# Patient Record
Sex: FEMALE | Race: WHITE | Employment: UNEMPLOYED
[De-identification: names, ages, dates, MRNs, and addresses within clinical notes are randomized per-mention and may not be internally consistent; named-entity substitution may affect disease eponyms.]

---

## 2017-01-20 ENCOUNTER — ROUTINE PRENATAL (OUTPATIENT)
Dept: OBGYN | Facility: CLINIC | Age: 22
End: 2017-01-20

## 2017-01-20 VITALS
BODY MASS INDEX: 42.76 KG/M2 | DIASTOLIC BLOOD PRESSURE: 76 MMHG | HEART RATE: 73 BPM | SYSTOLIC BLOOD PRESSURE: 138 MMHG | WEIGHT: 273 LBS

## 2017-01-20 DIAGNOSIS — O09.899 SUPERVISION OF OTHER HIGH-RISK PREGNANCY: ICD-10-CM

## 2017-01-20 DIAGNOSIS — Z3A.29 29 WEEKS GESTATION OF PREGNANCY: Primary | ICD-10-CM

## 2017-01-20 PROCEDURE — 0502F SUBSEQUENT PRENATAL CARE: CPT | Performed by: SPECIALIST

## 2017-01-30 ENCOUNTER — TELEPHONE (OUTPATIENT)
Dept: OBGYN | Facility: CLINIC | Age: 22
End: 2017-01-30

## 2017-01-30 DIAGNOSIS — I10 ESSENTIAL HYPERTENSION: ICD-10-CM

## 2017-01-30 DIAGNOSIS — O09.899 SUPERVISION OF OTHER HIGH-RISK PREGNANCY: Primary | ICD-10-CM

## 2017-01-30 DIAGNOSIS — R73.09 ELEVATED GLUCOSE TOLERANCE TEST: ICD-10-CM

## 2017-02-08 ENCOUNTER — HOSPITAL ENCOUNTER (OUTPATIENT)
Age: 22
Discharge: HOME | End: 2017-02-08
Payer: COMMERCIAL

## 2017-02-08 ENCOUNTER — HOSPITAL ENCOUNTER (OUTPATIENT)
Age: 22
Setting detail: OBSERVATION
LOS: 1 days | Discharge: HOME | End: 2017-02-08
Attending: SPECIALIST | Admitting: OBSTETRICS & GYNECOLOGY
Payer: COMMERCIAL

## 2017-02-08 VITALS
TEMPERATURE: 97.9 F | RESPIRATION RATE: 16 BRPM | BODY MASS INDEX: 42.85 KG/M2 | HEART RATE: 93 BPM | SYSTOLIC BLOOD PRESSURE: 136 MMHG | OXYGEN SATURATION: 97 % | HEIGHT: 67 IN | DIASTOLIC BLOOD PRESSURE: 74 MMHG | WEIGHT: 273 LBS

## 2017-02-08 DIAGNOSIS — R73.09 ELEVATED GLUCOSE TOLERANCE TEST: ICD-10-CM

## 2017-02-08 DIAGNOSIS — I10 ESSENTIAL HYPERTENSION: ICD-10-CM

## 2017-02-08 DIAGNOSIS — O09.899 SUPERVISION OF OTHER HIGH-RISK PREGNANCY: ICD-10-CM

## 2017-02-08 PROBLEM — Z98.891 H/O CESAREAN SECTION: Status: ACTIVE | Noted: 2017-02-08

## 2017-02-08 PROBLEM — R82.71 GBS BACTERIURIA: Status: ACTIVE | Noted: 2017-02-08

## 2017-02-08 PROBLEM — O26.899 PELVIC PAIN AFFECTING PREGNANCY: Status: ACTIVE | Noted: 2017-02-08

## 2017-02-08 PROBLEM — O26.899 HEADACHE IN PREGNANCY: Status: ACTIVE | Noted: 2017-02-08

## 2017-02-08 PROBLEM — Z87.59 HISTORY OF SPONTANEOUS ABORTION: Status: ACTIVE | Noted: 2017-02-08

## 2017-02-08 PROBLEM — R51.9 HEADACHE IN PREGNANCY: Status: ACTIVE | Noted: 2017-02-08

## 2017-02-08 PROBLEM — R10.2 PELVIC PAIN AFFECTING PREGNANCY: Status: ACTIVE | Noted: 2017-02-08

## 2017-02-08 PROBLEM — O98.819 CHLAMYDIA INFECTION AFFECTING PREGNANCY: Status: ACTIVE | Noted: 2017-02-08

## 2017-02-08 PROBLEM — A74.9 CHLAMYDIA INFECTION AFFECTING PREGNANCY: Status: ACTIVE | Noted: 2017-02-08

## 2017-02-08 LAB
AMOUNT GLUCOSE GIVEN: 100 G
BILIRUBIN URINE: NEGATIVE
COLOR: YELLOW
COMMENT UA: NORMAL
DIRECT EXAM: NORMAL
GLUCOSE FASTING: 133 MG/DL (ref 65–99)
GLUCOSE TOLERANCE TEST 1 HOUR: ABNORMAL MG/DL (ref 65–184)
GLUCOSE TOLERANCE TEST 2 HOUR: ABNORMAL MG/DL (ref 65–139)
GLUCOSE TOLERANCE TEST 3 HOUR: ABNORMAL MG/DL (ref 65–130)
GLUCOSE URINE: NEGATIVE
KETONES, URINE: NEGATIVE
LEUKOCYTE ESTERASE, URINE: NEGATIVE
Lab: NORMAL
NITRITE, URINE: NEGATIVE
PH UA: 6.5 (ref 5–8)
PROTEIN UA: NEGATIVE
SPECIFIC GRAVITY UA: 1.02 (ref 1–1.03)
SPECIMEN DESCRIPTION: NORMAL
SPECIMEN DESCRIPTION: NORMAL
STATUS: NORMAL
TURBIDITY: CLEAR
URINE HGB: NEGATIVE
UROBILINOGEN, URINE: NORMAL

## 2017-02-08 PROCEDURE — 82952 GTT-ADDED SAMPLES: CPT

## 2017-02-08 PROCEDURE — G0463 HOSPITAL OUTPT CLINIC VISIT: HCPCS

## 2017-02-08 PROCEDURE — 82947 ASSAY GLUCOSE BLOOD QUANT: CPT

## 2017-02-08 PROCEDURE — 81003 URINALYSIS AUTO W/O SCOPE: CPT

## 2017-02-08 PROCEDURE — 87591 N.GONORRHOEAE DNA AMP PROB: CPT

## 2017-02-08 PROCEDURE — 87491 CHLMYD TRACH DNA AMP PROBE: CPT

## 2017-02-08 PROCEDURE — 36415 COLL VENOUS BLD VENIPUNCTURE: CPT

## 2017-02-08 PROCEDURE — 87660 TRICHOMONAS VAGIN DIR PROBE: CPT

## 2017-02-08 PROCEDURE — 87510 GARDNER VAG DNA DIR PROBE: CPT

## 2017-02-08 PROCEDURE — 82951 GLUCOSE TOLERANCE TEST (GTT): CPT

## 2017-02-08 PROCEDURE — 6370000000 HC RX 637 (ALT 250 FOR IP): Performed by: OBSTETRICS & GYNECOLOGY

## 2017-02-08 PROCEDURE — G0378 HOSPITAL OBSERVATION PER HR: HCPCS

## 2017-02-08 PROCEDURE — 87480 CANDIDA DNA DIR PROBE: CPT

## 2017-02-08 RX ORDER — ACETAMINOPHEN 500 MG
1000 TABLET ORAL EVERY 6 HOURS PRN
Status: DISCONTINUED | OUTPATIENT
Start: 2017-02-08 | End: 2017-02-08 | Stop reason: HOSPADM

## 2017-02-08 RX ADMIN — ACETAMINOPHEN 1000 MG: 500 TABLET ORAL at 14:57

## 2017-02-08 ASSESSMENT — PAIN SCALES - GENERAL: PAINLEVEL_OUTOF10: 10

## 2017-02-09 LAB
C TRACH DNA GENITAL QL NAA+PROBE: NEGATIVE
N. GONORRHOEAE DNA: NEGATIVE

## 2017-02-13 ENCOUNTER — ROUTINE PRENATAL (OUTPATIENT)
Dept: OBGYN | Facility: CLINIC | Age: 22
End: 2017-02-13

## 2017-02-13 VITALS
DIASTOLIC BLOOD PRESSURE: 88 MMHG | SYSTOLIC BLOOD PRESSURE: 142 MMHG | WEIGHT: 274 LBS | HEART RATE: 81 BPM | BODY MASS INDEX: 42.91 KG/M2

## 2017-02-13 DIAGNOSIS — O24.410 DIET CONTROLLED GESTATIONAL DIABETES MELLITUS (GDM) IN THIRD TRIMESTER: ICD-10-CM

## 2017-02-13 DIAGNOSIS — O09.899 SUPERVISION OF OTHER HIGH-RISK PREGNANCY: Primary | ICD-10-CM

## 2017-02-13 DIAGNOSIS — I10 ESSENTIAL HYPERTENSION: ICD-10-CM

## 2017-02-13 DIAGNOSIS — Z3A.32 32 WEEKS GESTATION OF PREGNANCY: ICD-10-CM

## 2017-02-13 DIAGNOSIS — J45.20 MILD INTERMITTENT ASTHMA WITHOUT COMPLICATION: ICD-10-CM

## 2017-02-13 LAB
ACCELERATIONS > 10BPM: NORMAL
ACCELERATIONS > 15 BPM: NORMAL
ACOUSTIC STIMULATION: NORMAL
DECELERATIONS: NORMAL
FHR VARIABILITIES: NORMAL
NST ASSESSMENT: NORMAL
NST BASELINE: NORMAL
NST DURATION: NORMAL MINUTES
NST INDICATIONS: NORMAL
NST LOCATIONS: NORMAL
NST READ BY: NORMAL
NST RETURN: NORMAL
UTERINE ACTIVITY: NORMAL

## 2017-02-13 PROCEDURE — 59025 FETAL NON-STRESS TEST: CPT | Performed by: SPECIALIST

## 2017-02-13 PROCEDURE — 0502F SUBSEQUENT PRENATAL CARE: CPT | Performed by: SPECIALIST

## 2017-02-16 ENCOUNTER — TELEPHONE (OUTPATIENT)
Dept: OBGYN | Facility: CLINIC | Age: 22
End: 2017-02-16

## 2017-02-17 ENCOUNTER — HOSPITAL ENCOUNTER (OUTPATIENT)
Age: 22
Setting detail: OBSERVATION
Discharge: HOME | End: 2017-02-17
Attending: SPECIALIST | Admitting: SPECIALIST
Payer: COMMERCIAL

## 2017-02-17 VITALS
SYSTOLIC BLOOD PRESSURE: 123 MMHG | DIASTOLIC BLOOD PRESSURE: 57 MMHG | RESPIRATION RATE: 19 BRPM | TEMPERATURE: 98.5 F | HEART RATE: 97 BPM

## 2017-02-17 PROCEDURE — 80048 BASIC METABOLIC PNL TOTAL CA: CPT

## 2017-02-17 PROCEDURE — 6370000000 HC RX 637 (ALT 250 FOR IP): Performed by: OBSTETRICS & GYNECOLOGY

## 2017-02-17 PROCEDURE — G0378 HOSPITAL OBSERVATION PER HR: HCPCS

## 2017-02-17 PROCEDURE — G0379 DIRECT REFER HOSPITAL OBSERV: HCPCS

## 2017-02-17 PROCEDURE — 82947 ASSAY GLUCOSE BLOOD QUANT: CPT

## 2017-02-17 RX ORDER — ACETAMINOPHEN 500 MG
1000 TABLET ORAL ONCE
Status: COMPLETED | OUTPATIENT
Start: 2017-02-17 | End: 2017-02-17

## 2017-02-17 RX ADMIN — ACETAMINOPHEN 1000 MG: 500 TABLET ORAL at 19:57

## 2017-02-17 ASSESSMENT — PAIN SCALES - GENERAL: PAINLEVEL_OUTOF10: 10

## 2017-02-20 ENCOUNTER — ROUTINE PRENATAL (OUTPATIENT)
Dept: OBGYN | Facility: CLINIC | Age: 22
End: 2017-02-20

## 2017-02-20 VITALS
WEIGHT: 278 LBS | SYSTOLIC BLOOD PRESSURE: 129 MMHG | BODY MASS INDEX: 43.54 KG/M2 | DIASTOLIC BLOOD PRESSURE: 72 MMHG | HEART RATE: 83 BPM

## 2017-02-20 DIAGNOSIS — J45.20 MILD INTERMITTENT ASTHMA WITHOUT COMPLICATION: ICD-10-CM

## 2017-02-20 DIAGNOSIS — Z3A.33 33 WEEKS GESTATION OF PREGNANCY: ICD-10-CM

## 2017-02-20 DIAGNOSIS — O09.899 SUPERVISION OF OTHER HIGH-RISK PREGNANCY: Primary | ICD-10-CM

## 2017-02-20 DIAGNOSIS — O24.410 DIET CONTROLLED GESTATIONAL DIABETES MELLITUS (GDM) IN THIRD TRIMESTER: ICD-10-CM

## 2017-02-20 LAB
ACCELERATIONS > 10BPM: NORMAL
ACCELERATIONS > 15 BPM: NORMAL
ACOUSTIC STIMULATION: NORMAL
DECELERATIONS: NORMAL
FHR VARIABILITIES: NORMAL
GLUCOSE BLD-MCNC: 137 MG/DL (ref 65–105)
NST ASSESSMENT: NORMAL
NST BASELINE: NORMAL
NST DURATION: NORMAL MINUTES
NST INDICATIONS: NORMAL
NST LOCATIONS: NORMAL
NST READ BY: NORMAL
NST RETURN: NORMAL
UTERINE ACTIVITY: NORMAL

## 2017-02-20 PROCEDURE — 59025 FETAL NON-STRESS TEST: CPT | Performed by: SPECIALIST

## 2017-02-20 PROCEDURE — 0502F SUBSEQUENT PRENATAL CARE: CPT | Performed by: SPECIALIST

## 2017-02-28 ENCOUNTER — ROUTINE PRENATAL (OUTPATIENT)
Dept: OBGYN | Facility: CLINIC | Age: 22
End: 2017-02-28

## 2017-02-28 VITALS
SYSTOLIC BLOOD PRESSURE: 125 MMHG | DIASTOLIC BLOOD PRESSURE: 73 MMHG | BODY MASS INDEX: 43.7 KG/M2 | WEIGHT: 279 LBS | HEART RATE: 84 BPM

## 2017-02-28 DIAGNOSIS — O24.410 DIET CONTROLLED GESTATIONAL DIABETES MELLITUS (GDM) IN THIRD TRIMESTER: ICD-10-CM

## 2017-02-28 DIAGNOSIS — J45.20 MILD INTERMITTENT ASTHMA WITHOUT COMPLICATION: ICD-10-CM

## 2017-02-28 DIAGNOSIS — O09.899 SUPERVISION OF OTHER HIGH-RISK PREGNANCY: Primary | ICD-10-CM

## 2017-02-28 DIAGNOSIS — I10 ESSENTIAL HYPERTENSION: ICD-10-CM

## 2017-02-28 PROCEDURE — 0502F SUBSEQUENT PRENATAL CARE: CPT | Performed by: SPECIALIST

## 2017-02-28 PROCEDURE — 59025 FETAL NON-STRESS TEST: CPT | Performed by: SPECIALIST

## 2017-03-02 ENCOUNTER — PROCEDURE VISIT (OUTPATIENT)
Dept: OBGYN | Facility: CLINIC | Age: 22
End: 2017-03-02

## 2017-03-02 DIAGNOSIS — O24.419 GESTATIONAL DIABETES MELLITUS (GDM), ANTEPARTUM, GESTATIONAL DIABETES METHOD OF CONTROL UNSPECIFIED: Primary | ICD-10-CM

## 2017-03-02 LAB
ABDOMINAL CIRCUMFERENCE: 36.53 CM
BIPARIETAL DIAMETER: 8.4 CM
ESTIMATED FETAL WEIGHT: NORMAL GRAMS
FEMORAL DIAMETER: NORMAL CM
HC/AC: 3465
HEAD CIRCUMFERENCE: 32.88 CM

## 2017-03-07 ENCOUNTER — TELEPHONE (OUTPATIENT)
Dept: OBGYN | Facility: CLINIC | Age: 22
End: 2017-03-07

## 2017-03-15 ENCOUNTER — HOSPITAL ENCOUNTER (OUTPATIENT)
Age: 22
Discharge: HOME | End: 2017-03-15
Payer: COMMERCIAL

## 2017-03-15 ENCOUNTER — HOSPITAL ENCOUNTER (OUTPATIENT)
Age: 22
Discharge: HOME | End: 2017-03-15
Attending: SPECIALIST | Admitting: SPECIALIST
Payer: COMMERCIAL

## 2017-03-15 ENCOUNTER — TELEPHONE (OUTPATIENT)
Dept: OBGYN CLINIC | Age: 22
End: 2017-03-15

## 2017-03-15 VITALS
SYSTOLIC BLOOD PRESSURE: 118 MMHG | BODY MASS INDEX: 43.79 KG/M2 | HEART RATE: 103 BPM | RESPIRATION RATE: 18 BRPM | HEIGHT: 67 IN | TEMPERATURE: 98.2 F | DIASTOLIC BLOOD PRESSURE: 72 MMHG | WEIGHT: 279 LBS

## 2017-03-15 LAB
-: ABNORMAL
AMORPHOUS: ABNORMAL
BACTERIA: ABNORMAL
BILIRUBIN URINE: NEGATIVE
CASTS UA: ABNORMAL /LPF
COLOR: YELLOW
COMMENT UA: ABNORMAL
CRYSTALS, UA: ABNORMAL /HPF
DIRECT EXAM: NORMAL
EPITHELIAL CELLS UA: ABNORMAL /HPF
GLUCOSE BLD-MCNC: 149 MG/DL (ref 65–105)
GLUCOSE URINE: NEGATIVE
KETONES, URINE: NEGATIVE
LEUKOCYTE ESTERASE, URINE: ABNORMAL
Lab: NORMAL
MUCUS: ABNORMAL
NITRITE, URINE: NEGATIVE
OTHER OBSERVATIONS UA: ABNORMAL
PH UA: 7 (ref 5–8)
PROTEIN UA: NEGATIVE
RBC UA: ABNORMAL /HPF
RENAL EPITHELIAL, UA: ABNORMAL /HPF
SPECIFIC GRAVITY UA: 1.02 (ref 1–1.03)
SPECIMEN DESCRIPTION: NORMAL
SPECIMEN DESCRIPTION: NORMAL
STATUS: NORMAL
TRICHOMONAS: ABNORMAL
TURBIDITY: ABNORMAL
URINE HGB: NEGATIVE
UROBILINOGEN, URINE: NORMAL
WBC UA: ABNORMAL /HPF
YEAST: ABNORMAL

## 2017-03-15 PROCEDURE — 87660 TRICHOMONAS VAGIN DIR PROBE: CPT

## 2017-03-15 PROCEDURE — 82947 ASSAY GLUCOSE BLOOD QUANT: CPT

## 2017-03-15 PROCEDURE — G0463 HOSPITAL OUTPT CLINIC VISIT: HCPCS

## 2017-03-15 PROCEDURE — 6370000000 HC RX 637 (ALT 250 FOR IP): Performed by: OBSTETRICS & GYNECOLOGY

## 2017-03-15 PROCEDURE — 87086 URINE CULTURE/COLONY COUNT: CPT

## 2017-03-15 PROCEDURE — 87480 CANDIDA DNA DIR PROBE: CPT

## 2017-03-15 PROCEDURE — 87510 GARDNER VAG DNA DIR PROBE: CPT

## 2017-03-15 PROCEDURE — 81001 URINALYSIS AUTO W/SCOPE: CPT

## 2017-03-15 RX ORDER — ACETAMINOPHEN 500 MG
1000 TABLET ORAL ONCE
Status: COMPLETED | OUTPATIENT
Start: 2017-03-15 | End: 2017-03-15

## 2017-03-15 RX ADMIN — ACETAMINOPHEN 1000 MG: 500 TABLET, FILM COATED ORAL at 14:57

## 2017-03-15 ASSESSMENT — PAIN SCALES - GENERAL: PAINLEVEL_OUTOF10: 10

## 2017-03-16 LAB
CULTURE: NORMAL
CULTURE: NORMAL
Lab: NORMAL
SPECIMEN DESCRIPTION: NORMAL
SPECIMEN DESCRIPTION: NORMAL
STATUS: NORMAL

## 2017-03-23 ENCOUNTER — HOSPITAL ENCOUNTER (INPATIENT)
Age: 22
LOS: 3 days | Discharge: HOME | End: 2017-03-26
Attending: SPECIALIST | Admitting: SPECIALIST
Payer: COMMERCIAL

## 2017-03-23 ENCOUNTER — ANESTHESIA (OUTPATIENT)
Dept: LABOR AND DELIVERY | Age: 22
End: 2017-03-23
Payer: COMMERCIAL

## 2017-03-23 ENCOUNTER — ANESTHESIA EVENT (OUTPATIENT)
Dept: LABOR AND DELIVERY | Age: 22
End: 2017-03-23
Payer: COMMERCIAL

## 2017-03-23 VITALS — SYSTOLIC BLOOD PRESSURE: 105 MMHG | OXYGEN SATURATION: 98 % | DIASTOLIC BLOOD PRESSURE: 44 MMHG

## 2017-03-23 PROBLEM — Z98.891 S/P CESAREAN SECTION: Status: ACTIVE | Noted: 2017-03-23

## 2017-03-23 PROBLEM — O09.90 HRP (HIGH RISK PREGNANCY): Status: ACTIVE | Noted: 2017-03-23

## 2017-03-23 LAB
-: ABNORMAL
ABO/RH: NORMAL
ABSOLUTE BANDS #: 0.56 K/UL (ref 0–1)
ABSOLUTE EOS #: 0 K/UL (ref 0–0.4)
ABSOLUTE LYMPH #: 2.44 K/UL (ref 1–4.8)
ABSOLUTE MONO #: 0.94 K/UL (ref 0.1–1.3)
AMORPHOUS: ABNORMAL
ANTIBODY SCREEN: NEGATIVE
ARM BAND NUMBER: NORMAL
BACTERIA: ABNORMAL
BANDS: 3 % (ref 0–10)
BASOPHILS # BLD: 1 % (ref 0–2)
BASOPHILS ABSOLUTE: 0.19 K/UL (ref 0–0.2)
BILIRUBIN URINE: NEGATIVE
CASTS UA: ABNORMAL /LPF
COLOR: YELLOW
COMMENT UA: ABNORMAL
CRYSTALS, UA: ABNORMAL /HPF
DIFFERENTIAL TYPE: ABNORMAL
EOSINOPHILS RELATIVE PERCENT: 0 % (ref 0–4)
EPITHELIAL CELLS UA: ABNORMAL /HPF
EXPIRATION DATE: NORMAL
GLUCOSE BLD-MCNC: 100 MG/DL (ref 65–105)
GLUCOSE URINE: NEGATIVE
HCT VFR BLD CALC: 34.4 % (ref 36–46)
HCT VFR BLD CALC: 36 % (ref 36–46)
HEMOGLOBIN: 11.4 G/DL (ref 12–16)
HEMOGLOBIN: 12 G/DL (ref 12–16)
KETONES, URINE: NEGATIVE
LEUKOCYTE ESTERASE, URINE: ABNORMAL
LYMPHOCYTES # BLD: 13 % (ref 24–44)
MCH RBC QN AUTO: 31.1 PG (ref 26–34)
MCH RBC QN AUTO: 31.1 PG (ref 26–34)
MCHC RBC AUTO-ENTMCNC: 33 G/DL (ref 31–37)
MCHC RBC AUTO-ENTMCNC: 33.3 G/DL (ref 31–37)
MCV RBC AUTO: 93.4 FL (ref 80–100)
MCV RBC AUTO: 94 FL (ref 80–100)
MONOCYTES # BLD: 5 % (ref 1–7)
MORPHOLOGY: ABNORMAL
MUCUS: ABNORMAL
NITRITE, URINE: NEGATIVE
OTHER OBSERVATIONS UA: ABNORMAL
PDW BLD-RTO: 15.2 % (ref 11.5–14.9)
PDW BLD-RTO: 15.3 % (ref 11.5–14.9)
PH UA: 6.5 (ref 5–8)
PLATELET # BLD: 158 K/UL (ref 150–450)
PLATELET # BLD: 170 K/UL (ref 150–450)
PLATELET ESTIMATE: ABNORMAL
PMV BLD AUTO: 9 FL (ref 6–12)
PMV BLD AUTO: 9.1 FL (ref 6–12)
PROTEIN UA: NEGATIVE
RBC # BLD: 3.66 M/UL (ref 4–5.2)
RBC # BLD: 3.85 M/UL (ref 4–5.2)
RBC # BLD: ABNORMAL 10*6/UL
RBC UA: ABNORMAL /HPF
RENAL EPITHELIAL, UA: ABNORMAL /HPF
SEG NEUTROPHILS: 78 % (ref 36–66)
SEGMENTED NEUTROPHILS ABSOLUTE COUNT: 14.67 K/UL (ref 1.3–9.1)
SPECIFIC GRAVITY UA: 1.02 (ref 1–1.03)
T. PALLIDUM, IGG: NONREACTIVE
TRICHOMONAS: ABNORMAL
TURBIDITY: ABNORMAL
URINE HGB: NEGATIVE
UROBILINOGEN, URINE: NORMAL
WBC # BLD: 16.6 K/UL (ref 3.5–11)
WBC # BLD: 18.8 K/UL (ref 3.5–11)
WBC # BLD: ABNORMAL 10*3/UL
WBC UA: ABNORMAL /HPF
YEAST: ABNORMAL

## 2017-03-23 PROCEDURE — 6370000000 HC RX 637 (ALT 250 FOR IP): Performed by: OBSTETRICS & GYNECOLOGY

## 2017-03-23 PROCEDURE — 85025 COMPLETE CBC W/AUTO DIFF WBC: CPT

## 2017-03-23 PROCEDURE — 2580000003 HC RX 258: Performed by: OBSTETRICS & GYNECOLOGY

## 2017-03-23 PROCEDURE — 6360000002 HC RX W HCPCS: Performed by: OBSTETRICS & GYNECOLOGY

## 2017-03-23 PROCEDURE — 82947 ASSAY GLUCOSE BLOOD QUANT: CPT

## 2017-03-23 PROCEDURE — A6550 NEG PRES WOUND THER DRSG SET: HCPCS | Performed by: SPECIALIST

## 2017-03-23 PROCEDURE — 85027 COMPLETE CBC AUTOMATED: CPT

## 2017-03-23 PROCEDURE — 86403 PARTICLE AGGLUT ANTBDY SCRN: CPT

## 2017-03-23 PROCEDURE — 2580000003 HC RX 258: Performed by: ANESTHESIOLOGY

## 2017-03-23 PROCEDURE — 88307 TISSUE EXAM BY PATHOLOGIST: CPT

## 2017-03-23 PROCEDURE — 3700000000 HC ANESTHESIA ATTENDED CARE: Performed by: SPECIALIST

## 2017-03-23 PROCEDURE — 86900 BLOOD TYPING SEROLOGIC ABO: CPT

## 2017-03-23 PROCEDURE — 81001 URINALYSIS AUTO W/SCOPE: CPT

## 2017-03-23 PROCEDURE — 94664 DEMO&/EVAL PT USE INHALER: CPT

## 2017-03-23 PROCEDURE — 94762 N-INVAS EAR/PLS OXIMTRY CONT: CPT

## 2017-03-23 PROCEDURE — 6360000002 HC RX W HCPCS: Performed by: SPECIALIST

## 2017-03-23 PROCEDURE — 36415 COLL VENOUS BLD VENIPUNCTURE: CPT

## 2017-03-23 PROCEDURE — 7100000001 HC PACU RECOVERY - ADDTL 15 MIN: Performed by: SPECIALIST

## 2017-03-23 PROCEDURE — 6360000002 HC RX W HCPCS

## 2017-03-23 PROCEDURE — 86901 BLOOD TYPING SEROLOGIC RH(D): CPT

## 2017-03-23 PROCEDURE — 86850 RBC ANTIBODY SCREEN: CPT

## 2017-03-23 PROCEDURE — 64520 N BLOCK LUMBAR/THORACIC: CPT | Performed by: ANESTHESIOLOGY

## 2017-03-23 PROCEDURE — 3609079900 HC CESAREAN SECTION: Performed by: SPECIALIST

## 2017-03-23 PROCEDURE — 87086 URINE CULTURE/COLONY COUNT: CPT

## 2017-03-23 PROCEDURE — 6360000002 HC RX W HCPCS: Performed by: ANESTHESIOLOGY

## 2017-03-23 PROCEDURE — 7100000000 HC PACU RECOVERY - FIRST 15 MIN: Performed by: SPECIALIST

## 2017-03-23 PROCEDURE — 86780 TREPONEMA PALLIDUM: CPT

## 2017-03-23 PROCEDURE — 3700000001 HC ADD 15 MINUTES (ANESTHESIA): Performed by: SPECIALIST

## 2017-03-23 PROCEDURE — 1220000000 HC SEMI PRIVATE OB R&B

## 2017-03-23 RX ORDER — DIPHENHYDRAMINE HCL 25 MG
25 TABLET ORAL EVERY 6 HOURS PRN
Status: DISCONTINUED | OUTPATIENT
Start: 2017-03-23 | End: 2017-03-23 | Stop reason: SDUPTHER

## 2017-03-23 RX ORDER — DOCUSATE SODIUM 100 MG/1
100 CAPSULE, LIQUID FILLED ORAL 2 TIMES DAILY
Status: DISCONTINUED | OUTPATIENT
Start: 2017-03-23 | End: 2017-03-26 | Stop reason: HOSPADM

## 2017-03-23 RX ORDER — METOCLOPRAMIDE HYDROCHLORIDE 5 MG/ML
INJECTION INTRAMUSCULAR; INTRAVENOUS PRN
Status: DISCONTINUED | OUTPATIENT
Start: 2017-03-23 | End: 2017-03-23 | Stop reason: SDUPTHER

## 2017-03-23 RX ORDER — SODIUM CHLORIDE, SODIUM LACTATE, POTASSIUM CHLORIDE, CALCIUM CHLORIDE 600; 310; 30; 20 MG/100ML; MG/100ML; MG/100ML; MG/100ML
125 INJECTION, SOLUTION INTRAVENOUS CONTINUOUS
Status: DISCONTINUED | OUTPATIENT
Start: 2017-03-23 | End: 2017-03-23

## 2017-03-23 RX ORDER — ONDANSETRON HYDROCHLORIDE 8 MG/1
8 TABLET, FILM COATED ORAL EVERY 8 HOURS PRN
Status: DISCONTINUED | OUTPATIENT
Start: 2017-03-23 | End: 2017-03-26 | Stop reason: HOSPADM

## 2017-03-23 RX ORDER — ONDANSETRON 2 MG/ML
4 INJECTION INTRAMUSCULAR; INTRAVENOUS EVERY 6 HOURS PRN
Status: DISCONTINUED | OUTPATIENT
Start: 2017-03-23 | End: 2017-03-26 | Stop reason: HOSPADM

## 2017-03-23 RX ORDER — OXYCODONE HYDROCHLORIDE AND ACETAMINOPHEN 5; 325 MG/1; MG/1
2 TABLET ORAL EVERY 4 HOURS PRN
Status: DISCONTINUED | OUTPATIENT
Start: 2017-03-24 | End: 2017-03-25

## 2017-03-23 RX ORDER — ONDANSETRON 2 MG/ML
4 INJECTION INTRAMUSCULAR; INTRAVENOUS EVERY 6 HOURS PRN
Status: DISCONTINUED | OUTPATIENT
Start: 2017-03-23 | End: 2017-03-23 | Stop reason: SDUPTHER

## 2017-03-23 RX ORDER — SODIUM CHLORIDE 0.9 % (FLUSH) 0.9 %
10 SYRINGE (ML) INJECTION EVERY 12 HOURS SCHEDULED
Status: DISCONTINUED | OUTPATIENT
Start: 2017-03-23 | End: 2017-03-26 | Stop reason: HOSPADM

## 2017-03-23 RX ORDER — CALCIUM CARBONATE 200(500)MG
500 TABLET,CHEWABLE ORAL 3 TIMES DAILY PRN
Status: DISCONTINUED | OUTPATIENT
Start: 2017-03-23 | End: 2017-03-26 | Stop reason: HOSPADM

## 2017-03-23 RX ORDER — IBUPROFEN 800 MG/1
800 TABLET ORAL EVERY 8 HOURS PRN
Status: DISCONTINUED | OUTPATIENT
Start: 2017-03-24 | End: 2017-03-25

## 2017-03-23 RX ORDER — NALOXONE HYDROCHLORIDE 0.4 MG/ML
0.4 INJECTION, SOLUTION INTRAMUSCULAR; INTRAVENOUS; SUBCUTANEOUS PRN
Status: DISCONTINUED | OUTPATIENT
Start: 2017-03-23 | End: 2017-03-26 | Stop reason: HOSPADM

## 2017-03-23 RX ORDER — SODIUM CHLORIDE 0.9 % (FLUSH) 0.9 %
10 SYRINGE (ML) INJECTION EVERY 12 HOURS SCHEDULED
Status: DISCONTINUED | OUTPATIENT
Start: 2017-03-23 | End: 2017-03-23

## 2017-03-23 RX ORDER — OXYCODONE HYDROCHLORIDE AND ACETAMINOPHEN 5; 325 MG/1; MG/1
1 TABLET ORAL EVERY 4 HOURS PRN
Status: DISCONTINUED | OUTPATIENT
Start: 2017-03-24 | End: 2017-03-25

## 2017-03-23 RX ORDER — ONDANSETRON 2 MG/ML
INJECTION INTRAMUSCULAR; INTRAVENOUS PRN
Status: DISCONTINUED | OUTPATIENT
Start: 2017-03-23 | End: 2017-03-23 | Stop reason: SDUPTHER

## 2017-03-23 RX ORDER — METHYLDOPA 250 MG/1
500 TABLET, FILM COATED ORAL 2 TIMES DAILY
Status: DISCONTINUED | OUTPATIENT
Start: 2017-03-23 | End: 2017-03-26 | Stop reason: HOSPADM

## 2017-03-23 RX ORDER — SODIUM CHLORIDE 0.9 % (FLUSH) 0.9 %
10 SYRINGE (ML) INJECTION PRN
Status: DISCONTINUED | OUTPATIENT
Start: 2017-03-23 | End: 2017-03-23

## 2017-03-23 RX ORDER — FAMOTIDINE 20 MG/1
20 TABLET, FILM COATED ORAL 2 TIMES DAILY
Status: DISCONTINUED | OUTPATIENT
Start: 2017-03-23 | End: 2017-03-26 | Stop reason: HOSPADM

## 2017-03-23 RX ORDER — SODIUM CHLORIDE 9 MG/ML
125 INJECTION, SOLUTION INTRAVENOUS CONTINUOUS
Status: DISCONTINUED | OUTPATIENT
Start: 2017-03-23 | End: 2017-03-23

## 2017-03-23 RX ORDER — KETOROLAC TROMETHAMINE 30 MG/ML
30 INJECTION, SOLUTION INTRAMUSCULAR; INTRAVENOUS EVERY 6 HOURS PRN
Status: DISPENSED | OUTPATIENT
Start: 2017-03-23 | End: 2017-03-24

## 2017-03-23 RX ORDER — SODIUM CHLORIDE 9 MG/ML
INJECTION, SOLUTION INTRAVENOUS CONTINUOUS PRN
Status: DISCONTINUED | OUTPATIENT
Start: 2017-03-23 | End: 2017-03-23 | Stop reason: SDUPTHER

## 2017-03-23 RX ORDER — OXYCODONE HYDROCHLORIDE AND ACETAMINOPHEN 5; 325 MG/1; MG/1
1 TABLET ORAL EVERY 4 HOURS PRN
Status: DISCONTINUED | OUTPATIENT
Start: 2017-03-23 | End: 2017-03-23 | Stop reason: SDUPTHER

## 2017-03-23 RX ORDER — SCOLOPAMINE TRANSDERMAL SYSTEM 1 MG/1
1 PATCH, EXTENDED RELEASE TRANSDERMAL
Status: DISCONTINUED | OUTPATIENT
Start: 2017-03-23 | End: 2017-03-26 | Stop reason: HOSPADM

## 2017-03-23 RX ORDER — LANOLIN 100 %
OINTMENT (GRAM) TOPICAL
Status: DISCONTINUED | OUTPATIENT
Start: 2017-03-23 | End: 2017-03-26 | Stop reason: HOSPADM

## 2017-03-23 RX ORDER — MIDAZOLAM HYDROCHLORIDE 1 MG/ML
INJECTION INTRAMUSCULAR; INTRAVENOUS PRN
Status: DISCONTINUED | OUTPATIENT
Start: 2017-03-23 | End: 2017-03-23 | Stop reason: SDUPTHER

## 2017-03-23 RX ORDER — SODIUM CHLORIDE 0.9 % (FLUSH) 0.9 %
10 SYRINGE (ML) INJECTION PRN
Status: DISCONTINUED | OUTPATIENT
Start: 2017-03-23 | End: 2017-03-26 | Stop reason: HOSPADM

## 2017-03-23 RX ORDER — SODIUM CHLORIDE 9 MG/ML
INJECTION, SOLUTION INTRAVENOUS CONTINUOUS
Status: DISCONTINUED | OUTPATIENT
Start: 2017-03-23 | End: 2017-03-26 | Stop reason: HOSPADM

## 2017-03-23 RX ORDER — DIPHENHYDRAMINE HYDROCHLORIDE 50 MG/ML
25 INJECTION INTRAMUSCULAR; INTRAVENOUS EVERY 6 HOURS PRN
Status: DISCONTINUED | OUTPATIENT
Start: 2017-03-23 | End: 2017-03-26 | Stop reason: HOSPADM

## 2017-03-23 RX ADMIN — SODIUM CITRATE AND CITRIC ACID MONOHYDRATE 30 ML: 500; 334 SOLUTION ORAL at 08:34

## 2017-03-23 RX ADMIN — KETOROLAC TROMETHAMINE 30 MG: 30 INJECTION, SOLUTION INTRAMUSCULAR at 10:57

## 2017-03-23 RX ADMIN — SODIUM CHLORIDE: 9 INJECTION, SOLUTION INTRAVENOUS at 08:40

## 2017-03-23 RX ADMIN — SODIUM CHLORIDE 75 ML/HR: 9 INJECTION, SOLUTION INTRAVENOUS at 10:59

## 2017-03-23 RX ADMIN — Medication 2 G: at 17:33

## 2017-03-23 RX ADMIN — PHENYLEPHRINE HYDROCHLORIDE 100 MCG: 10 INJECTION INTRAVENOUS at 08:55

## 2017-03-23 RX ADMIN — SODIUM CHLORIDE: 9 INJECTION, SOLUTION INTRAVENOUS at 09:13

## 2017-03-23 RX ADMIN — KETOROLAC TROMETHAMINE 30 MG: 30 INJECTION, SOLUTION INTRAMUSCULAR at 16:55

## 2017-03-23 RX ADMIN — SODIUM CHLORIDE 125 ML/HR: 9 INJECTION, SOLUTION INTRAVENOUS at 07:00

## 2017-03-23 RX ADMIN — METOCLOPRAMIDE 10 MG: 5 INJECTION, SOLUTION INTRAMUSCULAR; INTRAVENOUS at 09:29

## 2017-03-23 RX ADMIN — Medication 500 ML: at 09:23

## 2017-03-23 RX ADMIN — DIPHENHYDRAMINE HYDROCHLORIDE 25 MG: 50 INJECTION, SOLUTION INTRAMUSCULAR; INTRAVENOUS at 14:34

## 2017-03-23 RX ADMIN — ONDANSETRON 4 MG: 2 INJECTION, SOLUTION INTRAMUSCULAR; INTRAVENOUS at 09:24

## 2017-03-23 RX ADMIN — PHENYLEPHRINE HYDROCHLORIDE 100 MCG: 10 INJECTION INTRAVENOUS at 09:06

## 2017-03-23 RX ADMIN — KETOROLAC TROMETHAMINE 30 MG: 30 INJECTION, SOLUTION INTRAMUSCULAR at 22:48

## 2017-03-23 RX ADMIN — Medication 50 MILLI-UNITS/MIN: at 10:57

## 2017-03-23 RX ADMIN — PHENYLEPHRINE HYDROCHLORIDE 100 MCG: 10 INJECTION INTRAVENOUS at 09:39

## 2017-03-23 RX ADMIN — MIDAZOLAM HYDROCHLORIDE 2 MG: 1 INJECTION, SOLUTION INTRAMUSCULAR; INTRAVENOUS at 09:52

## 2017-03-23 RX ADMIN — PHENYLEPHRINE HYDROCHLORIDE 100 MCG: 10 INJECTION INTRAVENOUS at 09:13

## 2017-03-23 RX ADMIN — FAMOTIDINE 20 MG: 20 TABLET ORAL at 20:44

## 2017-03-23 RX ADMIN — Medication 3 G: at 08:33

## 2017-03-23 RX ADMIN — PHENYLEPHRINE HYDROCHLORIDE 100 MCG: 10 INJECTION INTRAVENOUS at 09:02

## 2017-03-23 RX ADMIN — PHENYLEPHRINE HYDROCHLORIDE 100 MCG: 10 INJECTION INTRAVENOUS at 09:29

## 2017-03-23 RX ADMIN — DOCUSATE SODIUM 100 MG: 100 CAPSULE, LIQUID FILLED ORAL at 20:44

## 2017-03-23 ASSESSMENT — PAIN SCALES - GENERAL
PAINLEVEL_OUTOF10: 2
PAINLEVEL_OUTOF10: 8
PAINLEVEL_OUTOF10: 3
PAINLEVEL_OUTOF10: 2
PAINLEVEL_OUTOF10: 9

## 2017-03-23 ASSESSMENT — PAIN DESCRIPTION - PAIN TYPE
TYPE: SURGICAL PAIN
TYPE: SURGICAL PAIN

## 2017-03-23 ASSESSMENT — PAIN DESCRIPTION - ORIENTATION
ORIENTATION: LOWER
ORIENTATION: LOWER

## 2017-03-23 ASSESSMENT — PAIN DESCRIPTION - LOCATION
LOCATION: ABDOMEN
LOCATION: ABDOMEN

## 2017-03-24 LAB
CULTURE: NORMAL
CULTURE: NORMAL
HCT VFR BLD CALC: 30.1 % (ref 36–46)
HEMOGLOBIN: 10 G/DL (ref 12–16)
Lab: NORMAL
SPECIMEN DESCRIPTION: NORMAL
SPECIMEN DESCRIPTION: NORMAL
STATUS: NORMAL

## 2017-03-24 PROCEDURE — 6370000000 HC RX 637 (ALT 250 FOR IP): Performed by: OBSTETRICS & GYNECOLOGY

## 2017-03-24 PROCEDURE — 85014 HEMATOCRIT: CPT

## 2017-03-24 PROCEDURE — 6360000002 HC RX W HCPCS: Performed by: OBSTETRICS & GYNECOLOGY

## 2017-03-24 PROCEDURE — 2580000003 HC RX 258: Performed by: OBSTETRICS & GYNECOLOGY

## 2017-03-24 PROCEDURE — 36415 COLL VENOUS BLD VENIPUNCTURE: CPT

## 2017-03-24 PROCEDURE — 85018 HEMOGLOBIN: CPT

## 2017-03-24 PROCEDURE — 1220000000 HC SEMI PRIVATE OB R&B

## 2017-03-24 PROCEDURE — 6360000002 HC RX W HCPCS: Performed by: SPECIALIST

## 2017-03-24 RX ORDER — SIMETHICONE 80 MG
80 TABLET,CHEWABLE ORAL EVERY 6 HOURS PRN
Status: DISCONTINUED | OUTPATIENT
Start: 2017-03-24 | End: 2017-03-26 | Stop reason: HOSPADM

## 2017-03-24 RX ADMIN — DOCUSATE SODIUM 100 MG: 100 CAPSULE, LIQUID FILLED ORAL at 19:56

## 2017-03-24 RX ADMIN — Medication 10 ML: at 10:39

## 2017-03-24 RX ADMIN — ANTACID TABLETS 500 MG: 500 TABLET, CHEWABLE ORAL at 19:55

## 2017-03-24 RX ADMIN — DOCUSATE SODIUM 100 MG: 100 CAPSULE, LIQUID FILLED ORAL at 09:49

## 2017-03-24 RX ADMIN — OXYCODONE HYDROCHLORIDE AND ACETAMINOPHEN 2 TABLET: 5; 325 TABLET ORAL at 13:26

## 2017-03-24 RX ADMIN — OXYCODONE HYDROCHLORIDE AND ACETAMINOPHEN 2 TABLET: 5; 325 TABLET ORAL at 21:40

## 2017-03-24 RX ADMIN — OXYCODONE HYDROCHLORIDE AND ACETAMINOPHEN 2 TABLET: 5; 325 TABLET ORAL at 05:39

## 2017-03-24 RX ADMIN — IBUPROFEN 800 MG: 800 TABLET, FILM COATED ORAL at 13:25

## 2017-03-24 RX ADMIN — IBUPROFEN 800 MG: 800 TABLET, FILM COATED ORAL at 19:55

## 2017-03-24 RX ADMIN — OXYCODONE HYDROCHLORIDE AND ACETAMINOPHEN 2 TABLET: 5; 325 TABLET ORAL at 09:49

## 2017-03-24 RX ADMIN — IBUPROFEN 800 MG: 800 TABLET, FILM COATED ORAL at 05:38

## 2017-03-24 RX ADMIN — Medication 2 G: at 01:55

## 2017-03-24 RX ADMIN — Medication 10 ML: at 21:41

## 2017-03-24 RX ADMIN — MAGNESIUM HYDROXIDE 30 ML: 400 SUSPENSION ORAL at 21:40

## 2017-03-24 RX ADMIN — FAMOTIDINE 20 MG: 20 TABLET ORAL at 09:49

## 2017-03-24 RX ADMIN — OXYCODONE HYDROCHLORIDE AND ACETAMINOPHEN 2 TABLET: 5; 325 TABLET ORAL at 17:35

## 2017-03-24 RX ADMIN — ONDANSETRON 4 MG: 2 INJECTION INTRAMUSCULAR; INTRAVENOUS at 10:39

## 2017-03-24 RX ADMIN — SODIUM CHLORIDE: 9 INJECTION, SOLUTION INTRAVENOUS at 00:58

## 2017-03-24 RX ADMIN — FAMOTIDINE 20 MG: 20 TABLET ORAL at 19:55

## 2017-03-24 ASSESSMENT — PAIN SCALES - GENERAL
PAINLEVEL_OUTOF10: 0
PAINLEVEL_OUTOF10: 10
PAINLEVEL_OUTOF10: 8
PAINLEVEL_OUTOF10: 6
PAINLEVEL_OUTOF10: 9

## 2017-03-25 ENCOUNTER — APPOINTMENT (OUTPATIENT)
Dept: GENERAL RADIOLOGY | Age: 22
End: 2017-03-25
Payer: COMMERCIAL

## 2017-03-25 LAB
ABSOLUTE EOS #: 0.31 K/UL (ref 0–0.4)
ABSOLUTE LYMPH #: 1.39 K/UL (ref 1–4.8)
ABSOLUTE MONO #: 0.62 K/UL (ref 0.1–1.3)
ANION GAP SERPL CALCULATED.3IONS-SCNC: 12 MMOL/L (ref 9–17)
BASOPHILS # BLD: 0 % (ref 0–2)
BASOPHILS ABSOLUTE: 0 K/UL (ref 0–0.2)
BUN BLDV-MCNC: 6 MG/DL (ref 6–20)
BUN/CREAT BLD: ABNORMAL (ref 9–20)
CALCIUM SERPL-MCNC: 8.4 MG/DL (ref 8.6–10.4)
CHLORIDE BLD-SCNC: 105 MMOL/L (ref 98–107)
CO2: 22 MMOL/L (ref 20–31)
CREAT SERPL-MCNC: 0.7 MG/DL (ref 0.5–0.9)
DIFFERENTIAL TYPE: ABNORMAL
EOSINOPHILS RELATIVE PERCENT: 2 % (ref 0–4)
GFR AFRICAN AMERICAN: >60 ML/MIN
GFR NON-AFRICAN AMERICAN: >60 ML/MIN
GFR SERPL CREATININE-BSD FRML MDRD: ABNORMAL ML/MIN/{1.73_M2}
GFR SERPL CREATININE-BSD FRML MDRD: ABNORMAL ML/MIN/{1.73_M2}
GLUCOSE BLD-MCNC: 111 MG/DL (ref 70–99)
HCT VFR BLD CALC: 28.3 % (ref 36–46)
HEMOGLOBIN: 9.3 G/DL (ref 12–16)
LYMPHOCYTES # BLD: 9 % (ref 24–44)
MCH RBC QN AUTO: 31.4 PG (ref 26–34)
MCHC RBC AUTO-ENTMCNC: 33 G/DL (ref 31–37)
MCV RBC AUTO: 95.3 FL (ref 80–100)
MONOCYTES # BLD: 4 % (ref 1–7)
MORPHOLOGY: ABNORMAL
PDW BLD-RTO: 15.4 % (ref 11.5–14.9)
PLATELET # BLD: 155 K/UL (ref 150–450)
PLATELET ESTIMATE: ABNORMAL
PMV BLD AUTO: 8.4 FL (ref 6–12)
POTASSIUM SERPL-SCNC: 3.9 MMOL/L (ref 3.7–5.3)
RBC # BLD: 2.97 M/UL (ref 4–5.2)
RBC # BLD: ABNORMAL 10*6/UL
SEG NEUTROPHILS: 85 % (ref 36–66)
SEGMENTED NEUTROPHILS ABSOLUTE COUNT: 13.08 K/UL (ref 1.3–9.1)
SODIUM BLD-SCNC: 139 MMOL/L (ref 135–144)
WBC # BLD: 15.4 K/UL (ref 3.5–11)
WBC # BLD: ABNORMAL 10*3/UL

## 2017-03-25 PROCEDURE — 6370000000 HC RX 637 (ALT 250 FOR IP): Performed by: OBSTETRICS & GYNECOLOGY

## 2017-03-25 PROCEDURE — 6360000002 HC RX W HCPCS: Performed by: OBSTETRICS & GYNECOLOGY

## 2017-03-25 PROCEDURE — 85025 COMPLETE CBC W/AUTO DIFF WBC: CPT

## 2017-03-25 PROCEDURE — 74000 XR ABDOMEN LIMITED (KUB): CPT

## 2017-03-25 PROCEDURE — 2580000003 HC RX 258: Performed by: OBSTETRICS & GYNECOLOGY

## 2017-03-25 PROCEDURE — 80048 BASIC METABOLIC PNL TOTAL CA: CPT

## 2017-03-25 PROCEDURE — 1220000000 HC SEMI PRIVATE OB R&B

## 2017-03-25 PROCEDURE — 36415 COLL VENOUS BLD VENIPUNCTURE: CPT

## 2017-03-25 RX ORDER — IBUPROFEN 600 MG/1
600 TABLET ORAL EVERY 6 HOURS PRN
Qty: 60 TABLET | Refills: 0 | Status: SHIPPED | OUTPATIENT
Start: 2017-03-25 | End: 2018-11-16 | Stop reason: SINTOL

## 2017-03-25 RX ORDER — SIMETHICONE 80 MG
80 TABLET,CHEWABLE ORAL EVERY 6 HOURS PRN
Qty: 60 TABLET | Refills: 0 | Status: SHIPPED | OUTPATIENT
Start: 2017-03-25 | End: 2018-11-16 | Stop reason: ALTCHOICE

## 2017-03-25 RX ORDER — PSEUDOEPHEDRINE HCL 30 MG
100 TABLET ORAL 2 TIMES DAILY
Qty: 60 CAPSULE | Refills: 1 | Status: SHIPPED | OUTPATIENT
Start: 2017-03-25 | End: 2017-03-31 | Stop reason: ALTCHOICE

## 2017-03-25 RX ORDER — OXYCODONE HYDROCHLORIDE AND ACETAMINOPHEN 5; 325 MG/1; MG/1
1 TABLET ORAL EVERY 6 HOURS PRN
Qty: 15 TABLET | Refills: 0 | Status: SHIPPED | OUTPATIENT
Start: 2017-03-25 | End: 2017-03-31 | Stop reason: SDUPTHER

## 2017-03-25 RX ORDER — ACETAMINOPHEN 500 MG
1000 TABLET ORAL EVERY 6 HOURS PRN
Status: DISCONTINUED | OUTPATIENT
Start: 2017-03-25 | End: 2017-03-26 | Stop reason: HOSPADM

## 2017-03-25 RX ORDER — IBUPROFEN 600 MG/1
600 TABLET ORAL EVERY 6 HOURS PRN
Status: DISCONTINUED | OUTPATIENT
Start: 2017-03-25 | End: 2017-03-26 | Stop reason: HOSPADM

## 2017-03-25 RX ORDER — IBUPROFEN 600 MG/1
600 TABLET ORAL EVERY 6 HOURS PRN
Status: DISCONTINUED | OUTPATIENT
Start: 2017-03-25 | End: 2017-03-25

## 2017-03-25 RX ORDER — ACETAMINOPHEN 500 MG
1000 TABLET ORAL EVERY 6 HOURS PRN
Status: DISCONTINUED | OUTPATIENT
Start: 2017-03-25 | End: 2017-03-25

## 2017-03-25 RX ORDER — KETOROLAC TROMETHAMINE 30 MG/ML
30 INJECTION, SOLUTION INTRAMUSCULAR; INTRAVENOUS EVERY 6 HOURS
Status: DISCONTINUED | OUTPATIENT
Start: 2017-03-25 | End: 2017-03-25

## 2017-03-25 RX ADMIN — SODIUM CHLORIDE: 9 INJECTION, SOLUTION INTRAVENOUS at 10:23

## 2017-03-25 RX ADMIN — OXYCODONE HYDROCHLORIDE AND ACETAMINOPHEN 2 TABLET: 5; 325 TABLET ORAL at 01:21

## 2017-03-25 RX ADMIN — ANTACID TABLETS 500 MG: 500 TABLET, CHEWABLE ORAL at 22:24

## 2017-03-25 RX ADMIN — KETOROLAC TROMETHAMINE 30 MG: 30 INJECTION, SOLUTION INTRAMUSCULAR at 04:21

## 2017-03-25 RX ADMIN — MAGNESIUM HYDROXIDE 30 ML: 400 SUSPENSION ORAL at 08:04

## 2017-03-25 RX ADMIN — FAMOTIDINE 20 MG: 20 TABLET ORAL at 08:04

## 2017-03-25 RX ADMIN — ACETAMINOPHEN 1000 MG: 500 TABLET, FILM COATED ORAL at 22:25

## 2017-03-25 RX ADMIN — KETOROLAC TROMETHAMINE 30 MG: 30 INJECTION, SOLUTION INTRAMUSCULAR at 11:21

## 2017-03-25 RX ADMIN — SIMETHICONE CHEW TAB 80 MG 80 MG: 80 TABLET ORAL at 01:22

## 2017-03-25 RX ADMIN — FAMOTIDINE 20 MG: 20 TABLET ORAL at 22:25

## 2017-03-25 RX ADMIN — ANTACID TABLETS 500 MG: 500 TABLET, CHEWABLE ORAL at 15:54

## 2017-03-25 RX ADMIN — MAGNESIUM HYDROXIDE 30 ML: 400 SUSPENSION ORAL at 22:25

## 2017-03-25 RX ADMIN — KETOROLAC TROMETHAMINE 30 MG: 30 INJECTION, SOLUTION INTRAMUSCULAR at 17:34

## 2017-03-25 RX ADMIN — DOCUSATE SODIUM 100 MG: 100 CAPSULE, LIQUID FILLED ORAL at 22:25

## 2017-03-25 RX ADMIN — SIMETHICONE CHEW TAB 80 MG 80 MG: 80 TABLET ORAL at 08:04

## 2017-03-25 RX ADMIN — SIMETHICONE CHEW TAB 80 MG 80 MG: 80 TABLET ORAL at 15:54

## 2017-03-25 RX ADMIN — SIMETHICONE CHEW TAB 80 MG 80 MG: 80 TABLET ORAL at 22:24

## 2017-03-25 RX ADMIN — ONDANSETRON 4 MG: 2 INJECTION INTRAMUSCULAR; INTRAVENOUS at 04:12

## 2017-03-25 RX ADMIN — DOCUSATE SODIUM 100 MG: 100 CAPSULE, LIQUID FILLED ORAL at 08:04

## 2017-03-25 ASSESSMENT — PAIN SCALES - GENERAL
PAINLEVEL_OUTOF10: 9
PAINLEVEL_OUTOF10: 10
PAINLEVEL_OUTOF10: 6
PAINLEVEL_OUTOF10: 5
PAINLEVEL_OUTOF10: 6
PAINLEVEL_OUTOF10: 4

## 2017-03-26 VITALS
TEMPERATURE: 98.8 F | DIASTOLIC BLOOD PRESSURE: 54 MMHG | OXYGEN SATURATION: 98 % | WEIGHT: 274 LBS | BODY MASS INDEX: 43 KG/M2 | SYSTOLIC BLOOD PRESSURE: 108 MMHG | HEIGHT: 67 IN | HEART RATE: 72 BPM | RESPIRATION RATE: 16 BRPM

## 2017-03-26 PROCEDURE — 6370000000 HC RX 637 (ALT 250 FOR IP): Performed by: OBSTETRICS & GYNECOLOGY

## 2017-03-26 RX ADMIN — ACETAMINOPHEN 1000 MG: 500 TABLET, FILM COATED ORAL at 15:00

## 2017-03-26 RX ADMIN — FAMOTIDINE 20 MG: 20 TABLET ORAL at 09:20

## 2017-03-26 RX ADMIN — IBUPROFEN 600 MG: 600 TABLET, FILM COATED ORAL at 09:20

## 2017-03-26 RX ADMIN — DOCUSATE SODIUM 100 MG: 100 CAPSULE, LIQUID FILLED ORAL at 09:20

## 2017-03-26 ASSESSMENT — PAIN SCALES - GENERAL
PAINLEVEL_OUTOF10: 1
PAINLEVEL_OUTOF10: 2
PAINLEVEL_OUTOF10: 4
PAINLEVEL_OUTOF10: 2

## 2017-03-26 ASSESSMENT — PAIN DESCRIPTION - PAIN TYPE
TYPE: ACUTE PAIN
TYPE: ACUTE PAIN

## 2017-03-26 ASSESSMENT — PAIN DESCRIPTION - LOCATION
LOCATION: ABDOMEN
LOCATION: ABDOMEN

## 2017-03-27 LAB — SURGICAL PATHOLOGY REPORT: NORMAL

## 2017-03-31 ENCOUNTER — POSTPARTUM VISIT (OUTPATIENT)
Dept: OBGYN CLINIC | Age: 22
End: 2017-03-31

## 2017-03-31 VITALS
SYSTOLIC BLOOD PRESSURE: 135 MMHG | BODY MASS INDEX: 40.57 KG/M2 | RESPIRATION RATE: 18 BRPM | DIASTOLIC BLOOD PRESSURE: 89 MMHG | WEIGHT: 259 LBS | TEMPERATURE: 98.5 F | HEART RATE: 87 BPM

## 2017-03-31 DIAGNOSIS — Z48.89 POSTOPERATIVE VISIT: Primary | ICD-10-CM

## 2017-03-31 PROCEDURE — 99024 POSTOP FOLLOW-UP VISIT: CPT | Performed by: SPECIALIST

## 2017-03-31 RX ORDER — OXYCODONE HYDROCHLORIDE AND ACETAMINOPHEN 5; 325 MG/1; MG/1
1 TABLET ORAL EVERY 6 HOURS PRN
Qty: 20 TABLET | Refills: 0 | Status: SHIPPED | OUTPATIENT
Start: 2017-03-31 | End: 2017-04-07

## 2017-03-31 ASSESSMENT — ENCOUNTER SYMPTOMS
COUGH: 0
CONSTIPATION: 0
VOMITING: 0
EYE PAIN: 0
ABDOMINAL DISTENTION: 0
DIARRHEA: 0
ABDOMINAL PAIN: 0
APNEA: 0
NAUSEA: 0

## 2017-05-23 ENCOUNTER — POSTPARTUM VISIT (OUTPATIENT)
Dept: OBGYN CLINIC | Age: 22
End: 2017-05-23
Payer: COMMERCIAL

## 2017-05-23 VITALS
WEIGHT: 257 LBS | RESPIRATION RATE: 16 BRPM | BODY MASS INDEX: 40.25 KG/M2 | HEART RATE: 78 BPM | DIASTOLIC BLOOD PRESSURE: 78 MMHG | SYSTOLIC BLOOD PRESSURE: 137 MMHG

## 2017-05-23 DIAGNOSIS — Z86.32 HISTORY OF GESTATIONAL DIABETES: ICD-10-CM

## 2017-05-23 DIAGNOSIS — Z30.017 NEXPLANON INSERTION: Primary | ICD-10-CM

## 2017-05-23 PROCEDURE — 11981 INSERTION DRUG DLVR IMPLANT: CPT | Performed by: SPECIALIST

## 2017-05-23 PROCEDURE — 81025 URINE PREGNANCY TEST: CPT | Performed by: SPECIALIST

## 2017-05-23 ASSESSMENT — ENCOUNTER SYMPTOMS
APNEA: 0
EYE PAIN: 0
VOMITING: 0
DIARRHEA: 0
NAUSEA: 0
CONSTIPATION: 0
COUGH: 0
ABDOMINAL PAIN: 0
ABDOMINAL DISTENTION: 0

## 2017-06-08 ENCOUNTER — OFFICE VISIT (OUTPATIENT)
Dept: OBGYN CLINIC | Age: 22
End: 2017-06-08
Payer: COMMERCIAL

## 2017-06-08 VITALS
OXYGEN SATURATION: 96 % | WEIGHT: 253 LBS | HEART RATE: 76 BPM | BODY MASS INDEX: 39.71 KG/M2 | DIASTOLIC BLOOD PRESSURE: 66 MMHG | RESPIRATION RATE: 18 BRPM | HEIGHT: 67 IN | SYSTOLIC BLOOD PRESSURE: 123 MMHG | TEMPERATURE: 98 F

## 2017-06-08 DIAGNOSIS — Z30.40 ENCOUNTER FOR SURVEILLANCE OF CONTRACEPTIVES: Primary | ICD-10-CM

## 2017-06-08 PROCEDURE — 99212 OFFICE O/P EST SF 10 MIN: CPT | Performed by: SPECIALIST

## 2017-06-08 RX ORDER — CEPHALEXIN 500 MG/1
500 CAPSULE ORAL 4 TIMES DAILY
Qty: 20 CAPSULE | Refills: 0 | Status: SHIPPED | OUTPATIENT
Start: 2017-06-08 | End: 2017-06-13

## 2017-06-08 ASSESSMENT — ENCOUNTER SYMPTOMS
VOMITING: 0
EYE PAIN: 0
ABDOMINAL PAIN: 0
CONSTIPATION: 0
ROS SKIN COMMENTS: RASH ON FACE
ABDOMINAL DISTENTION: 0
NAUSEA: 0
DIARRHEA: 0
COUGH: 0
APNEA: 0

## 2017-06-08 ASSESSMENT — PATIENT HEALTH QUESTIONNAIRE - PHQ9
9. THOUGHTS THAT YOU WOULD BE BETTER OFF DEAD, OR OF HURTING YOURSELF: 0
4. FEELING TIRED OR HAVING LITTLE ENERGY: 3
2. FEELING DOWN, DEPRESSED OR HOPELESS: 2
5. POOR APPETITE OR OVEREATING: 3
8. MOVING OR SPEAKING SO SLOWLY THAT OTHER PEOPLE COULD HAVE NOTICED. OR THE OPPOSITE, BEING SO FIGETY OR RESTLESS THAT YOU HAVE BEEN MOVING AROUND A LOT MORE THAN USUAL: 2
7. TROUBLE CONCENTRATING ON THINGS, SUCH AS READING THE NEWSPAPER OR WATCHING TELEVISION: 2
SUM OF ALL RESPONSES TO PHQ QUESTIONS 1-9: 20
6. FEELING BAD ABOUT YOURSELF - OR THAT YOU ARE A FAILURE OR HAVE LET YOURSELF OR YOUR FAMILY DOWN: 3
3. TROUBLE FALLING OR STAYING ASLEEP: 3
1. LITTLE INTEREST OR PLEASURE IN DOING THINGS: 2
10. IF YOU CHECKED OFF ANY PROBLEMS, HOW DIFFICULT HAVE THESE PROBLEMS MADE IT FOR YOU TO DO YOUR WORK, TAKE CARE OF THINGS AT HOME, OR GET ALONG WITH OTHER PEOPLE: 2
SUM OF ALL RESPONSES TO PHQ9 QUESTIONS 1 & 2: 4

## 2018-06-04 ENCOUNTER — HOSPITAL ENCOUNTER (OUTPATIENT)
Age: 23
Setting detail: SPECIMEN
Discharge: HOME | End: 2018-06-04
Payer: COMMERCIAL

## 2018-06-04 ENCOUNTER — OFFICE VISIT (OUTPATIENT)
Dept: OBGYN CLINIC | Age: 23
End: 2018-06-04
Payer: COMMERCIAL

## 2018-06-04 VITALS
SYSTOLIC BLOOD PRESSURE: 127 MMHG | BODY MASS INDEX: 44.29 KG/M2 | HEART RATE: 92 BPM | WEIGHT: 282.2 LBS | HEIGHT: 67 IN | DIASTOLIC BLOOD PRESSURE: 83 MMHG

## 2018-06-04 DIAGNOSIS — N76.0 VULVOVAGINITIS: Primary | ICD-10-CM

## 2018-06-04 DIAGNOSIS — N76.0 VULVOVAGINITIS: ICD-10-CM

## 2018-06-04 DIAGNOSIS — Z86.19 HISTORY OF CHLAMYDIA: ICD-10-CM

## 2018-06-04 PROCEDURE — G8427 DOCREV CUR MEDS BY ELIG CLIN: HCPCS | Performed by: SPECIALIST

## 2018-06-04 PROCEDURE — G8417 CALC BMI ABV UP PARAM F/U: HCPCS | Performed by: SPECIALIST

## 2018-06-04 PROCEDURE — 99213 OFFICE O/P EST LOW 20 MIN: CPT | Performed by: SPECIALIST

## 2018-06-04 PROCEDURE — 1036F TOBACCO NON-USER: CPT | Performed by: SPECIALIST

## 2018-06-04 RX ORDER — FLUCONAZOLE 100 MG/1
100 TABLET ORAL DAILY
Qty: 7 TABLET | Refills: 1 | Status: SHIPPED | OUTPATIENT
Start: 2018-06-04 | End: 2018-06-11

## 2018-06-04 ASSESSMENT — ENCOUNTER SYMPTOMS
CONSTIPATION: 0
NAUSEA: 0
ABDOMINAL PAIN: 0
DIARRHEA: 0
VOMITING: 0
APNEA: 0
ABDOMINAL DISTENTION: 0
COUGH: 0
EYE PAIN: 0

## 2018-06-05 LAB
C TRACH DNA GENITAL QL NAA+PROBE: NEGATIVE
N. GONORRHOEAE DNA: NEGATIVE

## 2018-11-06 ENCOUNTER — APPOINTMENT (OUTPATIENT)
Dept: CT IMAGING | Age: 23
DRG: 251 | End: 2018-11-06
Payer: COMMERCIAL

## 2018-11-06 ENCOUNTER — HOSPITAL ENCOUNTER (INPATIENT)
Age: 23
LOS: 1 days | Discharge: HOME | DRG: 251 | End: 2018-11-08
Attending: EMERGENCY MEDICINE | Admitting: SURGERY
Payer: COMMERCIAL

## 2018-11-06 DIAGNOSIS — R11.2 NAUSEA VOMITING AND DIARRHEA: ICD-10-CM

## 2018-11-06 DIAGNOSIS — R10.31 RIGHT LOWER QUADRANT ABDOMINAL PAIN: Primary | ICD-10-CM

## 2018-11-06 DIAGNOSIS — R19.7 NAUSEA VOMITING AND DIARRHEA: ICD-10-CM

## 2018-11-06 PROBLEM — R10.9 ABDOMINAL PAIN: Status: ACTIVE | Noted: 2018-11-06

## 2018-11-06 LAB
-: NORMAL
ABSOLUTE BANDS #: 0.37 K/UL (ref 0–1)
ABSOLUTE EOS #: 0.19 K/UL (ref 0–0.4)
ABSOLUTE IMMATURE GRANULOCYTE: ABNORMAL K/UL (ref 0–0.3)
ABSOLUTE LYMPH #: 2.22 K/UL (ref 1–4.8)
ABSOLUTE MONO #: 0.56 K/UL (ref 0.1–1.3)
ALBUMIN SERPL-MCNC: 4.7 G/DL (ref 3.5–5.2)
ALBUMIN/GLOBULIN RATIO: ABNORMAL (ref 1–2.5)
ALP BLD-CCNC: 80 U/L (ref 35–104)
ALT SERPL-CCNC: 50 U/L (ref 5–33)
ANION GAP SERPL CALCULATED.3IONS-SCNC: 16 MMOL/L (ref 9–17)
AST SERPL-CCNC: 28 U/L
BANDS: 2 % (ref 0–10)
BASOPHILS # BLD: 0 % (ref 0–2)
BASOPHILS ABSOLUTE: 0 K/UL (ref 0–0.2)
BILIRUB SERPL-MCNC: 0.83 MG/DL (ref 0.3–1.2)
BILIRUBIN URINE: NEGATIVE
BUN BLDV-MCNC: 12 MG/DL (ref 6–20)
BUN/CREAT BLD: ABNORMAL (ref 9–20)
CALCIUM SERPL-MCNC: 9.6 MG/DL (ref 8.6–10.4)
CHLORIDE BLD-SCNC: 106 MMOL/L (ref 98–107)
CO2: 19 MMOL/L (ref 20–31)
COLOR: YELLOW
COMMENT UA: ABNORMAL
CREAT SERPL-MCNC: 0.78 MG/DL (ref 0.5–0.9)
DIFFERENTIAL TYPE: ABNORMAL
DIRECT EXAM: NORMAL
EOSINOPHILS RELATIVE PERCENT: 1 % (ref 0–4)
GFR AFRICAN AMERICAN: >60 ML/MIN
GFR NON-AFRICAN AMERICAN: >60 ML/MIN
GFR SERPL CREATININE-BSD FRML MDRD: ABNORMAL ML/MIN/{1.73_M2}
GFR SERPL CREATININE-BSD FRML MDRD: ABNORMAL ML/MIN/{1.73_M2}
GLUCOSE BLD-MCNC: 190 MG/DL (ref 70–99)
GLUCOSE URINE: NEGATIVE
HCG QUALITATIVE: NEGATIVE
HCT VFR BLD CALC: 46.3 % (ref 36–46)
HEMOGLOBIN: 15.6 G/DL (ref 12–16)
IMMATURE GRANULOCYTES: ABNORMAL %
KETONES, URINE: NEGATIVE
LEUKOCYTE ESTERASE, URINE: NEGATIVE
LIPASE: 42 U/L (ref 13–60)
LYMPHOCYTES # BLD: 12 % (ref 24–44)
Lab: NORMAL
MCH RBC QN AUTO: 30.2 PG (ref 26–34)
MCHC RBC AUTO-ENTMCNC: 33.6 G/DL (ref 31–37)
MCV RBC AUTO: 89.9 FL (ref 80–100)
MONOCYTES # BLD: 3 % (ref 1–7)
MORPHOLOGY: NORMAL
NITRITE, URINE: NEGATIVE
NRBC AUTOMATED: ABNORMAL PER 100 WBC
PDW BLD-RTO: 13.4 % (ref 11.5–14.9)
PH UA: 6 (ref 5–8)
PLATELET # BLD: 258 K/UL (ref 150–450)
PLATELET ESTIMATE: ABNORMAL
PMV BLD AUTO: 8.6 FL (ref 6–12)
POTASSIUM SERPL-SCNC: 3.7 MMOL/L (ref 3.7–5.3)
PROTEIN UA: NEGATIVE
RBC # BLD: 5.15 M/UL (ref 4–5.2)
RBC # BLD: ABNORMAL 10*6/UL
REASON FOR REJECTION: NORMAL
SEG NEUTROPHILS: 82 % (ref 36–66)
SEGMENTED NEUTROPHILS ABSOLUTE COUNT: 15.16 K/UL (ref 1.3–9.1)
SODIUM BLD-SCNC: 141 MMOL/L (ref 135–144)
SPECIFIC GRAVITY UA: 1.04 (ref 1–1.03)
SPECIMEN DESCRIPTION: NORMAL
STATUS: NORMAL
TOTAL PROTEIN: 8 G/DL (ref 6.4–8.3)
TURBIDITY: CLEAR
URINE HGB: NEGATIVE
UROBILINOGEN, URINE: NORMAL
WBC # BLD: 18.5 K/UL (ref 3.5–11)
WBC # BLD: ABNORMAL 10*3/UL
ZZ NTE CLEAN UP: ORDERED TEST: NORMAL
ZZ NTE WITH NAME CLEAN UP: SPECIMEN SOURCE: NORMAL

## 2018-11-06 PROCEDURE — 87491 CHLMYD TRACH DNA AMP PROBE: CPT

## 2018-11-06 PROCEDURE — 96374 THER/PROPH/DIAG INJ IV PUSH: CPT

## 2018-11-06 PROCEDURE — 96375 TX/PRO/DX INJ NEW DRUG ADDON: CPT

## 2018-11-06 PROCEDURE — 36415 COLL VENOUS BLD VENIPUNCTURE: CPT

## 2018-11-06 PROCEDURE — 87480 CANDIDA DNA DIR PROBE: CPT

## 2018-11-06 PROCEDURE — 81003 URINALYSIS AUTO W/O SCOPE: CPT

## 2018-11-06 PROCEDURE — 87660 TRICHOMONAS VAGIN DIR PROBE: CPT

## 2018-11-06 PROCEDURE — 6360000002 HC RX W HCPCS: Performed by: EMERGENCY MEDICINE

## 2018-11-06 PROCEDURE — 87510 GARDNER VAG DNA DIR PROBE: CPT

## 2018-11-06 PROCEDURE — 99285 EMERGENCY DEPT VISIT HI MDM: CPT

## 2018-11-06 PROCEDURE — 6360000004 HC RX CONTRAST MEDICATION: Performed by: EMERGENCY MEDICINE

## 2018-11-06 PROCEDURE — 2580000003 HC RX 258: Performed by: EMERGENCY MEDICINE

## 2018-11-06 PROCEDURE — G0378 HOSPITAL OBSERVATION PER HR: HCPCS

## 2018-11-06 PROCEDURE — 87591 N.GONORRHOEAE DNA AMP PROB: CPT

## 2018-11-06 PROCEDURE — 80053 COMPREHEN METABOLIC PANEL: CPT

## 2018-11-06 PROCEDURE — 96372 THER/PROPH/DIAG INJ SC/IM: CPT

## 2018-11-06 PROCEDURE — 74177 CT ABD & PELVIS W/CONTRAST: CPT

## 2018-11-06 PROCEDURE — 84703 CHORIONIC GONADOTROPIN ASSAY: CPT

## 2018-11-06 PROCEDURE — 85025 COMPLETE CBC W/AUTO DIFF WBC: CPT

## 2018-11-06 PROCEDURE — 83690 ASSAY OF LIPASE: CPT

## 2018-11-06 PROCEDURE — 94640 AIRWAY INHALATION TREATMENT: CPT

## 2018-11-06 RX ORDER — 0.9 % SODIUM CHLORIDE 0.9 %
1000 INTRAVENOUS SOLUTION INTRAVENOUS ONCE
Status: COMPLETED | OUTPATIENT
Start: 2018-11-06 | End: 2018-11-06

## 2018-11-06 RX ORDER — DICYCLOMINE HYDROCHLORIDE 10 MG/ML
20 INJECTION INTRAMUSCULAR ONCE
Status: COMPLETED | OUTPATIENT
Start: 2018-11-06 | End: 2018-11-06

## 2018-11-06 RX ORDER — ONDANSETRON 2 MG/ML
4 INJECTION INTRAMUSCULAR; INTRAVENOUS EVERY 8 HOURS PRN
Status: DISCONTINUED | OUTPATIENT
Start: 2018-11-06 | End: 2018-11-07

## 2018-11-06 RX ORDER — DICYCLOMINE HYDROCHLORIDE 10 MG/1
10 CAPSULE ORAL DAILY
COMMUNITY
End: 2018-11-16

## 2018-11-06 RX ORDER — FENTANYL CITRATE 50 UG/ML
25 INJECTION, SOLUTION INTRAMUSCULAR; INTRAVENOUS
Status: DISCONTINUED | OUTPATIENT
Start: 2018-11-06 | End: 2018-11-08 | Stop reason: HOSPADM

## 2018-11-06 RX ORDER — 0.9 % SODIUM CHLORIDE 0.9 %
1000 INTRAVENOUS SOLUTION INTRAVENOUS ONCE
Status: COMPLETED | OUTPATIENT
Start: 2018-11-06 | End: 2018-11-07

## 2018-11-06 RX ORDER — FENTANYL CITRATE 50 UG/ML
50 INJECTION, SOLUTION INTRAMUSCULAR; INTRAVENOUS
Status: DISCONTINUED | OUTPATIENT
Start: 2018-11-06 | End: 2018-11-08 | Stop reason: HOSPADM

## 2018-11-06 RX ORDER — FENTANYL CITRATE 50 UG/ML
50 INJECTION, SOLUTION INTRAMUSCULAR; INTRAVENOUS ONCE
Status: COMPLETED | OUTPATIENT
Start: 2018-11-06 | End: 2018-11-06

## 2018-11-06 RX ORDER — SODIUM CHLORIDE 0.9 % (FLUSH) 0.9 %
10 SYRINGE (ML) INJECTION PRN
Status: DISCONTINUED | OUTPATIENT
Start: 2018-11-06 | End: 2018-11-08 | Stop reason: HOSPADM

## 2018-11-06 RX ORDER — FENTANYL CITRATE 50 UG/ML
100 INJECTION, SOLUTION INTRAMUSCULAR; INTRAVENOUS ONCE
Status: DISCONTINUED | OUTPATIENT
Start: 2018-11-06 | End: 2018-11-06

## 2018-11-06 RX ORDER — KETOROLAC TROMETHAMINE 30 MG/ML
30 INJECTION, SOLUTION INTRAMUSCULAR; INTRAVENOUS ONCE
Status: COMPLETED | OUTPATIENT
Start: 2018-11-06 | End: 2018-11-06

## 2018-11-06 RX ORDER — ALBUTEROL SULFATE 2.5 MG/3ML
SOLUTION RESPIRATORY (INHALATION)
Status: DISPENSED
Start: 2018-11-06 | End: 2018-11-07

## 2018-11-06 RX ORDER — ONDANSETRON 2 MG/ML
4 INJECTION INTRAMUSCULAR; INTRAVENOUS ONCE
Status: COMPLETED | OUTPATIENT
Start: 2018-11-06 | End: 2018-11-06

## 2018-11-06 RX ORDER — 0.9 % SODIUM CHLORIDE 0.9 %
80 INTRAVENOUS SOLUTION INTRAVENOUS ONCE
Status: COMPLETED | OUTPATIENT
Start: 2018-11-06 | End: 2018-11-06

## 2018-11-06 RX ORDER — ACETAMINOPHEN 325 MG/1
650 TABLET ORAL EVERY 4 HOURS PRN
Status: DISCONTINUED | OUTPATIENT
Start: 2018-11-06 | End: 2018-11-08 | Stop reason: HOSPADM

## 2018-11-06 RX ORDER — SODIUM CHLORIDE 0.9 % (FLUSH) 0.9 %
10 SYRINGE (ML) INJECTION EVERY 12 HOURS SCHEDULED
Status: DISCONTINUED | OUTPATIENT
Start: 2018-11-06 | End: 2018-11-08 | Stop reason: HOSPADM

## 2018-11-06 RX ORDER — PROMETHAZINE HYDROCHLORIDE 25 MG/1
25 TABLET ORAL EVERY 6 HOURS PRN
COMMUNITY
End: 2019-06-05

## 2018-11-06 RX ORDER — ALBUTEROL SULFATE 2.5 MG/3ML
2.5 SOLUTION RESPIRATORY (INHALATION) EVERY 6 HOURS PRN
Status: DISCONTINUED | OUTPATIENT
Start: 2018-11-06 | End: 2018-11-08 | Stop reason: HOSPADM

## 2018-11-06 RX ORDER — DEXTROSE AND SODIUM CHLORIDE 5; .45 G/100ML; G/100ML
INJECTION, SOLUTION INTRAVENOUS CONTINUOUS
Status: DISCONTINUED | OUTPATIENT
Start: 2018-11-06 | End: 2018-11-08 | Stop reason: HOSPADM

## 2018-11-06 RX ADMIN — ONDANSETRON 4 MG: 2 INJECTION INTRAMUSCULAR; INTRAVENOUS at 19:52

## 2018-11-06 RX ADMIN — FENTANYL CITRATE 50 MCG: 50 INJECTION INTRAMUSCULAR; INTRAVENOUS at 20:54

## 2018-11-06 RX ADMIN — IOPAMIDOL 75 ML: 755 INJECTION, SOLUTION INTRAVENOUS at 21:40

## 2018-11-06 RX ADMIN — SODIUM CHLORIDE 80 ML: 9 INJECTION, SOLUTION INTRAVENOUS at 21:39

## 2018-11-06 RX ADMIN — Medication 10 ML: at 21:39

## 2018-11-06 RX ADMIN — DICYCLOMINE HYDROCHLORIDE 20 MG: 20 INJECTION, SOLUTION INTRAMUSCULAR at 19:54

## 2018-11-06 RX ADMIN — ALBUTEROL SULFATE 2.5 MG: 2.5 SOLUTION RESPIRATORY (INHALATION) at 19:35

## 2018-11-06 RX ADMIN — SODIUM CHLORIDE 1000 ML: 9 INJECTION, SOLUTION INTRAVENOUS at 19:56

## 2018-11-06 RX ADMIN — SODIUM CHLORIDE 1000 ML: 9 INJECTION, SOLUTION INTRAVENOUS at 21:23

## 2018-11-06 RX ADMIN — KETOROLAC TROMETHAMINE 30 MG: 30 INJECTION, SOLUTION INTRAMUSCULAR at 22:30

## 2018-11-06 ASSESSMENT — PAIN SCALES - GENERAL
PAINLEVEL_OUTOF10: 9
PAINLEVEL_OUTOF10: 10
PAINLEVEL_OUTOF10: 6
PAINLEVEL_OUTOF10: 6
PAINLEVEL_OUTOF10: 9

## 2018-11-06 ASSESSMENT — ENCOUNTER SYMPTOMS
WHEEZING: 0
RHINORRHEA: 0
NAUSEA: 1
ABDOMINAL PAIN: 1
EYE REDNESS: 0
VOMITING: 1
SORE THROAT: 0
CONSTIPATION: 0
SHORTNESS OF BREATH: 0
EYE DISCHARGE: 0
EYE PAIN: 0
DIARRHEA: 0
BLOOD IN STOOL: 0
CHEST TIGHTNESS: 0
COLOR CHANGE: 0
COUGH: 0
FACIAL SWELLING: 0
BACK PAIN: 0
TROUBLE SWALLOWING: 0
SINUS PRESSURE: 0

## 2018-11-06 NOTE — ED NOTES
Bed: 11  Expected date:   Expected time:   Means of arrival:   Comments:     Trista Pham RN  11/06/18 4727

## 2018-11-07 ENCOUNTER — APPOINTMENT (OUTPATIENT)
Dept: ULTRASOUND IMAGING | Age: 23
DRG: 251 | End: 2018-11-07
Payer: COMMERCIAL

## 2018-11-07 PROBLEM — K76.0 HEPATIC STEATOSIS: Chronic | Status: ACTIVE | Noted: 2018-11-07

## 2018-11-07 PROBLEM — Z86.19 HISTORY OF CHLAMYDIA: Chronic | Status: ACTIVE | Noted: 2018-11-07

## 2018-11-07 PROBLEM — K59.1 FUNCTIONAL DIARRHEA: Chronic | Status: ACTIVE | Noted: 2018-11-07

## 2018-11-07 PROBLEM — Z86.19 HISTORY OF GONORRHEA: Chronic | Status: ACTIVE | Noted: 2018-11-07

## 2018-11-07 PROBLEM — E11.40 TYPE 2 DIABETES MELLITUS WITH DIABETIC NEUROPATHY (HCC): Chronic | Status: ACTIVE | Noted: 2018-11-07

## 2018-11-07 PROBLEM — R10.9 ABDOMINAL PAIN: Chronic | Status: ACTIVE | Noted: 2018-11-06

## 2018-11-07 LAB
ANION GAP SERPL CALCULATED.3IONS-SCNC: 10 MMOL/L (ref 9–17)
BUN BLDV-MCNC: 9 MG/DL (ref 6–20)
BUN/CREAT BLD: ABNORMAL (ref 9–20)
CALCIUM SERPL-MCNC: 8.8 MG/DL (ref 8.6–10.4)
CHLORIDE BLD-SCNC: 108 MMOL/L (ref 98–107)
CO2: 22 MMOL/L (ref 20–31)
CREAT SERPL-MCNC: 0.72 MG/DL (ref 0.5–0.9)
ESTIMATED AVERAGE GLUCOSE: 114 MG/DL
GFR AFRICAN AMERICAN: >60 ML/MIN
GFR NON-AFRICAN AMERICAN: >60 ML/MIN
GFR SERPL CREATININE-BSD FRML MDRD: ABNORMAL ML/MIN/{1.73_M2}
GFR SERPL CREATININE-BSD FRML MDRD: ABNORMAL ML/MIN/{1.73_M2}
GLUCOSE BLD-MCNC: 117 MG/DL (ref 65–105)
GLUCOSE BLD-MCNC: 118 MG/DL (ref 65–105)
GLUCOSE BLD-MCNC: 122 MG/DL (ref 70–99)
GLUCOSE BLD-MCNC: 138 MG/DL (ref 65–105)
GLUCOSE BLD-MCNC: 148 MG/DL (ref 65–105)
HBA1C MFR BLD: 5.6 % (ref 4–6)
HCT VFR BLD CALC: 37.1 % (ref 36–46)
HEMOGLOBIN: 12.9 G/DL (ref 12–16)
MCH RBC QN AUTO: 31.1 PG (ref 26–34)
MCHC RBC AUTO-ENTMCNC: 34.7 G/DL (ref 31–37)
MCV RBC AUTO: 89.6 FL (ref 80–100)
NRBC AUTOMATED: NORMAL PER 100 WBC
PDW BLD-RTO: 13.5 % (ref 11.5–14.9)
PLATELET # BLD: 231 K/UL (ref 150–450)
PMV BLD AUTO: 8.3 FL (ref 6–12)
POTASSIUM SERPL-SCNC: 3.7 MMOL/L (ref 3.7–5.3)
RBC # BLD: 4.14 M/UL (ref 4–5.2)
SODIUM BLD-SCNC: 140 MMOL/L (ref 135–144)
WBC # BLD: 8.9 K/UL (ref 3.5–11)

## 2018-11-07 PROCEDURE — 2580000003 HC RX 258: Performed by: EMERGENCY MEDICINE

## 2018-11-07 PROCEDURE — 6360000002 HC RX W HCPCS: Performed by: EMERGENCY MEDICINE

## 2018-11-07 PROCEDURE — 85027 COMPLETE CBC AUTOMATED: CPT

## 2018-11-07 PROCEDURE — 82947 ASSAY GLUCOSE BLOOD QUANT: CPT

## 2018-11-07 PROCEDURE — 6360000002 HC RX W HCPCS: Performed by: SURGERY

## 2018-11-07 PROCEDURE — 96372 THER/PROPH/DIAG INJ SC/IM: CPT

## 2018-11-07 PROCEDURE — 83036 HEMOGLOBIN GLYCOSYLATED A1C: CPT

## 2018-11-07 PROCEDURE — 96376 TX/PRO/DX INJ SAME DRUG ADON: CPT

## 2018-11-07 PROCEDURE — 96375 TX/PRO/DX INJ NEW DRUG ADDON: CPT

## 2018-11-07 PROCEDURE — 36415 COLL VENOUS BLD VENIPUNCTURE: CPT

## 2018-11-07 PROCEDURE — 76705 ECHO EXAM OF ABDOMEN: CPT

## 2018-11-07 PROCEDURE — 80048 BASIC METABOLIC PNL TOTAL CA: CPT

## 2018-11-07 PROCEDURE — 1200000000 HC SEMI PRIVATE

## 2018-11-07 PROCEDURE — 76830 TRANSVAGINAL US NON-OB: CPT

## 2018-11-07 PROCEDURE — 87801 DETECT AGNT MULT DNA AMPLI: CPT

## 2018-11-07 RX ORDER — DEXTROSE MONOHYDRATE 50 MG/ML
100 INJECTION, SOLUTION INTRAVENOUS PRN
Status: DISCONTINUED | OUTPATIENT
Start: 2018-11-07 | End: 2018-11-08 | Stop reason: HOSPADM

## 2018-11-07 RX ORDER — ONDANSETRON 2 MG/ML
4 INJECTION INTRAMUSCULAR; INTRAVENOUS EVERY 4 HOURS PRN
Status: DISCONTINUED | OUTPATIENT
Start: 2018-11-07 | End: 2018-11-08 | Stop reason: HOSPADM

## 2018-11-07 RX ORDER — DEXTROSE MONOHYDRATE 25 G/50ML
12.5 INJECTION, SOLUTION INTRAVENOUS PRN
Status: DISCONTINUED | OUTPATIENT
Start: 2018-11-07 | End: 2018-11-08 | Stop reason: HOSPADM

## 2018-11-07 RX ORDER — NICOTINE POLACRILEX 4 MG
15 LOZENGE BUCCAL PRN
Status: DISCONTINUED | OUTPATIENT
Start: 2018-11-07 | End: 2018-11-08 | Stop reason: HOSPADM

## 2018-11-07 RX ADMIN — FENTANYL CITRATE 50 MCG: 50 INJECTION INTRAMUSCULAR; INTRAVENOUS at 15:58

## 2018-11-07 RX ADMIN — ENOXAPARIN SODIUM 30 MG: 30 INJECTION SUBCUTANEOUS at 08:45

## 2018-11-07 RX ADMIN — ONDANSETRON 4 MG: 2 INJECTION INTRAMUSCULAR; INTRAVENOUS at 10:16

## 2018-11-07 RX ADMIN — FENTANYL CITRATE 25 MCG: 50 INJECTION INTRAMUSCULAR; INTRAVENOUS at 07:13

## 2018-11-07 RX ADMIN — DEXTROSE AND SODIUM CHLORIDE: 5; 450 INJECTION, SOLUTION INTRAVENOUS at 07:09

## 2018-11-07 RX ADMIN — ENOXAPARIN SODIUM 30 MG: 30 INJECTION SUBCUTANEOUS at 23:30

## 2018-11-07 RX ADMIN — FENTANYL CITRATE 50 MCG: 50 INJECTION INTRAMUSCULAR; INTRAVENOUS at 09:12

## 2018-11-07 RX ADMIN — FENTANYL CITRATE 25 MCG: 50 INJECTION INTRAMUSCULAR; INTRAVENOUS at 21:07

## 2018-11-07 RX ADMIN — ONDANSETRON 4 MG: 2 INJECTION INTRAMUSCULAR; INTRAVENOUS at 16:56

## 2018-11-07 RX ADMIN — DEXTROSE AND SODIUM CHLORIDE: 5; 450 INJECTION, SOLUTION INTRAVENOUS at 15:59

## 2018-11-07 RX ADMIN — FENTANYL CITRATE 25 MCG: 50 INJECTION INTRAMUSCULAR; INTRAVENOUS at 04:21

## 2018-11-07 RX ADMIN — FENTANYL CITRATE 25 MCG: 50 INJECTION INTRAMUSCULAR; INTRAVENOUS at 23:30

## 2018-11-07 RX ADMIN — FENTANYL CITRATE 50 MCG: 50 INJECTION INTRAMUSCULAR; INTRAVENOUS at 16:56

## 2018-11-07 RX ADMIN — DEXTROSE AND SODIUM CHLORIDE: 5; 450 INJECTION, SOLUTION INTRAVENOUS at 23:44

## 2018-11-07 RX ADMIN — FENTANYL CITRATE 50 MCG: 50 INJECTION INTRAMUSCULAR; INTRAVENOUS at 10:12

## 2018-11-07 RX ADMIN — FENTANYL CITRATE 50 MCG: 50 INJECTION INTRAMUSCULAR; INTRAVENOUS at 13:25

## 2018-11-07 RX ADMIN — ONDANSETRON 4 MG: 2 INJECTION INTRAMUSCULAR; INTRAVENOUS at 04:27

## 2018-11-07 RX ADMIN — DEXTROSE AND SODIUM CHLORIDE: 5; 450 INJECTION, SOLUTION INTRAVENOUS at 00:23

## 2018-11-07 ASSESSMENT — PAIN SCALES - GENERAL
PAINLEVEL_OUTOF10: 0
PAINLEVEL_OUTOF10: 8
PAINLEVEL_OUTOF10: 7
PAINLEVEL_OUTOF10: 0
PAINLEVEL_OUTOF10: 8
PAINLEVEL_OUTOF10: 7
PAINLEVEL_OUTOF10: 5
PAINLEVEL_OUTOF10: 7
PAINLEVEL_OUTOF10: 10
PAINLEVEL_OUTOF10: 8
PAINLEVEL_OUTOF10: 9
PAINLEVEL_OUTOF10: 8
PAINLEVEL_OUTOF10: 6
PAINLEVEL_OUTOF10: 0
PAINLEVEL_OUTOF10: 0

## 2018-11-07 ASSESSMENT — PAIN DESCRIPTION - LOCATION: LOCATION: ABDOMEN

## 2018-11-07 ASSESSMENT — PAIN DESCRIPTION - PAIN TYPE: TYPE: ACUTE PAIN

## 2018-11-07 ASSESSMENT — PAIN DESCRIPTION - DESCRIPTORS: DESCRIPTORS: ACHING;CRAMPING

## 2018-11-07 ASSESSMENT — PAIN DESCRIPTION - ORIENTATION: ORIENTATION: RIGHT

## 2018-11-07 NOTE — PLAN OF CARE
Problem: Bowel/Gastric:  Goal: Bowel function will improve  Bowel function will improve   Outcome: Ongoing  Patient scheduled for diagnostic testing. Continue to monitor. Goal: Diagnostic test results will improve  Diagnostic test results will improve   Outcome: Ongoing  Patient to receive ultra sound testing, awaiting testing and results. Will continue to monitor. Goal: Occurrences of nausea will decrease  Occurrences of nausea will decrease   Outcome: Ongoing  Patient receives antinausea medications, no emesis this shift. Problem: Fluid Volume:  Goal: Maintenance of adequate hydration will improve  Maintenance of adequate hydration will improve   Outcome: Ongoing  Continue IV fluids as ordered; continue to monitor I&O. Problem: Sensory:  Goal: Pain level will decrease  Pain level will decrease   Outcome: Ongoing  Patient receiving pain medications. Patient able to sleep during shift.

## 2018-11-07 NOTE — ED PROVIDER NOTES
[address]  eMERGENCY dEPARTMENT eNCOUnter      Pt Name: Anam Alberts  MRN: [id number]  [place] 1995  Date of evaluation: 11/6/18      CHIEF COMPLAINT       Chief Complaint   Patient presents with    Emesis    Abdominal Cramping         HISTORY OF PRESENT ILLNESS    Anam Alberts is a 20 y.o. female who presents complaining of Abdominal pain. Patient states that she is having severe right lower quadrant abdominal pain. Patient states that it started last week she had some and up going to the hospital and they did a CT scan and blood work that was reportedly patient states that she was feeling better on the medication and then last night started having some sharp pain that was stabbing in nature in that area. Patient states that today it got significantly worse and she is now vomiting and having diarrhea. Patient is having no urinary or vaginal complaints. Patient denies fevers. REVIEW OF SYSTEMS       Review of Systems   Constitutional: Negative for activity change, appetite change, chills, diaphoresis and fever. HENT: Negative for congestion, ear pain, facial swelling, nosebleeds, rhinorrhea, sinus pressure, sore throat and trouble swallowing. Eyes: Negative for pain, discharge and redness. Respiratory: Negative for cough, chest tightness, shortness of breath and wheezing. Cardiovascular: Negative for chest pain, palpitations and leg swelling. Gastrointestinal: Positive for abdominal pain, nausea and vomiting. Negative for blood in stool, constipation and diarrhea. Genitourinary: Negative for difficulty urinating, dysuria, flank pain, frequency, genital sores and hematuria. Musculoskeletal: Negative for arthralgias, back pain, gait problem, joint swelling, myalgias and neck pain. Skin: Negative for color change, pallor, rash and wound. Neurological: Negative for dizziness, tremors, seizures, syncope, speech difficulty, weakness, numbness and headaches. Normocephalic and atraumatic. Eyes: Pupils are equal, round, and reactive to light. Conjunctivae and EOM are normal. Right eye exhibits no discharge. Left eye exhibits no discharge. No scleral icterus. Cardiovascular: Normal rate, regular rhythm and normal heart sounds. Exam reveals no gallop and no friction rub. No murmur heard. Pulmonary/Chest: Effort normal and breath sounds normal. No respiratory distress. She has no wheezes. She has no rales. She exhibits no tenderness. Abdominal: Soft. Bowel sounds are normal. She exhibits no distension and no mass. There is tenderness in the right upper quadrant and right lower quadrant. There is guarding. There is no rigidity, no rebound, no CVA tenderness, no tenderness at McBurney's point and negative Herring's sign. Musculoskeletal: Normal range of motion. She exhibits no edema or tenderness. Neurological: She is alert and oriented to person, place, and time. She displays normal reflexes. No cranial nerve deficit. She exhibits normal muscle tone. Coordination normal.   Skin: Skin is warm and dry. No rash noted. She is not diaphoretic. No erythema. No pallor. Psychiatric: She has a normal mood and affect. Her behavior is normal. Judgment and thought content normal.   Nursing note and vitals reviewed. DIAGNOSTIC RESULTS     RADIOLOGY:All plain film, CT,MRI, and formal ultrasound images (except ED bedside ultrasound) are read by the radiologist and the interpretations are directly viewed by the emergency physician. Ct Abdomen Pelvis W Iv Contrast    Result Date: 11/6/2018  EXAMINATION: CT OF THE ABDOMEN AND PELVIS WITH CONTRAST 11/6/2018 9:40 pm TECHNIQUE: CT of the abdomen and pelvis was performed with the administration of intravenous contrast. Multiplanar reformatted images are provided for review.  Dose modulation, iterative reconstruction, and/or weight based adjustment of the mA/kV was utilized to reduce the radiation dose to as low as reasonably

## 2018-11-07 NOTE — PLAN OF CARE
Problem: Bowel/Gastric:  Goal: Bowel function will improve  Bowel function will improve   Outcome: Ongoing    Goal: Diagnostic test results will improve  Diagnostic test results will improve   Outcome: Ongoing    Goal: Occurrences of nausea will decrease  Occurrences of nausea will decrease   Outcome: Ongoing      Problem: Fluid Volume:  Goal: Maintenance of adequate hydration will improve  Maintenance of adequate hydration will improve   Outcome: Ongoing      Problem: Sensory:  Goal: Pain level will decrease  Pain level will decrease   Outcome: Ongoing  Patients pain level decreased with prescribed pain medications.

## 2018-11-07 NOTE — CARE COORDINATION
Pt has been scheduled for a new patient appointment with DR. Anshul London on Tuesday November 13 at 3:30. Pt verbalizes understanding that they must bring photo ID, insurance card, and medication list to appointment. Pt also understands that they are to arrive 15 minutes prior to appointment. Appointment information entered into discharge navigator.     Electronically signed by Marlon Leyden, RN on 11/7/18 at 3:59 PM

## 2018-11-08 VITALS
BODY MASS INDEX: 43.7 KG/M2 | RESPIRATION RATE: 17 BRPM | WEIGHT: 278.44 LBS | TEMPERATURE: 98.2 F | HEIGHT: 67 IN | OXYGEN SATURATION: 97 % | SYSTOLIC BLOOD PRESSURE: 125 MMHG | DIASTOLIC BLOOD PRESSURE: 76 MMHG | HEART RATE: 67 BPM

## 2018-11-08 PROBLEM — E11.40 TYPE 2 DIABETES MELLITUS WITH DIABETIC NEUROPATHY (HCC): Status: ACTIVE | Noted: 2018-11-07

## 2018-11-08 PROBLEM — R10.9 ABDOMINAL PAIN: Status: RESOLVED | Noted: 2018-11-06 | Resolved: 2018-11-08

## 2018-11-08 PROBLEM — K59.1 FUNCTIONAL DIARRHEA: Chronic | Status: RESOLVED | Noted: 2018-11-07 | Resolved: 2018-11-08

## 2018-11-08 PROBLEM — E11.40 TYPE 2 DIABETES MELLITUS WITH DIABETIC NEUROPATHY (HCC): Status: RESOLVED | Noted: 2018-11-07 | Resolved: 2018-11-08

## 2018-11-08 LAB
ANION GAP SERPL CALCULATED.3IONS-SCNC: 11 MMOL/L (ref 9–17)
BUN BLDV-MCNC: 6 MG/DL (ref 6–20)
BUN/CREAT BLD: ABNORMAL (ref 9–20)
C TRACH DNA GENITAL QL NAA+PROBE: NEGATIVE
CALCIUM SERPL-MCNC: 8.6 MG/DL (ref 8.6–10.4)
CHLORIDE BLD-SCNC: 109 MMOL/L (ref 98–107)
CO2: 22 MMOL/L (ref 20–31)
CREAT SERPL-MCNC: 0.61 MG/DL (ref 0.5–0.9)
DIRECT EXAM: ABNORMAL
DIRECT EXAM: ABNORMAL
GFR AFRICAN AMERICAN: >60 ML/MIN
GFR NON-AFRICAN AMERICAN: >60 ML/MIN
GFR SERPL CREATININE-BSD FRML MDRD: ABNORMAL ML/MIN/{1.73_M2}
GFR SERPL CREATININE-BSD FRML MDRD: ABNORMAL ML/MIN/{1.73_M2}
GLUCOSE BLD-MCNC: 111 MG/DL (ref 70–99)
GLUCOSE BLD-MCNC: 113 MG/DL (ref 65–105)
GLUCOSE BLD-MCNC: 142 MG/DL (ref 65–105)
HCT VFR BLD CALC: 37.2 % (ref 36–46)
HEMOGLOBIN: 12.5 G/DL (ref 12–16)
Lab: ABNORMAL
MCH RBC QN AUTO: 30.1 PG (ref 26–34)
MCHC RBC AUTO-ENTMCNC: 33.6 G/DL (ref 31–37)
MCV RBC AUTO: 89.5 FL (ref 80–100)
N. GONORRHOEAE DNA: NEGATIVE
NRBC AUTOMATED: NORMAL PER 100 WBC
PDW BLD-RTO: 13.2 % (ref 11.5–14.9)
PLATELET # BLD: 209 K/UL (ref 150–450)
PMV BLD AUTO: 8.3 FL (ref 6–12)
POTASSIUM SERPL-SCNC: 3.8 MMOL/L (ref 3.7–5.3)
RBC # BLD: 4.16 M/UL (ref 4–5.2)
SODIUM BLD-SCNC: 142 MMOL/L (ref 135–144)
SPECIMEN DESCRIPTION: ABNORMAL
STATUS: ABNORMAL
WBC # BLD: 7.1 K/UL (ref 3.5–11)

## 2018-11-08 PROCEDURE — 85027 COMPLETE CBC AUTOMATED: CPT

## 2018-11-08 PROCEDURE — 82947 ASSAY GLUCOSE BLOOD QUANT: CPT

## 2018-11-08 PROCEDURE — 80048 BASIC METABOLIC PNL TOTAL CA: CPT

## 2018-11-08 PROCEDURE — 2580000003 HC RX 258: Performed by: EMERGENCY MEDICINE

## 2018-11-08 PROCEDURE — 36415 COLL VENOUS BLD VENIPUNCTURE: CPT

## 2018-11-08 PROCEDURE — 6360000002 HC RX W HCPCS: Performed by: EMERGENCY MEDICINE

## 2018-11-08 PROCEDURE — 99253 IP/OBS CNSLTJ NEW/EST LOW 45: CPT | Performed by: INTERNAL MEDICINE

## 2018-11-08 PROCEDURE — 6360000002 HC RX W HCPCS: Performed by: SURGERY

## 2018-11-08 RX ADMIN — ENOXAPARIN SODIUM 30 MG: 30 INJECTION SUBCUTANEOUS at 08:28

## 2018-11-08 RX ADMIN — DEXTROSE AND SODIUM CHLORIDE: 5; 450 INJECTION, SOLUTION INTRAVENOUS at 06:59

## 2018-11-08 RX ADMIN — FENTANYL CITRATE 25 MCG: 50 INJECTION INTRAMUSCULAR; INTRAVENOUS at 08:33

## 2018-11-08 RX ADMIN — FENTANYL CITRATE 25 MCG: 50 INJECTION INTRAMUSCULAR; INTRAVENOUS at 05:02

## 2018-11-08 ASSESSMENT — PAIN DESCRIPTION - FREQUENCY: FREQUENCY: CONTINUOUS

## 2018-11-08 ASSESSMENT — PAIN SCALES - GENERAL
PAINLEVEL_OUTOF10: 5
PAINLEVEL_OUTOF10: 0
PAINLEVEL_OUTOF10: 6

## 2018-11-08 ASSESSMENT — PAIN DESCRIPTION - PAIN TYPE: TYPE: ACUTE PAIN

## 2018-11-08 ASSESSMENT — PAIN DESCRIPTION - ORIENTATION: ORIENTATION: RIGHT

## 2018-11-08 ASSESSMENT — PAIN DESCRIPTION - LOCATION: LOCATION: ABDOMEN

## 2018-11-08 ASSESSMENT — PAIN DESCRIPTION - DESCRIPTORS: DESCRIPTORS: ACHING;CRAMPING

## 2018-11-08 NOTE — PROGRESS NOTES
Surgery Progress Note            PATIENT NAME: Darrell Wilson     TODAY'S DATE: 11/8/2018, 9:05 AM    Chief complaint: abdominal pain    SUBJECTIVE:    Pt is eating a regular diet. She is having no abdominal pain. OBJECTIVE:   VITALS:  /76   Pulse 67   Temp 98.2 °F (36.8 °C) (Oral)   Resp 17   Ht 5' 7\" (1.702 m)   Wt 278 lb 7.1 oz (126.3 kg)   SpO2 97%   BMI 43.61 kg/m²      INTAKE/OUTPUT:      Intake/Output Summary (Last 24 hours) at 11/08/18 0905  Last data filed at 11/08/18 4452   Gross per 24 hour   Intake          2934.08 ml   Output                0 ml   Net          2934.08 ml       PHYSICAL EXAM  General Appearance:  awake, alert, oriented, in no acute distress  Head/face:  NCAT  Lungs:  Normal expansion. Clear to auscultation. No rales, rhonchi, or wheezing. Heart:  Heart sounds are normal.  Regular rate and rhythm without murmur, gallop or rub. Abdomen:  Soft, non-tender, normal bowel sounds. No bruits, organomegaly or masses. Abnormal shape: obese and distended  Extremities: Extremities warm to touch, pink, with no edema.          Data:  CBC:   Lab Results   Component Value Date    WBC 7.1 11/08/2018    RBC 4.16 11/08/2018    HGB 12.5 11/08/2018    HCT 37.2 11/08/2018    MCV 89.5 11/08/2018    MCH 30.1 11/08/2018    MCHC 33.6 11/08/2018    RDW 13.2 11/08/2018     11/08/2018    MPV 8.3 11/08/2018     BMP:    Lab Results   Component Value Date     11/08/2018    K 3.8 11/08/2018     11/08/2018    CO2 22 11/08/2018    BUN 6 11/08/2018    LABALBU 4.7 11/06/2018    CREATININE 0.61 11/08/2018    CALCIUM 8.6 11/08/2018    GFRAA >60 11/08/2018    LABGLOM >60 11/08/2018    GLUCOSE 111 11/08/2018       Radiology Review:  EXAMINATION:   RIGHT UPPER QUADRANT ULTRASOUND       11/7/2018 10:49 am       COMPARISON:   CT 11/06/2018       HISTORY:   ORDERING SYSTEM PROVIDED HISTORY: RUQ abd pain, elevated WBC       FINDINGS:   LIVER:  Image portions of the liver show increased

## 2018-11-08 NOTE — PLAN OF CARE
Problem: Bowel/Gastric:  Goal: Occurrences of nausea will decrease  Occurrences of nausea will decrease   Outcome: Ongoing  Nausea decreases with patient NPO. Medications received as ordered. Will continue to monitor.

## 2018-11-08 NOTE — CONSULTS
Morbid obese  roseasea  gestional dm  abado pain non specific   Better now  Ok to [employer] pt with pcp  Wt loss and metformin is advised  Asher Washington  11/8/2018
POC Glucose Fingerstick    Collection Time: 11/07/18  8:17 PM   Result Value Ref Range    POC Glucose 148 (H) 65 - 105 mg/dL   Basic Metabolic Panel    Collection Time: 11/08/18  6:03 AM   Result Value Ref Range    Glucose 111 (H) 70 - 99 mg/dL    BUN 6 6 - 20 mg/dL    CREATININE 0.61 0.50 - 0.90 mg/dL    Bun/Cre Ratio NOT REPORTED 9 - 20    Calcium 8.6 8.6 - 10.4 mg/dL    Sodium 142 135 - 144 mmol/L    Potassium 3.8 3.7 - 5.3 mmol/L    Chloride 109 (H) 98 - 107 mmol/L    CO2 22 20 - 31 mmol/L    Anion Gap 11 9 - 17 mmol/L    GFR Non-African American >60 >60 mL/min    GFR African American >60 >60 mL/min    GFR Comment          GFR Staging NOT REPORTED    CBC    Collection Time: 11/08/18  6:03 AM   Result Value Ref Range    WBC 7.1 3.5 - 11.0 k/uL    RBC 4.16 4.0 - 5.2 m/uL    Hemoglobin 12.5 12.0 - 16.0 g/dL    Hematocrit 37.2 36 - 46 %    MCV 89.5 80 - 100 fL    MCH 30.1 26 - 34 pg    MCHC 33.6 31 - 37 g/dL    RDW 13.2 11.5 - 14.9 %    Platelets 187 897 - 202 k/uL    MPV 8.3 6.0 - 12.0 fL    NRBC Automated NOT REPORTED per 100 WBC   POC Glucose Fingerstick    Collection Time: 11/08/18  6:48 AM   Result Value Ref Range    POC Glucose 113 (H) 65 - 105 mg/dL   POC Glucose Fingerstick    Collection Time: 11/08/18 11:23 AM   Result Value Ref Range    POC Glucose 142 (H) 65 - 105 mg/dL       Imaging/Diagonstics:      Assessment :      Primary Problem  Abdominal pain    Active Hospital Problems    Diagnosis Date Noted    Hepatic steatosis [K76.0] 11/07/2018    History of gonorrhea [Z86.19] 11/07/2018    History of chlamydia [Z86.19] 11/07/2018    BMI 40.0-44.9 [Z68.41] 02/08/2017    Morbid obesity ([facility] [address] [E66.01] 08/16/2015    Asthma [J45.909]     chronic HTN [I10]        Plan:      Morbid obese  roseasea  gestional dm  abado pain non specific   Better now  Ok to [employer] pt with pcp  Wt loss and metformin is advised    Consultations:   IP CONSULT TO GENERAL SURGERY  IP CONSULT TO INTERNAL MEDICINE  IP CONSULT TO

## 2018-11-13 ENCOUNTER — TELEPHONE (OUTPATIENT)
Dept: INTERNAL MEDICINE CLINIC | Age: 23
End: 2018-11-13

## 2018-11-16 ENCOUNTER — OFFICE VISIT (OUTPATIENT)
Dept: INTERNAL MEDICINE CLINIC | Age: 23
End: 2018-11-16
Payer: COMMERCIAL

## 2018-11-16 VITALS
WEIGHT: 279 LBS | HEIGHT: 67 IN | BODY MASS INDEX: 43.79 KG/M2 | SYSTOLIC BLOOD PRESSURE: 124 MMHG | DIASTOLIC BLOOD PRESSURE: 78 MMHG

## 2018-11-16 DIAGNOSIS — R10.84 GENERALIZED ABDOMINAL PAIN: ICD-10-CM

## 2018-11-16 DIAGNOSIS — Z86.19 HISTORY OF GONORRHEA: Chronic | ICD-10-CM

## 2018-11-16 DIAGNOSIS — Z09 HOSPITAL DISCHARGE FOLLOW-UP: ICD-10-CM

## 2018-11-16 DIAGNOSIS — R11.2 NAUSEA AND VOMITING, INTRACTABILITY OF VOMITING NOT SPECIFIED, UNSPECIFIED VOMITING TYPE: Primary | ICD-10-CM

## 2018-11-16 DIAGNOSIS — J45.20 INTERMITTENT ASTHMA WITHOUT COMPLICATION, UNSPECIFIED ASTHMA SEVERITY: Chronic | ICD-10-CM

## 2018-11-16 DIAGNOSIS — L70.0 ACNE VULGARIS: ICD-10-CM

## 2018-11-16 DIAGNOSIS — Z86.19 HISTORY OF CHLAMYDIA: Chronic | ICD-10-CM

## 2018-11-16 PROCEDURE — 1036F TOBACCO NON-USER: CPT | Performed by: PHYSICIAN ASSISTANT

## 2018-11-16 PROCEDURE — G8427 DOCREV CUR MEDS BY ELIG CLIN: HCPCS | Performed by: PHYSICIAN ASSISTANT

## 2018-11-16 PROCEDURE — 1111F DSCHRG MED/CURRENT MED MERGE: CPT | Performed by: PHYSICIAN ASSISTANT

## 2018-11-16 PROCEDURE — G8484 FLU IMMUNIZE NO ADMIN: HCPCS | Performed by: PHYSICIAN ASSISTANT

## 2018-11-16 PROCEDURE — 99204 OFFICE O/P NEW MOD 45 MIN: CPT | Performed by: PHYSICIAN ASSISTANT

## 2018-11-16 PROCEDURE — G8417 CALC BMI ABV UP PARAM F/U: HCPCS | Performed by: PHYSICIAN ASSISTANT

## 2018-11-16 RX ORDER — SUCRALFATE 1 G/1
TABLET ORAL
Refills: 0 | COMMUNITY
Start: 2018-11-13 | End: 2019-05-24

## 2018-11-16 RX ORDER — CHLORHEXIDINE GLUCONATE 0.12 MG/ML
RINSE ORAL
Refills: 0 | COMMUNITY
Start: 2018-11-12 | End: 2018-11-16 | Stop reason: ALTCHOICE

## 2018-11-16 RX ORDER — OMEPRAZOLE 20 MG/1
CAPSULE, DELAYED RELEASE ORAL
Refills: 0 | COMMUNITY
Start: 2018-11-01 | End: 2019-06-05

## 2018-11-16 RX ORDER — ALBUTEROL SULFATE 90 UG/1
2 AEROSOL, METERED RESPIRATORY (INHALATION) EVERY 6 HOURS PRN
Qty: 1 INHALER | Refills: 3 | Status: SHIPPED | OUTPATIENT
Start: 2018-11-16 | End: 2019-06-05

## 2018-11-16 RX ORDER — ONDANSETRON 4 MG/1
TABLET, ORALLY DISINTEGRATING ORAL
Refills: 0 | COMMUNITY
Start: 2018-11-13 | End: 2019-06-05

## 2018-11-16 RX ORDER — DICYCLOMINE HCL 20 MG
TABLET ORAL
Refills: 0 | COMMUNITY
Start: 2018-11-01 | End: 2019-05-24

## 2018-11-16 ASSESSMENT — ENCOUNTER SYMPTOMS
VOMITING: 1
SORE THROAT: 0
CHEST TIGHTNESS: 0
EYE ITCHING: 0
DIARRHEA: 0
WHEEZING: 0
BACK PAIN: 0
EYE PAIN: 0
EYE REDNESS: 0
COLOR CHANGE: 0
SINUS PAIN: 0
EYE DISCHARGE: 0
BLOOD IN STOOL: 0
ABDOMINAL PAIN: 1
COUGH: 0
SHORTNESS OF BREATH: 0
TROUBLE SWALLOWING: 0
RHINORRHEA: 0
ANAL BLEEDING: 0
NAUSEA: 1
CONSTIPATION: 0
ABDOMINAL DISTENTION: 0

## 2018-11-16 ASSESSMENT — PATIENT HEALTH QUESTIONNAIRE - PHQ9
SUM OF ALL RESPONSES TO PHQ9 QUESTIONS 1 & 2: 0
2. FEELING DOWN, DEPRESSED OR HOPELESS: 0
SUM OF ALL RESPONSES TO PHQ QUESTIONS 1-9: 0
1. LITTLE INTEREST OR PLEASURE IN DOING THINGS: 0
SUM OF ALL RESPONSES TO PHQ QUESTIONS 1-9: 0

## 2018-11-16 NOTE — PROGRESS NOTES
Pneumococcal med risk (1 of 1 - PPSV23) 04/05/2012    Flu vaccine (1) 09/01/2018    Cervical cancer screen  10/28/2019    DTaP/Tdap/Td vaccine (2 - Td) 07/07/2025    HIV screen  Completed
healthmaintenance. Discussed use, benefit, and side effects of prescribed medications. Barriers tomedication compliance addressed. All patient questions answered. Patient voiced understanding. Patient given educational materials - see patient instructions    Megha STANFORD [facility]  11/16/2018, 12:49 PM    Please note that this chart was generated using voice recognition Dragon dictation software. Although every effort was made to ensure the accuracy of this automatedtranscription, some errors in transcription may have occurred.

## 2018-11-20 ENCOUNTER — OFFICE VISIT (OUTPATIENT)
Dept: GASTROENTEROLOGY | Age: 23
End: 2018-11-20
Payer: COMMERCIAL

## 2018-11-20 VITALS
SYSTOLIC BLOOD PRESSURE: 134 MMHG | HEART RATE: 87 BPM | DIASTOLIC BLOOD PRESSURE: 75 MMHG | WEIGHT: 279.1 LBS | BODY MASS INDEX: 43.7 KG/M2

## 2018-11-20 DIAGNOSIS — R19.7 DIARRHEA, UNSPECIFIED TYPE: ICD-10-CM

## 2018-11-20 DIAGNOSIS — R11.2 NAUSEA AND VOMITING, INTRACTABILITY OF VOMITING NOT SPECIFIED, UNSPECIFIED VOMITING TYPE: ICD-10-CM

## 2018-11-20 PROBLEM — R10.84 GENERALIZED ABDOMINAL PAIN: Status: ACTIVE | Noted: 2018-11-06

## 2018-11-20 PROCEDURE — 99244 OFF/OP CNSLTJ NEW/EST MOD 40: CPT | Performed by: INTERNAL MEDICINE

## 2018-11-20 PROCEDURE — G8484 FLU IMMUNIZE NO ADMIN: HCPCS | Performed by: INTERNAL MEDICINE

## 2018-11-20 PROCEDURE — G8427 DOCREV CUR MEDS BY ELIG CLIN: HCPCS | Performed by: INTERNAL MEDICINE

## 2018-11-20 PROCEDURE — G8417 CALC BMI ABV UP PARAM F/U: HCPCS | Performed by: INTERNAL MEDICINE

## 2018-11-20 ASSESSMENT — ENCOUNTER SYMPTOMS
TROUBLE SWALLOWING: 1
DIARRHEA: 1
SINUS PRESSURE: 1
SINUS PAIN: 1
VOMITING: 1
RESPIRATORY NEGATIVE: 1
ABDOMINAL PAIN: 1
BLOOD IN STOOL: 1
NAUSEA: 1
EYES NEGATIVE: 1
ABDOMINAL DISTENTION: 1

## 2018-11-20 NOTE — PROGRESS NOTES
Subjective:      Patient ID: Mary Ralph is a 20 y.o. female. HPI . Dr. Karron Boast primary care provider on file. has requested that I see Mary Ralph for a consult for   1. Nausea and vomiting, intractability of vomiting not specified, unspecified vomiting type    2. Diarrhea, unspecified type      Patient seen with the symptoms of nausea, vomiting, upper abdominal discomfort. Patient was admitted at [facility] with these symptoms. She also had diarrhea. It was felt that patient may have gastroenteritis or appendicitis. Patient was evaluated by general surgery and these possibilities were ruled out. Patient had a CT scan, ultrasound of the gallbladder which was reported to be nonspecific. Her labs including CBC, liver battery within normal limits. During this visit and diarrhea resolved. However, she continued to have upper abdominal discomfort and nausea. No emesis. She denies alcoholism. No history of NSAID use. On further discussion patient has significant anxiety/stress. Patient think that some of her symptoms may be related to these issues. Hospital records reviewed. Past Medical, Family, and Social History reviewed and does contribute to the patient presenting condition. patient\"s PMH/PSH,SH,PSYCH hx, MEDs, ALLERGIES, and ROS was all reviewed and updated ion the appropriate sections    Review of Systems   Constitutional: Positive for activity change, appetite change and fatigue. Negative for fever. HENT: Positive for sinus pain, sinus pressure and trouble swallowing. Negative for postnasal drip. Eyes: Negative. Respiratory: Negative. Cardiovascular: Negative. Gastrointestinal: Positive for abdominal distention, abdominal pain, blood in stool, diarrhea, nausea and vomiting. Endocrine: Negative. Genitourinary: Negative. Musculoskeletal: Negative. Skin: Negative. Allergic/Immunologic: Negative for environmental allergies and food allergies.

## 2018-11-21 ENCOUNTER — TELEPHONE (OUTPATIENT)
Dept: GASTROENTEROLOGY | Age: 23
End: 2018-11-21

## 2018-11-21 NOTE — TELEPHONE ENCOUNTER
I spoke with patient, she stated she will call her PCP for an appointment to discuss her anxiety and stress and the possibility it is affecting her GI symptoms

## 2018-11-27 ENCOUNTER — HOSPITAL ENCOUNTER (OUTPATIENT)
Age: 23
Setting detail: OUTPATIENT SURGERY
Discharge: HOME | End: 2018-11-27
Attending: INTERNAL MEDICINE | Admitting: INTERNAL MEDICINE
Payer: COMMERCIAL

## 2018-11-27 VITALS
RESPIRATION RATE: 16 BRPM | HEART RATE: 75 BPM | DIASTOLIC BLOOD PRESSURE: 64 MMHG | TEMPERATURE: 98.2 F | WEIGHT: 279 LBS | HEIGHT: 67 IN | OXYGEN SATURATION: 96 % | BODY MASS INDEX: 43.79 KG/M2 | SYSTOLIC BLOOD PRESSURE: 114 MMHG

## 2018-11-27 LAB — GLUCOSE BLD-MCNC: 116 MG/DL (ref 65–105)

## 2018-11-27 PROCEDURE — 2709999900 HC NON-CHARGEABLE SUPPLY: Performed by: INTERNAL MEDICINE

## 2018-11-27 PROCEDURE — 43239 EGD BIOPSY SINGLE/MULTIPLE: CPT | Performed by: INTERNAL MEDICINE

## 2018-11-27 PROCEDURE — 6360000002 HC RX W HCPCS: Performed by: INTERNAL MEDICINE

## 2018-11-27 PROCEDURE — 3609012400 HC EGD TRANSORAL BIOPSY SINGLE/MULTIPLE: Performed by: INTERNAL MEDICINE

## 2018-11-27 PROCEDURE — 88342 IMHCHEM/IMCYTCHM 1ST ANTB: CPT

## 2018-11-27 PROCEDURE — 2580000003 HC RX 258: Performed by: INTERNAL MEDICINE

## 2018-11-27 PROCEDURE — 7100000010 HC PHASE II RECOVERY - FIRST 15 MIN: Performed by: INTERNAL MEDICINE

## 2018-11-27 PROCEDURE — 7100000011 HC PHASE II RECOVERY - ADDTL 15 MIN: Performed by: INTERNAL MEDICINE

## 2018-11-27 PROCEDURE — 6370000000 HC RX 637 (ALT 250 FOR IP): Performed by: INTERNAL MEDICINE

## 2018-11-27 PROCEDURE — 99152 MOD SED SAME PHYS/QHP 5/>YRS: CPT | Performed by: INTERNAL MEDICINE

## 2018-11-27 PROCEDURE — 88305 TISSUE EXAM BY PATHOLOGIST: CPT

## 2018-11-27 PROCEDURE — 82947 ASSAY GLUCOSE BLOOD QUANT: CPT

## 2018-11-27 RX ORDER — MIDAZOLAM HYDROCHLORIDE 1 MG/ML
INJECTION INTRAMUSCULAR; INTRAVENOUS PRN
Status: DISCONTINUED | OUTPATIENT
Start: 2018-11-27 | End: 2018-11-27 | Stop reason: HOSPADM

## 2018-11-27 RX ORDER — FENTANYL CITRATE 50 UG/ML
INJECTION, SOLUTION INTRAMUSCULAR; INTRAVENOUS PRN
Status: DISCONTINUED | OUTPATIENT
Start: 2018-11-27 | End: 2018-11-27 | Stop reason: HOSPADM

## 2018-11-27 RX ORDER — SODIUM CHLORIDE 9 MG/ML
INJECTION, SOLUTION INTRAVENOUS CONTINUOUS
Status: DISCONTINUED | OUTPATIENT
Start: 2018-11-27 | End: 2018-11-27 | Stop reason: HOSPADM

## 2018-11-27 RX ADMIN — SODIUM CHLORIDE: 9 INJECTION, SOLUTION INTRAVENOUS at 09:44

## 2018-11-27 ASSESSMENT — PAIN SCALES - GENERAL: PAINLEVEL_OUTOF10: 0

## 2018-11-27 ASSESSMENT — PAIN - FUNCTIONAL ASSESSMENT: PAIN_FUNCTIONAL_ASSESSMENT: 0-10

## 2018-11-28 LAB — SURGICAL PATHOLOGY REPORT: NORMAL

## 2018-12-03 ENCOUNTER — TELEPHONE (OUTPATIENT)
Dept: GASTROENTEROLOGY | Age: 23
End: 2018-12-03

## 2018-12-03 NOTE — TELEPHONE ENCOUNTER
Patient called into office today asking for biopsy results and if it was positive for H-Pylori, writer told patient that it was negative for H-pylori and also made a follow appointment for the patient

## 2018-12-10 ENCOUNTER — TELEPHONE (OUTPATIENT)
Dept: INTERNAL MEDICINE CLINIC | Age: 23
End: 2018-12-10

## 2019-02-18 ENCOUNTER — TELEPHONE (OUTPATIENT)
Dept: INTERNAL MEDICINE CLINIC | Age: 24
End: 2019-02-18

## 2019-04-03 ENCOUNTER — HOSPITAL ENCOUNTER (EMERGENCY)
Age: 24
Discharge: HOME | End: 2019-04-03
Attending: EMERGENCY MEDICINE
Payer: COMMERCIAL

## 2019-04-03 VITALS
SYSTOLIC BLOOD PRESSURE: 105 MMHG | HEART RATE: 85 BPM | RESPIRATION RATE: 18 BRPM | TEMPERATURE: 98 F | DIASTOLIC BLOOD PRESSURE: 69 MMHG | OXYGEN SATURATION: 100 %

## 2019-04-03 DIAGNOSIS — B80 PINWORMS: Primary | ICD-10-CM

## 2019-04-03 PROCEDURE — 99282 EMERGENCY DEPT VISIT SF MDM: CPT

## 2019-04-03 PROCEDURE — 6370000000 HC RX 637 (ALT 250 FOR IP): Performed by: EMERGENCY MEDICINE

## 2019-04-03 RX ORDER — ALBENDAZOLE 200 MG/1
400 TABLET, FILM COATED ORAL ONCE
Status: COMPLETED | OUTPATIENT
Start: 2019-04-03 | End: 2019-04-03

## 2019-04-03 RX ADMIN — ALBENDAZOLE 400 MG: 200 TABLET, FILM COATED ORAL at 06:29

## 2019-04-03 ASSESSMENT — ENCOUNTER SYMPTOMS
SORE THROAT: 0
BACK PAIN: 0
ABDOMINAL PAIN: 0
SHORTNESS OF BREATH: 0

## 2019-04-03 NOTE — ED PROVIDER NOTES
[facility]  eMERGENCY dEPARTMENT eNCOUnter      Pt Name: Oli Brooks  MRN: [id number]  [place] 1995  Date of evaluation: 4/3/2019  PCP:    Lakesha Coyle PA-C      CHIEF COMPLAINT       Chief Complaint   Patient presents with    Other     treated pro active for worms in stool d/t child having them         [facility]    Oli Brooks is a 20 y.o. female who presents PIN WORMS     19 yo F pt daughter had pinworms, pt requesting to be treated no fever no vomit no abdominal pain, appetite remains stable, pt denies pin worms in stool,   Denies complaints,     REVIEW OF SYSTEMS       Review of Systems   Constitutional: Negative for fatigue and fever. HENT: Negative for sore throat. Respiratory: Negative for shortness of breath. Gastrointestinal: Negative for abdominal pain. Genitourinary: Negative for dysuria. Musculoskeletal: Negative for back pain. PAST MEDICAL HISTORY    has a past medical history of Asthma, Diabetes mellitus type 2 in obese ([address] Hepatic steatosis, Hypertension, Morbid obesity with body mass index (BMI) of 40.0 to 49.9 ([facility] [address] Neuropathy, and Postpartum depression. SURGICAL HISTORY      has a past surgical history that includes Tonsillectomy (Bilateral);  section; eye surgery;  section (N/A, 3/23/2017); and Upper gastrointestinal endoscopy (N/A, 2018).     CURRENT MEDICATIONS       Discharge Medication List as of 4/3/2019  6:23 AM      CONTINUE these medications which have NOT CHANGED    Details   dicyclomine (BENTYL) 20 MG tablet take 1 tablet by mouth every 6 hours, R-0Historical Med      ondansetron (ZOFRAN-ODT) 4 MG disintegrating tablet dissolve 1 tablet ON TONGUE every 8 hours if needed for nausea, R-0Historical Med      omeprazole (PRILOSEC) 20 MG delayed release capsule take 1 capsule by mouth once daily, R-0Historical Med      sucralfate (CARAFATE) 1 GM tablet take 1 tablet by mouth four times a day, R-0Historical Med      albuterol sulfate HFA (PROVENTIL HFA) 108 (90 Base) MCG/ACT inhaler Inhale 2 puffs into the lungs every 6 hours as needed for Wheezing, Disp-1 Inhaler, R-3Normal      promethazine (PHENERGAN) 25 MG tablet Take 25 mg by mouth every 6 hours as needed for NauseaHistorical Med      etonogestrel (NEXPLANON) 68 MG implant 68 mg by Subdermal route once for 1 dose, Subdermal, ONCE Starting Tue 5/23/2017, 1 dose, Disp-1 each, R-0, NO PRINT      acetaminophen (TYLENOL) 325 MG tablet Take 2 tablets by mouth every 6 hours as needed for Pain, Disp-60 tablet, R-0             ALLERGIES     is allergic to benadryl [diphenhydramine]. FAMILY HISTORY     indicated that her mother is alive. She indicated that her father is alive. She indicated that the status of her neg hx is unknown.     family history includes Breast Cancer (age of onset: 28) in her mother; Cancer (age of onset: 61) in her maternal uncle; Diabetes in her father, maternal uncle, and maternal uncle; Heart Attack in her maternal uncle; Heart Disease in her maternal uncle. SOCIAL HISTORY      reports that she quit smoking about 2 years ago. Her smoking use included cigarettes. She started smoking about 11 years ago. She has a 20.00 pack-year smoking history. She has never used smokeless tobacco. She reports that she does not drink alcohol or use drugs. PHYSICAL EXAM     INITIAL VITALS:  oral temperature is 98 °F (36.7 °C). Her blood pressure is 105/69 and her pulse is 85. Her respiration is 18 and oxygen saturation is 100%. Physical Exam   Constitutional: She appears well-developed and well-nourished. Obese female   HENT:   Head: Normocephalic and atraumatic. Eyes: No scleral icterus. Neck: Normal range of motion. Neck supple. Cardiovascular: Normal rate, regular rhythm and normal heart sounds. No murmur heard. Pulmonary/Chest: Effort normal and breath sounds normal. No respiratory distress. She has no wheezes.    Abdominal: [facility]

## 2019-04-03 NOTE — ED NOTES
Pt presents to the er with her children to get treated for worms. Pt denies any signs of them but her daughter was treated for rooms.      Kirby Woodward RN  04/03/19 2151

## 2019-04-09 ENCOUNTER — TELEPHONE (OUTPATIENT)
Dept: GASTROENTEROLOGY | Age: 24
End: 2019-04-09

## 2019-04-09 NOTE — TELEPHONE ENCOUNTER
Writer attempted to contact patient back to recommend that she go to the Emergency Room for vomiting blood, however, patient was unavailable and her voicemail box is full. No message was able to be left.

## 2019-04-10 ENCOUNTER — TELEPHONE (OUTPATIENT)
Dept: GASTROENTEROLOGY | Age: 24
End: 2019-04-10

## 2019-04-10 NOTE — TELEPHONE ENCOUNTER
Attempted to contact patient to confirm appt for tomorrow 4/11/19. Patient mailbox is full at this time and writer is unable to leave a detailed message.

## 2019-04-11 ENCOUNTER — OFFICE VISIT (OUTPATIENT)
Dept: GASTROENTEROLOGY | Age: 24
End: 2019-04-11
Payer: COMMERCIAL

## 2019-04-11 VITALS
DIASTOLIC BLOOD PRESSURE: 84 MMHG | SYSTOLIC BLOOD PRESSURE: 122 MMHG | WEIGHT: 283.2 LBS | BODY MASS INDEX: 44.36 KG/M2 | HEART RATE: 78 BPM

## 2019-04-11 DIAGNOSIS — K58.8 OTHER IRRITABLE BOWEL SYNDROME: Primary | ICD-10-CM

## 2019-04-11 DIAGNOSIS — R11.2 NAUSEA AND VOMITING, INTRACTABILITY OF VOMITING NOT SPECIFIED, UNSPECIFIED VOMITING TYPE: ICD-10-CM

## 2019-04-11 DIAGNOSIS — R10.84 GENERALIZED ABDOMINAL PAIN: ICD-10-CM

## 2019-04-11 DIAGNOSIS — R63.5 WEIGHT GAIN: ICD-10-CM

## 2019-04-11 PROCEDURE — G8427 DOCREV CUR MEDS BY ELIG CLIN: HCPCS | Performed by: INTERNAL MEDICINE

## 2019-04-11 PROCEDURE — G8417 CALC BMI ABV UP PARAM F/U: HCPCS | Performed by: INTERNAL MEDICINE

## 2019-04-11 PROCEDURE — 99244 OFF/OP CNSLTJ NEW/EST MOD 40: CPT | Performed by: INTERNAL MEDICINE

## 2019-04-11 ASSESSMENT — ENCOUNTER SYMPTOMS
SHORTNESS OF BREATH: 1
NAUSEA: 0
ABDOMINAL PAIN: 1
WHEEZING: 1
CONSTIPATION: 1
SINUS PRESSURE: 1
BLOOD IN STOOL: 0
COUGH: 1
VOMITING: 0
TROUBLE SWALLOWING: 1
SINUS PAIN: 1
ABDOMINAL DISTENTION: 1
DIARRHEA: 1
EYES NEGATIVE: 1

## 2019-04-11 NOTE — PROGRESS NOTES
Subjective:      Patient ID: Rudi Melendrez is a 30 y.o. female. HPI    Dr. Jordan Donaldson PA-C our mutual patient Rudi Melendrez was seen  for   1. Other irritable bowel syndrome    2. Generalized abdominal pain    3. Nausea and vomiting, intractability of vomiting not specified, unspecified vomiting type    4. Weight gain     . Patient seen with multiple medical issues. She states that she has abdominal pain. However the pain appears to be mostly in the right lateral abdominal wall area. She does not have any pain over the anterior abdominal.  She does have abdominal bloating and intermittent cramps. She also has history of constipation alternating with diarrhea. Constipation appears to be predominant. With good bowel movements abdominal bloating and cramps get better. She denies hematochezia or melena. She does not have food allergies. No symptoms of lactose intolerance. No nocturnal symptoms. She did gain 40 pounds in the last 5-6 months. Our previous records reviewed and she had EGD done in the past and was nonspecific. Past Medical, Family, and Social History reviewed and does contribute to the patient presenting condition. patient\"s PMH/PSH,SH,PSYCH hx, MEDs, ALLERGIES, and ROS was all reviewed and updated ion the appropriate sections    Review of Systems   Constitutional: Positive for activity change, appetite change and fatigue. Negative for fever. HENT: Positive for sinus pressure, sinus pain and trouble swallowing. Negative for postnasal drip. Eyes: Negative. Respiratory: Positive for cough, shortness of breath and wheezing. Cardiovascular: Negative. Gastrointestinal: Positive for abdominal distention, abdominal pain, constipation and diarrhea. Negative for blood in stool, nausea and vomiting. Endocrine: Negative. Genitourinary: Negative. Musculoskeletal: Negative. Skin: Negative.     Allergic/Immunologic: Negative for environmental allergies and food allergies. Neurological: Negative. Hematological: Bruises/bleeds easily. Psychiatric/Behavioral: Positive for sleep disturbance. The patient is nervous/anxious. Objective:   Physical Exam   Constitutional: She is oriented to person, place, and time. She appears well-developed and well-nourished. Markedly overweight patient. HENT:   Head: Normocephalic and atraumatic. No oral lesions   Eyes: Pupils are equal, round, and reactive to light. Conjunctivae are normal. No scleral icterus. Neck: Normal range of motion. Neck supple. No hepatojugular reflux and no JVD present. No tracheal deviation present. No thyromegaly present. Cardiovascular: Normal rate, regular rhythm, normal heart sounds and intact distal pulses. Pulmonary/Chest: Effort normal and breath sounds normal. No respiratory distress. She has no wheezes. She has no rales. Abdominal: Soft. Bowel sounds are normal. She exhibits no distension, no ascites and no mass. There is no hepatomegaly. There is no tenderness. There is no rebound. No hernia. Obese abdomin     Musculoskeletal: She exhibits no edema or tenderness. No joint swelling   Lymphadenopathy:     She has no cervical adenopathy. Neurological: She is alert and oriented to person, place, and time. No cranial nerve deficit. Skin: Skin is warm. No bruising, no ecchymosis and no rash noted. No erythema. Psychiatric: Thought content normal.   Nursing note and vitals reviewed. Assessment:       Diagnosis Orders   1. Other irritable bowel syndrome     2. Generalized abdominal pain     3. Nausea and vomiting, intractability of vomiting not specified, unspecified vomiting type     4. Weight gain  TSH without Reflex    Lipase           Plan:      Basing on the history and examination I think that this patient has irritable bowel syndrome. Discussed with the patient regarding IBS and management. Brochures given.     Also it appears that she has musculoskeletal

## 2019-04-12 ENCOUNTER — HOSPITAL ENCOUNTER (OUTPATIENT)
Age: 24
Discharge: HOME | End: 2019-04-12
Payer: COMMERCIAL

## 2019-04-12 DIAGNOSIS — R63.5 WEIGHT GAIN: ICD-10-CM

## 2019-04-12 LAB
LIPASE: 30 U/L (ref 13–60)
TSH SERPL DL<=0.05 MIU/L-ACNC: 2.96 MIU/L (ref 0.3–5)

## 2019-04-12 PROCEDURE — 36415 COLL VENOUS BLD VENIPUNCTURE: CPT

## 2019-04-12 PROCEDURE — 84443 ASSAY THYROID STIM HORMONE: CPT

## 2019-04-12 PROCEDURE — 83690 ASSAY OF LIPASE: CPT

## 2019-04-15 ENCOUNTER — TELEPHONE (OUTPATIENT)
Dept: GASTROENTEROLOGY | Age: 24
End: 2019-04-15

## 2019-04-15 NOTE — TELEPHONE ENCOUNTER
Patient phoned this writer requesting lab results. This writer reviewed last labs done. Patient was informed of results. Patient verbalized understanding, and states she has a follow up appointment.

## 2019-05-22 ENCOUNTER — TELEPHONE (OUTPATIENT)
Dept: GASTROENTEROLOGY | Age: 24
End: 2019-05-22

## 2019-05-24 ENCOUNTER — OFFICE VISIT (OUTPATIENT)
Dept: INTERNAL MEDICINE CLINIC | Age: 24
End: 2019-05-24
Payer: COMMERCIAL

## 2019-05-24 VITALS
HEIGHT: 67 IN | WEIGHT: 285 LBS | HEART RATE: 84 BPM | DIASTOLIC BLOOD PRESSURE: 78 MMHG | BODY MASS INDEX: 44.73 KG/M2 | SYSTOLIC BLOOD PRESSURE: 125 MMHG

## 2019-05-24 DIAGNOSIS — F31.31 BIPOLAR AFFECTIVE DISORDER, CURRENTLY DEPRESSED, MILD (HCC): Primary | ICD-10-CM

## 2019-05-24 DIAGNOSIS — K58.2 IRRITABLE BOWEL SYNDROME WITH BOTH CONSTIPATION AND DIARRHEA: ICD-10-CM

## 2019-05-24 DIAGNOSIS — N61.0 CELLULITIS OF BREAST: ICD-10-CM

## 2019-05-24 PROCEDURE — G8417 CALC BMI ABV UP PARAM F/U: HCPCS | Performed by: PHYSICIAN ASSISTANT

## 2019-05-24 PROCEDURE — 99214 OFFICE O/P EST MOD 30 MIN: CPT | Performed by: PHYSICIAN ASSISTANT

## 2019-05-24 PROCEDURE — G8427 DOCREV CUR MEDS BY ELIG CLIN: HCPCS | Performed by: PHYSICIAN ASSISTANT

## 2019-05-24 PROCEDURE — 1036F TOBACCO NON-USER: CPT | Performed by: PHYSICIAN ASSISTANT

## 2019-05-24 RX ORDER — DOXYCYCLINE HYCLATE 100 MG/1
100 CAPSULE ORAL 2 TIMES DAILY
Qty: 20 CAPSULE | Refills: 0 | Status: SHIPPED | OUTPATIENT
Start: 2019-05-24 | End: 2019-06-03

## 2019-05-24 RX ORDER — FLUOXETINE 10 MG/1
10 CAPSULE ORAL DAILY
Qty: 30 CAPSULE | Refills: 3 | Status: SHIPPED | OUTPATIENT
Start: 2019-05-24 | End: 2019-09-08 | Stop reason: SDUPTHER

## 2019-05-24 RX ORDER — RISPERIDONE 0.25 MG/1
0.25 TABLET, FILM COATED ORAL NIGHTLY
Qty: 30 TABLET | Refills: 2 | Status: SHIPPED | OUTPATIENT
Start: 2019-05-24 | End: 2019-08-10 | Stop reason: SDUPTHER

## 2019-05-24 ASSESSMENT — ENCOUNTER SYMPTOMS
DIARRHEA: 1
BACK PAIN: 0
NAUSEA: 0
ABDOMINAL PAIN: 0
RHINORRHEA: 0
COUGH: 0
EYE PAIN: 0
VOICE CHANGE: 0
COLOR CHANGE: 0
CHEST TIGHTNESS: 0
SORE THROAT: 0
BLOOD IN STOOL: 0
VOMITING: 0
WHEEZING: 0
CONSTIPATION: 1
SINUS PAIN: 0
EYE DISCHARGE: 0
EYE ITCHING: 0
SHORTNESS OF BREATH: 0
TROUBLE SWALLOWING: 0

## 2019-05-24 ASSESSMENT — PATIENT HEALTH QUESTIONNAIRE - PHQ9
SUM OF ALL RESPONSES TO PHQ9 QUESTIONS 1 & 2: 2
2. FEELING DOWN, DEPRESSED OR HOPELESS: 1
SUM OF ALL RESPONSES TO PHQ QUESTIONS 1-9: 2
SUM OF ALL RESPONSES TO PHQ QUESTIONS 1-9: 2
1. LITTLE INTEREST OR PLEASURE IN DOING THINGS: 1

## 2019-05-24 NOTE — PROGRESS NOTES
[facility] PHYSICIANS  [facility]  [address] [address]  [address] [address] [facility] [address]  Dept: [phone]  Dept Fax: [phone]    OfficeProgress/Follow Up Note  Date of patient's visit: 5/24/2019  Patient's Name:  Delisa Mccrary YOB: 1995            Patient Care Team:  Joseline Ponce PA-C as PCP - General (Physician Assistant)  Baylee Mai MD as Consulting Physician (Obstetrics & Gynecology)    REASON FOR VISIT:  Routine outpatient follow up    HISTORY OF PRESENT ILLNESS:      Chief Complaint   Patient presents with    Manic Behavior     CSB needs to be put psych meds     Cyst     she has multiple cysts on her breasts      History was obtained from the patient. Delisa Mccrary is a 30 y.o. female here today for follow up. Bipolar disorder. Reports increasing depression recently. Evaluated by CSB and requesting psych evaluation. Not taking medications for months, previously on Prozac 10 mg QD, Risperdal 0.25 mg QHS. Sleep is poor, appetite good, energy level low. No suicidal thoughts. No hallucinations. Previously following with [facility]. Irritable bowel syndrome. Following with gastroenterologist.   Reports improvement in nausea. Alternating episodes of constipation and diarrhea. Breast problem. Complains of bilateral breast masses. Present for months. Intermittently tender. Right breast with area of redness, tenderness to palpation. No nausea or vomiting. No fever or chills.      Health Maintenance Due   Topic Date Due    Pneumococcal 0-64 years Vaccine (1 of 1 - PPSV23) 04/05/1999    Varicella Vaccine (1 of 2 - 13+ 2-dose series) 04/05/2006    HPV vaccine (2 - Female 3-dose series) 08/19/2011     MEDICATIONS:      Current Outpatient Medications   Medication Sig Dispense Refill    FLUoxetine (PROZAC) 10 MG capsule Take 1 capsule by mouth daily 30 capsule 3    risperiDONE (RISPERDAL) 0.25 MG tablet Take 1 tablet by mouth nightly 30 tablet 2    doxycycline hyclate (VIBRAMYCIN) 100 MG capsule Take 1 capsule by mouth 2 times daily for 10 days Take with food, but avoid dairy, calcium and MTV's 2 hours before and after the dose 20 capsule 0    ondansetron (ZOFRAN-ODT) 4 MG disintegrating tablet dissolve 1 tablet ON TONGUE every 8 hours if needed for nausea  0    omeprazole (PRILOSEC) 20 MG delayed release capsule take 1 capsule by mouth once daily  0    albuterol sulfate HFA (PROVENTIL HFA) 108 (90 Base) MCG/ACT inhaler Inhale 2 puffs into the lungs every 6 hours as needed for Wheezing 1 Inhaler 3    promethazine (PHENERGAN) 25 MG tablet Take 25 mg by mouth every 6 hours as needed for Nausea      acetaminophen (TYLENOL) 325 MG tablet Take 2 tablets by mouth every 6 hours as needed for Pain 60 tablet 0    etonogestrel (NEXPLANON) 68 MG implant 68 mg by Subdermal route once for 1 dose 1 each 0     No current facility-administered medications for this visit. ALLERGIES:      Allergies   Allergen Reactions    Benadryl [Diphenhydramine] Shortness Of Breath       SOCIAL HISTORY    Reviewed and no change from previous record. Adenike Bullock  reports that she quit smoking about 2 years ago. Her smoking use included cigarettes. She started smoking about 11 years ago. She has a 20.00 pack-year smoking history. She has never used smokeless tobacco.    FAMILY HISTORY:    Reviewed and no change from previous visit    REVIEW OF SYSTEMS:    Review of Systems   Constitutional: Positive for fatigue and unexpected weight change (gain). Negative for appetite change, chills and fever. HENT: Negative for congestion, ear discharge, ear pain, hearing loss, rhinorrhea, sinus pain, sore throat, trouble swallowing and voice change. Eyes: Negative for pain, discharge, itching and visual disturbance. Respiratory: Negative for cough, chest tightness, shortness of breath and wheezing. Cardiovascular: Negative for chest pain, palpitations and leg swelling.    Gastrointestinal: Positive for constipation and diarrhea. Negative for abdominal pain, blood in stool, nausea and vomiting. Genitourinary: Negative for difficulty urinating, dysuria, flank pain, frequency, hematuria and urgency. Musculoskeletal: Negative for arthralgias, back pain, neck pain and neck stiffness. Skin: Positive for rash (right breast). Negative for color change and pallor. Neurological: Negative for dizziness, weakness, light-headedness, numbness and headaches. Hematological: Negative for adenopathy. Psychiatric/Behavioral: Positive for dysphoric mood and sleep disturbance. Negative for agitation, confusion, decreased concentration, hallucinations, self-injury and suicidal ideas. The patient is not nervous/anxious. PHYSICAL EXAM:      Vitals:    05/24/19 1321   BP: 125/78   Pulse: 84   Weight: 285 lb (129.3 kg)   Height: 5' 7.01\" (1.702 m)     BP Readings from Last 3 Encounters:   05/24/19 125/78   04/11/19 122/84   04/03/19 105/69      BENJIE Thompson present for duration of examination.     General - alert, well appearing, in no distress, appears mildly anxious   Skin - normal coloration and turgor, no rashes  Eyes - pupils equal and reactive, extraocular eye movements intact  Mouth - mucous membranes are moist, pharynx normal without lesions  Neck - supple, no significant adenopathy, no palpable masses   Lymphatics - no palpable lymphadenopathy, no hepatosplenomegaly  Breast - bilateral macromastia, right breast with small papule at 12 o'clock position, area of surrounding erythema, tender to palpation, no appreciable warmth, no underlying mass appreciated, no fluctuance, small excoriated papules present to both breasts   Chest - clear to auscultation, symmetric air entry  Heart - normal rate, rhythm is regular, no murmur  Abdomen - soft, nontender, nondistended, no masses or organomegaly  Neurological - alert, oriented, normal speech, no focal sensory or motor deficit  Extremities - no pedal edema    LABORATORY FINDINGS:    CBC:  Lab Results   Component Value Date    WBC 7.1 11/08/2018    HGB 12.5 11/08/2018     11/08/2018     BMP:    Lab Results   Component Value Date     11/08/2018    K 3.8 11/08/2018     11/08/2018    CO2 22 11/08/2018    BUN 6 11/08/2018    CREATININE 0.61 11/08/2018    GLUCOSE 111 11/08/2018     HEMOGLOBIN A1C:   Lab Results   Component Value Date    LABA1C 5.6 11/07/2018     FASTING LIPID PANEL:  Lab Results   Component Value Date    CHOL 165 05/03/2016    HDL 37 (L) 05/03/2016    TRIG 129 05/03/2016     ASSESSMENT AND PLAN:      1. Bipolar affective disorder, currently depressed, mild ([facility] [address]  - Will resume medications, patient encouraged to follow up with psychiatrist for further evaluation and treatment  - FLUoxetine (PROZAC) 10 MG capsule; Take 1 capsule by mouth daily  Dispense: 30 capsule; Refill: 3  - risperiDONE (RISPERDAL) 0.25 MG tablet; Take 1 tablet by mouth nightly  Dispense: 30 tablet; Refill: 2  - External Referral To Psychiatry    2. Cellulitis of breast  - scattered lesions to both breasts, acne, hidradenitis suppurativa, skin picking ?  - will start antibiotics, patient to return in one week, earlier if worsening  - doxycycline hyclate (VIBRAMYCIN) 100 MG capsule; Take 1 capsule by mouth 2 times daily for 10 days Take with food, but avoid dairy, calcium and MTV's 2 hours before and after the dose  Dispense: 20 capsule; Refill: 0  - consider addition of clindamycin gel ? 3. Irritable bowel syndrome with both constipation and diarrhea  - following with gastroenterologist  - follow up as instructed     FOLLOW UP AND INSTRUCTIONS:   Return in about 1 week (around 5/31/2019). Patient educated on depression, if worsening or develop thoughts to harm self or others, instructed to present to emergency room immediately, patient understands and agrees with planJaelyn Shi received counseling on the following healthy behaviors: nutrition and exercise    Discussed use, benefit, and side effects of prescribed medications. Barriers to medication compliance addressed. All patient questions answered. Patient voiced understanding. Patient given educational materials - see patient instructions    ABIMBOLA Manning [facility]  5/24/2019, 2:22 PM    Please note that this chart was generated using voice recognition Dragon dictation software. Although everyeffort was made to ensure the accuracy of this automated transcription, some errors in transcription may have occurred.

## 2019-05-24 NOTE — PROGRESS NOTES
Visit Information    Have you changed or started any medications since your last visit including any over-the-counter medicines, vitamins, or herbal medicines? no   Are you having any side effects from any of your medications? -  no  Have you stopped taking any of your medications? Is so, why? -  no    Have you seen any other physician or provider since your last visit? No  Have you had any other diagnostic tests since your last visit? No  Have you been seen in the emergency room and/or had an admission to a hospital since we last saw you? No  Have you had your routine dental cleaning in the past 6 months? no    Have you activated your [employer] account? If not, what are your barriers?  Yes     Patient Care Team:  Bakari De La Torre PA-C as PCP - General (Physician Assistant)  Edgar Grove MD as Consulting Physician (Obstetrics & Gynecology)    Medical History Review  Past Medical, Family, and Social History reviewed and does contribute to the patient presenting condition  Chief Complaint   Patient presents with    Manic Behavior     CSB needs to be put psych meds     Cyst     she has multiple cysts on her breasts      Health Maintenance   Topic Date Due    Pneumococcal 0-64 years Vaccine (1 of 1 - PPSV23) 04/05/1999    Varicella Vaccine (1 of 2 - 13+ 2-dose series) 04/05/2006    HPV vaccine (2 - Female 3-dose series) 08/19/2011    Flu vaccine (Season Ended) 09/01/2019    Cervical cancer screen  10/28/2019    DTaP/Tdap/Td vaccine (2 - Td) 07/07/2025    HIV screen  Completed

## 2019-05-31 ENCOUNTER — TELEPHONE (OUTPATIENT)
Dept: INTERNAL MEDICINE CLINIC | Age: 24
End: 2019-05-31

## 2019-05-31 NOTE — LETTER
[facility]  [address] [place] [address]  Phone: [phone]  Fax: [phone]      May 31, 2019     Iban Duran [place] [id number] [facility]      Dear Tasha Rice:    I am writing you because I have been informed of your missed appointment(s). We ask that you please call 24 hours in advance if you are unable to make your appointment so that we can give that time to another patient in need. We care about you and the management of your healthcare and want to make sure that you follow up as recommended. I have to also mention, that in an effort to provide quality care and timely appointments to all my patients, it is our policy that patients be discharged from the practice if they do not show for three scheduled appointments. You would be informed of this termination by mail, and would have 30 days from the date of discharge to locate another physician. We're sorry you were unable to keep your appointment and hope that you are doing well. We would like to continue treating your healthcare needs. Please call the office to let us know your plans for treatment and to reschedule your appointment.      Sincerely,        Cruz Lucas PA-C

## 2019-06-05 ENCOUNTER — TELEPHONE (OUTPATIENT)
Dept: INTERNAL MEDICINE CLINIC | Age: 24
End: 2019-06-05

## 2019-06-05 NOTE — TELEPHONE ENCOUNTER
Patient called, states plastics consult at [facility] stated she needs a referral to a different plastics surgeon. States her breasts are \"to long\". Please refer her to someone else.   Please notify patient

## 2019-06-18 ENCOUNTER — TELEPHONE (OUTPATIENT)
Dept: OBGYN CLINIC | Age: 24
End: 2019-06-18

## 2019-06-18 ENCOUNTER — HOSPITAL ENCOUNTER (INPATIENT)
Age: 24
LOS: 4 days | Discharge: HOME | DRG: 385 | End: 2019-06-22
Attending: SPECIALIST | Admitting: SPECIALIST
Payer: COMMERCIAL

## 2019-06-18 ENCOUNTER — OFFICE VISIT (OUTPATIENT)
Dept: OBGYN CLINIC | Age: 24
End: 2019-06-18
Payer: COMMERCIAL

## 2019-06-18 VITALS
WEIGHT: 270 LBS | DIASTOLIC BLOOD PRESSURE: 78 MMHG | BODY MASS INDEX: 42.38 KG/M2 | HEART RATE: 96 BPM | HEIGHT: 67 IN | SYSTOLIC BLOOD PRESSURE: 134 MMHG

## 2019-06-18 DIAGNOSIS — N61.1 LEFT BREAST ABSCESS: Primary | ICD-10-CM

## 2019-06-18 DIAGNOSIS — L73.2 HIDRADENITIS SUPPURATIVA: ICD-10-CM

## 2019-06-18 DIAGNOSIS — L02.91 ABSCESS: ICD-10-CM

## 2019-06-18 DIAGNOSIS — E11.65 UNCONTROLLED TYPE 2 DIABETES MELLITUS WITH HYPERGLYCEMIA (HCC): ICD-10-CM

## 2019-06-18 PROBLEM — Z86.59 HISTORY OF POSTPARTUM DEPRESSION: Status: ACTIVE | Noted: 2019-06-18

## 2019-06-18 PROBLEM — E11.9 DIABETES (HCC): Status: ACTIVE | Noted: 2019-06-18

## 2019-06-18 PROBLEM — Z91.199 NONCOMPLIANCE: Status: ACTIVE | Noted: 2019-06-18

## 2019-06-18 PROBLEM — E87.1 HYPONATREMIA: Status: ACTIVE | Noted: 2019-06-18

## 2019-06-18 PROBLEM — M54.9 BACK PAIN: Status: ACTIVE | Noted: 2019-06-18

## 2019-06-18 PROBLEM — Z98.891 HISTORY OF CESAREAN SECTION: Status: ACTIVE | Noted: 2019-06-18

## 2019-06-18 PROBLEM — Z78.9 POOR HISTORIAN: Status: ACTIVE | Noted: 2019-06-18

## 2019-06-18 PROBLEM — Z87.59 HISTORY OF POSTPARTUM DEPRESSION: Status: ACTIVE | Noted: 2019-06-18

## 2019-06-18 PROBLEM — Z65.3 LOST CUSTODY OF CHILDREN: Status: ACTIVE | Noted: 2019-06-18

## 2019-06-18 LAB
-: ABNORMAL
ABSOLUTE EOS #: 0.2 K/UL (ref 0–0.4)
ABSOLUTE IMMATURE GRANULOCYTE: ABNORMAL K/UL (ref 0–0.3)
ABSOLUTE LYMPH #: 1.8 K/UL (ref 1–4.8)
ABSOLUTE MONO #: 0.8 K/UL (ref 0.1–1.3)
ALBUMIN SERPL-MCNC: 3.8 G/DL (ref 3.5–5.2)
ALBUMIN/GLOBULIN RATIO: ABNORMAL (ref 1–2.5)
ALLEN TEST: ABNORMAL
ALP BLD-CCNC: 109 U/L (ref 35–104)
ALT SERPL-CCNC: 31 U/L (ref 5–33)
AMORPHOUS: ABNORMAL
AMPHETAMINE SCREEN URINE: NEGATIVE
ANION GAP SERPL CALCULATED.3IONS-SCNC: 16 MMOL/L (ref 9–17)
AST SERPL-CCNC: 28 U/L
BACTERIA: ABNORMAL
BARBITURATE SCREEN URINE: NEGATIVE
BASOPHILS # BLD: 1 % (ref 0–2)
BASOPHILS ABSOLUTE: 0.1 K/UL (ref 0–0.2)
BENZODIAZEPINE SCREEN, URINE: NEGATIVE
BETA-HYDROXYBUTYRATE: 0.43 MMOL/L (ref 0.02–0.27)
BILIRUB SERPL-MCNC: 1.41 MG/DL (ref 0.3–1.2)
BILIRUBIN URINE: NEGATIVE
BUN BLDV-MCNC: 8 MG/DL (ref 6–20)
BUN/CREAT BLD: ABNORMAL (ref 9–20)
BUPRENORPHINE URINE: NORMAL
CALCIUM SERPL-MCNC: 8.6 MG/DL (ref 8.6–10.4)
CANNABINOID SCREEN URINE: NEGATIVE
CARBOXYHEMOGLOBIN: 2.2 % (ref 0–5)
CASTS UA: ABNORMAL /LPF
CHLORIDE BLD-SCNC: 93 MMOL/L (ref 98–107)
CO2: 18 MMOL/L (ref 20–31)
COCAINE METABOLITE, URINE: NEGATIVE
COLOR: YELLOW
COMMENT UA: ABNORMAL
CREAT SERPL-MCNC: 0.75 MG/DL (ref 0.5–0.9)
CRYSTALS, UA: ABNORMAL /HPF
DIFFERENTIAL TYPE: ABNORMAL
EOSINOPHILS RELATIVE PERCENT: 2 % (ref 0–4)
EPITHELIAL CELLS UA: ABNORMAL /HPF
FIO2: ABNORMAL
GFR AFRICAN AMERICAN: >60 ML/MIN
GFR NON-AFRICAN AMERICAN: >60 ML/MIN
GFR SERPL CREATININE-BSD FRML MDRD: ABNORMAL ML/MIN/{1.73_M2}
GFR SERPL CREATININE-BSD FRML MDRD: ABNORMAL ML/MIN/{1.73_M2}
GLUCOSE BLD-MCNC: 326 MG/DL (ref 65–105)
GLUCOSE BLD-MCNC: 704 MG/DL (ref 70–99)
GLUCOSE URINE: ABNORMAL
HCG(URINE) PREGNANCY TEST: NEGATIVE
HCO3 VENOUS: 21.4 MMOL/L (ref 24–30)
HCT VFR BLD CALC: 40.1 % (ref 36–46)
HEMOGLOBIN: 13.1 G/DL (ref 12–16)
IMMATURE GRANULOCYTES: ABNORMAL %
KETONES, URINE: ABNORMAL
LACTIC ACID: 1.4 MMOL/L (ref 0.5–2.2)
LEUKOCYTE ESTERASE, URINE: NEGATIVE
LYMPHOCYTES # BLD: 19 % (ref 24–44)
MCH RBC QN AUTO: 30.5 PG (ref 26–34)
MCHC RBC AUTO-ENTMCNC: 32.7 G/DL (ref 31–37)
MCV RBC AUTO: 93.2 FL (ref 80–100)
MDMA URINE: NORMAL
METHADONE SCREEN, URINE: NEGATIVE
METHAMPHETAMINE, URINE: NORMAL
METHEMOGLOBIN: 0.3 % (ref 0–1.9)
MODE: ABNORMAL
MONOCYTES # BLD: 8 % (ref 1–7)
MUCUS: ABNORMAL
NEGATIVE BASE EXCESS, VEN: 2.9 MMOL/L (ref 0–2)
NITRITE, URINE: NEGATIVE
NOTIFICATION TIME: ABNORMAL
NOTIFICATION: ABNORMAL
NRBC AUTOMATED: ABNORMAL PER 100 WBC
O2 DEVICE/FLOW/%: ABNORMAL
O2 SAT, VEN: 91.3 % (ref 60–85)
OPIATES, URINE: NEGATIVE
OTHER OBSERVATIONS UA: ABNORMAL
OXYCODONE SCREEN URINE: NEGATIVE
OXYHEMOGLOBIN: ABNORMAL % (ref 95–98)
PATIENT TEMP: 37
PCO2, VEN, TEMP ADJ: ABNORMAL MMHG (ref 39–55)
PCO2, VEN: 32.1 (ref 39–55)
PDW BLD-RTO: 13 % (ref 11.5–14.9)
PEEP/CPAP: ABNORMAL
PH UA: 5.5 (ref 5–8)
PH VENOUS: 7.43 (ref 7.32–7.42)
PH, VEN, TEMP ADJ: ABNORMAL (ref 7.32–7.42)
PHENCYCLIDINE, URINE: NEGATIVE
PLATELET # BLD: 182 K/UL (ref 150–450)
PLATELET ESTIMATE: ABNORMAL
PMV BLD AUTO: 9.2 FL (ref 6–12)
PO2, VEN, TEMP ADJ: ABNORMAL MMHG (ref 30–50)
PO2, VEN: 64.1 (ref 30–50)
POSITIVE BASE EXCESS, VEN: ABNORMAL MMOL/L (ref 0–2)
POTASSIUM SERPL-SCNC: 4.4 MMOL/L (ref 3.7–5.3)
PROPOXYPHENE, URINE: NORMAL
PROTEIN UA: NEGATIVE
PSV: ABNORMAL
PT. POSITION: ABNORMAL
RBC # BLD: 4.31 M/UL (ref 4–5.2)
RBC # BLD: ABNORMAL 10*6/UL
RBC UA: ABNORMAL /HPF
RENAL EPITHELIAL, UA: ABNORMAL /HPF
RESPIRATORY RATE: ABNORMAL
SAMPLE SITE: ABNORMAL
SEG NEUTROPHILS: 70 % (ref 36–66)
SEGMENTED NEUTROPHILS ABSOLUTE COUNT: 7 K/UL (ref 1.3–9.1)
SET RATE: ABNORMAL
SODIUM BLD-SCNC: 127 MMOL/L (ref 135–144)
SPECIFIC GRAVITY UA: 1.03 (ref 1–1.03)
TEST INFORMATION: NORMAL
TEXT FOR RESPIRATORY: ABNORMAL
TOTAL HB: ABNORMAL G/DL (ref 12–16)
TOTAL PROTEIN: 6.7 G/DL (ref 6.4–8.3)
TOTAL RATE: ABNORMAL
TRICHOMONAS: ABNORMAL
TRICYCLIC ANTIDEPRESSANTS, UR: NORMAL
TURBIDITY: CLEAR
URINE HGB: ABNORMAL
UROBILINOGEN, URINE: NORMAL
VT: ABNORMAL
WBC # BLD: 9.8 K/UL (ref 3.5–11)
WBC # BLD: ABNORMAL 10*3/UL
WBC UA: ABNORMAL /HPF
YEAST: ABNORMAL

## 2019-06-18 PROCEDURE — 87075 CULTR BACTERIA EXCEPT BLOOD: CPT

## 2019-06-18 PROCEDURE — 82010 KETONE BODYS QUAN: CPT

## 2019-06-18 PROCEDURE — 81025 URINE PREGNANCY TEST: CPT

## 2019-06-18 PROCEDURE — 82805 BLOOD GASES W/O2 SATURATION: CPT

## 2019-06-18 PROCEDURE — 6360000002 HC RX W HCPCS: Performed by: STUDENT IN AN ORGANIZED HEALTH CARE EDUCATION/TRAINING PROGRAM

## 2019-06-18 PROCEDURE — 80053 COMPREHEN METABOLIC PANEL: CPT

## 2019-06-18 PROCEDURE — 1036F TOBACCO NON-USER: CPT | Performed by: SPECIALIST

## 2019-06-18 PROCEDURE — 86403 PARTICLE AGGLUT ANTBDY SCRN: CPT

## 2019-06-18 PROCEDURE — 36415 COLL VENOUS BLD VENIPUNCTURE: CPT

## 2019-06-18 PROCEDURE — 6370000000 HC RX 637 (ALT 250 FOR IP): Performed by: STUDENT IN AN ORGANIZED HEALTH CARE EDUCATION/TRAINING PROGRAM

## 2019-06-18 PROCEDURE — 87186 SC STD MICRODIL/AGAR DIL: CPT

## 2019-06-18 PROCEDURE — 85025 COMPLETE CBC W/AUTO DIFF WBC: CPT

## 2019-06-18 PROCEDURE — 82800 BLOOD PH: CPT

## 2019-06-18 PROCEDURE — 6370000000 HC RX 637 (ALT 250 FOR IP): Performed by: INTERNAL MEDICINE

## 2019-06-18 PROCEDURE — 87205 SMEAR GRAM STAIN: CPT

## 2019-06-18 PROCEDURE — 87070 CULTURE OTHR SPECIMN AEROBIC: CPT

## 2019-06-18 PROCEDURE — 83036 HEMOGLOBIN GLYCOSYLATED A1C: CPT

## 2019-06-18 PROCEDURE — 99213 OFFICE O/P EST LOW 20 MIN: CPT | Performed by: SPECIALIST

## 2019-06-18 PROCEDURE — 87040 BLOOD CULTURE FOR BACTERIA: CPT

## 2019-06-18 PROCEDURE — 81001 URINALYSIS AUTO W/SCOPE: CPT

## 2019-06-18 PROCEDURE — 82947 ASSAY GLUCOSE BLOOD QUANT: CPT

## 2019-06-18 PROCEDURE — 80307 DRUG TEST PRSMV CHEM ANLYZR: CPT

## 2019-06-18 PROCEDURE — 87491 CHLMYD TRACH DNA AMP PROBE: CPT

## 2019-06-18 PROCEDURE — 2580000003 HC RX 258: Performed by: STUDENT IN AN ORGANIZED HEALTH CARE EDUCATION/TRAINING PROGRAM

## 2019-06-18 PROCEDURE — 10060 I&D ABSCESS SIMPLE/SINGLE: CPT | Performed by: SPECIALIST

## 2019-06-18 PROCEDURE — 87591 N.GONORRHOEAE DNA AMP PROB: CPT

## 2019-06-18 PROCEDURE — G8417 CALC BMI ABV UP PARAM F/U: HCPCS | Performed by: SPECIALIST

## 2019-06-18 PROCEDURE — 1200000000 HC SEMI PRIVATE

## 2019-06-18 PROCEDURE — G8427 DOCREV CUR MEDS BY ELIG CLIN: HCPCS | Performed by: SPECIALIST

## 2019-06-18 PROCEDURE — 83605 ASSAY OF LACTIC ACID: CPT

## 2019-06-18 RX ORDER — SODIUM CHLORIDE 9 MG/ML
125 INJECTION, SOLUTION INTRAVENOUS CONTINUOUS
Status: DISCONTINUED | OUTPATIENT
Start: 2019-06-18 | End: 2019-06-22 | Stop reason: HOSPADM

## 2019-06-18 RX ORDER — PROMETHAZINE HYDROCHLORIDE 25 MG/ML
12.5 INJECTION, SOLUTION INTRAMUSCULAR; INTRAVENOUS EVERY 6 HOURS PRN
Status: DISCONTINUED | OUTPATIENT
Start: 2019-06-18 | End: 2019-06-22 | Stop reason: HOSPADM

## 2019-06-18 RX ORDER — NICOTINE POLACRILEX 4 MG
15 LOZENGE BUCCAL PRN
Status: DISCONTINUED | OUTPATIENT
Start: 2019-06-18 | End: 2019-06-22 | Stop reason: HOSPADM

## 2019-06-18 RX ORDER — HYDROCODONE BITARTRATE AND ACETAMINOPHEN 5; 325 MG/1; MG/1
1 TABLET ORAL EVERY 6 HOURS PRN
Status: DISCONTINUED | OUTPATIENT
Start: 2019-06-18 | End: 2019-06-22 | Stop reason: HOSPADM

## 2019-06-18 RX ORDER — DEXTROSE MONOHYDRATE 25 G/50ML
12.5 INJECTION, SOLUTION INTRAVENOUS PRN
Status: DISCONTINUED | OUTPATIENT
Start: 2019-06-18 | End: 2019-06-22 | Stop reason: HOSPADM

## 2019-06-18 RX ORDER — DEXTROSE MONOHYDRATE 50 MG/ML
100 INJECTION, SOLUTION INTRAVENOUS PRN
Status: DISCONTINUED | OUTPATIENT
Start: 2019-06-18 | End: 2019-06-22 | Stop reason: HOSPADM

## 2019-06-18 RX ORDER — INSULIN GLARGINE 100 [IU]/ML
20 INJECTION, SOLUTION SUBCUTANEOUS ONCE
Status: COMPLETED | OUTPATIENT
Start: 2019-06-18 | End: 2019-06-18

## 2019-06-18 RX ORDER — ACETAMINOPHEN 500 MG
1000 TABLET ORAL EVERY 6 HOURS PRN
Status: DISCONTINUED | OUTPATIENT
Start: 2019-06-18 | End: 2019-06-22 | Stop reason: HOSPADM

## 2019-06-18 RX ORDER — IBUPROFEN 800 MG/1
800 TABLET ORAL EVERY 8 HOURS PRN
Status: DISCONTINUED | OUTPATIENT
Start: 2019-06-18 | End: 2019-06-22 | Stop reason: HOSPADM

## 2019-06-18 RX ORDER — SULFAMETHOXAZOLE AND TRIMETHOPRIM 800; 160 MG/1; MG/1
1 TABLET ORAL 2 TIMES DAILY
COMMUNITY
End: 2019-07-30

## 2019-06-18 RX ORDER — ONDANSETRON 2 MG/ML
4 INJECTION INTRAMUSCULAR; INTRAVENOUS EVERY 6 HOURS PRN
Status: DISCONTINUED | OUTPATIENT
Start: 2019-06-18 | End: 2019-06-22 | Stop reason: HOSPADM

## 2019-06-18 RX ORDER — CEPHALEXIN 500 MG/1
500 CAPSULE ORAL 3 TIMES DAILY
COMMUNITY
Start: 2019-06-17 | End: 2019-06-28

## 2019-06-18 RX ADMIN — SODIUM CHLORIDE 125 ML/HR: 9 INJECTION, SOLUTION INTRAVENOUS at 17:27

## 2019-06-18 RX ADMIN — HYDROCODONE BITARTRATE AND ACETAMINOPHEN 1 TABLET: 5; 325 TABLET ORAL at 17:27

## 2019-06-18 RX ADMIN — INSULIN GLARGINE 20 UNITS: 100 INJECTION, SOLUTION SUBCUTANEOUS at 18:13

## 2019-06-18 RX ADMIN — INSULIN LISPRO 6 UNITS: 100 INJECTION, SOLUTION INTRAVENOUS; SUBCUTANEOUS at 21:39

## 2019-06-18 RX ADMIN — CEFTRIAXONE SODIUM 2 G: 2 INJECTION, POWDER, FOR SOLUTION INTRAMUSCULAR; INTRAVENOUS at 17:27

## 2019-06-18 RX ADMIN — HYDROCODONE BITARTRATE AND ACETAMINOPHEN 1 TABLET: 5; 325 TABLET ORAL at 23:01

## 2019-06-18 RX ADMIN — INSULIN LISPRO 15 UNITS: 100 INJECTION, SOLUTION INTRAVENOUS; SUBCUTANEOUS at 18:13

## 2019-06-18 RX ADMIN — IBUPROFEN 800 MG: 800 TABLET ORAL at 21:38

## 2019-06-18 ASSESSMENT — ENCOUNTER SYMPTOMS
CONSTIPATION: 0
COUGH: 0
DIARRHEA: 0
BACK PAIN: 1
ABDOMINAL PAIN: 1
EYE PAIN: 0
NAUSEA: 0
ABDOMINAL DISTENTION: 0
VOMITING: 0
APNEA: 0

## 2019-06-18 ASSESSMENT — PAIN SCALES - GENERAL
PAINLEVEL_OUTOF10: 7
PAINLEVEL_OUTOF10: 6
PAINLEVEL_OUTOF10: 10
PAINLEVEL_OUTOF10: 8

## 2019-06-18 ASSESSMENT — PAIN DESCRIPTION - LOCATION: LOCATION: ABDOMEN

## 2019-06-18 ASSESSMENT — PAIN DESCRIPTION - PAIN TYPE: TYPE: ACUTE PAIN

## 2019-06-18 ASSESSMENT — PAIN DESCRIPTION - ORIENTATION: ORIENTATION: RIGHT

## 2019-06-18 NOTE — TELEPHONE ENCOUNTER
ED Notes  - documented in this encounter    Mitali Corona RN - 06/17/2019 9:40 PM EDT  Pt has boil under left breast. Area is red and swollen.    Electronically signed by Ella Carrington RN at 06/17/2019 9:41 PM EDT          Miscellaneous Notes  - documented in this encounter    ED Procedure Note - MELANIE Foote - 06/17/2019 10:20 PM EDT  Associated Order(s): Incision and Drainage    Procedure  Incision and Drainage  Date/Time: 6/17/2019 10:20 PM  Performed by: MELANIE Harry  Authorized by: Damon Sullivan DO     Consent:   Consent obtained: Verbal  Consent given by: Patient  Risks discussed: Incomplete drainage, pain and infection  Location:   Type: Abscess  Size: 3x3cm  Location: Trunk  Trunk location: L breast  Pre-procedure details:   Skin preparation: Betadine  Anesthesia (see MAR for exact dosages): Anesthesia method: Local infiltration  Local anesthetic: Lidocaine 1% w/o epi  Procedure type:   Complexity: Simple  Procedure details:   Incision types: Single straight  Incision depth: Subcutaneous  Scalpel blade: 11  Wound management: Probed and deloculated  Drainage: Purulent  Drainage amount:  Moderate  Wound treatment: Wound left open  Packing materials: 1/2 in gauze  Post-procedure details:   Patient tolerance of procedure: Procedure terminated at patient's request      MELANIE Harry  06/17/19 2221      Electronically signed by MELANIE Harry at 06/17/2019 10:21 PM EDT          Plan of Treatment  - documented as of this encounter    Not on file        Procedures  - documented in this encounter    Procedure Name Priority Date/Time Associated Diagnosis Comments   IN DRAIN SKIN ABSCESS SIMPLE Routine 06/17/2019 10:20 PM EDT   Results for this procedure are in the results section.       Miscellaneous Results  - documented in this encounter      Incision and Drainage (06/17/2019 10:20 PM EDT)  Incision and Drainage (06/17/2019 10:20 PM EDT)   Narrative Performed At [facility] Goodwin     6/17/2019 10:21 PM    Incision and Drainage    Date/Time: 6/17/2019 10:20 PM    Performed by: MELANIE Goodwin    Authorized by: Giovanni Ramires DO         Consent:       Consent obtained:  Verbal      Consent given by:  Patient      Risks discussed:  Incomplete drainage, pain and infection    Location:       Type:  Abscess      Size:  3x3cm      Location:  Trunk      Trunk location:  L breast    Pre-procedure details:       Skin preparation:  Betadine    Anesthesia (see MAR for exact dosages):       Anesthesia method:  Local infiltration      Local anesthetic:  Lidocaine 1% w/o epi    Procedure type:       Complexity:  Simple    Procedure details:       Incision types:  Single straight      Incision depth:  Subcutaneous      Scalpel blade:  11      Wound management:  Probed and deloculated      Drainage:  Purulent      Drainage amount:  Moderate      Wound treatment:  Wound left open      Packing materials:  1/2 in [facility]

## 2019-06-18 NOTE — FLOWSHEET NOTE
RN reviewed home meds with patient. Pt is supposed to be taking Prozac, risperdal and ATB. Pt admits to not taking any of her medications at this time. RN notified the OB resident of this. Left breast examined with the OB resident, small open area to lower breast noted. Area is open to air Dry dressing applied to breast.   Pt also is a Diabetic but admits to not having a meter and has not checked her sugar in \"awhile\". RN will obtain BS reading today. Awaiting new admit orders.

## 2019-06-18 NOTE — H&P
OB/GYN H&P  [facility]    Patient Name: Hawa Buitrago     Patient : 1995  Room/Bed: 2037/7-01  Admission Date/Time: 2019  3:49 PM  Primary Care Physician: Nikki Kilgore PA-C    CC: Breast pain             HPI: Hawa Buitrago is a 30 y.o. female [id number] presents as a direct admit from Dr. Gracie Bravo office for treatment of L breast cellulitis/abscess. She reports she was evaluated at [facility] yesterday and she is s/p I&D of her L breast. She reports her symptoms started a few days ago. It started out as a very small area of redness and has gotten significantly worse since. She complains of intense L breast pain. She reports the redness has spread. She has a history of hydranitis suppurativa but denies ever having anything like this before. She was given keflex and bactrim in the ED yesterday but told the RN that she has not taken it yet. She denies any fevers or chills. She reports nausea and one episode of emesis earlier today. She denies any diarrhea and reports her last BM was two days ago. She has a decrease in her appetite today. She reports dysuria. She also complains of left lower back pain. She reports a history of low back pain. She has been doing a lot of house work lately and says it might be from that. She is requesting something for her pain. Of note, patient is a poor historian and has been noncompliant with her medial care and medications prescribed. She reports her children were taken away from her a few weeks ago due to her \"mental health issues and due to a dirty household\". She reports she was ordered to take medications (zoloft and Risperdal) in order to get her kids back. She stated to writer that she has not taken these medications in over a week but she then stated to RN that she has not started them at all. She appears to be upset and states \"I just wish I could see my kids. \" Patient denies any SI/HI at this time.  Per medical record, patient has a history of bipolar disorder. Patient reports she has multiple medical problems, including HTN, DM, and asthma. She reports she has not been checking her blood sugars and is supposed to be on medication but does not take it. Patient reports she had the Nexplanon implant placed two years ago. She has not had a period in 2 years but does report that she started to have her period a week ago. She is sexually active with one partner and does not use condoms for STD prevention. She reports a history of gonorrhea and chlamydia in the past. She does not know when her last pap smear was. Per Epic her last pap was 2016 and it was significant for ASCUS and HPV positive. Patient reports she was supposed to follow up but never did. She has a history of 3 pregnancies. (2 C/S and 1 SAB). She denies any tobacco, alcohol, or drug use. When asked about family history, patient reports her uncle had ovarian cancer. Writer informed patient that this is impossible but patient replies \"this is what I was told. \". Otherwise. She reports her family history is unremarkable. . No LMP recorded. Patient has had an implant.      REVIEW OF SYSTEMS:   Constitutional: negative fever, negative chills  HEENT: negative visual disturbances, negative headaches  Respiratory: negative dyspnea, negative cough  Cardiovascular: negative chest pain,  negative palpitations  Gastrointestinal: negative abdominal pain, negative RUQ pain, positive N/V, negative diarrhea, negative constipation  Genitourinary: positive dysuria, negative vaginal discharge  Dermatological: negative rash, positive erythema and pain to left breast   Hematologic: negative bruising  Immunologic/Lymphatic: negative recent illness, negative recent sick contact  Musculoskeletal: positive back pain, negative myalgias, negative arthralgias  Neurological:  negative dizziness, negative weakness  Behavior/Psych: positive depression, positive anxiety    _______________________________________________________________________    GYNECOLOGICAL HISTORY:    Sexually Active: single partner, contraception - nexplanon  STD History: past history: chlamydia and gonorrhea     Pap History: Last PAP was abnormal;  2016 - ASC-US; Atypical Squamous Cells of Undetermined Significance. HPV +  Colposcopy History: denies    Permanent Sterilization: denies  Reversible Birth Control: reports  Hormone Replacement Exposure: denies    OBSTETRICAL HISTORY:   OB History    Para Term  AB Living   3 2 1 1 1 2   SAB TAB Ectopic Molar Multiple Live Births   1 0 0 0 0 2      # Outcome Date GA Lbr Laci/2nd Weight Sex Delivery Anes PTL Lv   3 Term 17 38w0d  7 lb 11 oz (3.487 kg) F CS-LTranv Spinal N [place]      Name: Aakash Coppin  Apgar5: 8   2  09/03/15 35w6d  6 lb 10.5 oz (3.019 kg) F CS-LTranv Spinal Y [place]      Complications: Breech birth      Apgar1: 9  Apgar5: 9   1 SAB 10/23/14 14w0d              PAST MEDICAL HISTORY:   has a past medical history of Asthma, Diabetes mellitus type 2 in obese ([address] Hepatic steatosis, Hypertension, Morbid obesity with body mass index (BMI) of 40.0 to 49.9 ([facility] [address] Neuropathy, and Postpartum depression. PAST SURGICAL HISTORY:   has a past surgical history that includes Tonsillectomy (Bilateral);  section; eye surgery;  section (N/A, 3/23/2017); and Upper gastrointestinal endoscopy (N/A, 2018).     ALLERGIES:  Allergies as of 2019 - Review Complete 2019   Allergen Reaction Noted    Benadryl [diphenhydramine] Shortness Of Breath 2018       MEDICATIONS:  Current Facility-Administered Medications   Medication Dose Route Frequency Provider Last Rate Last Dose    magnesium hydroxide (MILK OF MAGNESIA) 400 MG/5ML suspension 30 mL  30 mL Oral Daily PRN Eduarda Apitsch, DO        ondansetron (ZOFRAN) injection 4 mg  4 mg Intravenous Q6H PRN Antonette Lopez DO  acetaminophen (TYLENOL) tablet 1,000 mg  1,000 mg Oral Q6H PRN Perfecto Momin, DO        promethazine (PHENERGAN) injection 12.5 mg  12.5 mg Intramuscular Q6H PRN Eduarda Apitsch, DO        0.9 % sodium chloride infusion  125 mL/hr Intravenous Continuous Eduarda Apitsch,  mL/hr at 06/18/19 1727 125 mL/hr at 06/18/19 1727    cefTRIAXone (ROCEPHIN) 2 g IVPB in D5W 50ml minibag  2 g Intravenous Q24H Eduarda Apitsch,  mL/hr at 06/18/19 1727 2 g at 06/18/19 1727    HYDROcodone-acetaminophen (NORCO) 5-325 MG per tablet 1 tablet  1 tablet Oral Q6H PRN Perfecto Momin, DO   1 tablet at 06/18/19 1727    ibuprofen (ADVIL;MOTRIN) tablet 800 mg  800 mg Oral Q8H PRN Eduarda Apiwaich, DO        insulin lispro (HUMALOG) injection vial 0-18 Units  0-18 Units Subcutaneous TID WC Eduarda Apitsch, DO        insulin lispro (HUMALOG) injection vial 0-9 Units  0-9 Units Subcutaneous Nightly Eduarda Apitsch, DO        glucose (GLUTOSE) 40 % oral gel 15 g  15 g Oral PRN Eduarda Apitsch, DO        dextrose 50 % IV solution  12.5 g Intravenous PRN Eduarda Apitsch, DO        glucagon (rDNA) injection 1 mg  1 mg Intramuscular PRN Eduarda Apiwaich, DO        dextrose 5 % solution  100 mL/hr Intravenous PRN Perfecto Momin, DO        vancomycin (VANCOCIN) 1750 mg in dextrose 5% 500 mL IVPB  1,750 mg Intravenous Once Perfecto Momin, DO        vancomycin (VANCOCIN) intermittent dosing (placeholder)   Other RX Placeholder Eduardaleyda Leachch, DO           FAMILY HISTORY:  Family History of Breast, Ovarian, Colon or Uterine Cancer:   family history includes Breast Cancer (age of onset: 28) in her mother; Cancer (age of onset: 61) in her maternal uncle; Colon Cancer in her maternal uncle; Diabetes in her father, maternal uncle, and maternal uncle; Heart Attack in her maternal uncle; Heart Disease in her maternal uncle. SOCIAL HISTORY:   reports that she quit smoking about 2 years ago.  Her smoking use included cigarettes. She started smoking about 11 years ago. She has a 20.00 pack-year smoking history. She has never used smokeless tobacco. She reports that she does not drink alcohol or use drugs. ________________________________________________________________________                                    Paula Height:  Vitals:    06/18/19 1638   BP: 107/78   Pulse: 91   Resp: 20   Temp: 98.5 °F (36.9 °C)   TempSrc: Oral   SpO2: 97%   Weight: 275 lb 12.7 oz (125.1 kg)   Height: 5' 7\" (1.702 m)                                                INPUT/OUTPUT:  I/O this shift:  In: -   Out: 600 [Urine:600]  In: -   Out: 600 [Urine:600]                                                                                                                               PHYSICAL EXAM:     General Appearance. Alert; in no acute distress. Poor hygiene. Skin/Breast Exam: Tenderness to L breast. Significant portion of L breast with erythema and warmth. Area previously outlined. Exam limited due to pt's pain. Small opening S/p I&D on L lower brest, no current drainage. Packing was removed in office. Many small areas of hidradenitis suppurativa on both breast and axilla. Respiratory: Normal expansion. Clear to auscultation. No rales, rhonchi, or wheezing. Cardiovascular: regular rate and rhythm  Abdomen: soft, non-tender, non-distended, no right upper quadrant tenderness and no CVA tenderness    Pelvic Exam: not indicated     Musculoskeletal: no gross abnormalities  Extremities: non-tender BLE and non-edematous  Psych:  Poor historian. Appears to be in mild distress over current situation. LAB RESULTS: labs ordered        DIAGNOSTICS:  No results found. ASSESSMENT & PLAN:    Avery Alvarado is a 30 y.o. female [id number] admitted for Tx of L Breast Abscess/cellulitis     - VSS. Afebrile. Non-toxic appearing   - S/p I&D 6/17/19 @[facility]    - L breast with significant erythema and warmth consistent with active infection.  Multiple areas of hidradenitis suppurativa on both breasts bilaterally and in axilla. No active drainage   - Will check a CBC to assess white count. Blood cultures ordered. Will collect wound cultures. - Will start patient on IV abx: Rocephin/vanco and consult ID for Abx recs. - General Surgery consult   - NPO after MN incase procedure is indicated   - Norco/motrin for pain control    - Zofran/phenergan for Nausea     Back pain    - Most likely musculoskeletal    - No injuries noted on exam    Vaginal Bleeding   - Denies heavy bleeding    - Most likely menses. Will order HCG Urine to rule out pregnancy   - CBC. No s/s of anemia. Dysuria   - UA ordered   - Afebrile     Uncontrolled DM type II   - Patient reports diagnosis of DM and has not followed up with a provider and has not been compliant with checking BG   - Per RN, POCT glucose is \"Too high too read\". CMP ordered. Will address once resulted. - High dose ISS.  ACHS   - Hgb A1C   - IM consult    - Carb control diet    - Counseled on importance of outpatient follow up     Chronic HTN   - BP normotensive    - F/u PCP     Asthma   - Reports well controlled   - Albuterol as needed     Bipolar Disorder   - Patient appears to be in mild distress over losing custody of children due to her mental health    - Noncompliant with medications prescribed   - Psych consult     Remote hx gonorrhea/chlamydia   - Gc/C urine    - Patient denies any current concerns     Hx ASCUS/HPV 2016   - Patient will need to follow up with OB/GYN for repeat pap smear       Noncompliance with medical care   - Counseled on importance     Poor Historian     BMI 43.3    Patient Active Problem List    Diagnosis Date Noted    Bipolar affective disorder, currently depressed, mild ([facility] [address] 05/24/2019     Priority: High    Cellulitis of breast 05/24/2019     Priority: High    Back pain 06/18/2019     Priority: Medium    History of gonorrhea 11/07/2018     Priority: Medium    History of chlamydia 2018     Priority: Medium    BMI 40.0-44.9 2017     Priority: Medium    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 2015     Priority: Medium    Morbid obesity ([facility] [address] 2015     Priority: Medium    Asthma      Priority: Medium     Albuterol PRN     Overview:   Overview:   Albuterol PRN       chronic HTN      Priority: Medium     Patient states she was dx cHTN by PCP, never started on meds    Overview:   Overview:   Patient states she was dx cHTN by PCP, never started on meds      Noncompliance 2019    Diabetes ([facility] [address] 2019    History of  section x2 2019    History of postpartum depression 2019    Lost custody of children 2019    Poor historian 2019    Nausea & vomiting 2018    Diarrhea 2018    Hepatic steatosis 2018    Generalized abdominal pain 2018       Plan discussed with Dr. Stafford Olszewski, who is agreeable.      Attending's Name: Dr. Sabine Nguyen DO  Ob/Gyn Resident  Pager: [phone]  2019, 5:51 PM

## 2019-06-18 NOTE — FLOWSHEET NOTE
Rn notified by lab that patient's glucose 704. RN called to notify OB resident ABDOULAYE Santiago. RN will also call Internal medicine for orders. Dr Jcarlos Novoa paged re: elevated blood sugar.  New orders given for @0 units of Lantus insulin, 15 units of regular insulin  SQ and recheck BS in 3 hours and call Dr Jcarlos Novoa [phone]

## 2019-06-18 NOTE — PLAN OF CARE
Problem: SAFETY  Goal: Free from accidental physical injury  Outcome: Met This Shift  Goal: Free from intentional harm  Outcome: Met This Shift     Problem: DAILY CARE  Goal: Daily care needs are met  Outcome: Met This Shift     Problem: PAIN  Goal: Patient's pain/discomfort is manageable  Outcome: Met This Shift     Problem: SKIN INTEGRITY  Goal: Skin integrity is maintained or improved  Outcome: Met This Shift     Problem: KNOWLEDGE DEFICIT  Goal: Patient/S.O. demonstrates understanding of disease process, treatment plan, medications, and discharge instructions. Outcome: Met This Shift     Problem: DISCHARGE BARRIERS  Goal: Patient's continuum of care needs are met  Outcome: Met This Shift     Problem: Pain:  Description  Pain management should include both nonpharmacologic and pharmacologic interventions.   Goal: Pain level will decrease  Description  Pain level will decrease  Outcome: Met This Shift  Goal: Control of acute pain  Description  Control of acute pain  Outcome: Met This Shift  Goal: Control of chronic pain  Description  Control of chronic pain  Outcome: Met This Shift

## 2019-06-18 NOTE — DISCHARGE SUMMARY
lantus to 30mg BID and continued metformin and lisinopril. LFTS were slightly elevated and IM ordered a hepatitis panel which was significant for Hep A. Patient to follow up as outpatient. Breast US 6/21/19: No drainable fluid collections within the breast. Wound packing material. No sonographic evidence of malignancy. General surgery rec daily dressing changes. Blood cultures remained negative. Per General surgery patient was okay for discharge from a surgical standpoint. Per IM, patient was okay for discharge and to follow up for DM management. Post-op course normal, discharged home on 6/22/19. Follow up in 1 week. Discharge instructions reviewed and questions answered. Discharge to: Home    Readmission planned: No    Recommendations on Discharge:     Medications:     Medication List      START taking these medications    blood glucose monitor kit and supplies  Test 2 times a day & as needed for symptoms of irregular blood glucose. blood glucose test strips  Test 2 times a day & as needed for symptoms of irregular blood glucose. HYDROcodone-acetaminophen 5-325 MG per tablet  Commonly known as:  NORCO  Take 1 tablet by mouth every 6 hours as needed for Pain for up to 5 days.      ibuprofen 800 MG tablet  Commonly known as:  ADVIL;MOTRIN  Take 1 tablet by mouth every 8 hours as needed for Pain     insulin glargine 100 UNIT/ML injection pen  Commonly known as:  LANTUS SOLOSTAR  Inject 35 Units into the skin 2 times daily     lisinopril 5 MG tablet  Commonly known as:  PRINIVIL;ZESTRIL  Take 1 tablet by mouth daily     metFORMIN 1000 MG tablet  Commonly known as:  GLUCOPHAGE  Take 1 tablet by mouth 2 times daily (with meals)     ONE TOUCH LANCETS Misc  1 each by Does not apply route 2 times daily     Pen Needles 31G X 6 MM Misc  1 each by Does not apply route daily        CONTINUE taking these medications    cephALEXin 500 MG capsule  Commonly known as:  KEFLEX     etonogestrel 68 MG implant  Commonly known as:  NEXPLANON  68 mg by Subdermal route once for 1 dose     FLUoxetine 10 MG capsule  Commonly known as:  PROZAC  Take 1 capsule by mouth daily     risperiDONE 0.25 MG tablet  Commonly known as:  RISPERDAL  Take 1 tablet by mouth nightly     sulfamethoxazole-trimethoprim 800-160 MG per tablet  Commonly known as:  BACTRIM DS;SEPTRA DS        STOP taking these medications    doxycycline hyclate 100 MG capsule  Commonly known as:  VIBRAMYCIN           Where to Get Your Medications      These medications were sent to [facility] [address]    Phone:  [phone]   · blood glucose monitor kit and supplies  · blood glucose test strips  · insulin glargine 100 UNIT/ML injection pen  · lisinopril 5 MG tablet  · metFORMIN 1000 MG tablet  · ONE TOUCH LANCETS Misc  · Pen Needles 31G X 6 MM Misc     You can get these medications from any pharmacy    Bring a paper prescription for each of these medications  · HYDROcodone-acetaminophen 5-325 MG per tablet  · ibuprofen 800 MG tablet           Diet: diabetic diet  Follow up: 1 week     Condition on discharge: good and stable   Discharge Date: 6/22/19    Comments:  Home care, Follow-up care, restrictions reviewed.     Frank Quinones DO  Ob/Gyn Resident  [facility]  6/22/2019, 9:36 AM

## 2019-06-18 NOTE — FLOWSHEET NOTE
Pt admitted to room 2037 from admitting per wheelchair. OB resident at bedside to complete admission. Pt c/o left breast pain with palpation. Right sided back and side pain. Pt did state she is bleeding vaginally at present time. Pt up ad [facility]  In room. Pt is alert and oriented x3 able to answer questions appropriately.

## 2019-06-19 ENCOUNTER — ANESTHESIA (OUTPATIENT)
Dept: OPERATING ROOM | Age: 24
DRG: 385 | End: 2019-06-19
Payer: COMMERCIAL

## 2019-06-19 ENCOUNTER — ANESTHESIA EVENT (OUTPATIENT)
Dept: OPERATING ROOM | Age: 24
DRG: 385 | End: 2019-06-19
Payer: COMMERCIAL

## 2019-06-19 VITALS — TEMPERATURE: 97.2 F | DIASTOLIC BLOOD PRESSURE: 100 MMHG | OXYGEN SATURATION: 99 % | SYSTOLIC BLOOD PRESSURE: 177 MMHG

## 2019-06-19 LAB
ABSOLUTE EOS #: 0.2 K/UL (ref 0–0.4)
ABSOLUTE IMMATURE GRANULOCYTE: ABNORMAL K/UL (ref 0–0.3)
ABSOLUTE LYMPH #: 3.2 K/UL (ref 1–4.8)
ABSOLUTE MONO #: 0.7 K/UL (ref 0.1–1.3)
ALBUMIN SERPL-MCNC: 3.9 G/DL (ref 3.5–5.2)
ALBUMIN/GLOBULIN RATIO: ABNORMAL (ref 1–2.5)
ALP BLD-CCNC: 109 U/L (ref 35–104)
ALT SERPL-CCNC: 29 U/L (ref 5–33)
ANION GAP SERPL CALCULATED.3IONS-SCNC: 13 MMOL/L (ref 9–17)
AST SERPL-CCNC: 23 U/L
BASOPHILS # BLD: 1 % (ref 0–2)
BASOPHILS ABSOLUTE: 0.1 K/UL (ref 0–0.2)
BILIRUB SERPL-MCNC: 0.86 MG/DL (ref 0.3–1.2)
BUN BLDV-MCNC: 8 MG/DL (ref 6–20)
BUN/CREAT BLD: ABNORMAL (ref 9–20)
CALCIUM SERPL-MCNC: 8.6 MG/DL (ref 8.6–10.4)
CHLORIDE BLD-SCNC: 102 MMOL/L (ref 98–107)
CHOLESTEROL/HDL RATIO: 5
CHOLESTEROL: 115 MG/DL
CO2: 23 MMOL/L (ref 20–31)
CREAT SERPL-MCNC: 0.63 MG/DL (ref 0.5–0.9)
CREAT SERPL-MCNC: 0.64 MG/DL (ref 0.5–0.9)
DIFFERENTIAL TYPE: ABNORMAL
EOSINOPHILS RELATIVE PERCENT: 3 % (ref 0–4)
GFR AFRICAN AMERICAN: >60 ML/MIN
GFR AFRICAN AMERICAN: >60 ML/MIN
GFR NON-AFRICAN AMERICAN: >60 ML/MIN
GFR NON-AFRICAN AMERICAN: >60 ML/MIN
GFR SERPL CREATININE-BSD FRML MDRD: ABNORMAL ML/MIN/{1.73_M2}
GFR SERPL CREATININE-BSD FRML MDRD: ABNORMAL ML/MIN/{1.73_M2}
GFR SERPL CREATININE-BSD FRML MDRD: NORMAL ML/MIN/{1.73_M2}
GFR SERPL CREATININE-BSD FRML MDRD: NORMAL ML/MIN/{1.73_M2}
GLUCOSE BLD-MCNC: 275 MG/DL (ref 65–105)
GLUCOSE BLD-MCNC: 291 MG/DL (ref 65–105)
GLUCOSE BLD-MCNC: 333 MG/DL (ref 65–105)
GLUCOSE BLD-MCNC: 358 MG/DL (ref 65–105)
GLUCOSE BLD-MCNC: 370 MG/DL (ref 70–99)
GLUCOSE BLD-MCNC: >600 MG/DL (ref 65–105)
HCT VFR BLD CALC: 39.5 % (ref 36–46)
HDLC SERPL-MCNC: 23 MG/DL
HEMOGLOBIN: 13.5 G/DL (ref 12–16)
IMMATURE GRANULOCYTES: ABNORMAL %
LDL CHOLESTEROL: 43 MG/DL (ref 0–130)
LYMPHOCYTES # BLD: 36 % (ref 24–44)
MCH RBC QN AUTO: 30.6 PG (ref 26–34)
MCHC RBC AUTO-ENTMCNC: 34.1 G/DL (ref 31–37)
MCV RBC AUTO: 89.8 FL (ref 80–100)
MONOCYTES # BLD: 8 % (ref 1–7)
NRBC AUTOMATED: ABNORMAL PER 100 WBC
PDW BLD-RTO: 12.7 % (ref 11.5–14.9)
PLATELET # BLD: 210 K/UL (ref 150–450)
PLATELET ESTIMATE: ABNORMAL
PMV BLD AUTO: 9.3 FL (ref 6–12)
POTASSIUM SERPL-SCNC: 3.6 MMOL/L (ref 3.7–5.3)
RBC # BLD: 4.4 M/UL (ref 4–5.2)
RBC # BLD: ABNORMAL 10*6/UL
SEG NEUTROPHILS: 52 % (ref 36–66)
SEGMENTED NEUTROPHILS ABSOLUTE COUNT: 4.8 K/UL (ref 1.3–9.1)
SODIUM BLD-SCNC: 138 MMOL/L (ref 135–144)
TOTAL PROTEIN: 6.7 G/DL (ref 6.4–8.3)
TRIGL SERPL-MCNC: 247 MG/DL
VLDLC SERPL CALC-MCNC: ABNORMAL MG/DL (ref 1–30)
WBC # BLD: 9 K/UL (ref 3.5–11)
WBC # BLD: ABNORMAL 10*3/UL

## 2019-06-19 PROCEDURE — 3600000012 HC SURGERY LEVEL 2 ADDTL 15MIN: Performed by: SURGERY

## 2019-06-19 PROCEDURE — 86403 PARTICLE AGGLUT ANTBDY SCRN: CPT

## 2019-06-19 PROCEDURE — 2580000003 HC RX 258: Performed by: INTERNAL MEDICINE

## 2019-06-19 PROCEDURE — 6360000002 HC RX W HCPCS: Performed by: SURGERY

## 2019-06-19 PROCEDURE — 99221 1ST HOSP IP/OBS SF/LOW 40: CPT | Performed by: INTERNAL MEDICINE

## 2019-06-19 PROCEDURE — 6370000000 HC RX 637 (ALT 250 FOR IP): Performed by: SURGERY

## 2019-06-19 PROCEDURE — 85025 COMPLETE CBC W/AUTO DIFF WBC: CPT

## 2019-06-19 PROCEDURE — 2500000003 HC RX 250 WO HCPCS: Performed by: NURSE ANESTHETIST, CERTIFIED REGISTERED

## 2019-06-19 PROCEDURE — 87205 SMEAR GRAM STAIN: CPT

## 2019-06-19 PROCEDURE — 7100000000 HC PACU RECOVERY - FIRST 15 MIN: Performed by: SURGERY

## 2019-06-19 PROCEDURE — 3700000001 HC ADD 15 MINUTES (ANESTHESIA): Performed by: SURGERY

## 2019-06-19 PROCEDURE — 3600000002 HC SURGERY LEVEL 2 BASE: Performed by: SURGERY

## 2019-06-19 PROCEDURE — 87075 CULTR BACTERIA EXCEPT BLOOD: CPT

## 2019-06-19 PROCEDURE — 82565 ASSAY OF CREATININE: CPT

## 2019-06-19 PROCEDURE — 1200000000 HC SEMI PRIVATE

## 2019-06-19 PROCEDURE — 87070 CULTURE OTHR SPECIMN AEROBIC: CPT

## 2019-06-19 PROCEDURE — 80053 COMPREHEN METABOLIC PANEL: CPT

## 2019-06-19 PROCEDURE — 7100000001 HC PACU RECOVERY - ADDTL 15 MIN: Performed by: SURGERY

## 2019-06-19 PROCEDURE — 2580000003 HC RX 258: Performed by: SURGERY

## 2019-06-19 PROCEDURE — 0H9U0ZZ DRAINAGE OF LEFT BREAST, OPEN APPROACH: ICD-10-PCS | Performed by: SURGERY

## 2019-06-19 PROCEDURE — 80061 LIPID PANEL: CPT

## 2019-06-19 PROCEDURE — 2580000003 HC RX 258: Performed by: STUDENT IN AN ORGANIZED HEALTH CARE EDUCATION/TRAINING PROGRAM

## 2019-06-19 PROCEDURE — 90792 PSYCH DIAG EVAL W/MED SRVCS: CPT | Performed by: NURSE PRACTITIONER

## 2019-06-19 PROCEDURE — 6360000002 HC RX W HCPCS: Performed by: NURSE ANESTHETIST, CERTIFIED REGISTERED

## 2019-06-19 PROCEDURE — 2709999900 HC NON-CHARGEABLE SUPPLY: Performed by: SURGERY

## 2019-06-19 PROCEDURE — 36415 COLL VENOUS BLD VENIPUNCTURE: CPT

## 2019-06-19 PROCEDURE — 3700000000 HC ANESTHESIA ATTENDED CARE: Performed by: SURGERY

## 2019-06-19 PROCEDURE — 99223 1ST HOSP IP/OBS HIGH 75: CPT | Performed by: INTERNAL MEDICINE

## 2019-06-19 PROCEDURE — 6370000000 HC RX 637 (ALT 250 FOR IP): Performed by: STUDENT IN AN ORGANIZED HEALTH CARE EDUCATION/TRAINING PROGRAM

## 2019-06-19 PROCEDURE — 2500000003 HC RX 250 WO HCPCS: Performed by: STUDENT IN AN ORGANIZED HEALTH CARE EDUCATION/TRAINING PROGRAM

## 2019-06-19 PROCEDURE — 6360000002 HC RX W HCPCS: Performed by: INTERNAL MEDICINE

## 2019-06-19 RX ORDER — FENTANYL CITRATE 50 UG/ML
INJECTION, SOLUTION INTRAMUSCULAR; INTRAVENOUS PRN
Status: DISCONTINUED | OUTPATIENT
Start: 2019-06-19 | End: 2019-06-19 | Stop reason: SDUPTHER

## 2019-06-19 RX ORDER — OXYCODONE HYDROCHLORIDE AND ACETAMINOPHEN 5; 325 MG/1; MG/1
1 TABLET ORAL EVERY 4 HOURS PRN
Status: DISCONTINUED | OUTPATIENT
Start: 2019-06-19 | End: 2019-06-22 | Stop reason: HOSPADM

## 2019-06-19 RX ORDER — MIDAZOLAM HYDROCHLORIDE 1 MG/ML
INJECTION INTRAMUSCULAR; INTRAVENOUS PRN
Status: DISCONTINUED | OUTPATIENT
Start: 2019-06-19 | End: 2019-06-19 | Stop reason: SDUPTHER

## 2019-06-19 RX ORDER — OXYCODONE HYDROCHLORIDE AND ACETAMINOPHEN 5; 325 MG/1; MG/1
2 TABLET ORAL PRN
Status: DISCONTINUED | OUTPATIENT
Start: 2019-06-19 | End: 2019-06-19 | Stop reason: HOSPADM

## 2019-06-19 RX ORDER — GLUCOSAMINE HCL/CHONDROITIN SU 500-400 MG
CAPSULE ORAL
Qty: 100 STRIP | Refills: 3 | Status: SHIPPED | OUTPATIENT
Start: 2019-06-19 | End: 2020-06-17 | Stop reason: CLARIF

## 2019-06-19 RX ORDER — METOCLOPRAMIDE HYDROCHLORIDE 5 MG/ML
INJECTION INTRAMUSCULAR; INTRAVENOUS PRN
Status: DISCONTINUED | OUTPATIENT
Start: 2019-06-19 | End: 2019-06-19 | Stop reason: SDUPTHER

## 2019-06-19 RX ORDER — PROPOFOL 10 MG/ML
INJECTION, EMULSION INTRAVENOUS PRN
Status: DISCONTINUED | OUTPATIENT
Start: 2019-06-19 | End: 2019-06-19 | Stop reason: SDUPTHER

## 2019-06-19 RX ORDER — FLUOXETINE 10 MG/1
10 CAPSULE ORAL DAILY
Status: DISCONTINUED | OUTPATIENT
Start: 2019-06-19 | End: 2019-06-22 | Stop reason: HOSPADM

## 2019-06-19 RX ORDER — SUCCINYLCHOLINE/SOD CL,ISO/PF 200MG/10ML
SYRINGE (ML) INTRAVENOUS PRN
Status: DISCONTINUED | OUTPATIENT
Start: 2019-06-19 | End: 2019-06-19 | Stop reason: SDUPTHER

## 2019-06-19 RX ORDER — INSULIN GLARGINE 100 [IU]/ML
20 INJECTION, SOLUTION SUBCUTANEOUS 2 TIMES DAILY
Status: DISCONTINUED | OUTPATIENT
Start: 2019-06-19 | End: 2019-06-21

## 2019-06-19 RX ORDER — FENTANYL CITRATE 50 UG/ML
100 INJECTION, SOLUTION INTRAMUSCULAR; INTRAVENOUS
Status: DISCONTINUED | OUTPATIENT
Start: 2019-06-19 | End: 2019-06-22 | Stop reason: HOSPADM

## 2019-06-19 RX ORDER — ALBUTEROL SULFATE 2.5 MG/3ML
2.5 SOLUTION RESPIRATORY (INHALATION) EVERY 6 HOURS PRN
Status: DISCONTINUED | OUTPATIENT
Start: 2019-06-19 | End: 2019-06-22 | Stop reason: HOSPADM

## 2019-06-19 RX ORDER — PROMETHAZINE HYDROCHLORIDE 25 MG/ML
6.25 INJECTION, SOLUTION INTRAMUSCULAR; INTRAVENOUS
Status: DISCONTINUED | OUTPATIENT
Start: 2019-06-19 | End: 2019-06-19

## 2019-06-19 RX ORDER — ONDANSETRON 2 MG/ML
INJECTION INTRAMUSCULAR; INTRAVENOUS PRN
Status: DISCONTINUED | OUTPATIENT
Start: 2019-06-19 | End: 2019-06-19 | Stop reason: SDUPTHER

## 2019-06-19 RX ORDER — LABETALOL 20 MG/4 ML (5 MG/ML) INTRAVENOUS SYRINGE
5 EVERY 10 MIN PRN
Status: DISCONTINUED | OUTPATIENT
Start: 2019-06-19 | End: 2019-06-19 | Stop reason: HOSPADM

## 2019-06-19 RX ORDER — MEPERIDINE HYDROCHLORIDE 50 MG/ML
12.5 INJECTION INTRAMUSCULAR; INTRAVENOUS; SUBCUTANEOUS EVERY 5 MIN PRN
Status: DISCONTINUED | OUTPATIENT
Start: 2019-06-19 | End: 2019-06-19 | Stop reason: HOSPADM

## 2019-06-19 RX ORDER — ROCURONIUM BROMIDE 10 MG/ML
INJECTION, SOLUTION INTRAVENOUS PRN
Status: DISCONTINUED | OUTPATIENT
Start: 2019-06-19 | End: 2019-06-19 | Stop reason: SDUPTHER

## 2019-06-19 RX ORDER — OXYCODONE HYDROCHLORIDE AND ACETAMINOPHEN 5; 325 MG/1; MG/1
1 TABLET ORAL PRN
Status: DISCONTINUED | OUTPATIENT
Start: 2019-06-19 | End: 2019-06-19 | Stop reason: HOSPADM

## 2019-06-19 RX ORDER — RISPERIDONE 0.25 MG/1
0.25 TABLET, FILM COATED ORAL NIGHTLY
Status: DISCONTINUED | OUTPATIENT
Start: 2019-06-19 | End: 2019-06-22 | Stop reason: HOSPADM

## 2019-06-19 RX ORDER — POTASSIUM CHLORIDE 7.45 MG/ML
10 INJECTION INTRAVENOUS PRN
Status: DISCONTINUED | OUTPATIENT
Start: 2019-06-19 | End: 2019-06-22 | Stop reason: HOSPADM

## 2019-06-19 RX ORDER — LISINOPRIL 5 MG/1
5 TABLET ORAL DAILY
Status: DISCONTINUED | OUTPATIENT
Start: 2019-06-19 | End: 2019-06-22 | Stop reason: HOSPADM

## 2019-06-19 RX ORDER — LIDOCAINE HYDROCHLORIDE 20 MG/ML
INJECTION, SOLUTION EPIDURAL; INFILTRATION; INTRACAUDAL; PERINEURAL PRN
Status: DISCONTINUED | OUTPATIENT
Start: 2019-06-19 | End: 2019-06-19 | Stop reason: SDUPTHER

## 2019-06-19 RX ADMIN — METOCLOPRAMIDE 10 MG: 5 INJECTION, SOLUTION INTRAMUSCULAR; INTRAVENOUS at 17:28

## 2019-06-19 RX ADMIN — LIDOCAINE HYDROCHLORIDE 70 MG: 20 INJECTION, SOLUTION EPIDURAL; INFILTRATION; INTRACAUDAL; PERINEURAL at 17:22

## 2019-06-19 RX ADMIN — RISPERIDONE 0.25 MG: 0.25 TABLET ORAL at 20:53

## 2019-06-19 RX ADMIN — PROPOFOL 200 MG: 10 INJECTION, EMULSION INTRAVENOUS at 17:22

## 2019-06-19 RX ADMIN — INSULIN GLARGINE 20 UNITS: 100 INJECTION, SOLUTION SUBCUTANEOUS at 20:52

## 2019-06-19 RX ADMIN — Medication 140 MG: at 17:22

## 2019-06-19 RX ADMIN — PIPERACILLIN SODIUM AND TAZOBACTAM SODIUM 3.38 G: 3; .375 INJECTION, POWDER, LYOPHILIZED, FOR SOLUTION INTRAVENOUS at 20:50

## 2019-06-19 RX ADMIN — INSULIN LISPRO 12 UNITS: 100 INJECTION, SOLUTION INTRAVENOUS; SUBCUTANEOUS at 12:40

## 2019-06-19 RX ADMIN — INSULIN LISPRO 15 UNITS: 100 INJECTION, SOLUTION INTRAVENOUS; SUBCUTANEOUS at 08:43

## 2019-06-19 RX ADMIN — FENTANYL CITRATE 50 MCG: 50 INJECTION, SOLUTION INTRAMUSCULAR; INTRAVENOUS at 17:27

## 2019-06-19 RX ADMIN — Medication 1750 MG: at 06:38

## 2019-06-19 RX ADMIN — FENTANYL CITRATE 100 MCG: 50 INJECTION INTRAMUSCULAR; INTRAVENOUS at 19:51

## 2019-06-19 RX ADMIN — SODIUM CHLORIDE 125 ML/HR: 9 INJECTION, SOLUTION INTRAVENOUS at 12:42

## 2019-06-19 RX ADMIN — INSULIN LISPRO 5 UNITS: 100 INJECTION, SOLUTION INTRAVENOUS; SUBCUTANEOUS at 20:51

## 2019-06-19 RX ADMIN — MIDAZOLAM 2 MG: 1 INJECTION INTRAMUSCULAR; INTRAVENOUS at 17:15

## 2019-06-19 RX ADMIN — FLUOXETINE 10 MG: 10 CAPSULE ORAL at 20:51

## 2019-06-19 RX ADMIN — INSULIN LISPRO 9 UNITS: 100 INJECTION, SOLUTION INTRAVENOUS; SUBCUTANEOUS at 18:14

## 2019-06-19 RX ADMIN — ONDANSETRON 4 MG: 2 INJECTION INTRAMUSCULAR; INTRAVENOUS at 17:32

## 2019-06-19 RX ADMIN — PIPERACILLIN SODIUM AND TAZOBACTAM SODIUM 3.38 G: 3; .375 INJECTION, POWDER, LYOPHILIZED, FOR SOLUTION INTRAVENOUS at 15:12

## 2019-06-19 RX ADMIN — HYDROCODONE BITARTRATE AND ACETAMINOPHEN 1 TABLET: 5; 325 TABLET ORAL at 06:38

## 2019-06-19 RX ADMIN — ROCURONIUM BROMIDE 4 MG: 10 INJECTION INTRAVENOUS at 17:22

## 2019-06-19 RX ADMIN — FENTANYL CITRATE 50 MCG: 50 INJECTION, SOLUTION INTRAMUSCULAR; INTRAVENOUS at 18:00

## 2019-06-19 ASSESSMENT — PULMONARY FUNCTION TESTS
PIF_VALUE: 22
PIF_VALUE: 23
PIF_VALUE: 22
PIF_VALUE: 23
PIF_VALUE: 19
PIF_VALUE: 1
PIF_VALUE: 26
PIF_VALUE: 1
PIF_VALUE: 22
PIF_VALUE: 1
PIF_VALUE: 19
PIF_VALUE: 23
PIF_VALUE: 25
PIF_VALUE: 23
PIF_VALUE: 6
PIF_VALUE: 1
PIF_VALUE: 25
PIF_VALUE: 3
PIF_VALUE: 27
PIF_VALUE: 23
PIF_VALUE: 22
PIF_VALUE: 23
PIF_VALUE: 22
PIF_VALUE: 1
PIF_VALUE: 8
PIF_VALUE: 23
PIF_VALUE: 22
PIF_VALUE: 0
PIF_VALUE: 23
PIF_VALUE: 23
PIF_VALUE: 22
PIF_VALUE: 17
PIF_VALUE: 14
PIF_VALUE: 5
PIF_VALUE: 22
PIF_VALUE: 12
PIF_VALUE: 2
PIF_VALUE: 1
PIF_VALUE: 1
PIF_VALUE: 22
PIF_VALUE: 22
PIF_VALUE: 19
PIF_VALUE: 22
PIF_VALUE: 2
PIF_VALUE: 23

## 2019-06-19 ASSESSMENT — PAIN DESCRIPTION - DESCRIPTORS: DESCRIPTORS: ACHING

## 2019-06-19 ASSESSMENT — PAIN DESCRIPTION - ONSET: ONSET: ON-GOING

## 2019-06-19 ASSESSMENT — PAIN SCALES - GENERAL
PAINLEVEL_OUTOF10: 10
PAINLEVEL_OUTOF10: 8
PAINLEVEL_OUTOF10: 0
PAINLEVEL_OUTOF10: 6
PAINLEVEL_OUTOF10: 0
PAINLEVEL_OUTOF10: 4
PAINLEVEL_OUTOF10: 0
PAINLEVEL_OUTOF10: 4

## 2019-06-19 ASSESSMENT — ENCOUNTER SYMPTOMS
ALLERGIC/IMMUNOLOGIC NEGATIVE: 1
RESPIRATORY NEGATIVE: 1
NAUSEA: 1
VOMITING: 0

## 2019-06-19 ASSESSMENT — PAIN DESCRIPTION - PROGRESSION: CLINICAL_PROGRESSION: NOT CHANGED

## 2019-06-19 ASSESSMENT — PAIN DESCRIPTION - LOCATION: LOCATION: BREAST

## 2019-06-19 ASSESSMENT — PAIN DESCRIPTION - ORIENTATION: ORIENTATION: LEFT

## 2019-06-19 ASSESSMENT — PAIN DESCRIPTION - FREQUENCY: FREQUENCY: CONTINUOUS

## 2019-06-19 ASSESSMENT — PAIN DESCRIPTION - PAIN TYPE: TYPE: ACUTE PAIN

## 2019-06-19 NOTE — CONSULTS
[address]    Consult Note. Date:   2019  Patient name:  Anna Steel  Date of admission:  2019  3:49 PM  MRN:   [id number]  YOB: 1995    Pt seen at the request of Haritha Andre MD    CHIEF COMPLAINT:     History Obtained From:  Patient and chart review. HISTORY OF PRESENT ILLNESS:      The patient is a 30 y.o.  female who is admitted to the hospital for  HPI: Anna Steel is a 30 y.o. female [id number] presents as a direct admit from Dr. Moon Vogel office for treatment of L breast cellulitis/abscess. She reports she was evaluated at [facility] yesterday and she is s/p I&D of her L breast. She reports her symptoms started a few days ago. It started out as a very small area of redness and has gotten significantly worse since. She complains of intense L breast pain. She reports the redness has spread. She has a history of hydranitis suppurativa but denies ever having anything like this before. She was given keflex and bactrim in the ED yesterday but told the RN that she has not taken it yet. She denies any fevers or chills. She reports nausea and one episode of emesis earlier today. She denies any diarrhea and reports her last BM was two days ago. She has a decrease in her appetite today. She reports dysuria. She also complains of left lower back pain. She reports a history of low back pain. She has been doing a lot of house work lately and says it might be from that. She is requesting something for her pain.      hiradenitis   dm2       Past Medical History:   has a past medical history of Asthma, Diabetes mellitus type 2 in obese [facility]), Hepatic steatosis, Hypertension, Morbid obesity with body mass index (BMI) of 40.0 to 49.9 ([facility] Neuropathy, and Postpartum depression. Past Surgical History:   has a past surgical history that includes Tonsillectomy (Bilateral);   section; eye surgery;  section (N/A, 3/23/2017); and Upper gastrointestinal endoscopy (N/A, 2018). Home Medications:    Prior to Admission medications    Medication Sig Start Date End Date Taking? Authorizing Provider   blood glucose monitor kit and supplies Test 2 times a day & as needed for symptoms of irregular blood glucose. 19  Yes Kely England MD   blood glucose monitor strips Test 2 times a day & as needed for symptoms of irregular blood glucose. 19  Yes Kely England MD   ONE TOUCH LANCETS MISC 1 each by Does not apply route 2 times daily 19  Yes Kely England MD   risperiDONE (RISPERDAL) 0.25 MG tablet Take 1 tablet by mouth nightly 19  Yes Mayco Carlos PA-C   sulfamethoxazole-trimethoprim (BACTRIM DS;SEPTRA DS) 800-160 MG per tablet Take 1 tablet by mouth 2 times daily    Historical Provider, MD   cephALEXin (KEFLEX) 500 MG capsule Take 500 mg by mouth 3 times daily 19  Historical Provider, MD   doxycycline hyclate (VIBRAMYCIN) 100 MG capsule Take 100 mg by mouth daily    Historical Provider, MD   FLUoxetine (PROZAC) 10 MG capsule Take 1 capsule by mouth daily 19   Mayco Carlos PA-C   etonogestrel (NEXPLANON) 68 MG implant 68 mg by Subdermal route once for 1 dose 17  Kartik Lozano MD       Allergies:  Benadryl [diphenhydramine]    Social History:   reports that she quit smoking about 2 years ago. Her smoking use included cigarettes. She started smoking about 11 years ago. She has a 20.00 pack-year smoking history. She has never used smokeless tobacco. She reports that she does not drink alcohol or use drugs. Family History: family history includes Breast Cancer (age of onset: 28) in her mother; Cancer (age of onset: 61) in her maternal uncle; Colon Cancer in her maternal uncle; Diabetes in her father, maternal uncle, and maternal uncle; Heart Attack in her maternal uncle; Heart Disease in her maternal uncle.     REVIEW OF SYSTEMS: lymphadenopathy no splenomegaly       DIAGNOSTICS:    Laboratory Testing:  CBC:   Recent Labs     06/19/19  0744   WBC 9.0   HGB 13.5        BMP:    Recent Labs     06/18/19  1703 06/19/19  0658 06/19/19  0744   *  --  138   K 4.4  --  3.6*   CL 93*  --  102   CO2 18*  --  23   BUN 8  --  8   CREATININE 0.75 0.63 0.64   GLUCOSE 704*  --  370*     S. Calcium:  Recent Labs     06/19/19  0744   CALCIUM 8.6     S. Ionized Calcium:No results for input(s): IONCA in the last 72 hours. S. Magnesium:No results for input(s): MG in the last 72 hours. S. Phosphorus:No results for input(s): PHOS in the last 72 hours. S. Glucose:  Recent Labs     06/18/19  2051 06/19/19  0658 06/19/19  1115   POCGLU 326* 358* 333*     Glycosylated hemoglobin A1C: No results for input(s): LABA1C in the last 72 hours. INR: No results for input(s): INR in the last 72 hours. Hepatic functions:   Recent Labs     06/19/19  0744   ALKPHOS 109*   ALT 29   AST 23   PROT 6.7   BILITOT 0.86   LABALBU 3.9     Pancreatic functions:  Recent Labs     06/18/19  1809   LACTA 1.4     S. Lactic Acid:   Recent Labs     06/18/19  1809   LACTA 1.4     Cardiac enzymes:No results for input(s): CKTOTAL, CKMB, CKMBINDEX, TROPONINI in the last 72 hours. BNP:No results for input(s): BNP in the last 72 hours. Lipid profile: No results for input(s): CHOL, TRIG, HDL, LDLCALC in the last 72 hours. Invalid input(s): LDL  Blood Gases: No results found for: PH, PCO2, PO2, HCO3, O2SAT  Thyroid functions:   Lab Results   Component Value Date    TSH 2.96 04/12/2019        Imaging/Diagonstics:    No results found.       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    BMI 40.0-44.9    Generalized abdominal pain    Hepatic steatosis    History of gonorrhea    History of chlamydia    Nausea & vomiting    Diarrhea    Bipolar affective disorder, currently depressed, mild ([facility]    Cellulitis of breast    Back pain    Noncompliance    Diabetes ([facility]    History of  section x2    History of postpartum depression    Lost custody of children    Poor historian    Hyponatremia    Uncontrolled diabetes mellitus ([facility]    Mood disorder ([facility]     Lt breast cellulitis   with underlying hiradenitis    On zosyn/vanco   cx pend     dm2   poor control     start    lantus   20 u     cr nl     Metformin 500 mg bid    htn    Mod control  PLAN:        hba1c   add lantus   add metformin      add lisinopril    lipids   needs ststin    tsh    cont iv atb      dayneulin  Ss         MD MARCK Boles[address], [address] [place] [address].    Phone [phone]   Fax: [phone]  Answering Service: [phone]

## 2019-06-19 NOTE — PROGRESS NOTES
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

## 2019-06-19 NOTE — FLOWSHEET NOTE
Pt alert and oriented x3. Flat affect noted.  Telepysch consult scheduled for 10 am. Pt to go to surgery at 1630 today for ID of left breast with Dr Lucille Gray

## 2019-06-19 NOTE — PROGRESS NOTES
Pharmacy Note  Vancomycin Consult    Anna Simmons is a 30 y.o. female started on Vancomycin for breast infection with I&D ; consult received from Dr. Nilda Daniels to manage therapy. Also receiving the following antibiotics: ceftriaxone. 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    BMI 40.0-44.9    Generalized abdominal pain    Hepatic steatosis    History of gonorrhea    History of chlamydia    Nausea & vomiting    Diarrhea    Bipolar affective disorder, currently depressed, mild ([facility]    Cellulitis of breast    Back pain    Noncompliance    Diabetes ([facility]    History of  section x2    History of postpartum depression    Lost custody of children    Poor historian    Hyponatremia    Uncontrolled diabetes mellitus ([facility]       Allergies:  Benadryl [diphenhydramine]     Temp max: 36.9     Recent Labs     19  1703   BUN 8       Recent Labs     19  1703   CREATININE 0.75       Recent Labs     19  1703   WBC 9.8         Intake/Output Summary (Last 24 hours) at 2019 2106  Last data filed at 2019 1816  Gross per 24 hour   Intake --   Output 800 ml   Net -800 ml       Culture Date      Source                       Results  NA    Ht Readings from Last 1 Encounters:   19 5' 7\" (1.702 m)        Wt Readings from Last 1 Encounters:   19 275 lb 12.7 oz (125.1 kg)         Body mass index is 43.2 kg/m². Estimated Creatinine Clearance: 156 mL/min (based on SCr of 0.75 mg/dL). Goal Trough Level: 10-20 mcg/mL    Assessment/Plan:  Will initiate vancomycin 1750  mg IV every 12 hours, started in ED. Timing of trough level will be determined based on culture results, renal function, and clinical response. Thank you for the consult. Will continue to follow. Mackenzie Rios [facility].  [address] 2019 9:12 PM

## 2019-06-19 NOTE — ANESTHESIA POSTPROCEDURE EVALUATION
Department of Anesthesiology  Postprocedure Note    Patient: Elsi Sigala  MRN: [id number]  [place]: 1995  Date of evaluation: 6/19/2019  Time:  6:11 PM     Procedure Summary     Date:  06/19/19 Room / Location:  [address] / [address]    Anesthesia Start:  1715 Anesthesia Stop:  1806    Procedure:  BREAST INCISION AND DRAINAGE (Left Breast) Diagnosis:  (ABSCESS LEFT BREAST)    Surgeon:  Marvin Mcqueen MD Responsible Provider:      Anesthesia Type:  general ASA Status:  3          Anesthesia Type: general    Ana Cristina Phase I:      Ana Cristina Phase II:      Last vitals: Reviewed and per EMR flowsheets.        Anesthesia Post Evaluation    Comments: POST- ANESTHESIA EVALUATION       Pt Name: Elsi Sigala  MRN: [id number]  YOB: 1995  Date of evaluation: 6/19/2019  Time:  6:11 PM      /88   Pulse 78   Temp 98.2 °F (36.8 °C) (Oral)   Resp 16   Ht 5' 7\" (1.702 m)   Wt 275 lb 12.7 oz (125.1 kg)   SpO2 99%   BMI 43.20 kg/m²      Consciousness Level  Awake  Cardiopulmonary Status  Stable  Pain Adequately Treated YES  Nausea / Vomiting  NO  Adequate Hydration  YES  Anesthesia Related Complications NONE      Electronically signed by Elvira Villalta MD on 6/19/2019 at 6:11 PM

## 2019-06-19 NOTE — CARE COORDINATION
DISCHARGE PLANNING NOTE:    Call received from [facility] at [facility] and she informed me that this patient is 100% covered for IV antibiotics. She would like to be kept updated on the discharge plan and can be reached at [phone].      Electronically signed by Gaviota Corado RN on 6/19/2019 at 2:55 PM

## 2019-06-19 NOTE — CONSULTS
[facility] -   Infectious diseases evaluation  admission date 6/18/2019    reason for consultation:   Left breast abscess/cellulitis    Impression :   Current:  Left breast abscess/cellulitis  Hidradenitis suppurativa  Other:  · Bipolar disorder  Discussion / summary of stay / plan of care   She presented with left breast increased swelling and redness, incomplete I&D was done at [facility] on 6/17, no cultures could be found. Recommendations   Surgery has been consulted and the plan for surgical debridement later today. Continue IV Vancomycin . D/C Ceftriaxone and start Zosyn . Infection Control Recommendations   · [place] Precautions      Antimicrobial Stewardship Recommendations   · Simplification of therapy  · Targeted therapy  · IV to oral conversion  · Per Kg dosing  · Restricted antimicrobial use  · Discontinuation of therapy    Coordination ofOutpatient Care:   · Estimated Length of IV antimicrobials: Indeterminate  · Patient will need Midline / picc  catheter Insertion: Unlikely  · Patient will need SNF: Unlikely  · Patient will need outpatient wound care: Indeterminate    History of Present Illness:   Initial history:  Delisa Mccrary is a 30y.o.-year-old female was a direct admit from OB/GYN office for increased swelling, pain or redness to the left breast for several days associated with nausea and vomiting, decreased appetite and lower back pain. She denied any fever or chills  The patient is a poor historian, noncompliant with mental health disease. Reportedly she had incision and debridement of left breast abscess at the [facility] on 6/17/19   History of hidradenitis suppurativa, diabetes, hypertension , asthma, STDs history of gonorrhea and chlamydia  She is sexually active with one partner and does not use condoms for STD prevention.   She had vaginal bleeding for 2 weeks has was evaluated by OB/GYN    Interval changes  6/19/2019 Not on file    Transportation needs:     Medical: Not on file     Non-medical: Not on file   Tobacco Use    Smoking status: Former Smoker     Packs/day: 2.00     Years: 10.00     Pack years: 20.00     Types: Cigarettes     Start date: 2007     Last attempt to quit: 2016     Years since quittin.9    Smokeless tobacco: Never Used   Substance and Sexual Activity    Alcohol use: No     Alcohol/week: 0.0 oz    Drug use: No    Sexual activity: Yes     Partners: Male     Comment: chlamydia in 2016,   Lifestyle    Physical activity:     Days per week: Not on file     Minutes per session: Not on file    Stress: Not on file   Relationships    Social connections:     Talks on phone: Not on file     Gets together: Not on file     Attends Yazidism service: Not on file     Active member of club or organization: Not on file     Attends meetings of clubs or organizations: Not on file     Relationship status: Not on file    Intimate partner violence:     Fear of current or ex partner: Not on file     Emotionally abused: Not on file     Physically abused: Not on file     Forced sexual activity: Not on file   Other Topics Concern    Not on file   Social History Narrative    Lives with fiance and 2 daughters       Family History:     Family History   Problem Relation Age of Onset    Breast Cancer Mother 28    Diabetes Father     Cancer Maternal Uncle 60        acute leukemia    Heart Disease Maternal Uncle     Diabetes Maternal Uncle     Colon Cancer Maternal Uncle     Diabetes Maternal Uncle     Heart Attack Maternal Uncle     Uterine Cancer Neg Hx     Ovarian Cancer Neg Hx         Allergies:   Benadryl [diphenhydramine]     Review of Systems:     Review of Systems   Constitutional: Negative. HENT: Negative. Respiratory: Negative. Cardiovascular: Negative. Gastrointestinal: Positive for nausea. Negative for vomiting. Endocrine: Negative. Genitourinary: Negative.     Musculoskeletal: Negative. Skin: Positive for wound. Allergic/Immunologic: Negative. Neurological: Negative. Hematological: Negative. Other than above 14 system review negative  Physical Examination :     Patient Vitals for the past 8 hrs:   BP Temp Temp src Pulse Resp SpO2   06/19/19 0722 105/62 98.4 °F (36.9 °C) Oral 66 16 98 %       Physical Exam   Constitutional: She is oriented to person, place, and time. She appears well-developed and well-nourished. HENT:   Head: Normocephalic and atraumatic. Eyes: Pupils are equal, round, and reactive to light. Conjunctivae and EOM are normal.   Neck: Normal range of motion. Neck supple. No thyromegaly present. Cardiovascular: Normal rate, regular rhythm and normal heart sounds. Pulmonary/Chest: Effort normal and breath sounds normal.   Abdominal: Soft. Bowel sounds are normal.   Musculoskeletal: Normal range of motion. Neurological: She is alert and oriented to person, place, and time. Skin: There is erythema. Erythema, swelling to the left breast associated with tenderness and induration, open wound with drainage. Multiple old scars to the axilla and groins area. Medical Decision Making:   I have independently reviewed/ordered the following labs:    CBC with Differential:   Recent Labs     06/18/19  1703 06/19/19  0744   WBC 9.8 9.0   HGB 13.1 13.5   HCT 40.1 39.5    210   LYMPHOPCT 19* 36   MONOPCT 8* 8*     BMP:  Recent Labs     06/18/19  1703 06/19/19  0658 06/19/19  0744   *  --  138   K 4.4  --  3.6*   CL 93*  --  102   CO2 18*  --  23   BUN 8  --  8   CREATININE 0.75 0.63 0.64     Hepatic Function Panel:   Recent Labs     06/18/19  1703 06/19/19  0744   PROT 6.7 6.7   LABALBU 3.8 3.9   BILITOT 1.41* 0.86   ALKPHOS 109* 109*   ALT 31 29   AST 28 23       Lab Results   Component Value Date    CREATININE 0.64 06/19/2019    GLUCOSE 370 06/19/2019       Detailed results:         Thank you for allowing us to participate in the care of this

## 2019-06-19 NOTE — FLOWSHEET NOTE
06/19/19 1503   Encounter Summary   Services provided to: Patient   Referral/Consult From: [facility]   Continue Visiting   (6/19/19V)   Volunteer Visit Yes   Complexity of Encounter Low   Length of Encounter 15 minutes   Routine   Type Follow up   Intervention Prayer

## 2019-06-19 NOTE — CARE COORDINATION
DISCHARGE PLANNING NOTE:    Referral sent to [facility] to follow as well as to [place] to run benefits for possible IV antibiotics. Will continue to follow along. Electronically signed by Eula Wang RN on 6/19/2019 at 10:49 AM    Call received from [facility] at [facility] and she informed me that they are unable to accept this patients insurance at this time. She will forward the patient information on to [facility] to run benefits.      Electronically signed by Eula Wang RN on 6/19/2019 at 11:00 AM

## 2019-06-19 NOTE — ANESTHESIA PRE PROCEDURE
Department of Anesthesiology  Preprocedure Note       Name:  Michel John   Age:  30 y.o.  :  1995                                          MRN:  [id number]         Date:  2019      Surgeon: Sadia Chu):  Natalie Landers MD    Procedure: BREAST INCISION AND DRAINAGE (Left Breast)    Medications prior to admission:   Prior to Admission medications    Medication Sig Start Date End Date Taking? Authorizing Provider   blood glucose monitor kit and supplies Test 2 times a day & as needed for symptoms of irregular blood glucose. 19  Yes Subhash Vincent MD   blood glucose monitor strips Test 2 times a day & as needed for symptoms of irregular blood glucose.  19  Yes Subhash Vincent MD   ONE TOUCH LANCETS MISC 1 each by Does not apply route 2 times daily 19  Yes Salina Bridges MD   risperiDONE (RISPERDAL) 0.25 MG tablet Take 1 tablet by mouth nightly 19  Yes Cliff Diana PA-C   sulfamethoxazole-trimethoprim (BACTRIM DS;SEPTRA DS) 800-160 MG per tablet Take 1 tablet by mouth 2 times daily    Historical Provider, MD   cephALEXin (KEFLEX) 500 MG capsule Take 500 mg by mouth 3 times daily 19  Historical Provider, MD   doxycycline hyclate (VIBRAMYCIN) 100 MG capsule Take 100 mg by mouth daily    Historical Provider, MD   FLUoxetine (PROZAC) 10 MG capsule Take 1 capsule by mouth daily 19   Cliff Diana PA-C   etonogestrel (NEXPLANON) 68 MG implant 68 mg by Subdermal route once for 1 dose 17  Augustine Shetty MD       Current medications:    Current Facility-Administered Medications   Medication Dose Route Frequency Provider Last Rate Last Dose    [MAR Hold] melatonin ER tablet 1 mg  1 mg Oral Nightly PRN Sandro Kee APRN - CNP        [facility] Hold] potassium chloride 10 mEq/100 mL IVPB (Peripheral Line)  10 mEq Intravenous PRN Eduarda Santiago DO        [MAR Hold] FLUoxetine (PROZAC) capsule 10 mg  10 mg Oral Daily Yvonne Willard APRN - CNP       [facility] Hold] risperiDONE (RISPERDAL) tablet 0.25 mg  0.25 mg Oral Nightly Candace Ayon, APRN - CNP        [MAR Hold] piperacillin-tazobactam (ZOSYN) 3.375 g in dextrose 5 % 50 mL IVPB (mini-bag)  3.375 g Intravenous Q6H Pedro Briggs MD   Stopped at 06/19/19 1612    [MAR Hold] insulin glargine (LANTUS) injection vial 20 Units  20 Units Subcutaneous BID Nery Rangel MD        [facility] Hold] lisinopril (PRINIVIL;ZESTRIL) tablet 5 mg  5 mg Oral Daily Nery Rangel MD        [facility] Hold] metFORMIN (GLUCOPHAGE) tablet 500 mg  500 mg Oral BID  Nery Rangel MD        [facility] Hold] magnesium hydroxide (MILK OF MAGNESIA) 400 MG/5ML suspension 30 mL  30 mL Oral Daily PRN Eduarda Santiago, DO        [MAR Hold] ondansetron (ZOFRAN) injection 4 mg  4 mg Intravenous Q6H PRN Eduarda Santiago, DO        [MAR Hold] acetaminophen (TYLENOL) tablet 1,000 mg  1,000 mg Oral Q6H PRN Eduarda Santiago, DO        [MAR Hold] promethazine (PHENERGAN) injection 12.5 mg  12.5 mg Intramuscular Q6H PRN Eduarda Santiago,         [facility] Hold] 0.9 % sodium chloride infusion  125 mL/hr Intravenous Continuous Eduarda Santiago,  mL/hr at 06/19/19 1242 125 mL/hr at 06/19/19 1242    [MAR Hold] HYDROcodone-acetaminophen (NORCO) 5-325 MG per tablet 1 tablet  1 tablet Oral Q6H PRN Enrique Rick, DO   1 tablet at 06/19/19 0638    [MAR Hold] ibuprofen (ADVIL;MOTRIN) tablet 800 mg  800 mg Oral Q8H PRN Eduarda Santiago, DO   800 mg at 06/18/19 2138    [MAR Hold] insulin lispro (HUMALOG) injection vial 0-18 Units  0-18 Units Subcutaneous TID RENEE Santiago DO   12 Units at 06/19/19 1240    [MAR Hold] insulin lispro (HUMALOG) injection vial 0-9 Units  0-9 Units Subcutaneous Nightly Eduarda Santiago, DO   6 Units at 06/18/19 2139    [MAR Hold] glucose (GLUTOSE) 40 % oral gel 15 g  15 g Oral PRN Eduarda Santiago, DO        [MAR Hold] dextrose 50 % IV solution  12.5 g Intravenous PRN Eduarda Santiago, DO        [MAR Hold] glucagon (rDNA) injection 1 mg  1 mg Intramuscular PRN Eduarda Santiago, DO        [MAR Hold] dextrose 5 % solution  100 mL/hr Intravenous PRN Praveen General, DO        [MAR Hold] vancomycin (VANCOCIN) intermittent dosing (placeholder)   Other RX Placeholder Eduardabert Santiago, DO        [MAR Hold] vancomycin (VANCOCIN) 1750 mg in dextrose 5% 500 mL IVPB  1,750 mg Intravenous Q12H Eduarda Santiago, DO   1,750 mg at 19 4872       Allergies:     Allergies   Allergen Reactions    Benadryl [Diphenhydramine] Shortness Of Breath       Problem List:    Patient Active Problem List   Diagnosis Code    chronic HTN I10    Asthma J45.909    Morbid obesity ([facility] [address] E66.01    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.4    BMI 40.0-44.9 Z68.41    Generalized abdominal pain R10.84    Hepatic steatosis K76.0    History of gonorrhea Z86.19    History of chlamydia Z86.19    Nausea & vomiting R11.2    Diarrhea R19.7    Bipolar affective disorder, currently depressed, mild (HCC) F31.31    Cellulitis of breast N61.0    Back pain M54.9    Noncompliance Z91.19    Diabetes ([address] E11.9    History of  section x2 Z98.891    History of postpartum depression Z87.59, Z80.58    Lost custody of children Z76.3    Poor historian Z78.9    Hyponatremia E87.1    Uncontrolled diabetes mellitus ([address] E11.65    Mood disorder ([address] F39       Past Medical History:        Diagnosis Date    Asthma     uses inhaler    Diabetes mellitus type 2 in obese ([address]     Hepatic steatosis     Hypertension     started when approx 6 mos pregnant with first pregnancy    Morbid obesity with body mass index (BMI) of 40.0 to 49.9 ([address]     Neuropathy     Postpartum depression        Past Surgical History:        Procedure Laterality Date     SECTION       SECTION N/A 3/23/2017     SECTION REPEAT  performed by Augustine Shetty MD [facility] L&D OR    EYE SURGERY      TONSILLECTOMY Bilateral     UPPER GASTROINTESTINAL ENDOSCOPY N/A 2018    EGD BIOPSY performed by Robi Cruz MD at [address] [place] [address] History:    Social History     Tobacco Use    Smoking status: Former Smoker     Packs/day: 2.00     Years: 10.00     Pack years: 20.00     Types: Cigarettes     Start date: 2007     Last attempt to quit: 2016     Years since quittin.9    Smokeless tobacco: Never Used   Substance Use Topics    Alcohol use: No     Alcohol/week: 0.0 oz                                Counseling given: Not Answered      Vital Signs (Current):   Vitals:    19 2346 19 0722 19 1144 19 1609   BP: 123/70 105/62 (!) 145/76 128/88   Pulse: 73 66 83 78   Resp: 16 16 16 16   Temp: 98.5 °F (36.9 °C) 98.4 °F (36.9 °C) 98 °F (36.7 °C) 98.2 °F (36.8 °C)   TempSrc: Oral Oral Oral Oral   SpO2: 98% 98% 96% 99%   Weight:       Height:                                                  BP Readings from Last 3 Encounters:   19 128/88   19 134/78   19 125/78       NPO Status: Time of last liquid consumption:                         Time of last solid consumption:                         Date of last liquid consumption: 19                        Date of last solid food consumption: 19    BMI:   Wt Readings from Last 3 Encounters:   19 275 lb 12.7 oz (125.1 kg)   19 270 lb (122.5 kg)   19 283 lb (128.4 kg)     Body mass index is 43.2 kg/m².     CBC:   Lab Results   Component Value Date    WBC 9.0 2019    RBC 4.40 2019    HGB 13.5 2019    HCT 39.5 2019    MCV 89.8 2019    RDW 12.7 2019     2019       CMP:   Lab Results   Component Value Date     2019    K 3.6 2019     2019    CO2 23 2019    BUN 8 2019    CREATININE 0.64 2019    GFRAA >60 2019    LABGLOM >60 2019    GLUCOSE 370 2019    PROT 6.7 2019    CALCIUM 8.6 2019    BILITOT 0.86 2019    ALKPHOS 109 06/19/2019    AST 23 06/19/2019    ALT 29 06/19/2019       POC Tests:   Recent Labs     06/19/19  1115   POCGLU 333*       Coags: No results found for: PROTIME, INR, APTT    HCG (If Applicable):   Lab Results   Component Value Date    PREGTESTUR NEGATIVE 06/18/2019    HCGQUANT 12,867 (H) 08/17/2016        ABGs: No results found for: PHART, PO2ART, QUP2NPE, ZER8ZPL, BEART, V6ETCLTD     Type & Screen (If Applicable):  No results found for: LABABO, [address]    Anesthesia Evaluation  Patient summary reviewed and Nursing notes reviewed no history of anesthetic complications:   Airway: Mallampati: IV  TM distance: >3 FB   Neck ROM: limited  Mouth opening: > = 3 FB Dental: normal exam         Pulmonary:normal exam    (+) asthma:                            Cardiovascular:    (+) hypertension:,                   Neuro/Psych:   (+) psychiatric history:            GI/Hepatic/Renal:   (+) liver disease:, morbid obesity          Endo/Other:    (+) DiabetesType II DM, poorly controlled, using insulin, . Abdominal:           Vascular:                                        Anesthesia Plan      general     ASA 3       Induction: intravenous. MIPS: Postoperative opioids intended and Prophylactic antiemetics administered. Anesthetic plan and risks discussed with patient.                       Elvira Villalta MD   6/19/2019

## 2019-06-19 NOTE — OP NOTE
Preoperative diagnosis: Left breast abscess inadequately drained with cellulitis      Postoperative diagnosis: Same    Procedure: Incision and drainage left breast abscess    Surgeon: Dr. Bradley Patricia    Asst.: None    Anesthesia: General    Preparation: Hibiclens    EBL: Less than 10 mL    Specimen: Cultures obtained    Procedure: 23-year-old female with left breast abscess cellulitis was scheduled for further drainage because of incomplete drainage in the emergency department in [facility].  Informed consent was obtained. Patient was on IV antibiotics. Patient was taken to the operating room. General anesthesia was given. Operative site was prepped and draped in usual sterile fashion. Timeout was done. Incision and drainage of this inadequately drained abscess was performed. Purulent fluid was drained. Cultures were obtained. Wound was irrigated. Hemostasis was confirmed. Sponge needle instrument counts found to be correct. Wound was packed using iodoform gauze. Clean dressing was applied. Patient tolerated procedure well and was transferred to the recovery room in stable condition. Recommendations: Operative findings are discussed with the patient's family at length. Postoperative care wound care recovery restrictions were discussed. Continue IV antibiotics per infectious disease recommendations.

## 2019-06-19 NOTE — PROGRESS NOTES
Nutrition Assessment    Type and Reason for Visit: Positive Nutrition Screen(Dietitian consult needed: Patient is diabetic and does not follow diet correctly)    Nutrition Recommendations: Continue NPO status, advance when appropriate. Patient drowsy today. Will attempt diabetic diet education over the next few days. Nutrition Assessment: Patient is nutritionally compromised as evidence by infected left breast abscess. Patient at risk for further compromise due to increased nutrient needs for wound healing. Will monitor for nutrition progression and add oral nutrition supplement when diet advanced. Patient still requires diabetic diet education. Will attempt diet education over the next few days. Malnutrition Assessment:  · Malnutrition Status: At risk for malnutrition  · Context: Acute illness or injury  · Findings of the 6 clinical characteristics of malnutrition (Minimum of 2 out of 6 clinical characteristics is required to make the diagnosis of moderate or severe Protein Calorie Malnutrition based on AND/ASPEN Guidelines):  1. Energy Intake-Less than or equal to 50% of estimated energy requirement, (2 days)    2. Weight Loss-No significant weight loss,    3. Fat Loss-No significant subcutaneous fat loss,    4. Muscle Loss-No significant muscle mass loss,    5. Fluid Accumulation-No significant fluid accumulation, Extremities  6.  Strength-Not measured    Nutrition Risk Level: Moderate    Nutrient Needs:  · Estimated Daily Total Kcal: 1875 kcals based on 15 kcal/kg of admission weight   · Estimated Daily Protein (g): 73-86 gm of protein based on 1.2-1.4 gm/kg of ideal weight     Nutrition Diagnosis:   · Problem: Increased nutrient needs  · Etiology: related to Increased demand for energy/nutrients     Signs and symptoms:  as evidenced by Presence of wounds(left breast abscess)    Objective Information:  · Nutrition-Focused Physical Findings: Some confusion.  Hidradenitis suppurativa covering the entire left breast.  · Wound Type: (Left breast abscess)  · Current Nutrition Therapies:  · Oral Diet Orders: NPO   · Oral Diet intake: NPO  · Anthropometric Measures:  · Ht: 5' 7\" (170.2 cm)   · Current Body Wt: 275 lb (124.7 kg)  · Admission Body Wt: 275 lb (124.7 kg)  · Ideal Body Wt: 135 lb (61.2 kg), % Ideal Body 204%  · BMI Classification: BMI > or equal to 40.0 Obese Class III    Nutrition Interventions:   Continue NPO  Continued Inpatient Monitoring, Education Needed    Nutrition Evaluation:   · Evaluation: Goals set   · Goals: Nutritional intake is greater than 75% of estimated needs    · Monitoring: Nutrition Progression, Weight, Pertinent Labs, Monitor Bowel Function, Diet Tolerance, I&O, Skin Integrity, Wound Healing      Nelda Lima  [facility], R.D, L.D,  Clinical Dietitian  Cell # [phone]  Office # [phone]
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
## 2019-06-19 NOTE — PROGRESS NOTES
OB/GYN Resident Interval Note    Patient seen and evaluated. Patient sitting comfortably in bed eating salad. Patient reports she is still feeling pain in her left breast and feels like it might be draining. Also complaining of a headache and reports she has been having a slight headache for the past couple of days. She is also complaining of increased confusion and problems with memory that has been going on for the past couple of weeks, but worse the past couple of days. Upon further questioning about increased confusion, patient cannot provide examples or describe what she means. Patient also complaining of feeling increased fatigue, lightheadedness and weakness for the past couple of weeks. Denies fevers, chills, chest pain, SOB, abdominal pain, N/V or increased pain/swelling in extremities. Aerobic and anaerobic cultures collected, patient tolerated well. Spoke with RN who reports General Surgery plans for I&D of left breast abscess on 6/19/19 at 1600.      Vitals:    06/18/19 1638 06/18/19 1854 06/18/19 2346   BP: 107/78 138/86 123/70   Pulse: 91 83 73   Resp: 20 18 16   Temp: 98.5 °F (36.9 °C) 98 °F (36.7 °C) 98.5 °F (36.9 °C)   TempSrc: Oral Oral Oral   SpO2: 97% 96% 98%   Weight: 275 lb 12.7 oz (125.1 kg)     Height: 5' 7\" (1.702 m)       PHYSICAL EXAM:    General appearance:  no apparent distress, alert and cooperative  Neurologic:  alert, oriented, normal speech, no focal findings or movement disorder noted  Lungs:  No increased work of breathing, good air exchange, clear to auscultation bilaterally, no crackles or wheezing  Heart:  regular rate and rhythm and no murmur    Abdomen:  soft, non-tender, non-distended, BS normal x 4  Extremities:  no calf tenderness, non edematous          Recent Results (from the past 12 hour(s))   Urinalysis Reflex to Culture    Collection Time: 06/18/19  5:00 PM   Result Value Ref Range    Color, UA YELLOW YELLOW    Turbidity UA CLEAR CLEAR    Glucose, Ur 3+ (A) NEGATIVE REPORTED None    Trichomonas, UA NOT REPORTED None    Amorphous, UA NOT REPORTED None    Other Observations UA NOT REPORTED NOT REQ.     Yeast, UA NOT REPORTED None   CBC auto differential    Collection Time: 06/18/19  5:03 PM   Result Value Ref Range    WBC 9.8 3.5 - 11.0 k/uL    RBC 4.31 4.0 - 5.2 m/uL    Hemoglobin 13.1 12.0 - 16.0 g/dL    Hematocrit 40.1 36 - 46 %    MCV 93.2 80 - 100 fL    MCH 30.5 26 - 34 pg    MCHC 32.7 31 - 37 g/dL    RDW 13.0 11.5 - 14.9 %    Platelets 558 330 - 114 k/uL    MPV 9.2 6.0 - 12.0 fL    NRBC Automated NOT REPORTED per 100 WBC    Differential Type NOT REPORTED     Seg Neutrophils 70 (H) 36 - 66 %    Lymphocytes 19 (L) 24 - 44 %    Monocytes 8 (H) 1 - 7 %    Eosinophils % 2 0 - 4 %    Basophils 1 0 - 2 %    Immature Granulocytes NOT REPORTED 0 %    Segs Absolute 7.00 1.3 - 9.1 k/uL    Absolute Lymph # 1.80 1.0 - 4.8 k/uL    Absolute Mono # 0.80 0.1 - 1.3 k/uL    Absolute Eos # 0.20 0.0 - 0.4 k/uL    Basophils # 0.10 0.0 - 0.2 k/uL    Absolute Immature Granulocyte NOT REPORTED 0.00 - 0.30 k/uL    WBC Morphology NOT REPORTED     RBC Morphology NOT REPORTED     Platelet Estimate NOT REPORTED    Comprehensive Metabolic Panel    Collection Time: 06/18/19  5:03 PM   Result Value Ref Range    Glucose 704 (HH) 70 - 99 mg/dL    BUN 8 6 - 20 mg/dL    CREATININE 0.75 0.50 - 0.90 mg/dL    Bun/Cre Ratio NOT REPORTED 9 - 20    Calcium 8.6 8.6 - 10.4 mg/dL    Sodium 127 (L) 135 - 144 mmol/L    Potassium 4.4 3.7 - 5.3 mmol/L    Chloride 93 (L) 98 - 107 mmol/L    CO2 18 (L) 20 - 31 mmol/L    Anion Gap 16 9 - 17 mmol/L    Alkaline Phosphatase 109 (H) 35 - 104 U/L    ALT 31 5 - 33 U/L    AST 28 <32 U/L    Total Bilirubin 1.41 (H) 0.3 - 1.2 mg/dL    Total Protein 6.7 6.4 - 8.3 g/dL    Alb 3.8 3.5 - 5.2 g/dL    Albumin/Globulin Ratio NOT REPORTED 1.0 - 2.5    GFR Non-African American >60 >60 mL/min    GFR African American >60 >60 mL/min    GFR Comment          GFR Staging NOT REPORTED BETA-HYDROXYBUTYRATE    Collection Time: 06/18/19  6:09 PM   Result Value Ref Range    Beta-Hydroxybutyrate 0.43 (H) 0.02 - 0.27 mmol/L   BLOOD GAS, VENOUS    Collection Time: 06/18/19  6:09 PM   Result Value Ref Range    pH, Nico 7.432 (H) 7.320 - 7.420    pCO2, Nico 32.1 (L) 39.0 - 55.0    pO2, Nico 64.1 (H) 30.0 - 50.0    HCO3, Venous 21.4 (L) 24.0 - 30.0 mmol/L    Positive Base Excess, Nico NOT REPORTED 0.0 - 2.0 mmol/L    Negative Base Excess, Nico 2.9 (H) 0.0 - 2.0 mmol/L    O2 Sat, Nico 91.3 (H) 60.0 - 85.0 %    Total Hb NOT REPORTED 12.0 - 16.0 g/dl    Oxyhemoglobin NOT REPORTED 95.0 - 98.0 %    Carboxyhemoglobin 2.2 0 - 5 %    Methemoglobin 0.3 0.0 - 1.9 %    Pt Temp 37.0     pH, [place], Temp Adj NOT REPORTED 7.320 - 7.420    pCO2, Nico, Temp Adj NOT REPORTED 39.0 - 55.0 mmHg    pO2, Nico, Temp Adj NOT REPORTED 30.0 - 50.0 mmHg    O2 Device/Flow/% NOT REPORTED     Respiratory Rate NOT REPORTED     Keyur Test NOT REPORTED     Sample Site NOT REPORTED     Pt. Position NOT REPORTED     Mode NOT REPORTED     Set Rate NOT REPORTED     Total Rate NOT REPORTED     VT NOT REPORTED     FIO2 NOT REPORTED     Peep/Cpap NOT REPORTED     PSV NOT REPORTED     Text for Respiratory  DRAWN PER LAB     NOTIFICATION NOT REPORTED     NOTIFICATION TIME NOT REPORTED    Lactic Acid    Collection Time: 06/18/19  6:09 PM   Result Value Ref Range    Lactic Acid 1.4 0.5 - 2.2 mmol/L   Cult,Aerobe/Anaerobe    Collection Time: 06/18/19  6:40 PM   Result Value Ref Range    Specimen Description . BREAST LEFT     Special Requests NOT REPORTED     Direct Exam MANY NEUTROPHILS (A)     Direct Exam NO BACTERIA SEEN     Culture PENDING    POC Glucose Fingerstick    Collection Time: 06/18/19  8:51 PM   Result Value Ref Range    POC Glucose 326 (H) 65 - 105 mg/dL     Labs reviewed. Glucose noted to be 704 on CMP. Internal Medicine consulted for management of uncontrolled Type II DM, recommended Lantus 20U and Novolog 15U.  Repeat blood sugar was noted to be 326. Continue to monitor blood sugars AC/HS with high dose ISS. Galina consulted regarding extensive psychiatric history, appreciate recommendations. Will continue to monitor symptoms closely.      Arvin Gilmore DO  Ob/Gyn Resident PGY2  [facility]  6/19/2019 4:10 AM

## 2019-06-19 NOTE — FLOWSHEET NOTE
06/19/19 1028   Encounter Summary   Services provided to: Patient   Referral/Consult From: Rounding   Continue Visiting   (6/19/19)   Complexity of Encounter Low   Length of Encounter 15 minutes   Spiritual/Church   Type Ritual   Intervention Anointing   Sacraments   Sacrament of Sick-Anointing Anointed  (Fr [place] 6/19/19)

## 2019-06-19 NOTE — CONSULTS
General Surgery Consult      Pt Name: Chanel Last  MRN: [id number]  [place]: 1995  Date of evaluation: 2019  Primary Care Physician: Chiquis Soto PA-C   Patient evaluated at the request of  Dr. Jorgito Carlson  Reason for evaluation: Left breast abscess with cellulitis    SUBJECTIVE:   History of Chief Complaint:    Chanel Last is a 30 y.o. female who presents with left breast lump. Patient went to the emergency department at [facility] where she had incision and drainage. Redness and pain persisted over the left lower breast.  Patient has very large breast.  Pain persisted. She was seen by her gynecologist and was admitted to the hospital.  I was asked to evaluate the patient. Patient is on IV antibiotics. Complaining of some drainage. Mostly pain and redness. Symptom onset has been acute for a time period of few day(s). Severity is described as moderate. Course of her symptoms over time is acute. Past Medical History   has a past medical history of Asthma, Diabetes mellitus type 2 in [facility]), Hepatic steatosis, Hypertension, Morbid obesity with body mass index (BMI) of 40.0 to 49.9 ([facility] [address] Neuropathy, and Postpartum depression. Past Surgical History   has a past surgical history that includes Tonsillectomy (Bilateral);  section; eye surgery;  section (N/A, 3/23/2017); and Upper gastrointestinal endoscopy (N/A, 2018). Medications  Prior to Admission medications    Medication Sig Start Date End Date Taking? Authorizing Provider   blood glucose monitor kit and supplies Test 2 times a day & as needed for symptoms of irregular blood glucose. 19  Yes Bruce Randolph MD   blood glucose monitor strips Test 2 times a day & as needed for symptoms of irregular blood glucose.  19  Yes Bruce Randolph MD   ONE TOUCH LANCETS MISC 1 each by Does not apply route 2 times daily 19  Yes Bruce Randolph MD   risperiDONE (RISPERDAL) 0.25 MG tablet Take 1 tablet and her pulse is 78. Her respiration is 16 and oxygen saturation is 99%. CONSTITUTIONAL: Alert and oriented times 3, no acute distress and cooperative to examination with proper mood and affect. SKIN: Skin color, texture, turgor normal. No rashes or lesions. LYMPH: no cervical nodes, no inguinal nodes  HEENT: Head is normocephalic, atraumatic. EOMI, PERRLA  NECK: Supple, symmetrical, trachea midline, no adenopathy, thyroid symmetric, not enlarged and no tenderness, skin normal  CHEST/LUNGS: chest symmetric with normal A/P diameter, normal respiratory rate and rhythm, lungs clear to auscultation without wheezes, rales or rhonchi. No accessory muscle use. Scars None   CARDIOVASCULAR: Heart regular rate and rhythm Normal S1 and S2. . Carotid and femoral pulses 2+/4 and equal bilaterally  ABDOMEN: Soft abdomen nondistended nontender rECTAL: deferred, not clinically indicated  NEUROLOGIC: There are no focalizing motor or sensory deficits. CN II-XII are grossly intact. EXTREMITIES: no cyanosis, no clubbing and no edema  Breast examination: Patient has very large breast tissue. On the left lower breast she had cellulitis with a small open area draining purulent fluid and tender. No crepitus.     LABS:   CBC with Differential:    Lab Results   Component Value Date    WBC 9.0 06/19/2019    RBC 4.40 06/19/2019    HGB 13.5 06/19/2019    HCT 39.5 06/19/2019     06/19/2019    MCV 89.8 06/19/2019    MCH 30.6 06/19/2019    MCHC 34.1 06/19/2019    RDW 12.7 06/19/2019    LYMPHOPCT 36 06/19/2019    MONOPCT 8 06/19/2019    BASOPCT 1 06/19/2019    MONOSABS 0.70 06/19/2019    LYMPHSABS 3.20 06/19/2019    EOSABS 0.20 06/19/2019    BASOSABS 0.10 06/19/2019    DIFFTYPE NOT REPORTED 06/19/2019     BMP:    Lab Results   Component Value Date     06/19/2019    K 3.6 06/19/2019     06/19/2019    CO2 23 06/19/2019    BUN 8 06/19/2019    LABALBU 3.9 06/19/2019    CREATININE 0.64 06/19/2019    CALCIUM 8.6 06/19/2019 GFRAA >60 06/19/2019    LABGLOM >60 06/19/2019    GLUCOSE 370 06/19/2019     Hepatic Function Panel:    Lab Results   Component Value Date    ALKPHOS 109 06/19/2019    ALT 29 06/19/2019    AST 23 06/19/2019    PROT 6.7 06/19/2019    BILITOT 0.86 06/19/2019    BILIDIR 0.09 04/21/2015    IBILI 0.15 04/21/2015    LABALBU 3.9 06/19/2019     Calcium:    Lab Results   Component Value Date    CALCIUM 8.6 06/19/2019     Magnesium:  No results found for: MG  Phosphorus:  No results found for: PHOS  PT/INR:  No results found for: PROTIME, INR  ABG:  No results found for: PHART, PH, VEL1XJP, PCO2, PO2ART, PO2, WJG2YIR, HCO3, BEART, BE, THGBART, THB, QTX3TDS, W5KHNTIY, O2SAT  Urine Culture:  No components found for: CURINE  Blood Culture:  No components found for: CBLOOD, CFUNGUSBL  Stool Culture:  No components found for: CSTOOL    RADIOLOGY:   I have personally reviewed the following films:  No results found. IMPRESSION:   1. Left breast abscess with cellulitis. Abscess partially drained. does not have any pertinent problems on file. PLAN:   1. Continue IV antibiotics per infectious disease recommendations. Further incision and drainage under anesthesia today. Procedure risk complications risk benefit alternatives recovery restrictions at all discussed with the patient and the family at length. They understand and want to proceed. Wound care was discussed with them as well. Thank you for this interesting consult and for allowing us to participate in the care of this patient. If you have any questions please don't hesitate to call.           Electronically signed by Jd Contreras MD  on 6/19/2019 at 5:51 PM

## 2019-06-19 NOTE — CARE COORDINATION
CASE MANAGEMENT NOTE:    Admission Date:  6/18/2019 Diandra Sood is a 30 y.o.  female    Admitted for : Left breast abscess [N61.1]  Cellulitis of breast [N61.0]    Met with:  Patient    PCP:  Jaye Ramos NP                                Insurance:  [facility]      Current Residence/ Living Arrangements:  independently at home           Current Services PTA:  Yes - Linked with [employer] - Has appointment on 6/21 and patient states she will take care of re-scheduling appointment    Is patient agreeable to [facility]: Yes    Freedom of choice provided: Yes    List of [address] provided: Yes    [facility] chosen:  Yes - Agreeable to [facility] [place] if needs IV antibiotics/wound packing. DME:  None - Will need scripts for glucometer and supplies. Home Oxygen: No    Nebulizer: No    CPAP/BIPAP: No    Supplier: N/A    Potential Assistance Needed: Yes - Referral placed for [facility] Outpatient diabetic education    SNF needed: No    Pharmacy:  [facility]       Is the Patient an [place]gg[place] with Readmission Risk Score greater than 14%? No  If yes, pt needs a follow up appointment made within 7 days. Does Patient want to use MEDS to BEDS? No    Family Members/Caregivers that pt would like involved in their care:    Yes    If yes, list name here:  Boyfriend Vonnie Lassiter:  Boyfriend Jesus Manuel Ness, cab or bus             Is patient in Isolation/One on One/Altered Mental Status? No  If yes, skip next question. If no, would they like an I-Pad to  use? No  If yes, call [phone]. Discharge Plan:  6/19: [facility] - Patient is from home with boyfriend. Linked with [facility] and will take care of re-scheduling her appointment with them. Order placed for outpatient diabetic education at [facility]. Will need glucometer, supplies and all insulin.  To have I&D today - [facility] [place] following for dressing changes and [place] to run benefits for possible IV antibiotics - Currently on IV rocephin and IV vanco. MELISA NEEDS SIGNED/COMPLETED. Saba Carpenter                 Electronically signed by: Raheem Tolbert RN on 6/19/2019 at 10:35 AM

## 2019-06-20 LAB
ABSOLUTE EOS #: 0.3 K/UL (ref 0–0.4)
ABSOLUTE IMMATURE GRANULOCYTE: NORMAL K/UL (ref 0–0.3)
ABSOLUTE LYMPH #: 3 K/UL (ref 1–4.8)
ABSOLUTE MONO #: 0.6 K/UL (ref 0.1–1.3)
ALBUMIN SERPL-MCNC: 3.6 G/DL (ref 3.5–5.2)
ALBUMIN/GLOBULIN RATIO: ABNORMAL (ref 1–2.5)
ALP BLD-CCNC: 101 U/L (ref 35–104)
ALT SERPL-CCNC: 40 U/L (ref 5–33)
ANION GAP SERPL CALCULATED.3IONS-SCNC: 15 MMOL/L (ref 9–17)
AST SERPL-CCNC: 47 U/L
BASOPHILS # BLD: 1 % (ref 0–2)
BASOPHILS ABSOLUTE: 0.1 K/UL (ref 0–0.2)
BILIRUB SERPL-MCNC: 0.93 MG/DL (ref 0.3–1.2)
BUN BLDV-MCNC: 6 MG/DL (ref 6–20)
BUN/CREAT BLD: ABNORMAL (ref 9–20)
C. TRACHOMATIS DNA ,URINE: NEGATIVE
CALCIUM SERPL-MCNC: 8.8 MG/DL (ref 8.6–10.4)
CHLORIDE BLD-SCNC: 102 MMOL/L (ref 98–107)
CO2: 20 MMOL/L (ref 20–31)
CREAT SERPL-MCNC: 0.64 MG/DL (ref 0.5–0.9)
CREAT SERPL-MCNC: 0.64 MG/DL (ref 0.5–0.9)
CULTURE: ABNORMAL
CULTURE: ABNORMAL
DIFFERENTIAL TYPE: NORMAL
DIRECT EXAM: ABNORMAL
DIRECT EXAM: ABNORMAL
EOSINOPHILS RELATIVE PERCENT: 3 % (ref 0–4)
ESTIMATED AVERAGE GLUCOSE: 283 MG/DL
GFR AFRICAN AMERICAN: >60 ML/MIN
GFR AFRICAN AMERICAN: >60 ML/MIN
GFR NON-AFRICAN AMERICAN: >60 ML/MIN
GFR NON-AFRICAN AMERICAN: >60 ML/MIN
GFR SERPL CREATININE-BSD FRML MDRD: ABNORMAL ML/MIN/{1.73_M2}
GFR SERPL CREATININE-BSD FRML MDRD: ABNORMAL ML/MIN/{1.73_M2}
GFR SERPL CREATININE-BSD FRML MDRD: NORMAL ML/MIN/{1.73_M2}
GFR SERPL CREATININE-BSD FRML MDRD: NORMAL ML/MIN/{1.73_M2}
GLUCOSE BLD-MCNC: 217 MG/DL (ref 65–105)
GLUCOSE BLD-MCNC: 240 MG/DL (ref 65–105)
GLUCOSE BLD-MCNC: 282 MG/DL (ref 70–99)
GLUCOSE BLD-MCNC: 345 MG/DL (ref 65–105)
GLUCOSE BLD-MCNC: 482 MG/DL (ref 65–105)
HBA1C MFR BLD: 11.5 % (ref 4–6)
HCT VFR BLD CALC: 39.3 % (ref 36–46)
HEMOGLOBIN: 13.3 G/DL (ref 12–16)
IMMATURE GRANULOCYTES: NORMAL %
LYMPHOCYTES # BLD: 29 % (ref 24–44)
Lab: ABNORMAL
MCH RBC QN AUTO: 31 PG (ref 26–34)
MCHC RBC AUTO-ENTMCNC: 33.9 G/DL (ref 31–37)
MCV RBC AUTO: 91.4 FL (ref 80–100)
MONOCYTES # BLD: 6 % (ref 1–7)
N. GONORRHOEAE DNA, URINE: NEGATIVE
NRBC AUTOMATED: NORMAL PER 100 WBC
PDW BLD-RTO: 12.7 % (ref 11.5–14.9)
PLATELET # BLD: 234 K/UL (ref 150–450)
PLATELET ESTIMATE: NORMAL
PMV BLD AUTO: 8.9 FL (ref 6–12)
POTASSIUM SERPL-SCNC: 3.9 MMOL/L (ref 3.7–5.3)
RBC # BLD: 4.3 M/UL (ref 4–5.2)
RBC # BLD: NORMAL 10*6/UL
SEG NEUTROPHILS: 61 % (ref 36–66)
SEGMENTED NEUTROPHILS ABSOLUTE COUNT: 6.2 K/UL (ref 1.3–9.1)
SODIUM BLD-SCNC: 137 MMOL/L (ref 135–144)
SPECIMEN DESCRIPTION: ABNORMAL
SPECIMEN DESCRIPTION: NORMAL
TOTAL PROTEIN: 6.5 G/DL (ref 6.4–8.3)
WBC # BLD: 10.2 K/UL (ref 3.5–11)
WBC # BLD: NORMAL 10*3/UL

## 2019-06-20 PROCEDURE — 2580000003 HC RX 258: Performed by: SURGERY

## 2019-06-20 PROCEDURE — 36415 COLL VENOUS BLD VENIPUNCTURE: CPT

## 2019-06-20 PROCEDURE — 99232 SBSQ HOSP IP/OBS MODERATE 35: CPT | Performed by: INTERNAL MEDICINE

## 2019-06-20 PROCEDURE — 2500000003 HC RX 250 WO HCPCS: Performed by: SURGERY

## 2019-06-20 PROCEDURE — 6360000002 HC RX W HCPCS: Performed by: STUDENT IN AN ORGANIZED HEALTH CARE EDUCATION/TRAINING PROGRAM

## 2019-06-20 PROCEDURE — 85025 COMPLETE CBC W/AUTO DIFF WBC: CPT

## 2019-06-20 PROCEDURE — 80053 COMPREHEN METABOLIC PANEL: CPT

## 2019-06-20 PROCEDURE — 82565 ASSAY OF CREATININE: CPT

## 2019-06-20 PROCEDURE — 6360000002 HC RX W HCPCS: Performed by: SURGERY

## 2019-06-20 PROCEDURE — 6370000000 HC RX 637 (ALT 250 FOR IP): Performed by: SURGERY

## 2019-06-20 PROCEDURE — 1200000000 HC SEMI PRIVATE

## 2019-06-20 PROCEDURE — 6370000000 HC RX 637 (ALT 250 FOR IP): Performed by: STUDENT IN AN ORGANIZED HEALTH CARE EDUCATION/TRAINING PROGRAM

## 2019-06-20 PROCEDURE — 82947 ASSAY GLUCOSE BLOOD QUANT: CPT

## 2019-06-20 RX ORDER — DOCUSATE SODIUM 100 MG/1
100 CAPSULE, LIQUID FILLED ORAL DAILY
Status: DISCONTINUED | OUTPATIENT
Start: 2019-06-20 | End: 2019-06-22 | Stop reason: HOSPADM

## 2019-06-20 RX ADMIN — OXYCODONE HYDROCHLORIDE AND ACETAMINOPHEN 1 TABLET: 5; 325 TABLET ORAL at 00:15

## 2019-06-20 RX ADMIN — Medication 1750 MG: at 06:22

## 2019-06-20 RX ADMIN — SODIUM CHLORIDE 125 ML/HR: 9 INJECTION, SOLUTION INTRAVENOUS at 16:42

## 2019-06-20 RX ADMIN — INSULIN GLARGINE 20 UNITS: 100 INJECTION, SOLUTION SUBCUTANEOUS at 08:42

## 2019-06-20 RX ADMIN — RISPERIDONE 0.25 MG: 0.25 TABLET ORAL at 21:05

## 2019-06-20 RX ADMIN — OXYCODONE HYDROCHLORIDE AND ACETAMINOPHEN 1 TABLET: 5; 325 TABLET ORAL at 06:22

## 2019-06-20 RX ADMIN — INSULIN LISPRO 6 UNITS: 100 INJECTION, SOLUTION INTRAVENOUS; SUBCUTANEOUS at 08:41

## 2019-06-20 RX ADMIN — METFORMIN HYDROCHLORIDE 500 MG: 500 TABLET, FILM COATED ORAL at 18:40

## 2019-06-20 RX ADMIN — PIPERACILLIN SODIUM AND TAZOBACTAM SODIUM 3.38 G: 3; .375 INJECTION, POWDER, LYOPHILIZED, FOR SOLUTION INTRAVENOUS at 14:00

## 2019-06-20 RX ADMIN — INSULIN LISPRO 6 UNITS: 100 INJECTION, SOLUTION INTRAVENOUS; SUBCUTANEOUS at 18:40

## 2019-06-20 RX ADMIN — INSULIN LISPRO 6 UNITS: 100 INJECTION, SOLUTION INTRAVENOUS; SUBCUTANEOUS at 21:07

## 2019-06-20 RX ADMIN — LISINOPRIL 5 MG: 5 TABLET ORAL at 08:41

## 2019-06-20 RX ADMIN — PIPERACILLIN SODIUM AND TAZOBACTAM SODIUM 3.38 G: 3; .375 INJECTION, POWDER, LYOPHILIZED, FOR SOLUTION INTRAVENOUS at 21:05

## 2019-06-20 RX ADMIN — ENOXAPARIN SODIUM 30 MG: 30 INJECTION SUBCUTANEOUS at 21:06

## 2019-06-20 RX ADMIN — DOCUSATE SODIUM 100 MG: 100 CAPSULE, LIQUID FILLED ORAL at 08:41

## 2019-06-20 RX ADMIN — Medication 1750 MG: at 18:40

## 2019-06-20 RX ADMIN — ENOXAPARIN SODIUM 30 MG: 30 INJECTION SUBCUTANEOUS at 08:41

## 2019-06-20 RX ADMIN — METFORMIN HYDROCHLORIDE 500 MG: 500 TABLET, FILM COATED ORAL at 08:41

## 2019-06-20 RX ADMIN — FLUOXETINE 10 MG: 10 CAPSULE ORAL at 08:45

## 2019-06-20 RX ADMIN — INSULIN GLARGINE 20 UNITS: 100 INJECTION, SOLUTION SUBCUTANEOUS at 21:06

## 2019-06-20 RX ADMIN — PIPERACILLIN SODIUM AND TAZOBACTAM SODIUM 3.38 G: 3; .375 INJECTION, POWDER, LYOPHILIZED, FOR SOLUTION INTRAVENOUS at 00:15

## 2019-06-20 RX ADMIN — PIPERACILLIN SODIUM AND TAZOBACTAM SODIUM 3.38 G: 3; .375 INJECTION, POWDER, LYOPHILIZED, FOR SOLUTION INTRAVENOUS at 08:40

## 2019-06-20 RX ADMIN — INSULIN LISPRO 18 UNITS: 100 INJECTION, SOLUTION INTRAVENOUS; SUBCUTANEOUS at 12:24

## 2019-06-20 RX ADMIN — IBUPROFEN 800 MG: 800 TABLET ORAL at 06:22

## 2019-06-20 ASSESSMENT — PAIN DESCRIPTION - LOCATION
LOCATION: BREAST

## 2019-06-20 ASSESSMENT — PAIN DESCRIPTION - FREQUENCY
FREQUENCY: CONTINUOUS
FREQUENCY: CONTINUOUS

## 2019-06-20 ASSESSMENT — PAIN SCALES - GENERAL
PAINLEVEL_OUTOF10: 7
PAINLEVEL_OUTOF10: 3
PAINLEVEL_OUTOF10: 9
PAINLEVEL_OUTOF10: 4
PAINLEVEL_OUTOF10: 10
PAINLEVEL_OUTOF10: 3

## 2019-06-20 ASSESSMENT — PAIN DESCRIPTION - PAIN TYPE
TYPE: SURGICAL PAIN

## 2019-06-20 ASSESSMENT — PAIN DESCRIPTION - ONSET
ONSET: ON-GOING
ONSET: ON-GOING

## 2019-06-20 ASSESSMENT — PAIN DESCRIPTION - DESCRIPTORS
DESCRIPTORS: ACHING
DESCRIPTORS: SORE

## 2019-06-20 ASSESSMENT — PAIN DESCRIPTION - PROGRESSION: CLINICAL_PROGRESSION: NOT CHANGED

## 2019-06-20 ASSESSMENT — PAIN DESCRIPTION - ORIENTATION
ORIENTATION: LEFT

## 2019-06-20 NOTE — PROGRESS NOTES
[facility]  Progress Note    Paola Tran  MRN: [id number]  Date: 6/20/2019    Reason for F/U :   Left breast abscess/cellulitis     Impression :   Current:  Left breast abscess/cellulitis status post incision and drainage left breast abscess 6/19/2019      Hidradenitis suppurativa  Other:  · Bipolar disorder  Discussion / summary of stay / plan of care   She presented with left breast increased swelling and redness, incomplete I&D was done at [facility] on 6/17. Status post surgical incision and drainage left breast abscess 6/19/2019  Recommendations     Continue IV Vancomycin and  Zosyn for now . Follow intraoperative cultures and adjust antibiotics as needed. Follow  CBC renal function closely  Infection Control Recommendations   · [place] Precautions        Antimicrobial Stewardship Recommendations   · Simplification of therapy  · Targeted therapy  · IV to oral conversion  · Per Kg dosing  · Restricted antimicrobial use  · Discontinuation of therapy     Coordination ofOutpatient Care:   · Estimated Length of IV antimicrobials: Indeterminate  · Patient will need Midline / picc  catheter Insertion: Unlikely  · Patient will need SNF: Unlikely  · Patient will need outpatient wound care: Indeterminate     History of Present Illness:   Initial history:  Paola Tran is a 30y.o.-year-old female was a direct admit from OB/GYN office for increased swelling, pain or redness to the left breast for several days associated with nausea and vomiting, decreased appetite and lower back pain. She denied any fever or chills  The patient is a poor historian, noncompliant with mental health disease. Reportedly she [place] [facility] [place]   History of hidradenitis suppurativa, diabetes, hypertension , asthma, STDs history of gonorrhea and chlamydia  She is sexually active with one partner and does not use condoms for STD prevention.   She had vaginal bleeding for 2 weeks has was evaluated by OB/GYN        Current evaluation:2019  Status post surgical debridement of the left breast yesterday. She is feeling better, denied nausea or vomiting, no diarrhea, no new complaints. BP (!) 140/70   Pulse 93   Temp 98.7 °F (37.1 °C) (Oral)   Resp 16   Ht 5' 7\" (1.702 m)   Wt 275 lb 12.7 oz (125.1 kg)   SpO2 99%   BMI 43.20 kg/m²     Temperature Range: Temp: 98.7 °F (37.1 °C) Temp  Av.5 °F (36.4 °C)  Min: 95.5 °F (35.3 °C)  Max: 98.8 °F (37.1 °C)  Summary of relevant labs:  Labs: Pregnancy test negative. Drug screen negative  WBC normal  Glucose was 704 on  , renal function normal  Micro:  Left breast culture pending  Imaging:  No new imaging        I have personally reviewed the past medical history, past surgical history, medications, social history, and family history, and I haveupdated the database accordingly.   Past Medical History:      Past Medical History   Past Medical History:   Diagnosis Date    Asthma       uses inhaler    Diabetes mellitus type 2 in obese ([facility] [address]      Hepatic steatosis      Hypertension       started when approx 6 mos pregnant with first pregnancy    Morbid obesity with body mass index (BMI) of 40.0 to 49.9 (HCC)      Neuropathy      Postpartum depression              Past Surgical  History:      Past Surgical History         Past Surgical History:   Procedure Laterality Date     SECTION         SECTION N/A 3/23/2017      SECTION REPEAT  performed by Renee Murphy MD at [facility] L[facility]D OR    EYE SURGERY        TONSILLECTOMY Bilateral      UPPER GASTROINTESTINAL ENDOSCOPY N/A 2018     EGD BIOPSY performed by Laura Dennis MD at [place] [address]     Medications:      Scheduled Medications    FLUoxetine  10 mg Oral Daily    risperiDONE  0.25 mg Oral Nightly    cefTRIAXone (ROCEPHIN) IV  2 g Intravenous Q24H    insulin lispro  0-18 Units Subcutaneous TID WC    insulin lispro  0-9 Units Subcutaneous Nightly    vancomycin (VANCOCIN) intermittent dosing (placeholder)   Other RX Placeholder    vancomycin  1,750 mg Intravenous Q12H            Social History:      Social History               Socioeconomic History    Marital status: Single       Spouse name: Not on file    Number of children: 2    Years of education: graduated high school, some college    Highest education level: Not on file   Occupational History    Occupation: housewife    Occupation:        Comment: last worked    [address] resource strain: Not on file    Food insecurity:       Worry: Not on file       Inability: Not on file   [employer] needs:       Medical: Not on file       Non-medical: Not on file   Tobacco Use    Smoking status: Former Smoker       Packs/day: 2.00       Years: 10.00       Pack years: 20.00       Types: Cigarettes       Start date: 2007       Last attempt to quit: 2016       Years since quittin.9    Smokeless tobacco: Never Used   Substance and Sexual Activity    Alcohol use:  No       Alcohol/week: 0.0 oz    Drug use: No    Sexual activity: Yes       Partners: Male       Comment: chlamydia in 2016,   Lifestyle    Physical activity:       Days per week: Not on file       Minutes per session: Not on file    Stress: Not on file   Relationships    Social connections:       Talks on phone: Not on file       Gets together: Not on file       Attends Rastafarian service: Not on file       Active member of club or organization: Not on file       Attends meetings of clubs or organizations: Not on file       Relationship status: Not on file    Intimate partner violence:       Fear of current or ex partner: Not on file       Emotionally abused: Not on file       Physically abused: Not on file       Forced sexual activity: Not on file   Other Topics Concern    Not on file   Social History Narrative     Lives with fiance and 2 daughters            Family History:      Family History         Family History   Problem Relation Age of Onset    Breast Cancer Mother 28    Diabetes Father      Cancer Maternal Uncle 61         acute leukemia    Heart Disease Maternal Uncle      Diabetes Maternal Uncle      Colon Cancer Maternal Uncle      Diabetes Maternal Uncle      Heart Attack Maternal Uncle      Uterine Cancer Neg Hx      Ovarian Cancer Neg Hx              Allergies:   Benadryl [diphenhydramine]                Review of Systems:      Review of Systems   Constitutional: Negative. HENT: Negative. Respiratory: Negative. Cardiovascular: Negative. Gastrointestinal: Positive for nausea. Negative for vomiting. Endocrine: Negative. Genitourinary: Negative. Musculoskeletal: Negative. Skin: Positive for wound. Allergic/Immunologic: Negative. Neurological: Negative. Hematological: Negative.       Other than above 12 system review negative    Physical Examination :     Physical Exam   Constitutional: She is oriented to person, place, and time. She appears well-developed and well-nourished. HENT:   Head: Normocephalic and atraumatic. Eyes: Pupils are equal, round, and reactive to light. Conjunctivae and EOM are normal.   Neck: Normal range of motion. Neck supple. No thyromegaly present. Cardiovascular: Normal rate, regular rhythm and normal heart sounds. Pulmonary/Chest: Effort normal and breath sounds normal.   Abdominal: Soft. Bowel sounds are normal.   Musculoskeletal: Normal range of motion. Neurological: She is alert and oriented to person, place, and time. Skin: Multiple old scars in the groin and axillary area. Left breast and surgical dressing in place.         Laboratory data:   I have independently reviewed the followinglabs:  CBC with Differential:   Recent Labs     06/19/19  0744 06/20/19  0654   WBC 9.0 10.2   HGB 13.5 13.3   HCT 39.5 39.3    234   LYMPHOPCT 36 29   MONOPCT 8* 6

## 2019-06-20 NOTE — PROGRESS NOTES
[address]    Progress Note            Date:   6/20/2019  Patient name:  Niranjan Herring  Date of admission:  6/18/2019  3:49 PM  MRN:   [id number]  YOB: 1995    Pt seen at the request of Girish Alicia MD    CHIEF COMPLAINT:     History Obtained From:  Patient and chart review. HISTORY OF PRESENT ILLNESS:      The patient is a 30 y.o.  female who is admitted to the hospital for  HPI: Niranjan Herring is a 30 y.o. female [id number] presents as a direct admit from Dr. Bekah Jordan office for treatment of L breast cellulitis/abscess. She reports she was evaluated at [facility] yesterday and she is s/p I&D of her L breast. She reports her symptoms started a few days ago. It started out as a very small area of redness and has gotten significantly worse since. She complains of intense L breast pain. She reports the redness has spread. She has a history of hydranitis suppurativa but denies ever having anything like this before. She was given keflex and bactrim in the ED yesterday but told the RN that she has not taken it yet. She denies any fevers or chills. She reports nausea and one episode of emesis earlier today. She denies any diarrhea and reports her last BM was two days ago. She has a decrease in her appetite today. She reports dysuria. She also complains of left lower back pain. She reports a history of low back pain. She has been doing a lot of house work lately and says it might be from that.  She is requesting something for her pain.      hiradenitis   dm2     6/20  Doing well  S/p I&D L breast abscess 6/19/19  Continues to have pain over L breast, improving  No chest pain or SOB      Past Medical History:   has a past medical history of Asthma, Diabetes mellitus type 2 in obese ([address] Hepatic steatosis, Hypertension, Morbid obesity with body mass index (BMI) of 40.0 to 49.9 ([address] Neuropathy, and Postpartum maternal uncle; Diabetes in her father, maternal uncle, and maternal uncle; Heart Attack in her maternal uncle; Heart Disease in her maternal uncle. REVIEW OF SYSTEMS:    · Constitutional: Negative for weight loss  · Eyes: Negative for visual changes, scleral icterus. · ENT: Negative for Headaches, hearing loss, vertigo, mouth sores, sore throat. · Cardiovascular: Negative for lightheadedness/orthostatic symptoms at this time ,chest pain, dyspnea on exertion, or loss of consciousness. · Respiratory: Negative for cough or wheezing, shortness of breath  · Gastrointestinal: Negative for nausea/vomiting, change in bowel habits, abdominal pain   · Genitourinary:Negative for change in bladder habits, dysuria, trouble voiding  · Musculoskeletal: Negative for weakness, joint complaints. + myalgias post op  · Integumentary:  lt breast  Wound, pain  ·  Neurological: Negative for headache, dizziness, reported change in muscle strength, numbness/tingling, change in gait, balance, coordination,   · Endocrine: negative for reported temperature intolerance, excessive thirst, fluid intake, or urination, tremor. · Hematologic/Lymphatic: negative for abnormal bruising or bleeding, blood clots, swollen lymph nodes. .      PHYSICAL EXAM:    /82   Pulse 72   Temp 98.8 °F (37.1 °C) (Oral)   Resp 16   Ht 5' 7\" (1.702 m)   Wt 275 lb 12.7 oz (125.1 kg)   SpO2 99%   BMI 43.20 kg/m²      · General appearance: well nourished  · HEENT: Head: Normocephalic, no lesions, without obvious abnormality. · Lungs: clear to auscultation bilaterally  · Heart: regular rate and rhythm, S1, S2 normal, no murmur  · Abdomen: soft, non-tender; bowel sounds normal; no masses,  no organomegaly  · Extremities: extremities normal, atraumatic, no cyanosis or edema  · Neurological: Gait normal. Reflexes normal and symmetric.  Sensation grossly normal  · Skin -  Lt breast  Bandage in place, appears clean and dry  ·  Eye no icterus no redness noted       DIAGNOSTICS:    Laboratory Testing:  CBC:   Recent Labs     06/19/19  0744   WBC 9.0   HGB 13.5        BMP:    Recent Labs     06/19/19  0658 06/19/19  0744 06/20/19  0654   NA  --  138  --    K  --  3.6*  --    CL  --  102  --    CO2  --  23  --    BUN  --  8  --    CREATININE 0.63 0.64 0.64   GLUCOSE  --  370*  --      S. Calcium:  Recent Labs     06/19/19  0744   CALCIUM 8.6     S. Ionized Calcium:No results for input(s): IONCA in the last 72 hours. S. Magnesium:No results for input(s): MG in the last 72 hours. S. Phosphorus:No results for input(s): PHOS in the last 72 hours. S. Glucose:  Recent Labs     06/19/19  1810 06/19/19  1943 06/20/19  0630   POCGLU 291* 275* 240*     Glycosylated hemoglobin A1C: No results for input(s): LABA1C in the last 72 hours. INR: No results for input(s): INR in the last 72 hours. Hepatic functions:   Recent Labs     06/19/19  0744   ALKPHOS 109*   ALT 29   AST 23   PROT 6.7   BILITOT 0.86   LABALBU 3.9     Pancreatic functions:  Recent Labs     06/18/19  1809   LACTA 1.4     S. Lactic Acid:   Recent Labs     06/18/19  1809   LACTA 1.4     Cardiac enzymes:No results for input(s): CKTOTAL, CKMB, CKMBINDEX, TROPONINI in the last 72 hours. BNP:No results for input(s): BNP in the last 72 hours. Lipid profile:   Recent Labs     06/19/19  1356   CHOL 115   TRIG 247*   HDL 23*     Blood Gases: No results found for: PH, PCO2, PO2, HCO3, O2SAT  Thyroid functions:   Lab Results   Component Value Date    TSH 2.96 04/12/2019        Imaging/Diagonstics:    No results found.       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    BMI 40.0-44.9    Generalized abdominal pain    Hepatic steatosis    History of gonorrhea    History of chlamydia    Nausea & vomiting    Diarrhea    Bipolar affective disorder, currently depressed, mild ([address]    Cellulitis of breast    Back pain    Noncompliance    Diabetes ([facility]    History of  section x2    History of postpartum depression    Lost custody of children    Poor historian    Hyponatremia    Uncontrolled diabetes mellitus ([facility]    Mood disorder ([facility]     Lt breast cellulitis with abscess   with underlying hiradenitis    On zosyn/vanco   cx pend     dm2   poor control     start    lantus   20 u     cr nl     Metformin 500 mg bid    htn    Mod control  PLAN:    hba1c   add lantus   add metformin      add lisinopril    lipids   needs ststin    tsh    cont iv atb      isulin  Ss       S/p L breast I&D  BP controlled  Cr normal, daily BMP  A1C pending  BG improved but remains elevated ~ 250   On metformin, held yesterday when NPO  Patient only received once daily dosing of lantus, continue bid as ordered with high dose iss and follow repsonse  On vanco and zosyn, follow final cx and sensitivities  Pain control per primary    MD MARCK Suárez[address], [address] [place] [address]. Phone [phone]   Fax: [phone]  Answering Service: [phone]  Attending Physician Statement  I Independently seen and examined the patient. I have discussed the care of the patient, including pertinent history and exam findings,  with the resident. I have reviewed the key elements of all parts of the encounter with the resident. I agree with the assessment, plan and orders as documented by the resident.     Nieves Bridges

## 2019-06-20 NOTE — PLAN OF CARE
Nutrition Problem: Increased nutrient needs  Intervention: Food and/or Nutrient Delivery: Continue NPO  Nutritional Goals: Nutritional intake is greater than 75% of estimated needs

## 2019-06-20 NOTE — FLOWSHEET NOTE
Writer spoke with the pharmacist Janeth Coles) regarding education for patients medications. Writer also spoke with the dietitian regarding the patients need for diabetic education related to nutrition.

## 2019-06-20 NOTE — PROGRESS NOTES
Patient is in the bathroom. Discussed with nurse taking care of the patient. Continue wet-to-dry dressing changes twice daily. IV antibiotics per infectious disease recommendations. I will evaluate the patient tomorrow morning.

## 2019-06-20 NOTE — PROGRESS NOTES
OB/GYN Resident Progress Note    Date: 6/20/2019  Time: 5:15 AM    Anna Steel is a 30 y.o. female [id number] HD# 3    Patient was seen and examined. Patient sleeping comfortably in bed. She complained of left breast pain this morning. Patient is  tolerating oral intake. She is urinating well. She is  ambulating without difficulty. She is  passing flatus. She denies Fever/Chills, Chest Pain, SOB, N/V, Calf Pain    Vitals:  Vitals:    06/19/19 1900 06/19/19 1910 06/19/19 1924 06/19/19 2341   BP: (!) 115/59  120/75 126/77   Pulse: 72 69 73 82   Resp: 19 18 16 16   Temp:  98.4 °F (36.9 °C) 98.3 °F (36.8 °C) 98.7 °F (37.1 °C)   TempSrc:   Oral Oral   SpO2: 93% 92% 96% 92%   Weight:       Height:           Intake/Output:   Last Shift: I/O last 3 completed shifts: In: 950 [I.V.:950]  Out: 902 [Urine:900; Blood:2]  Current Shift: No intake/output data recorded. Physical Exam:  General:  no apparent distress, alert and cooperative  Neurologic:  alert, oriented, normal speech, no focal findings or movement disorder noted  Lungs:  no increased work of breathing, good air exchange, clear to auscultation bilaterally, no crackles or wheezing  Heart:  regular rate and rhythm and no murmur  Breast: cellulitis on left lower breast with dressing in place   Abdomen: Abdomen soft, non-tender.  BS normal. No masses,  No organomegaly  Extremities:  no calf tenderness, non edematous    Lab:  Complete Blood Count:   Recent Labs     06/18/19  1703 06/19/19  0744   WBC 9.8 9.0   HGB 13.1 13.5   HCT 40.1 39.5    210        PT/INR:    No results found for: PROTIME, INR  PTT:    No results found for: APTT, PTT    Comprehensive Metabolic Profile:   Recent Labs     06/18/19  1703 06/19/19  0658 06/19/19  0744   *  --  138   K 4.4  --  3.6*   CL 93*  --  102   CO2 18*  --  23   BUN 8  --  8   CREATININE 0.75 0.63 0.64   GLUCOSE 704*  --  370*   CALCIUM 8.6  --  8.6   PROT 6.7  --  6.7   LABALBU 3.8  --  3.9   BILITOT 1.41*  -- 0. 86   ALKPHOS 109*  --  109*   AST 28  --  23   ALT 31  --  29        Assessment/Plan:  Iban Donaldson 30 y.o. female [id number] HD# 3 w/ Left Breast Abscess/Cellulitis POD #1 s/p Incision and Drainage on 19   - General Surgery following   - Doing well, vitals stable   - Continue IVF (NS @ 125 cc/hr)   - CBC showed WBC 9.8> 9.0   - Blood cultures x 2 NGTD   - Aerobic/anerobic cultures + S. aureus     - Continue IV antibiotics (Zosyn and Vancomycin)   - Norco/Motrin for pain control    - Zofran/Phenergan for nausea    - Carb control diet   - DVT prophylaxis: Lovenox   - Encourage ambulation and use of incentive spirometer    - Continue care, please page with any questions    Uncontrolled Type II DM   - Internal Medicine consulted for management of DM, appreciate recommendations -    - Patient reports she has been noncompliant with diet and diabetic management   - Lantus 20U BID and metformin 500 mg BID (started )   - VBG 19: pH 7.432, PCO2 32.1, HCO3 21.4   - Beta hydroxybutyrate 0.43   - Lactic acid 1.4   - Carb control diet   - Continue to monitor blood sugars AC/HS   - High dose ISS    Hyponatremia (resolved)   - Na 127>138     Hypokalemia   - 4.4> 3.6   - Potassium replacement protocol ordered     Chronic HTN   - Blood pressures normotensive overnight   - Continue Lisinopril 5 mg qd    - Recommend close follow up outpatient with PCP    Bipolar Disorder   - Psych consulted, appreciate recommendations   - Continue Risperdal and Prozac    - Denies SI/HI today    Remote Hx Gonorrhea/Chlamydia (TOR negative)   - Chlamyia + 10/28/16, TOR negative 18   - Gonorrhea + 3/31/15, TOR negative 18   - GC/C urine pending this admission     Asthma   - Controlled with Albuterol inhaler PRN     Obesity (BMI 43.3)      Patient Active Problem List    Diagnosis Date Noted    [facility] disorder [facility]     Back pain 2019    Noncompliance 2019    Diabetes ([facility] 2019    History of  section x2 06/18/2019    History of postpartum depression 06/18/2019    Lost custody of children 06/18/2019    Poor historian 06/18/2019    Hyponatremia 06/18/2019    Uncontrolled diabetes mellitus ([facility] 06/18/2019    Bipolar affective disorder, currently depressed, mild ([facility] 05/24/2019    Cellulitis of breast 05/24/2019    Nausea & vomiting 11/20/2018    Diarrhea 11/20/2018    Hepatic steatosis 11/07/2018    History of gonorrhea 11/07/2018    History of chlamydia 11/07/2018    Generalized abdominal pain 11/06/2018    BMI 40.0-44.9 02/08/2017    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 I have seen and examined the patient and the key elements of all parts of the encounter have been performed by me. I agree with the assessment, plan and orders as documented by the resident.     Mahesh Alegria MD  Attending Physician

## 2019-06-20 NOTE — PROGRESS NOTES
Nutrition Education    Type and Reason for Visit: Patient Education    Nutrition Assessment:  Patient was not following a carbohydrate restricted diet. Used [employer] eating for people with diabetes booklet, 14 day Sample menu for 1800 kcal diet and Ready, Set, Start Counting (carbohydrate counting) handout. Focused on eating 3 meals per day and portion sizes. Encouraged patient to attend diabetes education classes. · Verbally reviewed following information with Patient and mother-in- law to- be   · Written educational materials provided. · Refer to Patient Education activity for more details.     Colette GIBSON, R.D, L.D,  Clinical Dietitian  Cell # [phone]  Office # [phone]

## 2019-06-21 ENCOUNTER — APPOINTMENT (OUTPATIENT)
Dept: WOMENS IMAGING | Age: 24
DRG: 385 | End: 2019-06-21
Attending: SPECIALIST
Payer: COMMERCIAL

## 2019-06-21 LAB
-: NORMAL
ABSOLUTE EOS #: 0.3 K/UL (ref 0–0.4)
ABSOLUTE EOS #: NORMAL K/UL (ref 0–0.4)
ABSOLUTE IMMATURE GRANULOCYTE: NORMAL K/UL (ref 0–0.3)
ABSOLUTE IMMATURE GRANULOCYTE: NORMAL K/UL (ref 0–0.3)
ABSOLUTE LYMPH #: 2.6 K/UL (ref 1–4.8)
ABSOLUTE LYMPH #: NORMAL K/UL (ref 1–4.8)
ABSOLUTE MONO #: 0.6 K/UL (ref 0.1–1.3)
ABSOLUTE MONO #: NORMAL K/UL (ref 0.1–1.3)
ALBUMIN SERPL-MCNC: 3.7 G/DL (ref 3.5–5.2)
ALBUMIN/GLOBULIN RATIO: ABNORMAL (ref 1–2.5)
ALP BLD-CCNC: 93 U/L (ref 35–104)
ALT SERPL-CCNC: 32 U/L (ref 5–33)
ANION GAP SERPL CALCULATED.3IONS-SCNC: 13 MMOL/L (ref 9–17)
AST SERPL-CCNC: 29 U/L
BASOPHILS # BLD: 1 % (ref 0–2)
BASOPHILS # BLD: NORMAL % (ref 0–2)
BASOPHILS ABSOLUTE: 0.1 K/UL (ref 0–0.2)
BASOPHILS ABSOLUTE: NORMAL K/UL (ref 0–0.2)
BILIRUB SERPL-MCNC: 0.64 MG/DL (ref 0.3–1.2)
BUN BLDV-MCNC: 6 MG/DL (ref 6–20)
BUN/CREAT BLD: ABNORMAL (ref 9–20)
CALCIUM SERPL-MCNC: 8.8 MG/DL (ref 8.6–10.4)
CHLORIDE BLD-SCNC: 98 MMOL/L (ref 98–107)
CO2: 22 MMOL/L (ref 20–31)
CREAT SERPL-MCNC: 0.65 MG/DL (ref 0.5–0.9)
DIFFERENTIAL TYPE: NORMAL
DIFFERENTIAL TYPE: NORMAL
EOSINOPHILS RELATIVE PERCENT: 4 % (ref 0–4)
EOSINOPHILS RELATIVE PERCENT: NORMAL %
GFR AFRICAN AMERICAN: >60 ML/MIN
GFR NON-AFRICAN AMERICAN: >60 ML/MIN
GFR SERPL CREATININE-BSD FRML MDRD: ABNORMAL ML/MIN/{1.73_M2}
GFR SERPL CREATININE-BSD FRML MDRD: ABNORMAL ML/MIN/{1.73_M2}
GLUCOSE BLD-MCNC: 237 MG/DL (ref 65–105)
GLUCOSE BLD-MCNC: 278 MG/DL (ref 65–105)
GLUCOSE BLD-MCNC: 314 MG/DL (ref 65–105)
GLUCOSE BLD-MCNC: 352 MG/DL (ref 65–105)
GLUCOSE BLD-MCNC: 465 MG/DL (ref 70–99)
HAV IGM SER IA-ACNC: ABNORMAL
HCT VFR BLD CALC: 38.2 % (ref 36–46)
HCT VFR BLD CALC: NORMAL % (ref 36–46)
HEMOGLOBIN: 12.9 G/DL (ref 12–16)
HEMOGLOBIN: NORMAL G/DL (ref 12–16)
HEPATITIS B CORE IGM ANTIBODY: NONREACTIVE
HEPATITIS B SURFACE ANTIGEN: NONREACTIVE
HEPATITIS C ANTIBODY: NONREACTIVE
IMMATURE GRANULOCYTES: NORMAL %
IMMATURE GRANULOCYTES: NORMAL %
LYMPHOCYTES # BLD: 32 % (ref 24–44)
LYMPHOCYTES # BLD: NORMAL % (ref 24–44)
MCH RBC QN AUTO: 31.1 PG (ref 26–34)
MCH RBC QN AUTO: NORMAL PG (ref 26–34)
MCHC RBC AUTO-ENTMCNC: 33.7 G/DL (ref 31–37)
MCHC RBC AUTO-ENTMCNC: NORMAL G/DL (ref 31–37)
MCV RBC AUTO: 92.1 FL (ref 80–100)
MCV RBC AUTO: NORMAL FL (ref 80–100)
MONOCYTES # BLD: 7 % (ref 1–7)
MONOCYTES # BLD: NORMAL % (ref 1–7)
NRBC AUTOMATED: NORMAL PER 100 WBC
NRBC AUTOMATED: NORMAL PER 100 WBC
PATHOLOGIST REVIEW: NORMAL
PDW BLD-RTO: 12.9 % (ref 11.5–14.9)
PDW BLD-RTO: NORMAL % (ref 11.5–14.9)
PLATELET # BLD: 225 K/UL (ref 150–450)
PLATELET # BLD: NORMAL K/UL (ref 150–450)
PLATELET ESTIMATE: NORMAL
PLATELET ESTIMATE: NORMAL
PMV BLD AUTO: 8.9 FL (ref 6–12)
PMV BLD AUTO: NORMAL FL (ref 6–12)
POTASSIUM SERPL-SCNC: 4 MMOL/L (ref 3.7–5.3)
RBC # BLD: 4.14 M/UL (ref 4–5.2)
RBC # BLD: NORMAL 10*6/UL
RBC # BLD: NORMAL 10*6/UL
RBC # BLD: NORMAL M/UL (ref 4–5.2)
REASON FOR REJECTION: NORMAL
SEG NEUTROPHILS: 56 % (ref 36–66)
SEG NEUTROPHILS: NORMAL % (ref 36–66)
SEGMENTED NEUTROPHILS ABSOLUTE COUNT: 4.6 K/UL (ref 1.3–9.1)
SEGMENTED NEUTROPHILS ABSOLUTE COUNT: NORMAL K/UL (ref 1.3–9.1)
SODIUM BLD-SCNC: 133 MMOL/L (ref 135–144)
TOTAL PROTEIN: 6.2 G/DL (ref 6.4–8.3)
WBC # BLD: 8.1 K/UL (ref 3.5–11)
WBC # BLD: NORMAL 10*3/UL
WBC # BLD: NORMAL 10*3/UL
WBC # BLD: NORMAL K/UL (ref 3.5–11)
ZZ NTE CLEAN UP: ORDERED TEST: NORMAL
ZZ NTE WITH NAME CLEAN UP: SPECIMEN SOURCE: NORMAL

## 2019-06-21 PROCEDURE — 2580000003 HC RX 258: Performed by: INTERNAL MEDICINE

## 2019-06-21 PROCEDURE — 6360000002 HC RX W HCPCS: Performed by: STUDENT IN AN ORGANIZED HEALTH CARE EDUCATION/TRAINING PROGRAM

## 2019-06-21 PROCEDURE — 6370000000 HC RX 637 (ALT 250 FOR IP): Performed by: STUDENT IN AN ORGANIZED HEALTH CARE EDUCATION/TRAINING PROGRAM

## 2019-06-21 PROCEDURE — 2580000003 HC RX 258: Performed by: SURGERY

## 2019-06-21 PROCEDURE — 76642 ULTRASOUND BREAST LIMITED: CPT

## 2019-06-21 PROCEDURE — 80074 ACUTE HEPATITIS PANEL: CPT

## 2019-06-21 PROCEDURE — 6370000000 HC RX 637 (ALT 250 FOR IP): Performed by: INTERNAL MEDICINE

## 2019-06-21 PROCEDURE — 36415 COLL VENOUS BLD VENIPUNCTURE: CPT

## 2019-06-21 PROCEDURE — 2500000003 HC RX 250 WO HCPCS: Performed by: SURGERY

## 2019-06-21 PROCEDURE — 6360000002 HC RX W HCPCS: Performed by: SURGERY

## 2019-06-21 PROCEDURE — 99232 SBSQ HOSP IP/OBS MODERATE 35: CPT | Performed by: INTERNAL MEDICINE

## 2019-06-21 PROCEDURE — 82947 ASSAY GLUCOSE BLOOD QUANT: CPT

## 2019-06-21 PROCEDURE — 80053 COMPREHEN METABOLIC PANEL: CPT

## 2019-06-21 PROCEDURE — 6360000002 HC RX W HCPCS: Performed by: INTERNAL MEDICINE

## 2019-06-21 PROCEDURE — 6370000000 HC RX 637 (ALT 250 FOR IP): Performed by: SURGERY

## 2019-06-21 PROCEDURE — 1200000000 HC SEMI PRIVATE

## 2019-06-21 PROCEDURE — 85025 COMPLETE CBC W/AUTO DIFF WBC: CPT

## 2019-06-21 RX ORDER — HYDROCODONE BITARTRATE AND ACETAMINOPHEN 5; 325 MG/1; MG/1
1 TABLET ORAL EVERY 6 HOURS PRN
Qty: 12 TABLET | Refills: 0 | Status: SHIPPED | OUTPATIENT
Start: 2019-06-21 | End: 2019-06-26

## 2019-06-21 RX ORDER — INSULIN GLARGINE 100 [IU]/ML
30 INJECTION, SOLUTION SUBCUTANEOUS 2 TIMES DAILY
Status: DISCONTINUED | OUTPATIENT
Start: 2019-06-21 | End: 2019-06-22 | Stop reason: HOSPADM

## 2019-06-21 RX ORDER — IBUPROFEN 800 MG/1
800 TABLET ORAL EVERY 8 HOURS PRN
Qty: 30 TABLET | Refills: 0 | Status: SHIPPED | OUTPATIENT
Start: 2019-06-21 | End: 2019-07-30 | Stop reason: SDUPTHER

## 2019-06-21 RX ORDER — INSULIN GLARGINE 100 [IU]/ML
10 INJECTION, SOLUTION SUBCUTANEOUS ONCE
Status: COMPLETED | OUTPATIENT
Start: 2019-06-21 | End: 2019-06-21

## 2019-06-21 RX ADMIN — INSULIN GLARGINE 20 UNITS: 100 INJECTION, SOLUTION SUBCUTANEOUS at 08:55

## 2019-06-21 RX ADMIN — FLUOXETINE 10 MG: 10 CAPSULE ORAL at 08:55

## 2019-06-21 RX ADMIN — CEFAZOLIN 1 G: 1 INJECTION, POWDER, FOR SOLUTION INTRAMUSCULAR; INTRAVENOUS at 15:50

## 2019-06-21 RX ADMIN — METFORMIN HYDROCHLORIDE 500 MG: 500 TABLET, FILM COATED ORAL at 17:00

## 2019-06-21 RX ADMIN — INSULIN GLARGINE 30 UNITS: 100 INJECTION, SOLUTION SUBCUTANEOUS at 20:37

## 2019-06-21 RX ADMIN — CEFAZOLIN 1 G: 1 INJECTION, POWDER, FOR SOLUTION INTRAMUSCULAR; INTRAVENOUS at 22:53

## 2019-06-21 RX ADMIN — LISINOPRIL 5 MG: 5 TABLET ORAL at 08:54

## 2019-06-21 RX ADMIN — ENOXAPARIN SODIUM 30 MG: 30 INJECTION SUBCUTANEOUS at 20:37

## 2019-06-21 RX ADMIN — INSULIN LISPRO 9 UNITS: 100 INJECTION, SOLUTION INTRAVENOUS; SUBCUTANEOUS at 17:01

## 2019-06-21 RX ADMIN — RISPERIDONE 0.25 MG: 0.25 TABLET ORAL at 23:13

## 2019-06-21 RX ADMIN — PIPERACILLIN SODIUM AND TAZOBACTAM SODIUM 3.38 G: 3; .375 INJECTION, POWDER, LYOPHILIZED, FOR SOLUTION INTRAVENOUS at 02:30

## 2019-06-21 RX ADMIN — INSULIN LISPRO 6 UNITS: 100 INJECTION, SOLUTION INTRAVENOUS; SUBCUTANEOUS at 20:37

## 2019-06-21 RX ADMIN — METFORMIN HYDROCHLORIDE 500 MG: 500 TABLET, FILM COATED ORAL at 08:54

## 2019-06-21 RX ADMIN — ENOXAPARIN SODIUM 30 MG: 30 INJECTION SUBCUTANEOUS at 08:54

## 2019-06-21 RX ADMIN — Medication 1750 MG: at 06:09

## 2019-06-21 RX ADMIN — INSULIN GLARGINE 10 UNITS: 100 INJECTION, SOLUTION SUBCUTANEOUS at 12:15

## 2019-06-21 RX ADMIN — DOCUSATE SODIUM 100 MG: 100 CAPSULE, LIQUID FILLED ORAL at 08:54

## 2019-06-21 RX ADMIN — OXYCODONE HYDROCHLORIDE AND ACETAMINOPHEN 1 TABLET: 5; 325 TABLET ORAL at 01:15

## 2019-06-21 RX ADMIN — SODIUM CHLORIDE 125 ML/HR: 9 INJECTION, SOLUTION INTRAVENOUS at 02:30

## 2019-06-21 RX ADMIN — PIPERACILLIN SODIUM AND TAZOBACTAM SODIUM 3.38 G: 3; .375 INJECTION, POWDER, LYOPHILIZED, FOR SOLUTION INTRAVENOUS at 12:16

## 2019-06-21 RX ADMIN — INSULIN LISPRO 6 UNITS: 100 INJECTION, SOLUTION INTRAVENOUS; SUBCUTANEOUS at 08:56

## 2019-06-21 RX ADMIN — INSULIN LISPRO 15 UNITS: 100 INJECTION, SOLUTION INTRAVENOUS; SUBCUTANEOUS at 11:31

## 2019-06-21 RX ADMIN — HYDROCODONE BITARTRATE AND ACETAMINOPHEN 1 TABLET: 5; 325 TABLET ORAL at 17:01

## 2019-06-21 ASSESSMENT — PAIN DESCRIPTION - PAIN TYPE
TYPE: SURGICAL PAIN

## 2019-06-21 ASSESSMENT — PAIN DESCRIPTION - LOCATION
LOCATION: BREAST

## 2019-06-21 ASSESSMENT — PAIN SCALES - GENERAL
PAINLEVEL_OUTOF10: 9
PAINLEVEL_OUTOF10: 10
PAINLEVEL_OUTOF10: 3
PAINLEVEL_OUTOF10: 7
PAINLEVEL_OUTOF10: 3
PAINLEVEL_OUTOF10: 2

## 2019-06-21 ASSESSMENT — PAIN DESCRIPTION - ORIENTATION
ORIENTATION: LEFT

## 2019-06-21 NOTE — CARE COORDINATION
DISCHARGE PLANNING NOTE:    Plan is for this patient to return to home and will have services through [facility] for dressing changes. [facility] is following for possible IV antibiotics - Currently on IV cefazolin. ID is awaiting final cultures. Breast US unremarkable. Hopeful for PO antibiotics at discharge. We are following for glucometer, supplies and insulin. Will continue to follow along.      Electronically signed by Gretel Prader, RN on 6/21/2019 at 10:30 AM

## 2019-06-21 NOTE — DISCHARGE INSTR - COC
Continuity of Care Form    Patient Name: Rimma Bennett   :  1995  MRN:  [id number]    Admit date:  2019  Discharge date:  19    Code Status Order: Full Code   Advance Directives:   Advance Care Flowsheet Documentation     Date/Time Healthcare Directive Type of Healthcare Directive Copy in [address] Agent's Name Healthcare Agent's Phone Number    19 1504  No, patient does not have an advance directive for healthcare treatment  --  --  --  --  --    19 1636  No, patient does not have an advance directive for healthcare treatment  --  --  --  --  --          Admitting Physician:  Lucia Kerns MD  PCP: Guero Carmen PA-C    Discharging Nurse: [facility] Unit/Room#: 2037/2037-01  Discharging Unit Phone Number: [phone]    Emergency Contact:   Extended Emergency Contact Information  Primary Emergency Contact: Adarsh Cary  Address: [address] [place] [address] [address]. [address] Phone: [phone]  Mobile Phone: [phone]  Relation: Other  Secondary Emergency Contact: Frederick Chahal  Address: [address] [place] [address], [address] [place], [place] [address]e [address] Phone: [phone]  Mobile Phone: [phone]  Relation: Parent    Past Surgical History:  Past Surgical History:   Procedure Laterality Date    BREAST SURGERY Left 2019    BREAST INCISION AND DRAINAGE performed by Jd Contreras MD at [address] N/A 3/23/2017     SECTION REPEAT  performed by Lucia Kerns MD at [facility] L&D OR    EYE SURGERY      TONSILLECTOMY Bilateral     UPPER GASTROINTESTINAL ENDOSCOPY N/A 2018    EGD BIOPSY performed by Enmanuel Graham MD at [address] [place] [address] Yue Valiente       Immunization History:   Immunization History   Administered Date(s) Administered    HPV Quadrivalent (Gardasil) 2011    Influenza, Jeniffer Le, 3 Years and older, IM (Fluzone 3 yrs and older or Afluria 5 yrs and older) 10/04/2016    Tdap (Boostrix, Adacel) 2015       Active Problems:  Patient Active Problem List   Diagnosis Code    chronic HTN I10    Asthma J45.909    Morbid obesity ([facility] E66.01    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.4    BMI 40.0-44.9 Z68.41    Generalized abdominal pain R10.84    Hepatic steatosis K76.0    History of gonorrhea Z86.19    History of chlamydia Z86.19    Nausea & vomiting R11.2    Diarrhea R19.7    Bipolar affective disorder, currently depressed, mild ([facility]) F31.31    Cellulitis of breast N61.0    Back pain M54.9    Noncompliance Z91.19    Diabetes ([facility] E11.9    History of  section x2 Z98.891    History of postpartum depression Z80.59, Z80.58    Lost custody of children Z76.3    Poor historian Z78.9    Hyponatremia E87.1    Uncontrolled diabetes mellitus (HCC) E11.65    Mood disorder ([facility]) F39       Isolation/Infection:   Isolation          No Isolation            Nurse Assessment:  Last Vital Signs: /67   Pulse 64   Temp 98.4 °F (36.9 °C) (Oral)   Resp 16   Ht 5' 7\" (1.702 m)   Wt 275 lb 12.7 oz (125.1 kg)   SpO2 97%   BMI 43.20 kg/m²     Last documented pain score (0-10 scale): Pain Level: 3  Last Weight:   Wt Readings from Last 1 Encounters:   19 275 lb 12.7 oz (125.1 kg)     Mental Status:  oriented and alert    IV Access:  - None    Nursing Mobility/ADLs:  Walking   Independent  Transfer  Independent  Bathing  Independent  Dressing  Independent  Toileting  Independent  Feeding  Independent  Med Admin  Independent  Med Delivery   whole    Wound Care Documentation and Therapy:  Incision 17 Abdomen Lower (Active)   Number of days: 820       Incision (Active)   Number of days:         Elimination:  Continence:   · Bowel:  Yes  · Bladder: Yes  Urinary Catheter: None   Colostomy/Ileostomy/Ileal Conduit: No       Date of Last BM: 2019    Intake/Output Summary (Last 24 hours) at 2019 1036  Last data filed at 6/21/2019 8588  Gross per 24 hour   Intake 2093 ml   Output 600 ml   Net 1493 ml     I/O last 3 completed shifts: In: 1853 [I.V.:1853]  Out: 600 [Urine:600]    Safety Concerns:     None    Impairments/Disabilities:      None    Nutrition Therapy:  Current Nutrition Therapy:   - Oral Diet:  Carb Control 4 carbs/meal (1800kcals/day)    Routes of Feeding: Oral  Liquids: No Restrictions  Daily Fluid Restriction: no  Last Modified Barium Swallow with Video (Video Swallowing Test): not done    Treatments at the Time of Hospital Discharge:   Respiratory Treatments: N/A  Oxygen Therapy:  is not on home oxygen therapy.   Ventilator:    - No ventilator support    Rehab Therapies: N/A  Weight Bearing Status/Restrictions: No weight bearing restirctions  Other Medical Equipment (for information only, NOT a DME order):  N/A  Other Treatments: Skilled Nursing Assessment, Medication Education and Monitoring, In depth diabetic education  Wet to dry dressing changes to left breat BID    **Please make follow up appointment with GI for patient**    Patient's personal belongings (please select all that are sent with patient):  None    RN SIGNATURE:  Electronically signed by Rasheed Nelson RN on 6/21/19 at 10:37 AM    CASE MANAGEMENT/SOCIAL WORK SECTION    Inpatient Status Date: 6/18/2019    Readmission Risk Assessment Score:  Readmission Risk              Risk of Unplanned Readmission:        10           Discharging to Facility/ [address] [facility] [address]  Phone [phone]  Fax  [phone]      / signature: Electronically signed by Rasheed Nelson RN on 6/21/19 at 10:38 AM    PHYSICIAN SECTION    Prognosis: Good    Condition at Discharge: Stable    Rehab Potential (if transferring to Rehab): Good    Recommended Labs or Other Treatments After Discharge: SEE ABOVE    Physician Certification: I certify the above information and transfer of

## 2019-06-21 NOTE — PLAN OF CARE
Problem: DAILY CARE  Goal: Daily care needs are met  6/21/2019 1626 by Madeline Sumner RN  Outcome: Ongoing  Pt. Denies assistance with daily care     Problem: SKIN INTEGRITY  Goal: Skin integrity is maintained or improved  6/21/2019 1626 by Madeline Sumner RN  Outcome: Ongoing  No new signs of skin break down, assessment completed. Problem: Pain:  Goal: Control of acute pain  Description  Control of acute pain  6/21/2019 1626 by Madeline Sumner RN  Outcome: Ongoing  Pt. Verbalizes pain at a tolerable level. Will continue to monitor. Problem: Falls - Risk of:  Goal: Will remain free from falls  Description  Will remain free from falls  6/21/2019 1626 by Madeline Sumner RN  Outcome: Ongoing  Pt. Remains free of falls. Will continue to monitor.

## 2019-06-21 NOTE — PROGRESS NOTES
OB/GYN Resident Progress Note    Date: 6/21/2019  Time: 7:58 AM    Diandra Sood is a 30 y.o. female [id number] HD# 4, POD #2 S/P I&D of Left Breast Abscess     Patient was seen and examined. Afebrile, VSS. Patient sitting up comfortably in bed watching TV. She complained of left breast pain this morning, but reports it is improved from yesterday. Patient is  tolerating oral intake. She is urinating well. She is  ambulating without difficulty. She is  passing flatus. She denies Fever/Chills, Chest Pain, SOB, N/V, Calf Pain. Vitals:  Vitals:    06/20/19 0711 06/20/19 1159 06/20/19 1857 06/21/19 0658   BP: 134/82 (!) 140/70 (!) 92/46 123/67   Pulse: 72 93 93 64   Resp: 16 16 16 16   Temp: 98.8 °F (37.1 °C) 98.7 °F (37.1 °C) 98.2 °F (36.8 °C) 98.4 °F (36.9 °C)   TempSrc: Oral Oral Oral Oral   SpO2: 99% 99% 97% 97%   Weight:       Height:           Intake/Output:   Last Shift: I/O last 3 completed shifts: In: 1853 [I.V.:1853]  Out: 600 [Urine:600]  Current Shift: No intake/output data recorded. Physical Exam:  General:  no apparent distress, alert and cooperative  Neurologic:  alert, oriented, normal speech, no focal findings or movement disorder noted  Lungs:  no increased work of breathing, good air exchange, clear to auscultation bilaterally, no crackles or wheezing  Heart:  regular rate and rhythm and no murmur  Breast: cellulitis on left lower breast with dressing in place   Abdomen: Abdomen soft, non-tender.  BS normal. No masses,  No organomegaly  Extremities:  no calf tenderness, non edematous    Lab:  Complete Blood Count:   Recent Labs     06/19/19  0744 06/20/19  0654 06/21/19  0700   WBC 9.0 10.2 7.3   HGB 13.5 13.3 12.1   HCT 39.5 39.3 35.9*    234 196        PT/INR:    No results found for: PROTIME, INR  PTT:    No results found for: APTT, PTT    Comprehensive Metabolic Profile:   Recent Labs     06/18/19  1703 06/19/19  0658 06/19/19  0744 06/20/19  0654   *  --  138 137   K 4.4  -- 3. 6* 3.9   CL 93*  --  102 102   CO2 18*  --  23 20   BUN 8  --  8 6   CREATININE 0.75 0.63 0.64 0.64  0.64   GLUCOSE 704*  --  370* 282*   CALCIUM 8.6  --  8.6 8.8   PROT 6.7  --  6.7 6.5   LABALBU 3.8  --  3.9 3.6   BILITOT 1.41*  --  0.86 0.93   ALKPHOS 109*  --  109* 101   AST 28  --  23 47*   ALT 31  --  29 40*        Assessment/Plan:  Anna Simmons 30 y.o. female [id number] HD# 4 w/ Left Breast Abscess/Cellulitis POD #1 s/p Incision and Drainage on 6/19/19   - General Surgery following, recommended wet to dry dressing changes twice daily   - Doing well, vitals stable   - Continue IVF (NS @ 125 cc/hr)   - CBC showed WBC 9.8> 9.0   - Blood cultures x 2 NGTD   - Aerobic/anerobic cultures + S. aureus     - ID following, recommended to continue IV antibiotics (Zosyn and Vancomycin)   - Norco/Motrin for pain control    - Zofran/Phenergan for nausea    - Carb control diet   - DVT prophylaxis: Lovenox   - Encourage ambulation and use of incentive spirometer    - Continue care, please page with any questions    Uncontrolled Type II DM   - Internal Medicine consulted for management of DM, appreciate recommendations -    - Patient reports she has been noncompliant with diet and diabetic management   - Lantus 20U BID and metformin 500 mg BID (started 6/19)   - VBG 6/18/19: pH 7.432, PCO2 32.1, HCO3 21.4   - Beta hydroxybutyrate 0.43   - Lactic acid 1.4   - Carb control diet   - Continue to monitor blood sugars AC/HS   - High dose ISS    Hyponatremia (resolved)   - Na 197>933>506   - CMP pending this AM     Hypokalemia   - 4.4> 3.6> 3.9   - CMP pending this AM     Chronic HTN   - Blood pressures normotensive overnight   - Continue Lisinopril 5 mg qd    - Recommend close follow up outpatient with PCP    Bipolar Disorder   - Psych consulted, appreciate recommendations   - Continue Risperdal and Prozac    - Denies SI/HI today    Remote Hx Gonorrhea/Chlamydia (TOR negative)   - Chlamyia + 10/28/16, TOR negative 11/6/18   - Gonorrhea + 3/31/15, TOR negative 18   - GC/C urine pending this admission     Asthma   - Controlled with Albuterol inhaler PRN     Obesity (BMI 43.3)      Patient Active Problem List    Diagnosis Date Noted    Mood disorder ([address]     Back pain 2019    Noncompliance 2019    Diabetes ([address] 2019    History of  section x2 2019    History of postpartum depression 2019    Lost custody of children 2019    Poor historian 2019    Hyponatremia 2019    Uncontrolled diabetes mellitus ([address] 2019    Bipolar affective disorder, currently depressed, mild ([address] 2019    Cellulitis of breast 2019    Nausea & vomiting 2018    Diarrhea 2018    Hepatic steatosis 2018    History of gonorrhea 2018    History of chlamydia 2018    Generalized abdominal pain 2018    BMI 40.0-44.9 2017    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 2015    Morbid obesity ([address] 2015    Asthma      Overview Note:     Albuterol PRN     Overview:   Overview:   Albuterol PRN       chronic HTN      Overview Note:     Patient states she was dx cHTN by PCP, never started on meds    Overview:   Overview:   Patient states she was dx cHTN by PCP, never started on meds           Elva Sosa DO  Ob/Gyn Resident PGY2  [facility]  2019 7:58 AM

## 2019-06-21 NOTE — PROGRESS NOTES
Patient was seen and examined. Afebrile vital signs are stable. Left breast wound was examined. Very very clean. Cellulitis is resolved. At this point continue wet-to-dry dressing changes on a daily basis. Antibiotics per infectious disease recommendations. Surgically stable for discharge once her blood sugars are under control.

## 2019-06-21 NOTE — PROGRESS NOTES
[facility]  Progress Note    Vicenta Chou  MRN: [id number]  Date: 6/21/2019    Reason for F/U :   Left breast abscess/cellulitis     Impression :   Current:  Left breast abscess/cellulitis status post incision and drainage left breast abscess 6/19/2019 MSSA on culture . Hidradenitis suppurativa  Other:  · Bipolar disorder  Discussion / summary of stay / plan of care   She presented with left breast increased swelling and redness, incomplete I&D was done at [facility] on 6/17. Status post surgical incision and drainage left breast abscess 6/19/2019 MSSA ON CULTURES  Slightly elevated LFT   Recommendations     D/C IV Vancomycin and  Zosyn  . IV Cefazolin. Left breast US if no fluid collection may change to PO Keflex and Bactrim    Follow FINAL intraoperative cultures and adjust antibiotics as needed. F/U on Tuesday . Hepatitis panel  Follow  CBC renal function closely  Infection Control Recommendations   · [place] Precautions        Antimicrobial Stewardship Recommendations   · Simplification of therapy  · Targeted therapy  · IV to oral conversion  · Per Kg dosing  · Restricted antimicrobial use  · Discontinuation of therapy     Coordination ofOutpatient Care:   · Estimated Length of IV antimicrobials: 10 days  · Patient will need Midline / picc  catheter Insertion: no  · Patient will need SNF: no  · Patient will need outpatient wound care: Indeterminate     History of Present Illness:   Initial history:  Vicenta Chou is a 30y.o.-year-old female was a direct admit from OB/GYN office for increased swelling, pain or redness to the left breast for several days associated with nausea and vomiting, decreased appetite and lower back pain. She denied any fever or chills  The patient is a poor historian, noncompliant with mental health disease.   Reportedly she had incision and debridement of left breast abscess at the [facility] on 6/17/19   History of hidradenitis suppurativa, diabetes, hypertension , asthma, STDs history of gonorrhea and chlamydia  She is sexually active with one partner and does not use condoms for STD prevention. She had vaginal bleeding for 2 weeks has was evaluated by OB/GYN        Current evaluation:2019  Status post surgical debridement of the left breast . She is complaining of left breast pain , loose stool one BM today ,denied nausea or vomiting, , no new complaints. BP (!) 140/70   Pulse 93   Temp 98.7 °F (37.1 °C) (Oral)   Resp 16   Ht 5' 7\" (1.702 m)   Wt 275 lb 12.7 oz (125.1 kg)   SpO2 99%   BMI 43.20 kg/m²     Temperature Range: Temp: 98.7 °F (37.1 °C) Temp  Av.5 °F (36.4 °C)  Min: 95.5 °F (35.3 °C)  Max: 98.8 °F (37.1 °C)  Summary of relevant labs:  Labs: Pregnancy test negative. Drug screen negative  WBC normal  Glucose was 704 on  , renal function normal  Micro:  Left breast culture MSSA  Imaging:  No new imaging        I have personally reviewed the past medical history, past surgical history, medications, social history, and family history, and I haveupdated the database accordingly.   Past Medical History:      Past Medical History   Past Medical History:   Diagnosis Date    Asthma       uses inhaler    Diabetes mellitus type 2 in obese (HCC)      Hepatic steatosis      Hypertension       started when approx 6 mos pregnant with first pregnancy    Morbid obesity with body mass index (BMI) of 40.0 to 49.9 (HCC)      Neuropathy      Postpartum depression              Past Surgical  History:      Past Surgical History         Past Surgical History:   Procedure Laterality Date     SECTION         SECTION N/A 3/23/2017      SECTION REPEAT  performed by Girish Alicia MD at [address] [place] [address] L&D OR    EYE SURGERY        TONSILLECTOMY Bilateral      UPPER GASTROINTESTINAL ENDOSCOPY N/A 2018     EGD BIOPSY performed by Mariam Agrawal MD at [place] [address]     Medications:      Scheduled Medications    FLUoxetine  10 mg Oral Daily    risperiDONE  0.25 mg Oral Nightly    cefTRIAXone (ROCEPHIN) IV  2 g Intravenous Q24H    insulin lispro  0-18 Units Subcutaneous TID WC    insulin lispro  0-9 Units Subcutaneous Nightly    vancomycin (VANCOCIN) intermittent dosing (placeholder)   Other RX Placeholder    vancomycin  1,750 mg Intravenous Q12H            Social History:      Social History               Socioeconomic History    Marital status: Single       Spouse name: Not on file    Number of children: 2    Years of education: graduated high school, some college    Highest education level: Not on file   Occupational History    Occupation: housewife    Occupation:        Comment: last worked    Social Needs    Financial resource strain: Not on file    Food insecurity:       Worry: Not on file       Inability: Not on file   [employer] needs:       Medical: Not on file       Non-medical: Not on file   Tobacco Use    Smoking status: Former Smoker       Packs/day: 2.00       Years: 10.00       Pack years: 20.00       Types: Cigarettes       Start date: 2007       Last attempt to quit: 2016       Years since quittin.9    Smokeless tobacco: Never Used   Substance and Sexual Activity    Alcohol use:  No       Alcohol/week: 0.0 oz    Drug use: No    Sexual activity: Yes       Partners: Male       Comment: chlamydia in 2016,   Lifestyle    Physical activity:       Days per week: Not on file       Minutes per session: Not on file    Stress: Not on file   Relationships    Social connections:       Talks on phone: Not on file       Gets together: Not on file       Attends Judaism service: Not on file       Active member of club or organization: Not on file       Attends meetings of clubs or organizations: Not on file       Relationship status: Not on file    Intimate partner violence:       Fear of current or ex partner: Not on file       Emotionally abused: Not on file     Physically abused: Not on file       Forced sexual activity: Not on file   Other Topics Concern    Not on file   Social History Narrative     Lives with fiance and 2 daughters            Family History:      Family History         Family History   Problem Relation Age of Onset    Breast Cancer Mother 28    Diabetes Father      Cancer Maternal Uncle 61         acute leukemia    Heart Disease Maternal Uncle      Diabetes Maternal Uncle      Colon Cancer Maternal Uncle      Diabetes Maternal Uncle      Heart Attack Maternal Uncle      Uterine Cancer Neg Hx      Ovarian Cancer Neg Hx              Allergies:   Benadryl [diphenhydramine]                Review of Systems:      Review of Systems   Constitutional: Negative. HENT: Negative. Respiratory: mild cough . Cardiovascular: Negative. Gastrointestinal: Positive for loose stool . Negative for vomiting. Endocrine: Negative. Genitourinary: Negative. Musculoskeletal: Negative. Skin: Positive for wound. Allergic/Immunologic: Negative. Neurological: Negative. Hematological: Negative.       Other than above 12 system review negative    Physical Examination :     Physical Exam   Constitutional: She is oriented to person, place, and time. She appears well-developed and well-nourished. HENT:   Head: Normocephalic and atraumatic. Eyes: Pupils are equal, round, and reactive to light. Conjunctivae and EOM are normal.   Neck: Normal range of motion. Neck supple. No thyromegaly present. Cardiovascular: Normal rate, regular rhythm and normal heart sounds. Pulmonary/Chest: Effort normal and breath sounds normal.   Abdominal: Soft. Bowel sounds are normal.   Musculoskeletal: Normal range of motion. Neurological: She is alert and oriented to person, place, and time. Skin: Multiple old scars in the groin and axillary area.   Left breast open wound with surrounding erythema ,edema and induration ,no fluctuation or crepitans

## 2019-06-22 VITALS
HEIGHT: 67 IN | HEART RATE: 82 BPM | DIASTOLIC BLOOD PRESSURE: 84 MMHG | BODY MASS INDEX: 43.29 KG/M2 | RESPIRATION RATE: 16 BRPM | WEIGHT: 275.8 LBS | TEMPERATURE: 98.6 F | OXYGEN SATURATION: 99 % | SYSTOLIC BLOOD PRESSURE: 132 MMHG

## 2019-06-22 PROBLEM — B15.9 VIRAL HEPATITIS A WITHOUT HEPATIC COMA: Status: ACTIVE | Noted: 2019-06-22

## 2019-06-22 LAB
ABSOLUTE EOS #: 0.4 K/UL (ref 0–0.4)
ABSOLUTE IMMATURE GRANULOCYTE: ABNORMAL K/UL (ref 0–0.3)
ABSOLUTE LYMPH #: 2.6 K/UL (ref 1–4.8)
ABSOLUTE MONO #: 0.5 K/UL (ref 0.1–1.3)
ALBUMIN SERPL-MCNC: 3.6 G/DL (ref 3.5–5.2)
ALBUMIN/GLOBULIN RATIO: ABNORMAL (ref 1–2.5)
ALP BLD-CCNC: 84 U/L (ref 35–104)
ALT SERPL-CCNC: 46 U/L (ref 5–33)
ANION GAP SERPL CALCULATED.3IONS-SCNC: 12 MMOL/L (ref 9–17)
AST SERPL-CCNC: 57 U/L
BASOPHILS # BLD: 1 % (ref 0–2)
BASOPHILS ABSOLUTE: 0.1 K/UL (ref 0–0.2)
BILIRUB SERPL-MCNC: 0.36 MG/DL (ref 0.3–1.2)
BUN BLDV-MCNC: 7 MG/DL (ref 6–20)
BUN/CREAT BLD: ABNORMAL (ref 9–20)
CALCIUM SERPL-MCNC: 8.7 MG/DL (ref 8.6–10.4)
CHLORIDE BLD-SCNC: 104 MMOL/L (ref 98–107)
CO2: 21 MMOL/L (ref 20–31)
CREAT SERPL-MCNC: 0.62 MG/DL (ref 0.5–0.9)
DIFFERENTIAL TYPE: ABNORMAL
EOSINOPHILS RELATIVE PERCENT: 5 % (ref 0–4)
GFR AFRICAN AMERICAN: >60 ML/MIN
GFR NON-AFRICAN AMERICAN: >60 ML/MIN
GFR SERPL CREATININE-BSD FRML MDRD: ABNORMAL ML/MIN/{1.73_M2}
GFR SERPL CREATININE-BSD FRML MDRD: ABNORMAL ML/MIN/{1.73_M2}
GLUCOSE BLD-MCNC: 230 MG/DL (ref 65–105)
GLUCOSE BLD-MCNC: 266 MG/DL (ref 70–99)
GLUCOSE BLD-MCNC: 292 MG/DL (ref 65–105)
HCT VFR BLD CALC: 36.9 % (ref 36–46)
HEMOGLOBIN: 12.6 G/DL (ref 12–16)
IMMATURE GRANULOCYTES: ABNORMAL %
LYMPHOCYTES # BLD: 34 % (ref 24–44)
MCH RBC QN AUTO: 30.7 PG (ref 26–34)
MCHC RBC AUTO-ENTMCNC: 34.1 G/DL (ref 31–37)
MCV RBC AUTO: 90.2 FL (ref 80–100)
MISCELLANEOUS LAB TEST RESULT: NORMAL
MONOCYTES # BLD: 7 % (ref 1–7)
NRBC AUTOMATED: ABNORMAL PER 100 WBC
PDW BLD-RTO: 13 % (ref 11.5–14.9)
PLATELET # BLD: 227 K/UL (ref 150–450)
PLATELET ESTIMATE: ABNORMAL
PMV BLD AUTO: 8.3 FL (ref 6–12)
POTASSIUM SERPL-SCNC: 3.9 MMOL/L (ref 3.7–5.3)
RBC # BLD: 4.09 M/UL (ref 4–5.2)
RBC # BLD: ABNORMAL 10*6/UL
SEG NEUTROPHILS: 53 % (ref 36–66)
SEGMENTED NEUTROPHILS ABSOLUTE COUNT: 4 K/UL (ref 1.3–9.1)
SODIUM BLD-SCNC: 137 MMOL/L (ref 135–144)
TEST NAME: NORMAL
TOTAL PROTEIN: 6 G/DL (ref 6.4–8.3)
WBC # BLD: 7.6 K/UL (ref 3.5–11)
WBC # BLD: ABNORMAL 10*3/UL

## 2019-06-22 PROCEDURE — 85025 COMPLETE CBC W/AUTO DIFF WBC: CPT

## 2019-06-22 PROCEDURE — 2580000003 HC RX 258: Performed by: INTERNAL MEDICINE

## 2019-06-22 PROCEDURE — 99231 SBSQ HOSP IP/OBS SF/LOW 25: CPT | Performed by: INTERNAL MEDICINE

## 2019-06-22 PROCEDURE — 6360000002 HC RX W HCPCS: Performed by: STUDENT IN AN ORGANIZED HEALTH CARE EDUCATION/TRAINING PROGRAM

## 2019-06-22 PROCEDURE — 6370000000 HC RX 637 (ALT 250 FOR IP): Performed by: SURGERY

## 2019-06-22 PROCEDURE — 82947 ASSAY GLUCOSE BLOOD QUANT: CPT

## 2019-06-22 PROCEDURE — 6370000000 HC RX 637 (ALT 250 FOR IP): Performed by: INTERNAL MEDICINE

## 2019-06-22 PROCEDURE — 6360000002 HC RX W HCPCS: Performed by: INTERNAL MEDICINE

## 2019-06-22 PROCEDURE — 80053 COMPREHEN METABOLIC PANEL: CPT

## 2019-06-22 PROCEDURE — 6370000000 HC RX 637 (ALT 250 FOR IP): Performed by: STUDENT IN AN ORGANIZED HEALTH CARE EDUCATION/TRAINING PROGRAM

## 2019-06-22 PROCEDURE — 36415 COLL VENOUS BLD VENIPUNCTURE: CPT

## 2019-06-22 RX ORDER — PEN NEEDLE, DIABETIC 31 G X1/4"
1 NEEDLE, DISPOSABLE MISCELLANEOUS DAILY
Qty: 100 EACH | Refills: 3 | Status: SHIPPED | OUTPATIENT
Start: 2019-06-22 | End: 2021-05-21 | Stop reason: SDUPTHER

## 2019-06-22 RX ORDER — LISINOPRIL 5 MG/1
5 TABLET ORAL DAILY
Qty: 30 TABLET | Refills: 3 | Status: SHIPPED | OUTPATIENT
Start: 2019-06-23 | End: 2019-10-07 | Stop reason: SDUPTHER

## 2019-06-22 RX ADMIN — DOCUSATE SODIUM 100 MG: 100 CAPSULE, LIQUID FILLED ORAL at 08:11

## 2019-06-22 RX ADMIN — CEFAZOLIN 1 G: 1 INJECTION, POWDER, FOR SOLUTION INTRAMUSCULAR; INTRAVENOUS at 06:28

## 2019-06-22 RX ADMIN — ENOXAPARIN SODIUM 30 MG: 30 INJECTION SUBCUTANEOUS at 08:11

## 2019-06-22 RX ADMIN — INSULIN GLARGINE 30 UNITS: 100 INJECTION, SOLUTION SUBCUTANEOUS at 08:10

## 2019-06-22 RX ADMIN — FLUOXETINE 10 MG: 10 CAPSULE ORAL at 12:00

## 2019-06-22 RX ADMIN — METFORMIN HYDROCHLORIDE 500 MG: 500 TABLET, FILM COATED ORAL at 08:11

## 2019-06-22 RX ADMIN — LISINOPRIL 5 MG: 5 TABLET ORAL at 08:11

## 2019-06-22 RX ADMIN — INSULIN LISPRO 6 UNITS: 100 INJECTION, SOLUTION INTRAVENOUS; SUBCUTANEOUS at 08:09

## 2019-06-22 RX ADMIN — INSULIN LISPRO 9 UNITS: 100 INJECTION, SOLUTION INTRAVENOUS; SUBCUTANEOUS at 12:00

## 2019-06-22 NOTE — PROGRESS NOTES
Discharge instructions reviewed with patient and . Wound care instructions, follow up appts and medications reviewed. All questions answered.

## 2019-06-22 NOTE — CARE COORDINATION
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.4    BMI 40.0-44.9 Z68.41    Generalized abdominal pain R10.84    Hepatic steatosis K76.0    History of gonorrhea Z86.19    History of chlamydia Z86.19    Nausea & vomiting R11.2    Diarrhea R19.7    Bipolar affective disorder, currently depressed, mild ([facility]) F31.31    Cellulitis of breast N61.0    Back pain M54.9    Noncompliance Z91.19    Diabetes ([facility] E11.9    History of  section x2 Z98.891    History of postpartum depression Z80.59, Z80.58    Lost custody of children Z76.3    Poor historian Z78.9    Hyponatremia E87.1    Uncontrolled diabetes mellitus (HCC) E11.65    Mood disorder ([facility]) F39       Isolation/Infection:   Isolation          No Isolation            Nurse Assessment:  Last Vital Signs: /67   Pulse 64   Temp 98.4 °F (36.9 °C) (Oral)   Resp 16   Ht 5' 7\" (1.702 m)   Wt 275 lb 12.7 oz (125.1 kg)   SpO2 97%   BMI 43.20 kg/m²     Last documented pain score (0-10 scale): Pain Level: 3  Last Weight:   Wt Readings from Last 1 Encounters:   19 275 lb 12.7 oz (125.1 kg)     Mental Status:  oriented and alert    IV Access:  - None    Nursing Mobility/ADLs:  Walking   Independent  Transfer  Independent  Bathing  Independent  Dressing  Independent  Toileting  Independent  Feeding  Independent  Med Admin  Independent  Med Delivery   whole    Wound Care Documentation and Therapy:  Incision 17 Abdomen Lower (Active)   Number of days: 820       Incision (Active)   Number of days:         Elimination:  Continence:   · Bowel:  Yes  · Bladder: Yes  Urinary Catheter: None   Colostomy/Ileostomy/Ileal Conduit: No       Date of Last BM: 2019    Intake/Output Summary (Last 24 hours) at 2019 1036  Last Avery Found  is necessary for the continuing treatment of the diagnosis listed and that she requires Home Care for less 30 days. Update Admission H&P: No change in H&P    PHYSICIAN SIGNATURE: Verbal order per Dr. Zane Rosenberg Electronically signed by Spencer Vazquez RN on 6/22/19 at 9:21 AM    Current Discharge Medication List     START taking these medications     Medication Dose   metFORMIN (GLUCOPHAGE) 1000 MG tablet 1,000 mg   Take 1 tablet by mouth 2 times daily (with meals)   Quantity: 60 tablet Refills: 3       lisinopril (PRINIVIL;ZESTRIL) 5 MG tablet 5 mg   Take 1 tablet by mouth daily   Quantity: 30 tablet Refills: 3       insulin glargine (LANTUS SOLOSTAR) 100 UNIT/ML injection pen 35 Units   Inject 35 Units into the skin 2 times daily   Quantity: 5 pen Refills: 3       Insulin Pen Needle (PEN NEEDLES) 31G X 6 MM MISC 1 each   1 each by Does not apply route daily   Quantity: 100 each Refills: 3       HYDROcodone-acetaminophen (NORCO) 5-325 MG per tablet 1 tablet   Take 1 tablet by mouth every 6 hours as needed for Pain for up to 5 days. Quantity: 12 tablet Refills: 0       Comments: Reduce doses taken as pain becomes manageable      ibuprofen (ADVIL;MOTRIN) 800 MG tablet 800 mg   Take 1 tablet by mouth every 8 hours as needed for Pain   Quantity: 30 tablet Refills: 0       blood glucose monitor kit and supplies    Test 2 times a day & as needed for symptoms of irregular blood glucose. Quantity: 1 kit Refills: 0       Comments: Brand per patient preference. May round up to next available package size. blood glucose monitor strips    Test 2 times a day & as needed for symptoms of irregular blood glucose. Quantity: 100 strip Refills: 3       Comments: Brand per patient preference. May round up to next available package size.       ONE TOUCH LANCETS MISC 1 each   1 each by Does not apply route 2 times daily   Quantity: 100 each Refills: 3           CONTINUE these medications which have NOT CHANGED     Medication Dose   risperiDONE (RISPERDAL) 0.25 MG tablet 0.25 mg   Take 1 tablet by mouth nightly   Quantity: 30 tablet Refills: 2       sulfamethoxazole-trimethoprim (BACTRIM DS;SEPTRA DS) 800-160 MG per tablet 1 tablet   Take 1 tablet by mouth 2 times daily       cephALEXin (KEFLEX) 500 MG capsule 500 mg   Take 500 mg by mouth 3 times daily       FLUoxetine (PROZAC) 10 MG capsule 10 mg   Take 1 capsule by mouth daily   Quantity: 30 capsule Refills: 3       etonogestrel (NEXPLANON) 68 MG implant 68 mg   68 mg by Subdermal route once for 1 dose   Quantity: 1 each Refills: 0           STOP taking these previous medications     Medication Dose Reason for Stopping Comments   (STOP TAKING) doxycycline hyclate (VIBRAMYCIN) 100 MG capsule 100 mg

## 2019-06-22 NOTE — PROGRESS NOTES
Progress Note    Date: 6/22/2019  Time: 9:26 AM    Tahira Millard 30 y.o. female [id number], HD # 5, POD #3 S/p I&D of Left Breast Abscess    Patient seen and examined. She has no complaints today and wants to go home. She does have a lot of questions about her DM and new diagnosis of Hep A. Pain is controlled. Patient is  tolerating oral intake. She is urinating well. She is  ambulating without difficulty. She is  passing flatus. She denies Fever/Chills, Chest Pain, SOB, N/V, Calf Pain    Vitals:  Vitals:    06/20/19 1857 06/21/19 0658 06/21/19 1944 06/22/19 0659   BP: (!) 92/46 123/67 (!) 116/49 105/84   Pulse: 93 64 80 65   Resp: 16 16 12 16   Temp: 98.2 °F (36.8 °C) 98.4 °F (36.9 °C) 98.8 °F (37.1 °C) 97.7 °F (36.5 °C)   TempSrc: Oral Oral Oral Oral   SpO2: 97% 97% 97% 100%   Weight:       Height:             Intake/Output:   Last Shift: I/O last 3 completed shifts: In: 9277 [P.O.:360; I.V.:1420]  Out: -   Current Shift: No intake/output data recorded.     Physical Exam:  General:  no apparent distress, alert and cooperative  Neurologic:  alert, oriented, normal speech, no focal findings or movement disorder noted  Lungs:  No increased work of breathing, good air exchange, clear to auscultation bilaterally, no crackles or wheezing  Heart:  regular rate and rhythm and no murmur    Abdomen: soft, non-distended, appropriate tenderness, no CVA tenderness, normal bowel sounds  Breast: dressing in place   Extremities:  no calf tenderness, non edematous    Lab:  Recent Results (from the past 12 hour(s))   CBC Auto Differential    Collection Time: 06/22/19  6:47 AM   Result Value Ref Range    WBC 7.6 3.5 - 11.0 k/uL    RBC 4.09 4.0 - 5.2 m/uL    Hemoglobin 12.6 12.0 - 16.0 g/dL    Hematocrit 36.9 36 - 46 %    MCV 90.2 80 - 100 fL    MCH 30.7 26 - 34 pg    MCHC 34.1 31 - 37 g/dL    RDW 13.0 11.5 - 14.9 %    Platelets 270 169 - 436 k/uL    MPV 8.3 6.0 - 12.0 fL    NRBC Automated NOT REPORTED per 100 WBC    Differential Type NOT REPORTED     Seg Neutrophils 53 36 - 66 %    Lymphocytes 34 24 - 44 %    Monocytes 7 1 - 7 %    Eosinophils % 5 (H) 0 - 4 %    Basophils 1 0 - 2 %    Immature Granulocytes NOT REPORTED 0 %    Segs Absolute 4.00 1.3 - 9.1 k/uL    Absolute Lymph # 2.60 1.0 - 4.8 k/uL    Absolute Mono # 0.50 0.1 - 1.3 k/uL    Absolute Eos # 0.40 0.0 - 0.4 k/uL    Basophils # 0.10 0.0 - 0.2 k/uL    Absolute Immature Granulocyte NOT REPORTED 0.00 - 0.30 k/uL    WBC Morphology NOT REPORTED     RBC Morphology NOT REPORTED     Platelet Estimate NOT REPORTED    Comp Metabolic Prof    Collection Time: 06/22/19  6:47 AM   Result Value Ref Range    Glucose 266 (H) 70 - 99 mg/dL    BUN 7 6 - 20 mg/dL    CREATININE 0.62 0.50 - 0.90 mg/dL    Bun/Cre Ratio NOT REPORTED 9 - 20    Calcium 8.7 8.6 - 10.4 mg/dL    Sodium 137 135 - 144 mmol/L    Potassium 3.9 3.7 - 5.3 mmol/L    Chloride 104 98 - 107 mmol/L    CO2 21 20 - 31 mmol/L    Anion Gap 12 9 - 17 mmol/L    Alkaline Phosphatase 84 35 - 104 U/L    ALT 46 (H) 5 - 33 U/L    AST 57 (H) <32 U/L    Total Bilirubin 0.36 0.3 - 1.2 mg/dL    Total Protein 6.0 (L) 6.4 - 8.3 g/dL    Alb 3.6 3.5 - 5.2 g/dL    Albumin/Globulin Ratio NOT REPORTED 1.0 - 2.5    GFR Non-African American >60 >60 mL/min    GFR African American >60 >60 mL/min    GFR Comment          GFR Staging NOT REPORTED    POC Glucose Fingerstick    Collection Time: 06/22/19  6:57 AM   Result Value Ref Range    POC Glucose 230 (H) 65 - 105 mg/dL       Assessment/Plan:  Tahira Millard 30 y.o. female [id number], HD # 5 with left beast abscess/cellulitis POD# 3 from I&D on 6/19/19   - Doing well, vitals stable   - General surgery following: okay for discharge once BS under control. Daily dressing changes. Apprec recs    - Labs stable. No white count. Afebrile   - Blood cutures NTD   - Aerobic/anaerobic cultures: + staph aureus    - ID following: S/p vanco, zosyn, and rocephin. On Ancef currently.  D/C on Bactrim and Keflex    - Norco/motrin for pain control    - Zofran/phenergan for nausea   - Carb control diet   - DVT: Lovenox daily    - Encouraged ambulation and incentive spirometer   - Continue care, please page with any questions   - Okay for discharge once BS under control. Follow up with IM. Discussed daily dressing changes. Follow up in office next week. Uncontrolled Type II DM   - IM following, apprec recs   - Patient had been noncompliant with medications   - Lantus was increased to 30mg BID last night.  Continue Metformin   - Carb control diet    - Continue to monitor BS closely and adjustments per IM   - High Dose ISS    Newly diagnosed Hep A   - LFTs 40/47>32/29>46/57   - IM recs today    Hyponatremia/hypokelemia--resolved   - Daily labs     Chronic HTN   - Lisinopril per IM 5mg, apprec recs   - Follow up IM as OP    Bipolar disorder   - Psych consulted, apprec recs   - Continue Prozac and Risperdal    - Follow up closely as OP   - Denies SI/HI    BMI 43.3    Melody Ritchie DO  Ob/Gyn Resident  Pager: [phone]  6/22/2019, 9:26 AM

## 2019-06-22 NOTE — PROGRESS NOTES
lantus to 30 bid follow FS   Last HBA1c is 11.6     MD MARCK Brady [address], [address] [place] [address].    Phone [phone]   Fax: [phone]  Answering Service: [phone]

## 2019-06-22 NOTE — CARE COORDINATION
DISCHARGE PLANNING NOTE:     Spoke with patient regarding discharge plan and plan remains for patient to return to home and will have [facility] for dressing changes.      CSI is following for possible IV antibiotics - Currently on IV cefazolin. Per ID, if left breast US showed no fluid collection, then may change to PO Keflex and Bactrim on d/c. Left breast US was unremarkable. Intraop cultures still pending.     Glucometer/supplies already sent to patient's pharmacy. Will need Insulin Pens prescribed.     Will continue to follow for additional d/c needs.     Electronically signed by Melina Montaño RN on 6/22/2019 at 10:20 AM

## 2019-06-22 NOTE — CARE COORDINATION
Faxed MELISA and d/c med list to [facility]. Called [facility] and spoke with Sergei Baker. Notified her of what was faxed and that pt will be d/c'ed home today.     Electronically signed by Kahlil Skinner RN on 6/22/2019 at 1:05 PM

## 2019-06-23 NOTE — PROGRESS NOTES
[facility]    Date:   2019  Patient name:  Anna Simmons  Date of admission:  2019  3:49 PM  MRN:   [id number]  YOB: 1995    CC- hyperglycemia     HPI-    Pt FS more than 300  Cellulitis imporved     REVIEW OF SYSTEMS:    · General----negative for fatigue, weight loss  · GI negative for nausea and vomiting, no dysphagia       EXAM-  /84   Pulse 82   Temp 98.6 °F (37 °C) (Oral)   Resp 16   Ht 5' 7\" (1.702 m)   Wt 275 lb 12.7 oz (125.1 kg)   SpO2 99%   BMI 43.20 kg/m²      · General appearance: NAD conversant  · Lungs: normal effort, clear to auscultation bilaterally,no wheeze.   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    BMI 40.0-44.9    Generalized abdominal pain    Hepatic steatosis    History of gonorrhea    History of chlamydia    Nausea & vomiting    Diarrhea    Bipolar affective disorder, currently depressed, mild (HCC)    Cellulitis of breast    Back pain    Noncompliance    Diabetes ([address]    History of  section x2    History of postpartum depression    Lost custody of children    Poor historian    Hyponatremia    Uncontrolled diabetes mellitus ([address]    Mood disorder ([address]    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    Phone [phone]   Fax: [phone]  Answering Service: [phone]

## 2019-06-24 ENCOUNTER — TELEPHONE (OUTPATIENT)
Dept: INTERNAL MEDICINE CLINIC | Age: 24
End: 2019-06-24

## 2019-06-24 LAB
CULTURE: NORMAL
Lab: NORMAL
SPECIMEN DESCRIPTION: NORMAL

## 2019-06-24 NOTE — TELEPHONE ENCOUNTER
[facility] Transitions Initial Follow Up Call    Outreach made within 2 business days of discharge: Yes    Patient: Donna Cruz Patient : 1995   MRN: [id number]  Reason for Admission: There are no discharge diagnoses documented for the most recent discharge. Discharge Date: 19       Spoke with: [facility]     Discharge department/facility: [address] [place] [address] Interactive Patient Contact:  Was patient able to fill all prescriptions:  Was patient instructed to bring all medications to the follow-up visit:   Is patient taking all medications as directed in the discharge summary?    Does patient understand their discharge instructions:   Does patient have questions or concerns that need addressed prior to 7-14 day follow up office visit:     Scheduled appointment with PCP within 7-14 days    Follow Up  Future Appointments   Date Time Provider Terry Lopez   2019 10:00 AM Anthony Stauffer MD INF DIS 2 [facility]       1st att mpt LMOM     Jared Sakrar MA

## 2019-06-25 ENCOUNTER — TELEPHONE (OUTPATIENT)
Dept: INTERNAL MEDICINE CLINIC | Age: 24
End: 2019-06-25

## 2019-06-25 LAB
CULTURE: ABNORMAL
CULTURE: ABNORMAL
DIRECT EXAM: ABNORMAL
DIRECT EXAM: ABNORMAL
Lab: ABNORMAL
SPECIMEN DESCRIPTION: ABNORMAL

## 2019-06-25 NOTE — TELEPHONE ENCOUNTER
[facility] Transitions Initial Follow Up Call    Outreach made within 2 business days of discharge: Yes    Patient: Harshal Perez Patient : 1995   MRN: [id number]  Reason for Admission: There are no discharge diagnoses documented for the most recent discharge. Discharge Date: 19       Spoke with: patient     Discharge department/facility: [facility] Interactive Patient Contact:  Was patient able to fill all prescriptions: Yes  Was patient instructed to bring all medications to the follow-up visit: Yes  Is patient taking all medications as directed in the discharge summary? Yes  Does patient understand their discharge instructions: Yes  Does patient have questions or concerns that need addressed prior to 7-14 day follow up office visit: no    Scheduled appointment with PCP within 7-14 days  F/u 19   Follow Up  No future appointments.     Michelle Hsu

## 2019-07-01 NOTE — TELEPHONE ENCOUNTER
Called [facility] plastic and reconstructive surg at [phone] [facility] with Malinda Silva to see if they can help         Malinda Silva called back they take her insurance and is not concerned about sternal notch but they are not taking any new reductions until January.

## 2019-07-08 ENCOUNTER — TELEPHONE (OUTPATIENT)
Dept: INTERNAL MEDICINE CLINIC | Age: 24
End: 2019-07-08

## 2019-07-18 ENCOUNTER — OFFICE VISIT (OUTPATIENT)
Dept: OBGYN CLINIC | Age: 24
End: 2019-07-18
Payer: COMMERCIAL

## 2019-07-18 VITALS
HEART RATE: 84 BPM | BODY MASS INDEX: 42.22 KG/M2 | DIASTOLIC BLOOD PRESSURE: 82 MMHG | SYSTOLIC BLOOD PRESSURE: 129 MMHG | HEIGHT: 67 IN | WEIGHT: 269 LBS

## 2019-07-18 DIAGNOSIS — R10.2 PELVIC PAIN IN FEMALE: ICD-10-CM

## 2019-07-18 DIAGNOSIS — Z97.5 BREAKTHROUGH BLEEDING ON NEXPLANON: Primary | ICD-10-CM

## 2019-07-18 DIAGNOSIS — N92.1 BREAKTHROUGH BLEEDING ON NEXPLANON: Primary | ICD-10-CM

## 2019-07-18 PROCEDURE — 1036F TOBACCO NON-USER: CPT | Performed by: SPECIALIST

## 2019-07-18 PROCEDURE — 1111F DSCHRG MED/CURRENT MED MERGE: CPT | Performed by: SPECIALIST

## 2019-07-18 PROCEDURE — G8427 DOCREV CUR MEDS BY ELIG CLIN: HCPCS | Performed by: SPECIALIST

## 2019-07-18 PROCEDURE — 99213 OFFICE O/P EST LOW 20 MIN: CPT | Performed by: SPECIALIST

## 2019-07-18 PROCEDURE — G8417 CALC BMI ABV UP PARAM F/U: HCPCS | Performed by: SPECIALIST

## 2019-07-18 ASSESSMENT — ENCOUNTER SYMPTOMS
ABDOMINAL DISTENTION: 0
VOMITING: 0
NAUSEA: 0
DIARRHEA: 0
APNEA: 0
ABDOMINAL PAIN: 0
EYE PAIN: 0
COUGH: 0
CONSTIPATION: 0

## 2019-07-18 NOTE — PROGRESS NOTES
sulfamethoxazole-trimethoprim (BACTRIM DS;SEPTRA DS) 800-160 MG per tablet Take 1 tablet by mouth 2 times daily      FLUoxetine (PROZAC) 10 MG capsule Take 1 capsule by mouth daily 30 capsule 3    risperiDONE (RISPERDAL) 0.25 MG tablet Take 1 tablet by mouth nightly 30 tablet 2    etonogestrel (NEXPLANON) 68 MG implant 68 mg by Subdermal route once for 1 dose 1 each 0     No current Epic-ordered facility-administered medications on file. Problem List Items Addressed This Visit     None      Visit Diagnoses     Breakthrough bleeding on Nexplanon    -  Primary    Relevant Orders    US Non OB Transvaginal    US Pelvis Complete    Pelvic pain in female        Relevant Orders    US Non OB Transvaginal    US Pelvis Complete        Allergies   Allergen Reactions    Benadryl [Diphenhydramine] Shortness Of Breath     Orders Placed This Encounter   Procedures    US Non OB Transvaginal     Begin with transabdominal imaging. Standing Status:   Future     Standing Expiration Date:   7/18/2020     Order Specific Question:   Reason for exam:     Answer:   abnormal bleeding    US Pelvis Complete     Standing Status:   Future     Standing Expiration Date:   7/18/2020        Patient is here complaining of vaginal bleeding. She had not had a period in 2 years since she had the Nexplanon put in in May 2017. Then she started bleeding for a month. She did stop, but now has started bleeding again. She is also having pain in her lower right side. She denies nausea, vomiting and fever. Review of Systems   Constitutional: Negative for activity change, appetite change and fever. HENT: Negative for ear discharge and ear pain. Eyes: Negative for pain and visual disturbance. Respiratory: Negative for apnea and cough. Cardiovascular: Negative for chest pain, palpitations and leg swelling. Gastrointestinal: Negative for abdominal distention, abdominal pain, constipation, diarrhea, nausea and vomiting. above documentation placed by my scribe Alanis Beard. I reviewed the scribe's note and agree with the documented findings and plan of care. Any areas of disagreement are noted on the chart. I have personally evaluated this patient. Additional findings are as noted. I agree with the chief complaint, past medical history, past surgical history, allergies, medications, social and family history as documented unless otherwise noted below.      Electronically signed by Aviva Tello MD on 7/20/2019 at 4:20 PM

## 2019-07-22 DIAGNOSIS — E13.8 DIABETES MELLITUS OF OTHER TYPE WITH COMPLICATION, UNSPECIFIED WHETHER LONG TERM INSULIN USE: Primary | ICD-10-CM

## 2019-07-22 RX ORDER — GLUCOSAMINE HCL/CHONDROITIN SU 500-400 MG
1 CAPSULE ORAL 3 TIMES DAILY
Qty: 100 STRIP | Refills: 11 | Status: SHIPPED | OUTPATIENT
Start: 2019-07-22 | End: 2021-09-14

## 2019-07-23 ENCOUNTER — OFFICE VISIT (OUTPATIENT)
Dept: INTERNAL MEDICINE CLINIC | Age: 24
End: 2019-07-23
Payer: COMMERCIAL

## 2019-07-23 VITALS
WEIGHT: 273 LBS | BODY MASS INDEX: 42.85 KG/M2 | HEIGHT: 67 IN | HEART RATE: 97 BPM | OXYGEN SATURATION: 97 % | DIASTOLIC BLOOD PRESSURE: 80 MMHG | SYSTOLIC BLOOD PRESSURE: 124 MMHG

## 2019-07-23 DIAGNOSIS — I10 ESSENTIAL HYPERTENSION: ICD-10-CM

## 2019-07-23 DIAGNOSIS — F31.31 BIPOLAR AFFECTIVE DISORDER, CURRENTLY DEPRESSED, MILD (HCC): ICD-10-CM

## 2019-07-23 DIAGNOSIS — N61.1 BREAST ABSCESS: ICD-10-CM

## 2019-07-23 DIAGNOSIS — E78.5 HYPERLIPIDEMIA LDL GOAL <70: ICD-10-CM

## 2019-07-23 DIAGNOSIS — E13.8 DIABETES MELLITUS OF OTHER TYPE WITH COMPLICATION, UNSPECIFIED WHETHER LONG TERM INSULIN USE: Primary | ICD-10-CM

## 2019-07-23 LAB — HBA1C MFR BLD: 8.2 %

## 2019-07-23 PROCEDURE — 99214 OFFICE O/P EST MOD 30 MIN: CPT | Performed by: PHYSICIAN ASSISTANT

## 2019-07-23 PROCEDURE — 83036 HEMOGLOBIN GLYCOSYLATED A1C: CPT | Performed by: PHYSICIAN ASSISTANT

## 2019-07-23 PROCEDURE — G8417 CALC BMI ABV UP PARAM F/U: HCPCS | Performed by: PHYSICIAN ASSISTANT

## 2019-07-23 PROCEDURE — 1036F TOBACCO NON-USER: CPT | Performed by: PHYSICIAN ASSISTANT

## 2019-07-23 PROCEDURE — G8427 DOCREV CUR MEDS BY ELIG CLIN: HCPCS | Performed by: PHYSICIAN ASSISTANT

## 2019-07-23 PROCEDURE — 2022F DILAT RTA XM EVC RTNOPTHY: CPT | Performed by: PHYSICIAN ASSISTANT

## 2019-07-23 PROCEDURE — 3046F HEMOGLOBIN A1C LEVEL >9.0%: CPT | Performed by: PHYSICIAN ASSISTANT

## 2019-07-23 RX ORDER — NALOXONE HYDROCHLORIDE 4 MG/.1ML
SPRAY NASAL
Refills: 0 | Status: ON HOLD | COMMUNITY
Start: 2019-07-19 | End: 2021-09-20

## 2019-07-23 RX ORDER — ALBUTEROL SULFATE 90 UG/1
AEROSOL, METERED RESPIRATORY (INHALATION)
COMMUNITY
Start: 2018-11-16 | End: 2019-07-23 | Stop reason: SDUPTHER

## 2019-07-23 RX ORDER — ALBUTEROL SULFATE 90 UG/1
2 AEROSOL, METERED RESPIRATORY (INHALATION) EVERY 6 HOURS PRN
Qty: 1 INHALER | Refills: 2 | Status: SHIPPED | OUTPATIENT
Start: 2019-07-23

## 2019-07-23 RX ORDER — PEN NEEDLE, DIABETIC 29 G X1/2"
NEEDLE, DISPOSABLE MISCELLANEOUS
Refills: 0 | COMMUNITY
Start: 2019-06-24 | End: 2021-06-08 | Stop reason: SDUPTHER

## 2019-07-23 RX ORDER — OXYCODONE HYDROCHLORIDE AND ACETAMINOPHEN 5; 325 MG/1; MG/1
TABLET ORAL
Refills: 0 | Status: ON HOLD | COMMUNITY
Start: 2019-06-18 | End: 2020-02-03 | Stop reason: HOSPADM

## 2019-07-23 ASSESSMENT — ENCOUNTER SYMPTOMS
EYE DISCHARGE: 0
EYE ITCHING: 0
EYE PAIN: 0
SINUS PAIN: 0
NAUSEA: 0
CHEST TIGHTNESS: 0
BLOOD IN STOOL: 0
VOICE CHANGE: 0
BACK PAIN: 0
VOMITING: 0
SHORTNESS OF BREATH: 0
COLOR CHANGE: 0
COUGH: 0
TROUBLE SWALLOWING: 0
DIARRHEA: 0
RHINORRHEA: 0
CONSTIPATION: 0
SORE THROAT: 0
ABDOMINAL PAIN: 0

## 2019-07-23 NOTE — PROGRESS NOTES
1 tablet by mouth every 6 hours if needed for pain FOR UP TO 3 DAYS maximum daily dose of 4  0    blood glucose monitor kit and supplies 1 kit by Other route three times daily Dispense product that insurance will cover (Patient not taking: Reported on 7/23/2019) 1 kit 0    blood glucose monitor kit and supplies Test 2 times a day & as needed for symptoms of irregular blood glucose. 1 kit 0    blood glucose monitor strips Test 2 times a day & as needed for symptoms of irregular blood glucose. 100 strip 3    sulfamethoxazole-trimethoprim (BACTRIM DS;SEPTRA DS) 800-160 MG per tablet Take 1 tablet by mouth 2 times daily      etonogestrel (NEXPLANON) 68 MG implant 68 mg by Subdermal route once for 1 dose 1 each 0     No current facility-administered medications for this visit. ALLERGIES:      Allergies   Allergen Reactions    Benadryl [Diphenhydramine] Shortness Of Breath       SOCIAL HISTORY    Reviewed and no change from previous record. [facility]  reports that she quit smoking about 3 years ago. Her smoking use included cigarettes. She started smoking about 11 years ago. She has a 20.00 pack-year smoking history. She has never used smokeless tobacco.    FAMILY HISTORY:    Reviewed and No change from previous visit    REVIEW OF SYSTEMS:    Review of Systems   Constitutional: Negative for chills, fatigue and fever. HENT: Negative for congestion, ear discharge, ear pain, hearing loss, rhinorrhea, sinus pain, sore throat, trouble swallowing and voice change. Eyes: Negative for pain, discharge, itching and visual disturbance. Respiratory: Negative for cough, chest tightness and shortness of breath. Cardiovascular: Negative for chest pain, palpitations and leg swelling. Gastrointestinal: Negative for abdominal pain, blood in stool, constipation, diarrhea, nausea and vomiting. Genitourinary: Negative for dysuria, flank pain, frequency, hematuria and urgency.    Musculoskeletal: Negative for arthralgias, back pain, neck pain and neck stiffness. Skin: Negative for color change, pallor and rash. Neurological: Negative for dizziness, weakness, light-headedness, numbness and headaches. Hematological: Negative for adenopathy. Does not bruise/bleed easily. Psychiatric/Behavioral: Positive for dysphoric mood (improved). Negative for agitation, behavioral problems, confusion, decreased concentration, hallucinations, self-injury, sleep disturbance and suicidal ideas. The patient is not nervous/anxious and is not hyperactive.       PHYSICAL EXAM:      Vitals:    07/23/19 1457   BP: 124/80   Pulse: 97   SpO2: 97%   Weight: 273 lb (123.8 kg)   Height: 5' 7.01\" (1.702 m)     BP Readings from Last 3 Encounters:   07/23/19 124/80   07/18/19 129/82   06/22/19 132/84      General - alert, well appearing, and in no distress  Skin - normal coloration and turgor, no rash  Eyes - pupils equal and reactive, extraocular eye movements intact  Mouth - mucous membranes are moist, pharynx normal without lesions  Neck - supple, no significant adenopathy, no palpable masses   Lymphatics - no palpable lymphadenopathy, no hepatosplenomegaly  Chest - clear to auscultation, symmetric air entry  Heart - normal rate, rhythm is regular, no murmur  Abdomen - soft, nontender, nondistended  Neurological - alert, oriented, normal speech, no focal sensory or motor deficit  Extremities - peripheral pulses normal, no pedal edema    LABORATORY FINDINGS:    CBC:  Lab Results   Component Value Date    WBC 7.6 06/22/2019    HGB 12.6 06/22/2019     06/22/2019     BMP:    Lab Results   Component Value Date     06/22/2019    K 3.9 06/22/2019     06/22/2019    CO2 21 06/22/2019    BUN 7 06/22/2019    CREATININE 0.62 06/22/2019    GLUCOSE 266 06/22/2019     HEMOGLOBIN A1C:   Lab Results   Component Value Date    LABA1C 11.5 06/18/2019     FASTING LIPID PANEL:  Lab Results   Component Value Date    CHOL 115 06/19/2019    HDL 23

## 2019-07-30 ENCOUNTER — OFFICE VISIT (OUTPATIENT)
Dept: INTERNAL MEDICINE CLINIC | Age: 24
End: 2019-07-30
Payer: COMMERCIAL

## 2019-07-30 ENCOUNTER — HOSPITAL ENCOUNTER (OUTPATIENT)
Dept: GENERAL RADIOLOGY | Facility: CLINIC | Age: 24
Discharge: HOME | End: 2019-08-01
Payer: COMMERCIAL

## 2019-07-30 ENCOUNTER — HOSPITAL ENCOUNTER (OUTPATIENT)
Facility: CLINIC | Age: 24
Discharge: HOME | End: 2019-08-01
Payer: COMMERCIAL

## 2019-07-30 VITALS
BODY MASS INDEX: 41.91 KG/M2 | HEART RATE: 80 BPM | WEIGHT: 267 LBS | DIASTOLIC BLOOD PRESSURE: 62 MMHG | OXYGEN SATURATION: 97 % | TEMPERATURE: 98.3 F | SYSTOLIC BLOOD PRESSURE: 136 MMHG | HEIGHT: 67 IN

## 2019-07-30 DIAGNOSIS — M54.50 CHRONIC MIDLINE LOW BACK PAIN WITHOUT SCIATICA: ICD-10-CM

## 2019-07-30 DIAGNOSIS — G89.29 CHRONIC MIDLINE LOW BACK PAIN WITHOUT SCIATICA: Primary | ICD-10-CM

## 2019-07-30 DIAGNOSIS — G89.29 CHRONIC MIDLINE LOW BACK PAIN WITHOUT SCIATICA: ICD-10-CM

## 2019-07-30 DIAGNOSIS — M54.50 CHRONIC MIDLINE LOW BACK PAIN WITHOUT SCIATICA: Primary | ICD-10-CM

## 2019-07-30 DIAGNOSIS — K59.00 CONSTIPATION, UNSPECIFIED CONSTIPATION TYPE: ICD-10-CM

## 2019-07-30 PROCEDURE — G8417 CALC BMI ABV UP PARAM F/U: HCPCS | Performed by: PHYSICIAN ASSISTANT

## 2019-07-30 PROCEDURE — 99214 OFFICE O/P EST MOD 30 MIN: CPT | Performed by: PHYSICIAN ASSISTANT

## 2019-07-30 PROCEDURE — G8427 DOCREV CUR MEDS BY ELIG CLIN: HCPCS | Performed by: PHYSICIAN ASSISTANT

## 2019-07-30 PROCEDURE — 72100 X-RAY EXAM L-S SPINE 2/3 VWS: CPT

## 2019-07-30 PROCEDURE — 1036F TOBACCO NON-USER: CPT | Performed by: PHYSICIAN ASSISTANT

## 2019-07-30 RX ORDER — DOCUSATE SODIUM 100 MG/1
100 CAPSULE, LIQUID FILLED ORAL 2 TIMES DAILY PRN
Qty: 60 CAPSULE | Refills: 0 | Status: SHIPPED | OUTPATIENT
Start: 2019-07-30 | End: 2021-05-21

## 2019-07-30 RX ORDER — IBUPROFEN 600 MG/1
600 TABLET ORAL 3 TIMES DAILY PRN
Qty: 90 TABLET | Refills: 0 | Status: SHIPPED | OUTPATIENT
Start: 2019-07-30 | End: 2020-10-28 | Stop reason: SDUPTHER

## 2019-07-30 RX ORDER — POLYETHYLENE GLYCOL 3350 17 G/17G
17 POWDER, FOR SOLUTION ORAL DAILY
Qty: 1530 G | Refills: 1 | Status: SHIPPED | OUTPATIENT
Start: 2019-07-30 | End: 2019-08-29

## 2019-07-30 RX ORDER — CYCLOBENZAPRINE HCL 5 MG
5 TABLET ORAL NIGHTLY PRN
Qty: 30 TABLET | Refills: 0 | Status: SHIPPED | OUTPATIENT
Start: 2019-07-30 | End: 2019-08-09

## 2019-07-30 ASSESSMENT — ENCOUNTER SYMPTOMS
COUGH: 0
CHEST TIGHTNESS: 0
SHORTNESS OF BREATH: 0
NAUSEA: 0
CONSTIPATION: 1
VOMITING: 0
WHEEZING: 0
BACK PAIN: 1
DIARRHEA: 0
ABDOMINAL PAIN: 0

## 2019-07-30 NOTE — PROGRESS NOTES
[facility]  [facility] [address] [address]  Dept: [phone]  Dept Fax: [phone]    OfficeProgress/Follow Up Note  Date of patient's visit: 7/30/2019  Patient's Name:  Elias Bowie YOB: 1995            Patient Care Team:  Maire Leventhal, PA-C as PCP - General (Physician Assistant)  Maire Leventhal, PA-C as PCP - [facility]  Oliver Jacobson MD as Consulting Physician (Obstetrics & Gynecology)    REASON FOR VISIT:  Same day visit    HISTORY OF PRESENT ILLNESS:      Chief Complaint   Patient presents with    Lower Back Pain     sharp pain, been going for months and becoming, hurts to sleep ,sit and to walk cant stand for long      History was obtained from the patient. Elias Boiwe is a 30 y.o. female here today for above. Lower back pain. Symptoms present for years. Pain is described as an ache, sharp at times (9/10). Lower back, radiates to buttocks bilaterally. Pain is worse with activity, prolonged sitting/standing. No alleviating factors. No weakness or numbness in lower legs. No incontinence of bowels or bladder, mild constipation. No fever or chills.      MEDICATIONS:      Current Outpatient Medications   Medication Sig Dispense Refill    docusate sodium (COLACE) 100 MG capsule Take 1 capsule by mouth 2 times daily as needed for Constipation 60 capsule 0    polyethylene glycol (GLYCOLAX) powder Take 17 g by mouth daily 1530 g 1    cyclobenzaprine (FLEXERIL) 5 MG tablet Take 1 tablet by mouth nightly as needed for Muscle spasms 30 tablet 0    ibuprofen (ADVIL;MOTRIN) 600 MG tablet Take 1 tablet by mouth 3 times daily as needed for Pain 90 tablet 0    NARCAN 4 MG/0.1ML LIQD nasal spray ADMINISTER A SINGLE spray INTO ONE NOSTRIL CALL 911 MAY REPEAT ONCE  0    Blood Glucose Monitoring Suppl (TRUE METRIX METER) w/Device KIT TEST 2 TIMES A DAY AND AS NEEED FOR SYMPTOMS OF IRREGULAR BLOOD GLUCOSE  0    UNIFINE ext nusrat  Extremities - peripheral pulses normal, no pedal edema    ASSESSMENT AND PLAN:      1. Chronic midline low back pain without sciatica  - Acute on chronic lower back pain, advised ice/heat, anti inflammatory, muscle relaxer nightly as needed  - Consider physical therapy referral if symptoms worsening or fail to improve, weight loss discussed  - XR LUMBAR SPINE (2-3 VIEWS); Future  - cyclobenzaprine (FLEXERIL) 5 MG tablet; Take 1 tablet by mouth nightly as needed for Muscle spasms  Dispense: 30 tablet; Refill: 0  - ibuprofen (ADVIL;MOTRIN) 600 MG tablet; Take 1 tablet by mouth 3 times daily as needed for Pain  Dispense: 90 tablet; Refill: 0    2. Constipation, unspecified constipation type  - docusate sodium (COLACE) 100 MG capsule; Take 1 capsule by mouth 2 times daily as needed for Constipation  Dispense: 60 capsule; Refill: 0  - polyethylene glycol (GLYCOLAX) powder; Take 17 g by mouth daily  Dispense: 1530 g; Refill: 1    FOLLOW UP AND INSTRUCTIONS:   Return if symptoms worsen or fail to improve. Discussed use, benefit, and side effects of prescribed medications. All patient questions answered. Patient voiced understanding. Patient given educational materials - see patient instructions    [facility]  7/30/2019, 3:51 PM    Please note that this chart wasgenerated using voice recognition Dragon dictation software. Although every effort was made to ensure the accuracy of this automated transcription, some errors in transcription may have occurred.

## 2019-08-07 ENCOUNTER — TELEPHONE (OUTPATIENT)
Dept: INTERNAL MEDICINE CLINIC | Age: 24
End: 2019-08-07

## 2019-08-07 DIAGNOSIS — M54.50 CHRONIC MIDLINE LOW BACK PAIN WITHOUT SCIATICA: Primary | ICD-10-CM

## 2019-08-07 DIAGNOSIS — G89.29 CHRONIC MIDLINE LOW BACK PAIN WITHOUT SCIATICA: Primary | ICD-10-CM

## 2019-08-08 ENCOUNTER — TELEPHONE (OUTPATIENT)
Dept: INTERNAL MEDICINE CLINIC | Age: 24
End: 2019-08-08

## 2019-08-08 ENCOUNTER — TELEPHONE (OUTPATIENT)
Dept: OBGYN CLINIC | Age: 24
End: 2019-08-08

## 2019-08-08 RX ORDER — PREDNISONE 20 MG/1
20 TABLET ORAL 2 TIMES DAILY
Qty: 10 TABLET | Refills: 0 | Status: SHIPPED | OUTPATIENT
Start: 2019-08-08 | End: 2019-08-13

## 2019-08-08 NOTE — TELEPHONE ENCOUNTER
Pharmacist called to inform that the Prednisone 20 mg is not available, going to change dose to 10mg 2 tabs to double the dose

## 2019-08-10 DIAGNOSIS — F31.31 BIPOLAR AFFECTIVE DISORDER, CURRENTLY DEPRESSED, MILD (HCC): ICD-10-CM

## 2019-08-12 RX ORDER — RISPERIDONE 0.25 MG/1
0.25 TABLET, FILM COATED ORAL NIGHTLY
Qty: 30 TABLET | Refills: 2 | Status: SHIPPED | OUTPATIENT
Start: 2019-08-12 | End: 2019-11-05 | Stop reason: SDUPTHER

## 2019-08-14 ENCOUNTER — HOSPITAL ENCOUNTER (OUTPATIENT)
Age: 24
Discharge: HOME | End: 2019-08-14
Payer: COMMERCIAL

## 2019-08-14 LAB
T3 FREE: 3.14 PG/ML (ref 2.02–4.43)
THYROXINE, FREE: 1.17 NG/DL (ref 0.93–1.7)
TSH SERPL DL<=0.05 MIU/L-ACNC: 0.99 MIU/L (ref 0.3–5)

## 2019-08-14 PROCEDURE — 84481 FREE ASSAY (FT-3): CPT

## 2019-08-14 PROCEDURE — 84443 ASSAY THYROID STIM HORMONE: CPT

## 2019-08-14 PROCEDURE — 36415 COLL VENOUS BLD VENIPUNCTURE: CPT

## 2019-08-14 PROCEDURE — 86376 MICROSOMAL ANTIBODY EACH: CPT

## 2019-08-14 PROCEDURE — 84439 ASSAY OF FREE THYROXINE: CPT

## 2019-08-15 LAB — THYROID PEROXIDASE (TPO) AB: <10 IU/ML (ref 0–35)

## 2019-08-16 ENCOUNTER — TELEPHONE (OUTPATIENT)
Dept: INTERNAL MEDICINE CLINIC | Age: 24
End: 2019-08-16

## 2019-08-16 NOTE — TELEPHONE ENCOUNTER
Patient called in and is asking if something may be called in for her to help her sleep, informed patient she may need to make an appointment. Please call patient back to follow up and advise.

## 2019-08-19 RX ORDER — TRAZODONE HYDROCHLORIDE 50 MG/1
50 TABLET ORAL NIGHTLY PRN
Qty: 30 TABLET | Refills: 5 | Status: SHIPPED | OUTPATIENT
Start: 2019-08-19 | End: 2020-04-20 | Stop reason: SDUPTHER

## 2019-08-19 NOTE — TELEPHONE ENCOUNTER
Patient called back and I informed her of Susan Burks   response, she states that Melatonin is something she tried and does not work for her but she will try Diphenhydramine. Requesting rx to be sent to pharmacy if you could, states that she does not have any money to buy OTC.

## 2019-09-08 DIAGNOSIS — F31.31 BIPOLAR AFFECTIVE DISORDER, CURRENTLY DEPRESSED, MILD (HCC): ICD-10-CM

## 2019-09-09 RX ORDER — FLUOXETINE 10 MG/1
CAPSULE ORAL
Qty: 30 CAPSULE | Refills: 3 | Status: SHIPPED | OUTPATIENT
Start: 2019-09-09 | End: 2020-01-06

## 2019-09-18 ENCOUNTER — TELEPHONE (OUTPATIENT)
Dept: INTERNAL MEDICINE CLINIC | Age: 24
End: 2019-09-18

## 2019-09-23 ENCOUNTER — HOSPITAL ENCOUNTER (OUTPATIENT)
Age: 24
Setting detail: OBSERVATION
Discharge: HOME | End: 2019-09-25
Attending: EMERGENCY MEDICINE | Admitting: INTERNAL MEDICINE
Payer: COMMERCIAL

## 2019-09-23 ENCOUNTER — OFFICE VISIT (OUTPATIENT)
Dept: INTERNAL MEDICINE CLINIC | Age: 24
End: 2019-09-23
Payer: COMMERCIAL

## 2019-09-23 VITALS
OXYGEN SATURATION: 99 % | SYSTOLIC BLOOD PRESSURE: 112 MMHG | WEIGHT: 267 LBS | BODY MASS INDEX: 41.91 KG/M2 | DIASTOLIC BLOOD PRESSURE: 68 MMHG | HEIGHT: 67 IN | TEMPERATURE: 98.2 F | HEART RATE: 86 BPM

## 2019-09-23 DIAGNOSIS — L02.91 ABSCESS: Primary | ICD-10-CM

## 2019-09-23 DIAGNOSIS — N61.1 BREAST ABSCESS: ICD-10-CM

## 2019-09-23 DIAGNOSIS — N61.0 CELLULITIS OF BREAST: ICD-10-CM

## 2019-09-23 DIAGNOSIS — N61.1 ABSCESS OF RIGHT BREAST: Primary | ICD-10-CM

## 2019-09-23 LAB
ABSOLUTE EOS #: 0.5 K/UL (ref 0–0.4)
ABSOLUTE IMMATURE GRANULOCYTE: ABNORMAL K/UL (ref 0–0.3)
ABSOLUTE LYMPH #: 4 K/UL (ref 1–4.8)
ABSOLUTE MONO #: 1.1 K/UL (ref 0.1–1.3)
ALBUMIN SERPL-MCNC: 4.2 G/DL (ref 3.5–5.2)
ALBUMIN/GLOBULIN RATIO: ABNORMAL (ref 1–2.5)
ALP BLD-CCNC: 84 U/L (ref 35–104)
ALT SERPL-CCNC: 29 U/L (ref 5–33)
ANION GAP SERPL CALCULATED.3IONS-SCNC: 17 MMOL/L (ref 9–17)
AST SERPL-CCNC: 15 U/L
BASOPHILS # BLD: 1 % (ref 0–2)
BASOPHILS ABSOLUTE: 0.1 K/UL (ref 0–0.2)
BILIRUB SERPL-MCNC: 0.57 MG/DL (ref 0.3–1.2)
BUN BLDV-MCNC: 20 MG/DL (ref 6–20)
BUN/CREAT BLD: ABNORMAL (ref 9–20)
CALCIUM SERPL-MCNC: 9 MG/DL (ref 8.6–10.4)
CHLORIDE BLD-SCNC: 99 MMOL/L (ref 98–107)
CO2: 18 MMOL/L (ref 20–31)
CREAT SERPL-MCNC: 0.89 MG/DL (ref 0.5–0.9)
DIFFERENTIAL TYPE: ABNORMAL
EOSINOPHILS RELATIVE PERCENT: 3 % (ref 0–4)
GFR AFRICAN AMERICAN: >60 ML/MIN
GFR NON-AFRICAN AMERICAN: >60 ML/MIN
GFR SERPL CREATININE-BSD FRML MDRD: ABNORMAL ML/MIN/{1.73_M2}
GFR SERPL CREATININE-BSD FRML MDRD: ABNORMAL ML/MIN/{1.73_M2}
GLUCOSE BLD-MCNC: 279 MG/DL (ref 70–99)
HCT VFR BLD CALC: 37.1 % (ref 36–46)
HEMOGLOBIN: 12.4 G/DL (ref 12–16)
IMMATURE GRANULOCYTES: ABNORMAL %
LYMPHOCYTES # BLD: 26 % (ref 24–44)
MCH RBC QN AUTO: 30.5 PG (ref 26–34)
MCHC RBC AUTO-ENTMCNC: 33.4 G/DL (ref 31–37)
MCV RBC AUTO: 91.4 FL (ref 80–100)
MONOCYTES # BLD: 8 % (ref 1–7)
NRBC AUTOMATED: ABNORMAL PER 100 WBC
PDW BLD-RTO: 14.5 % (ref 11.5–14.9)
PLATELET # BLD: 242 K/UL (ref 150–450)
PLATELET ESTIMATE: ABNORMAL
PMV BLD AUTO: 7.6 FL (ref 6–12)
POTASSIUM SERPL-SCNC: 3.8 MMOL/L (ref 3.7–5.3)
RBC # BLD: 4.06 M/UL (ref 4–5.2)
RBC # BLD: ABNORMAL 10*6/UL
SEG NEUTROPHILS: 62 % (ref 36–66)
SEGMENTED NEUTROPHILS ABSOLUTE COUNT: 9.4 K/UL (ref 1.3–9.1)
SODIUM BLD-SCNC: 134 MMOL/L (ref 135–144)
TOTAL PROTEIN: 6.9 G/DL (ref 6.4–8.3)
WBC # BLD: 15.1 K/UL (ref 3.5–11)
WBC # BLD: ABNORMAL 10*3/UL

## 2019-09-23 PROCEDURE — G8417 CALC BMI ABV UP PARAM F/U: HCPCS | Performed by: PHYSICIAN ASSISTANT

## 2019-09-23 PROCEDURE — 99284 EMERGENCY DEPT VISIT MOD MDM: CPT

## 2019-09-23 PROCEDURE — 1036F TOBACCO NON-USER: CPT | Performed by: PHYSICIAN ASSISTANT

## 2019-09-23 PROCEDURE — 36415 COLL VENOUS BLD VENIPUNCTURE: CPT

## 2019-09-23 PROCEDURE — 80053 COMPREHEN METABOLIC PANEL: CPT

## 2019-09-23 PROCEDURE — G8427 DOCREV CUR MEDS BY ELIG CLIN: HCPCS | Performed by: PHYSICIAN ASSISTANT

## 2019-09-23 PROCEDURE — 96367 TX/PROPH/DG ADDL SEQ IV INF: CPT

## 2019-09-23 PROCEDURE — 96375 TX/PRO/DX INJ NEW DRUG ADDON: CPT

## 2019-09-23 PROCEDURE — G0378 HOSPITAL OBSERVATION PER HR: HCPCS

## 2019-09-23 PROCEDURE — 87040 BLOOD CULTURE FOR BACTERIA: CPT

## 2019-09-23 PROCEDURE — 6360000002 HC RX W HCPCS: Performed by: EMERGENCY MEDICINE

## 2019-09-23 PROCEDURE — 2580000003 HC RX 258: Performed by: EMERGENCY MEDICINE

## 2019-09-23 PROCEDURE — 99214 OFFICE O/P EST MOD 30 MIN: CPT | Performed by: PHYSICIAN ASSISTANT

## 2019-09-23 PROCEDURE — 96365 THER/PROPH/DIAG IV INF INIT: CPT

## 2019-09-23 PROCEDURE — 2500000003 HC RX 250 WO HCPCS: Performed by: EMERGENCY MEDICINE

## 2019-09-23 PROCEDURE — 85025 COMPLETE CBC W/AUTO DIFF WBC: CPT

## 2019-09-23 RX ORDER — KETOROLAC TROMETHAMINE 30 MG/ML
30 INJECTION, SOLUTION INTRAMUSCULAR; INTRAVENOUS ONCE
Status: COMPLETED | OUTPATIENT
Start: 2019-09-23 | End: 2019-09-23

## 2019-09-23 RX ADMIN — KETOROLAC TROMETHAMINE 30 MG: 30 INJECTION, SOLUTION INTRAMUSCULAR; INTRAVENOUS at 22:58

## 2019-09-23 RX ADMIN — Medication 1750 MG: at 23:30

## 2019-09-23 RX ADMIN — PIPERACILLIN SODIUM AND TAZOBACTAM SODIUM 3.38 G: 3; .375 INJECTION, POWDER, LYOPHILIZED, FOR SOLUTION INTRAVENOUS at 22:59

## 2019-09-23 ASSESSMENT — PAIN DESCRIPTION - FREQUENCY: FREQUENCY: CONTINUOUS

## 2019-09-23 ASSESSMENT — ENCOUNTER SYMPTOMS
SINUS PAIN: 0
COUGH: 0
BLOOD IN STOOL: 0
DIARRHEA: 0
SORE THROAT: 0
VOMITING: 0
CONSTIPATION: 0
EYE ITCHING: 0
SHORTNESS OF BREATH: 0
EYE PAIN: 0
COLOR CHANGE: 0
RHINORRHEA: 0
NAUSEA: 0
ABDOMINAL PAIN: 0
CHEST TIGHTNESS: 0
EYE DISCHARGE: 0
BACK PAIN: 0

## 2019-09-23 ASSESSMENT — PAIN DESCRIPTION - PAIN TYPE: TYPE: ACUTE PAIN

## 2019-09-23 ASSESSMENT — PAIN SCALES - GENERAL
PAINLEVEL_OUTOF10: 10
PAINLEVEL_OUTOF10: 10

## 2019-09-23 ASSESSMENT — PAIN DESCRIPTION - ORIENTATION: ORIENTATION: RIGHT

## 2019-09-23 ASSESSMENT — PAIN DESCRIPTION - DESCRIPTORS: DESCRIPTORS: ACHING

## 2019-09-23 ASSESSMENT — PAIN DESCRIPTION - ONSET: ONSET: ON-GOING

## 2019-09-23 ASSESSMENT — PAIN DESCRIPTION - LOCATION: LOCATION: BREAST

## 2019-09-23 NOTE — PROGRESS NOTES
deficit  Extremities - peripheral pulses normal, no pedal edema    ASSESSMENT AND PLAN:      1. Abscess of right breast  2. Cellulitis of breast  - Discussed case with Dr. Cayden Vogel, will admit to [facility]   - IV antibiotics, general surgery consult  - Patient to present to hospital from clinic     FOLLOW UP AND INSTRUCTIONS:   Return for hospital discharge follow up. [facility]OLA [facility]  9/23/2019, 3:43 PM    Please note that this chart wasgenerated using voice recognition Dragon dictation software. Although every effort was made to ensure the accuracy of this automated transcription, some errors in transcription may have occurred.

## 2019-09-24 ENCOUNTER — ANESTHESIA (OUTPATIENT)
Dept: OPERATING ROOM | Age: 24
End: 2019-09-24
Payer: COMMERCIAL

## 2019-09-24 ENCOUNTER — ANESTHESIA EVENT (OUTPATIENT)
Dept: OPERATING ROOM | Age: 24
End: 2019-09-24
Payer: COMMERCIAL

## 2019-09-24 VITALS — SYSTOLIC BLOOD PRESSURE: 94 MMHG | OXYGEN SATURATION: 99 % | DIASTOLIC BLOOD PRESSURE: 50 MMHG | TEMPERATURE: 95.9 F

## 2019-09-24 LAB
-: NORMAL
ANION GAP SERPL CALCULATED.3IONS-SCNC: 15 MMOL/L (ref 9–17)
BUN BLDV-MCNC: 18 MG/DL (ref 6–20)
BUN/CREAT BLD: ABNORMAL (ref 9–20)
CALCIUM SERPL-MCNC: 8.7 MG/DL (ref 8.6–10.4)
CHLORIDE BLD-SCNC: 103 MMOL/L (ref 98–107)
CO2: 18 MMOL/L (ref 20–31)
CREAT SERPL-MCNC: 0.76 MG/DL (ref 0.5–0.9)
GFR AFRICAN AMERICAN: >60 ML/MIN
GFR NON-AFRICAN AMERICAN: >60 ML/MIN
GFR SERPL CREATININE-BSD FRML MDRD: ABNORMAL ML/MIN/{1.73_M2}
GFR SERPL CREATININE-BSD FRML MDRD: ABNORMAL ML/MIN/{1.73_M2}
GLUCOSE BLD-MCNC: 139 MG/DL (ref 65–105)
GLUCOSE BLD-MCNC: 150 MG/DL (ref 65–105)
GLUCOSE BLD-MCNC: 152 MG/DL (ref 70–99)
GLUCOSE BLD-MCNC: 161 MG/DL (ref 65–105)
GLUCOSE BLD-MCNC: 212 MG/DL (ref 65–105)
GLUCOSE BLD-MCNC: 335 MG/DL (ref 65–105)
GLUCOSE BLD-MCNC: 404 MG/DL (ref 65–105)
HCG, PREGNANCY URINE (POC): NEGATIVE
HCT VFR BLD CALC: 38.3 % (ref 36–46)
HEMOGLOBIN: 13 G/DL (ref 12–16)
MCH RBC QN AUTO: 31.1 PG (ref 26–34)
MCHC RBC AUTO-ENTMCNC: 33.9 G/DL (ref 31–37)
MCV RBC AUTO: 91.9 FL (ref 80–100)
NRBC AUTOMATED: ABNORMAL PER 100 WBC
PDW BLD-RTO: 14.4 % (ref 11.5–14.9)
PLATELET # BLD: 219 K/UL (ref 150–450)
PMV BLD AUTO: 7.4 FL (ref 6–12)
POTASSIUM SERPL-SCNC: 3.7 MMOL/L (ref 3.7–5.3)
RBC # BLD: 4.17 M/UL (ref 4–5.2)
SODIUM BLD-SCNC: 136 MMOL/L (ref 135–144)
WBC # BLD: 12 K/UL (ref 3.5–11)

## 2019-09-24 PROCEDURE — 6370000000 HC RX 637 (ALT 250 FOR IP): Performed by: SURGERY

## 2019-09-24 PROCEDURE — 2500000003 HC RX 250 WO HCPCS

## 2019-09-24 PROCEDURE — 6360000002 HC RX W HCPCS

## 2019-09-24 PROCEDURE — 86403 PARTICLE AGGLUT ANTBDY SCRN: CPT

## 2019-09-24 PROCEDURE — 2580000003 HC RX 258: Performed by: SURGERY

## 2019-09-24 PROCEDURE — 3700000000 HC ANESTHESIA ATTENDED CARE: Performed by: SURGERY

## 2019-09-24 PROCEDURE — 6360000002 HC RX W HCPCS: Performed by: NURSE PRACTITIONER

## 2019-09-24 PROCEDURE — 87205 SMEAR GRAM STAIN: CPT

## 2019-09-24 PROCEDURE — 85027 COMPLETE CBC AUTOMATED: CPT

## 2019-09-24 PROCEDURE — 3600000012 HC SURGERY LEVEL 2 ADDTL 15MIN: Performed by: SURGERY

## 2019-09-24 PROCEDURE — 3700000001 HC ADD 15 MINUTES (ANESTHESIA): Performed by: SURGERY

## 2019-09-24 PROCEDURE — 87206 SMEAR FLUORESCENT/ACID STAI: CPT

## 2019-09-24 PROCEDURE — 6360000002 HC RX W HCPCS: Performed by: ANESTHESIOLOGY

## 2019-09-24 PROCEDURE — 2580000003 HC RX 258: Performed by: NURSE PRACTITIONER

## 2019-09-24 PROCEDURE — 87075 CULTR BACTERIA EXCEPT BLOOD: CPT

## 2019-09-24 PROCEDURE — 7100000001 HC PACU RECOVERY - ADDTL 15 MIN: Performed by: SURGERY

## 2019-09-24 PROCEDURE — G0378 HOSPITAL OBSERVATION PER HR: HCPCS

## 2019-09-24 PROCEDURE — 3600000002 HC SURGERY LEVEL 2 BASE: Performed by: SURGERY

## 2019-09-24 PROCEDURE — 87070 CULTURE OTHR SPECIMN AEROBIC: CPT

## 2019-09-24 PROCEDURE — 80048 BASIC METABOLIC PNL TOTAL CA: CPT

## 2019-09-24 PROCEDURE — 82947 ASSAY GLUCOSE BLOOD QUANT: CPT

## 2019-09-24 PROCEDURE — 81025 URINE PREGNANCY TEST: CPT

## 2019-09-24 PROCEDURE — 87186 SC STD MICRODIL/AGAR DIL: CPT

## 2019-09-24 PROCEDURE — 99223 1ST HOSP IP/OBS HIGH 75: CPT | Performed by: INTERNAL MEDICINE

## 2019-09-24 PROCEDURE — 6360000002 HC RX W HCPCS: Performed by: SURGERY

## 2019-09-24 PROCEDURE — 36415 COLL VENOUS BLD VENIPUNCTURE: CPT

## 2019-09-24 PROCEDURE — 2500000003 HC RX 250 WO HCPCS: Performed by: EMERGENCY MEDICINE

## 2019-09-24 PROCEDURE — 7100000000 HC PACU RECOVERY - FIRST 15 MIN: Performed by: SURGERY

## 2019-09-24 PROCEDURE — 2709999900 HC NON-CHARGEABLE SUPPLY: Performed by: SURGERY

## 2019-09-24 PROCEDURE — 87102 FUNGUS ISOLATION CULTURE: CPT

## 2019-09-24 PROCEDURE — 6370000000 HC RX 637 (ALT 250 FOR IP): Performed by: NURSE PRACTITIONER

## 2019-09-24 RX ORDER — NICOTINE POLACRILEX 4 MG
15 LOZENGE BUCCAL PRN
Status: DISCONTINUED | OUTPATIENT
Start: 2019-09-24 | End: 2019-09-25 | Stop reason: HOSPADM

## 2019-09-24 RX ORDER — DEXTROSE MONOHYDRATE 25 G/50ML
12.5 INJECTION, SOLUTION INTRAVENOUS PRN
Status: DISCONTINUED | OUTPATIENT
Start: 2019-09-24 | End: 2019-09-25 | Stop reason: HOSPADM

## 2019-09-24 RX ORDER — FENTANYL CITRATE 50 UG/ML
INJECTION, SOLUTION INTRAMUSCULAR; INTRAVENOUS PRN
Status: DISCONTINUED | OUTPATIENT
Start: 2019-09-24 | End: 2019-09-24 | Stop reason: SDUPTHER

## 2019-09-24 RX ORDER — POTASSIUM CHLORIDE 20 MEQ/1
40 TABLET, EXTENDED RELEASE ORAL PRN
Status: DISCONTINUED | OUTPATIENT
Start: 2019-09-24 | End: 2019-09-25 | Stop reason: HOSPADM

## 2019-09-24 RX ORDER — LABETALOL 20 MG/4 ML (5 MG/ML) INTRAVENOUS SYRINGE
5 EVERY 10 MIN PRN
Status: DISCONTINUED | OUTPATIENT
Start: 2019-09-24 | End: 2019-09-24 | Stop reason: HOSPADM

## 2019-09-24 RX ORDER — LIDOCAINE HYDROCHLORIDE 10 MG/ML
INJECTION, SOLUTION EPIDURAL; INFILTRATION; INTRACAUDAL; PERINEURAL PRN
Status: DISCONTINUED | OUTPATIENT
Start: 2019-09-24 | End: 2019-09-24 | Stop reason: SDUPTHER

## 2019-09-24 RX ORDER — ONDANSETRON 2 MG/ML
4 INJECTION INTRAMUSCULAR; INTRAVENOUS
Status: COMPLETED | OUTPATIENT
Start: 2019-09-24 | End: 2019-09-24

## 2019-09-24 RX ORDER — PROPOFOL 10 MG/ML
INJECTION, EMULSION INTRAVENOUS PRN
Status: DISCONTINUED | OUTPATIENT
Start: 2019-09-24 | End: 2019-09-24 | Stop reason: SDUPTHER

## 2019-09-24 RX ORDER — SODIUM CHLORIDE 0.9 % (FLUSH) 0.9 %
10 SYRINGE (ML) INJECTION EVERY 12 HOURS SCHEDULED
Status: DISCONTINUED | OUTPATIENT
Start: 2019-09-24 | End: 2019-09-25 | Stop reason: HOSPADM

## 2019-09-24 RX ORDER — DEXTROSE MONOHYDRATE 50 MG/ML
100 INJECTION, SOLUTION INTRAVENOUS PRN
Status: DISCONTINUED | OUTPATIENT
Start: 2019-09-24 | End: 2019-09-25 | Stop reason: HOSPADM

## 2019-09-24 RX ORDER — OXYCODONE HYDROCHLORIDE AND ACETAMINOPHEN 5; 325 MG/1; MG/1
1 TABLET ORAL PRN
Status: DISCONTINUED | OUTPATIENT
Start: 2019-09-24 | End: 2019-09-24 | Stop reason: HOSPADM

## 2019-09-24 RX ORDER — KETOROLAC TROMETHAMINE 30 MG/ML
30 INJECTION, SOLUTION INTRAMUSCULAR; INTRAVENOUS EVERY 6 HOURS PRN
Status: DISCONTINUED | OUTPATIENT
Start: 2019-09-24 | End: 2019-09-25 | Stop reason: HOSPADM

## 2019-09-24 RX ORDER — LISINOPRIL 5 MG/1
5 TABLET ORAL DAILY
Status: DISCONTINUED | OUTPATIENT
Start: 2019-09-24 | End: 2019-09-25 | Stop reason: HOSPADM

## 2019-09-24 RX ORDER — SODIUM CHLORIDE 9 MG/ML
INJECTION, SOLUTION INTRAVENOUS CONTINUOUS
Status: DISCONTINUED | OUTPATIENT
Start: 2019-09-24 | End: 2019-09-25 | Stop reason: HOSPADM

## 2019-09-24 RX ORDER — FLUOXETINE 10 MG/1
10 CAPSULE ORAL DAILY
Status: DISCONTINUED | OUTPATIENT
Start: 2019-09-24 | End: 2019-09-25 | Stop reason: HOSPADM

## 2019-09-24 RX ORDER — MIDAZOLAM HYDROCHLORIDE 1 MG/ML
INJECTION INTRAMUSCULAR; INTRAVENOUS PRN
Status: DISCONTINUED | OUTPATIENT
Start: 2019-09-24 | End: 2019-09-24 | Stop reason: SDUPTHER

## 2019-09-24 RX ORDER — ACETAMINOPHEN 325 MG/1
650 TABLET ORAL EVERY 4 HOURS PRN
Status: DISCONTINUED | OUTPATIENT
Start: 2019-09-24 | End: 2019-09-25 | Stop reason: HOSPADM

## 2019-09-24 RX ORDER — RISPERIDONE 0.25 MG/1
0.25 TABLET, FILM COATED ORAL NIGHTLY
Status: DISCONTINUED | OUTPATIENT
Start: 2019-09-24 | End: 2019-09-25 | Stop reason: HOSPADM

## 2019-09-24 RX ORDER — POTASSIUM CHLORIDE 7.45 MG/ML
10 INJECTION INTRAVENOUS PRN
Status: DISCONTINUED | OUTPATIENT
Start: 2019-09-24 | End: 2019-09-25 | Stop reason: HOSPADM

## 2019-09-24 RX ORDER — DEXAMETHASONE SODIUM PHOSPHATE 4 MG/ML
INJECTION, SOLUTION INTRA-ARTICULAR; INTRALESIONAL; INTRAMUSCULAR; INTRAVENOUS; SOFT TISSUE PRN
Status: DISCONTINUED | OUTPATIENT
Start: 2019-09-24 | End: 2019-09-24 | Stop reason: SDUPTHER

## 2019-09-24 RX ORDER — ALBUTEROL SULFATE 90 UG/1
2 AEROSOL, METERED RESPIRATORY (INHALATION) EVERY 6 HOURS PRN
Status: DISCONTINUED | OUTPATIENT
Start: 2019-09-24 | End: 2019-09-25 | Stop reason: HOSPADM

## 2019-09-24 RX ORDER — OXYCODONE HYDROCHLORIDE AND ACETAMINOPHEN 5; 325 MG/1; MG/1
1 TABLET ORAL EVERY 4 HOURS PRN
Status: DISCONTINUED | OUTPATIENT
Start: 2019-09-24 | End: 2019-09-25 | Stop reason: HOSPADM

## 2019-09-24 RX ORDER — INSULIN GLARGINE 100 [IU]/ML
35 INJECTION, SOLUTION SUBCUTANEOUS 2 TIMES DAILY
Status: DISCONTINUED | OUTPATIENT
Start: 2019-09-24 | End: 2019-09-25 | Stop reason: HOSPADM

## 2019-09-24 RX ORDER — OXYCODONE HYDROCHLORIDE AND ACETAMINOPHEN 5; 325 MG/1; MG/1
2 TABLET ORAL PRN
Status: DISCONTINUED | OUTPATIENT
Start: 2019-09-24 | End: 2019-09-24 | Stop reason: HOSPADM

## 2019-09-24 RX ORDER — ONDANSETRON 2 MG/ML
INJECTION INTRAMUSCULAR; INTRAVENOUS PRN
Status: DISCONTINUED | OUTPATIENT
Start: 2019-09-24 | End: 2019-09-24 | Stop reason: SDUPTHER

## 2019-09-24 RX ORDER — TRAZODONE HYDROCHLORIDE 50 MG/1
50 TABLET ORAL NIGHTLY PRN
Status: DISCONTINUED | OUTPATIENT
Start: 2019-09-24 | End: 2019-09-25 | Stop reason: HOSPADM

## 2019-09-24 RX ORDER — DOCUSATE SODIUM 100 MG/1
100 CAPSULE, LIQUID FILLED ORAL 2 TIMES DAILY PRN
Status: DISCONTINUED | OUTPATIENT
Start: 2019-09-24 | End: 2019-09-25 | Stop reason: HOSPADM

## 2019-09-24 RX ORDER — SODIUM CHLORIDE 0.9 % (FLUSH) 0.9 %
10 SYRINGE (ML) INJECTION PRN
Status: DISCONTINUED | OUTPATIENT
Start: 2019-09-24 | End: 2019-09-25 | Stop reason: HOSPADM

## 2019-09-24 RX ORDER — MAGNESIUM SULFATE 1 G/100ML
1 INJECTION INTRAVENOUS PRN
Status: DISCONTINUED | OUTPATIENT
Start: 2019-09-24 | End: 2019-09-25 | Stop reason: HOSPADM

## 2019-09-24 RX ORDER — PROMETHAZINE HYDROCHLORIDE 25 MG/ML
6.25 INJECTION, SOLUTION INTRAMUSCULAR; INTRAVENOUS
Status: DISCONTINUED | OUTPATIENT
Start: 2019-09-24 | End: 2019-09-24 | Stop reason: HOSPADM

## 2019-09-24 RX ORDER — ONDANSETRON 2 MG/ML
4 INJECTION INTRAMUSCULAR; INTRAVENOUS EVERY 6 HOURS PRN
Status: DISCONTINUED | OUTPATIENT
Start: 2019-09-24 | End: 2019-09-25 | Stop reason: HOSPADM

## 2019-09-24 RX ADMIN — TRAZODONE HYDROCHLORIDE 50 MG: 50 TABLET ORAL at 21:29

## 2019-09-24 RX ADMIN — ENOXAPARIN SODIUM 30 MG: 30 INJECTION SUBCUTANEOUS at 01:50

## 2019-09-24 RX ADMIN — Medication 1750 MG: at 12:12

## 2019-09-24 RX ADMIN — INSULIN LISPRO 4 UNITS: 100 INJECTION, SOLUTION INTRAVENOUS; SUBCUTANEOUS at 01:50

## 2019-09-24 RX ADMIN — FENTANYL CITRATE 50 MCG: 50 INJECTION, SOLUTION INTRAMUSCULAR; INTRAVENOUS at 15:32

## 2019-09-24 RX ADMIN — PIPERACILLIN SODIUM AND TAZOBACTAM SODIUM 3.38 G: 3; .375 INJECTION, POWDER, LYOPHILIZED, FOR SOLUTION INTRAVENOUS at 22:44

## 2019-09-24 RX ADMIN — FENTANYL CITRATE 50 MCG: 50 INJECTION, SOLUTION INTRAMUSCULAR; INTRAVENOUS at 15:20

## 2019-09-24 RX ADMIN — DEXAMETHASONE SODIUM PHOSPHATE 4 MG: 4 INJECTION, SOLUTION INTRA-ARTICULAR; INTRALESIONAL; INTRAMUSCULAR; INTRAVENOUS; SOFT TISSUE at 15:33

## 2019-09-24 RX ADMIN — ONDANSETRON 4 MG: 2 INJECTION INTRAMUSCULAR; INTRAVENOUS at 16:16

## 2019-09-24 RX ADMIN — LIDOCAINE HYDROCHLORIDE 50 MG: 10 INJECTION, SOLUTION EPIDURAL; INFILTRATION; INTRACAUDAL at 15:15

## 2019-09-24 RX ADMIN — PROPOFOL 60 MG: 10 INJECTION, EMULSION INTRAVENOUS at 15:20

## 2019-09-24 RX ADMIN — INSULIN GLARGINE 35 UNITS: 100 INJECTION, SOLUTION SUBCUTANEOUS at 21:30

## 2019-09-24 RX ADMIN — ONDANSETRON 4 MG: 2 INJECTION INTRAMUSCULAR; INTRAVENOUS at 15:33

## 2019-09-24 RX ADMIN — MIDAZOLAM 2 MG: 1 INJECTION INTRAMUSCULAR; INTRAVENOUS at 15:09

## 2019-09-24 RX ADMIN — INSULIN LISPRO 6 UNITS: 100 INJECTION, SOLUTION INTRAVENOUS; SUBCUTANEOUS at 21:30

## 2019-09-24 RX ADMIN — INSULIN GLARGINE 35 UNITS: 100 INJECTION, SOLUTION SUBCUTANEOUS at 01:50

## 2019-09-24 RX ADMIN — PIPERACILLIN SODIUM AND TAZOBACTAM SODIUM 3.38 G: 3; .375 INJECTION, POWDER, LYOPHILIZED, FOR SOLUTION INTRAVENOUS at 04:07

## 2019-09-24 RX ADMIN — RISPERIDONE 0.25 MG: 0.25 TABLET ORAL at 21:29

## 2019-09-24 RX ADMIN — SODIUM CHLORIDE: 9 INJECTION, SOLUTION INTRAVENOUS at 01:50

## 2019-09-24 RX ADMIN — SODIUM CHLORIDE: 9 INJECTION, SOLUTION INTRAVENOUS at 12:14

## 2019-09-24 RX ADMIN — PROPOFOL 200 MG: 10 INJECTION, EMULSION INTRAVENOUS at 15:15

## 2019-09-24 RX ADMIN — SODIUM CHLORIDE: 9 INJECTION, SOLUTION INTRAVENOUS at 14:38

## 2019-09-24 RX ADMIN — PIPERACILLIN SODIUM AND TAZOBACTAM SODIUM 3.38 G: 3; .375 INJECTION, POWDER, LYOPHILIZED, FOR SOLUTION INTRAVENOUS at 11:19

## 2019-09-24 ASSESSMENT — PULMONARY FUNCTION TESTS
PIF_VALUE: 13
PIF_VALUE: 13
PIF_VALUE: 11
PIF_VALUE: 2
PIF_VALUE: 11
PIF_VALUE: 15
PIF_VALUE: 14
PIF_VALUE: 0
PIF_VALUE: 17
PIF_VALUE: 14
PIF_VALUE: 25
PIF_VALUE: 17
PIF_VALUE: 0
PIF_VALUE: 14
PIF_VALUE: 12
PIF_VALUE: 13
PIF_VALUE: 0
PIF_VALUE: 0
PIF_VALUE: 15
PIF_VALUE: 12
PIF_VALUE: 0
PIF_VALUE: 14
PIF_VALUE: 16
PIF_VALUE: 13
PIF_VALUE: 11
PIF_VALUE: 12
PIF_VALUE: 0
PIF_VALUE: 3
PIF_VALUE: 12
PIF_VALUE: 13
PIF_VALUE: 1
PIF_VALUE: 15
PIF_VALUE: 14
PIF_VALUE: 14
PIF_VALUE: 0
PIF_VALUE: 24
PIF_VALUE: 0
PIF_VALUE: 14
PIF_VALUE: 12
PIF_VALUE: 14
PIF_VALUE: 1
PIF_VALUE: 1
PIF_VALUE: 12

## 2019-09-24 ASSESSMENT — PAIN - FUNCTIONAL ASSESSMENT: PAIN_FUNCTIONAL_ASSESSMENT: 0-10

## 2019-09-24 ASSESSMENT — ENCOUNTER SYMPTOMS
COUGH: 0
VOMITING: 0
BACK PAIN: 0
ABDOMINAL PAIN: 0
WHEEZING: 0
CONSTIPATION: 0
DIARRHEA: 1
NAUSEA: 0
SHORTNESS OF BREATH: 0
SORE THROAT: 0

## 2019-09-24 ASSESSMENT — PAIN DESCRIPTION - DESCRIPTORS
DESCRIPTORS: ACHING
DESCRIPTORS: BURNING;ACHING
DESCRIPTORS: THROBBING;BURNING

## 2019-09-24 ASSESSMENT — PAIN DESCRIPTION - ONSET
ONSET: SUDDEN
ONSET: ON-GOING

## 2019-09-24 ASSESSMENT — PAIN SCALES - GENERAL
PAINLEVEL_OUTOF10: 0
PAINLEVEL_OUTOF10: 0
PAINLEVEL_OUTOF10: 7
PAINLEVEL_OUTOF10: 0
PAINLEVEL_OUTOF10: 10

## 2019-09-24 ASSESSMENT — PAIN DESCRIPTION - ORIENTATION
ORIENTATION: RIGHT
ORIENTATION: LEFT;RIGHT

## 2019-09-24 ASSESSMENT — PAIN DESCRIPTION - PAIN TYPE
TYPE: ACUTE PAIN
TYPE: ACUTE PAIN

## 2019-09-24 ASSESSMENT — PAIN DESCRIPTION - FREQUENCY
FREQUENCY: CONTINUOUS
FREQUENCY: CONTINUOUS

## 2019-09-24 ASSESSMENT — PAIN DESCRIPTION - LOCATION
LOCATION: ARM;BREAST
LOCATION: BREAST

## 2019-09-24 NOTE — ED PROVIDER NOTES
40 mEq     potassium bicarb-citric acid (EFFER-K) effervescent tablet 40 mEq     potassium chloride 10 mEq/100 mL IVPB (Peripheral Line)    magnesium sulfate 1 g in dextrose 5% 100 mL IVPB    magnesium hydroxide (MILK OF MAGNESIA) 400 MG/5ML suspension 30 mL    ondansetron (ZOFRAN) injection 4 mg    enoxaparin (LOVENOX) injection 30 mg    acetaminophen (TYLENOL) tablet 650 mg    glucose (GLUTOSE) 40 % oral gel 15 g    dextrose 50 % IV solution    glucagon (rDNA) injection 1 mg    dextrose 5 % solution    insulin lispro (HUMALOG) injection vial 0-12 Units    insulin lispro (HUMALOG) injection vial 0-6 Units    influenza quadrivalent split vaccine (FLUZONE;FLUARIX;FLULAVAL;AFLURIA) injection 0.5 mL     CONSULTS:  PHARMACY TO DOSE VANCOMYCIN  IP CONSULT TO GENERAL SURGERY    FINAL IMPRESSION      1.  Abscess          DISPOSITION/PLAN   DISPOSITION Admitted 09/23/2019 11:15:15 PM      PATIENT REFERRED TO:  Rio Cho PA-C  [address] [place] [id number]          DISCHARGE MEDICATIONS:  Current Discharge Medication List        Lisa Anaya MD  Attending Emergency Physician                   Cayetano Primrose, MD  09/24/19 1370

## 2019-09-24 NOTE — CONSULTS
ABIMBOLA Gomez   estrogen, conjugated,-medroxyprogesterone (PREMPRO) 0.625-2.5 MG per tablet Take 1 tablet by mouth daily 7/18/19  Yes Daryn Murphy MD   metFORMIN (GLUCOPHAGE) 1000 MG tablet Take 1 tablet by mouth 2 times daily (with meals) 6/22/19  Yes Ailyn Clayton MD   lisinopril (PRINIVIL;ZESTRIL) 5 MG tablet Take 1 tablet by mouth daily 6/23/19  Yes Ailyn Clayton MD   insulin glargine (LANTUS SOLOSTAR) 100 UNIT/ML injection pen Inject 35 Units into the skin 2 times daily 6/22/19  Yes Ailyn Clayton MD   ibuprofen (ADVIL;MOTRIN) 600 MG tablet Take 1 tablet by mouth 3 times daily as needed for Pain 7/30/19   Lito Escoto PA-C   oxyCODONE-acetaminophen (PERCOCET) 5-325 MG per tablet take 1 tablet by mouth every 6 hours if needed for pain FOR UP TO 3 DAYS maximum daily dose of 4 6/18/19   Historical Provider, MD   NARCAN 4 MG/0.1ML LIQD nasal spray ADMINISTER A SINGLE spray INTO ONE NOSTRIL CALL 911 MAY REPEAT ONCE 7/19/19   Historical Provider, MD   Blood Glucose Monitoring Suppl (TRUE METRIX METER) w/Device KIT TEST 2 TIMES A DAY AND AS NEEED FOR SYMPTOMS OF IRREGULAR BLOOD GLUCOSE 6/19/19   Historical Provider, MD SAUCEDO PENTIPS 29G X 12MM MISC USE TWICE DAILY WITH Axel Claudio 6/24/19   Historical Provider, MD   blood glucose monitor kit and supplies 1 kit by Other route three times daily Dispense product that insurance will cover 7/22/19   Lito Escoto PA-C   blood glucose monitor strips 1 strip by Other route three times daily Dispense product that insurance will cover 7/22/19   Lito Escoto PA-C   Insulin Pen Needle (PEN NEEDLES) 31G X 6 MM MISC 1 each by Does not apply route daily 6/22/19   Ailyn Clayton MD   blood glucose monitor strips Test 2 times a day & as needed for symptoms of irregular blood glucose.  6/19/19   Dionne Ashley MD   ONE TOUCH LANCETS MISC 1 each by Does not apply route 2 times daily 6/19/19   Dionne Ashley MD   etonogestrel (NEXPLANON) 68 MG implant 68 mg Pain control. Thank you for this interesting consult and for allowing us to participate in the care of this patient. If you have any questions please don't hesitate to call.           Electronically signed by Carmen Post MD  on 9/24/2019 at 3:43 PM

## 2019-09-24 NOTE — ANESTHESIA PRE PROCEDURE
Department of Anesthesiology  Preprocedure Note       Name:  Rosmery Salguero   Age:  30 y.o.  :  1995                                          MRN:  [id number]         Date:  2019      Surgeon: Denise Og):  Connor Delaney MD    Procedure: BREAST INCISION AND DRAINAGE (Right Breast)    Medications prior to admission:   Prior to Admission medications    Medication Sig Start Date End Date Taking?  Authorizing Provider   FLUoxetine (PROZAC) 10 MG capsule take 1 capsule by mouth once daily 19   Aldo Zhang PA-C   traZODone (DESYREL) 50 MG tablet Take 1 tablet by mouth nightly as needed for Sleep 19   Aldo Zhang PA-C   risperiDONE (RISPERDAL) 0.25 MG tablet take 1 tablet by mouth NIGHTLY 19   Aldo Zhang PA-C   docusate sodium (COLACE) 100 MG capsule Take 1 capsule by mouth 2 times daily as needed for Constipation 19   Aldo Zhang PA-C   ibuprofen (ADVIL;MOTRIN) 600 MG tablet Take 1 tablet by mouth 3 times daily as needed for Pain 19   Aldo Zhang PA-C   oxyCODONE-acetaminophen (PERCOCET) 5-325 MG per tablet take 1 tablet by mouth every 6 hours if needed for pain FOR UP TO 3 DAYS maximum daily dose of 4 19   Historical Provider, MD   NARCAN 4 MG/0.1ML LIQD nasal spray ADMINISTER A SINGLE spray INTO ONE NOSTRIL CALL 911 MAY REPEAT ONCE 19   Historical Provider, MD   Blood Glucose Monitoring Suppl (TRUE METRIX METER) w/Device KIT TEST 2 TIMES A DAY AND AS NEEED FOR SYMPTOMS OF IRREGULAR BLOOD GLUCOSE 19   Historical Provider, MD SAUCEDO PENTIPS 29G X 12MM MISC USE TWICE DAILY WITH BASAGLAR 19   Historical Provider, MD   albuterol sulfate HFA (PROVENTIL HFA) 108 (90 Base) MCG/ACT inhaler Inhale 2 puffs into the lungs every 6 hours as needed for Wheezing 19   Aldo Zahng PA-C   blood glucose monitor kit and supplies 1 kit by Other route three times daily Dispense product that insurance will cover 19   Aldo Zhang PA-C   blood

## 2019-09-24 NOTE — H&P
uncle, and maternal uncle; Heart Attack in her maternal uncle; Heart Disease in her maternal uncle. SOCIAL HISTORY    Patient  reports that she quit smoking about 3 years ago. Her smoking use included cigarettes. She started smoking about 11 years ago. She has a 20.00 pack-year smoking history. She has never used smokeless tobacco. She reports that she does not drink alcohol or use drugs. HOME MEDICATIONS        Prior to Admission medications    Medication Sig Start Date End Date Taking?  Authorizing Provider   FLUoxetine (PROZAC) 10 MG capsule take 1 capsule by mouth once daily 9/9/19  Yes Bozena Marie PA-C   traZODone (DESYREL) 50 MG tablet Take 1 tablet by mouth nightly as needed for Sleep 8/19/19  Yes Bozena Marie PA-C   risperiDONE (RISPERDAL) 0.25 MG tablet take 1 tablet by mouth NIGHTLY 8/12/19  Yes Bozena Marie PA-C   docusate sodium (COLACE) 100 MG capsule Take 1 capsule by mouth 2 times daily as needed for Constipation 7/30/19  Yes Bozena Marie PA-C   albuterol sulfate HFA (PROVENTIL HFA) 108 (90 Base) MCG/ACT inhaler Inhale 2 puffs into the lungs every 6 hours as needed for Wheezing 7/23/19  Yes Bozena Marie PA-C   estrogen, conjugated,-medroxyprogesterone (PREMPRO) 0.625-2.5 MG per tablet Take 1 tablet by mouth daily 7/18/19  Yes Harjinder Barrera MD   metFORMIN (GLUCOPHAGE) 1000 MG tablet Take 1 tablet by mouth 2 times daily (with meals) 6/22/19  Yes Jairon Boyd MD   lisinopril (PRINIVIL;ZESTRIL) 5 MG tablet Take 1 tablet by mouth daily 6/23/19  Yes Jairon Boyd MD   insulin glargine (LANTUS SOLOSTAR) 100 UNIT/ML injection pen Inject 35 Units into the skin 2 times daily 6/22/19  Yes Jairon Boyd MD   ibuprofen (ADVIL;MOTRIN) 600 MG tablet Take 1 tablet by mouth 3 times daily as needed for Pain 7/30/19   Bozena Marie PA-C   oxyCODONE-acetaminophen (PERCOCET) 5-325 MG per tablet take 1 tablet by mouth every 6 hours if needed for pain FOR UP TO 3 DAYS maximum KAY Casanova [address] [place] [address], [address] [place] [address]. Phone [phone]     IM Attending    Pt seen and examined today by me independently   I have discussed the care of pt, including pertinent history and exam findings,  with the NP Jihan Greene . I have reviewed the key elements of all parts of the encounter with Jihan Greene.   I agree with the assessment, plan and orders as documented by Jihan Greene    Pt planned for I AND D TODAY   Electronically signed by Dharmesh Corado MD on 9/24/2019 at 9:16 PM

## 2019-09-24 NOTE — FLOWSHEET NOTE
09/24/19 0557   Provider Notification   Reason for Communication Review case   Provider Name    Provider Notification Physician   Method of Communication Page   Response Waiting for response   Notification Time 0602    notified of patient consult.

## 2019-09-24 NOTE — PLAN OF CARE
Problem: Falls - Risk of:  Goal: Will remain free from falls  Description  Will remain free from falls  9/24/2019 1607 by Joyce Raines RN  Outcome: Ongoing  Note:   No falls this shift. Call light within reach and siderails x2. Bed in lowest position. Patient safety maintained. Problem: Falls - Risk of:  Goal: Absence of physical injury  Description  Absence of physical injury  Outcome: Ongoing  Note:   No falls this shift. Call light within reach and siderails x2. Bed in lowest position. Patient safety maintained.        Problem: Pain:  Goal: Pain level will decrease  Description  Pain level will decrease  9/24/2019 1607 by Joyce Raines RN  Outcome: Ongoing

## 2019-09-25 VITALS
SYSTOLIC BLOOD PRESSURE: 129 MMHG | RESPIRATION RATE: 18 BRPM | TEMPERATURE: 98.1 F | DIASTOLIC BLOOD PRESSURE: 51 MMHG | HEIGHT: 67 IN | HEART RATE: 69 BPM | WEIGHT: 288.8 LBS | OXYGEN SATURATION: 99 % | BODY MASS INDEX: 45.33 KG/M2

## 2019-09-25 LAB
ANION GAP SERPL CALCULATED.3IONS-SCNC: 13 MMOL/L (ref 9–17)
BUN BLDV-MCNC: 11 MG/DL (ref 6–20)
BUN/CREAT BLD: ABNORMAL (ref 9–20)
CALCIUM SERPL-MCNC: 8.8 MG/DL (ref 8.6–10.4)
CHLORIDE BLD-SCNC: 102 MMOL/L (ref 98–107)
CO2: 20 MMOL/L (ref 20–31)
CREAT SERPL-MCNC: 0.6 MG/DL (ref 0.5–0.9)
DIRECT EXAM: NORMAL
GFR AFRICAN AMERICAN: >60 ML/MIN
GFR NON-AFRICAN AMERICAN: >60 ML/MIN
GFR SERPL CREATININE-BSD FRML MDRD: ABNORMAL ML/MIN/{1.73_M2}
GFR SERPL CREATININE-BSD FRML MDRD: ABNORMAL ML/MIN/{1.73_M2}
GLUCOSE BLD-MCNC: 208 MG/DL (ref 65–105)
GLUCOSE BLD-MCNC: 257 MG/DL (ref 65–105)
GLUCOSE BLD-MCNC: 294 MG/DL (ref 65–105)
GLUCOSE BLD-MCNC: 347 MG/DL (ref 70–99)
GLUCOSE BLD-MCNC: 357 MG/DL (ref 65–105)
GLUCOSE BLD-MCNC: 418 MG/DL (ref 65–105)
HCT VFR BLD CALC: 37.4 % (ref 36–46)
HEMOGLOBIN: 12.6 G/DL (ref 12–16)
Lab: NORMAL
MCH RBC QN AUTO: 30.8 PG (ref 26–34)
MCHC RBC AUTO-ENTMCNC: 33.7 G/DL (ref 31–37)
MCV RBC AUTO: 91.5 FL (ref 80–100)
NRBC AUTOMATED: ABNORMAL PER 100 WBC
PDW BLD-RTO: 14.5 % (ref 11.5–14.9)
PLATELET # BLD: 238 K/UL (ref 150–450)
PMV BLD AUTO: 7.8 FL (ref 6–12)
POTASSIUM SERPL-SCNC: 4.1 MMOL/L (ref 3.7–5.3)
RBC # BLD: 4.09 M/UL (ref 4–5.2)
SODIUM BLD-SCNC: 135 MMOL/L (ref 135–144)
SPECIMEN DESCRIPTION: NORMAL
VANCOMYCIN TROUGH DATE LAST DOSE: ABNORMAL
VANCOMYCIN TROUGH DOSE AMOUNT: ABNORMAL
VANCOMYCIN TROUGH TIME LAST DOSE: 1
VANCOMYCIN TROUGH: 9.3 UG/ML (ref 10–20)
WBC # BLD: 13.9 K/UL (ref 3.5–11)

## 2019-09-25 PROCEDURE — 36415 COLL VENOUS BLD VENIPUNCTURE: CPT

## 2019-09-25 PROCEDURE — 6360000002 HC RX W HCPCS: Performed by: SURGERY

## 2019-09-25 PROCEDURE — 96366 THER/PROPH/DIAG IV INF ADDON: CPT

## 2019-09-25 PROCEDURE — 2580000003 HC RX 258: Performed by: SURGERY

## 2019-09-25 PROCEDURE — 6360000002 HC RX W HCPCS: Performed by: INTERNAL MEDICINE

## 2019-09-25 PROCEDURE — 80202 ASSAY OF VANCOMYCIN: CPT

## 2019-09-25 PROCEDURE — 6370000000 HC RX 637 (ALT 250 FOR IP): Performed by: SURGERY

## 2019-09-25 PROCEDURE — 96376 TX/PRO/DX INJ SAME DRUG ADON: CPT

## 2019-09-25 PROCEDURE — 2500000003 HC RX 250 WO HCPCS: Performed by: SURGERY

## 2019-09-25 PROCEDURE — G0378 HOSPITAL OBSERVATION PER HR: HCPCS

## 2019-09-25 PROCEDURE — 96375 TX/PRO/DX INJ NEW DRUG ADDON: CPT

## 2019-09-25 PROCEDURE — 85027 COMPLETE CBC AUTOMATED: CPT

## 2019-09-25 PROCEDURE — 96372 THER/PROPH/DIAG INJ SC/IM: CPT

## 2019-09-25 PROCEDURE — 82947 ASSAY GLUCOSE BLOOD QUANT: CPT

## 2019-09-25 PROCEDURE — 80048 BASIC METABOLIC PNL TOTAL CA: CPT

## 2019-09-25 PROCEDURE — 99239 HOSP IP/OBS DSCHRG MGMT >30: CPT | Performed by: INTERNAL MEDICINE

## 2019-09-25 RX ORDER — ONDANSETRON 4 MG/1
TABLET, FILM COATED ORAL
Qty: 20 TABLET | Refills: 0 | Status: ON HOLD | OUTPATIENT
Start: 2019-09-25 | End: 2020-02-04 | Stop reason: HOSPADM

## 2019-09-25 RX ORDER — CEPHALEXIN 500 MG/1
CAPSULE ORAL
Qty: 21 CAPSULE | Refills: 0 | Status: ON HOLD | OUTPATIENT
Start: 2019-09-25 | End: 2020-02-04 | Stop reason: HOSPADM

## 2019-09-25 RX ORDER — SULFAMETHOXAZOLE AND TRIMETHOPRIM 800; 160 MG/1; MG/1
1 TABLET ORAL 2 TIMES DAILY
Qty: 20 TABLET | Refills: 0 | Status: SHIPPED | OUTPATIENT
Start: 2019-09-25 | End: 2019-10-05

## 2019-09-25 RX ORDER — OXYCODONE HYDROCHLORIDE AND ACETAMINOPHEN 5; 325 MG/1; MG/1
1 TABLET ORAL EVERY 6 HOURS PRN
Qty: 28 TABLET | Refills: 0 | Status: SHIPPED | OUTPATIENT
Start: 2019-09-25 | End: 2019-10-02

## 2019-09-25 RX ADMIN — OXYCODONE HYDROCHLORIDE AND ACETAMINOPHEN 1 TABLET: 5; 325 TABLET ORAL at 02:00

## 2019-09-25 RX ADMIN — METFORMIN HYDROCHLORIDE 1000 MG: 1000 TABLET ORAL at 16:33

## 2019-09-25 RX ADMIN — OXYCODONE HYDROCHLORIDE AND ACETAMINOPHEN 1 TABLET: 5; 325 TABLET ORAL at 16:01

## 2019-09-25 RX ADMIN — KETOROLAC TROMETHAMINE 30 MG: 30 INJECTION, SOLUTION INTRAMUSCULAR; INTRAVENOUS at 04:59

## 2019-09-25 RX ADMIN — ENOXAPARIN SODIUM 30 MG: 30 INJECTION SUBCUTANEOUS at 09:32

## 2019-09-25 RX ADMIN — Medication 1250 MG: at 13:16

## 2019-09-25 RX ADMIN — INSULIN LISPRO 6 UNITS: 100 INJECTION, SOLUTION INTRAVENOUS; SUBCUTANEOUS at 09:33

## 2019-09-25 RX ADMIN — LISINOPRIL 5 MG: 5 TABLET ORAL at 09:32

## 2019-09-25 RX ADMIN — INSULIN LISPRO 12 UNITS: 100 INJECTION, SOLUTION INTRAVENOUS; SUBCUTANEOUS at 16:29

## 2019-09-25 RX ADMIN — KETOROLAC TROMETHAMINE 30 MG: 30 INJECTION, SOLUTION INTRAMUSCULAR; INTRAVENOUS at 16:01

## 2019-09-25 RX ADMIN — PIPERACILLIN SODIUM AND TAZOBACTAM SODIUM 3.38 G: 3; .375 INJECTION, POWDER, LYOPHILIZED, FOR SOLUTION INTRAVENOUS at 16:29

## 2019-09-25 RX ADMIN — PIPERACILLIN SODIUM AND TAZOBACTAM SODIUM 3.38 G: 3; .375 INJECTION, POWDER, LYOPHILIZED, FOR SOLUTION INTRAVENOUS at 05:00

## 2019-09-25 RX ADMIN — INSULIN GLARGINE 35 UNITS: 100 INJECTION, SOLUTION SUBCUTANEOUS at 09:33

## 2019-09-25 RX ADMIN — PIPERACILLIN SODIUM AND TAZOBACTAM SODIUM 3.38 G: 3; .375 INJECTION, POWDER, LYOPHILIZED, FOR SOLUTION INTRAVENOUS at 09:45

## 2019-09-25 RX ADMIN — FLUOXETINE 10 MG: 10 CAPSULE ORAL at 09:34

## 2019-09-25 RX ADMIN — METFORMIN HYDROCHLORIDE 1000 MG: 1000 TABLET ORAL at 09:37

## 2019-09-25 RX ADMIN — INSULIN LISPRO 10 UNITS: 100 INJECTION, SOLUTION INTRAVENOUS; SUBCUTANEOUS at 13:11

## 2019-09-25 RX ADMIN — OXYCODONE HYDROCHLORIDE AND ACETAMINOPHEN 1 TABLET: 5; 325 TABLET ORAL at 09:44

## 2019-09-25 RX ADMIN — Medication 1750 MG: at 00:00

## 2019-09-25 ASSESSMENT — PAIN DESCRIPTION - LOCATION
LOCATION: BREAST
LOCATION: BREAST

## 2019-09-25 ASSESSMENT — PAIN DESCRIPTION - DESCRIPTORS
DESCRIPTORS: THROBBING
DESCRIPTORS: THROBBING

## 2019-09-25 ASSESSMENT — PAIN SCALES - GENERAL
PAINLEVEL_OUTOF10: 10
PAINLEVEL_OUTOF10: 5
PAINLEVEL_OUTOF10: 6
PAINLEVEL_OUTOF10: 0
PAINLEVEL_OUTOF10: 10
PAINLEVEL_OUTOF10: 2

## 2019-09-25 ASSESSMENT — PAIN DESCRIPTION - FREQUENCY
FREQUENCY: INTERMITTENT
FREQUENCY: INTERMITTENT

## 2019-09-25 ASSESSMENT — PAIN DESCRIPTION - PAIN TYPE
TYPE: SURGICAL PAIN
TYPE: SURGICAL PAIN

## 2019-09-25 ASSESSMENT — PAIN DESCRIPTION - ORIENTATION
ORIENTATION: RIGHT
ORIENTATION: RIGHT

## 2019-09-25 NOTE — PLAN OF CARE
Problem: Falls - Risk of:  Goal: Will remain free from falls  Description  Will remain free from falls  9/25/2019 0135 by Jerry Hugo RN  Outcome: Ongoing   Pt. Free of falls and injuries this shift. Problem: Pain:  Goal: Pain level will decrease  Description  Pain level will decrease  9/25/2019 0135 by Jerry Hugo RN  Outcome: Ongoing   Adequate pain control achieved this shift. See MAR.

## 2019-09-25 NOTE — DISCHARGE INSTR - COC
Continuity of Care Form    Patient Name: Ifrah Gonzalez   :  1995  MRN:  [id number]    Admit date:  2019  Discharge date:  2019    Code Status Order: Full Code   Advance Directives:   Advance Care Flowsheet Documentation     Date/Time Healthcare Directive Type of Healthcare Directive Copy in [address] Agent's Name Healthcare Agent's Phone Number    19 1427  No, patient does not have an advance directive for healthcare treatment declined info -- -- -- -- --    19 0108  No, patient does not have an advance directive for healthcare treatment -- -- -- -- --          Admitting Physician:  Linh Trujillo MD  PCP: Mark Lei PA-C    [facility]72[facility]2072-01  Discharging Unit Phone Number: [phone]    Emergency Contact:   Extended Emergency Contact Information  Primary Emergency Contact: Luis DanielAdarsh  Address: [address] [place] [address] [address] Phone: [phone]  Mobile Phone: [phone]  Relation: Other  Secondary Emergency Contact: Shivani Alvares  Address: [address] [place] [address], [address] [place] [address] Phone: [phone]  Mobile Phone: [phone]  Relation: Parent    Past Surgical History:  Past Surgical History:   Procedure Laterality Date    BREAST SURGERY Left 2019    BREAST INCISION AND DRAINAGE performed by Shira Kirkland MD at [facility] Right 2019    BREAST INCISION AND DRAINAGE performed by Shira Kirkland MD at [address] N/A 3/23/2017     SECTION REPEAT  performed by Megha Harley MD at [facility] L&D OR    EYE SURGERY      TONSILLECTOMY Bilateral     UPPER GASTROINTESTINAL ENDOSCOPY N/A 2018    EGD BIOPSY performed by Andrew Linares MD at [address]       Immunization History:   Immunization History   Administered Date(s) Administered Elimination:  Continence:   · Bowel: Yes  · Bladder: Yes  Urinary Catheter: None   Colostomy/Ileostomy/Ileal Conduit: No       Date of Last BM: 9/25/2019    Intake/Output Summary (Last 24 hours) at 9/25/2019 1822  Last data filed at 9/25/2019 0557  Gross per 24 hour   Intake 1081 ml   Output 900 ml   Net 181 ml     I/O last 3 completed shifts: In: 8382 [P.O.:240; I.V.:3106]  Out: 36 [Urine:900; Blood:25]    Safety Concerns:     None    Impairments/Disabilities:      None    Nutrition Therapy:  Current Nutrition Therapy:   - Oral Diet:  Carb Control 4 carbs/meal (1800kcals/day)    Routes of Feeding: Oral  Liquids: No Restrictions  Daily Fluid Restriction: no  Last Modified Barium Swallow with Video (Video Swallowing Test): not done    Treatments at the Time of Hospital Discharge:   Respiratory Treatments: none  Oxygen Therapy:  is not on home oxygen therapy. Ventilator:    - No ventilator support    Rehab Therapies: none  Weight Bearing Status/Restrictions: No weight bearing restirctions  Other Medical Equipment (for information only, NOT a DME order):     Other Treatments: Wet to dry dressing q daily to right breast incision    Patient's personal belongings (please select all that are sent with patient):  {CHP DME Belongings:[id number]}    RN SIGNATURE:  {Esignature:[id number]}    CASE MANAGEMENT/SOCIAL WORK SECTION    Inpatient Status Date: ***    Readmission Risk Assessment Score:  Readmission Risk              Risk of Unplanned Readmission:        15           Discharging to Facility/ . [address] [address]  Phone [phone]  Fax  [phone]  ·     Dialysis Facility (if applicable)   · Name:  · Address:  · Dialysis Schedule:  · Phone:  · Fax:    / signature: Electronically signed by Tj Henson RN on 9/26/19 at 8:17 AM    PHYSICIAN SECTION    Prognosis: Good    Condition at Discharge: Stable    Rehab Potential (if transferring to Rehab): Good    Recommended Labs or Other Treatments After Discharge:     Physician Certification: I certify the above information and transfer of Melanie Marcus  is necessary for the continuing treatment of the diagnosis listed and that she requires [address] for less 30 days. Update Admission H&P: No change in H&P    PHYSICIAN SIGNATURE:  V/O Dr. Lucia Means.  Jolie Hernandez, RN, CM

## 2019-09-25 NOTE — PROGRESS NOTES
Pharmacy Vancomycin Consult     Vancomycin Day: 3  Current Dosin mg every 12 hours    Temp max:  36.6    Recent Labs     19  0637 19  0533   BUN 18 11       Recent Labs     19  0637 19  0533   CREATININE 0.76 0.60       Recent Labs     19  0637 19  0533   WBC 12.0* 13.9*         Intake/Output Summary (Last 24 hours) at 2019 1203  Last data filed at 2019 0557  Gross per 24 hour   Intake 3346 ml   Output 925 ml   Net 2421 ml       Culture Date      Source                       Results  See micro    Ht Readings from Last 1 Encounters:   19 5' 7\" (1.702 m)        Wt Readings from Last 1 Encounters:   19 288 lb 12.8 oz (131 kg)         Body mass index is 45.23 kg/m². Estimated Creatinine Clearance: 201 mL/min (based on SCr of 0.6 mg/dL). Trough: 9.3    Assessment/Plan:  Trough is below target range of 10-20 and was drawn 1.5 hours prior to next dose. True trough would have been lower. Decrease dose to 1250 mg every 8 hours. Will continue to monitor and dose.    Abbi Modi RPh  2019  12:05 PM

## 2019-09-26 ENCOUNTER — TELEPHONE (OUTPATIENT)
Dept: INTERNAL MEDICINE CLINIC | Age: 24
End: 2019-09-26

## 2019-09-26 NOTE — DISCHARGE SUMMARY
Internal Medicine   Discharge Summary         Patient Identification:  Sheri Jacqeus is a 30 y.o. female.   :  1995  MRN: [id number]     Acct: [de-identified]   Admit Date:  2019  Discharge date and time: 2019  7:24 PM     Attending Provider: No att. 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    BMI 40.0-44.9    Generalized abdominal pain    Hepatic steatosis    History of gonorrhea    History of chlamydia    Nausea & vomiting    Diarrhea    Bipolar affective disorder, currently depressed, mild ([facility]    Cellulitis of breast    Back pain    Noncompliance    Diabetes ([facility]    History of  section x2    History of postpartum depression    Lost custody of children    Poor historian    Hyponatremia    Uncontrolled diabetes mellitus ([facility]    Mood disorder ([facility]    Viral hepatitis A without hepatic coma    Abscess    Hyperlipidemia LDL goal <70    Breast abscess          Consults:  Sx     Procedures: I and D     Hospital Course:    Abscess and Cellulitis of Right Breast  -IV Vancomycin and Zosyn initiated in ED  --Continue on admission to unit  -Blood cultures obtained x 2  -Wound culture/gram stain  S/p I and D   cx staph   Previous abscess in  [facility]   Dc on keflex     DM hba1c is 8.2   Compliance stressed   Disposition:   Home    Discharged Condition:  Stable     Discharge Medications:       Melo Mar   Home Medication Instructions GSM:[id number]    Printed on:19 1176   Medication Information                      albuterol sulfate HFA (PROVENTIL HFA) 108 (90 Base) MCG/ACT inhaler  Inhale 2 puffs into the lungs every 6 hours as needed for Wheezing             blood glucose monitor kit and supplies  1 kit by Other route three times daily Dispense product that insurance will cover             blood glucose monitor strips  Test 2 times a day & as needed for symptoms of irregular blood glucose.              blood glucose monitor strips  1 strip by Other route three times daily Dispense product that insurance will cover             Blood Glucose Monitoring Suppl (TRUE METRIX METER) w/Device KIT  TEST 2 TIMES A DAY AND AS NEEED FOR SYMPTOMS OF IRREGULAR BLOOD GLUCOSE             cephALEXin (KEFLEX) 500 MG capsule  500 mgTake three times daily             docusate sodium (COLACE) 100 MG capsule  Take 1 capsule by mouth 2 times daily as needed for Constipation             estrogen, conjugated,-medroxyprogesterone (PREMPRO) 0.625-2.5 MG per tablet  Take 1 tablet by mouth daily             etonogestrel (NEXPLANON) 68 MG implant  68 mg by Subdermal route once for 1 dose             FLUoxetine (PROZAC) 10 MG capsule  take 1 capsule by mouth once daily             ibuprofen (ADVIL;MOTRIN) 600 MG tablet  Take 1 tablet by mouth 3 times daily as needed for Pain             insulin glargine (LANTUS SOLOSTAR) 100 UNIT/ML injection pen  Inject 35 Units into the skin 2 times daily             Insulin Pen Needle (PEN NEEDLES) 31G X 6 MM MISC  1 each by Does not apply route daily             lisinopril (PRINIVIL;ZESTRIL) 5 MG tablet  Take 1 tablet by mouth daily             metFORMIN (GLUCOPHAGE) 1000 MG tablet  Take 1 tablet by mouth 2 times daily (with meals)             NARCAN 4 MG/0.1ML LIQD nasal spray  ADMINISTER A SINGLE spray INTO ONE NOSTRIL CALL 911 MAY REPEAT ONCE             ondansetron (ZOFRAN) 4 MG tablet  Take every six hours as needed             ONE TOUCH LANCETS MISC  1 each by Does not apply route 2 times daily             oxyCODONE-acetaminophen (PERCOCET) 5-325 MG per tablet  take 1 tablet by mouth every 6 hours if needed for pain FOR UP TO 3 DAYS maximum daily dose of 4             oxyCODONE-acetaminophen (PERCOCET) 5-325 MG per

## 2019-09-30 LAB
CULTURE: NORMAL
CULTURE: NORMAL
Lab: NORMAL
Lab: NORMAL
SPECIMEN DESCRIPTION: NORMAL
SPECIMEN DESCRIPTION: NORMAL

## 2019-10-08 RX ORDER — LISINOPRIL 5 MG/1
TABLET ORAL
Qty: 30 TABLET | Refills: 3 | Status: SHIPPED | OUTPATIENT
Start: 2019-10-08 | End: 2020-02-19

## 2019-10-11 RX ORDER — INSULIN GLARGINE 100 [IU]/ML
INJECTION, SOLUTION SUBCUTANEOUS
Qty: 15 ML | Refills: 11 | Status: ON HOLD | OUTPATIENT
Start: 2019-10-11 | End: 2020-02-04 | Stop reason: HOSPADM

## 2019-10-28 LAB
CULTURE: NORMAL
Lab: NORMAL
SPECIMEN DESCRIPTION: NORMAL

## 2019-11-05 DIAGNOSIS — F31.31 BIPOLAR AFFECTIVE DISORDER, CURRENTLY DEPRESSED, MILD (HCC): ICD-10-CM

## 2019-11-06 RX ORDER — RISPERIDONE 0.25 MG/1
0.25 TABLET, FILM COATED ORAL NIGHTLY
Qty: 30 TABLET | Refills: 2 | Status: ON HOLD | OUTPATIENT
Start: 2019-11-06 | End: 2020-02-04

## 2019-12-11 ENCOUNTER — TELEPHONE (OUTPATIENT)
Dept: INTERNAL MEDICINE CLINIC | Age: 24
End: 2019-12-11

## 2020-01-06 RX ORDER — FLUOXETINE 10 MG/1
CAPSULE ORAL
Qty: 30 CAPSULE | Refills: 3 | Status: SHIPPED | OUTPATIENT
Start: 2020-01-06 | End: 2020-04-20 | Stop reason: SDUPTHER

## 2020-01-09 NOTE — TELEPHONE ENCOUNTER
Last visit:19   Last Med refill: 19  Does patient have enough medication for 72 hours:    Next Visit Date:  No future appointments.     Health Maintenance   Topic Date Due    Varicella Vaccine (1 of 2 - 2-dose childhood series) 1994    Pneumococcal 0-64 years Vaccine (1 of 1 - PPSV23) 1999    Diabetic foot exam  2003    Diabetic microalbuminuria test  2011    HPV vaccine (2 - Female 3-dose series) 2011    Hepatitis B vaccine (1 of 3 - Risk 3-dose series) 2012    Flu vaccine (1) 2019    Cervical cancer screen  10/28/2019    Diabetic retinal exam  2019    Lipid screen  2020    A1C test (Diabetic or Prediabetic)  2020    Potassium monitoring  2020    Creatinine monitoring  2020    DTaP/Tdap/Td vaccine (2 - Td) 2025    HIV screen  Completed       Hemoglobin A1C (%)   Date Value   2019 8.2   2019 11.5 (H)   2018 5.6             ( goal A1C is < 7)   No results found for: LABMICR  LDL Cholesterol (mg/dL)   Date Value   2019 43   2016 102       (goal LDL is <100)   AST (U/L)   Date Value   2019 15     ALT (U/L)   Date Value   2019 29     BUN (mg/dL)   Date Value   2019 11     BP Readings from Last 3 Encounters:   19 (!) 129/51   19 (!) 94/50   19 112/68          (goal 120/80)    All Future Testing planned in [facility]  Lab Frequency Next Occurrence               Patient Active Problem List:     Essential hypertension     Asthma     Morbid obesity ([address]     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

## 2020-02-02 ENCOUNTER — HOSPITAL ENCOUNTER (INPATIENT)
Age: 25
LOS: 2 days | Discharge: HOME | DRG: 364 | End: 2020-02-04
Attending: EMERGENCY MEDICINE | Admitting: INTERNAL MEDICINE
Payer: COMMERCIAL

## 2020-02-02 ENCOUNTER — APPOINTMENT (OUTPATIENT)
Dept: CT IMAGING | Age: 25
DRG: 364 | End: 2020-02-02
Payer: COMMERCIAL

## 2020-02-02 PROBLEM — L02.31 ABSCESS OF BUTTOCK: Status: ACTIVE | Noted: 2020-02-02

## 2020-02-02 LAB
-: ABNORMAL
-: NORMAL
ABSOLUTE EOS #: 0.2 K/UL (ref 0–0.4)
ABSOLUTE IMMATURE GRANULOCYTE: ABNORMAL K/UL (ref 0–0.3)
ABSOLUTE LYMPH #: 2.8 K/UL (ref 1–4.8)
ABSOLUTE MONO #: 0.7 K/UL (ref 0.1–1.3)
AMORPHOUS: ABNORMAL
ANION GAP SERPL CALCULATED.3IONS-SCNC: 16 MMOL/L (ref 9–17)
BACTERIA: ABNORMAL
BASOPHILS # BLD: 1 % (ref 0–2)
BASOPHILS ABSOLUTE: 0.1 K/UL (ref 0–0.2)
BILIRUBIN URINE: NEGATIVE
BUN BLDV-MCNC: 7 MG/DL (ref 6–20)
BUN/CREAT BLD: ABNORMAL (ref 9–20)
CALCIUM SERPL-MCNC: 9.3 MG/DL (ref 8.6–10.4)
CASTS UA: ABNORMAL /LPF
CHLORIDE BLD-SCNC: 101 MMOL/L (ref 98–107)
CHP ED QC CHECK: YES
CO2: 19 MMOL/L (ref 20–31)
COLOR: YELLOW
COMMENT UA: ABNORMAL
CREAT SERPL-MCNC: 0.47 MG/DL (ref 0.5–0.9)
CRYSTALS, UA: ABNORMAL /HPF
DIFFERENTIAL TYPE: ABNORMAL
EOSINOPHILS RELATIVE PERCENT: 2 % (ref 0–4)
EPITHELIAL CELLS UA: ABNORMAL /HPF
GFR AFRICAN AMERICAN: >60 ML/MIN
GFR NON-AFRICAN AMERICAN: >60 ML/MIN
GFR SERPL CREATININE-BSD FRML MDRD: ABNORMAL ML/MIN/{1.73_M2}
GFR SERPL CREATININE-BSD FRML MDRD: ABNORMAL ML/MIN/{1.73_M2}
GLUCOSE BLD-MCNC: 302 MG/DL (ref 65–105)
GLUCOSE BLD-MCNC: 381 MG/DL (ref 70–99)
GLUCOSE BLD-MCNC: 398 MG/DL (ref 65–105)
GLUCOSE URINE: ABNORMAL
HCG, PREGNANCY URINE (POC): NEGATIVE
HCT VFR BLD CALC: 42.7 % (ref 36–46)
HEMOGLOBIN: 14.3 G/DL (ref 12–16)
IMMATURE GRANULOCYTES: ABNORMAL %
KETONES, URINE: NEGATIVE
LEUKOCYTE ESTERASE, URINE: ABNORMAL
LYMPHOCYTES # BLD: 24 % (ref 24–44)
MCH RBC QN AUTO: 29.6 PG (ref 26–34)
MCHC RBC AUTO-ENTMCNC: 33.4 G/DL (ref 31–37)
MCV RBC AUTO: 88.6 FL (ref 80–100)
MONOCYTES # BLD: 6 % (ref 1–7)
MUCUS: ABNORMAL
NITRITE, URINE: NEGATIVE
NRBC AUTOMATED: ABNORMAL PER 100 WBC
OTHER OBSERVATIONS UA: ABNORMAL
PDW BLD-RTO: 13.5 % (ref 11.5–14.9)
PH UA: 5.5 (ref 5–8)
PLATELET # BLD: 216 K/UL (ref 150–450)
PLATELET ESTIMATE: ABNORMAL
PMV BLD AUTO: 8.4 FL (ref 6–12)
POTASSIUM SERPL-SCNC: 4.1 MMOL/L (ref 3.7–5.3)
PREGNANCY TEST URINE, POC: NORMAL
PROTEIN UA: NEGATIVE
RBC # BLD: 4.82 M/UL (ref 4–5.2)
RBC # BLD: ABNORMAL 10*6/UL
RBC UA: ABNORMAL /HPF
RENAL EPITHELIAL, UA: ABNORMAL /HPF
SEG NEUTROPHILS: 67 % (ref 36–66)
SEGMENTED NEUTROPHILS ABSOLUTE COUNT: 8.1 K/UL (ref 1.3–9.1)
SODIUM BLD-SCNC: 136 MMOL/L (ref 135–144)
SPECIFIC GRAVITY UA: 1.04 (ref 1–1.03)
TRICHOMONAS: ABNORMAL
TURBIDITY: ABNORMAL
URINE HGB: ABNORMAL
UROBILINOGEN, URINE: NORMAL
WBC # BLD: 11.9 K/UL (ref 3.5–11)
WBC # BLD: ABNORMAL 10*3/UL
WBC UA: ABNORMAL /HPF
YEAST: ABNORMAL

## 2020-02-02 PROCEDURE — 96365 THER/PROPH/DIAG IV INF INIT: CPT

## 2020-02-02 PROCEDURE — 2580000003 HC RX 258: Performed by: INTERNAL MEDICINE

## 2020-02-02 PROCEDURE — 96375 TX/PRO/DX INJ NEW DRUG ADDON: CPT

## 2020-02-02 PROCEDURE — 6370000000 HC RX 637 (ALT 250 FOR IP): Performed by: STUDENT IN AN ORGANIZED HEALTH CARE EDUCATION/TRAINING PROGRAM

## 2020-02-02 PROCEDURE — 1200000000 HC SEMI PRIVATE

## 2020-02-02 PROCEDURE — 6360000002 HC RX W HCPCS: Performed by: EMERGENCY MEDICINE

## 2020-02-02 PROCEDURE — 96367 TX/PROPH/DG ADDL SEQ IV INF: CPT

## 2020-02-02 PROCEDURE — 2580000003 HC RX 258: Performed by: PHYSICIAN ASSISTANT

## 2020-02-02 PROCEDURE — G0378 HOSPITAL OBSERVATION PER HR: HCPCS

## 2020-02-02 PROCEDURE — 87086 URINE CULTURE/COLONY COUNT: CPT

## 2020-02-02 PROCEDURE — 96376 TX/PRO/DX INJ SAME DRUG ADON: CPT

## 2020-02-02 PROCEDURE — 96372 THER/PROPH/DIAG INJ SC/IM: CPT

## 2020-02-02 PROCEDURE — 96361 HYDRATE IV INFUSION ADD-ON: CPT

## 2020-02-02 PROCEDURE — 6360000002 HC RX W HCPCS: Performed by: STUDENT IN AN ORGANIZED HEALTH CARE EDUCATION/TRAINING PROGRAM

## 2020-02-02 PROCEDURE — 85025 COMPLETE CBC W/AUTO DIFF WBC: CPT

## 2020-02-02 PROCEDURE — 82947 ASSAY GLUCOSE BLOOD QUANT: CPT

## 2020-02-02 PROCEDURE — 80048 BASIC METABOLIC PNL TOTAL CA: CPT

## 2020-02-02 PROCEDURE — 81001 URINALYSIS AUTO W/SCOPE: CPT

## 2020-02-02 PROCEDURE — 2500000003 HC RX 250 WO HCPCS: Performed by: STUDENT IN AN ORGANIZED HEALTH CARE EDUCATION/TRAINING PROGRAM

## 2020-02-02 PROCEDURE — 72193 CT PELVIS W/DYE: CPT

## 2020-02-02 PROCEDURE — 6360000002 HC RX W HCPCS: Performed by: PHYSICIAN ASSISTANT

## 2020-02-02 PROCEDURE — 96366 THER/PROPH/DIAG IV INF ADDON: CPT

## 2020-02-02 PROCEDURE — 99284 EMERGENCY DEPT VISIT MOD MDM: CPT

## 2020-02-02 PROCEDURE — 2580000003 HC RX 258: Performed by: STUDENT IN AN ORGANIZED HEALTH CARE EDUCATION/TRAINING PROGRAM

## 2020-02-02 PROCEDURE — 6360000004 HC RX CONTRAST MEDICATION: Performed by: PHYSICIAN ASSISTANT

## 2020-02-02 PROCEDURE — 6360000002 HC RX W HCPCS: Performed by: INTERNAL MEDICINE

## 2020-02-02 PROCEDURE — 2580000003 HC RX 258: Performed by: EMERGENCY MEDICINE

## 2020-02-02 PROCEDURE — 81025 URINE PREGNANCY TEST: CPT

## 2020-02-02 RX ORDER — SODIUM CHLORIDE 9 MG/ML
1000 INJECTION, SOLUTION INTRAVENOUS CONTINUOUS
Status: DISCONTINUED | OUTPATIENT
Start: 2020-02-02 | End: 2020-02-04

## 2020-02-02 RX ORDER — SODIUM CHLORIDE 0.9 % (FLUSH) 0.9 %
10 SYRINGE (ML) INJECTION EVERY 12 HOURS SCHEDULED
Status: DISCONTINUED | OUTPATIENT
Start: 2020-02-02 | End: 2020-02-04 | Stop reason: HOSPADM

## 2020-02-02 RX ORDER — ONDANSETRON 2 MG/ML
4 INJECTION INTRAMUSCULAR; INTRAVENOUS EVERY 6 HOURS PRN
Status: DISCONTINUED | OUTPATIENT
Start: 2020-02-02 | End: 2020-02-04 | Stop reason: HOSPADM

## 2020-02-02 RX ORDER — SODIUM CHLORIDE 9 MG/ML
INJECTION, SOLUTION INTRAVENOUS CONTINUOUS
Status: DISCONTINUED | OUTPATIENT
Start: 2020-02-02 | End: 2020-02-04 | Stop reason: HOSPADM

## 2020-02-02 RX ORDER — DEXTROSE MONOHYDRATE 50 MG/ML
100 INJECTION, SOLUTION INTRAVENOUS PRN
Status: DISCONTINUED | OUTPATIENT
Start: 2020-02-02 | End: 2020-02-04 | Stop reason: HOSPADM

## 2020-02-02 RX ORDER — MORPHINE SULFATE 4 MG/ML
4 INJECTION, SOLUTION INTRAMUSCULAR; INTRAVENOUS ONCE
Status: COMPLETED | OUTPATIENT
Start: 2020-02-02 | End: 2020-02-02

## 2020-02-02 RX ORDER — NALOXONE HYDROCHLORIDE 1 MG/ML
1 INJECTION INTRAMUSCULAR; INTRAVENOUS; SUBCUTANEOUS PRN
Status: DISCONTINUED | OUTPATIENT
Start: 2020-02-02 | End: 2020-02-04 | Stop reason: HOSPADM

## 2020-02-02 RX ORDER — RISPERIDONE 0.25 MG/1
0.25 TABLET, FILM COATED ORAL NIGHTLY
Status: DISCONTINUED | OUTPATIENT
Start: 2020-02-02 | End: 2020-02-04 | Stop reason: HOSPADM

## 2020-02-02 RX ORDER — FLUOXETINE 10 MG/1
10 CAPSULE ORAL DAILY
Status: DISCONTINUED | OUTPATIENT
Start: 2020-02-02 | End: 2020-02-04 | Stop reason: HOSPADM

## 2020-02-02 RX ORDER — NICOTINE POLACRILEX 4 MG
15 LOZENGE BUCCAL PRN
Status: DISCONTINUED | OUTPATIENT
Start: 2020-02-02 | End: 2020-02-04 | Stop reason: HOSPADM

## 2020-02-02 RX ORDER — INSULIN GLARGINE 100 [IU]/ML
35 INJECTION, SOLUTION SUBCUTANEOUS 2 TIMES DAILY
Status: DISCONTINUED | OUTPATIENT
Start: 2020-02-02 | End: 2020-02-04

## 2020-02-02 RX ORDER — ALBUTEROL SULFATE 90 UG/1
2 AEROSOL, METERED RESPIRATORY (INHALATION) EVERY 6 HOURS PRN
Status: DISCONTINUED | OUTPATIENT
Start: 2020-02-02 | End: 2020-02-04 | Stop reason: HOSPADM

## 2020-02-02 RX ORDER — SODIUM CHLORIDE 0.9 % (FLUSH) 0.9 %
10 SYRINGE (ML) INJECTION PRN
Status: DISCONTINUED | OUTPATIENT
Start: 2020-02-02 | End: 2020-02-04 | Stop reason: HOSPADM

## 2020-02-02 RX ORDER — LISINOPRIL 5 MG/1
5 TABLET ORAL DAILY
Status: DISCONTINUED | OUTPATIENT
Start: 2020-02-02 | End: 2020-02-04 | Stop reason: HOSPADM

## 2020-02-02 RX ORDER — TRAZODONE HYDROCHLORIDE 50 MG/1
50 TABLET ORAL NIGHTLY PRN
Status: DISCONTINUED | OUTPATIENT
Start: 2020-02-02 | End: 2020-02-04 | Stop reason: HOSPADM

## 2020-02-02 RX ORDER — ACETAMINOPHEN 325 MG/1
650 TABLET ORAL EVERY 4 HOURS PRN
Status: DISCONTINUED | OUTPATIENT
Start: 2020-02-02 | End: 2020-02-04 | Stop reason: HOSPADM

## 2020-02-02 RX ORDER — ONDANSETRON 2 MG/ML
4 INJECTION INTRAMUSCULAR; INTRAVENOUS ONCE
Status: COMPLETED | OUTPATIENT
Start: 2020-02-02 | End: 2020-02-02

## 2020-02-02 RX ORDER — MORPHINE SULFATE 2 MG/ML
2 INJECTION, SOLUTION INTRAMUSCULAR; INTRAVENOUS EVERY 6 HOURS PRN
Status: DISCONTINUED | OUTPATIENT
Start: 2020-02-02 | End: 2020-02-04 | Stop reason: HOSPADM

## 2020-02-02 RX ORDER — DEXTROSE MONOHYDRATE 25 G/50ML
12.5 INJECTION, SOLUTION INTRAVENOUS PRN
Status: DISCONTINUED | OUTPATIENT
Start: 2020-02-02 | End: 2020-02-04 | Stop reason: HOSPADM

## 2020-02-02 RX ORDER — IBUPROFEN 600 MG/1
600 TABLET ORAL 3 TIMES DAILY PRN
Status: DISCONTINUED | OUTPATIENT
Start: 2020-02-02 | End: 2020-02-04 | Stop reason: HOSPADM

## 2020-02-02 RX ORDER — 0.9 % SODIUM CHLORIDE 0.9 %
80 INTRAVENOUS SOLUTION INTRAVENOUS ONCE
Status: COMPLETED | OUTPATIENT
Start: 2020-02-02 | End: 2020-02-02

## 2020-02-02 RX ADMIN — SODIUM CHLORIDE: 9 INJECTION, SOLUTION INTRAVENOUS at 16:59

## 2020-02-02 RX ADMIN — INSULIN LISPRO 8 UNITS: 100 INJECTION, SOLUTION INTRAVENOUS; SUBCUTANEOUS at 16:54

## 2020-02-02 RX ADMIN — INSULIN LISPRO 5 UNITS: 100 INJECTION, SOLUTION INTRAVENOUS; SUBCUTANEOUS at 21:54

## 2020-02-02 RX ADMIN — FAMOTIDINE 20 MG: 10 INJECTION, SOLUTION INTRAVENOUS at 21:53

## 2020-02-02 RX ADMIN — MORPHINE SULFATE 2 MG: 2 INJECTION, SOLUTION INTRAMUSCULAR; INTRAVENOUS at 23:26

## 2020-02-02 RX ADMIN — MORPHINE SULFATE 2 MG: 2 INJECTION, SOLUTION INTRAMUSCULAR; INTRAVENOUS at 16:54

## 2020-02-02 RX ADMIN — SODIUM CHLORIDE 80 ML: 9 INJECTION, SOLUTION INTRAVENOUS at 13:39

## 2020-02-02 RX ADMIN — INSULIN GLARGINE 35 UNITS: 100 INJECTION, SOLUTION SUBCUTANEOUS at 21:54

## 2020-02-02 RX ADMIN — VANCOMYCIN HYDROCHLORIDE 2500 MG: 1 INJECTION, POWDER, LYOPHILIZED, FOR SOLUTION INTRAVENOUS at 15:11

## 2020-02-02 RX ADMIN — IOPAMIDOL 75 ML: 755 INJECTION, SOLUTION INTRAVENOUS at 13:39

## 2020-02-02 RX ADMIN — MORPHINE SULFATE 4 MG: 4 INJECTION, SOLUTION INTRAMUSCULAR; INTRAVENOUS at 12:38

## 2020-02-02 RX ADMIN — ONDANSETRON 4 MG: 2 INJECTION INTRAMUSCULAR; INTRAVENOUS at 12:38

## 2020-02-02 RX ADMIN — SODIUM CHLORIDE 1000 ML: 9 INJECTION, SOLUTION INTRAVENOUS at 12:33

## 2020-02-02 RX ADMIN — PIPERACILLIN SODIUM AND TAZOBACTAM SODIUM 3.38 G: 3; .375 INJECTION, POWDER, LYOPHILIZED, FOR SOLUTION INTRAVENOUS at 23:26

## 2020-02-02 RX ADMIN — ENOXAPARIN SODIUM 30 MG: 30 INJECTION SUBCUTANEOUS at 21:53

## 2020-02-02 RX ADMIN — PIPERACILLIN AND TAZOBACTAM 4.5 G: 4; .5 INJECTION, POWDER, LYOPHILIZED, FOR SOLUTION INTRAVENOUS; PARENTERAL at 14:41

## 2020-02-02 RX ADMIN — FLUOXETINE 10 MG: 10 CAPSULE ORAL at 17:11

## 2020-02-02 RX ADMIN — RISPERIDONE 0.25 MG: 0.25 TABLET ORAL at 21:54

## 2020-02-02 RX ADMIN — LISINOPRIL 5 MG: 5 TABLET ORAL at 16:55

## 2020-02-02 RX ADMIN — Medication 10 ML: at 13:39

## 2020-02-02 ASSESSMENT — PAIN SCALES - GENERAL
PAINLEVEL_OUTOF10: 10
PAINLEVEL_OUTOF10: 10
PAINLEVEL_OUTOF10: 5
PAINLEVEL_OUTOF10: 10
PAINLEVEL_OUTOF10: 10
PAINLEVEL_OUTOF10: 0

## 2020-02-02 ASSESSMENT — ENCOUNTER SYMPTOMS
CONSTIPATION: 0
ABDOMINAL PAIN: 0
BACK PAIN: 0
RECTAL PAIN: 1
CHEST TIGHTNESS: 0
VOMITING: 0
BLOOD IN STOOL: 0
NAUSEA: 0
SHORTNESS OF BREATH: 0
DIARRHEA: 0
APNEA: 0
COLOR CHANGE: 1

## 2020-02-02 ASSESSMENT — PAIN DESCRIPTION - LOCATION
LOCATION: VAGINA
LOCATION: ABDOMEN;VAGINA

## 2020-02-02 ASSESSMENT — PAIN DESCRIPTION - ORIENTATION
ORIENTATION: LEFT
ORIENTATION: LEFT

## 2020-02-02 ASSESSMENT — PAIN DESCRIPTION - DESCRIPTORS: DESCRIPTORS: SHARP;SHOOTING

## 2020-02-02 ASSESSMENT — PAIN DESCRIPTION - PAIN TYPE
TYPE: ACUTE PAIN
TYPE: ACUTE PAIN

## 2020-02-02 ASSESSMENT — PAIN DESCRIPTION - FREQUENCY: FREQUENCY: INTERMITTENT

## 2020-02-02 NOTE — ED PROVIDER NOTES
[facility] [place] [facility]  eMERGENCY dEPARTMENT eNCOUnter      Pt Name: Britton Steinberg  MRN: [id number]  [place] 1995  Date of evaluation: 20      CHIEF COMPLAINT       Chief Complaint   Patient presents with    Abscess         HISTORY OF PRESENT ILLNESS    Britton Steinberg is a 30 y.o. female who presents complaining of abscess to the left perineal area   The history is provided by the patient. Abscess   Location:  Pelvis  Pelvic abscess location:  Perineum  Size:  4 cm  Abscess quality: painful, redness, warmth and weeping    Red streaking: no    Duration:  2 days  Progression:  Worsening  Pain details:     Quality:  Dull and pressure    Severity:  Moderate    Duration:  2 days    Timing:  Constant    Progression:  Worsening  Chronicity:  Recurrent  Context: diabetes    Relieved by:  Nothing  Worsened by:  Nothing  Ineffective treatments:  None tried  Associated symptoms: no nausea and no vomiting    Risk factors: prior abscess        REVIEW OF SYSTEMS       Review of Systems   Gastrointestinal: Negative for nausea and vomiting. Skin: Positive for color change (redness left, arnold area, pain weeping, recurrent abscess). All other systems reviewed and are negative.       PAST MEDICAL HISTORY     Past Medical History:   Diagnosis Date    Asthma     uses inhaler    Diabetes mellitus type 2 in obese ([address]     Hepatic steatosis     Hypertension     started when approx 6 mos pregnant with first pregnancy    Morbid obesity with body mass index (BMI) of 40.0 to 49.9 (HCC)     Neuropathy     Postpartum depression        SURGICAL HISTORY       Past Surgical History:   Procedure Laterality Date    BREAST SURGERY Left 2019    BREAST INCISION AND DRAINAGE performed by Ana Maria Vasques MD at [facility] Right 2019    BREAST INCISION AND DRAINAGE performed by Ana Maria Vasques MD at [facility] [address] N/A 3/23/2017     SECTION REPEAT  performed by Jessi Nixon Johnathon Mandujano MD at [address] [facility] [address]D [address] RECTAL SURGERY N/A 2/3/2020    I & D PERIANAL ABSCESS performed by Windle Koyanagi, MD at [address] Bilateral    [address] [facility] [address] N/A 11/27/2018    EGD BIOPSY performed by Matt Herndon MD at [address]       Current Discharge Medication List      CONTINUE these medications which have NOT CHANGED    Details   insulin aspart (NOVOLOG FLEXPEN) 100 UNIT/ML injection pen Inject 4 Units into the skin 3 times daily (before meals) 4 units before breakfast, 4 units before lunch, 6 units before dinner and 6 units before bedtime      insulin detemir (LEVEMIR FLEXTOUCH) 100 UNIT/ML injection pen Inject 48 Units into the skin nightly      FLUoxetine (PROZAC) 10 MG capsule take 1 capsule by mouth once daily  Qty: 30 capsule, Refills: 3    Associated Diagnoses: Bipolar affective disorder, currently depressed, mild (HCC)      risperiDONE (RISPERDAL) 0.25 MG tablet take 1 tablet by mouth NIGHTLY  Qty: 30 tablet, Refills: 2    Associated Diagnoses: Bipolar affective disorder, currently depressed, mild (HCC)      metFORMIN (GLUCOPHAGE) 1000 MG tablet take 1 tablet by mouth twice a day with meals  Qty: 60 tablet, Refills: 3      lisinopril (PRINIVIL;ZESTRIL) 5 MG tablet take 1 tablet by mouth once daily  Qty: 30 tablet, Refills: 3      traZODone (DESYREL) 50 MG tablet Take 1 tablet by mouth nightly as needed for Sleep  Qty: 30 tablet, Refills: 5      estrogen, conjugated,-medroxyprogesterone (PREMPRO) 0.625-2.5 MG per tablet Take 1 tablet by mouth daily  Qty: 28 tablet, Refills: 3      BASAGLAR KWIKPEN 100 UNIT/ML injection pen inject 35 units subcutaneously twice a day  Qty: 15 mL, Refills: 11      cephALEXin (KEFLEX) 500 MG capsule 500 mgTake three times daily  Qty: 21 capsule, Refills: 0    Associated Diagnoses: Breast abscess      ondansetron (ZOFRAN) 4 MG tablet Take every six hours as needed  Qty: 20 tablet, Refills: 0 time.    DIAGNOSTIC RESULTS     EKG: All EKG's are interpreted by the Emergency Department Physician who either signs or Co-signs this chart in the absence of acardiologist.        RADIOLOGY:Allplain film, CT, MRI, and formal ultrasound images (except ED bedside ultrasound) are read by the radiologist and the images and interpretations are directly viewed by the emergency physician. Prominent soft tissue is seen along the medial aspect of the gluteal clefts   without fluid collection to suggest obvious perianal fistula/abscess. Marsha Distel   is associated left-sided inflammatory changes skin thickening extending to   the level of the labia.  If further evaluation is needed, MRI utilizing   perianal fistula protocol is suggested. *Enlarged left inguinal lymph node, likely reactive given the history. Attention on follow-up is suggested. *Hepatic steatosis. LABS:All lab results were reviewed by myself, and all abnormals are listed below.   Labs Reviewed   CULTURE, ANAEROBIC AND AEROBIC - Abnormal; Notable for the following components:       Result Value    Direct Exam FEW NEUTROPHILS (*)     All other components within normal limits   CBC WITH AUTO DIFFERENTIAL - Abnormal; Notable for the following components:    WBC 11.9 (*)     Seg Neutrophils 67 (*)     All other components within normal limits   BASIC METABOLIC PANEL W/ REFLEX TO MG FOR LOW K - Abnormal; Notable for the following components:    Glucose 381 (*)     CREATININE 0.47 (*)     CO2 19 (*)     All other components within normal limits   URINE RT REFLEX TO CULTURE - Abnormal; Notable for the following components:    Turbidity UA CLOUDY (*)     Glucose, Ur 3+ (*)     Specific Gravity, UA 1.038 (*)     Urine Hgb LARGE (*)     Leukocyte Esterase, Urine TRACE (*)     All other components within normal limits   MICROSCOPIC URINALYSIS - Abnormal; Notable for the following components:    Bacteria, UA MODERATE (*)     Amorphous, UA 1+ (*)     All other components within normal limits   BASIC METABOLIC PANEL W/ REFLEX TO MG FOR LOW K - Abnormal; Notable for the following components:    Glucose 365 (*)     All other components within normal limits   CBC WITH AUTO DIFFERENTIAL - Abnormal; Notable for the following components:    Monocytes 8 (*)     All other components within normal limits   VANCOMYCIN, TROUGH - Abnormal; Notable for the following components:    Vancomycin Tr 8.6 (*)     All other components within normal limits   POC GLUCOSE FINGERSTICK - Abnormal; Notable for the following components:    POC Glucose 302 (*)     All other components within normal limits   POC GLUCOSE FINGERSTICK - Abnormal; Notable for the following components:    POC Glucose 398 (*)     All other components within normal limits   POC GLUCOSE FINGERSTICK - Abnormal; Notable for the following components:    POC Glucose 285 (*)     All other components within normal limits   POC GLUCOSE FINGERSTICK - Abnormal; Notable for the following components:    POC Glucose 264 (*)     All other components within normal limits   POC GLUCOSE FINGERSTICK - Abnormal; Notable for the following components:    POC Glucose 260 (*)     All other components within normal limits   POC GLUCOSE FINGERSTICK - Abnormal; Notable for the following components:    POC Glucose 271 (*)     All other components within normal limits   POCT URINE PREGNANCY - Normal   URINE CULTURE CLEAN CATCH   TSH WITHOUT REFLEX   HEMOGLOBIN A1C   POCT HCG, PREGNANCY, UR   POCT GLUCOSE         EMERGENCY DEPARTMENT COURSE:   Vitals:    Vitals:    02/03/20 1250 02/03/20 1300 02/03/20 1315 02/03/20 1515   BP: 124/63 126/71 (!) 144/83 111/62   Pulse: 69 66 90 64   Resp: 14 17 16 16   Temp:   98 °F (36.7 °C) 98 °F (36.7 °C)   TempSrc:   Oral Oral   SpO2: 97% 96% 98% 96%   Weight:       Height:           The patient was given the following medications while in the emergency department:  Orders Placed This Encounter   Medications    0.9 % sodium chloride infusion    morphine sulfate (PF) injection 4 mg    ondansetron (ZOFRAN) injection 4 mg    sodium chloride flush 0.9 % injection 10 mL    0.9 % sodium chloride bolus    iopamidol (ISOVUE-370) 76 % injection 75 mL    piperacillin-tazobactam (ZOSYN) 4.5 g in dextrose 5 % 100 mL IVPB (mini-bag)    vancomycin (VANCOCIN) intermittent dosing (placeholder)    vancomycin (VANCOCIN) 2,500 mg in dextrose 5 % 500 mL IVPB    DISCONTD: vancomycin (VANCOCIN) 1750 mg in dextrose 5% 500 mL IVPB    albuterol sulfate  (90 Base) MCG/ACT inhaler 2 puff    insulin glargine (LANTUS) injection vial 35 Units    lisinopril (PRINIVIL;ZESTRIL) tablet 5 mg    naloxone (NARCAN) injection 1 mg    risperiDONE (RISPERDAL) tablet 0.25 mg    traZODone (DESYREL) tablet 50 mg    FLUoxetine (PROZAC) capsule 10 mg    ibuprofen (ADVIL;MOTRIN) tablet 600 mg    DISCONTD: etonogestrel (NEXPLANON) implant 68 mg    sodium chloride flush 0.9 % injection 10 mL    sodium chloride flush 0.9 % injection 10 mL    magnesium hydroxide (MILK OF MAGNESIA) 400 MG/5ML suspension 30 mL    ondansetron (ZOFRAN) injection 4 mg    enoxaparin (LOVENOX) injection 30 mg    0.9 % sodium chloride infusion    acetaminophen (TYLENOL) tablet 650 mg    piperacillin-tazobactam (ZOSYN) 3.375 g in dextrose 5 % 50 mL IVPB extended infusion (mini-bag)    insulin lispro (HUMALOG) injection vial 0-12 Units    DISCONTD: insulin lispro (HUMALOG) injection vial 0-6 Units    glucose (GLUTOSE) 40 % oral gel 15 g    dextrose 50 % IV solution    glucagon (rDNA) injection 1 mg    dextrose 5 % solution    morphine (PF) injection 2 mg    famotidine (PEPCID) injection 20 mg    piperacillin-tazobactam (ZOSYN) 4.5 g in dextrose 5 % 100 mL IVPB (mini-bag)    DISCONTD: HYDROmorphone (DILAUDID) injection 0.25 mg    DISCONTD: HYDROmorphone (DILAUDID) injection 0.5 mg    DISCONTD: HYDROmorphone (DILAUDID) injection 0.25 mg    DISCONTD: becomes manageable  Associated Diagnoses: Postoperative state      sulfamethoxazole-trimethoprim (BACTRIM DS;SEPTRA DS) 800-160 MG per tablet Take 1 tablet by mouth 2 times daily for 10 days  Qty: 20 tablet, Refills: 0    Associated Diagnoses: Abscess      clindamycin (CLEOCIN-T) 1 % gel Apply topically 2 times daily.   Qty: 1 Tube, Refills: 1    Associated Diagnoses: Abscess             (Please note that portions of this note were completed with a voice recognition program.  Efforts were made to edit thedictations but occasionally words are mis-transcribed.)    ABIMBOLA Loyola PA-C  02/03/20 5260

## 2020-02-02 NOTE — H&P
reports that she quit smoking about 3 years ago. Her smoking use included cigarettes. She started smoking about 12 years ago. She has a 20.00 pack-year smoking history. She has never used smokeless tobacco.  Alcohol:      reports no history of alcohol use. Drug Use:  reports no history of drug use. Family History:     Family History   Problem Relation Age of Onset    Breast Cancer Mother 28    Diabetes Father     Cancer Maternal Uncle 61        acute leukemia    Heart Disease Maternal Uncle     Diabetes Maternal Uncle     Colon Cancer Maternal Uncle     Diabetes Maternal Uncle     Heart Attack Maternal Uncle     Uterine Cancer Neg Hx     Ovarian Cancer Neg Hx        Review of Systems:     Positive and Negative as described in HPI. Review of Systems   Constitutional: Positive for appetite change and fatigue. Negative for activity change, chills and fever. Respiratory: Negative for apnea, chest tightness and shortness of breath. Gastrointestinal: Positive for rectal pain. Negative for abdominal pain, blood in stool, constipation and diarrhea. Genitourinary: Positive for difficulty urinating and vaginal bleeding. Negative for dyspareunia, frequency and urgency. Musculoskeletal: Negative for arthralgias, back pain and neck pain. Skin: Positive for color change, rash and wound. Neurological: Negative for dizziness, light-headedness and headaches. Psychiatric/Behavioral: Negative for agitation, behavioral problems and confusion. The patient is nervous/anxious. Physical Exam:   /66   Pulse 68   Temp 98.6 °F (37 °C) (Oral)   Resp 16   Ht 5' 7\" (1.702 m)   Wt 270 lb (122.5 kg)   SpO2 96%   BMI 42.29 kg/m²   Temp (24hrs), Av.2 °F (36.8 °C), Min:97.8 °F (36.6 °C), Max:98.6 °F (37 °C)    No results for input(s): POCGLU in the last 72 hours.     Intake/Output Summary (Last 24 hours) at 2020 1532  Last data filed at 2020 1510  Gross per 24 hour   Intake 100 ml Other Observations UA NOT REPORTED NOT REQ. Yeast, UA NOT REPORTED None   POCT HCG, Prenancy, Ur    Collection Time: 02/02/20 12:24 PM   Result Value Ref Range    HCG, Pregnancy Urine (POC) NEGATIVE NEGATIVE    - NOT REPORTED    POCT urine pregnancy    Collection Time: 02/02/20 12:25 PM   Result Value Ref Range    Preg Test, Ur neg     QC OK?  yes    CBC Auto Differential    Collection Time: 02/02/20 12:31 PM   Result Value Ref Range    WBC 11.9 (H) 3.5 - 11.0 k/uL    RBC 4.82 4.0 - 5.2 m/uL    Hemoglobin 14.3 12.0 - 16.0 g/dL    Hematocrit 42.7 36 - 46 %    MCV 88.6 80 - 100 fL    MCH 29.6 26 - 34 pg    MCHC 33.4 31 - 37 g/dL    RDW 13.5 11.5 - 14.9 %    Platelets 611 985 - 092 k/uL    MPV 8.4 6.0 - 12.0 fL    NRBC Automated NOT REPORTED per 100 WBC    Differential Type NOT REPORTED     Seg Neutrophils 67 (H) 36 - 66 %    Lymphocytes 24 24 - 44 %    Monocytes 6 1 - 7 %    Eosinophils % 2 0 - 4 %    Basophils 1 0 - 2 %    Immature Granulocytes NOT REPORTED 0 %    Segs Absolute 8.10 1.3 - 9.1 k/uL    Absolute Lymph # 2.80 1.0 - 4.8 k/uL    Absolute Mono # 0.70 0.1 - 1.3 k/uL    Absolute Eos # 0.20 0.0 - 0.4 k/uL    Basophils Absolute 0.10 0.0 - 0.2 k/uL    Absolute Immature Granulocyte NOT REPORTED 0.00 - 0.30 k/uL    WBC Morphology NOT REPORTED     RBC Morphology NOT REPORTED     Platelet Estimate NOT REPORTED    Basic Metabolic Panel w/ Reflex to MG    Collection Time: 02/02/20 12:31 PM   Result Value Ref Range    Glucose 381 (H) 70 - 99 mg/dL    BUN 7 6 - 20 mg/dL    CREATININE 0.47 (L) 0.50 - 0.90 mg/dL    Bun/Cre Ratio NOT REPORTED 9 - 20    Calcium 9.3 8.6 - 10.4 mg/dL    Sodium 136 135 - 144 mmol/L    Potassium 4.1 3.7 - 5.3 mmol/L    Chloride 101 98 - 107 mmol/L    CO2 19 (L) 20 - 31 mmol/L    Anion Gap 16 9 - 17 mmol/L    GFR Non-African American >60 >60 mL/min    GFR African American >60 >60 mL/min    GFR Comment          GFR Staging NOT REPORTED        Imaging/Diagnostics:  Ct Pelvis W Contrast Additional Contrast? None    Result Date: 2020  EXAMINATION: CT OF THE PELVIS WITH CONTRAST 2020 1:31 pm TECHNIQUE: CT of the pelvis was performed with the administration of intravenous contrast. Multiplanar reformatted images are provided for review. Dose modulation, iterative reconstruction, and/or weight based adjustment of the mA/kV was utilized to reduce the radiation dose to as low as reasonably achievable. COMPARISON: CT abdomen and pelvis 2018. HISTORY: ORDERING SYSTEM PROVIDED HISTORY: abscess perirectal area TECHNOLOGIST PROVIDED HISTORY: abscess perirectal area Reason for Exam: perirectal abscess Acuity: Acute Type of Exam: Initial Additional signs and symptoms: Pt noticed something between rectum & vaginal opening a couple days ago; states larger last night Relevant Medical/Surgical History: hx diabetes on metformin; hx  FINDINGS: Visualized liver parenchyma demonstrates hepatic steatosis. Visualized gallbladder is unremarkable. Visualized small bowel and colon demonstrate no acute findings. There is prominent soft tissue seen along the medial aspects of the gluteal clefts without fluid collection to suggest obvious perianal fistula/abscess. There is mild left-sided inflammatory changes and associated skin thickening extending into the region of the left labia. The pelvic floor muscles appear relatively symmetric. Urinary bladder is grossly unremarkable. Uterus is grossly unremarkable. No adnexal mass. Uncomplicated left-sided fat filled inguinal hernia. No free air or free fluid. Enlarged left inguinal lymph node with lobulated cortex measuring 3.5 x 1.2 cm. Osseous structures demonstrate no acute findings. *Prominent soft tissue is seen along the medial aspect of the gluteal clefts without fluid collection to suggest obvious perianal fistula/abscess. There is associated left-sided inflammatory changes skin thickening extending to the level of the labia.   If further evaluation is needed, MRI utilizing perianal fistula protocol is suggested. *Enlarged left inguinal lymph node, likely reactive given the history. Attention on follow-up is suggested. *Hepatic steatosis. Assessment :      Primary Problem  <principal problem not specified>    Active Hospital Problems    Diagnosis Date Noted    Abscess of buttock [L02.31] 02/02/2020       Plan:     Patient status Admit as inpatient in the  Med/Surge    Perineal abscess  -Consult general surgery; appreciate recommendations  -IV Vancomycin pharmacy to dose  -IV Zosyn  -NPO after midnight  -IVF 75 mL/hr   -Zofran  -Morphine 4 mg in ED  -Morphine 2 mg Q6h PRN    Urinary Tract Infection  -Zosyn IV  -Urinalysis: moderate bacteria , cloudy , 3+ glucose   -Awaiting urine cultures       Type 2 Diabetes Mellitus   -MDSS  -Hypoglycemia protocol  -POCT Glucose       DVT Px: Lovenox 50 mg SC daily  PT/ OT Eval & Treat       Consultations:   PHARMACY TO DOSE VANCOMYCIN  IP CONSULT TO GENERAL SURGERY  IP CONSULT TO INTERNAL MEDICINE  IP CONSULT TO SOCIAL WORK  PHARMACY TO DOSE VANCOMYCIN    Patient is admitted as inpatient status because of co-morbiditieslisted above, severity of signs and symptoms as outlined, requirement for current medical therapies and most importantly because of direct risk to patient if care not provided in a hospital setting.     Tesha Mendoza MD  2/2/2020  3:32 PM    Copy sent to Dr. Alphonsus Soulier, PA-C

## 2020-02-02 NOTE — PROGRESS NOTES
Admitted to room 2055 from ER per cart. Oriented to room and call light. Vitals and assessment completed. Pt whimpering and rating pain 10/10 in her vaginal area and left abdomen.

## 2020-02-03 ENCOUNTER — ANESTHESIA (OUTPATIENT)
Dept: OPERATING ROOM | Age: 25
DRG: 364 | End: 2020-02-03
Payer: COMMERCIAL

## 2020-02-03 ENCOUNTER — ANESTHESIA EVENT (OUTPATIENT)
Dept: OPERATING ROOM | Age: 25
DRG: 364 | End: 2020-02-03
Payer: COMMERCIAL

## 2020-02-03 VITALS — OXYGEN SATURATION: 95 % | DIASTOLIC BLOOD PRESSURE: 53 MMHG | TEMPERATURE: 97 F | SYSTOLIC BLOOD PRESSURE: 105 MMHG

## 2020-02-03 PROBLEM — F31.31 BIPOLAR AFFECTIVE DISORDER, CURRENTLY DEPRESSED, MILD (HCC): Chronic | Status: ACTIVE | Noted: 2019-05-24

## 2020-02-03 LAB
ABSOLUTE EOS #: 0.2 K/UL (ref 0–0.4)
ABSOLUTE IMMATURE GRANULOCYTE: ABNORMAL K/UL (ref 0–0.3)
ABSOLUTE LYMPH #: 2.7 K/UL (ref 1–4.8)
ABSOLUTE MONO #: 0.6 K/UL (ref 0.1–1.3)
ANION GAP SERPL CALCULATED.3IONS-SCNC: 13 MMOL/L (ref 9–17)
BASOPHILS # BLD: 1 % (ref 0–2)
BASOPHILS ABSOLUTE: 0 K/UL (ref 0–0.2)
BUN BLDV-MCNC: 9 MG/DL (ref 6–20)
BUN/CREAT BLD: ABNORMAL (ref 9–20)
CALCIUM SERPL-MCNC: 8.7 MG/DL (ref 8.6–10.4)
CHLORIDE BLD-SCNC: 102 MMOL/L (ref 98–107)
CO2: 20 MMOL/L (ref 20–31)
CREAT SERPL-MCNC: 0.54 MG/DL (ref 0.5–0.9)
CULTURE: NORMAL
DIFFERENTIAL TYPE: ABNORMAL
EOSINOPHILS RELATIVE PERCENT: 3 % (ref 0–4)
ESTIMATED AVERAGE GLUCOSE: 298 MG/DL
GFR AFRICAN AMERICAN: >60 ML/MIN
GFR NON-AFRICAN AMERICAN: >60 ML/MIN
GFR SERPL CREATININE-BSD FRML MDRD: ABNORMAL ML/MIN/{1.73_M2}
GFR SERPL CREATININE-BSD FRML MDRD: ABNORMAL ML/MIN/{1.73_M2}
GLUCOSE BLD-MCNC: 260 MG/DL (ref 65–105)
GLUCOSE BLD-MCNC: 264 MG/DL (ref 65–105)
GLUCOSE BLD-MCNC: 271 MG/DL (ref 65–105)
GLUCOSE BLD-MCNC: 285 MG/DL (ref 65–105)
GLUCOSE BLD-MCNC: 350 MG/DL (ref 65–105)
GLUCOSE BLD-MCNC: 365 MG/DL (ref 70–99)
HBA1C MFR BLD: 12 % (ref 4–6)
HCT VFR BLD CALC: 37.6 % (ref 36–46)
HEMOGLOBIN: 12.9 G/DL (ref 12–16)
IMMATURE GRANULOCYTES: ABNORMAL %
LYMPHOCYTES # BLD: 34 % (ref 24–44)
Lab: NORMAL
MCH RBC QN AUTO: 30.5 PG (ref 26–34)
MCHC RBC AUTO-ENTMCNC: 34.4 G/DL (ref 31–37)
MCV RBC AUTO: 88.7 FL (ref 80–100)
MONOCYTES # BLD: 8 % (ref 1–7)
NRBC AUTOMATED: ABNORMAL PER 100 WBC
PDW BLD-RTO: 13.3 % (ref 11.5–14.9)
PLATELET # BLD: 198 K/UL (ref 150–450)
PLATELET ESTIMATE: ABNORMAL
PMV BLD AUTO: 8.5 FL (ref 6–12)
POTASSIUM SERPL-SCNC: 4.2 MMOL/L (ref 3.7–5.3)
RBC # BLD: 4.25 M/UL (ref 4–5.2)
RBC # BLD: ABNORMAL 10*6/UL
SEG NEUTROPHILS: 54 % (ref 36–66)
SEGMENTED NEUTROPHILS ABSOLUTE COUNT: 4.4 K/UL (ref 1.3–9.1)
SODIUM BLD-SCNC: 135 MMOL/L (ref 135–144)
SPECIMEN DESCRIPTION: NORMAL
TSH SERPL DL<=0.05 MIU/L-ACNC: 2.59 MIU/L (ref 0.3–5)
VANCOMYCIN TROUGH DATE LAST DOSE: ABNORMAL
VANCOMYCIN TROUGH DOSE AMOUNT: ABNORMAL
VANCOMYCIN TROUGH TIME LAST DOSE: 346
VANCOMYCIN TROUGH: 8.6 UG/ML (ref 10–20)
WBC # BLD: 8 K/UL (ref 3.5–11)
WBC # BLD: ABNORMAL 10*3/UL

## 2020-02-03 PROCEDURE — 2500000003 HC RX 250 WO HCPCS: Performed by: SURGERY

## 2020-02-03 PROCEDURE — 6370000000 HC RX 637 (ALT 250 FOR IP): Performed by: STUDENT IN AN ORGANIZED HEALTH CARE EDUCATION/TRAINING PROGRAM

## 2020-02-03 PROCEDURE — 6360000002 HC RX W HCPCS: Performed by: SURGERY

## 2020-02-03 PROCEDURE — 7100000000 HC PACU RECOVERY - FIRST 15 MIN: Performed by: SURGERY

## 2020-02-03 PROCEDURE — 2580000003 HC RX 258: Performed by: NURSE ANESTHETIST, CERTIFIED REGISTERED

## 2020-02-03 PROCEDURE — 6360000002 HC RX W HCPCS: Performed by: NURSE ANESTHETIST, CERTIFIED REGISTERED

## 2020-02-03 PROCEDURE — 80048 BASIC METABOLIC PNL TOTAL CA: CPT

## 2020-02-03 PROCEDURE — 2709999900 HC NON-CHARGEABLE SUPPLY: Performed by: SURGERY

## 2020-02-03 PROCEDURE — 2500000003 HC RX 250 WO HCPCS: Performed by: NURSE ANESTHETIST, CERTIFIED REGISTERED

## 2020-02-03 PROCEDURE — 84443 ASSAY THYROID STIM HORMONE: CPT

## 2020-02-03 PROCEDURE — 3600000002 HC SURGERY LEVEL 2 BASE: Performed by: SURGERY

## 2020-02-03 PROCEDURE — 96372 THER/PROPH/DIAG INJ SC/IM: CPT

## 2020-02-03 PROCEDURE — 87070 CULTURE OTHR SPECIMN AEROBIC: CPT

## 2020-02-03 PROCEDURE — 2580000003 HC RX 258: Performed by: STUDENT IN AN ORGANIZED HEALTH CARE EDUCATION/TRAINING PROGRAM

## 2020-02-03 PROCEDURE — 2580000003 HC RX 258: Performed by: SURGERY

## 2020-02-03 PROCEDURE — 1200000000 HC SEMI PRIVATE

## 2020-02-03 PROCEDURE — 6360000002 HC RX W HCPCS: Performed by: STUDENT IN AN ORGANIZED HEALTH CARE EDUCATION/TRAINING PROGRAM

## 2020-02-03 PROCEDURE — 87205 SMEAR GRAM STAIN: CPT

## 2020-02-03 PROCEDURE — 7100000001 HC PACU RECOVERY - ADDTL 15 MIN: Performed by: SURGERY

## 2020-02-03 PROCEDURE — G0378 HOSPITAL OBSERVATION PER HR: HCPCS

## 2020-02-03 PROCEDURE — 0J990ZZ DRAINAGE OF BUTTOCK SUBCUTANEOUS TISSUE AND FASCIA, OPEN APPROACH: ICD-10-PCS | Performed by: SURGERY

## 2020-02-03 PROCEDURE — 87075 CULTR BACTERIA EXCEPT BLOOD: CPT

## 2020-02-03 PROCEDURE — 3700000000 HC ANESTHESIA ATTENDED CARE: Performed by: SURGERY

## 2020-02-03 PROCEDURE — 2500000003 HC RX 250 WO HCPCS: Performed by: INTERNAL MEDICINE

## 2020-02-03 PROCEDURE — 3600000012 HC SURGERY LEVEL 2 ADDTL 15MIN: Performed by: SURGERY

## 2020-02-03 PROCEDURE — 83036 HEMOGLOBIN GLYCOSYLATED A1C: CPT

## 2020-02-03 PROCEDURE — 36415 COLL VENOUS BLD VENIPUNCTURE: CPT

## 2020-02-03 PROCEDURE — 80202 ASSAY OF VANCOMYCIN: CPT

## 2020-02-03 PROCEDURE — 99254 IP/OBS CNSLTJ NEW/EST MOD 60: CPT | Performed by: INTERNAL MEDICINE

## 2020-02-03 PROCEDURE — 82947 ASSAY GLUCOSE BLOOD QUANT: CPT

## 2020-02-03 PROCEDURE — 3700000001 HC ADD 15 MINUTES (ANESTHESIA): Performed by: SURGERY

## 2020-02-03 PROCEDURE — 6370000000 HC RX 637 (ALT 250 FOR IP): Performed by: INTERNAL MEDICINE

## 2020-02-03 PROCEDURE — 6370000000 HC RX 637 (ALT 250 FOR IP): Performed by: SURGERY

## 2020-02-03 PROCEDURE — 85025 COMPLETE CBC W/AUTO DIFF WBC: CPT

## 2020-02-03 RX ORDER — FENTANYL CITRATE 50 UG/ML
INJECTION, SOLUTION INTRAMUSCULAR; INTRAVENOUS PRN
Status: DISCONTINUED | OUTPATIENT
Start: 2020-02-03 | End: 2020-02-03 | Stop reason: SDUPTHER

## 2020-02-03 RX ORDER — OXYCODONE HYDROCHLORIDE AND ACETAMINOPHEN 5; 325 MG/1; MG/1
1 TABLET ORAL PRN
Status: DISCONTINUED | OUTPATIENT
Start: 2020-02-03 | End: 2020-02-03 | Stop reason: HOSPADM

## 2020-02-03 RX ORDER — DIPHENHYDRAMINE HYDROCHLORIDE 50 MG/ML
12.5 INJECTION INTRAMUSCULAR; INTRAVENOUS
Status: DISCONTINUED | OUTPATIENT
Start: 2020-02-03 | End: 2020-02-03

## 2020-02-03 RX ORDER — BUPIVACAINE HYDROCHLORIDE 5 MG/ML
INJECTION, SOLUTION EPIDURAL; INTRACAUDAL PRN
Status: DISCONTINUED | OUTPATIENT
Start: 2020-02-03 | End: 2020-02-03 | Stop reason: ALTCHOICE

## 2020-02-03 RX ORDER — GLYCOPYRROLATE 1 MG/5 ML
SYRINGE (ML) INTRAVENOUS PRN
Status: DISCONTINUED | OUTPATIENT
Start: 2020-02-03 | End: 2020-02-03 | Stop reason: SDUPTHER

## 2020-02-03 RX ORDER — PROPOFOL 10 MG/ML
INJECTION, EMULSION INTRAVENOUS PRN
Status: DISCONTINUED | OUTPATIENT
Start: 2020-02-03 | End: 2020-02-03 | Stop reason: SDUPTHER

## 2020-02-03 RX ORDER — CLINDAMYCIN PHOSPHATE 10 MG/G
GEL TOPICAL
Qty: 1 TUBE | Refills: 1 | Status: SHIPPED | OUTPATIENT
Start: 2020-02-03 | End: 2020-02-10

## 2020-02-03 RX ORDER — ONDANSETRON 2 MG/ML
INJECTION INTRAMUSCULAR; INTRAVENOUS PRN
Status: DISCONTINUED | OUTPATIENT
Start: 2020-02-03 | End: 2020-02-03 | Stop reason: SDUPTHER

## 2020-02-03 RX ORDER — HYDROCODONE BITARTRATE AND ACETAMINOPHEN 5; 325 MG/1; MG/1
1 TABLET ORAL EVERY 4 HOURS PRN
Qty: 20 TABLET | Refills: 0 | Status: SHIPPED | OUTPATIENT
Start: 2020-02-03 | End: 2020-02-04 | Stop reason: HOSPADM

## 2020-02-03 RX ORDER — SODIUM CHLORIDE 9 MG/ML
INJECTION, SOLUTION INTRAVENOUS CONTINUOUS PRN
Status: DISCONTINUED | OUTPATIENT
Start: 2020-02-03 | End: 2020-02-03 | Stop reason: SDUPTHER

## 2020-02-03 RX ORDER — LABETALOL 20 MG/4 ML (5 MG/ML) INTRAVENOUS SYRINGE
5 EVERY 10 MIN PRN
Status: DISCONTINUED | OUTPATIENT
Start: 2020-02-03 | End: 2020-02-03 | Stop reason: HOSPADM

## 2020-02-03 RX ORDER — ONDANSETRON 2 MG/ML
4 INJECTION INTRAMUSCULAR; INTRAVENOUS
Status: DISCONTINUED | OUTPATIENT
Start: 2020-02-03 | End: 2020-02-03 | Stop reason: HOSPADM

## 2020-02-03 RX ORDER — LIDOCAINE HYDROCHLORIDE 10 MG/ML
INJECTION, SOLUTION EPIDURAL; INFILTRATION; INTRACAUDAL; PERINEURAL PRN
Status: DISCONTINUED | OUTPATIENT
Start: 2020-02-03 | End: 2020-02-03 | Stop reason: SDUPTHER

## 2020-02-03 RX ORDER — SULFAMETHOXAZOLE AND TRIMETHOPRIM 800; 160 MG/1; MG/1
1 TABLET ORAL 2 TIMES DAILY
Qty: 20 TABLET | Refills: 0 | Status: SHIPPED | OUTPATIENT
Start: 2020-02-03 | End: 2020-02-13

## 2020-02-03 RX ORDER — OXYCODONE HYDROCHLORIDE AND ACETAMINOPHEN 5; 325 MG/1; MG/1
2 TABLET ORAL PRN
Status: DISCONTINUED | OUTPATIENT
Start: 2020-02-03 | End: 2020-02-03 | Stop reason: HOSPADM

## 2020-02-03 RX ORDER — MIDAZOLAM HYDROCHLORIDE 1 MG/ML
INJECTION INTRAMUSCULAR; INTRAVENOUS PRN
Status: DISCONTINUED | OUTPATIENT
Start: 2020-02-03 | End: 2020-02-03 | Stop reason: SDUPTHER

## 2020-02-03 RX ADMIN — SODIUM CHLORIDE: 9 INJECTION, SOLUTION INTRAVENOUS at 11:07

## 2020-02-03 RX ADMIN — MIDAZOLAM 2 MG: 1 INJECTION INTRAMUSCULAR; INTRAVENOUS at 11:07

## 2020-02-03 RX ADMIN — PIPERACILLIN SODIUM AND TAZOBACTAM SODIUM 3.38 G: 3; .375 INJECTION, POWDER, LYOPHILIZED, FOR SOLUTION INTRAVENOUS at 23:05

## 2020-02-03 RX ADMIN — SODIUM CHLORIDE: 9 INJECTION, SOLUTION INTRAVENOUS at 06:18

## 2020-02-03 RX ADMIN — Medication 0.2 MG: at 11:32

## 2020-02-03 RX ADMIN — FENTANYL CITRATE 100 MCG: 50 INJECTION, SOLUTION INTRAMUSCULAR; INTRAVENOUS at 11:11

## 2020-02-03 RX ADMIN — LIDOCAINE HYDROCHLORIDE 50 MG: 10 INJECTION, SOLUTION EPIDURAL; INFILTRATION; INTRACAUDAL; PERINEURAL at 11:11

## 2020-02-03 RX ADMIN — INSULIN GLARGINE 35 UNITS: 100 INJECTION, SOLUTION SUBCUTANEOUS at 21:40

## 2020-02-03 RX ADMIN — METFORMIN HYDROCHLORIDE 1000 MG: 1000 TABLET ORAL at 16:22

## 2020-02-03 RX ADMIN — PIPERACILLIN SODIUM AND TAZOBACTAM SODIUM 3.38 G: 3; .375 INJECTION, POWDER, LYOPHILIZED, FOR SOLUTION INTRAVENOUS at 06:14

## 2020-02-03 RX ADMIN — INSULIN GLARGINE 35 UNITS: 100 INJECTION, SOLUTION SUBCUTANEOUS at 09:52

## 2020-02-03 RX ADMIN — MORPHINE SULFATE 2 MG: 2 INJECTION, SOLUTION INTRAMUSCULAR; INTRAVENOUS at 21:43

## 2020-02-03 RX ADMIN — FAMOTIDINE 20 MG: 10 INJECTION, SOLUTION INTRAVENOUS at 21:40

## 2020-02-03 RX ADMIN — RISPERIDONE 0.25 MG: 0.25 TABLET ORAL at 21:45

## 2020-02-03 RX ADMIN — Medication 1750 MG: at 03:46

## 2020-02-03 RX ADMIN — VANCOMYCIN HYDROCHLORIDE 2000 MG: 1 INJECTION, POWDER, LYOPHILIZED, FOR SOLUTION INTRAVENOUS at 16:22

## 2020-02-03 RX ADMIN — SODIUM CHLORIDE: 9 INJECTION, SOLUTION INTRAVENOUS at 12:05

## 2020-02-03 RX ADMIN — ENOXAPARIN SODIUM 30 MG: 30 INJECTION SUBCUTANEOUS at 21:43

## 2020-02-03 RX ADMIN — PIPERACILLIN SODIUM AND TAZOBACTAM SODIUM 3.38 G: 3; .375 INJECTION, POWDER, LYOPHILIZED, FOR SOLUTION INTRAVENOUS at 16:22

## 2020-02-03 RX ADMIN — FAMOTIDINE 20 MG: 10 INJECTION, SOLUTION INTRAVENOUS at 13:30

## 2020-02-03 RX ADMIN — MORPHINE SULFATE 2 MG: 2 INJECTION, SOLUTION INTRAMUSCULAR; INTRAVENOUS at 06:18

## 2020-02-03 RX ADMIN — PROPOFOL 20 MG: 10 INJECTION, EMULSION INTRAVENOUS at 11:11

## 2020-02-03 RX ADMIN — Medication 0.2 MG: at 11:28

## 2020-02-03 RX ADMIN — PHENYLEPHRINE HYDROCHLORIDE 100 MCG: 10 INJECTION INTRAVENOUS at 11:16

## 2020-02-03 RX ADMIN — ONDANSETRON 4 MG: 2 INJECTION INTRAMUSCULAR; INTRAVENOUS at 11:32

## 2020-02-03 ASSESSMENT — PULMONARY FUNCTION TESTS
PIF_VALUE: 3
PIF_VALUE: 26
PIF_VALUE: 23
PIF_VALUE: 3
PIF_VALUE: 25
PIF_VALUE: 25
PIF_VALUE: 4
PIF_VALUE: 1
PIF_VALUE: 26
PIF_VALUE: 4
PIF_VALUE: 3
PIF_VALUE: 4
PIF_VALUE: 0
PIF_VALUE: 4
PIF_VALUE: 3
PIF_VALUE: 0
PIF_VALUE: 4
PIF_VALUE: 0
PIF_VALUE: 1
PIF_VALUE: 2
PIF_VALUE: 1
PIF_VALUE: 1
PIF_VALUE: 3
PIF_VALUE: 24
PIF_VALUE: 22
PIF_VALUE: 3
PIF_VALUE: 23
PIF_VALUE: 3
PIF_VALUE: 24
PIF_VALUE: 24
PIF_VALUE: 23
PIF_VALUE: 4
PIF_VALUE: 2
PIF_VALUE: 24
PIF_VALUE: 3
PIF_VALUE: 0
PIF_VALUE: 17
PIF_VALUE: 1
PIF_VALUE: 3
PIF_VALUE: 3
PIF_VALUE: 17

## 2020-02-03 ASSESSMENT — PAIN SCALES - GENERAL
PAINLEVEL_OUTOF10: 0
PAINLEVEL_OUTOF10: 10
PAINLEVEL_OUTOF10: 8
PAINLEVEL_OUTOF10: 6
PAINLEVEL_OUTOF10: 3

## 2020-02-03 ASSESSMENT — PAIN DESCRIPTION - ORIENTATION: ORIENTATION: RIGHT

## 2020-02-03 ASSESSMENT — PAIN DESCRIPTION - LOCATION: LOCATION: BUTTOCKS

## 2020-02-03 ASSESSMENT — PAIN DESCRIPTION - PAIN TYPE: TYPE: SURGICAL PAIN

## 2020-02-03 NOTE — CONSULTS
[address] [place] [address]            Date:   2/3/2020  Patient name:  Benita López  Date of admission:  2/2/2020 11:31 AM  MRN:   [id number]  Account:  [de-identified]  [address] Birth:  1995  PCP:    Peyman Contreras PA-C  Room:   [address] [place] [address]  Code Status:    Full Code    PhysicianRequesting Consult: Katherine Do MD          History ofPresent Illness:     Chief Complaint   Patient presents with    Abscess     and reason for consult; Medical comorbidity and medication management ;  S/p region and drainage of the abscess on buttock      Principal Problem:    Abscess of buttock  Active Problems:    Essential hypertension    Asthma    BMI 40.0-44.9    Hepatic steatosis    Type 2 diabetes mellitus with diabetic neuropathy (HCC)    Bipolar affective disorder, currently depressed, mild ([address]  Resolved Problems:    * No resolved hospital problems. *                HPI  ;  Patient was admitted by Dr. Campbell Spaulding for abscess on the buttock     DATE OF PROCEDURE: 2/3/2020      Asa Joy MD     ASSISTANT: Pravin Wakefield DO PGY 1     PREOPERATIVE DIAGNOSIS: Abscess left buttock     POSTOPERATIVE DIAGNOSIS: Same     OPERATION: Incision and drainage      ANESTHESIA: General and Local     ESTIMATED BLOOD LOSS:  less than 50 ml     COMPLICATIONS: None.      SPECIMENS:  Was Obtained: swabs for culture     HISTORY: The patient is a 30y.o. year old female with history of above preop diagnosis. I explained the risk, benefits, expected outcome, and alternatives to the Patient  And they consent. She is brought to the operating room and placed supine on the table. General anesthesia is achieved with an LMA and she is placed into lithotomy position. The area is prepped and draped in typical sterile fashion. The skin and subcutaneous tissue is infiltrated with 0.5% bupivacaine.   Incision is made approximately   3 cm and the pus is extruded from the wound. Cultures are taken. The wound is packed with iodoform gauze and dressed with a sterile dressing and a dressing is applied. The patient tolerates this procedure well. She is extubated and awakened and taken to the recovery room in stable condition          ON admission HX;  As above          Past Medical History:   Diagnosis Date    Asthma     uses inhaler    Diabetes mellitus type 2 in obese ([address]     Hepatic steatosis     Hypertension     started when approx 6 mos pregnant with first pregnancy    Morbid obesity with body mass index (BMI) of 40.0 to 49.9 (HCC)     Neuropathy     Postpartum depression        Family History   Problem Relation Age of Onset    Breast Cancer Mother 28    Diabetes Father     Cancer Maternal Uncle 61        acute leukemia    Heart Disease Maternal Uncle     Diabetes Maternal Uncle     Colon Cancer Maternal Uncle     Diabetes Maternal Uncle     Heart Attack Maternal Uncle     Uterine Cancer Neg Hx     Ovarian Cancer Neg Hx        Social History:     Tobacco:    reports that she quit smoking about 3 years ago. Her smoking use included cigarettes. She started smoking about 12 years ago. She has a 20.00 pack-year smoking history. She has never used smokeless tobacco.  Alcohol:     reports no history of alcohol use. Drug Use:  reports no history of drug use. Review of Systems:     POSITIVE AND NEGATIVES AS DESCRIBEDIN HISTORY OF PRESENT ILLNESS ;  IN ADDITION ;   Review of Systems          All other systems negative                Physical Exam:     Physical Exam   Vitals:  BP (!) 144/83   Pulse 90   Temp 98 °F (36.7 °C) (Oral)   Resp 16   Ht 5' 7\" (1.702 m)   Wt 260 lb 2.3 oz (118 kg)   SpO2 98%   BMI 40.74 kg/m²                 Body mass index is 40.74 kg/m².      General Appearance:   Alert , CO-OPERATIVE ,                 Skin:                             No rash or erythema  HEENT ;                           Head Symmetrical , within normal limits                                    No tenderness over frontal  sinuses. No tenderness over maxillary sinuses. Eye                           Conjunctiva normal ,, sclera non-icteric . Ear                           External ear ok . Hearing okay , no discharge from ears . [] Otoscopy findings wnl     Nose                         w.n.l. Throat                       Oropharynx  findings wnl               Neck:                            No mass , no thyroid enlargement                                           Pulmonary/Chest:        Clear to auscultation bilaterally . No wheezes, rales or rhonchi . No abnormality on percussion                                                        Cardiovascular:            Normal rate, regular rhythm,                                          No murmur or  Gallop . Abdomen:                       Soft, non-tender                                           Normal bowels sounds,                                             Extremities:                    No  Edema .                                            Neurological ;                 No focal motor deficit ,                 No focal sensory deficit ,    Musculo-skeletal ;                  No  gait abnormality                  No significant joint abnormality X 4 extremities                   Psych:   Mood normal ,                 Memory intact ,                            Status post incision and drainage of the buttock abscess                                                                     Data:     Vitals:  BP (!) 144/83   Pulse 90   Temp 98 °F (36.7 °C) (Oral)   Resp 16   Ht 5' 7\" (1.702 m)   Wt 260 lb 2.3 oz (118 kg)   SpO2 98%   BMI 40.74 kg/m²   Temp have occurred.

## 2020-02-03 NOTE — ANESTHESIA PRE PROCEDURE
Department of Anesthesiology  Preprocedure Note       Name:  Renetta Pearson   Age:  30 y.o.  :  1995                                          MRN:  [id number]         Date:  2/3/2020      Surgeon: Delores Ibrahim):  Katerin James MD    Procedure: I & D PERIANAL ABSCESS (N/A Anus)    Medications prior to admission:   Prior to Admission medications    Medication Sig Start Date End Date Taking?  Authorizing Provider   insulin aspart (NOVOLOG FLEXPEN) 100 UNIT/ML injection pen Inject 4 Units into the skin 3 times daily (before meals) 4 units before breakfast, 4 units before lunch, 6 units before dinner and 6 units before bedtime    Historical Provider, MD   insulin detemir (LEVEMIR FLEXTOUCH) 100 UNIT/ML injection pen Inject 48 Units into the skin nightly    Historical Provider, MD   estrogen, conjugated,-medroxyprogesterone (PREMPRO) 0.625-2.5 MG per tablet Take 1 tablet by mouth daily 1/10/20   Roberth Palma MD   FLUoxetine (PROZAC) 10 MG capsule take 1 capsule by mouth once daily 20   Aram Schmidt PA-C   risperiDONE (RISPERDAL) 0.25 MG tablet take 1 tablet by mouth NIGHTLY 19   Aram Schmidt PA-C   BASAGLAR KWIKPEN 100 UNIT/ML injection pen inject 35 units subcutaneously twice a day 10/11/19   Aram Schmidt PA-C   metFORMIN (GLUCOPHAGE) 1000 MG tablet take 1 tablet by mouth twice a day with meals 10/8/19   Aram Schmidt PA-C   lisinopril (PRINIVIL;ZESTRIL) 5 MG tablet take 1 tablet by mouth once daily 10/8/19   Aram Schmidt PA-C   cephALEXin (KEFLEX) 500 MG capsule 500 mgTake three times daily 19   Dante Garcia MD   ondansetron (ZOFRAN) 4 MG tablet Take every six hours as needed 19   Dante Garcia MD   traZODone (DESYREL) 50 MG tablet Take 1 tablet by mouth nightly as needed for Sleep 19   Aram Schmidt PA-C   docusate sodium (COLACE) 100 MG capsule Take 1 capsule by mouth 2 times daily as needed for Constipation 19   Aram Schmidt PA-C   ibuprofen (ADVIL;MOTRIN) 600 MG tablet Take 1 tablet by mouth 3 times daily as needed for Pain 7/30/19   Emily Guzman PA-C   oxyCODONE-acetaminophen (PERCOCET) 5-325 MG per tablet take 1 tablet by mouth every 6 hours if needed for pain FOR UP TO 3 DAYS maximum daily dose of 4 6/18/19   Historical Provider, MD   NARCAN 4 MG/0.1ML LIQD nasal spray ADMINISTER A SINGLE spray INTO ONE NOSTRIL CALL 911 MAY REPEAT ONCE 7/19/19   Historical Provider, MD   Blood Glucose Monitoring Suppl (TRUE METRIX METER) w/Device KIT TEST 2 TIMES A DAY AND AS NEEED FOR SYMPTOMS OF IRREGULAR BLOOD GLUCOSE 6/19/19   Historical Provider, MD SAUCEDO PENTIPS 29G X 12MM MISC USE TWICE DAILY WITH Wily Li 6/24/19   Historical Provider, MD   albuterol sulfate HFA (PROVENTIL HFA) 108 (90 Base) MCG/ACT inhaler Inhale 2 puffs into the lungs every 6 hours as needed for Wheezing 7/23/19   Emily Guzman PA-C   blood glucose monitor kit and supplies 1 kit by Other route three times daily Dispense product that insurance will cover 7/22/19   Emily Guzman PA-C   blood glucose monitor strips 1 strip by Other route three times daily Dispense product that insurance will cover 7/22/19   Emily Guzman PA-C   Insulin Pen Needle (PEN NEEDLES) 31G X 6 MM MISC 1 each by Does not apply route daily 6/22/19   Alex Patrick MD   blood glucose monitor strips Test 2 times a day & as needed for symptoms of irregular blood glucose. 6/19/19   Shannan Casanova MD   ONE TOUCH LANCETS MISC 1 each by Does not apply route 2 times daily 6/19/19   Shannan Casanova MD   etonogestrel (NEXPLANON) 68 MG implant 68 mg by Subdermal route once for 1 dose 5/23/17 11/16/18  Park Bradshaw MD       Current medications:    No current facility-administered medications for this visit. No current outpatient medications on file.      Facility-Administered Medications Ordered in Other Visits   Medication Dose Route Frequency Provider Last Rate Last Dose    [MAR Hold] 0.9 % sodium 0.9 % sodium chloride infusion   Intravenous Continuous Shazia Celis MD 75 mL/hr at 02/03/20 0618      [MAR Hold] acetaminophen (TYLENOL) tablet 650 mg  650 mg Oral Q4H PRN Maricarmen Tan MD        [facility] Hold] piperacillin-tazobactam (ZOSYN) 3.375 g in dextrose 5 % 50 mL IVPB extended infusion (mini-bag)  3.375 g Intravenous Q8H Maricarmen Tan MD 12.5 mL/hr at 02/03/20 0614 3.375 g at 02/03/20 0614    [MAR Hold] insulin lispro (HUMALOG) injection vial 0-12 Units  0-12 Units Subcutaneous TID WC Maricarmen Tan MD   8 Units at 02/02/20 1654    [MAR Hold] insulin lispro (HUMALOG) injection vial 0-6 Units  0-6 Units Subcutaneous Nightly Maricarmen Tan MD   5 Units at 02/02/20 2154    [MAR Hold] glucose (GLUTOSE) 40 % oral gel 15 g  15 g Oral PRN Maricarmen Tan MD        [facility] Hold] dextrose 50 % IV solution  12.5 g Intravenous PRN Maricarmen Tan MD        [facility] Hold] glucagon (rDNA) injection 1 mg  1 mg Intramuscular PRN Maricarmen Tan MD        [facility] Hold] dextrose 5 % solution  100 mL/hr Intravenous PRN Maricarmen Tan MD        [facility] Hold] morphine (PF) injection 2 mg  2 mg Intravenous Q6H PRN Maricarmen Tan MD   2 mg at 02/03/20 0618    [MAR Hold] famotidine (PEPCID) injection 20 mg  20 mg Intravenous BID Maricarmen Tan MD   20 mg at 02/02/20 2153    [MAR Hold] piperacillin-tazobactam (ZOSYN) 4.5 g in dextrose 5 % 100 mL IVPB (mini-bag)  4.5 g Intravenous Once Inocencio Myles MD           Allergies:     Allergies   Allergen Reactions    Benadryl [Diphenhydramine] Hives and Shortness Of Breath       Problem List:    Patient Active Problem List   Diagnosis Code    Essential hypertension I10    Asthma J45.909    Morbid obesity ([facility] [address] E66.01    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.4    BMI 40.0-44.9 Z68.41    Generalized abdominal pain R10.84    Hepatic steatosis K76.0    Type 2 diabetes mellitus with diabetic neuropathy (HCC) E11.40    History of gonorrhea Z86.19    History of chlamydia Z86.19    Nausea & vomiting R11.2    Diarrhea R19.7    Bipolar affective disorder, currently depressed, mild (HCC) F31.31    Cellulitis of breast N61.0    Back pain M54.9    Noncompliance Z91.19    Diabetes (HCC) E11.9    History of  section x2 Z98.891    History of postpartum depression Z87.59, Z80.58    Lost custody of children Z76.3    Poor historian Z78.9    Hyponatremia E87.1    Uncontrolled diabetes mellitus (HCC) E11.65    Mood disorder (HCC) F39    Viral hepatitis A without hepatic coma B15.9    Abscess L02.91    Hyperlipidemia LDL goal <70 E78.5    Breast abscess N61.1    Abscess of buttock L02.31       Past Medical History:        Diagnosis Date    Asthma     uses inhaler    Diabetes mellitus type 2 in obese ([address]     Hepatic steatosis     Hypertension     started when approx 6 mos pregnant with first pregnancy    Morbid obesity with body mass index (BMI) of 40.0 to 49.9 (HCC)     Neuropathy     Postpartum depression        Past Surgical History:        Procedure Laterality Date    BREAST SURGERY Left 2019    BREAST INCISION AND DRAINAGE performed by Yosvany Anderson MD at [address] [place] [address] Right 2019    BREAST INCISION AND DRAINAGE performed by Yosvany Anderson MD at [facility] [address] N/A 3/23/2017     SECTION REPEAT  performed by Tonya Muniz MD at [facility] L&D OR    EYE SURGERY      TONSILLECTOMY Bilateral     UPPER GASTROINTESTINAL ENDOSCOPY N/A 2018    EGD BIOPSY performed by Bautista Ceja MD at [address] [place] [address] [address]       Social History:    Social History     Tobacco Use    Smoking status: Former Smoker     Packs/day: 2.00     Years: 10.00     Pack years: 20.00     Types: Cigarettes     Start date: 2007     Last attempt to quit: 2016     Years since quitting: 3.5    Smokeless tobacco: Never Used   Substance Use Topics    Alcohol use: No     Alcohol/week: 0.0 standard drinks

## 2020-02-03 NOTE — CARE COORDINATION
DISCHARGE PLANNING NOTE:    Attempted to see patient for discharge planning needs, however she is off the floor for I&D perianal abscess. Will follow for possible VNS if wound care is needed. Active order for IV Vanco and IV Zosyn. Will continue to follow for additional d/c needs.     Electronically signed by Quincy Bishop RN on 2/3/2020 at 11:38 AM

## 2020-02-03 NOTE — OP NOTE
OPERATIVE NOTE    DATE OF PROCEDURE: 2/3/2020     Radha Nunez MD    ASSISTANT: Robert Mccallum DO PGY 1    PREOPERATIVE DIAGNOSIS: Abscess left buttock    POSTOPERATIVE DIAGNOSIS: Same    OPERATION: Incision and drainage     ANESTHESIA: General and Local    ESTIMATED BLOOD LOSS:  less than 50 ml    COMPLICATIONS: None. SPECIMENS:  Was Obtained: swabs for culture    HISTORY: The patient is a 30y.o. year old female with history of above preop diagnosis. I explained the risk, benefits, expected outcome, and alternatives to the Patient  And they consent. She is brought to the operating room and placed supine on the table. General anesthesia is achieved with an LMA and she is placed into lithotomy position. The area is prepped and draped in typical sterile fashion. The skin and subcutaneous tissue is infiltrated with 0.5% bupivacaine. Incision is made approximately   3 cm and the pus is extruded from the wound. Cultures are taken. The wound is packed with iodoform gauze and dressed with a sterile dressing and a dressing is applied. The patient tolerates this procedure well.   She is extubated and awakened and taken to the recovery room in stable condition    Electronically signed by Jai Lo MD on 2/3/2020 at 12:22 PM

## 2020-02-03 NOTE — PROGRESS NOTES
[address] [facility]   OCCUPATIONAL THERAPY MISSED TREATMENT NOTE   INPATIENT   Date: 2/3/20  Patient Name: Yuliya Patrick       Room: 9454/3061-50  MRN: [id number]   Account #: [de-identified]    : 1995  (30 y.o.)  Gender: female                 REASON FOR MISSED TREATMENT:  Patient unable to participate   -   Other - [phone] Per RN is preparing for surgery this AM. Will check back for OT gio Flores, OT

## 2020-02-03 NOTE — PROGRESS NOTES
[facility]: ELENITA SMART   OCCUPATIONAL THERAPY MISSED TREATMENT NOTE   INPATIENT   Date: 2/3/20  Patient Name: Sravan Appiah       Room: 2679/2323-52  MRN: [id number]   Account #: [de-identified]    : 1995  (30 y.o.)  Gender: female                 REASON FOR MISSED TREATMENT:  Hold treatment per nursing request   -   Other - Attempted OT eval at 36, JAYDEN Love reports pt just returned from surgery and is not able to participate at this time.  Will defer OT eval today, will check tomorrow       Sukumar Martinez OT

## 2020-02-03 NOTE — PROGRESS NOTES
Pharmacy Vancomycin Consult     Vancomycin Day: 2  Current Dosin mg every 12 hours. (only received a 2500 mg dose yesterday at 1511 and a 1750 mg dose at 0346.)    Temp max:  98 F    Recent Labs     20  1231 20  0617   BUN 7 9       Recent Labs     20  1231 20  0617   CREATININE 0.47* 0.54       Recent Labs     20  1231 20  0617   WBC 11.9* 8.0         Intake/Output Summary (Last 24 hours) at 2/3/2020 1513  Last data filed at 2/3/2020 1250  Gross per 24 hour   Intake 1805 ml   Output 800 ml   Net 1005 ml       Culture Date      Source                       Results  See micro    Ht Readings from Last 1 Encounters:   20 5' 7\" (1.702 m)        Wt Readings from Last 1 Encounters:   20 260 lb 2.3 oz (118 kg)         Body mass index is 40.74 kg/m². Estimated Creatinine Clearance: 210 mL/min (based on SCr of 0.54 mg/dL). Trough: 8.6 at 1414, last dose of 1750 at 0346. Assessment:  Level was obtained only after 2 nd dose and so these results are not valid since patient was not at steady state. Plan:  Because level was so below our target of 15-20, nevertheless, will increase the dose to 2 gm every 12 hours and get another trough level before the 4 th 2 gm dose ( around 0300 hrs on  ) or sooner if serum creatinine rises too quickly. Krishan Ramirez. Ph.  2/3/2020  3:17 PM

## 2020-02-03 NOTE — FLOWSHEET NOTE
Patient returned to room from [facility] [address] [place] [address]. Patient drowsy, oriented to self and place but disoriented to year and somewhat confused in conversation. VSS as charted. Dressing CDI. Bed alarm on, patient resting comfortably without signs of distress. Will continue to monitor.

## 2020-02-03 NOTE — PLAN OF CARE
Nutrition Problem: Increased nutrient needs  Intervention: Food and/or Nutrient Delivery: Continue current diet  Nutritional Goals: PO intakes are greater than 75% at meals

## 2020-02-03 NOTE — PLAN OF CARE
Problem: Falls - Risk of:  Goal: Will remain free from falls  Description  Will remain free from falls  Outcome: Ongoing  Note:   No falls during this shift. Call light is within reach. Side rails up x2 with bed in lowest position. Patient safety maintained. Goal: Absence of physical injury  Description  Absence of physical injury  Outcome: Ongoing  Note:   No injury this shift. Patient safety maintained. Problem: Pain:  Description  Pain management should include both nonpharmacologic and pharmacologic interventions. Goal: Pain level will decrease  Description  Pain level will decrease  Outcome: Ongoing  Note:   Patients pain level decreased with PRN medications.    Goal: Control of acute pain  Description  Control of acute pain  Outcome: Ongoing  Goal: Control of chronic pain  Description  Control of chronic pain  Outcome: Ongoing

## 2020-02-03 NOTE — CONSULTS
General Surgery   History and Physical      PATIENT NAME: Charline Moya   YOB: 1995    ADMISSION DATE: 2/2/2020 11:31 AM      TODAY'S DATE: 2/3/2020    CHIEF COMPLAINT:  Pain in left buttock      HISTORY OF PRESENT ILLNESS:  The patient is a 30 y.o. Non-/non  female who presents withAbscess   and she is admitted to the hospital for the management of  Perianal abscess. Patient presented to the emergency department at [facility] on 2/2 with complaints of pain along her left vaginal wall close to her buttock. Patient states she was doing well until a few days ago when, during wiping she discovered a bump along the above-mentioned area. Patient stated she normally gets bumps after shaving which is why she thought it would go away on its own. However, patient states the bump kept growing and it became difficult to wipe, urinate and patient was crying all day yesterday and took Advil to relieve the pain which she states is a 10 out of 10, with very little relief. Patient states she was recently operated on due to breast abscess by . Patient denies chest pain, SOB, fever, chills ,vomiting, diarrhea, constipation, burning on urination.      In the ED, CT scan was performed which showed prominent soft tissue without fluid collection, vanc and zosyn started with IV fluids. Decision to admit patient to hospital for further management of perianal abscess. Urinalyiss showed moderate bacteria, Urine HgB large, 3+ Glucose.     Past Medical History:        Diagnosis Date    Asthma     uses inhaler    Diabetes mellitus type 2 in obese ([address]     Hepatic steatosis     Hypertension     started when approx 6 mos pregnant with first pregnancy    Morbid obesity with body mass index (BMI) of 40.0 to 49.9 (HCC)     Neuropathy     Postpartum depression        Past Surgical History:        Procedure Laterality Date    BREAST SURGERY Left 6/19/2019    BREAST INCISION AND DRAINAGE performed by Judith Gonzales MD at [address] Right 2019    BREAST INCISION AND DRAINAGE performed by Judith Gonzales MD at [address] N/A 3/23/2017     SECTION REPEAT  performed by Sarah Parra MD at [facility] L&D OR    EYE SURGERY      TONSILLECTOMY Bilateral     UPPER GASTROINTESTINAL ENDOSCOPY N/A 2018    EGD BIOPSY performed by Maxwell Andino MD at [address]       Medications Prior to Admission:   Medications Prior to Admission: insulin aspart (NOVOLOG FLEXPEN) 100 UNIT/ML injection pen, Inject 4 Units into the skin 3 times daily (before meals) 4 units before breakfast, 4 units before lunch, 6 units before dinner and 6 units before bedtime  insulin detemir (LEVEMIR FLEXTOUCH) 100 UNIT/ML injection pen, Inject 48 Units into the skin nightly  FLUoxetine (PROZAC) 10 MG capsule, take 1 capsule by mouth once daily  risperiDONE (RISPERDAL) 0.25 MG tablet, take 1 tablet by mouth NIGHTLY  metFORMIN (GLUCOPHAGE) 1000 MG tablet, take 1 tablet by mouth twice a day with meals  lisinopril (PRINIVIL;ZESTRIL) 5 MG tablet, take 1 tablet by mouth once daily  traZODone (DESYREL) 50 MG tablet, Take 1 tablet by mouth nightly as needed for Sleep  estrogen, conjugated,-medroxyprogesterone (PREMPRO) 0.625-2.5 MG per tablet, Take 1 tablet by mouth daily  BASAGLAR KWIKPEN 100 UNIT/ML injection pen, inject 35 units subcutaneously twice a day  cephALEXin (KEFLEX) 500 MG capsule, 500 mgTake three times daily  ondansetron (ZOFRAN) 4 MG tablet, Take every six hours as needed  docusate sodium (COLACE) 100 MG capsule, Take 1 capsule by mouth 2 times daily as needed for Constipation  ibuprofen (ADVIL;MOTRIN) 600 MG tablet, Take 1 tablet by mouth 3 times daily as needed for Pain  oxyCODONE-acetaminophen (PERCOCET) 5-325 MG per tablet, take 1 tablet by mouth every 6 hours if needed for pain FOR UP TO 3 DAYS maximum daily dose of 4  NARCAN 4 MG/0.1ML LIQD nasal spray, ADMINISTER A SINGLE spray INTO ONE NOSTRIL CALL 911 MAY REPEAT ONCE  Blood Glucose Monitoring Suppl (TRUE METRIX METER) w/Device KIT, TEST 2 TIMES A DAY AND AS NEEED FOR SYMPTOMS OF IRREGULAR BLOOD GLUCOSE  UNIFINE PENTIPS 29G X 12MM MISC, USE TWICE DAILY WITH BASAGLAR  albuterol sulfate HFA (PROVENTIL HFA) 108 (90 Base) MCG/ACT inhaler, Inhale 2 puffs into the lungs every 6 hours as needed for Wheezing  blood glucose monitor kit and supplies, 1 kit by Other route three times daily Dispense product that insurance will cover  blood glucose monitor strips, 1 strip by Other route three times daily Dispense product that insurance will cover  Insulin Pen Needle (PEN NEEDLES) 31G X 6 MM MISC, 1 each by Does not apply route daily  blood glucose monitor strips, Test 2 times a day & as needed for symptoms of irregular blood glucose.   ONE TOUCH LANCETS MISC, 1 each by Does not apply route 2 times daily  etonogestrel (NEXPLANON) 68 MG implant, 68 mg by Subdermal route once for 1 dose    Allergies:  Benadryl [diphenhydramine]    Social History:   Social History     Socioeconomic History    Marital status: Single     Spouse name: Not on file    Number of children: 2    Years of education: graduated high school, some college    Highest education level: Not on file   Occupational History    Occupation: housewife    Occupation:      Comment: last worked 2015   [address] resource strain: Not on file    Food insecurity:     Worry: Not on file     Inability: Not on file   Work in Field needs:     Medical: Not on file     Non-medical: Not on file   Tobacco Use    Smoking status: Former Smoker     Packs/day: 2.00     Years: 10.00     Pack years: 20.00     Types: Cigarettes     Start date: 11/7/2007     Last attempt to quit: 7/20/2016     Years since quitting: 3.5    Smokeless tobacco: Never Used   Substance and Sexual Activity    Alcohol use: No     Alcohol/week: 0.0 standard drinks  Drug use: No    Sexual activity: Yes     Partners: Male     Comment: chlamydia in 2016,   Lifestyle    Physical activity:     Days per week: Not on file     Minutes per session: Not on file    Stress: Not on file   Relationships    Social connections:     Talks on phone: Not on file     Gets together: Not on file     Attends [employer] service: Not on file     Active member of club or organization: Not on file     Attends meetings of clubs or organizations: Not on file     Relationship status: Not on file    Intimate partner violence:     Fear of current or ex partner: Not on file     Emotionally abused: Not on file     Physically abused: Not on file     Forced sexual activity: Not on file   Other Topics Concern    Not on file   Social History Narrative    Lives with fiance and 2 daughters       Family History:       Problem Relation Age of Onset    Breast Cancer Mother 28    Diabetes Father     Cancer Maternal Uncle 60        acute leukemia    Heart Disease Maternal Uncle     Diabetes Maternal Uncle     Colon Cancer Maternal Uncle     Diabetes Maternal Uncle     Heart Attack Maternal Uncle     Uterine Cancer Neg Hx     Ovarian Cancer Neg Hx        Review Of Systems (11 point):  Constitutional: + fever, +chills or +malaise; No weight change or fatigue  Head and Eyes: No vision, Headache, Dizziness or trauma in last 12 months  ENT ROS: No hearing, Tinnitis, sinus or taste problems  Hematological and Lymphatic ROS:No Lymphoma, Von Willebrand's, Hemophillia or Bleeding History  Psych ROS: No Depression, Homicidal thoughts,suicidal thoughts, or anxiety  Breast ROS: No prior breast abnormalities or lumps  Respiratory ROS: No SOB, Pneumoniae,Cough, or Pulmonary Embolism History  Cardiovascular ROS: No Chest Pain with Exertion, Palpitations, Syncope, Edema, Arrhythmia  Gastrointestinal ROS: No Indigestion, Heartburn, Nausea, vomiting, Diarrhea, Constipation,or Bowel Changes;  No Bloody Stools or

## 2020-02-03 NOTE — PROGRESS NOTES
Physical Therapy  DATE: 2/3/2020    NAME: Nell Joaquin  MRN: [id number]   : 1995    Patient not seen this date for Physical Therapy due to:  [] Blood transfusion in progress  [] Hemodialysis  []  Patient Declined  [] Spine Precautions   [] Strict Bedrest  [x] Surgery/ Procedure  2/3/20: Needs PT eval. Transferring out of room for I&D, confirmed with nursing. 1005. Will check back. BK  [] Testing      [] Other        [] PT being discontinued at this time. Patient independent. No further needs. [] PT being discontinued at this time as the patient has been transferred to palliative care. No further needs.     Suzanne Lesches, PT

## 2020-02-04 VITALS
RESPIRATION RATE: 14 BRPM | BODY MASS INDEX: 40.83 KG/M2 | HEIGHT: 67 IN | WEIGHT: 260.14 LBS | TEMPERATURE: 98.1 F | SYSTOLIC BLOOD PRESSURE: 104 MMHG | DIASTOLIC BLOOD PRESSURE: 44 MMHG | HEART RATE: 68 BPM | OXYGEN SATURATION: 95 %

## 2020-02-04 LAB
ABSOLUTE EOS #: 0.2 K/UL (ref 0–0.4)
ABSOLUTE IMMATURE GRANULOCYTE: ABNORMAL K/UL (ref 0–0.3)
ABSOLUTE LYMPH #: 2.8 K/UL (ref 1–4.8)
ABSOLUTE MONO #: 0.6 K/UL (ref 0.1–1.3)
ANION GAP SERPL CALCULATED.3IONS-SCNC: 10 MMOL/L (ref 9–17)
BASOPHILS # BLD: 1 % (ref 0–2)
BASOPHILS ABSOLUTE: 0 K/UL (ref 0–0.2)
BUN BLDV-MCNC: 7 MG/DL (ref 6–20)
BUN/CREAT BLD: ABNORMAL (ref 9–20)
CALCIUM SERPL-MCNC: 8.8 MG/DL (ref 8.6–10.4)
CHLORIDE BLD-SCNC: 102 MMOL/L (ref 98–107)
CO2: 23 MMOL/L (ref 20–31)
CREAT SERPL-MCNC: 0.63 MG/DL (ref 0.5–0.9)
DIFFERENTIAL TYPE: ABNORMAL
EOSINOPHILS RELATIVE PERCENT: 3 % (ref 0–4)
GFR AFRICAN AMERICAN: >60 ML/MIN
GFR NON-AFRICAN AMERICAN: >60 ML/MIN
GFR SERPL CREATININE-BSD FRML MDRD: ABNORMAL ML/MIN/{1.73_M2}
GFR SERPL CREATININE-BSD FRML MDRD: ABNORMAL ML/MIN/{1.73_M2}
GLUCOSE BLD-MCNC: 255 MG/DL (ref 65–105)
GLUCOSE BLD-MCNC: 302 MG/DL (ref 65–105)
GLUCOSE BLD-MCNC: 357 MG/DL (ref 70–99)
HCT VFR BLD CALC: 36.8 % (ref 36–46)
HEMOGLOBIN: 12.7 G/DL (ref 12–16)
IMMATURE GRANULOCYTES: ABNORMAL %
LYMPHOCYTES # BLD: 40 % (ref 24–44)
MCH RBC QN AUTO: 30.7 PG (ref 26–34)
MCHC RBC AUTO-ENTMCNC: 34.6 G/DL (ref 31–37)
MCV RBC AUTO: 88.7 FL (ref 80–100)
MONOCYTES # BLD: 8 % (ref 1–7)
NRBC AUTOMATED: ABNORMAL PER 100 WBC
PDW BLD-RTO: 13.3 % (ref 11.5–14.9)
PLATELET # BLD: 200 K/UL (ref 150–450)
PLATELET ESTIMATE: ABNORMAL
PMV BLD AUTO: 8.3 FL (ref 6–12)
POTASSIUM SERPL-SCNC: 3.8 MMOL/L (ref 3.7–5.3)
RBC # BLD: 4.15 M/UL (ref 4–5.2)
RBC # BLD: ABNORMAL 10*6/UL
SEG NEUTROPHILS: 48 % (ref 36–66)
SEGMENTED NEUTROPHILS ABSOLUTE COUNT: 3.5 K/UL (ref 1.3–9.1)
SODIUM BLD-SCNC: 135 MMOL/L (ref 135–144)
WBC # BLD: 7.2 K/UL (ref 3.5–11)
WBC # BLD: ABNORMAL 10*3/UL

## 2020-02-04 PROCEDURE — 96376 TX/PRO/DX INJ SAME DRUG ADON: CPT

## 2020-02-04 PROCEDURE — 2580000003 HC RX 258: Performed by: SURGERY

## 2020-02-04 PROCEDURE — 6370000000 HC RX 637 (ALT 250 FOR IP): Performed by: STUDENT IN AN ORGANIZED HEALTH CARE EDUCATION/TRAINING PROGRAM

## 2020-02-04 PROCEDURE — 82947 ASSAY GLUCOSE BLOOD QUANT: CPT

## 2020-02-04 PROCEDURE — 6370000000 HC RX 637 (ALT 250 FOR IP): Performed by: INTERNAL MEDICINE

## 2020-02-04 PROCEDURE — 6360000002 HC RX W HCPCS: Performed by: SURGERY

## 2020-02-04 PROCEDURE — 96361 HYDRATE IV INFUSION ADD-ON: CPT

## 2020-02-04 PROCEDURE — 6370000000 HC RX 637 (ALT 250 FOR IP): Performed by: SURGERY

## 2020-02-04 PROCEDURE — 96372 THER/PROPH/DIAG INJ SC/IM: CPT

## 2020-02-04 PROCEDURE — 2500000003 HC RX 250 WO HCPCS: Performed by: SURGERY

## 2020-02-04 PROCEDURE — 36415 COLL VENOUS BLD VENIPUNCTURE: CPT

## 2020-02-04 PROCEDURE — 85025 COMPLETE CBC W/AUTO DIFF WBC: CPT

## 2020-02-04 PROCEDURE — 80048 BASIC METABOLIC PNL TOTAL CA: CPT

## 2020-02-04 PROCEDURE — 96366 THER/PROPH/DIAG IV INF ADDON: CPT

## 2020-02-04 PROCEDURE — 99232 SBSQ HOSP IP/OBS MODERATE 35: CPT | Performed by: INTERNAL MEDICINE

## 2020-02-04 PROCEDURE — G0378 HOSPITAL OBSERVATION PER HR: HCPCS

## 2020-02-04 RX ORDER — RISPERIDONE 0.25 MG/1
0.25 TABLET, FILM COATED ORAL NIGHTLY
Qty: 30 TABLET | Refills: 5 | Status: SHIPPED | OUTPATIENT
Start: 2020-02-04 | End: 2020-05-29

## 2020-02-04 RX ORDER — OXYCODONE HYDROCHLORIDE AND ACETAMINOPHEN 5; 325 MG/1; MG/1
1 TABLET ORAL EVERY 6 HOURS PRN
Qty: 20 TABLET | Refills: 0 | Status: SHIPPED | OUTPATIENT
Start: 2020-02-04 | End: 2020-02-09

## 2020-02-04 RX ORDER — SENNA PLUS 8.6 MG/1
1 TABLET ORAL NIGHTLY
Status: DISCONTINUED | OUTPATIENT
Start: 2020-02-04 | End: 2020-02-04 | Stop reason: HOSPADM

## 2020-02-04 RX ORDER — INSULIN GLARGINE 100 [IU]/ML
15 INJECTION, SOLUTION SUBCUTANEOUS ONCE
Status: COMPLETED | OUTPATIENT
Start: 2020-02-04 | End: 2020-02-04

## 2020-02-04 RX ORDER — OXYCODONE HYDROCHLORIDE AND ACETAMINOPHEN 5; 325 MG/1; MG/1
1 TABLET ORAL EVERY 4 HOURS PRN
Status: DISCONTINUED | OUTPATIENT
Start: 2020-02-04 | End: 2020-02-04 | Stop reason: HOSPADM

## 2020-02-04 RX ORDER — OXYCODONE HYDROCHLORIDE AND ACETAMINOPHEN 5; 325 MG/1; MG/1
2 TABLET ORAL EVERY 4 HOURS PRN
Status: DISCONTINUED | OUTPATIENT
Start: 2020-02-04 | End: 2020-02-04 | Stop reason: HOSPADM

## 2020-02-04 RX ORDER — INSULIN GLARGINE 100 [IU]/ML
45 INJECTION, SOLUTION SUBCUTANEOUS 2 TIMES DAILY
Qty: 1 VIAL | Refills: 3 | OUTPATIENT
Start: 2020-02-04

## 2020-02-04 RX ORDER — INSULIN GLARGINE 100 [IU]/ML
45 INJECTION, SOLUTION SUBCUTANEOUS 2 TIMES DAILY
Status: DISCONTINUED | OUTPATIENT
Start: 2020-02-04 | End: 2020-02-04 | Stop reason: HOSPADM

## 2020-02-04 RX ORDER — DOCUSATE SODIUM 100 MG/1
100 CAPSULE, LIQUID FILLED ORAL 2 TIMES DAILY
Status: DISCONTINUED | OUTPATIENT
Start: 2020-02-04 | End: 2020-02-04 | Stop reason: HOSPADM

## 2020-02-04 RX ADMIN — ACETAMINOPHEN 650 MG: 325 TABLET, FILM COATED ORAL at 00:45

## 2020-02-04 RX ADMIN — OXYCODONE HYDROCHLORIDE AND ACETAMINOPHEN 2 TABLET: 5; 325 TABLET ORAL at 03:13

## 2020-02-04 RX ADMIN — FLUOXETINE 10 MG: 10 CAPSULE ORAL at 08:52

## 2020-02-04 RX ADMIN — ENOXAPARIN SODIUM 30 MG: 30 INJECTION SUBCUTANEOUS at 08:46

## 2020-02-04 RX ADMIN — VANCOMYCIN HYDROCHLORIDE 2000 MG: 1 INJECTION, POWDER, LYOPHILIZED, FOR SOLUTION INTRAVENOUS at 03:14

## 2020-02-04 RX ADMIN — DOCUSATE SODIUM 100 MG: 100 CAPSULE, LIQUID FILLED ORAL at 09:35

## 2020-02-04 RX ADMIN — SODIUM CHLORIDE: 9 INJECTION, SOLUTION INTRAVENOUS at 03:14

## 2020-02-04 RX ADMIN — INSULIN LISPRO 4 UNITS: 100 INJECTION, SOLUTION INTRAVENOUS; SUBCUTANEOUS at 11:46

## 2020-02-04 RX ADMIN — INSULIN GLARGINE 35 UNITS: 100 INJECTION, SOLUTION SUBCUTANEOUS at 08:47

## 2020-02-04 RX ADMIN — FAMOTIDINE 20 MG: 10 INJECTION, SOLUTION INTRAVENOUS at 08:47

## 2020-02-04 RX ADMIN — LISINOPRIL 5 MG: 5 TABLET ORAL at 08:47

## 2020-02-04 RX ADMIN — INSULIN GLARGINE 15 UNITS: 100 INJECTION, SOLUTION SUBCUTANEOUS at 11:47

## 2020-02-04 RX ADMIN — ONDANSETRON 4 MG: 2 INJECTION INTRAMUSCULAR; INTRAVENOUS at 08:47

## 2020-02-04 RX ADMIN — ONDANSETRON 4 MG: 2 INJECTION INTRAMUSCULAR; INTRAVENOUS at 04:40

## 2020-02-04 RX ADMIN — MORPHINE SULFATE 2 MG: 2 INJECTION, SOLUTION INTRAMUSCULAR; INTRAVENOUS at 04:40

## 2020-02-04 RX ADMIN — OXYCODONE HYDROCHLORIDE AND ACETAMINOPHEN 2 TABLET: 5; 325 TABLET ORAL at 08:46

## 2020-02-04 RX ADMIN — METFORMIN HYDROCHLORIDE 1000 MG: 1000 TABLET ORAL at 08:47

## 2020-02-04 RX ADMIN — PIPERACILLIN SODIUM AND TAZOBACTAM SODIUM 3.38 G: 3; .375 INJECTION, POWDER, LYOPHILIZED, FOR SOLUTION INTRAVENOUS at 06:28

## 2020-02-04 RX ADMIN — INSULIN LISPRO 8 UNITS: 100 INJECTION, SOLUTION INTRAVENOUS; SUBCUTANEOUS at 08:47

## 2020-02-04 ASSESSMENT — PAIN SCALES - GENERAL
PAINLEVEL_OUTOF10: 10
PAINLEVEL_OUTOF10: 8
PAINLEVEL_OUTOF10: 9
PAINLEVEL_OUTOF10: 0
PAINLEVEL_OUTOF10: 6
PAINLEVEL_OUTOF10: 0

## 2020-02-04 NOTE — PROGRESS NOTES
Nurse went over discharge instructions. Pt understands medication regimen and follow up appointments. Scripts given to pt. Nurse explained wound care to the pt.  Pt understands

## 2020-02-04 NOTE — PROGRESS NOTES
Pt asked for a shower. Aide assisted pt to the bathroom. Pt threw herself on the ground while aide was changing the bed. Pt removed her iv while showering. Pt hysterically crying complaining of pain in her right side. Nurse medicated pt her orders. Pt told nurse she wants to go home. Dressing changed done per  order. Dr Iver Apley gave a verbal order to give 15 units of lantus.  Dr Deisy Jeffers and dr Iver Apley aware of the new pain

## 2020-02-04 NOTE — FLOWSHEET NOTE
02/04/20 1051   Encounter Summary   Services provided to: Patient   Referral/Consult From: Rounding   Continue Visiting   (2/4/20)   Complexity of Encounter Low   Length of Encounter 15 minutes   Spiritual/Anabaptist   Type Ritual   Sacraments   Sacrament of Sick-Anointing Anointed  (Fr Posada 2/4/20)

## 2020-02-04 NOTE — CONSULTS
[facility]            Date:   2/4/2020  Patient name:  Nell Joaquin  Date of admission:  2/2/2020 11:31 AM  MRN:   [id number]  Account:  [de-identified]  [address] Birth:  1995  PCP:    Emeterio Rea PA-C  Room:   [address] [place] [address]  Code Status:    Full Code    PhysicianRequesting Consult: Virginia Falcon MD          History ofPresent Illness:     Chief Complaint   Patient presents with    Abscess     and reason for consult; Medical comorbidity and medication management ;  S/p region and drainage of the abscess on buttock      Principal Problem:    Abscess of buttock  Active Problems:    Essential hypertension    Asthma    BMI 40.0-44.9    Hepatic steatosis    Type 2 diabetes mellitus with diabetic neuropathy (HCC)    Bipolar affective disorder, currently depressed, mild ([address]  Resolved Problems:    * No resolved hospital problems. *                HPI  ;  Patient was admitted by Dr. Jf Jin for abscess on the buttock     DATE OF PROCEDURE: 2/3/2020      Philippe Lambert MD     ASSISTANT: Jing Lara DO PGY 1     PREOPERATIVE DIAGNOSIS: Abscess left buttock     POSTOPERATIVE DIAGNOSIS: Same     OPERATION: Incision and drainage      ANESTHESIA: General and Local     ESTIMATED BLOOD LOSS:  less than 50 ml     COMPLICATIONS: None.      SPECIMENS:  Was Obtained: swabs for culture     HISTORY: The patient is a 30y.o. year old female with history of above preop diagnosis. I explained the risk, benefits, expected outcome, and alternatives to the Patient  And they consent. She is brought to the operating room and placed supine on the table. General anesthesia is achieved with an LMA and she is placed into lithotomy position. The area is prepped and draped in typical sterile fashion. The skin and subcutaneous tissue is infiltrated with 0.5% bupivacaine.   Incision is made approximately   3 cm and the pus is Symmetrical , within normal limits                                    No tenderness over frontal  sinuses. No tenderness over maxillary sinuses. Eye                           Conjunctiva normal ,, sclera non-icteric . Ear                           External ear ok . Hearing okay , no discharge from ears . [] Otoscopy findings wnl     Nose                         w.n.l. Throat                       Oropharynx  findings wnl               Neck:                            No mass , no thyroid enlargement                                           Pulmonary/Chest:        Clear to auscultation bilaterally . No wheezes, rales or rhonchi . No abnormality on percussion                                                        Cardiovascular:            Normal rate, regular rhythm,                                          No murmur or  Gallop . Abdomen:                       Soft, non-tender                                           Normal bowels sounds,                                             Extremities:                    No  Edema .                                            Neurological ;                 No focal motor deficit ,                 No focal sensory deficit ,    Musculo-skeletal ;                  No  gait abnormality                  No significant joint abnormality X 4 extremities                   Psych:   Mood normal ,                 Memory intact ,                            Status post incision and drainage of the buttock abscess                                                                     Data:     Vitals:  BP (!) 119/56   Pulse 60   Temp 98 °F (36.7 °C) (Oral)   Resp 16   Ht 5' 7\" (1.702 m)   Wt 260 lb 2.3 oz (118 kg)   SpO2 97%   BMI 40.74 kg/m²   Temp

## 2020-02-04 NOTE — CARE COORDINATION
ONGOING DISCHARGE PLAN:    Spoke with patient regarding discharge plan and patient confirms that plan is still home. Pt is agreeable to [facility] for wound care. Freedom of choice and list provided. Agency selected [facility] (she has used them in the past). Notified Gloria from [facility] of this. Active order for IV Zosyn and Vanco.    POD#1 I&D left buttock abscess. Will continue to follow for additional discharge needs.     Electronically signed by Wilder Triplett RN on 2/4/2020 at 1:58 PM

## 2020-02-04 NOTE — PROGRESS NOTES
[address]   OCCUPATIONAL THERAPY MISSED TREATMENT NOTE   INPATIENT   Date: 20  Patient Name: Sravan Appiah       Room: 0312/2489-47  MRN: [id number]   Account #: [de-identified]    : 1995  (30 y.o.)  Gender: female                 REASON FOR MISSED TREATMENT:     -        2020 OT attempt at 13:44 pt states that she is going home today     Jennifer Torres OT

## 2020-02-04 NOTE — DISCHARGE INSTR - COC
[facility]  Independent  Med Delivery   none    Wound Care Documentation and Therapy:        Elimination:  Continence:   · Bowel: Yes  · Bladder: Yes  Urinary Catheter: None   Colostomy/Ileostomy/Ileal Conduit: No       Date of Last BM: 2/2/20  No intake or output data in the 24 hours ending 02/04/20 1354  I/O last 3 completed shifts: In: 1150 [I.V.:1150]  Out: -     Safety Concerns:     None    Impairments/Disabilities:      None    Nutrition Therapy:  Current Nutrition Therapy:   - Oral Diet:  Carb Control 3 carbs/meal (1500kcals/day)    Routes of Feeding: Oral  Liquids: Thin Liquids  Daily Fluid Restriction: no  Last Modified Barium Swallow with Video (Video Swallowing Test): not done    Treatments at the Time of Hospital Discharge:   Respiratory Treatments: n/a  Oxygen Therapy:  is not on home oxygen therapy. Ventilator:    - No ventilator support    Rehab Therapies: n/a  Weight Bearing Status/Restrictions: No weight bearing restirctions  Other Medical Equipment (for information only, NOT a DME order):     Other Treatments: ***    Patient's personal belongings (please select all that are sent with patient):  Cell phone clothes     RN SIGNATURE:  Electronically signed by Henri Perdomo RN on 2/4/20 at 2:23 PM    CASE MANAGEMENT/SOCIAL WORK SECTION    Inpatient Status Date: ***    Readmission Risk Assessment Score:  Readmission Risk              Risk of Unplanned Readmission:        15           Discharging to Facility/ Agency   · Name:   · Address:  · Phone:  · Fax:    Dialysis Facility (if applicable)   · Name:  · Address:  · Dialysis Schedule:  · Phone:  · Fax:    / signature: {Esignature:[id number]}    PHYSICIAN SECTION    Prognosis: {Prognosis:3122675895}    Condition at Discharge: [facility] Patient Condition:[id number]}    Rehab Potential (if transferring to Rehab): {Prognosis:8598492381}    Recommended Labs or Other Treatments After

## 2020-02-04 NOTE — PROGRESS NOTES
General Surgery Progress Note    Date: 2020  Time: 7:27 AM    Michael Srinivasan 30 y.o. female , POD # 1 s/p I&D of abscess of left buttock    Patient seen and examined. She complained of some continued discomfort where the lesion was. Pain is controlled. Patient is  tolerating oral intake. She is urinating well. She is  ambulating without difficulty. She is  passing flatus. She denies Fever/Chills, Chest Pain, SOB, N/V, Calf Pain. She denies any further drainage from the surgical site and just admits to some light bleeding.     Vitals:  Vitals:    20 0010 20 0047 20 0715   BP: (!) 104/47 (!) 94/40 118/72 (!) 119/56   Pulse: 63 73  60   Resp: 18 16  16   Temp: 98.4 °F (36.9 °C) 97.9 °F (36.6 °C)  98 °F (36.7 °C)   TempSrc: Axillary Oral  Oral   SpO2: 98% 98%  97%   Weight:       Height:              Physical Exam:  General:  no apparent distress, alert and cooperative  Neurologic:  alert, oriented, normal speech, no focal findings or movement disorder noted  Lungs:  No increased work of breathing, good air exchange, clear to auscultation bilaterally, no crackles or wheezing  Heart:  regular rate and rhythm and no murmur    Abdomen: soft, non-distended, minimal tenderness to deep palpation, no CVA tenderness, normal bowel sounds  Incision: Surgical site clean, some light bleeding noted on bandage, iodine packing still in place, no foul discharge noted  Extremities:  no calf tenderness, non edematous, SCDs not being used currently    Lab:  Recent Results (from the past 12 hour(s))   POC Glucose Fingerstick    Collection Time: 20  8:15 PM   Result Value Ref Range    POC Glucose 350 (H) 65 - 105 mg/dL   Basic Metabolic Panel w/ Reflex to MG    Collection Time: 20  6:27 AM   Result Value Ref Range    Glucose 357 (H) 70 - 99 mg/dL    BUN 7 6 - 20 mg/dL    CREATININE 0.63 0.50 - 0.90 mg/dL    Bun/Cre Ratio NOT REPORTED 9 - 20    Calcium 8.8 8.6 - 10.4 mg/dL    Sodium 135 135 - 144 mmol/L    Potassium 3.8 3.7 - 5.3 mmol/L    Chloride 102 98 - 107 mmol/L    CO2 23 20 - 31 mmol/L    Anion Gap 10 9 - 17 mmol/L    GFR Non-African American >60 >60 mL/min    GFR African American >60 >60 mL/min    GFR Comment          GFR Staging NOT REPORTED    CBC auto differential    Collection Time: 02/04/20  6:27 AM   Result Value Ref Range    WBC 7.2 3.5 - 11.0 k/uL    RBC 4.15 4.0 - 5.2 m/uL    Hemoglobin 12.7 12.0 - 16.0 g/dL    Hematocrit 36.8 36 - 46 %    MCV 88.7 80 - 100 fL    MCH 30.7 26 - 34 pg    MCHC 34.6 31 - 37 g/dL    RDW 13.3 11.5 - 14.9 %    Platelets 110 257 - 764 k/uL    MPV 8.3 6.0 - 12.0 fL    NRBC Automated NOT REPORTED per 100 WBC    Differential Type NOT REPORTED     Seg Neutrophils 48 36 - 66 %    Lymphocytes 40 24 - 44 %    Monocytes 8 (H) 1 - 7 %    Eosinophils % 3 0 - 4 %    Basophils 1 0 - 2 %    Immature Granulocytes NOT REPORTED 0 %    Segs Absolute 3.50 1.3 - 9.1 k/uL    Absolute Lymph # 2.80 1.0 - 4.8 k/uL    Absolute Mono # 0.60 0.1 - 1.3 k/uL    Absolute Eos # 0.20 0.0 - 0.4 k/uL    Basophils Absolute 0.00 0.0 - 0.2 k/uL    Absolute Immature Granulocyte NOT REPORTED 0.00 - 0.30 k/uL    WBC Morphology NOT REPORTED     RBC Morphology NOT REPORTED     Platelet Estimate NOT REPORTED    POC Glucose Fingerstick    Collection Time: 02/04/20  6:33 AM   Result Value Ref Range    POC Glucose 302 (H) 65 - 105 mg/dL       Assessment/Plan:  Tempe Peat 30 y.o. female [id number], POD #1 s/p I&D of abscess on left buttock   - Doing well, vitals stable, afebrile   - Patient voiding spontaneously   - Continue IVF @125ml/hr   - Encourage ambulation and use of incentive spirometer   - Pain control: Motrin/Percocet as pain is tolerating PO   - Labs: CBC and BMP unremarkable other than glucose of 357   - DVT Proph: Lovenox 30mg BID   - Abx: Zosyn and Vancomycin   - Diet: Diabetic Diet   - Path: Aerobic and anaerobic cultures pending   - Surgical site without significant drainage, packing in place, will continue routine wound care so site can close by secondary intention   - Printed Rx for clindamycin gel and Bactrim DS, [place] for pain control upon discharge   - Continue post-op care, please page with any questions    Type 2 Diabetes Mellitus   - AM glucose 357   - Lantus 35U BID   - Medium dose sliding scale   - Metformin 1000mg BID   - Hypoglycemia order set in place    Bipolar Disorder   - Continue prozac and risperdal    Asthma   - Albuterol PRN ordered    Eve Oreilly DO  Ob/Gyn Resident  Pager: [phone]  2/4/2020, 7:27 AM

## 2020-02-04 NOTE — FLOWSHEET NOTE
Patient awake, alert and oriented x 4. Sitting up in bed, drinking water, visiting with family. Assisted to restroom. Ambulating well. Will continue to monitor.

## 2020-02-05 NOTE — DISCHARGE SUMMARY
Value Ref Range    Specimen Description . CLEAN CATCH URINE     Special Requests NOT REPORTED     Culture NO SIGNIFICANT GROWTH    Anaerobic and Aerobic Culture   Result Value Ref Range    Specimen Description . PERINEUM SWAB     Special Requests NOT REPORTED     Direct Exam FEW NEUTROPHILS (A)     Direct Exam NO BACTERIA SEEN     Culture NO GROWTH 1 DAY    CBC Auto Differential   Result Value Ref Range    WBC 11.9 (H) 3.5 - 11.0 k/uL    RBC 4.82 4.0 - 5.2 m/uL    Hemoglobin 14.3 12.0 - 16.0 g/dL    Hematocrit 42.7 36 - 46 %    MCV 88.6 80 - 100 fL    MCH 29.6 26 - 34 pg    MCHC 33.4 31 - 37 g/dL    RDW 13.5 11.5 - 14.9 %    Platelets 900 547 - 456 k/uL    MPV 8.4 6.0 - 12.0 fL    NRBC Automated NOT REPORTED per 100 WBC    Differential Type NOT REPORTED     Seg Neutrophils 67 (H) 36 - 66 %    Lymphocytes 24 24 - 44 %    Monocytes 6 1 - 7 %    Eosinophils % 2 0 - 4 %    Basophils 1 0 - 2 %    Immature Granulocytes NOT REPORTED 0 %    Segs Absolute 8.10 1.3 - 9.1 k/uL    Absolute Lymph # 2.80 1.0 - 4.8 k/uL    Absolute Mono # 0.70 0.1 - 1.3 k/uL    Absolute Eos # 0.20 0.0 - 0.4 k/uL    Basophils Absolute 0.10 0.0 - 0.2 k/uL    Absolute Immature Granulocyte NOT REPORTED 0.00 - 0.30 k/uL    WBC Morphology NOT REPORTED     RBC Morphology NOT REPORTED     Platelet Estimate NOT REPORTED    Basic Metabolic Panel w/ Reflex to MG   Result Value Ref Range    Glucose 381 (H) 70 - 99 mg/dL    BUN 7 6 - 20 mg/dL    CREATININE 0.47 (L) 0.50 - 0.90 mg/dL    Bun/Cre Ratio NOT REPORTED 9 - 20    Calcium 9.3 8.6 - 10.4 mg/dL    Sodium 136 135 - 144 mmol/L    Potassium 4.1 3.7 - 5.3 mmol/L    Chloride 101 98 - 107 mmol/L    CO2 19 (L) 20 - 31 mmol/L    Anion Gap 16 9 - 17 mmol/L    GFR Non-African American >60 >60 mL/min    GFR African American >60 >60 mL/min    GFR Comment          GFR Staging NOT REPORTED    Urinalysis Reflex to Culture   Result Value Ref Range    Color, UA YELLOW YELLOW    Turbidity UA CLOUDY (A) CLEAR    Glucose, Ur 3+ (A) NEGATIVE    Bilirubin Urine NEGATIVE NEGATIVE    Ketones, Urine NEGATIVE NEGATIVE    Specific Gravity, UA 1.038 (H) 1.000 - 1.030    Urine Hgb LARGE (A) NEGATIVE    pH, UA 5.5 5.0 - 8.0    Protein, UA NEGATIVE NEGATIVE    Urobilinogen, Urine Normal Normal    Nitrite, Urine NEGATIVE NEGATIVE    Leukocyte Esterase, Urine TRACE (A) NEGATIVE    Urinalysis Comments NOT REPORTED    Microscopic Urinalysis   Result Value Ref Range    -          WBC, UA 10 TO 20 /HPF    RBC, UA 10 TO 20 /HPF    Casts UA NOT REPORTED /LPF    Crystals UA NOT REPORTED None /HPF    Epithelial Cells UA 10 TO 20 /HPF    Renal Epithelial, Urine NOT REPORTED 0 /HPF    Bacteria, UA MODERATE (A) None    Mucus, UA NOT REPORTED None    Trichomonas, UA NOT REPORTED None    Amorphous, UA 1+ (A) None    Other Observations UA NOT REPORTED NOT REQ.     Yeast, UA NOT REPORTED None   Basic Metabolic Panel w/ Reflex to MG   Result Value Ref Range    Glucose 365 (H) 70 - 99 mg/dL    BUN 9 6 - 20 mg/dL    CREATININE 0.54 0.50 - 0.90 mg/dL    Bun/Cre Ratio NOT REPORTED 9 - 20    Calcium 8.7 8.6 - 10.4 mg/dL    Sodium 135 135 - 144 mmol/L    Potassium 4.2 3.7 - 5.3 mmol/L    Chloride 102 98 - 107 mmol/L    CO2 20 20 - 31 mmol/L    Anion Gap 13 9 - 17 mmol/L    GFR Non-African American >60 >60 mL/min    GFR African American >60 >60 mL/min    GFR Comment          GFR Staging NOT REPORTED    CBC auto differential   Result Value Ref Range    WBC 8.0 3.5 - 11.0 k/uL    RBC 4.25 4.0 - 5.2 m/uL    Hemoglobin 12.9 12.0 - 16.0 g/dL    Hematocrit 37.6 36 - 46 %    MCV 88.7 80 - 100 fL    MCH 30.5 26 - 34 pg    MCHC 34.4 31 - 37 g/dL    RDW 13.3 11.5 - 14.9 %    Platelets 503 802 - 719 k/uL    MPV 8.5 6.0 - 12.0 fL    NRBC Automated NOT REPORTED per 100 WBC    Differential Type NOT REPORTED     Seg Neutrophils 54 36 - 66 %    Lymphocytes 34 24 - 44 %    Monocytes 8 (H) 1 - 7 %    Eosinophils % 3 0 - 4 %    Basophils 1 0 - 2 %    Immature Granulocytes NOT REPORTED 0 % Segs Absolute 4.40 1.3 - 9.1 k/uL    Absolute Lymph # 2.70 1.0 - 4.8 k/uL    Absolute Mono # 0.60 0.1 - 1.3 k/uL    Absolute Eos # 0.20 0.0 - 0.4 k/uL    Basophils Absolute 0.00 0.0 - 0.2 k/uL    Absolute Immature Granulocyte NOT REPORTED 0.00 - 0.30 k/uL    WBC Morphology NOT REPORTED     RBC Morphology NOT REPORTED     Platelet Estimate NOT REPORTED    VANCOMYCIN, TROUGH   Result Value Ref Range    Vancomycin Tr 8.6 (L) 10.0 - 20.0 ug/mL    Vancomycin Trough Dose amount 1750MG     Vancomycin Trough Date last dose 2,032,020     Vancomycin Trough Time last dose 346    Hemoglobin A1C   Result Value Ref Range    Hemoglobin A1C 12.0 (H) 4.0 - 6.0 %    Estimated Avg Glucose 298 mg/dL   Thyroid Stim. Horm.    Result Value Ref Range    TSH 2.59 0.30 - 5.00 mIU/L   Basic Metabolic Panel w/ Reflex to MG   Result Value Ref Range    Glucose 357 (H) 70 - 99 mg/dL    BUN 7 6 - 20 mg/dL    CREATININE 0.63 0.50 - 0.90 mg/dL    Bun/Cre Ratio NOT REPORTED 9 - 20    Calcium 8.8 8.6 - 10.4 mg/dL    Sodium 135 135 - 144 mmol/L    Potassium 3.8 3.7 - 5.3 mmol/L    Chloride 102 98 - 107 mmol/L    CO2 23 20 - 31 mmol/L    Anion Gap 10 9 - 17 mmol/L    GFR Non-African American >60 >60 mL/min    GFR African American >60 >60 mL/min    GFR Comment          GFR Staging NOT REPORTED    CBC auto differential   Result Value Ref Range    WBC 7.2 3.5 - 11.0 k/uL    RBC 4.15 4.0 - 5.2 m/uL    Hemoglobin 12.7 12.0 - 16.0 g/dL    Hematocrit 36.8 36 - 46 %    MCV 88.7 80 - 100 fL    MCH 30.7 26 - 34 pg    MCHC 34.6 31 - 37 g/dL    RDW 13.3 11.5 - 14.9 %    Platelets 865 103 - 690 k/uL    MPV 8.3 6.0 - 12.0 fL    NRBC Automated NOT REPORTED per 100 WBC    Differential Type NOT REPORTED     Seg Neutrophils 48 36 - 66 %    Lymphocytes 40 24 - 44 %    Monocytes 8 (H) 1 - 7 %    Eosinophils % 3 0 - 4 %    Basophils 1 0 - 2 %    Immature Granulocytes NOT REPORTED 0 %    Segs Absolute 3.50 1.3 - 9.1 k/uL    Absolute Lymph # 2.80 1.0 - 4.8 k/uL    Absolute Mono # 0.60 0.1 - 1.3 k/uL    Absolute Eos # 0.20 0.0 - 0.4 k/uL    Basophils Absolute 0.00 0.0 - 0.2 k/uL    Absolute Immature Granulocyte NOT REPORTED 0.00 - 0.30 k/uL    WBC Morphology NOT REPORTED     RBC Morphology NOT REPORTED     Platelet Estimate NOT REPORTED    POCT urine pregnancy   Result Value Ref Range    Preg Test, Ur neg     QC OK? yes    POCT HCG, Prenancy, Ur   Result Value Ref Range    HCG, Pregnancy Urine (POC) NEGATIVE NEGATIVE    - NOT REPORTED    POC Glucose Fingerstick   Result Value Ref Range    POC Glucose 302 (H) 65 - 105 mg/dL   POC Glucose Fingerstick   Result Value Ref Range    POC Glucose 398 (H) 65 - 105 mg/dL   POC Glucose Fingerstick   Result Value Ref Range    POC Glucose 285 (H) 65 - 105 mg/dL   POC Glucose Fingerstick   Result Value Ref Range    POC Glucose 264 (H) 65 - 105 mg/dL   POC Glucose Fingerstick   Result Value Ref Range    POC Glucose 260 (H) 65 - 105 mg/dL   POC Glucose Fingerstick   Result Value Ref Range    POC Glucose 271 (H) 65 - 105 mg/dL   POC Glucose Fingerstick   Result Value Ref Range    POC Glucose 350 (H) 65 - 105 mg/dL   POC Glucose Fingerstick   Result Value Ref Range    POC Glucose 302 (H) 65 - 105 mg/dL   POC Glucose Fingerstick   Result Value Ref Range    POC Glucose 255 (H) 65 - 105 mg/dL        {Radiology:    Ct Pelvis W Contrast Additional Contrast? None    Result Date: 2/2/2020  EXAMINATION: CT OF THE PELVIS WITH CONTRAST 2/2/2020 1:31 pm TECHNIQUE: CT of the pelvis was performed with the administration of intravenous contrast. Multiplanar reformatted images are provided for review. Dose modulation, iterative reconstruction, and/or weight based adjustment of the mA/kV was utilized to reduce the radiation dose to as low as reasonably achievable. COMPARISON: CT abdomen and pelvis November 6, 2018.  HISTORY: ORDERING SYSTEM PROVIDED HISTORY: abscess perirectal area TECHNOLOGIST PROVIDED HISTORY: abscess perirectal area Reason for Exam: perirectal abscess Acuity: Acute Type of Exam: Initial Additional signs and symptoms: Pt noticed something between rectum & vaginal opening a couple days ago; states larger last night Relevant Medical/Surgical History: hx diabetes on metformin; hx  FINDINGS: Visualized liver parenchyma demonstrates hepatic steatosis. Visualized gallbladder is unremarkable. Visualized small bowel and colon demonstrate no acute findings. There is prominent soft tissue seen along the medial aspects of the gluteal clefts without fluid collection to suggest obvious perianal fistula/abscess. There is mild left-sided inflammatory changes and associated skin thickening extending into the region of the left labia. The pelvic floor muscles appear relatively symmetric. Urinary bladder is grossly unremarkable. Uterus is grossly unremarkable. No adnexal mass. Uncomplicated left-sided fat filled inguinal hernia. No free air or free fluid. Enlarged left inguinal lymph node with lobulated cortex measuring 3.5 x 1.2 cm. Osseous structures demonstrate no acute findings. *Prominent soft tissue is seen along the medial aspect of the gluteal clefts without fluid collection to suggest obvious perianal fistula/abscess. There is associated left-sided inflammatory changes skin thickening extending to the level of the labia. If further evaluation is needed, MRI utilizing perianal fistula protocol is suggested. *Enlarged left inguinal lymph node, likely reactive given the history. Attention on follow-up is suggested. *Hepatic steatosis. Consultations:    Consults:     Final Specialist Recommendations/Findings:   PHARMACY TO DOSE VANCOMYCIN  IP CONSULT TO GENERAL SURGERY  IP CONSULT TO INTERNAL MEDICINE  IP CONSULT TO SOCIAL WORK  PHARMACY TO DOSE VANCOMYCIN      The patient was seen and examined on day of discharge and this discharge summary is in conjunction with any daily progress note from day of discharge.     Discharge plan:     Disposition: Time spent on discharge planning ;          [] less than 30 minutes . [x]   more  than 30 minutes . Ellectronically signed by   Kayleen Mcneal MD      Thank you Dr. Shaista Enrique PA-C for the opportunity to be involved in this patient's care. Please note that this chart was generated using voice recognition Dragon dictation software. Although every effort was made to ensure the accuracy of this automated transcription, some errors in transcription may have occurred.

## 2020-02-06 ENCOUNTER — TELEPHONE (OUTPATIENT)
Dept: INTERNAL MEDICINE CLINIC | Age: 25
End: 2020-02-06

## 2020-02-06 NOTE — TELEPHONE ENCOUNTER
[facility] Transitions Initial Follow Up Call    Outreach made within 2 business days of discharge: Yes    Patient: Christina Juan Patient : 1995   MRN: [id number]  Reason for Admission: There are no discharge diagnoses documented for the most recent discharge. Discharge Date: 20       Spoke with: 1st attempt left message to call office. Discharge department/facility: [facility] Interactive Patient Contact:  Was patient able to fill all prescriptions: Yes  Was patient instructed to bring all medications to the follow-up visit: Yes  Is patient taking all medications as directed in the discharge summary? Yes  Does patient understand their discharge instructions: Yes  Does patient have questions or concerns that need addressed prior to 7-14 day follow up office visit: yes     Scheduled appointment with PCP within 7-14 days    Follow Up  No future appointments.     Cl Hanks MA

## 2020-02-12 ENCOUNTER — HOSPITAL ENCOUNTER (OUTPATIENT)
Dept: WOUND CARE | Age: 25
Discharge: HOME | End: 2020-02-12

## 2020-02-12 ENCOUNTER — TELEPHONE (OUTPATIENT)
Dept: WOUND CARE | Age: 25
End: 2020-02-12

## 2020-02-12 NOTE — TELEPHONE ENCOUNTER
Patient was a no call, no show for her [facility] wound care appointment today, message left on voice mail to call back to reschedule.

## 2020-02-19 RX ORDER — LISINOPRIL 5 MG/1
TABLET ORAL
Qty: 30 TABLET | Refills: 3 | Status: SHIPPED | OUTPATIENT
Start: 2020-02-19 | End: 2020-03-11

## 2020-03-02 ENCOUNTER — TELEPHONE (OUTPATIENT)
Dept: INTERNAL MEDICINE CLINIC | Age: 25
End: 2020-03-02

## 2020-03-11 RX ORDER — LISINOPRIL 5 MG/1
TABLET ORAL
Qty: 30 TABLET | Refills: 3 | Status: SHIPPED | OUTPATIENT
Start: 2020-03-11 | End: 2021-05-21 | Stop reason: SDUPTHER

## 2020-03-16 ENCOUNTER — TELEPHONE (OUTPATIENT)
Dept: INTERNAL MEDICINE CLINIC | Age: 25
End: 2020-03-16

## 2020-03-16 NOTE — TELEPHONE ENCOUNTER
Pt has no showed for appt carol dillard/ [facility] on 3/16/20. Pt has no showed for the 6th time since 5/2019. Per [address] pts may be dismissed after the 3rd no show by the provider's discretion. Please advise if you would like pt to be dismissed from the practice due to no show history.

## 2020-04-02 ENCOUNTER — TELEPHONE (OUTPATIENT)
Dept: INTERNAL MEDICINE CLINIC | Age: 25
End: 2020-04-02

## 2020-04-16 ENCOUNTER — HOSPITAL ENCOUNTER (EMERGENCY)
Age: 25
Discharge: HOME | End: 2020-04-16
Attending: EMERGENCY MEDICINE
Payer: COMMERCIAL

## 2020-04-16 VITALS
TEMPERATURE: 98.1 F | SYSTOLIC BLOOD PRESSURE: 145 MMHG | BODY MASS INDEX: 39.24 KG/M2 | RESPIRATION RATE: 16 BRPM | WEIGHT: 250 LBS | HEART RATE: 96 BPM | DIASTOLIC BLOOD PRESSURE: 81 MMHG | OXYGEN SATURATION: 98 % | HEIGHT: 67 IN

## 2020-04-16 PROCEDURE — 6370000000 HC RX 637 (ALT 250 FOR IP): Performed by: PHYSICIAN ASSISTANT

## 2020-04-16 PROCEDURE — 99282 EMERGENCY DEPT VISIT SF MDM: CPT

## 2020-04-16 RX ORDER — IBUPROFEN 600 MG/1
600 TABLET ORAL ONCE
Status: COMPLETED | OUTPATIENT
Start: 2020-04-16 | End: 2020-04-16

## 2020-04-16 RX ORDER — AMOXICILLIN 500 MG/1
500 CAPSULE ORAL 3 TIMES DAILY
Qty: 21 CAPSULE | Refills: 0 | Status: SHIPPED | OUTPATIENT
Start: 2020-04-16 | End: 2020-04-23

## 2020-04-16 RX ORDER — AMOXICILLIN 250 MG/1
500 CAPSULE ORAL ONCE
Status: COMPLETED | OUTPATIENT
Start: 2020-04-16 | End: 2020-04-16

## 2020-04-16 RX ADMIN — IBUPROFEN 600 MG: 600 TABLET, FILM COATED ORAL at 22:06

## 2020-04-16 RX ADMIN — AMOXICILLIN 500 MG: 250 CAPSULE ORAL at 22:06

## 2020-04-16 ASSESSMENT — ENCOUNTER SYMPTOMS
TROUBLE SWALLOWING: 0
RHINORRHEA: 0
SORE THROAT: 1

## 2020-04-16 ASSESSMENT — PAIN SCALES - GENERAL: PAINLEVEL_OUTOF10: 6

## 2020-04-17 ENCOUNTER — CARE COORDINATION (OUTPATIENT)
Dept: CARE COORDINATION | Age: 25
End: 2020-04-17

## 2020-04-17 NOTE — ED PROVIDER NOTES
EMERGENCY DEPARTMENT ENCOUNTER   INDEPENDENT ATTESTATION     Pt Name: Paras Givens  MRN: [id number]  [place] 1995  Date of evaluation: 4/17/20     Paras Givens is a 30 y.o. female with CC: Pharyngitis      I was personally available for consultation in the Emergency Department.     Phyllis Biswas MD  Attending Emergency Physician                   Phyllis Biswas MD  04/17/20 [id number]

## 2020-04-20 RX ORDER — TRAZODONE HYDROCHLORIDE 50 MG/1
50 TABLET ORAL NIGHTLY PRN
Qty: 30 TABLET | Refills: 5 | Status: SHIPPED | OUTPATIENT
Start: 2020-04-20 | End: 2021-05-21

## 2020-04-20 RX ORDER — FLUOXETINE 10 MG/1
CAPSULE ORAL
Qty: 30 CAPSULE | Refills: 3 | Status: SHIPPED | OUTPATIENT
Start: 2020-04-20 | End: 2020-05-29

## 2020-04-20 NOTE — TELEPHONE ENCOUNTER
Patient was dismissed from practice due to recurrent no shows, will provide refill but will need to establish care elsewhere for ongoing care.

## 2020-05-14 ENCOUNTER — APPOINTMENT (OUTPATIENT)
Dept: CT IMAGING | Age: 25
End: 2020-05-14
Payer: COMMERCIAL

## 2020-05-14 ENCOUNTER — HOSPITAL ENCOUNTER (EMERGENCY)
Age: 25
Discharge: HOME | End: 2020-05-15
Attending: EMERGENCY MEDICINE
Payer: COMMERCIAL

## 2020-05-14 LAB
ABSOLUTE EOS #: 0.2 K/UL (ref 0–0.4)
ABSOLUTE IMMATURE GRANULOCYTE: NORMAL K/UL (ref 0–0.3)
ABSOLUTE LYMPH #: 2.8 K/UL (ref 1–4.8)
ABSOLUTE MONO #: 0.6 K/UL (ref 0.1–1.3)
ALBUMIN SERPL-MCNC: 4.3 G/DL (ref 3.5–5.2)
ALBUMIN/GLOBULIN RATIO: ABNORMAL (ref 1–2.5)
ALP BLD-CCNC: 77 U/L (ref 35–104)
ALT SERPL-CCNC: 45 U/L (ref 5–33)
ANION GAP SERPL CALCULATED.3IONS-SCNC: 16 MMOL/L (ref 9–17)
AST SERPL-CCNC: 36 U/L
BASOPHILS # BLD: 1 % (ref 0–2)
BASOPHILS ABSOLUTE: 0.1 K/UL (ref 0–0.2)
BILIRUB SERPL-MCNC: 1.1 MG/DL (ref 0.3–1.2)
BUN BLDV-MCNC: 7 MG/DL (ref 6–20)
BUN/CREAT BLD: ABNORMAL (ref 9–20)
CALCIUM SERPL-MCNC: 9.4 MG/DL (ref 8.6–10.4)
CHLORIDE BLD-SCNC: 98 MMOL/L (ref 98–107)
CO2: 18 MMOL/L (ref 20–31)
CREAT SERPL-MCNC: 0.46 MG/DL (ref 0.5–0.9)
DIFFERENTIAL TYPE: NORMAL
EOSINOPHILS RELATIVE PERCENT: 2 % (ref 0–4)
GFR AFRICAN AMERICAN: >60 ML/MIN
GFR NON-AFRICAN AMERICAN: >60 ML/MIN
GFR SERPL CREATININE-BSD FRML MDRD: ABNORMAL ML/MIN/{1.73_M2}
GFR SERPL CREATININE-BSD FRML MDRD: ABNORMAL ML/MIN/{1.73_M2}
GLUCOSE BLD-MCNC: 353 MG/DL (ref 70–99)
HCG QUALITATIVE: NEGATIVE
HCG QUANTITATIVE: <1 IU/L
HCT VFR BLD CALC: 45.1 % (ref 36–46)
HEMOGLOBIN: 15.3 G/DL (ref 12–16)
IMMATURE GRANULOCYTES: NORMAL %
LIPASE: 118 U/L (ref 13–60)
LYMPHOCYTES # BLD: 33 % (ref 24–44)
MCH RBC QN AUTO: 30.7 PG (ref 26–34)
MCHC RBC AUTO-ENTMCNC: 34 G/DL (ref 31–37)
MCV RBC AUTO: 90.4 FL (ref 80–100)
MONOCYTES # BLD: 7 % (ref 1–7)
NRBC AUTOMATED: NORMAL PER 100 WBC
PDW BLD-RTO: 12.6 % (ref 11.5–14.9)
PLATELET # BLD: 187 K/UL (ref 150–450)
PLATELET ESTIMATE: NORMAL
PMV BLD AUTO: 8.6 FL (ref 6–12)
POTASSIUM SERPL-SCNC: 4.4 MMOL/L (ref 3.7–5.3)
RBC # BLD: 4.99 M/UL (ref 4–5.2)
RBC # BLD: NORMAL 10*6/UL
SEG NEUTROPHILS: 57 % (ref 36–66)
SEGMENTED NEUTROPHILS ABSOLUTE COUNT: 4.9 K/UL (ref 1.3–9.1)
SODIUM BLD-SCNC: 132 MMOL/L (ref 135–144)
TOTAL PROTEIN: 6.9 G/DL (ref 6.4–8.3)
WBC # BLD: 8.5 K/UL (ref 3.5–11)
WBC # BLD: NORMAL 10*3/UL

## 2020-05-14 PROCEDURE — 6360000004 HC RX CONTRAST MEDICATION: Performed by: EMERGENCY MEDICINE

## 2020-05-14 PROCEDURE — 85025 COMPLETE CBC W/AUTO DIFF WBC: CPT

## 2020-05-14 PROCEDURE — 84702 CHORIONIC GONADOTROPIN TEST: CPT

## 2020-05-14 PROCEDURE — 80053 COMPREHEN METABOLIC PANEL: CPT

## 2020-05-14 PROCEDURE — 83690 ASSAY OF LIPASE: CPT

## 2020-05-14 PROCEDURE — 6360000002 HC RX W HCPCS: Performed by: EMERGENCY MEDICINE

## 2020-05-14 PROCEDURE — 84703 CHORIONIC GONADOTROPIN ASSAY: CPT

## 2020-05-14 PROCEDURE — 2580000003 HC RX 258: Performed by: EMERGENCY MEDICINE

## 2020-05-14 PROCEDURE — 82010 KETONE BODYS QUAN: CPT

## 2020-05-14 PROCEDURE — 74177 CT ABD & PELVIS W/CONTRAST: CPT

## 2020-05-14 PROCEDURE — 99284 EMERGENCY DEPT VISIT MOD MDM: CPT

## 2020-05-14 PROCEDURE — 96361 HYDRATE IV INFUSION ADD-ON: CPT

## 2020-05-14 PROCEDURE — 96374 THER/PROPH/DIAG INJ IV PUSH: CPT

## 2020-05-14 PROCEDURE — 96375 TX/PRO/DX INJ NEW DRUG ADDON: CPT

## 2020-05-14 PROCEDURE — 36415 COLL VENOUS BLD VENIPUNCTURE: CPT

## 2020-05-14 RX ORDER — ONDANSETRON 2 MG/ML
4 INJECTION INTRAMUSCULAR; INTRAVENOUS ONCE
Status: COMPLETED | OUTPATIENT
Start: 2020-05-14 | End: 2020-05-14

## 2020-05-14 RX ORDER — 0.9 % SODIUM CHLORIDE 0.9 %
80 INTRAVENOUS SOLUTION INTRAVENOUS ONCE
Status: COMPLETED | OUTPATIENT
Start: 2020-05-14 | End: 2020-05-15

## 2020-05-14 RX ORDER — SODIUM CHLORIDE 0.9 % (FLUSH) 0.9 %
10 SYRINGE (ML) INJECTION PRN
Status: DISCONTINUED | OUTPATIENT
Start: 2020-05-14 | End: 2020-05-15 | Stop reason: HOSPADM

## 2020-05-14 RX ORDER — MORPHINE SULFATE 4 MG/ML
4 INJECTION, SOLUTION INTRAMUSCULAR; INTRAVENOUS ONCE
Status: COMPLETED | OUTPATIENT
Start: 2020-05-14 | End: 2020-05-14

## 2020-05-14 RX ORDER — 0.9 % SODIUM CHLORIDE 0.9 %
1000 INTRAVENOUS SOLUTION INTRAVENOUS ONCE
Status: COMPLETED | OUTPATIENT
Start: 2020-05-14 | End: 2020-05-15

## 2020-05-14 RX ADMIN — ONDANSETRON 4 MG: 2 INJECTION INTRAMUSCULAR; INTRAVENOUS at 23:08

## 2020-05-14 RX ADMIN — Medication 10 ML: at 23:59

## 2020-05-14 RX ADMIN — MORPHINE SULFATE 4 MG: 4 INJECTION, SOLUTION INTRAMUSCULAR; INTRAVENOUS at 23:08

## 2020-05-14 RX ADMIN — SODIUM CHLORIDE 1000 ML: 9 INJECTION, SOLUTION INTRAVENOUS at 23:08

## 2020-05-14 RX ADMIN — IOVERSOL 75 ML: 741 INJECTION INTRA-ARTERIAL; INTRAVENOUS at 23:59

## 2020-05-14 RX ADMIN — SODIUM CHLORIDE 80 ML: 9 INJECTION, SOLUTION INTRAVENOUS at 23:58

## 2020-05-14 ASSESSMENT — ENCOUNTER SYMPTOMS
SHORTNESS OF BREATH: 0
RHINORRHEA: 0
BACK PAIN: 0
SINUS PAIN: 0
COUGH: 0
CONSTIPATION: 0
DIARRHEA: 0
VOMITING: 0
CHEST TIGHTNESS: 0
ABDOMINAL PAIN: 1
NAUSEA: 1
EYE PAIN: 0

## 2020-05-14 ASSESSMENT — PAIN SCALES - GENERAL
PAINLEVEL_OUTOF10: 10
PAINLEVEL_OUTOF10: 10

## 2020-05-15 ENCOUNTER — APPOINTMENT (OUTPATIENT)
Dept: ULTRASOUND IMAGING | Age: 25
End: 2020-05-15
Payer: COMMERCIAL

## 2020-05-15 VITALS
RESPIRATION RATE: 16 BRPM | HEART RATE: 72 BPM | SYSTOLIC BLOOD PRESSURE: 107 MMHG | HEIGHT: 67 IN | TEMPERATURE: 98.3 F | DIASTOLIC BLOOD PRESSURE: 58 MMHG | OXYGEN SATURATION: 96 % | BODY MASS INDEX: 39.87 KG/M2 | WEIGHT: 254 LBS

## 2020-05-15 LAB
ALLEN TEST: ABNORMAL
BETA-HYDROXYBUTYRATE: 0.58 MMOL/L (ref 0.02–0.27)
BILIRUBIN URINE: NEGATIVE
CARBOXYHEMOGLOBIN: 3.6 % (ref 0–5)
COLOR: YELLOW
COMMENT UA: ABNORMAL
DIRECT EXAM: ABNORMAL
FIO2: ABNORMAL
GLUCOSE URINE: ABNORMAL
HCO3 VENOUS: 22 MMOL/L (ref 24–30)
KETONES, URINE: ABNORMAL
LEUKOCYTE ESTERASE, URINE: NEGATIVE
Lab: ABNORMAL
METHEMOGLOBIN: 0 % (ref 0–1.9)
MODE: ABNORMAL
NEGATIVE BASE EXCESS, VEN: 2.4 MMOL/L (ref 0–2)
NITRITE, URINE: NEGATIVE
NOTIFICATION TIME: ABNORMAL
NOTIFICATION: ABNORMAL
O2 DEVICE/FLOW/%: ABNORMAL
O2 SAT, VEN: 95.9 % (ref 60–85)
OXYHEMOGLOBIN: ABNORMAL % (ref 95–98)
PATIENT TEMP: ABNORMAL
PCO2, VEN, TEMP ADJ: ABNORMAL MMHG (ref 39–55)
PCO2, VEN: 33.3 (ref 39–55)
PEEP/CPAP: ABNORMAL
PH UA: 6 (ref 5–8)
PH VENOUS: 7.43 (ref 7.32–7.42)
PH, VEN, TEMP ADJ: ABNORMAL (ref 7.32–7.42)
PO2, VEN, TEMP ADJ: ABNORMAL MMHG (ref 30–50)
PO2, VEN: 110 (ref 30–50)
POSITIVE BASE EXCESS, VEN: ABNORMAL MMOL/L (ref 0–2)
PROTEIN UA: NEGATIVE
PSV: ABNORMAL
PT. POSITION: ABNORMAL
RESPIRATORY RATE: ABNORMAL
SAMPLE SITE: ABNORMAL
SET RATE: ABNORMAL
SPECIFIC GRAVITY UA: 1.07 (ref 1–1.03)
SPECIMEN DESCRIPTION: ABNORMAL
TEXT FOR RESPIRATORY: ABNORMAL
TOTAL HB: ABNORMAL G/DL (ref 12–16)
TOTAL RATE: ABNORMAL
TURBIDITY: CLEAR
URINE HGB: NEGATIVE
UROBILINOGEN, URINE: NORMAL
VT: ABNORMAL

## 2020-05-15 PROCEDURE — 6370000000 HC RX 637 (ALT 250 FOR IP): Performed by: EMERGENCY MEDICINE

## 2020-05-15 PROCEDURE — 87480 CANDIDA DNA DIR PROBE: CPT

## 2020-05-15 PROCEDURE — 96361 HYDRATE IV INFUSION ADD-ON: CPT

## 2020-05-15 PROCEDURE — 87491 CHLMYD TRACH DNA AMP PROBE: CPT

## 2020-05-15 PROCEDURE — 36415 COLL VENOUS BLD VENIPUNCTURE: CPT

## 2020-05-15 PROCEDURE — 93976 VASCULAR STUDY: CPT

## 2020-05-15 PROCEDURE — 82805 BLOOD GASES W/O2 SATURATION: CPT

## 2020-05-15 PROCEDURE — 87510 GARDNER VAG DNA DIR PROBE: CPT

## 2020-05-15 PROCEDURE — 87660 TRICHOMONAS VAGIN DIR PROBE: CPT

## 2020-05-15 PROCEDURE — 87591 N.GONORRHOEAE DNA AMP PROB: CPT

## 2020-05-15 PROCEDURE — 81003 URINALYSIS AUTO W/O SCOPE: CPT

## 2020-05-15 PROCEDURE — 82800 BLOOD PH: CPT

## 2020-05-15 PROCEDURE — 76830 TRANSVAGINAL US NON-OB: CPT

## 2020-05-15 RX ORDER — FLUCONAZOLE 150 MG/1
150 TABLET ORAL ONCE
Status: COMPLETED | OUTPATIENT
Start: 2020-05-15 | End: 2020-05-15

## 2020-05-15 RX ORDER — ONDANSETRON 4 MG/1
4 TABLET, ORALLY DISINTEGRATING ORAL EVERY 8 HOURS PRN
Qty: 20 TABLET | Refills: 0 | Status: SHIPPED | OUTPATIENT
Start: 2020-05-15 | End: 2021-05-21

## 2020-05-15 RX ADMIN — FLUCONAZOLE 150 MG: 150 TABLET ORAL at 01:58
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
## 2020-05-16 ENCOUNTER — CARE COORDINATION (OUTPATIENT)
Dept: CARE COORDINATION | Age: 25
End: 2020-05-16

## 2020-05-18 LAB
C TRACH DNA GENITAL QL NAA+PROBE: NEGATIVE
N. GONORRHOEAE DNA: NEGATIVE
SPECIMEN DESCRIPTION: NORMAL

## 2020-05-22 ENCOUNTER — CARE COORDINATION (OUTPATIENT)
Dept: CARE COORDINATION | Age: 25
End: 2020-05-22

## 2020-05-29 ENCOUNTER — TELEMEDICINE (OUTPATIENT)
Dept: PSYCHIATRY | Age: 25
End: 2020-05-29
Payer: COMMERCIAL

## 2020-05-29 ENCOUNTER — CARE COORDINATION (OUTPATIENT)
Dept: CARE COORDINATION | Age: 25
End: 2020-05-29

## 2020-05-29 PROCEDURE — 4004F PT TOBACCO SCREEN RCVD TLK: CPT | Performed by: NURSE PRACTITIONER

## 2020-05-29 PROCEDURE — 90792 PSYCH DIAG EVAL W/MED SRVCS: CPT | Performed by: NURSE PRACTITIONER

## 2020-05-29 NOTE — PROGRESS NOTES
Neurological:positive for migraine headaches. Endo/Heme/Allergies:positive for brittle diabetic, anemia, benadryl allergy.          Current Outpatient Medications:     dicyclomine (BENTYL) 20 MG tablet, Take 1 tablet by mouth every 6 hours, Disp: 60 tablet, Rfl: 0    Blood Pressure KIT, 1 kit by Does not apply route 2 times daily, Disp: 1 kit, Rfl: 0    QUEtiapine (SEROQUEL) 50 MG tablet, take 1 tablet by mouth at bedtime, Disp: , Rfl:     ondansetron (ZOFRAN ODT) 4 MG disintegrating tablet, Take 1 tablet by mouth every 8 hours as needed for Nausea (Patient not taking: Reported on 5/19/2020), Disp: 20 tablet, Rfl: 0    PREMPRO 0.625-2.5 MG per tablet, take 1 tablet by mouth once daily, Disp: 28 tablet, Rfl: 1    traZODone (DESYREL) 50 MG tablet, Take 1 tablet by mouth nightly as needed for Sleep, Disp: 30 tablet, Rfl: 5    metFORMIN (GLUCOPHAGE) 1000 MG tablet, take 1 tablet by mouth twice a day with meals, Disp: 60 tablet, Rfl: 3    lisinopril (PRINIVIL;ZESTRIL) 5 MG tablet, take 1 tablet by mouth once daily, Disp: 30 tablet, Rfl: 3    insulin detemir (LEVEMIR FLEXTOUCH) 100 UNIT/ML injection pen, Inject 55 Units into the skin nightly, Disp: 5 pen, Rfl: 3    insulin aspart (NOVOLOG FLEXPEN) 100 UNIT/ML injection pen, Inject 4 Units into the skin 3 times daily (before meals) 4 units before breakfast, 4 units before lunch, 6 units before dinner and 6 units before bedtime, Disp: , Rfl:     docusate sodium (COLACE) 100 MG capsule, Take 1 capsule by mouth 2 times daily as needed for Constipation (Patient not taking: Reported on 5/19/2020), Disp: 60 capsule, Rfl: 0    ibuprofen (ADVIL;MOTRIN) 600 MG tablet, Take 1 tablet by mouth 3 times daily as needed for Pain, Disp: 90 tablet, Rfl: 0    NARCAN 4 MG/0.1ML LIQD nasal spray, ADMINISTER A SINGLE spray INTO ONE NOSTRIL CALL 911 MAY REPEAT ONCE, Disp: , Rfl: 0    Blood Glucose Monitoring Suppl (TRUE METRIX METER) w/Device KIT, TEST 2 TIMES A DAY AND AS

## 2020-06-05 RX ORDER — QUETIAPINE FUMARATE 50 MG/1
TABLET, FILM COATED ORAL
Qty: 30 TABLET | Refills: 1 | Status: SHIPPED | OUTPATIENT
Start: 2020-06-05 | End: 2020-08-03

## 2020-06-15 ENCOUNTER — APPOINTMENT (OUTPATIENT)
Dept: CT IMAGING | Age: 25
End: 2020-06-15
Payer: COMMERCIAL

## 2020-06-15 ENCOUNTER — HOSPITAL ENCOUNTER (EMERGENCY)
Age: 25
Discharge: HOME | End: 2020-06-15
Attending: EMERGENCY MEDICINE
Payer: COMMERCIAL

## 2020-06-15 VITALS
OXYGEN SATURATION: 98 % | WEIGHT: 251 LBS | HEIGHT: 67 IN | SYSTOLIC BLOOD PRESSURE: 129 MMHG | TEMPERATURE: 98.8 F | RESPIRATION RATE: 17 BRPM | BODY MASS INDEX: 39.39 KG/M2 | DIASTOLIC BLOOD PRESSURE: 77 MMHG | HEART RATE: 92 BPM

## 2020-06-15 LAB
ABSOLUTE EOS #: 0.2 K/UL (ref 0–0.4)
ABSOLUTE IMMATURE GRANULOCYTE: NORMAL K/UL (ref 0–0.3)
ABSOLUTE LYMPH #: 3.6 K/UL (ref 1–4.8)
ABSOLUTE MONO #: 0.7 K/UL (ref 0.1–1.3)
ALBUMIN SERPL-MCNC: 4.3 G/DL (ref 3.5–5.2)
ALBUMIN/GLOBULIN RATIO: ABNORMAL (ref 1–2.5)
ALP BLD-CCNC: 73 U/L (ref 35–104)
ALT SERPL-CCNC: 52 U/L (ref 5–33)
ANION GAP SERPL CALCULATED.3IONS-SCNC: 15 MMOL/L (ref 9–17)
AST SERPL-CCNC: 37 U/L
BASOPHILS # BLD: 1 % (ref 0–2)
BASOPHILS ABSOLUTE: 0.1 K/UL (ref 0–0.2)
BILIRUB SERPL-MCNC: 1 MG/DL (ref 0.3–1.2)
BUN BLDV-MCNC: 11 MG/DL (ref 6–20)
BUN/CREAT BLD: ABNORMAL (ref 9–20)
CALCIUM SERPL-MCNC: 9.6 MG/DL (ref 8.6–10.4)
CHLORIDE BLD-SCNC: 96 MMOL/L (ref 98–107)
CO2: 21 MMOL/L (ref 20–31)
CREAT SERPL-MCNC: 0.58 MG/DL (ref 0.5–0.9)
DIFFERENTIAL TYPE: NORMAL
EOSINOPHILS RELATIVE PERCENT: 2 % (ref 0–4)
GFR AFRICAN AMERICAN: >60 ML/MIN
GFR NON-AFRICAN AMERICAN: >60 ML/MIN
GFR SERPL CREATININE-BSD FRML MDRD: ABNORMAL ML/MIN/{1.73_M2}
GFR SERPL CREATININE-BSD FRML MDRD: ABNORMAL ML/MIN/{1.73_M2}
GLUCOSE BLD-MCNC: 459 MG/DL (ref 70–99)
HCG QUALITATIVE: NEGATIVE
HCT VFR BLD CALC: 43.4 % (ref 36–46)
HEMOGLOBIN: 14.7 G/DL (ref 12–16)
IMMATURE GRANULOCYTES: NORMAL %
INR BLD: 0.9
LIPASE: 69 U/L (ref 13–60)
LYMPHOCYTES # BLD: 36 % (ref 24–44)
MCH RBC QN AUTO: 30.6 PG (ref 26–34)
MCHC RBC AUTO-ENTMCNC: 34 G/DL (ref 31–37)
MCV RBC AUTO: 90.1 FL (ref 80–100)
MONOCYTES # BLD: 7 % (ref 1–7)
NRBC AUTOMATED: NORMAL PER 100 WBC
PARTIAL THROMBOPLASTIN TIME: 22.4 SEC (ref 24–36)
PDW BLD-RTO: 12.5 % (ref 11.5–14.9)
PLATELET # BLD: 205 K/UL (ref 150–450)
PLATELET ESTIMATE: NORMAL
PMV BLD AUTO: 8.7 FL (ref 6–12)
POTASSIUM SERPL-SCNC: 3.7 MMOL/L (ref 3.7–5.3)
PROTHROMBIN TIME: 12 SEC (ref 11.8–14.6)
RBC # BLD: 4.82 M/UL (ref 4–5.2)
RBC # BLD: NORMAL 10*6/UL
SEG NEUTROPHILS: 54 % (ref 36–66)
SEGMENTED NEUTROPHILS ABSOLUTE COUNT: 5.7 K/UL (ref 1.3–9.1)
SODIUM BLD-SCNC: 132 MMOL/L (ref 135–144)
TOTAL PROTEIN: 6.9 G/DL (ref 6.4–8.3)
WBC # BLD: 10.2 K/UL (ref 3.5–11)
WBC # BLD: NORMAL 10*3/UL

## 2020-06-15 PROCEDURE — 6360000004 HC RX CONTRAST MEDICATION: Performed by: EMERGENCY MEDICINE

## 2020-06-15 PROCEDURE — 84703 CHORIONIC GONADOTROPIN ASSAY: CPT

## 2020-06-15 PROCEDURE — 85730 THROMBOPLASTIN TIME PARTIAL: CPT

## 2020-06-15 PROCEDURE — 83690 ASSAY OF LIPASE: CPT

## 2020-06-15 PROCEDURE — 99284 EMERGENCY DEPT VISIT MOD MDM: CPT

## 2020-06-15 PROCEDURE — 2580000003 HC RX 258: Performed by: EMERGENCY MEDICINE

## 2020-06-15 PROCEDURE — 85610 PROTHROMBIN TIME: CPT

## 2020-06-15 PROCEDURE — 80053 COMPREHEN METABOLIC PANEL: CPT

## 2020-06-15 PROCEDURE — 74177 CT ABD & PELVIS W/CONTRAST: CPT

## 2020-06-15 PROCEDURE — 36415 COLL VENOUS BLD VENIPUNCTURE: CPT

## 2020-06-15 PROCEDURE — 85025 COMPLETE CBC W/AUTO DIFF WBC: CPT

## 2020-06-15 RX ORDER — 0.9 % SODIUM CHLORIDE 0.9 %
1000 INTRAVENOUS SOLUTION INTRAVENOUS ONCE
Status: COMPLETED | OUTPATIENT
Start: 2020-06-15 | End: 2020-06-15

## 2020-06-15 RX ORDER — SODIUM CHLORIDE 0.9 % (FLUSH) 0.9 %
10 SYRINGE (ML) INJECTION ONCE
Status: COMPLETED | OUTPATIENT
Start: 2020-06-15 | End: 2020-06-15

## 2020-06-15 RX ORDER — 0.9 % SODIUM CHLORIDE 0.9 %
80 INTRAVENOUS SOLUTION INTRAVENOUS ONCE
Status: COMPLETED | OUTPATIENT
Start: 2020-06-15 | End: 2020-06-15

## 2020-06-15 RX ADMIN — Medication 10 ML: at 20:39

## 2020-06-15 RX ADMIN — SODIUM CHLORIDE 1000 ML: 9 INJECTION, SOLUTION INTRAVENOUS at 19:58

## 2020-06-15 RX ADMIN — SODIUM CHLORIDE 80 ML: 9 INJECTION, SOLUTION INTRAVENOUS at 20:39

## 2020-06-15 RX ADMIN — IOPAMIDOL 75 ML: 755 INJECTION, SOLUTION INTRAVENOUS at 20:39

## 2020-06-15 ASSESSMENT — PAIN SCALES - GENERAL: PAINLEVEL_OUTOF10: 10

## 2020-06-15 ASSESSMENT — ENCOUNTER SYMPTOMS
BLOOD IN STOOL: 1
RESPIRATORY NEGATIVE: 1
BACK PAIN: 0
SHORTNESS OF BREATH: 0
COUGH: 0
NAUSEA: 0
ABDOMINAL PAIN: 1
VOMITING: 0
DIARRHEA: 0
EYES NEGATIVE: 1

## 2020-06-15 NOTE — ED PROVIDER NOTES
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    BMI 40.0-44.9 Z68.41    Generalized abdominal pain R10.84    Hepatic steatosis K76.0    Type 2 diabetes mellitus with diabetic neuropathy (HCC) E11.40    History of gonorrhea Z86.19    History of chlamydia Z86.19    Nausea & vomiting R11.2    Diarrhea R19.7    Bipolar affective disorder, currently depressed, mild (HCC) F31.31    Cellulitis of breast N61.0    Back pain M54.9    Noncompliance Z91.19    Diabetes ([facility] E11.9    History of  section x2 Z98.891    History of postpartum depression Z87.59, Z80.58    Lost custody of children Z76.3    Poor historian Z78.9    Hyponatremia E87.1    Uncontrolled diabetes mellitus ([facility] E11.65    Mood disorder ([facility] F39    Viral hepatitis A without hepatic coma B15.9    Abscess L02.91    Hyperlipidemia LDL goal <70 E78.5    Breast abscess N61.1    Abscess of buttock L02.31     SURGICAL HISTORY       Past Surgical History:   Procedure Laterality Date    BREAST SURGERY Left 2019    BREAST INCISION AND DRAINAGE performed by Moises Soria MD at [address] [place] [address] Right 2019    BREAST INCISION AND DRAINAGE performed by Moises Soria MD at [address] N/A 3/23/2017     SECTION REPEAT  [facility] [address]      RECTAL SURGERY N/A 2/3/2020    I & D PERIANAL ABSCESS performed by Melinda Sexton MD at [address] N/A 2018    EGD BIOPSY performed by Criselda Batista MD at [address] [place] [address]       Discharge Medication List as of 6/15/2020  9:17 PM      CONTINUE these Base) MCG/ACT inhaler Inhale 2 puffs into the lungs every 6 hours as needed for Wheezing, Disp-1 Inhaler, R-2Normal      blood glucose monitor kit and supplies 1 kit by Other route three times daily Dispense product that insurance will cover, Other, 3 TIMES DAILY Starting Mon 7/22/2019, Disp-1 kit, R-0, Normal      !! blood glucose monitor strips 1 strip by Other route three times daily Dispense product that insurance will cover, Other, 3 TIMES DAILY Starting Mon 7/22/2019, Disp-100 strip, R-11, Normal      !! Insulin Pen Needle (PEN NEEDLES) 31G X 6 MM MISC DAILY Starting Sat 6/22/2019, Disp-100 each, R-3, Normal      !! blood glucose monitor strips Test 2 times a day & as needed for symptoms of irregular blood glucose., Disp-100 strip, R-3, Normal      ONE TOUCH LANCETS MISC 2 TIMES DAILY Starting Wed 6/19/2019, Disp-100 each, R-3, Normal      etonogestrel (NEXPLANON) 68 MG implant 68 mg by Subdermal route once for 1 dose, Subdermal, ONCE Starting Tue 5/23/2017, Disp-1 each, R-0, NO PRINT       ! ! - Potential duplicate medications found. Please discuss with provider. ALLERGIES     is allergic to benadryl [diphenhydramine]. FAMILY HISTORY     She indicated that her mother is alive. She indicated that her father is alive. She indicated that the status of her neg hx is unknown.      SOCIAL HISTORY       Social History     Tobacco Use    Smoking status: Current Every Day Smoker     Packs/day: 1.00     Years: 10.00     Pack years: 10.00     Types: E-Cigarettes     Start date: 11/7/2007     Last attempt to quit: 7/20/2016     Years since quitting: 3.9    Smokeless tobacco: Never Used   Substance Use Topics    Alcohol use: No     Alcohol/week: 0.0 standard drinks    Drug use: No     PHYSICAL EXAM     INITIAL VITALS: /77   Pulse 92   Temp 98.8 °F (37.1 °C) (Oral)   Resp 17   Ht 5' 7\" (1.702 m)   Wt 251 lb (113.9 kg)   SpO2 98%   BMI 39.31 kg/m²    Physical Exam  Vitals signs and nursing note reviewed. Exam conducted with a chaperone present. Constitutional:       Appearance: Normal appearance. HENT:      Head: Normocephalic and atraumatic. Nose: No congestion. Mouth/Throat:      Mouth: Mucous membranes are moist.   Eyes:      Conjunctiva/sclera: Conjunctivae normal.   Neck:      Musculoskeletal: Normal range of motion and neck supple. Cardiovascular:      Rate and Rhythm: Normal rate and regular rhythm. Heart sounds: No murmur. Pulmonary:      Effort: Pulmonary effort is normal.      Breath sounds: Normal breath sounds. Abdominal:      Palpations: Abdomen is soft. Tenderness: There is abdominal tenderness (mild rlq abd pain. ). Genitourinary:     Comments: Small ext hemorrhoid, no active bleeding. Musculoskeletal:         General: No signs of injury. Skin:     General: Skin is warm and dry. Capillary Refill: Capillary refill takes less than 2 seconds. Neurological:      General: No focal deficit present. Mental Status: She is alert and oriented to person, place, and time. MEDICAL DECISION MAKING:     Reviewed results. Ct demonstrates hepatic steatosis, ovarian cyst.  Hyperglycemia. No acidosis. Mild lft elevation. On reeval, exam unchanged. Pt appears well, no distress. Discussed results. No evidence of emergent neuro, vascular, infectious, traumatic, cardiac, pulmonary, intraabdominal pathology or other emergent pathology. Pt is appropriate for discharge. Discussed results and plan with the pt. They expressed appropriate understanding. Pt given close follow up, supportive care instructions and strict return instructions at the bedside.          CRITICAL CARE:       PROCEDURES:    Procedures    DIAGNOSTIC RESULTS   EKG:All EKG's are interpreted by the Emergency Department Physician who either signs or Co-signs this chart in the absence of a cardiologist.        RADIOLOGY:All plain film, CT, MRI, and formal ultrasound images (except ED

## 2020-06-16 ENCOUNTER — CARE COORDINATION (OUTPATIENT)
Dept: CARE COORDINATION | Age: 25
End: 2020-06-16

## 2020-06-16 NOTE — ED NOTES
Mode of arrival (squad #, walk in, police, etc) : brought in by family        Chief complaint(s): c/o abd pain and rectal bleeding        Arrival Note (brief scenario, treatment PTA, etc). : Pt presents to ED from home c/o abd pain and rectal bleeding x a few days. Pt stated she has a hemorrhoid. Pt stated she has been dx w/ gallstones and pancreatitis. Pt stated she is having pain 10/10 in her abdomen, which is described to be 'sharp and constant' pain. Pt stated her 'sugar might be high', pt stated she takes metformin, and 2 types of insulin. Pt is A&Ox4, eupneic, PWD. GCS=15. Call light in reach. C= \"Have you ever felt that you should Cut down on your drinking? \"  No  A= \"Have people Annoyed you by criticizing your drinking? \"  No  G= \"Have you ever felt bad or Guilty about your drinking? \"  No  E= \"Have you ever had a drink as an Eye-opener first thing in the morning to steady your nerves or to help a hangover? \"  No      Deferred []      Reason for deferring: N/A    *If yes to two or more: probable alcohol abuse. Jaspal Owens RN  06/15/20 2019       Chuy Fairchild RN  06/15/20 2033

## 2020-06-17 ENCOUNTER — TELEPHONE (OUTPATIENT)
Dept: GASTROENTEROLOGY | Age: 25
End: 2020-06-17

## 2020-06-17 ENCOUNTER — HOSPITAL ENCOUNTER (EMERGENCY)
Age: 25
Discharge: HOME | End: 2020-06-17
Attending: EMERGENCY MEDICINE
Payer: COMMERCIAL

## 2020-06-17 VITALS
HEART RATE: 106 BPM | RESPIRATION RATE: 16 BRPM | HEIGHT: 67 IN | SYSTOLIC BLOOD PRESSURE: 122 MMHG | DIASTOLIC BLOOD PRESSURE: 71 MMHG | BODY MASS INDEX: 39.39 KG/M2 | WEIGHT: 251 LBS | OXYGEN SATURATION: 97 % | TEMPERATURE: 98.3 F

## 2020-06-17 PROCEDURE — 99284 EMERGENCY DEPT VISIT MOD MDM: CPT

## 2020-06-18 ASSESSMENT — ENCOUNTER SYMPTOMS
SORE THROAT: 0
COLOR CHANGE: 0
DIARRHEA: 0
COUGH: 0
VOMITING: 0
ABDOMINAL PAIN: 0
SHORTNESS OF BREATH: 0

## 2020-06-18 NOTE — ED PROVIDER NOTES
[facility] ED  Emergency Department Encounter  Emergency Medicine Attending Note     Pt Name: Jose M Lutz  MRN: [id number]  [place] 1995   Date of evaluation: 2020  PCP:  Diana Scott PA-C    CHIEF COMPLAINT       Chief Complaint   Patient presents with    Depression       HISTORY OF PRESENT ILLNESS  (Location/Symptom, Timing/Onset, Context/Setting, Quality, Duration, Modifying Factors, Severity.)      Jose M Lutz is a 30 y.o. female who presents with depression and superficial cutting. Patient states that she has been very sad and depressed because she just wants to feel loved. She states that she did have some superficial cutting, but is because she just wanted to feel. She denies wanting to actually harm herself. Denies any suicidal ideations. Denies any homicidal ideations. States that she is just been very sad recently. She denies any significant medical complaints including no chest pain, cough, shortness of breath. No recent fevers. No abdominal pain. She denies any alcohol or drug use today. PAST MEDICAL / SURGICAL / SOCIAL / FAMILY HISTORY     Past Medical History:  has a past medical history of Asthma, Diabetes mellitus type 2 in obese ([facility] Hepatic steatosis, Hypertension, Morbid obesity with body mass index (BMI) of 40.0 to 49.9 ([facility] Neuropathy, and Postpartum depression. Past Surgical History:  has a past surgical history that includes Tonsillectomy (Bilateral);  section; eye surgery;  section (N/A, 3/23/2017); Upper gastrointestinal endoscopy (N/A, 2018); Breast surgery (Left, 2019); Breast surgery (Right, 2019); and Rectal surgery (N/A, 2/3/2020). Allergies:  Benadryl [diphenhydramine]     Home Meds:   Prior to Visit Medications    Medication Sig Taking?  Authorizing Provider   dicyclomine (BENTYL) 20 MG tablet Take 1 tablet by mouth every 6 hours  Diana Scott PA-C   QUEtiapine (SEROQUEL) 50 MG tablet take 1 tablet by mouth at bedtime  Eduarda Salas, APRN - NP   Blood Pressure KIT 1 kit by Does not apply route 2 times daily  Shanthi Godoy PA-C   ondansetron (ZOFRAN ODT) 4 MG disintegrating tablet Take 1 tablet by mouth every 8 hours as needed for Nausea  Jacob Vance MD   PREMPRO 0.625-2.5 MG per tablet take 1 tablet by mouth once daily  Aly Lopez APRN - CNP   traZODone (DESYREL) 50 MG tablet Take 1 tablet by mouth nightly as needed for Sleep  Leighton Moore PA-C   metFORMIN (GLUCOPHAGE) 1000 MG tablet take 1 tablet by mouth twice a day with meals  Leighton Moore PA-C   lisinopril (PRINIVIL;ZESTRIL) 5 MG tablet take 1 tablet by mouth once daily  Leighton Moore PA-C   insulin detemir (LEVEMIR FLEXTOUCH) 100 UNIT/ML injection pen Inject 55 Units into the skin nightly  Pranav Kebede MD   insulin aspart (NOVOLOG FLEXPEN) 100 UNIT/ML injection pen Inject 4 Units into the skin 3 times daily (before meals) 4 units before breakfast, 4 units before lunch, 6 units before dinner and 6 units before bedtime  Historical Provider, MD   docusate sodium (COLACE) 100 MG capsule Take 1 capsule by mouth 2 times daily as needed for Constipation  Leighton Moore PA-C   ibuprofen (ADVIL;MOTRIN) 600 MG tablet Take 1 tablet by mouth 3 times daily as needed for Pain  Leighton Moore PA-C   NARCAN 4 MG/0.1ML LIQD nasal spray ADMINISTER A SINGLE spray INTO ONE NOSTRIL CALL 911 MAY REPEAT ONCE  Historical Provider, MD Toribio Spearing 29G X 12MM MISC USE TWICE DAILY WITH [address]  Historical Provider, MD   albuterol sulfate HFA (PROVENTIL HFA) 108 (90 Base) MCG/ACT inhaler Inhale 2 puffs into the lungs every 6 hours as needed for Wheezing  Leighton Moore PA-C   blood glucose monitor kit and supplies 1 kit by Other route three times daily Dispense product that insurance will cover  Leighton Moore PA-C   blood glucose monitor strips 1 strip by Other route three times daily Dispense product that insurance will cover needed, If symptoms worsen      DISCHARGE MEDICATIONS:  Discharge Medication List as of 6/17/2020  9:22 PM          Ambrocio Horan MD  Emergency Medicine Attending      (Please note that portions of this note were completed with a voice recognition program.  Efforts were made to edit the dictations but occasionallywords are mis-transcribed.)          Ambrocio Horan MD  06/18/20 [id number]

## 2020-06-23 ENCOUNTER — TELEPHONE (OUTPATIENT)
Dept: GASTROENTEROLOGY | Age: 25
End: 2020-06-23

## 2020-06-24 ENCOUNTER — HOSPITAL ENCOUNTER (OUTPATIENT)
Age: 25
Setting detail: SPECIMEN
Discharge: HOME | End: 2020-06-24
Payer: COMMERCIAL

## 2020-06-24 ENCOUNTER — OFFICE VISIT (OUTPATIENT)
Dept: GASTROENTEROLOGY | Age: 25
End: 2020-06-24
Payer: COMMERCIAL

## 2020-06-24 VITALS — HEIGHT: 67 IN | WEIGHT: 247 LBS | BODY MASS INDEX: 38.77 KG/M2 | TEMPERATURE: 97.1 F

## 2020-06-24 LAB
CHOLESTEROL/HDL RATIO: 6.1
CHOLESTEROL: 206 MG/DL
HDLC SERPL-MCNC: 34 MG/DL
IGA: 139 MG/DL (ref 70–400)
LDL CHOLESTEROL: 112 MG/DL (ref 0–130)
LIPASE: 37 U/L (ref 13–60)
TRIGL SERPL-MCNC: 298 MG/DL
VLDLC SERPL CALC-MCNC: ABNORMAL MG/DL (ref 1–30)

## 2020-06-24 PROCEDURE — G8427 DOCREV CUR MEDS BY ELIG CLIN: HCPCS | Performed by: INTERNAL MEDICINE

## 2020-06-24 PROCEDURE — 83516 IMMUNOASSAY NONANTIBODY: CPT

## 2020-06-24 PROCEDURE — 4004F PT TOBACCO SCREEN RCVD TLK: CPT | Performed by: INTERNAL MEDICINE

## 2020-06-24 PROCEDURE — 82784 ASSAY IGA/IGD/IGG/IGM EACH: CPT

## 2020-06-24 PROCEDURE — 83690 ASSAY OF LIPASE: CPT

## 2020-06-24 PROCEDURE — 99215 OFFICE O/P EST HI 40 MIN: CPT | Performed by: INTERNAL MEDICINE

## 2020-06-24 PROCEDURE — 36415 COLL VENOUS BLD VENIPUNCTURE: CPT

## 2020-06-24 PROCEDURE — G8417 CALC BMI ABV UP PARAM F/U: HCPCS | Performed by: INTERNAL MEDICINE

## 2020-06-24 PROCEDURE — 83036 HEMOGLOBIN GLYCOSYLATED A1C: CPT

## 2020-06-24 PROCEDURE — 80061 LIPID PANEL: CPT

## 2020-06-24 RX ORDER — DIAPER,BRIEF,INFANT-TODD,DISP
EACH MISCELLANEOUS
Qty: 1 TUBE | Refills: 1 | Status: SHIPPED | OUTPATIENT
Start: 2020-06-24 | End: 2020-07-01

## 2020-06-24 ASSESSMENT — ENCOUNTER SYMPTOMS
TROUBLE SWALLOWING: 1
COUGH: 1
BLOOD IN STOOL: 0
EYES NEGATIVE: 1
DIARRHEA: 1
NAUSEA: 1
ABDOMINAL DISTENTION: 1
CONSTIPATION: 1
ABDOMINAL PAIN: 1
SHORTNESS OF BREATH: 1
VOMITING: 0

## 2020-06-24 NOTE — PROGRESS NOTES
GI OFFICE FOLLOW UP    INTERVAL HISTORY:   Laura Ferrera PA-C  [address] [facility]  [address] [facility],[address]  [place], [address] [place] [address]    Chief Complaint   Patient presents with    Follow-up     Pt states RUQ abd. pain daily. Pt states nausea daily.  Hemorrhoids     Pt states having pain when wiping and bleeding. Pt states that she needs more cream.       1. Viral hepatitis A without hepatic coma    2. Nausea and vomiting, intractability of vomiting not specified, unspecified vomiting type              HISTORY OF PRESENT ILLNESS: Kenzie Chavez is a 30 y.o. female with a past history remarkable for multiple medical problems, referred for evaluation of   Chief Complaint   Patient presents with    Follow-up     Pt states RUQ abd. pain daily. Pt states nausea daily.  Hemorrhoids     Pt states having pain when wiping and bleeding. Pt states that she needs more cream.   .  Patient seen with a history of abdominal pain, nausea, hematochezia. Patient states the pain is mostly in the left lateral abdominal wall. The pain get worse with body movements. When she is lying down or when she sleeps the pain do not bother her. She does have postprandial nausea. No emesis. No weight loss. Patient states that with voluntary efforts she lost about 4 pounds. Patient visited emergency department several times in the last 3 to 4 months. Her work-up was negative. CT scan nonspecific. Labs reviewed. She is known to have mildly elevated lipase levels. She denies alcoholism. Recent CT scan did not reveal pancreatic abnormalities. At present patient has good appetite. No dysphagia. Has up to 3 bowel movements a day. She stated that she has hemorrhoids and noticing hematochezia. Patient requests Preparation H which she has been taking.     Patient's previous records reviewed  Past Medical,Family, and Social History reviewed and does contribute to the patient presenting condition. Patient's PMH/PSH,SH,PSYCH Hx, MEDs, ALLERGIES, and ROS were all reviewed and updated in the appropriate sections.     PAST MEDICAL HISTORY:  Past Medical History:   Diagnosis Date    Asthma     uses inhaler    Diabetes mellitus type 2 in obese ([address]     Hepatic steatosis     Hypertension     started when approx 6 mos pregnant with first pregnancy    Morbid obesity with body mass index (BMI) of 40.0 to 49.9 (HCC)     Neuropathy     Postpartum depression        Past Surgical History:   Procedure Laterality Date    BREAST SURGERY Left 2019    BREAST INCISION AND DRAINAGE performed by Yamilka Rascon MD at [facility] Right 2019    BREAST INCISION AND DRAINAGE performed by Yamilka Rascon MD at [address] N/A 3/23/2017     SECTION REPEAT  performed by Leonel Malcolm MD at [facility] L&D [address] RECTAL SURGERY N/A 2/3/2020    I & D PERIANAL ABSCESS performed by Deandra Hurtado MD at [facility] N/A 2018    [facility]    Current Outpatient Medications:     dicyclomine (BENTYL) 20 MG tablet, Take 1 tablet by mouth every 6 hours, Disp: 60 tablet, Rfl: 1    QUEtiapine (SEROQUEL) 50 MG tablet, take 1 tablet by mouth at bedtime, Disp: 30 tablet, Rfl: 1    Blood Pressure KIT, 1 kit by Does not apply route 2 times daily, Disp: 1 kit, Rfl: 0    ondansetron (ZOFRAN ODT) 4 MG disintegrating tablet, Take 1 tablet by mouth every 8 hours as needed for Nausea, Disp: 20 tablet, Rfl: 0    PREMPRO 0.625-2.5 MG per tablet, take 1 tablet by mouth once daily, Disp: 28 tablet, Rfl: 1    traZODone (DESYREL) 50 MG tablet, Take 1 tablet by mouth nightly as needed for Sleep, Disp: 30 tablet, Rfl: 5    metFORMIN (GLUCOPHAGE) 1000 MG tablet, take 1 tablet by mouth twice a day with meals, Disp: 60 tablet, Rfl: 3    lisinopril (PRINIVIL;ZESTRIL) 5 MG tablet, take 1 tablet by mouth once daily, Disp: 30 tablet, Rfl: 3    insulin detemir (LEVEMIR FLEXTOUCH) 100 UNIT/ML injection pen, Inject 55 Units into the skin nightly, Disp: 5 pen, Rfl: 3    insulin aspart (NOVOLOG FLEXPEN) 100 UNIT/ML injection pen, Inject 4 Units into the skin 3 times daily (before meals) 4 units before breakfast, 4 units before lunch, 6 units before dinner and 6 units before bedtime, Disp: , Rfl:     docusate sodium (COLACE) 100 MG capsule, Take 1 capsule by mouth 2 times daily as needed for Constipation, Disp: 60 capsule, Rfl: 0    ibuprofen (ADVIL;MOTRIN) 600 MG tablet, Take 1 tablet by mouth 3 times daily as needed for Pain, Disp: 90 tablet, Rfl: 0    NARCAN 4 MG/0.1ML LIQD nasal spray, ADMINISTER A SINGLE spray INTO ONE NOSTRIL CALL 911 MAY REPEAT ONCE, Disp: , Rfl: 0    UNIFINE PENTIPS 29G X 12MM MISC, USE TWICE DAILY WITH BASAGLAR, Disp: , Rfl: 0    albuterol sulfate HFA (PROVENTIL HFA) 108 (90 Base) MCG/ACT inhaler, Inhale 2 puffs into the lungs every 6 hours as needed for Wheezing, Disp: 1 Inhaler, Rfl: 2    blood glucose monitor kit and supplies, 1 kit by Other route three times daily Dispense product that insurance will cover, Disp: 1 kit, Rfl: 0    blood glucose monitor strips, 1 strip by Other route three times daily Dispense product that insurance will cover, Disp: 100 strip, Rfl: 11    Insulin Pen Needle (PEN NEEDLES) 31G X 6 MM MISC, 1 each by Does not apply route daily, Disp: 100 each, Rfl: 3    ONE TOUCH LANCETS MISC, 1 each by Does not apply route 2 times daily, Disp: 100 each, Rfl: 3    etonogestrel (NEXPLANON) 68 MG implant, 68 mg by Subdermal route once for 1 dose, Disp: 1 each, Rfl: 0    ALLERGIES:   Allergies   Allergen Reactions    Benadryl [Diphenhydramine] Hives and Shortness Of Breath       FAMILY HISTORY:       Problem Relation Age of Onset    Breast

## 2020-06-25 ENCOUNTER — TELEPHONE (OUTPATIENT)
Dept: GASTROENTEROLOGY | Age: 25
End: 2020-06-25

## 2020-06-25 LAB
ESTIMATED AVERAGE GLUCOSE: 298 MG/DL
HBA1C MFR BLD: 12 % (ref 4–6)
TISSUE TRANSGLUTAMINASE IGA: 0.6 U/ML

## 2020-06-25 NOTE — TELEPHONE ENCOUNTER
Patient called office requesting a call back regarding her lab results. Informed that we do not give results over the phone. Currently scheduled for 7/28/20. Asking it her results are bad. Before I could offer a sooner appointment she had already hung up.

## 2020-06-26 NOTE — TELEPHONE ENCOUNTER
Writer called patient and informed her that her cholesterol was slightly elevated and her A1C was very high and advised the patient to contact her primary care physician to follow up with her to control her blood sugar. Patient thanked Roger Sanders for calling her back and stated she would contact her PCP.

## 2020-07-28 ENCOUNTER — OFFICE VISIT (OUTPATIENT)
Dept: GASTROENTEROLOGY | Age: 25
End: 2020-07-28
Payer: COMMERCIAL

## 2020-07-28 VITALS — WEIGHT: 242 LBS | TEMPERATURE: 97.2 F | BODY MASS INDEX: 37.9 KG/M2

## 2020-07-28 PROCEDURE — 99213 OFFICE O/P EST LOW 20 MIN: CPT | Performed by: INTERNAL MEDICINE

## 2020-07-28 PROCEDURE — G8427 DOCREV CUR MEDS BY ELIG CLIN: HCPCS | Performed by: INTERNAL MEDICINE

## 2020-07-28 PROCEDURE — APPSS30 APP SPLIT SHARED TIME 16-30 MINUTES: Performed by: NURSE PRACTITIONER

## 2020-07-28 PROCEDURE — 4004F PT TOBACCO SCREEN RCVD TLK: CPT | Performed by: INTERNAL MEDICINE

## 2020-07-28 PROCEDURE — G8417 CALC BMI ABV UP PARAM F/U: HCPCS | Performed by: INTERNAL MEDICINE

## 2020-07-28 ASSESSMENT — ENCOUNTER SYMPTOMS
WHEEZING: 0
CONSTIPATION: 1
COUGH: 1
DIARRHEA: 1
BACK PAIN: 0
ANAL BLEEDING: 0
ABDOMINAL DISTENTION: 1
VOICE CHANGE: 0
RECTAL PAIN: 0
ABDOMINAL PAIN: 1
SORE THROAT: 0
SHORTNESS OF BREATH: 1
EYES NEGATIVE: 1
BLOOD IN STOOL: 0
SINUS PRESSURE: 0
TROUBLE SWALLOWING: 1
NAUSEA: 1
VOMITING: 0
CHOKING: 0

## 2020-07-28 NOTE — PROGRESS NOTES
Base) MCG/ACT inhaler, Inhale 2 puffs into the lungs every 6 hours as needed for Wheezing, Disp: 1 Inhaler, Rfl: 2    blood glucose monitor kit and supplies, 1 kit by Other route three times daily Dispense product that insurance will cover, Disp: 1 kit, Rfl: 0    blood glucose monitor strips, 1 strip by Other route three times daily Dispense product that insurance will cover, Disp: 100 strip, Rfl: 11    Insulin Pen Needle (PEN NEEDLES) 31G X 6 MM MISC, 1 each by Does not apply route daily, Disp: 100 each, Rfl: 3    ONE TOUCH LANCETS MISC, 1 each by Does not apply route 2 times daily, Disp: 100 each, Rfl: 3    etonogestrel (NEXPLANON) 68 MG implant, 68 mg by Subdermal route once for 1 dose, Disp: 1 each, Rfl: 0    ALLERGIES:   Allergies   Allergen Reactions    Benadryl [Diphenhydramine] Hives and Shortness Of Breath       FAMILY HISTORY:       Problem Relation Age of Onset    Breast Cancer Mother 28    Diabetes Father     Cancer Maternal Uncle 61        acute leukemia    Heart Disease Maternal Uncle     Diabetes Maternal Uncle     Colon Cancer Maternal Uncle     Diabetes Maternal Uncle     Heart Attack Maternal Uncle     Uterine Cancer Neg Hx     Ovarian Cancer Neg Hx          SOCIAL HISTORY:   Social History     Socioeconomic History    Marital status: Single     Spouse name: Not on file    Number of children: 2    Years of education: graduated high school, some college    Highest education level: Not on file   Occupational History    Occupation: housewife    Occupation:      Comment: last worked 2015   Social Needs    Financial resource strain: Not on file    Food insecurity     Worry: Not on file     Inability: Not on file   [employer] needs     Medical: Not on file     Non-medical: Not on file   Tobacco Use    Smoking status: Current Every Day Smoker     Packs/day: 1.00     Years: 10.00     Pack years: 10.00     Types: E-Cigarettes     Start date: 11/7/2007     Last attempt to quit: 2016     Years since quittin.0    Smokeless tobacco: Never Used   Substance and Sexual Activity    Alcohol use: No     Alcohol/week: 0.0 standard drinks    Drug use: No    Sexual activity: Yes     Partners: Male     Comment: chlamydia in 2016,   Lifestyle    Physical activity     Days per week: Not on file     Minutes per session: Not on file    Stress: Not on file   Relationships    Social connections     Talks on phone: Not on file     Gets together: Not on file     Attends Gnosticism service: Not on file     Active member of club or organization: Not on file     Attends meetings of clubs or organizations: Not on file     Relationship status: Not on file    Intimate partner violence     Fear of current or ex partner: Not on file     Emotionally abused: Not on file     Physically abused: Not on file     Forced sexual activity: Not on file   Other Topics Concern    Not on file   Social History Narrative    Lives with fiance and 2 daughters         REVIEW OF SYSTEMS:         Review of Systems   Constitutional: Positive for appetite change and fatigue. Negative for fever and unexpected weight change. HENT: Positive for trouble swallowing. Negative for dental problem, postnasal drip, sinus pressure, sore throat and voice change. Eyes: Negative. Negative for visual disturbance. Respiratory: Positive for cough and shortness of breath. Negative for choking and wheezing. Cardiovascular: Negative. Negative for chest pain, palpitations and leg swelling. Gastrointestinal: Positive for abdominal distention, abdominal pain, constipation, diarrhea and nausea. Negative for anal bleeding, blood in stool, rectal pain and vomiting. Endocrine: Negative. Genitourinary: Negative. Negative for difficulty urinating. Musculoskeletal: Negative. Negative for arthralgias, back pain, gait problem and myalgias. Skin: Negative.     Allergic/Immunologic: Negative for environmental withdrawn. Thought Content: Thought content normal.           LABORATORY DATA: Reviewed  Lab Results   Component Value Date    WBC 10.2 06/15/2020    HGB 14.7 06/15/2020    HCT 43.4 06/15/2020    MCV 90.1 06/15/2020     06/15/2020     (L) 06/15/2020    K 3.7 06/15/2020    CL 96 (L) 06/15/2020    CO2 21 06/15/2020    BUN 11 06/15/2020    CREATININE 0.58 06/15/2020    LABALBU 4.3 06/15/2020    BILITOT 1.00 06/15/2020    ALKPHOS 73 06/15/2020    AST 37 (H) 06/15/2020    ALT 52 (H) 06/15/2020    INR 0.9 06/15/2020         Lab Results   Component Value Date    RBC 4.82 06/15/2020    HGB 14.7 06/15/2020    MCV 90.1 06/15/2020    MCH 30.6 06/15/2020    MCHC 34.0 06/15/2020    RDW 12.5 06/15/2020    MPV 8.7 06/15/2020    BASOPCT 1 06/15/2020    LYMPHSABS 3.60 06/15/2020    MONOSABS 0.70 06/15/2020    NEUTROABS 5.70 06/15/2020    EOSABS 0.20 06/15/2020    BASOSABS 0.10 06/15/2020         DIAGNOSTIC TESTING:     No results found. Assessment  1. Nausea and vomiting, intractability of vomiting not specified, unspecified vomiting type    2. Dysphagia, unspecified type    3. Irritable bowel syndrome with both constipation and diarrhea        Plan  At present patient is stable. Her IBS appears to be improving. However, she still reports dysphagia, nausea, vomiting. She has hx of significant NSAID use. She needs EGD to rule out NSAID induced esophagitis, gastritis, H pylori, etc.    Pt was advised in detail about some life style and dietary modifications. She was advised about avoidance of caffeine, nicotine and chocolate. Pt was also told to stay away from any kind of fast foods, soda pops. She was also advised to avoid lots of spices, grease and fried food etc.     Instructions were also given about trying to arrange the timing, quality and quantity of food.     Instructions were given about using ample amount of fiber including dietary and supplemental fiber either metamucil, bennafiber or citrucell etc.  Pt was advised about drinking ample amount of water without any colors or chemicals. Stress was given about regular exercise. Pt has verbalized understanding and agreement to these modifications. The Endoscopic procedure was explained to the patient in detail  The prep and NPO were explained  All the Risks, Benefits, and Alternatives were explained  Risk of Bleeding, Perforation and Cardio Respiratory risks were explained  her questions were answered  The procedure has been scheduled with the  in the office  Patient was asked to give us a call for any questions  The patient has verbalized understanding and agreement to this plan. The patient was counseled at length about the risks of jacob Covid-19 during their perioperative period and any recovery window from their procedure. The patient was made aware that jacob Covid-19  may worsen their prognosis for recovering from their procedure  and lend to a higher morbidity and/or mortality risk. All material risks, benefits, and reasonable alternatives including postponing the procedure were discussed. The patient does wish to proceed with the procedure at this time. Thank you for allowing me to participate in the care of Ms. Millard. For any further questions please do not hesitate to contact me. I have reviewed and agree with the ROS entered by the MA/LPN. I independently performed an evaluation on Zane Cifuentes. I have reviewed the above documentation completed by the Nurse Practitioner and agree with the documented findings and plan of care. I have personally evaluated this patient with the APRN and have completed at least one if not all key elements of the E/M (history, physical exam, and MDM). Please see my additional contributions to the HPI, physical exam, assessment, and medical decision making. Discussed with the patient regarding the symptoms. Not clear that she has esophagitis.   After discussion patient

## 2020-08-03 RX ORDER — QUETIAPINE FUMARATE 50 MG/1
TABLET, FILM COATED ORAL
Qty: 30 TABLET | Refills: 1 | Status: SHIPPED | OUTPATIENT
Start: 2020-08-03 | End: 2020-10-12

## 2020-08-10 ENCOUNTER — TELEPHONE (OUTPATIENT)
Dept: GASTROENTEROLOGY | Age: 25
End: 2020-08-10

## 2020-08-10 NOTE — TELEPHONE ENCOUNTER
Writer left msg on pt's vm informing pt her 8/11/20 proc has been cancelled d/t missing her covid testing on 8/7/20. Left msg asking pt to call back to resched.

## 2020-08-25 ENCOUNTER — OFFICE VISIT (OUTPATIENT)
Dept: GASTROENTEROLOGY | Age: 25
End: 2020-08-25
Payer: COMMERCIAL

## 2020-08-25 VITALS — TEMPERATURE: 97.2 F | HEIGHT: 67 IN | BODY MASS INDEX: 36.6 KG/M2 | WEIGHT: 233.2 LBS

## 2020-08-25 PROCEDURE — APPSS30 APP SPLIT SHARED TIME 16-30 MINUTES: Performed by: NURSE PRACTITIONER

## 2020-08-25 PROCEDURE — 99213 OFFICE O/P EST LOW 20 MIN: CPT | Performed by: INTERNAL MEDICINE

## 2020-08-25 PROCEDURE — G8427 DOCREV CUR MEDS BY ELIG CLIN: HCPCS | Performed by: INTERNAL MEDICINE

## 2020-08-25 PROCEDURE — 1036F TOBACCO NON-USER: CPT | Performed by: INTERNAL MEDICINE

## 2020-08-25 PROCEDURE — G8417 CALC BMI ABV UP PARAM F/U: HCPCS | Performed by: INTERNAL MEDICINE

## 2020-08-25 RX ORDER — BISACODYL 5 MG
TABLET, DELAYED RELEASE (ENTERIC COATED) ORAL
Qty: 4 TABLET | Refills: 0 | Status: SHIPPED | OUTPATIENT
Start: 2020-08-25 | End: 2020-09-15

## 2020-08-25 RX ORDER — OMEPRAZOLE 20 MG/1
20 CAPSULE, DELAYED RELEASE ORAL
Qty: 90 CAPSULE | Refills: 1 | Status: SHIPPED | OUTPATIENT
Start: 2020-08-25 | End: 2021-03-02

## 2020-08-25 RX ORDER — POLYETHYLENE GLYCOL 3350 17 G/17G
POWDER, FOR SOLUTION ORAL
Qty: 238 G | Refills: 0 | Status: SHIPPED | OUTPATIENT
Start: 2020-08-25 | End: 2020-11-06

## 2020-08-25 ASSESSMENT — ENCOUNTER SYMPTOMS
VOMITING: 0
CONSTIPATION: 0
WHEEZING: 0
COUGH: 1
SORE THROAT: 0
NAUSEA: 1
ANAL BLEEDING: 0
BLOOD IN STOOL: 0
EYES NEGATIVE: 1
DIARRHEA: 1
VOICE CHANGE: 0
RECTAL PAIN: 0
SINUS PRESSURE: 0
ABDOMINAL PAIN: 1
ABDOMINAL DISTENTION: 1
BACK PAIN: 0
CHOKING: 0
TROUBLE SWALLOWING: 1
SHORTNESS OF BREATH: 1
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
## 2020-08-26 PROBLEM — K62.5 RECTAL BLEEDING: Status: ACTIVE | Noted: 2020-08-26

## 2020-09-12 ENCOUNTER — HOSPITAL ENCOUNTER (OUTPATIENT)
Dept: PREADMISSION TESTING | Age: 25
Setting detail: SPECIMEN
Discharge: HOME | End: 2020-09-16
Payer: COMMERCIAL

## 2020-09-12 PROCEDURE — U0003 INFECTIOUS AGENT DETECTION BY NUCLEIC ACID (DNA OR RNA); SEVERE ACUTE RESPIRATORY SYNDROME CORONAVIRUS 2 (SARS-COV-2) (CORONAVIRUS DISEASE [COVID-19]), AMPLIFIED PROBE TECHNIQUE, MAKING USE OF HIGH THROUGHPUT TECHNOLOGIES AS DESCRIBED BY CMS-2020-01-R: HCPCS

## 2020-09-15 LAB — SARS-COV-2, NAA: NOT DETECTED

## 2020-09-15 RX ORDER — BISACODYL 5 MG
TABLET, DELAYED RELEASE (ENTERIC COATED) ORAL
Qty: 4 TABLET | Refills: 0 | Status: SHIPPED | OUTPATIENT
Start: 2020-09-15 | End: 2020-11-06

## 2020-09-15 NOTE — TELEPHONE ENCOUNTER
Grady Burton left voicemail message stating that she misplaced her Dulcolax tablets and asking if she can r/s her procedure.  Writer returned call and left message to advise dulcolax can be purchased over the counter and she could pick that up and still have her procedure tomorrow, requested call back to discuss

## 2020-09-16 ENCOUNTER — HOSPITAL ENCOUNTER (OUTPATIENT)
Age: 25
Setting detail: OUTPATIENT SURGERY
Discharge: HOME | End: 2020-09-16
Attending: INTERNAL MEDICINE | Admitting: INTERNAL MEDICINE
Payer: COMMERCIAL

## 2020-09-16 ENCOUNTER — ANESTHESIA EVENT (OUTPATIENT)
Dept: ENDOSCOPY | Age: 25
End: 2020-09-16
Payer: COMMERCIAL

## 2020-09-16 ENCOUNTER — ANESTHESIA (OUTPATIENT)
Dept: ENDOSCOPY | Age: 25
End: 2020-09-16
Payer: COMMERCIAL

## 2020-09-16 VITALS
BODY MASS INDEX: 35.94 KG/M2 | HEART RATE: 72 BPM | SYSTOLIC BLOOD PRESSURE: 129 MMHG | OXYGEN SATURATION: 100 % | DIASTOLIC BLOOD PRESSURE: 72 MMHG | TEMPERATURE: 97.8 F | RESPIRATION RATE: 20 BRPM | HEIGHT: 67 IN | WEIGHT: 229 LBS

## 2020-09-16 VITALS — DIASTOLIC BLOOD PRESSURE: 65 MMHG | TEMPERATURE: 98.2 F | OXYGEN SATURATION: 99 % | SYSTOLIC BLOOD PRESSURE: 121 MMHG

## 2020-09-16 LAB
-: NORMAL
GLUCOSE BLD-MCNC: 186 MG/DL (ref 65–105)
GLUCOSE BLD-MCNC: 189 MG/DL (ref 65–105)
HCG, PREGNANCY URINE (POC): NEGATIVE

## 2020-09-16 PROCEDURE — 2580000003 HC RX 258: Performed by: ANESTHESIOLOGY

## 2020-09-16 PROCEDURE — 3700000001 HC ADD 15 MINUTES (ANESTHESIA): Performed by: INTERNAL MEDICINE

## 2020-09-16 PROCEDURE — 88305 TISSUE EXAM BY PATHOLOGIST: CPT

## 2020-09-16 PROCEDURE — 82947 ASSAY GLUCOSE BLOOD QUANT: CPT

## 2020-09-16 PROCEDURE — 3700000000 HC ANESTHESIA ATTENDED CARE: Performed by: INTERNAL MEDICINE

## 2020-09-16 PROCEDURE — 6360000002 HC RX W HCPCS: Performed by: NURSE ANESTHETIST, CERTIFIED REGISTERED

## 2020-09-16 PROCEDURE — 45385 COLONOSCOPY W/LESION REMOVAL: CPT | Performed by: INTERNAL MEDICINE

## 2020-09-16 PROCEDURE — 2709999900 HC NON-CHARGEABLE SUPPLY: Performed by: INTERNAL MEDICINE

## 2020-09-16 PROCEDURE — 7100000001 HC PACU RECOVERY - ADDTL 15 MIN: Performed by: INTERNAL MEDICINE

## 2020-09-16 PROCEDURE — 7100000000 HC PACU RECOVERY - FIRST 15 MIN: Performed by: INTERNAL MEDICINE

## 2020-09-16 PROCEDURE — 81025 URINE PREGNANCY TEST: CPT

## 2020-09-16 PROCEDURE — 43239 EGD BIOPSY SINGLE/MULTIPLE: CPT | Performed by: INTERNAL MEDICINE

## 2020-09-16 PROCEDURE — 2500000003 HC RX 250 WO HCPCS: Performed by: NURSE ANESTHETIST, CERTIFIED REGISTERED

## 2020-09-16 PROCEDURE — 3609012400 HC EGD TRANSORAL BIOPSY SINGLE/MULTIPLE: Performed by: INTERNAL MEDICINE

## 2020-09-16 PROCEDURE — 3609010400 HC COLONOSCOPY POLYPECTOMY HOT BIOPSY: Performed by: INTERNAL MEDICINE

## 2020-09-16 RX ORDER — SODIUM CHLORIDE 9 MG/ML
INJECTION, SOLUTION INTRAVENOUS CONTINUOUS
Status: DISCONTINUED | OUTPATIENT
Start: 2020-09-16 | End: 2020-09-16 | Stop reason: HOSPADM

## 2020-09-16 RX ORDER — LIDOCAINE HYDROCHLORIDE 10 MG/ML
INJECTION, SOLUTION EPIDURAL; INFILTRATION; INTRACAUDAL; PERINEURAL PRN
Status: DISCONTINUED | OUTPATIENT
Start: 2020-09-16 | End: 2020-09-16 | Stop reason: SDUPTHER

## 2020-09-16 RX ORDER — PROPOFOL 10 MG/ML
INJECTION, EMULSION INTRAVENOUS CONTINUOUS PRN
Status: DISCONTINUED | OUTPATIENT
Start: 2020-09-16 | End: 2020-09-16 | Stop reason: SDUPTHER

## 2020-09-16 RX ORDER — FENTANYL CITRATE 50 UG/ML
INJECTION, SOLUTION INTRAMUSCULAR; INTRAVENOUS PRN
Status: DISCONTINUED | OUTPATIENT
Start: 2020-09-16 | End: 2020-09-16 | Stop reason: SDUPTHER

## 2020-09-16 RX ADMIN — PROPOFOL 200 MCG/KG/MIN: 10 INJECTION, EMULSION INTRAVENOUS at 09:31

## 2020-09-16 RX ADMIN — SODIUM CHLORIDE: 9 INJECTION, SOLUTION INTRAVENOUS at 08:30

## 2020-09-16 RX ADMIN — FENTANYL CITRATE 100 MCG: 50 INJECTION, SOLUTION INTRAMUSCULAR; INTRAVENOUS at 09:31

## 2020-09-16 RX ADMIN — LIDOCAINE HYDROCHLORIDE 50 MG: 10 INJECTION, SOLUTION EPIDURAL; INFILTRATION; INTRACAUDAL; PERINEURAL at 09:31

## 2020-09-16 ASSESSMENT — PAIN SCALES - GENERAL: PAINLEVEL_OUTOF10: 0

## 2020-09-16 ASSESSMENT — PULMONARY FUNCTION TESTS
PIF_VALUE: 1

## 2020-09-16 ASSESSMENT — ENCOUNTER SYMPTOMS
SHORTNESS OF BREATH: 0
STRIDOR: 0

## 2020-09-16 ASSESSMENT — PAIN DESCRIPTION - DESCRIPTORS: DESCRIPTORS: SHARP

## 2020-09-16 ASSESSMENT — PAIN - FUNCTIONAL ASSESSMENT: PAIN_FUNCTIONAL_ASSESSMENT: 0-10

## 2020-09-16 ASSESSMENT — LIFESTYLE VARIABLES: SMOKING_STATUS: 0

## 2020-09-16 NOTE — OP NOTE
ESOPHAGOGASTRODUODENOSCOPY   ( EGD )  DATE OF PROCEDURE: 9/16/2020     SURGEON: Bhakti Mandujano MD    ASSISTANT: None    PREOPERATIVE DIAGNOSIS: Patient has persistent nausea and dyspepsia. Procedure performed to evaluate upper GI lesions    POSTOPERATIVE DIAGNOSIS: No lesions seen that can account for patient's symptoms    OPERATION: Upper GI endoscopy with Biopsy    ANESTHESIA: MAC    ESTIMATED BLOOD LOSS: None    COMPLICATIONS: None. SPECIMENS:  Was Obtained: Random biopsies from the body and antrum to evaluate H. pylori    HISTORY: The patient is a 30y.o. year old female with history of above preop diagnosis. I recommended esophagogastroduodenoscopy with possible biopsy and I explained the risk, benefits, expected outcome, and alternatives to the procedure. Risks included but are not limited to bleeding, infection, respiratory distress, hypotension, and perforation of the esophagus, stomach, or duodenum. Patient understands and is in agreement. PROCEDURE: The patient was given IV conscious sedation. The patient's SPO2 remained above 90% throughout the procedure. Cetacaine spray given. Patient placed in left lateral position. Olympus  videogastroscope was inserted orally under vision into the esophagus without difficulty and advanced into the stomach then through the pylorus up to the second part of duodenum. Findings:    Retropharyngeal area was grossly normal appearing    Esophagus: normal.  No hiatal hernia    Stomach:    Fundus and Cardia Examined in Retroflexed View: normal    Body: normal    Antrum: normal  , Random biopsies taken to evaluate H. pylori       duodenum:     Descending: normal    Bulb: normal    While withdrawing the scope the above findings were verified and the scope was removed. The patient has tolerated the procedure without unusual events. Recommendations/Plan:   1. F/U Biopsies  2. F/U In Office as instructed  3.  Discussed with the family                   Electronically signed by Olga Urena MD  on 9/16/2020 at 10:19 AM

## 2020-09-16 NOTE — ANESTHESIA PRE PROCEDURE
Department of Anesthesiology  Preprocedure Note       Name:  Sweetie Landa   Age:  30 y.o.  :  1995                                          MRN:  [id number]         Date:  2020      Surgeon: Veronica Zimmerman):  Suzanne Rosenberg MD    Procedure: Procedure(s):  EGD  COLONOSCOPY DIAGNOSTIC    Medications prior to admission:   Prior to Admission medications    Medication Sig Start Date End Date Taking?  Authorizing Provider   bisacodyl (BISACODYL) 5 MG EC tablet TAKE 4 TABLETS THE DAY BEFORE COLONOSCOPY AS DIRECTED ON BOWEL PREP INSTRUCTIONS GIVEN BY PHYSICIAN'S OFFICE 9/15/20   Suzanne Rosenberg MD   omeprazole (PRILOSEC) 20 MG delayed release capsule Take 1 capsule by mouth every morning (before breakfast) 20   MARCELO Andrews NP   polyethylene glycol [facility]) 17 GM/SCOOP powder Use as directed by following your bowel prep instructions given by physician office 20   Suzanne Rosenberg MD   QUEtiapine (SEROQUEL) 50 MG tablet take 1 tablet by mouth at bedtime 8/3/20   MARCELO Vuong NP   dicyclomine (BENTYL) 20 MG tablet Take 1 tablet by mouth every 6 hours 20   Niko Jacques PA-C   Blood Pressure KIT 1 kit by Does not apply route 2 times daily 20   iNko Jacques PA-C   ondansetron (ZOFRAN ODT) 4 MG disintegrating tablet Take 1 tablet by mouth every 8 hours as needed for Nausea 5/15/20   Shannan Carolina MD   PREMPRO 0.625-2.5 MG per tablet take 1 tablet by mouth once daily 20   MARCELO Belle CNP   traZODone (DESYREL) 50 MG tablet Take 1 tablet by mouth nightly as needed for Sleep 20   Germaine Blum PA-C   metFORMIN (GLUCOPHAGE) 1000 MG tablet take 1 tablet by mouth twice a day with meals 3/11/20   Germaine Blum PA-C   lisinopril (PRINIVIL;ZESTRIL) 5 MG tablet take 1 tablet by mouth once daily 3/11/20   Germaine Blum PA-C   insulin detemir (LEVEMIR FLEXTOUCH) 100 UNIT/ML injection pen Inject 55 Units into the skin nightly 20   Kranthi Radhika Au MD   insulin aspart (NOVOLOG FLEXPEN) 100 UNIT/ML injection pen Inject 4 Units into the skin 3 times daily (before meals) 4 units before breakfast, 4 units before lunch, 6 units before dinner and 6 units before bedtime    Historical Provider, MD   docusate sodium (COLACE) 100 MG capsule Take 1 capsule by mouth 2 times daily as needed for Constipation 7/30/19   Lona Miguel PA-C   ibuprofen (ADVIL;MOTRIN) 600 MG tablet Take 1 tablet by mouth 3 times daily as needed for Pain 7/30/19   Lona Miguel PA-C   NARCAN 4 MG/0.1ML LIQD nasal spray ADMINISTER A SINGLE spray INTO ONE NOSTRIL CALL 911 MAY REPEAT ONCE 7/19/19   Historical Provider, MD SAUCEDO PENTIPS 29G X 12MM MISC USE TWICE DAILY WITH Jessica Ramosney 6/24/19   Historical Provider, MD   albuterol sulfate HFA (PROVENTIL HFA) 108 (90 Base) MCG/ACT inhaler Inhale 2 puffs into the lungs every 6 hours as needed for Wheezing 7/23/19   Lona Miguel PA-C   blood glucose monitor kit and supplies 1 kit by Other route three times daily Dispense product that insurance will cover 7/22/19   Lona Miguel PA-C   blood glucose monitor strips 1 strip by Other route three times daily Dispense product that insurance will cover 7/22/19   Lona Miguel PA-C   Insulin Pen Needle (PEN NEEDLES) 31G X 6 MM MISC 1 each by Does not apply route daily 6/22/19   Mynor Salcedo MD   ONE TOUCH LANCETS MISC 1 each by Does not apply route 2 times daily 6/19/19   Neli Booker MD   etonogestrel (NEXPLANON) 68 MG implant 68 mg by Subdermal route once for 1 dose 5/23/17 6/24/20  Abhay Jose MD       Current medications:    No current facility-administered medications for this encounter. Allergies:     Allergies   Allergen Reactions    Benadryl [Diphenhydramine] Hives and Shortness Of Breath       Problem List:    Patient Active Problem List   Diagnosis Code    Essential hypertension I10    Asthma J45.909    Morbid obesity ([facility] E66.01    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.4    BMI 40.0-44.9 Z68.41    Generalized abdominal pain R10.84    Hepatic steatosis K76.0    Type 2 diabetes mellitus with diabetic neuropathy (HCC) E11.40    History of gonorrhea Z86.19    History of chlamydia Z86.19    Nausea & vomiting R11.2    Diarrhea R19.7    Bipolar affective disorder, currently depressed, mild (HCC) F31.31    Cellulitis of breast N61.0    Back pain M54.9    Noncompliance Z91.19    Diabetes (HCC) E11.9    History of  section x2 Z98.891    History of postpartum depression Z87.59, Z80.58    Lost custody of children Z76.3    Poor historian Z78.9    Hyponatremia E87.1    Uncontrolled diabetes mellitus (HCC) E11.65    Mood disorder (HCC) F39    Viral hepatitis A without hepatic coma B15.9    Abscess L02.91    Hyperlipidemia LDL goal <70 E78.5    Breast abscess N61.1    Abscess of buttock L02.31    Rectal bleeding K62.5       Past Medical History:        Diagnosis Date    Asthma     uses inhaler    Diabetes mellitus type 2 in obese ([facility] [address]     Hepatic steatosis     Hypertension     started when approx 6 mos pregnant with first pregnancy    Morbid obesity with body mass index (BMI) of 40.0 to 49.9 (HCC)     Neuropathy     Postpartum depression        Past Surgical History:        Procedure Laterality Date    BREAST SURGERY Left 2019    BREAST INCISION AND DRAINAGE performed by Rober Dawson MD at [facility] Right 2019    BREAST INCISION AND DRAINAGE performed by Rober Dawson MD at [address] N/A 3/23/2017     SECTION REPEAT  performed by Isrrael Dorado MD at [facility] L&D OR    EYE SURGERY      RECTAL SURGERY N/A 2/3/2020    I & D PERIANAL ABSCESS performed by Dipak Monet MD at [facility] Bilateral    [address] [place] [address] Drive N/A 2018    EGD BIOPSY performed by Joao Stewart MD at [facility] History:    Social History     Tobacco Use    Smoking status: Former Smoker     Packs/day: 1.00     Years: 10.00     Pack years: 10.00     Types: Cigarettes     Start date: 11/7/2007    Smokeless tobacco: Never Used   Substance Use Topics    Alcohol use: No     Alcohol/week: 0.0 standard drinks                                Counseling given: Not Answered      Vital Signs (Current): There were no vitals filed for this visit. BP Readings from Last 3 Encounters:   06/17/20 122/71   06/16/20 108/62   06/15/20 129/77       NPO Status:                                                                                 BMI:   Wt Readings from Last 3 Encounters:   08/25/20 233 lb 3.2 oz (105.8 kg)   07/28/20 242 lb (109.8 kg)   06/24/20 247 lb (112 kg)     There is no height or weight on file to calculate BMI.    CBC:   Lab Results   Component Value Date    WBC 10.2 06/15/2020    RBC 4.82 06/15/2020    HGB 14.7 06/15/2020    HCT 43.4 06/15/2020    MCV 90.1 06/15/2020    RDW 12.5 06/15/2020     06/15/2020     K 3.7    CMP:   Lab Results   Component Value Date     06/15/2020    K 3.7 06/15/2020    CL 96 06/15/2020    CO2 21 06/15/2020    BUN 11 06/15/2020    CREATININE 0.58 06/15/2020    GFRAA >60 06/15/2020    LABGLOM >60 06/15/2020    GLUCOSE 459 06/15/2020    PROT 6.9 06/15/2020    CALCIUM 9.6 06/15/2020    BILITOT 1.00 06/15/2020    ALKPHOS 73 06/15/2020    AST 37 06/15/2020    ALT 52 06/15/2020       POC Tests: No results for input(s): POCGLU, POCNA, POCK, POCCL, POCBUN, POCHEMO, POCHCT in the last 72 hours.     Coags:   Lab Results   Component Value Date    PROTIME 12.0 06/15/2020    INR 0.9 06/15/2020    APTT 22.4 06/15/2020       HCG (If Applicable):   Lab Results   Component Value Date    PREGTESTUR neg 02/02/2020    HCG NEGATIVE 02/02/2020    HCGQUANT <1 05/14/2020        ABGs: No results found for: PHART, PO2ART, AEI3LNL, ZVM1CBI, BEART, S2LDUOUU     Type & Screen (If Applicable):  No results found for: LABABO, LABRH    Drug/Infectious Status (If Applicable):  Lab Results   Component Value Date    HEPCAB NONREACTIVE 06/21/2019       COVID-19 Screening (If Applicable):   Lab Results   Component Value Date    COVID19 Not Detected 09/12/2020         Anesthesia Evaluation  Patient summary reviewed no history of anesthetic complications:   Airway: Mallampati: II  TM distance: >3 FB   Neck ROM: full  Mouth opening: > = 3 FB Dental: normal exam         Pulmonary:normal exam  breath sounds clear to auscultation  (+) asthma: allergic asthma,     (-) pneumonia, COPD, shortness of breath, recent URI, sleep apnea, rhonchi, wheezes, rales, stridor and not a current smoker          Patient did not smoke on day of surgery. Cardiovascular:  Exercise tolerance: good (>4 METS),   (+) hypertension: no interval change,     (-) pacemaker, valvular problems/murmurs, past MI, CAD, CABG/stent, dysrhythmias,  angina,  CHF, orthopnea, PND,  SNYDER, murmur, weak pulses,  friction rub, systolic click, carotid bruit,  JVD and peripheral edema    ECG reviewed  Rhythm: regular  Rate: normal           Beta Blocker:  Not on Beta Blocker         Neuro/Psych:   (+) psychiatric history: stable with treatment   (-) seizures, neuromuscular disease, TIA, CVA, headaches and depression/anxiety            GI/Hepatic/Renal:   (+) hepatitis:, liver disease:,      (-) hiatal hernia, GERD, PUD, no renal disease, bowel prep and no morbid obesity       Endo/Other:    (+) DiabetesType II DM, , no malignancy/cancer. (-) hypothyroidism, hyperthyroidism, blood dyscrasia, arthritis, no electrolyte abnormalities, no malignancy/cancer               Abdominal:           Vascular: negative vascular ROS. - PVD, DVT and PE. Anesthesia Plan      MAC and general     ASA 3       Induction: intravenous.     MIPS: Postoperative opioids intended and Prophylactic antiemetics administered. Anesthetic plan and risks discussed with patient. Plan discussed with CRNA. The patient was counseled at length about the risks of jacob Covid-19 during their perioperative period and any recovery window from their procedure. The patient was made aware that jacob Covid-19  may worsen their prognosis for recovering from their procedure  and lend to a higher morbidity and/or mortality risk. All material risks, benefits, and reasonable alternatives including postponing the procedure were discussed. The patient DOES wish to proceed with the procedure at this time.             Jonatan Ling MD   9/16/2020

## 2020-09-16 NOTE — H&P
49.9 ([facility] [address]     Neuropathy     Postpartum depression        SURGICAL HISTORY       Past Surgical History:   Procedure Laterality Date    BREAST SURGERY Left 2019    BREAST INCISION AND DRAINAGE performed by Terence Rojas MD at [address] Right 2019    BREAST INCISION AND DRAINAGE performed by Terence Rojas MD at [address] N/A 3/23/2017     SECTION REPEAT  performed by Dayo Santos MD at [facility] L&D OR    EYE SURGERY      RECTAL SURGERY N/A 2/3/2020    I & D PERIANAL ABSCESS performed by Héctor Voss MD at [facility] Bilateral     UPPER GASTROINTESTINAL ENDOSCOPY N/A 2018    EGD BIOPSY performed by Inna Do MD at [facility]       Family History   Problem Relation Age of Onset    Breast Cancer Mother 28    Diabetes Father     Cancer Maternal Uncle 61        acute leukemia    Heart Disease Maternal Uncle     Diabetes Maternal Uncle     Colon Cancer Maternal Uncle     Diabetes Maternal Uncle     Heart Attack Maternal Uncle     Uterine Cancer Neg Hx     Ovarian Cancer Neg Hx        SOCIAL HISTORY       Social History     Socioeconomic History    Marital status: Single     Spouse name: Not on file    Number of children: 2    Years of education: graduated high school, some college    Highest education level: Not on file   Occupational History    Occupation: housewife    Occupation:      Comment: last worked    Social Needs    Financial resource strain: Not on file    Food insecurity     Worry: Not on file     Inability: Not on file   [employer] needs     Medical: Not on file     Non-medical: Not on file   Tobacco Use    Smoking status: Former Smoker     Packs/day: 1.00     Years: 10.00     Pack years: 10.00     Types: Cigarettes     Start date: 2007    Smokeless tobacco: Never Used   Substance and Sexual Activity    Alcohol use:  No Alcohol/week: 0.0 standard drinks    Drug use: No    Sexual activity: Yes     Partners: Male     Comment: chlamydia in 2016,   Lifestyle    Physical activity     Days per week: Not on file     Minutes per session: Not on file    Stress: Not on file   Relationships    Social connections     Talks on phone: Not on file     Gets together: Not on file     Attends Judaism service: Not on file     Active member of club or organization: Not on file     Attends meetings of clubs or organizations: Not on file     Relationship status: Not on file    Intimate partner violence     Fear of current or ex partner: Not on file     Emotionally abused: Not on file     Physically abused: Not on file     Forced sexual activity: Not on file   Other Topics Concern    Not on file   Social History Narrative    Lives with fiance and 2 daughters           REVIEW OF SYSTEMS      Allergies   Allergen Reactions    Benadryl [Diphenhydramine] Hives and Shortness Of Breath       No current facility-administered medications on file prior to encounter.       Current Outpatient Medications on File Prior to Encounter   Medication Sig Dispense Refill    omeprazole (PRILOSEC) 20 MG delayed release capsule Take 1 capsule by mouth every morning (before breakfast) 90 capsule 1    polyethylene glycol (MIRALAX) 17 GM/SCOOP powder Use as directed by following your bowel prep instructions given by physician office 238 g 0    QUEtiapine (SEROQUEL) 50 MG tablet take 1 tablet by mouth at bedtime 30 tablet 1    dicyclomine (BENTYL) 20 MG tablet Take 1 tablet by mouth every 6 hours 60 tablet 1    Blood Pressure KIT 1 kit by Does not apply route 2 times daily 1 kit 0    ondansetron (ZOFRAN ODT) 4 MG disintegrating tablet Take 1 tablet by mouth every 8 hours as needed for Nausea 20 tablet 0    PREMPRO 0.625-2.5 MG per tablet take 1 tablet by mouth once daily 28 tablet 1    traZODone (DESYREL) 50 MG tablet Take 1 tablet by mouth nightly as needed for Sleep 30 tablet 5    metFORMIN (GLUCOPHAGE) 1000 MG tablet take 1 tablet by mouth twice a day with meals 60 tablet 3    lisinopril (PRINIVIL;ZESTRIL) 5 MG tablet take 1 tablet by mouth once daily 30 tablet 3    insulin detemir (LEVEMIR FLEXTOUCH) 100 UNIT/ML injection pen Inject 55 Units into the skin nightly 5 pen 3    insulin aspart (NOVOLOG FLEXPEN) 100 UNIT/ML injection pen Inject 4 Units into the skin 3 times daily (before meals) 4 units before breakfast, 4 units before lunch, 6 units before dinner and 6 units before bedtime      docusate sodium (COLACE) 100 MG capsule Take 1 capsule by mouth 2 times daily as needed for Constipation 60 capsule 0    ibuprofen (ADVIL;MOTRIN) 600 MG tablet Take 1 tablet by mouth 3 times daily as needed for Pain 90 tablet 0    NARCAN 4 MG/0.1ML LIQD nasal spray ADMINISTER A SINGLE spray INTO ONE NOSTRIL CALL 911 MAY REPEAT ONCE  0    UNIFINE PENTIPS 29G X 12MM MISC USE TWICE DAILY WITH BASAGLAR  0    albuterol sulfate HFA (PROVENTIL HFA) 108 (90 Base) MCG/ACT inhaler Inhale 2 puffs into the lungs every 6 hours as needed for Wheezing 1 Inhaler 2    blood glucose monitor kit and supplies 1 kit by Other route three times daily Dispense product that insurance will cover 1 kit 0    blood glucose monitor strips 1 strip by Other route three times daily Dispense product that insurance will cover 100 strip 11    Insulin Pen Needle (PEN NEEDLES) 31G X 6 MM MISC 1 each by Does not apply route daily 100 each 3    ONE TOUCH LANCETS MISC 1 each by Does not apply route 2 times daily 100 each 3    etonogestrel (NEXPLANON) 68 MG implant 68 mg by Subdermal route once for 1 dose 1 each 0       Negative except for what is mentioned in the HPI. GENERAL PHYSICAL EXAM     Vitals: see  nursing flow sheet for vital signs. GENERAL APPEARANCE:   Anurag June is 30 y.o.,  female, , nourished, conscious, alert. Does not appear to be distress or pain at this time. SKIN:  Warm, dry, no cyanosis or jaundice. HEAD:  Normocephalic, atraumatic, no swelling or tenderness. EYES:  Pupils equal, reactive to light. EARS:  No discharge, no marked hearing loss. NOSE:  No rhinorrhea, epistaxis or septal deformity. THROAT:  Not congested. No ulceration bleeding or discharge. NECK:  No stiffness, trachea central.  No palpable masses or L.N.                 CHEST:  Symmetrical and equal on expansion. HEART:  RRR S1 > S2. No audible murmurs or gallops. LUNGS:  Equal on expansion, normal breath sounds. No adventitious sounds. ABDOMEN:  Obese. Soft on palpation. No dysphagia, No localized tenderness. No guarding or rigidity. No palpable hepatosplenomegaly. LYMPHATICS:  No palpable cervical lymphadenopathy. LOCOMOTOR, BACK AND SPINE:  No tenderness or deformities. EXTREMITIES:  Symmetrical, no pretibial edema. Еленаs sign negative. No discoloration or ulcerations. NEUROLOGIC:  The patient is conscious, alert, oriented,Cranial nerve II-XII intact, taste and smell were not examined. No apparent focal sensory or motor deficits.              PROVISIONAL DIAGNOSES / SURGERY:    NAUSEA AND VOMITING,   INTRACTIBILITY OF VOMITING,   UNSPECIFIED VOMITING TYPERECTAL BLEED  EGD   COLONOSCOPY DIAGNOSTIC  Patient Active Problem List    Diagnosis Date Noted    Rectal bleeding 2020    Abscess of buttock 2020    Breast abscess 2019    Hyperlipidemia LDL goal <70 2019    Abscess     Viral hepatitis A without hepatic coma 2019    Mood disorder ([address]     Back pain 2019    Noncompliance 2019    Diabetes ([facility] [address] 2019    History of  section x2 2019    History of postpartum depression 2019    Lost custody of children 2019    Poor historian 2019  Hyponatremia 06/18/2019    Uncontrolled diabetes mellitus ([facility] [address] 06/18/2019    Bipolar affective disorder, currently depressed, mild ([facility] [address] 05/24/2019    Cellulitis of breast 05/24/2019    Nausea & vomiting 11/20/2018    Diarrhea 11/20/2018    Hepatic steatosis 11/07/2018    Type 2 diabetes mellitus with diabetic neuropathy ([facility] [address] 11/07/2018    History of gonorrhea 11/07/2018    History of chlamydia 11/07/2018    Generalized abdominal pain 11/06/2018    BMI 40.0-44.9 02/08/2017    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 09/03/2015    Morbid obesity ([facility] [address] 08/16/2015    Asthma     Essential hypertension            MARCELO Truong - CNP on 9/16/2020 at 7:17 AM

## 2020-09-16 NOTE — ANESTHESIA POSTPROCEDURE EVALUATION
Department of Anesthesiology  Postprocedure Note    Patient: Isma Knapp  MRN: [id number]  YOB: 1995  Date of evaluation: 9/16/2020  Time:  1:13 PM     Procedure Summary     Date:  09/16/20 Room / Location:  [facility] ENDO 02 / [facility]    Anesthesia Start:  0927 Anesthesia Stop:  1149    Procedures:       EGD BIOPSY (N/A Esophagus)      COLONOSCOPY POLYPECTOMY HOT BIOPSY (N/A ) Diagnosis:       (NAUSEA AND VOMITING, INTRACTIBILITY OF VOMITING, UNSPECIFIED VOMITING TYPE)      (RECTAL BLEED)      (PAT ON ADMIT)    Surgeon:  Vidya Chino MD Responsible Provider:  Jesenia Roger MD    Anesthesia Type:  MAC, general ASA Status:  3          Anesthesia Type: MAC, general    Ana Cristina Phase I: Ana Cristina Score: 10    Ana Cristina Phase II:      Last vitals: Reviewed and per EMR flowsheets.        Anesthesia Post Evaluation    Comments: POST- ANESTHESIA EVALUATION       Pt Name: Isma Knapp  MRN: [id number]  YOB: 1995  Date of evaluation: 9/16/2020  Time:  1:13 PM      /72   Pulse 72   Temp 97.8 °F (36.6 °C) (Infrared)   Resp 20   Ht 5' 7\" (1.702 m)   Wt 229 lb (103.9 kg)   SpO2 100%   BMI 35.87 kg/m²      Consciousness Level  Awake  Cardiopulmonary Status  Stable  Pain Adequately Treated YES  Nausea / Vomiting  NO  Adequate Hydration  YES  Anesthesia Related Complications NONE      Electronically signed by Jesenia Roger MD on 9/16/2020 at 1:13 PM

## 2020-09-16 NOTE — OP NOTE
COLONOSCOPY    DATE OF PROCEDURE: 9/16/2020    SURGEON: Carole Linton MD    ASSISTANT: None    PREOPERATIVE DIAGNOSIS: Intermittent hematochezia. Procedure performed to evaluate colonic lesions    POSTOPERATIVE DIAGNOSIS: Less than adequate preparation. A polyp which is about 7 mm in the transverse colon. OPERATION: Total colonoscopy and snare polypectomy    ANESTHESIA: MAC    ESTIMATED BLOOD LOSS: None    COMPLICATIONS: None     SPECIMENS:  Was Obtained: Polyp transverse colon    HISTORY: The patient is a 30y.o. year old female with history of above preop diagnosis. I recommended colonoscopy with possible biopsy or polypectomy and I explained the risk, benefits, expected outcome, and alternatives to the procedure. Risks included but are not limited to bleeding, infection, respiratory distress, hypotension, and perforation of the colon and possibility of missing a lesion. The patient understands and is in agreement. PROCEDURE:  The patient's SPO2 remained above 90% throughout the procedure. Digital rectal exam was normal.  The colonoscope was inserted through the anus into the rectum and advanced under direct vision to the cecum without difficulty. .  The prep was Less than adequate preparation. Findings:      Cecum/Ascending colon: normal    Transverse colon: abnormal: A polyp which is about 7 mm seen in the distal transverse colon, removed with snare and cautery technique    Descending/Sigmoid colon: normal, has spasms    Rectum/Anus: examined in normal and retroflexed positions and was normal, no hemorrhoids    Withdrawal Time was (minutes): 15          The colon was decompressed and the scope was removed. The patient tolerated the procedure without unusual events. During the procedure, the patient's blood pressure, pulse and oxygen saturation remained stable and documented. No unusual events occurred during the procedure.  Patient was transferred to recovery room and will be discharged when criteria is met. Recommendations/Plan:   1. F/U Biopsies  2. F/U In Office as instructed  3. Discussed with the family  4. High fiber diet   5. Precautions to avoid constipation     Next colonoscopy: 3 years.   If Colonoscopy is less than 10 years the recommended reason is due:polyps, less than adequate preparation    Electronically signed by Silvia Muniz MD  on 9/16/2020 at 10:16 AM

## 2020-09-17 LAB — SURGICAL PATHOLOGY REPORT: NORMAL

## 2020-10-12 RX ORDER — QUETIAPINE FUMARATE 50 MG/1
TABLET, FILM COATED ORAL
Qty: 30 TABLET | Refills: 1 | Status: SHIPPED | OUTPATIENT
Start: 2020-10-12 | End: 2020-12-08

## 2020-10-28 ENCOUNTER — HOSPITAL ENCOUNTER (EMERGENCY)
Age: 25
Discharge: HOME | End: 2020-10-28
Attending: EMERGENCY MEDICINE
Payer: COMMERCIAL

## 2020-10-28 VITALS
OXYGEN SATURATION: 97 % | WEIGHT: 244 LBS | RESPIRATION RATE: 16 BRPM | DIASTOLIC BLOOD PRESSURE: 78 MMHG | SYSTOLIC BLOOD PRESSURE: 181 MMHG | HEART RATE: 107 BPM | HEIGHT: 67 IN | BODY MASS INDEX: 38.3 KG/M2 | TEMPERATURE: 98.6 F

## 2020-10-28 LAB
CHP ED QC CHECK: NORMAL
GLUCOSE BLD-MCNC: 299 MG/DL
GLUCOSE BLD-MCNC: 299 MG/DL (ref 65–105)

## 2020-10-28 PROCEDURE — 99285 EMERGENCY DEPT VISIT HI MDM: CPT

## 2020-10-28 PROCEDURE — 82947 ASSAY GLUCOSE BLOOD QUANT: CPT

## 2020-10-28 PROCEDURE — 10060 I&D ABSCESS SIMPLE/SINGLE: CPT

## 2020-10-28 PROCEDURE — 6370000000 HC RX 637 (ALT 250 FOR IP): Performed by: EMERGENCY MEDICINE

## 2020-10-28 PROCEDURE — 2500000003 HC RX 250 WO HCPCS: Performed by: EMERGENCY MEDICINE

## 2020-10-28 RX ORDER — OXYCODONE HYDROCHLORIDE AND ACETAMINOPHEN 5; 325 MG/1; MG/1
1 TABLET ORAL ONCE
Status: COMPLETED | OUTPATIENT
Start: 2020-10-28 | End: 2020-10-28

## 2020-10-28 RX ORDER — LIDOCAINE HYDROCHLORIDE AND EPINEPHRINE 10; 10 MG/ML; UG/ML
10 INJECTION, SOLUTION INFILTRATION; PERINEURAL ONCE
Status: COMPLETED | OUTPATIENT
Start: 2020-10-28 | End: 2020-10-28

## 2020-10-28 RX ORDER — DOXYCYCLINE HYCLATE 100 MG
100 TABLET ORAL 2 TIMES DAILY
Qty: 20 TABLET | Refills: 0 | Status: SHIPPED | OUTPATIENT
Start: 2020-10-28 | End: 2020-11-07

## 2020-10-28 RX ORDER — IBUPROFEN 600 MG/1
600 TABLET ORAL 3 TIMES DAILY PRN
Qty: 30 TABLET | Refills: 0 | Status: SHIPPED | OUTPATIENT
Start: 2020-10-28 | End: 2021-05-21

## 2020-10-28 RX ORDER — ACETAMINOPHEN 500 MG
1000 TABLET ORAL 3 TIMES DAILY
Qty: 90 TABLET | Refills: 0 | Status: SHIPPED | OUTPATIENT
Start: 2020-10-28

## 2020-10-28 RX ADMIN — OXYCODONE HYDROCHLORIDE AND ACETAMINOPHEN 1 TABLET: 5; 325 TABLET ORAL at 20:35

## 2020-10-28 RX ADMIN — LIDOCAINE HYDROCHLORIDE AND EPINEPHRINE 10 ML: 10; 10 INJECTION, SOLUTION INFILTRATION; PERINEURAL at 20:45

## 2020-10-28 ASSESSMENT — PAIN SCALES - GENERAL
PAINLEVEL_OUTOF10: 10

## 2020-10-29 ENCOUNTER — CARE COORDINATION (OUTPATIENT)
Dept: CARE COORDINATION | Age: 25
End: 2020-10-29

## 2020-10-29 NOTE — ED PROVIDER NOTES
[address]  Emergency Department Encounter  EmergencyMedicine Resident     Pt [facility]  MRN: [id number]  [place] 1995  Date of evaluation: 10/28/20  PCP:  Deonna Brownlee PA-C    CHIEF COMPLAINT       Chief Complaint   Patient presents with    Abscess     Right axillary area.  Hyperglycemia       HISTORY OF PRESENT ILLNESS  (Location/Symptom, Timing/Onset, Context/Setting, Quality, Duration, Modifying Factors, Severity.)      Mauricio Rojas is a 30 y.o. female who presents with chief complaint right axilla abscess. Patient says with past 4 days she has noticed some swelling and pain in the right armpit. Denies fevers, vomiting, diarrhea, chest pain, shortness of breath, abdominal pain. Does not recall an inciting event, does say that she shaves her armpits with a razor. History previous abscess in breast, buttock removed by Dr. Nela Galan. Secondary chief complaint hyperglycemia, patient says she is not compliant with her medications and thinks her sugars were over 400. Denies polyuria, polydipsia. Admits to smoking about a pack a day, denies alcohol, street drugs. PAST MEDICAL / SURGICAL / SOCIAL / FAMILY HISTORY      has a past medical history of Asthma, Diabetes mellitus type 2 in obese ([address] Hepatic steatosis, Hypertension, Morbid obesity with body mass index (BMI) of 40.0 to 49.9 ([address] Neuropathy, and Postpartum depression. has a past surgical history that includes Tonsillectomy (Bilateral);  section; eye surgery;  section (N/A, 3/23/2017); Upper gastrointestinal endoscopy (N/A, 2018); Breast surgery (Left, 2019); Breast surgery (Right, 2019); Rectal surgery (N/A, 2/3/2020); Upper gastrointestinal endoscopy (N/A, 2020); and Colonoscopy (N/A, 2020).     Social History     Socioeconomic History    Marital status: Single     Spouse name: Not on file    Number of children: 2    Years of education: graduated high school, some college    Highest education level: Not on file   Occupational History    Occupation: housewife    Occupation:      Comment: last worked 2015     Christiane Kern strain: Not on file    Food insecurity     Worry: Not on file     Inability: Not on file   [email or web address] needs     Medical: Not on file     Non-medical: Not on file   Tobacco Use    Smoking status: Current Every Day Smoker     Packs/day: 2.00     Years: 10.00     Pack years: 20.00     Types: Cigarettes     Start date: 11/7/2007    Smokeless tobacco: Never Used   Substance and Sexual Activity    Alcohol use: No     Alcohol/week: 0.0 standard drinks    Drug use: No    Sexual activity: Yes     Partners: Male     Comment: chlamydia in 2016,   Lifestyle    Physical activity     Days per week: Not on file     Minutes per session: Not on file    Stress: Not on file   Relationships    Social connections     Talks on phone: Not on file     Gets together: Not on file     Attends Judaism service: Not on file     Active member of club or organization: Not on file     Attends meetings of clubs or organizations: Not on file     Relationship status: Not on file    Intimate partner violence     Fear of current or ex partner: Not on file     Emotionally abused: Not on file     Physically abused: Not on file     Forced sexual activity: Not on file   Other Topics Concern    Not on file   Social History Narrative    Lives with fiance and 2 daughters       Family History   Problem Relation Age of Onset    Breast Cancer Mother 28    Diabetes Father     Cancer Maternal Uncle 60        acute leukemia    Heart Disease Maternal Uncle     Diabetes Maternal Uncle     Colon Cancer Maternal Uncle     Diabetes Maternal Uncle     Heart Attack Maternal Uncle     Uterine Cancer Neg Hx     Ovarian Cancer Neg Hx        Allergies:  Benadryl [diphenhydramine]    Home Medications:  Prior to Admission medications    Medication Sig Start Date End Date Taking?  Authorizing Provider   ibuprofen (ADVIL;MOTRIN) 600 MG tablet Take 1 tablet by mouth 3 times daily as needed for Pain 10/28/20  Yes Marlene Tejada MD   acetaminophen (TYLENOL) 500 MG tablet Take 2 tablets by mouth 3 times daily 10/28/20  Yes Marlene Tejada MD   doxycycline hyclate (VIBRA-TABS) 100 MG tablet Take 1 tablet by mouth 2 times daily for 10 days 10/28/20 11/7/20 Yes Marlene Tejada MD   QUEtiapine (SEROQUEL) 50 MG tablet take 1 tablet by mouth at bedtime 10/12/20   MARCELO Esteban NP   bisacodyl (BISACODYL) 5 MG EC tablet TAKE 4 TABLETS THE DAY BEFORE COLONOSCOPY AS DIRECTED ON BOWEL PREP INSTRUCTIONS GIVEN BY PHYSICIAN'S OFFICE 9/15/20   Saulo Linton MD   omeprazole (PRILOSEC) 20 MG delayed release capsule Take 1 capsule by mouth every morning (before breakfast) 8/25/20   MARCELO Mcallister NP   polyethylene glycol [facility]) 17 GM/SCOOP powder Use as directed by following your bowel prep instructions given by physician office 8/25/20   Saulo Linton MD   dicyclomine (BENTYL) 20 MG tablet Take 1 tablet by mouth every 6 hours 6/17/20   Nilda Griffiths PA-C   Blood Pressure KIT 1 kit by Does not apply route 2 times daily 5/20/20   Nilda Griffiths PA-C   ondansetron (ZOFRAN ODT) 4 MG disintegrating tablet Take 1 tablet by mouth every 8 hours as needed for Nausea 5/15/20   Valentin Quarles MD   PREMPRO 0.625-2.5 MG per tablet take 1 tablet by mouth once daily 4/20/20   MARCELO Downing CNP   traZODone (DESYREL) 50 MG tablet Take 1 tablet by mouth nightly as needed for Sleep 4/20/20   Lokesh Moncada PA-C   metFORMIN (GLUCOPHAGE) 1000 MG tablet take 1 tablet by mouth twice a day with meals 3/11/20   Lokesh Moncada PA-C   lisinopril (PRINIVIL;ZESTRIL) 5 MG tablet take 1 tablet by mouth once daily 3/11/20   Lokesh Moncada PA-C   insulin detemir (LEVEMIR FLEXTOUCH) 100 UNIT/ML injection pen Inject 55 Units into the skin nightly 2/4/20   Kranthi HERRERA Augusta Meyer MD   insulin aspart (NOVOLOG FLEXPEN) 100 UNIT/ML injection pen Inject 4 Units into the skin 3 times daily (before meals) 4 units before breakfast, 4 units before lunch, 6 units before dinner and 6 units before bedtime    Historical Provider, MD   docusate sodium (COLACE) 100 MG capsule Take 1 capsule by mouth 2 times daily as needed for Constipation 7/30/19   Ata Martinez PA-C   NARCAN 4 MG/0.1ML LIQD nasal spray ADMINISTER A SINGLE spray INTO ONE NOSTRIL CALL 911 MAY REPEAT ONCE 7/19/19   Historical Provider, MD SAUCEDO PENTIPS 29G X 12MM MISC USE TWICE DAILY WITH Christiano Burleson 6/24/19   Historical Provider, MD   albuterol sulfate HFA (PROVENTIL HFA) 108 (90 Base) MCG/ACT inhaler Inhale 2 puffs into the lungs every 6 hours as needed for Wheezing 7/23/19   Ata Martinez PA-C   blood glucose monitor kit and supplies 1 kit by Other route three times daily Dispense product that insurance will cover 7/22/19   Ata Martinez PA-C   blood glucose monitor strips 1 strip by Other route three times daily Dispense product that insurance will cover 7/22/19   Ata Martinez PA-C   Insulin Pen Needle (PEN NEEDLES) 31G X 6 MM MISC 1 each by Does not apply route daily 6/22/19   Padilla Washington MD   ONE TOUCH LANCETS MISC 1 each by Does not apply route 2 times daily 6/19/19   Jeremy Brandon MD   etonogestrel (NEXPLANON) 68 MG implant 68 mg by Subdermal route once for 1 dose 5/23/17 6/24/20  [facility], MD       REVIEW OF SYSTEMS    (2-9 systems for level 4, 10 or more for level 5)      Review of Systems   Constitutional: Negative for fever. HENT: Negative for congestion. Eyes: Negative for photophobia. Respiratory: Negative for shortness of breath. Cardiovascular: Negative for chest pain. Gastrointestinal: Negative for abdominal pain and vomiting. Endocrine: Negative for polyuria. Genitourinary: Negative for dysuria. Musculoskeletal: Negative for arthralgias.    Skin: Positive for No weakness. DIFFERENTIAL  DIAGNOSIS     PLAN (LABS / IMAGING / EKG):  Orders Placed This Encounter   Procedures    POCT Glucose    POC Glucose Fingerstick       MEDICATIONS ORDERED:  Orders Placed This Encounter   Medications    oxyCODONE-acetaminophen (PERCOCET) 5-325 MG per tablet 1 tablet    lidocaine-EPINEPHrine 1 percent-[id number] injection 10 mL    ibuprofen (ADVIL;MOTRIN) 600 MG tablet     Sig: Take 1 tablet by mouth 3 times daily as needed for Pain     Dispense:  30 tablet     Refill:  0    acetaminophen (TYLENOL) 500 MG tablet     Sig: Take 2 tablets by mouth 3 times daily     Dispense:  90 tablet     Refill:  0    doxycycline hyclate (VIBRA-TABS) 100 MG tablet     Sig: Take 1 tablet by mouth 2 times daily for 10 days     Dispense:  20 tablet     Refill:  0           DIAGNOSTIC RESULTS / EMERGENCY DEPARTMENT COURSE / MDM     LABS:  Results for orders placed or performed during the hospital encounter of 10/28/20   POCT Glucose   Result Value Ref Range    Glucose 299 mg/dL    QC OK? ok    POC Glucose Fingerstick   Result Value Ref Range    POC Glucose 299 (H) 65 - 105 mg/dL         RADIOLOGY:  None    EKG  None    All EKG's are interpreted by the Emergency Department Physician who either signs or Co-signs this chart in the absence of a cardiologist.    EMERGENCY DEPARTMENT COURSE:  Patient is alert and oriented x3, breathing quietly and unlabored on room air. Speech is normal and speaking in full sentences without requiring to pause to take a breath. Patient vital signs stable, afebrile. Did bedside ultrasound shows a 2 cm x 2 cm abscess,   Surrounding cobblestoning inflammation. We will plan for bedside I&D, Percocet for pain control. Point-of-care glucose check. Glucose within the patient's baseline.     We will prescribe doxycycline and follow-up with PCP, Dr. Cherylene Saunas as needed    PROCEDURES:  PROCEDURE NOTE - INCISION and DRAINAGE    PATIENT NAME: Naty Snowden,[address] NO[address] [id number]  DATE: 10/28/2020  ATTENDING PHYSICIAN: Quita Naranjo    PREOPERATIVE DIAGNOSIS:  axillary abscess  POSTOPERATIVE DIAGNOSIS:  Same  PROCEDURE PERFORMED:   Incision and drainage  PERFORMING PHYSICIAN: Keira Pickett MD      DISCUSSION:  Ariana Koch is a 30y.o.-year-old female who requires an incision and drainage of a axillary abscess. The history and physical examination were reviewed and confirmed. CONSENT: The patient provided verbal consent for this procedure. PROCEDURE:  The patient was positioned appropriately and the skin over the incision site was prepped with chlorhexidine. Local anesthesia was obtained by infiltration using 1% Lidocaine with epinephrine. An incision was then made over the greatest area of fluctuance and approximately 10 cc of thick, purulent and bloody material was expressed. Loculations were not present. The drainage cavity was then explored for loculations. The patients tetanus status was up to date and did not require a booster dose. The patient tolerated the procedure well. COMPLICATIONS:  None     Keira Pickett MD  11:23 PM, 10/28/20      CONSULTS:  None    CRITICAL CARE:  None    FINAL IMPRESSION      1. Abscess    2.  Chronic midline low back pain without sciatica          DISPOSITION / PLAN     DISPOSITION Decision To Discharge 10/28/2020 09:22:03 PM      PATIENT REFERRED TO:  Lanette Lerner PA-C  [address] [facility]  [address]  [address] [facility] [address] [phone]    Schedule an appointment as soon as possible for a visit in 2 days        DISCHARGE MEDICATIONS:  Discharge Medication List as of 10/28/2020  9:24 PM      START taking these medications    Details   acetaminophen (TYLENOL) 500 MG tablet Take 2 tablets by mouth 3 times daily, Disp-90 tablet,R-0Print      doxycycline hyclate (VIBRA-TABS) 100 MG tablet Take 1 tablet by mouth 2 times daily for 10 days, Disp-20 tablet,R-0Print             Keira Pickett MD  Emergency Medicine Resident    (Please note that portions of thisnote were completed with a voice recognition program.  Efforts were made to edit the dictations but occasionally words are mis-transcribed.)       Elif Riley MD  Resident  10/28/20 [id number]

## 2020-10-29 NOTE — ED NOTES
Mode of arrival (squad #, walk in, police, etc) : walk in        Chief complaint(s): abscess and high BS        Arrival Note (brief scenario, treatment PTA, etc). : Pt arrives to the ED with the complaint of a right axillary abscess and high BS. Pt states her BS at home was 482. Pt states she has type 2 diabetes and her sugar is consistently high. Pt states she has been taking her medications but does not follow a specific diet. Pt is here for an abscess in her right axillary. There is a 3cm x 3cm abscess which is tender and slightly red. Pt states she has been admitted and Dr. Anita Mckeon has drained previous abscesses in her armpits prior to this visit. C= \"Have you ever felt that you should Cut down on your drinking? \"  No  A= \"Have people Annoyed you by criticizing your drinking? \"  No  G= \"Have you ever felt bad or Guilty about your drinking? \"  No  E= \"Have you ever had a drink as an Eye-opener first thing in the morning to steady your nerves or to help a hangover? \"  No      Deferred []      Reason for deferring: N/A    *If yes to two or more: probable alcohol abuse. Balta Flores RN  10/28/20 [id number]

## 2020-10-29 NOTE — CARE COORDINATION
Ambulatory Care Coordination  ED Follow up Call    Reason for ED visit: abcess   Status:     not changed    Did you call your PCP prior to going to the ED? No      Did you receive a discharge instructions from the Emergency Room? Yes  Review of Instructions:     Understands what to report/when to return?:  Yes   Understands discharge instructions?:  Yes   Following discharge instructions?:  Yes       Are there any new complaints of pain? No  New Pain Meds? No    Constipation prophylaxis needed? No    If you have a wound is the dressing clean, dry, and intact? N/A  Understands wound care regimen? Yes    Are there any other complaints/concerns that you wish to tell your provider? Patient was not satisfied with her care at the ED, wanted them to call Dr. Saolme Rubio for recommendations. FU appts/Provider:    Future Appointments   Date Time Provider Terry Lopez   11/3/2020  3:30 PM Ronda Jeffers MD [facility]           New Medications?:   Yes      Medication Reconciliation by phone - Yes  Understands Medications? Yes  Taking Medications? Has not picked them up  Can you swallow your pills? Yes. Any further needs in the home i.e. Equipment? No    Link to services in community?:  N/A   Which services:  Na  Patient highly encouraged to call Dr. Salome Rubio and or PCP for a follow up. Reviewed S&S of infection. Reviewed S&S of Covid to report if she should have them. Patient denied any symptoms today. Explained CC and gave patient contact information if she needs anything. Declined for now.

## 2020-11-06 ENCOUNTER — TELEPHONE (OUTPATIENT)
Dept: GASTROENTEROLOGY | Age: 25
End: 2020-11-06

## 2020-11-06 ENCOUNTER — OFFICE VISIT (OUTPATIENT)
Dept: GASTROENTEROLOGY | Age: 25
End: 2020-11-06
Payer: COMMERCIAL

## 2020-11-06 VITALS
TEMPERATURE: 97.3 F | HEART RATE: 86 BPM | HEIGHT: 67 IN | OXYGEN SATURATION: 96 % | DIASTOLIC BLOOD PRESSURE: 79 MMHG | BODY MASS INDEX: 39.49 KG/M2 | SYSTOLIC BLOOD PRESSURE: 120 MMHG | WEIGHT: 251.6 LBS

## 2020-11-06 PROCEDURE — APPSS30 APP SPLIT SHARED TIME 16-30 MINUTES: Performed by: NURSE PRACTITIONER

## 2020-11-06 PROCEDURE — 4004F PT TOBACCO SCREEN RCVD TLK: CPT | Performed by: NURSE PRACTITIONER

## 2020-11-06 PROCEDURE — G8427 DOCREV CUR MEDS BY ELIG CLIN: HCPCS | Performed by: NURSE PRACTITIONER

## 2020-11-06 PROCEDURE — 99213 OFFICE O/P EST LOW 20 MIN: CPT | Performed by: NURSE PRACTITIONER

## 2020-11-06 PROCEDURE — G8484 FLU IMMUNIZE NO ADMIN: HCPCS | Performed by: NURSE PRACTITIONER

## 2020-11-06 PROCEDURE — G8417 CALC BMI ABV UP PARAM F/U: HCPCS | Performed by: NURSE PRACTITIONER

## 2020-11-06 ASSESSMENT — ENCOUNTER SYMPTOMS
DIARRHEA: 1
COUGH: 1
EYES NEGATIVE: 1
SINUS PRESSURE: 0
SHORTNESS OF BREATH: 1
BLOOD IN STOOL: 0
CONSTIPATION: 0
NAUSEA: 1
WHEEZING: 0
CHOKING: 0
BACK PAIN: 0
RECTAL PAIN: 0
ABDOMINAL PAIN: 1
TROUBLE SWALLOWING: 1
ANAL BLEEDING: 0
VOMITING: 0
ABDOMINAL DISTENTION: 1
SORE THROAT: 0
VOICE CHANGE: 0

## 2020-11-06 NOTE — TELEPHONE ENCOUNTER
Paper written by provider states to f/u as needed and at check out notes for f/u say 6 months. Pt was instructed to call the office if she needs us. Pt voices her understanding.

## 2020-11-06 NOTE — PROGRESS NOTES
Disp: 20 tablet, Rfl: 0    PREMPRO 0.625-2.5 MG per tablet, take 1 tablet by mouth once daily, Disp: 28 tablet, Rfl: 1    traZODone (DESYREL) 50 MG tablet, Take 1 tablet by mouth nightly as needed for Sleep, Disp: 30 tablet, Rfl: 5    metFORMIN (GLUCOPHAGE) 1000 MG tablet, take 1 tablet by mouth twice a day with meals, Disp: 60 tablet, Rfl: 3    lisinopril (PRINIVIL;ZESTRIL) 5 MG tablet, take 1 tablet by mouth once daily, Disp: 30 tablet, Rfl: 3    insulin detemir (LEVEMIR FLEXTOUCH) 100 UNIT/ML injection pen, Inject 55 Units into the skin nightly, Disp: 5 pen, Rfl: 3    insulin aspart (NOVOLOG FLEXPEN) 100 UNIT/ML injection pen, Inject 4 Units into the skin 3 times daily (before meals) 4 units before breakfast, 4 units before lunch, 6 units before dinner and 6 units before bedtime, Disp: , Rfl:     docusate sodium (COLACE) 100 MG capsule, Take 1 capsule by mouth 2 times daily as needed for Constipation, Disp: 60 capsule, Rfl: 0    NARCAN 4 MG/0.1ML LIQD nasal spray, ADMINISTER A SINGLE spray INTO ONE NOSTRIL CALL 911 MAY REPEAT ONCE, Disp: , Rfl: 0    UNIFINE PENTIPS 29G X 12MM MISC, USE TWICE DAILY WITH BASAGLAR, Disp: , Rfl: 0    albuterol sulfate HFA (PROVENTIL HFA) 108 (90 Base) MCG/ACT inhaler, Inhale 2 puffs into the lungs every 6 hours as needed for Wheezing, Disp: 1 Inhaler, Rfl: 2    blood glucose monitor kit and supplies, 1 kit by Other route three times daily Dispense product that insurance will cover, Disp: 1 kit, Rfl: 0    blood glucose monitor strips, 1 strip by Other route three times daily Dispense product that insurance will cover, Disp: 100 strip, Rfl: 11    Insulin Pen Needle (PEN NEEDLES) 31G X 6 MM MISC, 1 each by Does not apply route daily, Disp: 100 each, Rfl: 3    ONE TOUCH LANCETS MISC, 1 each by Does not apply route 2 times daily, Disp: 100 each, Rfl: 3    etonogestrel (NEXPLANON) 68 MG implant, 68 mg by Subdermal route once for 1 dose, Disp: 1 each, Rfl: 0    ALLERGIES: Allergies   Allergen Reactions    Benadryl [Diphenhydramine] Hives and Shortness Of Breath       FAMILY HISTORY:       Problem Relation Age of Onset    Breast Cancer Mother 28    Diabetes Father     Cancer Maternal Uncle 61        acute leukemia    Heart Disease Maternal Uncle     Diabetes Maternal Uncle     Colon Cancer Maternal Uncle     Diabetes Maternal Uncle     Heart Attack Maternal Uncle     Uterine Cancer Neg Hx     Ovarian Cancer Neg Hx          SOCIAL HISTORY:   Social History     Socioeconomic History    Marital status: Single     Spouse name: Not on file    Number of children: 2    Years of education: graduated high school, some college    Highest education level: Not on file   Occupational History    Occupation: housewife    Occupation:      Comment: last worked 2015   Social Needs    Financial resource strain: Not on file    Food insecurity     Worry: Not on file     Inability: Not on file   [employer] needs     Medical: Not on file     Non-medical: Not on file   Tobacco Use    Smoking status: Current Every Day Smoker     Packs/day: 1.00     Years: 10.00     Pack years: 10.00     Types: Cigarettes     Start date: 11/7/2007    Smokeless tobacco: Never Used   Substance and Sexual Activity    Alcohol use: No     Alcohol/week: 0.0 standard drinks    Drug use: No    Sexual activity: Yes     Partners: Male     Comment: chlamydia in 2016,   Lifestyle    Physical activity     Days per week: Not on file     Minutes per session: Not on file    Stress: Not on file   Relationships    Social connections     Talks on phone: Not on file     Gets together: Not on file     Attends Restorationism service: Not on file     Active member of club or organization: Not on file     Attends meetings of clubs or organizations: Not on file     Relationship status: Not on file    Intimate partner violence     Fear of current or ex partner: Not on file     Emotionally abused: Not on file     Physically abused: Not on file     Forced sexual activity: Not on file   Other Topics Concern    Not on file   Social History Narrative    Lives with fipatricia and 2 daughters         REVIEW OF SYSTEMS:         Review of Systems   Constitutional: Positive for appetite change and fatigue. Negative for fever and unexpected weight change. HENT: Positive for trouble swallowing. Negative for dental problem, postnasal drip, sinus pressure, sore throat and voice change. Eyes: Negative. Negative for visual disturbance. Respiratory: Positive for cough and shortness of breath. Negative for choking and wheezing. Cardiovascular: Negative. Negative for chest pain, palpitations and leg swelling. Gastrointestinal: Positive for abdominal distention, abdominal pain, diarrhea and nausea. Negative for anal bleeding, blood in stool, constipation, rectal pain and vomiting. Endocrine: Negative. Genitourinary: Negative. Negative for difficulty urinating. Musculoskeletal: Negative. Negative for arthralgias, back pain, gait problem and myalgias. Skin: Negative. Allergic/Immunologic: Negative for environmental allergies and food allergies. Neurological: Negative. Negative for dizziness, weakness, light-headedness, numbness and headaches. Hematological: Bruises/bleeds easily. Psychiatric/Behavioral: Positive for sleep disturbance. The patient is nervous/anxious. PHYSICAL EXAMINATION:     Vital signs reviewed per the nursing documentation. /79   Pulse 86   Temp 97.3 °F (36.3 °C)   Ht 5' 7\" (1.702 m)   Wt 251 lb 9.6 oz (114.1 kg)   SpO2 96%   BMI 39.41 kg/m²   Body mass index is 39.41 kg/m². Physical Exam  Vitals signs and nursing note reviewed. Constitutional:       Appearance: She is well-developed and overweight. HENT:      Head: Normocephalic and atraumatic. Eyes:      General: No scleral icterus.      Conjunctiva/sclera: Conjunctivae normal.      Pupils: Pupils are equal, round, and reactive to light. Neck:      Musculoskeletal: Normal range of motion and neck supple. Thyroid: No thyromegaly. Vascular: No hepatojugular reflux or JVD. Trachea: No tracheal deviation. Cardiovascular:      Rate and Rhythm: Normal rate and regular rhythm. Heart sounds: Normal heart sounds. Pulmonary:      Effort: Pulmonary effort is normal. No respiratory distress. Breath sounds: Normal breath sounds. No wheezing or rales. Abdominal:      General: Bowel sounds are normal. There is no distension. Palpations: Abdomen is soft. There is no hepatomegaly or mass. Tenderness: There is no abdominal tenderness. There is no rebound. Hernia: No hernia is present. Genitourinary:     Rectum: Guaiac result negative. Musculoskeletal:         General: No tenderness. Comments: No joint swelling   Lymphadenopathy:      Cervical: No cervical adenopathy. Skin:     General: Skin is warm. Findings: No bruising, ecchymosis, erythema or rash. Neurological:      Mental Status: She is alert and oriented to person, place, and time. Cranial Nerves: No cranial nerve deficit. Psychiatric:         Thought Content:  Thought content normal.           LABORATORY DATA: Reviewed  Lab Results   Component Value Date    WBC 10.2 06/15/2020    HGB 14.7 06/15/2020    HCT 43.4 06/15/2020    MCV 90.1 06/15/2020     06/15/2020     (L) 06/15/2020    K 3.7 06/15/2020    CL 96 (L) 06/15/2020    CO2 21 06/15/2020    BUN 11 06/15/2020    CREATININE 0.58 06/15/2020    LABALBU 4.3 06/15/2020    BILITOT 1.00 06/15/2020    ALKPHOS 73 06/15/2020    AST 37 (H) 06/15/2020    ALT 52 (H) 06/15/2020    INR 0.9 06/15/2020         Lab Results   Component Value Date    RBC 4.82 06/15/2020    HGB 14.7 06/15/2020    MCV 90.1 06/15/2020    MCH 30.6 06/15/2020    MCHC 34.0 06/15/2020    RDW 12.5 06/15/2020    MPV 8.7 06/15/2020    BASOPCT 1 06/15/2020    LYMPHSABS 3.60 06/15/2020

## 2020-11-20 ENCOUNTER — HOSPITAL ENCOUNTER (OUTPATIENT)
Dept: ULTRASOUND IMAGING | Age: 25
Discharge: HOME | End: 2020-11-22
Payer: COMMERCIAL

## 2020-11-20 PROCEDURE — 76856 US EXAM PELVIC COMPLETE: CPT

## 2020-12-08 RX ORDER — QUETIAPINE FUMARATE 50 MG/1
TABLET, FILM COATED ORAL
Qty: 30 TABLET | Refills: 1 | Status: SHIPPED | OUTPATIENT
Start: 2020-12-08 | End: 2021-12-28

## 2021-01-20 ENCOUNTER — TELEPHONE (OUTPATIENT)
Dept: OBGYN CLINIC | Age: 26
End: 2021-01-20

## 2021-01-26 ENCOUNTER — TELEPHONE (OUTPATIENT)
Dept: OBGYN CLINIC | Age: 26
End: 2021-01-26

## 2021-01-26 NOTE — TELEPHONE ENCOUNTER
Pt is calling because she wants her Valtrex changed back to the previous dosage of 1000mg three times a day. She has a very bad breakout and the new dosage is not helping her. She has an appointment 1/27/21 with you. Please advise.

## 2021-01-27 ENCOUNTER — OFFICE VISIT (OUTPATIENT)
Dept: OBGYN CLINIC | Age: 26
End: 2021-01-27
Payer: COMMERCIAL

## 2021-01-27 ENCOUNTER — HOSPITAL ENCOUNTER (OUTPATIENT)
Age: 26
Setting detail: SPECIMEN
Discharge: HOME | End: 2021-01-27
Payer: COMMERCIAL

## 2021-01-27 VITALS
BODY MASS INDEX: 38.96 KG/M2 | WEIGHT: 248.2 LBS | RESPIRATION RATE: 16 BRPM | HEIGHT: 67 IN | TEMPERATURE: 97.7 F | SYSTOLIC BLOOD PRESSURE: 124 MMHG | HEART RATE: 98 BPM | DIASTOLIC BLOOD PRESSURE: 83 MMHG

## 2021-01-27 DIAGNOSIS — A60.00 GENITAL HERPES SIMPLEX, UNSPECIFIED SITE: Primary | ICD-10-CM

## 2021-01-27 DIAGNOSIS — N76.0 VULVOVAGINITIS: ICD-10-CM

## 2021-01-27 DIAGNOSIS — A60.00 GENITAL HERPES SIMPLEX, UNSPECIFIED SITE: ICD-10-CM

## 2021-01-27 PROCEDURE — 99213 OFFICE O/P EST LOW 20 MIN: CPT | Performed by: SPECIALIST

## 2021-01-27 PROCEDURE — 36415 COLL VENOUS BLD VENIPUNCTURE: CPT | Performed by: SPECIALIST

## 2021-01-27 PROCEDURE — G8484 FLU IMMUNIZE NO ADMIN: HCPCS | Performed by: SPECIALIST

## 2021-01-27 PROCEDURE — G8417 CALC BMI ABV UP PARAM F/U: HCPCS | Performed by: SPECIALIST

## 2021-01-27 PROCEDURE — G8427 DOCREV CUR MEDS BY ELIG CLIN: HCPCS | Performed by: SPECIALIST

## 2021-01-27 PROCEDURE — 4004F PT TOBACCO SCREEN RCVD TLK: CPT | Performed by: SPECIALIST

## 2021-01-27 RX ORDER — FLUCONAZOLE 100 MG/1
100 TABLET ORAL DAILY
Qty: 7 TABLET | Refills: 0 | Status: SHIPPED | OUTPATIENT
Start: 2021-01-27 | End: 2021-02-03

## 2021-01-27 RX ORDER — METRONIDAZOLE 500 MG/1
500 TABLET ORAL 2 TIMES DAILY
Qty: 14 TABLET | Refills: 0 | Status: SHIPPED | OUTPATIENT
Start: 2021-01-27 | End: 2021-02-03

## 2021-01-27 RX ORDER — CLOTRIMAZOLE AND BETAMETHASONE DIPROPIONATE 10; .64 MG/G; MG/G
CREAM TOPICAL
Qty: 45 G | Refills: 1 | Status: ON HOLD | OUTPATIENT
Start: 2021-01-27 | End: 2021-09-20

## 2021-01-27 RX ORDER — VALACYCLOVIR HYDROCHLORIDE 500 MG/1
1 TABLET, FILM COATED ORAL DAILY
COMMUNITY
Start: 2021-01-26 | End: 2021-02-08

## 2021-01-27 ASSESSMENT — ENCOUNTER SYMPTOMS
ABDOMINAL DISTENTION: 0
EYE PAIN: 0
ABDOMINAL PAIN: 0
NAUSEA: 0
CONSTIPATION: 0
COUGH: 0
VOMITING: 0
DIARRHEA: 0
APNEA: 0

## 2021-01-27 NOTE — PROGRESS NOTES
 PREMPRO 0.625-2.5 MG per tablet take 1 tablet by mouth once daily 28 tablet 1    traZODone (DESYREL) 50 MG tablet Take 1 tablet by mouth nightly as needed for Sleep 30 tablet 5    metFORMIN (GLUCOPHAGE) 1000 MG tablet take 1 tablet by mouth twice a day with meals 60 tablet 3    lisinopril (PRINIVIL;ZESTRIL) 5 MG tablet take 1 tablet by mouth once daily 30 tablet 3    insulin detemir (LEVEMIR FLEXTOUCH) 100 UNIT/ML injection pen Inject 55 Units into the skin nightly 5 pen 3    insulin aspart (NOVOLOG FLEXPEN) 100 UNIT/ML injection pen Inject 4 Units into the skin 3 times daily (before meals) 4 units before breakfast, 4 units before lunch, 6 units before dinner and 6 units before bedtime      NARCAN 4 MG/0.1ML LIQD nasal spray ADMINISTER A SINGLE spray INTO ONE NOSTRIL CALL 911 MAY REPEAT ONCE  0    UNIFINE PENTIPS 29G X 12MM MISC USE TWICE DAILY WITH BASAGLAR  0    albuterol sulfate HFA (PROVENTIL HFA) 108 (90 Base) MCG/ACT inhaler Inhale 2 puffs into the lungs every 6 hours as needed for Wheezing 1 Inhaler 2    blood glucose monitor kit and supplies 1 kit by Other route three times daily Dispense product that insurance will cover 1 kit 0    blood glucose monitor strips 1 strip by Other route three times daily Dispense product that insurance will cover 100 strip 11    Insulin Pen Needle (PEN NEEDLES) 31G X 6 MM MISC 1 each by Does not apply route daily 100 each 3    ONE TOUCH LANCETS MISC 1 each by Does not apply route 2 times daily 100 each 3    valACYclovir (VALTREX) 500 MG tablet Take 1 tablet by mouth daily      docusate sodium (COLACE) 100 MG capsule Take 1 capsule by mouth 2 times daily as needed for Constipation (Patient not taking: Reported on 1/27/2021) 60 capsule 0    etonogestrel (NEXPLANON) 68 MG implant 68 mg by Subdermal route once for 1 dose 1 each 0     No current [facility]-ordered facility-administered medications on file.       Problem List Items Addressed This Visit     None Visit Diagnoses     Genital herpes simplex, unspecified site    -  Primary    Relevant Orders    Herpes Profile    Vulvovaginitis        Relevant Orders    Herpes Profile        Allergies   Allergen Reactions    Benadryl [Diphenhydramine] Hives and Shortness Of Breath     Orders Placed This Encounter   Procedures    Herpes Profile     Standing Status:   Future     Standing Expiration Date:   1/27/2022        Patient is here today following an ER visit for genital herpes. Patient was treated with Valtrex and as soon as she was done taking them, they returned. She was then given another dose of Valtrex but it was not as high as the one she got at the hospital and it is not working. Patient wants a prescription for the same treatment she got at the hospital.  She also was given Lidocaine and wants refills. The outbreak started a couple weeks ago. She states that the outbreak is making it hard for her to walk. Patient is an insulin dependent diabetic. Review of Systems   Constitutional: Negative for activity change, appetite change and fever. HENT: Negative for ear discharge and ear pain. Eyes: Negative for pain and visual disturbance. Respiratory: Negative for apnea and cough. Cardiovascular: Negative for chest pain, palpitations and leg swelling. Gastrointestinal: Negative for abdominal distention, abdominal pain, constipation, diarrhea, nausea and vomiting. Endocrine: Negative. Genitourinary: Positive for genital sores. Negative for difficulty urinating, dysuria, menstrual problem and pelvic pain. Musculoskeletal: Negative for neck pain and neck stiffness. Skin: Negative. Neurological: Negative for light-headedness and numbness. Hematological: Negative. Does not bruise/bleed easily. Objective:   Physical Exam  Vitals signs and nursing note reviewed. Exam conducted with a chaperone present. Constitutional:       Appearance: She is well-developed.    HENT: Head: Normocephalic and atraumatic. Neck:      Musculoskeletal: Normal range of motion and neck supple. Thyroid: No thyromegaly. Cardiovascular:      Rate and Rhythm: Normal rate and regular rhythm. Pulmonary:      Effort: Pulmonary effort is normal.      Breath sounds: Normal breath sounds. No wheezing. Abdominal:      General: Bowel sounds are normal. There is no distension. Palpations: Abdomen is soft. There is no mass. Tenderness: There is no abdominal tenderness. There is no guarding. Genitourinary:     Labia:         Right: Rash present. Left: Rash present. Comments: Small lesion near clitoris compatible with herpes  Musculoskeletal: Normal range of motion. Skin:     General: Skin is dry. Neurological:      Mental Status: She is alert and oriented to person, place, and time. Psychiatric:         Behavior: Behavior normal.         Thought Content: Thought content normal.         Assessment:      Patient with severe vulvovaginitis and genital herpes. Patient states that her blood sugar is not under control. Will treat with Flagyl and Diflucan and Lotrisone. Patient was told to only take Valtrex once a day and a prescription was sent prior to this appointment. Patient was told that the severity of her vulvovaginitis is most likely due to her uncontrolled diabetes and is the cause of her pain. Patient was told that there is no cure for herpes, only treatment when she has an outbreak. Herpes profile will be drawn today. Patient was told to return in 2 weeks for re-examination. Plan:      Orders Placed This Encounter   Procedures    Herpes Profile     Orders Placed This Encounter   Medications    clotrimazole-betamethasone (LOTRISONE) 1-0.05 % cream     Sig: Apply topically 2 times daily.      Dispense:  45 g     Refill:  1    metroNIDAZOLE (FLAGYL) 500 MG tablet     Sig: Take 1 tablet by mouth 2 times daily for 7 days     Dispense:  14 tablet Refill:  0    fluconazole (DIFLUCAN) 100 MG tablet     Sig: Take 1 tablet by mouth daily for 7 days     Dispense:  7 tablet     Refill:  0      Appointment in 2 weeks. Yue Su am scribing for, and in the presence of Dr. Yenni Bills. Electronically signed by: Yazmin Kebede 1/27/21 4:50 PM       I agree to the above documentation placed by my scribe Yazmin Kebede. I reviewed the scribe's note and agree with the documented findings and plan of care. Any areas of disagreement are noted on the chart. I have personally evaluated this patient. Additional findings are as noted. I agree with the chief complaint, past medical history, past surgical history, allergies, medications, social and family history as documented unless otherwise noted below.      Electronically signed by Yenni Bills MD on 1/28/2021 at 5:25 AM

## 2021-01-27 NOTE — PROGRESS NOTES
Patient was in the office today for a Herpes profile. Draw per physician order using sterile technique.   Drawn from the Right antecubital.

## 2021-01-28 LAB
HERPES SIMPLEX VIRUS 1 IGG: 5.39
HERPES SIMPLEX VIRUS 2 IGG: 0.76
HERPES TYPE 1/2 IGM COMBINED: 0.47

## 2021-02-15 ENCOUNTER — NURSE TRIAGE (OUTPATIENT)
Dept: OTHER | Facility: CLINIC | Age: 26
End: 2021-02-15

## 2021-02-15 NOTE — TELEPHONE ENCOUNTER
Patient called Anupama Marin at [facility] pre-service center [facility])  with red flag complaint. Brief description of triage: see below. Triage indicates for patient to be seen by PCP after second level triage with Whitley Mace NP. Care advice provided, patient verbalizes understanding; denies any other questions or concerns; instructed to call back for any new or worsening symptoms. Writer provided warm transfer to [place] at [facility] [place] for appointment scheduling. Attention Provider: Thank you for allowing me to participate in the care of your patient. The patient was connected to triage in response to information provided to the [facility]. Please do not respond through this encounter as the response is not directed to a shared pool. Answer Assessment - Initial Assessment Questions  1. MECHANISM: \"How did the injury happen? \" (e.g., twisting injury, direct blow)      Patient reports she was roller skating when she fell and her leg bent under her. 2. ONSET: \"When did the injury happen? \" (Minutes or hours ago)       2/7/2021    3. LOCATION: \"Where is the injury located? \"       Left ankle. 4. APPEARANCE of INJURY: \"What does the injury look like? \"       Swollen, bruising. 5. WEIGHT-BEARING: \"Can you put weight on that foot? \" \"Can you walk (four steps or more)? \"        Not able to bear weight she states she is currently using crutches. 6. SIZE: For cuts, bruises, or swelling, ask: \"How large is it? \" (e.g., inches or centimeters;  entire joint)       Bruising and swelling but no breaks in the skin. 7. PAIN: \"Is there pain? \" If so, ask: \"How bad is the pain? \"    (e.g., Scale 1-10; or mild, moderate, severe)      10    8. TETANUS: For any breaks in the skin, ask: \"When was the last tetanus booster? \"      N/a    9. OTHER SYMPTOMS: \"Do you have any other symptoms? \"       None. 10. PREGNANCY: \"Is there any chance you are pregnant? \" \"When was your last menstrual period? \"        No LMP 2/7/2021    Protocols used: ANKLE AND FOOT INJURY-ADULT-[place]

## 2021-02-15 NOTE — TELEPHONE ENCOUNTER
Reason for Disposition   Can't stand (bear weight) or walk (e.g., 4 steps)    Answer Assessment - Initial Assessment Questions  1. MECHANISM: \"How did the injury happen? \" (e.g., twisting injury, direct blow)      Patient reports she was roller skating when she fell and her leg bent under her. 2. ONSET: \"When did the injury happen? \" (Minutes or hours ago)       2/7/2021    3. LOCATION: \"Where is the injury located? \"       Left ankle. 4. APPEARANCE of INJURY: \"What does the injury look like? \"       Swollen, bruising. 5. WEIGHT-BEARING: \"Can you put weight on that foot? \" \"Can you walk (four steps or more)? \"        Not able to bear weight she states she is currently using crutches. 6. SIZE: For cuts, bruises, or swelling, ask: \"How large is it? \" (e.g., inches or centimeters;  entire joint)       Bruising and swelling but no breaks in the skin. 7. PAIN: \"Is there pain? \" If so, ask: \"How bad is the pain? \"    (e.g., Scale 1-10; or mild, moderate, severe)      10    8. TETANUS: For any breaks in the skin, ask: \"When was the last tetanus booster? \"      N/a    9. OTHER SYMPTOMS: \"Do you have any other symptoms? \"       None. 10. PREGNANCY: \"Is there any chance you are pregnant? \" \"When was your last menstrual period? \"        No LMP 2/7/2021    Protocols used: ANKLE AND FOOT INJURY-ADULT-[place]

## 2021-03-02 RX ORDER — OMEPRAZOLE 20 MG/1
20 CAPSULE, DELAYED RELEASE ORAL
Qty: 30 CAPSULE | Refills: 3 | Status: SHIPPED | OUTPATIENT
Start: 2021-03-02 | End: 2021-07-12

## 2021-03-03 ENCOUNTER — HOSPITAL ENCOUNTER (OUTPATIENT)
Age: 26
Setting detail: SPECIMEN
Discharge: HOME | End: 2021-03-03
Payer: COMMERCIAL

## 2021-03-03 ENCOUNTER — OFFICE VISIT (OUTPATIENT)
Dept: OBGYN CLINIC | Age: 26
End: 2021-03-03
Payer: COMMERCIAL

## 2021-03-03 VITALS
DIASTOLIC BLOOD PRESSURE: 83 MMHG | WEIGHT: 249 LBS | TEMPERATURE: 97.9 F | BODY MASS INDEX: 39.08 KG/M2 | SYSTOLIC BLOOD PRESSURE: 126 MMHG | HEIGHT: 67 IN | HEART RATE: 89 BPM

## 2021-03-03 DIAGNOSIS — B37.2 SKIN YEAST INFECTION: Primary | ICD-10-CM

## 2021-03-03 DIAGNOSIS — N90.89 FISSURE OF VULVA: ICD-10-CM

## 2021-03-03 DIAGNOSIS — Z11.3 SCREEN FOR STD (SEXUALLY TRANSMITTED DISEASE): ICD-10-CM

## 2021-03-03 PROCEDURE — 99213 OFFICE O/P EST LOW 20 MIN: CPT | Performed by: CLINICAL NURSE SPECIALIST

## 2021-03-03 PROCEDURE — G8427 DOCREV CUR MEDS BY ELIG CLIN: HCPCS | Performed by: CLINICAL NURSE SPECIALIST

## 2021-03-03 PROCEDURE — G8484 FLU IMMUNIZE NO ADMIN: HCPCS | Performed by: CLINICAL NURSE SPECIALIST

## 2021-03-03 PROCEDURE — G8417 CALC BMI ABV UP PARAM F/U: HCPCS | Performed by: CLINICAL NURSE SPECIALIST

## 2021-03-03 PROCEDURE — 4004F PT TOBACCO SCREEN RCVD TLK: CPT | Performed by: CLINICAL NURSE SPECIALIST

## 2021-03-03 RX ORDER — FLUCONAZOLE 100 MG/1
100 TABLET ORAL DAILY
Qty: 7 TABLET | Refills: 0 | Status: SHIPPED | OUTPATIENT
Start: 2021-03-03 | End: 2021-03-10

## 2021-03-03 ASSESSMENT — ENCOUNTER SYMPTOMS
RESPIRATORY NEGATIVE: 1
GASTROINTESTINAL NEGATIVE: 1
ALLERGIC/IMMUNOLOGIC NEGATIVE: 1
EYES NEGATIVE: 1

## 2021-03-03 NOTE — PROGRESS NOTES
Subjective:      Patient ID:  Masood Henson is a 30 y.o. female who presents for   Chief Complaint   Patient presents with    Dysuria     a couple days    Rash     vaginal rash; stated that it showed up after she was intimate        HPI    Patient is a 31 yo female who presents for STD screening. Patient states that she is having vaginal rash, itching and burning for the past few days. Patient is unsure if she is having an active HSV outbreak  Review of Systems   Constitutional: Negative for chills and fever. HENT: Negative. Eyes: Negative. Respiratory: Negative. Cardiovascular: Negative. Gastrointestinal: Negative. Endocrine: Negative. Genitourinary: Negative for dysuria, menstrual problem and vaginal discharge. Vaginal irritation, itching and burning for the past few days and is not sure if she is having an HSV outbreak   Musculoskeletal: Negative. Skin: Negative. Allergic/Immunologic: Negative. Neurological: Negative. Hematological: Negative. Psychiatric/Behavioral: Negative. /83   Pulse 89   Temp 97.9 °F (36.6 °C)   Ht 5' 7\" (1.702 m)   Wt 249 lb (112.9 kg)   LMP 03/03/2021 (Within Days)   BMI 39.00 kg/m²    Patient's last menstrual period was 03/03/2021 (within days).     Family History   Problem Relation Age of Onset    Breast Cancer Mother 28    Diabetes Father     Cancer Maternal Uncle 61        acute leukemia    Heart Disease Maternal Uncle     Diabetes Maternal Uncle     Colon Cancer Maternal Uncle     Diabetes Maternal Uncle     Heart Attack Maternal Uncle     Uterine Cancer Neg Hx     Ovarian Cancer Neg Hx       Past Medical History:   Diagnosis Date    Asthma     uses inhaler    Diabetes mellitus type 2 in obese ([address]     Hepatic steatosis     Hypertension     started when approx 6 mos pregnant with first pregnancy    Morbid obesity with body mass index (BMI) of 40.0 to 49.9 (HCC)     Neuropathy     Postpartum depression  dicyclomine (BENTYL) 20 MG tablet Take 1 tablet by mouth every 6 hours (Patient not taking: Reported on 3/3/2021) 60 tablet 1    Blood Pressure KIT 1 kit by Does not apply route 2 times daily 1 kit 0    ondansetron (ZOFRAN ODT) 4 MG disintegrating tablet Take 1 tablet by mouth every 8 hours as needed for Nausea (Patient not taking: Reported on 3/3/2021) 20 tablet 0    insulin detemir (LEVEMIR FLEXTOUCH) 100 UNIT/ML injection pen Inject 55 Units into the skin nightly 5 pen 3    insulin aspart (NOVOLOG FLEXPEN) 100 UNIT/ML injection pen Inject 4 Units into the skin 3 times daily (before meals) 4 units before breakfast, 4 units before lunch, 6 units before dinner and 6 units before bedtime      docusate sodium (COLACE) 100 MG capsule Take 1 capsule by mouth 2 times daily as needed for Constipation (Patient not taking: Reported on 3/3/2021) 60 capsule 0    NARCAN 4 MG/0.1ML LIQD nasal spray ADMINISTER A SINGLE spray INTO ONE NOSTRIL CALL 911 MAY REPEAT ONCE  0    albuterol sulfate HFA (PROVENTIL HFA) 108 (90 Base) MCG/ACT inhaler Inhale 2 puffs into the lungs every 6 hours as needed for Wheezing 1 Inhaler 2    blood glucose monitor kit and supplies 1 kit by Other route three times daily Dispense product that insurance will cover 1 kit 0    blood glucose monitor strips 1 strip by Other route three times daily Dispense product that insurance will cover 100 strip 11    ONE TOUCH LANCETS MISC 1 each by Does not apply route 2 times daily 100 each 3    etonogestrel (NEXPLANON) 68 MG implant 68 mg by Subdermal route once for 1 dose 1 each 0     No current facility-administered medications for this visit. Objective:   Physical Exam  Vitals signs reviewed. Constitutional:       Appearance: She is well-developed. HENT:      Head: Normocephalic and atraumatic. Eyes:      Conjunctiva/sclera: Conjunctivae normal.   Neck:      Musculoskeletal: Normal range of motion and neck supple.    Cardiovascular: Rate and Rhythm: Normal rate and regular rhythm. Pulmonary:      Effort: Pulmonary effort is normal.      Breath sounds: Normal breath sounds. Abdominal:      General: Bowel sounds are normal.   Genitourinary:     Vagina: Vaginal discharge (large amount of blood, just started menses today) present. Musculoskeletal: Normal range of motion. Skin:     General: Skin is warm and dry. Neurological:      Mental Status: She is oriented to person, place, and time. Psychiatric:         Behavior: Behavior normal.         Thought Content: Thought content normal.         Judgment: Judgment normal.         Assessment:      Diagnosis Orders   1. Skin yeast infection  VAGINITIS DNA PROBE    fluconazole (DIFLUCAN) 100 MG tablet   2. Screen for STD (sexually transmitted disease)  Chlamydia Trachomatis & Neisseria gonorrhoeae (GC) by amplified detection   3. Fissure of vulva               Plan:      Return for as needed. Patient was seen with total face to face time of 20 minutes. More than 50% of this visit was on counseling andeducation regarding the problems listed below and her options. She was also counseled on her preventative health maintenance recommendations and follow-up.     Electronically signed by: Ubaldo Garces CNP

## 2021-03-04 DIAGNOSIS — Z11.3 SCREEN FOR STD (SEXUALLY TRANSMITTED DISEASE): ICD-10-CM

## 2021-03-04 LAB
DIRECT EXAM: NORMAL
Lab: NORMAL
SPECIMEN DESCRIPTION: NORMAL

## 2021-04-16 ENCOUNTER — NURSE TRIAGE (OUTPATIENT)
Dept: OTHER | Facility: CLINIC | Age: 26
End: 2021-04-16

## 2021-04-16 NOTE — TELEPHONE ENCOUNTER
Received call from Rishi Bermudez at [address] [facility])  with [employer]. Brief description of triage: Chills, nausea, body aches, headache, SOB. In [place] for [place]. Triage indicates for patient to be seen in the ED. She does not drive and will need to call 911 in order to be transported. Care advice provided, patient verbalizes understanding; denies any other questions or concerns; instructed to call back for any new or worsening symptoms. Attention Provider: Thank you for allowing me to participate in the care of your patient. The patient was connected to triage in response to information provided to the [facility]. Please do not respond through this encounter as the response is not directed to a shared pool. Reason for Disposition   MODERATE difficulty breathing (e.g., speaks in phrases, SOB even at rest, pulse 100-120)    Answer Assessment - Initial Assessment Questions  1. COVID-19 DIAGNOSIS: \"Who made your Coronavirus (COVID-19) diagnosis? \" \"Was it confirmed by a positive lab test?\" If not diagnosed by a HCP, ask \"Are there lots of cases (community spread) where you live? \" (See public health department website, if unsure)      Denies    2. COVID-19 EXPOSURE: \"Was there any known exposure to COVID before the symptoms began? \" CDC Definition of close contact: within 6 feet (2 meters) for a total of 15 minutes or more over a 24-hour period. Denies    3. ONSET: \"When did the COVID-19 symptoms start? \"       Last night    4. WORST SYMPTOM: \"What is your worst symptom? \" (e.g., cough, fever, shortness of breath, muscle aches)      Muscle aches and body aches    5. COUGH: \"Do you have a cough? \" If so, ask: \"How bad is the cough? \"          Denies    6. FEVER: \"Do you have a fever? \" If so, ask: \"What is your temperature, how was it measured, and when did it start? \"        Denies    7. RESPIRATORY STATUS: \"Describe your breathing? \" (e.g., shortness of breath, wheezing, unable to speak)         SOB- she feels she has trouble catching her breath    8. BETTER-SAME-WORSE: Delaney Dominguez you getting better, staying the same or getting worse compared to yesterday? \"  If getting worse, ask, \"In what way? \"      Worse    9. HIGH RISK DISEASE: \"Do you have any chronic medical problems? \" (e.g., asthma, heart or lung disease, weak immune system, obesity, etc.)        Asthmatic- has not used her inhaler she cannot find it   Obesity      10. PREGNANCY: \"Is there any chance you are pregnant? \" \"When was your last menstrual period? \"       Denies    11. OTHER SYMPTOMS: \"Do you have any other symptoms? \"  (e.g., chills, fatigue, headache, loss of smell or taste, muscle pain, sore throat; new loss of smell or taste especially support the diagnosis of COVID-19)        See travel screen    Protocols used: CORONAVIRUS (COVID-19) DIAGNOSED OR SUSPECTED-ADULT-[place]  see above documentation

## 2021-04-27 RX ORDER — VALACYCLOVIR HYDROCHLORIDE 500 MG/1
TABLET, FILM COATED ORAL
Qty: 14 TABLET | Refills: 2 | Status: SHIPPED | OUTPATIENT
Start: 2021-04-27 | End: 2021-05-18

## 2021-05-18 RX ORDER — VALACYCLOVIR HYDROCHLORIDE 500 MG/1
TABLET, FILM COATED ORAL
Qty: 14 TABLET | Refills: 2 | Status: SHIPPED | OUTPATIENT
Start: 2021-05-18 | End: 2021-06-08

## 2021-05-21 ENCOUNTER — HOSPITAL ENCOUNTER (OUTPATIENT)
Age: 26
Setting detail: SPECIMEN
Discharge: HOME | End: 2021-05-21
Payer: COMMERCIAL

## 2021-05-21 DIAGNOSIS — E11.65 UNCONTROLLED TYPE 2 DIABETES MELLITUS WITH HYPERGLYCEMIA (HCC): ICD-10-CM

## 2021-05-21 DIAGNOSIS — E11.40 TYPE 2 DIABETES MELLITUS WITH DIABETIC NEUROPATHY, UNSPECIFIED WHETHER LONG TERM INSULIN USE (HCC): ICD-10-CM

## 2021-05-21 PROBLEM — K51.419: Status: ACTIVE | Noted: 2021-05-21

## 2021-05-21 LAB
CREATININE URINE: 104.4 MG/DL (ref 28–217)
MICROALBUMIN/CREAT 24H UR: <12 MG/L
MICROALBUMIN/CREAT UR-RTO: NORMAL MCG/MG CREAT

## 2021-06-08 RX ORDER — VALACYCLOVIR HYDROCHLORIDE 500 MG/1
TABLET, FILM COATED ORAL
Qty: 14 TABLET | Refills: 2 | Status: SHIPPED | OUTPATIENT
Start: 2021-06-08 | End: 2021-06-30

## 2021-06-30 RX ORDER — VALACYCLOVIR HYDROCHLORIDE 500 MG/1
TABLET, FILM COATED ORAL
Qty: 14 TABLET | Refills: 2 | Status: SHIPPED | OUTPATIENT
Start: 2021-06-30 | End: 2021-08-23

## 2021-07-12 RX ORDER — OMEPRAZOLE 20 MG/1
20 CAPSULE, DELAYED RELEASE ORAL
Qty: 30 CAPSULE | Refills: 3 | Status: SHIPPED | OUTPATIENT
Start: 2021-07-12 | End: 2021-12-28

## 2021-08-02 RX ORDER — VALACYCLOVIR HYDROCHLORIDE 500 MG/1
TABLET, FILM COATED ORAL
Qty: 14 TABLET | Refills: 2 | OUTPATIENT
Start: 2021-08-02

## 2021-08-09 RX ORDER — VALACYCLOVIR HYDROCHLORIDE 500 MG/1
TABLET, FILM COATED ORAL
Qty: 14 TABLET | Refills: 2 | OUTPATIENT
Start: 2021-08-09

## 2021-08-23 RX ORDER — VALACYCLOVIR HYDROCHLORIDE 500 MG/1
TABLET, FILM COATED ORAL
Qty: 14 TABLET | Refills: 2 | Status: SHIPPED | OUTPATIENT
Start: 2021-08-23 | End: 2021-11-30

## 2021-09-14 ENCOUNTER — HOSPITAL ENCOUNTER (INPATIENT)
Age: 26
LOS: 2 days | Discharge: HOME | DRG: 420 | End: 2021-09-16
Attending: EMERGENCY MEDICINE | Admitting: INTERNAL MEDICINE
Payer: COMMERCIAL

## 2021-09-14 ENCOUNTER — APPOINTMENT (OUTPATIENT)
Dept: GENERAL RADIOLOGY | Age: 26
DRG: 420 | End: 2021-09-14
Payer: COMMERCIAL

## 2021-09-14 DIAGNOSIS — E11.10 DIABETIC KETOACIDOSIS WITHOUT COMA ASSOCIATED WITH TYPE 2 DIABETES MELLITUS (HCC): Primary | ICD-10-CM

## 2021-09-14 DIAGNOSIS — E11.10 DKA, TYPE 2, NOT AT GOAL (HCC): ICD-10-CM

## 2021-09-14 DIAGNOSIS — E87.6 HYPOKALEMIA: ICD-10-CM

## 2021-09-14 DIAGNOSIS — I10 ESSENTIAL HYPERTENSION: ICD-10-CM

## 2021-09-14 DIAGNOSIS — E11.10 METABOLIC ACIDOSIS DUE TO DIABETES MELLITUS (HCC): ICD-10-CM

## 2021-09-14 PROBLEM — J45.901 ASTHMA EXACERBATION: Status: ACTIVE | Noted: 2021-09-14

## 2021-09-14 LAB
-: ABNORMAL
ABSOLUTE EOS #: 0 K/UL (ref 0–0.4)
ABSOLUTE IMMATURE GRANULOCYTE: 0 K/UL (ref 0–0.3)
ABSOLUTE LYMPH #: 3.21 K/UL (ref 1–4.8)
ABSOLUTE MONO #: 0.99 K/UL (ref 0.1–0.8)
ALLEN TEST: ABNORMAL
ALLEN TEST: ABNORMAL
AMORPHOUS: ABNORMAL
ANION GAP SERPL CALCULATED.3IONS-SCNC: 16 MMOL/L (ref 9–17)
ANION GAP SERPL CALCULATED.3IONS-SCNC: 21 MMOL/L (ref 9–17)
BACTERIA: ABNORMAL
BASOPHILS # BLD: 0 % (ref 0–2)
BASOPHILS ABSOLUTE: 0 K/UL (ref 0–0.2)
BETA-HYDROXYBUTYRATE: 0.16 MMOL/L (ref 0.02–0.27)
BILIRUBIN URINE: NEGATIVE
BUN BLDV-MCNC: 12 MG/DL (ref 6–20)
BUN BLDV-MCNC: 15 MG/DL (ref 6–20)
BUN/CREAT BLD: ABNORMAL (ref 9–20)
BUN/CREAT BLD: ABNORMAL (ref 9–20)
CALCIUM SERPL-MCNC: 10.4 MG/DL (ref 8.6–10.4)
CALCIUM SERPL-MCNC: 8.8 MG/DL (ref 8.6–10.4)
CARBOXYHEMOGLOBIN: 0.7 % (ref 0–5)
CARBOXYHEMOGLOBIN: 2 % (ref 0–5)
CASTS UA: ABNORMAL /LPF (ref 0–8)
CHLORIDE BLD-SCNC: 102 MMOL/L (ref 98–107)
CHLORIDE BLD-SCNC: 99 MMOL/L (ref 98–107)
CHP ED QC CHECK: NORMAL
CHP ED QC CHECK: NORMAL
CO2: 10 MMOL/L (ref 20–31)
CO2: 14 MMOL/L (ref 20–31)
COLOR: YELLOW
CREAT SERPL-MCNC: 0.61 MG/DL (ref 0.5–0.9)
CREAT SERPL-MCNC: 0.65 MG/DL (ref 0.5–0.9)
CRYSTALS, UA: ABNORMAL /HPF
D-DIMER QUANTITATIVE: <0.17 MG/L FEU
DIFFERENTIAL TYPE: ABNORMAL
EOSINOPHILS RELATIVE PERCENT: 0 % (ref 1–4)
EPITHELIAL CELLS UA: ABNORMAL /HPF (ref 0–5)
FIO2: ABNORMAL
FIO2: ABNORMAL
GFR AFRICAN AMERICAN: >60 ML/MIN
GFR AFRICAN AMERICAN: >60 ML/MIN
GFR NON-AFRICAN AMERICAN: >60 ML/MIN
GFR NON-AFRICAN AMERICAN: >60 ML/MIN
GFR SERPL CREATININE-BSD FRML MDRD: ABNORMAL ML/MIN/{1.73_M2}
GLUCOSE BLD-MCNC: 214 MG/DL (ref 65–105)
GLUCOSE BLD-MCNC: 271 MG/DL (ref 65–105)
GLUCOSE BLD-MCNC: 294 MG/DL (ref 65–105)
GLUCOSE BLD-MCNC: 332 MG/DL (ref 70–99)
GLUCOSE BLD-MCNC: 341 MG/DL (ref 65–105)
GLUCOSE BLD-MCNC: 343 MG/DL
GLUCOSE BLD-MCNC: 343 MG/DL (ref 65–105)
GLUCOSE BLD-MCNC: 366 MG/DL
GLUCOSE BLD-MCNC: 366 MG/DL (ref 65–105)
GLUCOSE BLD-MCNC: 453 MG/DL (ref 70–99)
GLUCOSE URINE: ABNORMAL
HCG QUALITATIVE: NEGATIVE
HCO3 VENOUS: 11.6 MMOL/L (ref 24–30)
HCO3 VENOUS: 13.8 MMOL/L (ref 24–30)
HCT VFR BLD CALC: 41.3 % (ref 36.3–47.1)
HEMOGLOBIN: 14.3 G/DL (ref 11.9–15.1)
IMMATURE GRANULOCYTES: 0 %
KETONES, URINE: ABNORMAL
LACTIC ACID, WHOLE BLOOD: 2.7 MMOL/L (ref 0.7–2.1)
LACTIC ACID, WHOLE BLOOD: 6.5 MMOL/L (ref 0.7–2.1)
LEUKOCYTE ESTERASE, URINE: NEGATIVE
LYMPHOCYTES # BLD: 13 % (ref 24–44)
MAGNESIUM: 1.7 MG/DL (ref 1.6–2.6)
MCH RBC QN AUTO: 31.4 PG (ref 25.2–33.5)
MCHC RBC AUTO-ENTMCNC: 34.6 G/DL (ref 28.4–34.8)
MCV RBC AUTO: 90.8 FL (ref 82.6–102.9)
METHEMOGLOBIN: ABNORMAL % (ref 0–1.5)
METHEMOGLOBIN: ABNORMAL % (ref 0–1.5)
MODE: ABNORMAL
MODE: ABNORMAL
MONOCYTES # BLD: 4 % (ref 1–7)
MORPHOLOGY: NORMAL
MUCUS: ABNORMAL
NEGATIVE BASE EXCESS, VEN: 11.4 MMOL/L (ref 0–2)
NEGATIVE BASE EXCESS, VEN: 13.6 MMOL/L (ref 0–2)
NITRITE, URINE: NEGATIVE
NOTIFICATION TIME: ABNORMAL
NOTIFICATION TIME: ABNORMAL
NOTIFICATION: ABNORMAL
NOTIFICATION: ABNORMAL
NRBC AUTOMATED: 0 PER 100 WBC
O2 DEVICE/FLOW/%: ABNORMAL
O2 DEVICE/FLOW/%: ABNORMAL
O2 SAT, VEN: 86.6 % (ref 60–85)
O2 SAT, VEN: 92.8 % (ref 60–85)
OTHER OBSERVATIONS UA: ABNORMAL
OXYHEMOGLOBIN: ABNORMAL % (ref 95–98)
OXYHEMOGLOBIN: ABNORMAL % (ref 95–98)
PATIENT TEMP: 37
PATIENT TEMP: 37
PCO2, VEN, TEMP ADJ: ABNORMAL MMHG (ref 39–55)
PCO2, VEN, TEMP ADJ: ABNORMAL MMHG (ref 39–55)
PCO2, VEN: 25.8 (ref 39–55)
PCO2, VEN: 30.1 (ref 39–55)
PDW BLD-RTO: 12.3 % (ref 11.8–14.4)
PEEP/CPAP: ABNORMAL
PEEP/CPAP: ABNORMAL
PH UA: 5.5 (ref 5–8)
PH VENOUS: 7.28 (ref 7.32–7.42)
PH VENOUS: 7.28 (ref 7.32–7.42)
PH, VEN, TEMP ADJ: ABNORMAL (ref 7.32–7.42)
PH, VEN, TEMP ADJ: ABNORMAL (ref 7.32–7.42)
PHOSPHORUS: 3.2 MG/DL (ref 2.6–4.5)
PLATELET # BLD: ABNORMAL K/UL (ref 138–453)
PLATELET ESTIMATE: ABNORMAL
PLATELET, FLUORESCENCE: NORMAL K/UL (ref 138–453)
PMV BLD AUTO: ABNORMAL FL (ref 8.1–13.5)
PO2, VEN, TEMP ADJ: ABNORMAL MMHG (ref 30–50)
PO2, VEN, TEMP ADJ: ABNORMAL MMHG (ref 30–50)
PO2, VEN: 59.9 (ref 30–50)
PO2, VEN: 74.3 (ref 30–50)
POSITIVE BASE EXCESS, VEN: ABNORMAL MMOL/L (ref 0–2)
POSITIVE BASE EXCESS, VEN: ABNORMAL MMOL/L (ref 0–2)
POTASSIUM SERPL-SCNC: 4.2 MMOL/L (ref 3.7–5.3)
POTASSIUM SERPL-SCNC: 4.3 MMOL/L (ref 3.7–5.3)
PROCALCITONIN: 0.08 NG/ML
PROTEIN UA: NEGATIVE
PSV: ABNORMAL
PSV: ABNORMAL
PT. POSITION: ABNORMAL
PT. POSITION: ABNORMAL
RBC # BLD: 4.55 M/UL (ref 3.95–5.11)
RBC # BLD: ABNORMAL 10*6/UL
RBC UA: ABNORMAL /HPF (ref 0–4)
RENAL EPITHELIAL, UA: ABNORMAL /HPF
RESPIRATORY RATE: ABNORMAL
RESPIRATORY RATE: ABNORMAL
SAMPLE SITE: ABNORMAL
SAMPLE SITE: ABNORMAL
SARS-COV-2, RAPID: NOT DETECTED
SEG NEUTROPHILS: 83 % (ref 36–66)
SEGMENTED NEUTROPHILS ABSOLUTE COUNT: 20.5 K/UL (ref 1.8–7.7)
SET RATE: ABNORMAL
SET RATE: ABNORMAL
SODIUM BLD-SCNC: 130 MMOL/L (ref 135–144)
SODIUM BLD-SCNC: 132 MMOL/L (ref 135–144)
SPECIFIC GRAVITY UA: 1.03 (ref 1–1.03)
SPECIMEN DESCRIPTION: NORMAL
TEXT FOR RESPIRATORY: ABNORMAL
TEXT FOR RESPIRATORY: ABNORMAL
TOTAL HB: ABNORMAL G/DL (ref 12–16)
TOTAL HB: ABNORMAL G/DL (ref 12–16)
TOTAL RATE: ABNORMAL
TOTAL RATE: ABNORMAL
TRICHOMONAS: ABNORMAL
TROPONIN INTERP: NORMAL
TROPONIN INTERP: NORMAL
TROPONIN T: NORMAL NG/ML
TROPONIN T: NORMAL NG/ML
TROPONIN, HIGH SENSITIVITY: <6 NG/L (ref 0–14)
TROPONIN, HIGH SENSITIVITY: <6 NG/L (ref 0–14)
TURBIDITY: CLEAR
URINE HGB: NEGATIVE
UROBILINOGEN, URINE: NORMAL
VT: ABNORMAL
VT: ABNORMAL
WBC # BLD: 24.7 K/UL (ref 3.5–11.3)
WBC # BLD: ABNORMAL 10*3/UL
WBC UA: ABNORMAL /HPF (ref 0–5)
YEAST: ABNORMAL

## 2021-09-14 PROCEDURE — 36415 COLL VENOUS BLD VENIPUNCTURE: CPT

## 2021-09-14 PROCEDURE — 84100 ASSAY OF PHOSPHORUS: CPT

## 2021-09-14 PROCEDURE — 82805 BLOOD GASES W/O2 SATURATION: CPT

## 2021-09-14 PROCEDURE — 94640 AIRWAY INHALATION TREATMENT: CPT

## 2021-09-14 PROCEDURE — 84145 PROCALCITONIN (PCT): CPT

## 2021-09-14 PROCEDURE — 83735 ASSAY OF MAGNESIUM: CPT

## 2021-09-14 PROCEDURE — 6360000002 HC RX W HCPCS: Performed by: STUDENT IN AN ORGANIZED HEALTH CARE EDUCATION/TRAINING PROGRAM

## 2021-09-14 PROCEDURE — 2060000000 HC ICU INTERMEDIATE R&B

## 2021-09-14 PROCEDURE — 2580000003 HC RX 258: Performed by: STUDENT IN AN ORGANIZED HEALTH CARE EDUCATION/TRAINING PROGRAM

## 2021-09-14 PROCEDURE — 85025 COMPLETE CBC W/AUTO DIFF WBC: CPT

## 2021-09-14 PROCEDURE — 87086 URINE CULTURE/COLONY COUNT: CPT

## 2021-09-14 PROCEDURE — 80048 BASIC METABOLIC PNL TOTAL CA: CPT

## 2021-09-14 PROCEDURE — 84703 CHORIONIC GONADOTROPIN ASSAY: CPT

## 2021-09-14 PROCEDURE — 2580000003 HC RX 258

## 2021-09-14 PROCEDURE — 99223 1ST HOSP IP/OBS HIGH 75: CPT | Performed by: INTERNAL MEDICINE

## 2021-09-14 PROCEDURE — 82010 KETONE BODYS QUAN: CPT

## 2021-09-14 PROCEDURE — 84484 ASSAY OF TROPONIN QUANT: CPT

## 2021-09-14 PROCEDURE — 6370000000 HC RX 637 (ALT 250 FOR IP): Performed by: STUDENT IN AN ORGANIZED HEALTH CARE EDUCATION/TRAINING PROGRAM

## 2021-09-14 PROCEDURE — 85379 FIBRIN DEGRADATION QUANT: CPT

## 2021-09-14 PROCEDURE — 96365 THER/PROPH/DIAG IV INF INIT: CPT

## 2021-09-14 PROCEDURE — 93005 ELECTROCARDIOGRAM TRACING: CPT | Performed by: STUDENT IN AN ORGANIZED HEALTH CARE EDUCATION/TRAINING PROGRAM

## 2021-09-14 PROCEDURE — 96375 TX/PRO/DX INJ NEW DRUG ADDON: CPT

## 2021-09-14 PROCEDURE — 71046 X-RAY EXAM CHEST 2 VIEWS: CPT

## 2021-09-14 PROCEDURE — 82947 ASSAY GLUCOSE BLOOD QUANT: CPT

## 2021-09-14 PROCEDURE — 99285 EMERGENCY DEPT VISIT HI MDM: CPT

## 2021-09-14 PROCEDURE — 87635 SARS-COV-2 COVID-19 AMP PRB: CPT

## 2021-09-14 PROCEDURE — 94664 DEMO&/EVAL PT USE INHALER: CPT

## 2021-09-14 PROCEDURE — 83036 HEMOGLOBIN GLYCOSYLATED A1C: CPT

## 2021-09-14 PROCEDURE — 83605 ASSAY OF LACTIC ACID: CPT

## 2021-09-14 PROCEDURE — 87040 BLOOD CULTURE FOR BACTERIA: CPT

## 2021-09-14 PROCEDURE — 81001 URINALYSIS AUTO W/SCOPE: CPT

## 2021-09-14 PROCEDURE — 85055 RETICULATED PLATELET ASSAY: CPT

## 2021-09-14 RX ORDER — NICOTINE POLACRILEX 4 MG
15 LOZENGE BUCCAL PRN
Status: DISCONTINUED | OUTPATIENT
Start: 2021-09-14 | End: 2021-09-14

## 2021-09-14 RX ORDER — LORAZEPAM 2 MG/ML
0.5 INJECTION INTRAMUSCULAR ONCE
Status: COMPLETED | OUTPATIENT
Start: 2021-09-14 | End: 2021-09-14

## 2021-09-14 RX ORDER — ALBUTEROL SULFATE 90 UG/1
2 AEROSOL, METERED RESPIRATORY (INHALATION) EVERY 6 HOURS PRN
Status: DISCONTINUED | OUTPATIENT
Start: 2021-09-14 | End: 2021-09-16 | Stop reason: HOSPADM

## 2021-09-14 RX ORDER — DEXTROSE MONOHYDRATE 50 MG/ML
100 INJECTION, SOLUTION INTRAVENOUS PRN
Status: DISCONTINUED | OUTPATIENT
Start: 2021-09-14 | End: 2021-09-14

## 2021-09-14 RX ORDER — SODIUM CHLORIDE 0.9 % (FLUSH) 0.9 %
5-40 SYRINGE (ML) INJECTION EVERY 12 HOURS SCHEDULED
Status: DISCONTINUED | OUTPATIENT
Start: 2021-09-14 | End: 2021-09-14

## 2021-09-14 RX ORDER — ONDANSETRON 2 MG/ML
4 INJECTION INTRAMUSCULAR; INTRAVENOUS EVERY 6 HOURS PRN
Status: DISCONTINUED | OUTPATIENT
Start: 2021-09-14 | End: 2021-09-16 | Stop reason: HOSPADM

## 2021-09-14 RX ORDER — SODIUM CHLORIDE 9 MG/ML
INJECTION, SOLUTION INTRAVENOUS CONTINUOUS
Status: DISCONTINUED | OUTPATIENT
Start: 2021-09-14 | End: 2021-09-15

## 2021-09-14 RX ORDER — POLYETHYLENE GLYCOL 3350 17 G/17G
17 POWDER, FOR SOLUTION ORAL DAILY PRN
Status: DISCONTINUED | OUTPATIENT
Start: 2021-09-14 | End: 2021-09-16 | Stop reason: HOSPADM

## 2021-09-14 RX ORDER — 0.9 % SODIUM CHLORIDE 0.9 %
1000 INTRAVENOUS SOLUTION INTRAVENOUS ONCE
Status: COMPLETED | OUTPATIENT
Start: 2021-09-14 | End: 2021-09-14

## 2021-09-14 RX ORDER — SODIUM CHLORIDE 9 MG/ML
INJECTION, SOLUTION INTRAVENOUS CONTINUOUS
Status: DISCONTINUED | OUTPATIENT
Start: 2021-09-14 | End: 2021-09-14

## 2021-09-14 RX ORDER — SODIUM CHLORIDE 0.9 % (FLUSH) 0.9 %
5-40 SYRINGE (ML) INJECTION PRN
Status: DISCONTINUED | OUTPATIENT
Start: 2021-09-14 | End: 2021-09-16 | Stop reason: HOSPADM

## 2021-09-14 RX ORDER — DEXTROSE, SODIUM CHLORIDE, AND POTASSIUM CHLORIDE 5; .45; .15 G/100ML; G/100ML; G/100ML
INJECTION INTRAVENOUS CONTINUOUS PRN
Status: DISCONTINUED | OUTPATIENT
Start: 2021-09-14 | End: 2021-09-15

## 2021-09-14 RX ORDER — SODIUM CHLORIDE 9 MG/ML
25 INJECTION, SOLUTION INTRAVENOUS PRN
Status: DISCONTINUED | OUTPATIENT
Start: 2021-09-14 | End: 2021-09-16 | Stop reason: HOSPADM

## 2021-09-14 RX ORDER — ACETAMINOPHEN 650 MG/1
650 SUPPOSITORY RECTAL EVERY 6 HOURS PRN
Status: DISCONTINUED | OUTPATIENT
Start: 2021-09-14 | End: 2021-09-16 | Stop reason: HOSPADM

## 2021-09-14 RX ORDER — METHYLPREDNISOLONE SODIUM SUCCINATE 125 MG/2ML
40 INJECTION, POWDER, LYOPHILIZED, FOR SOLUTION INTRAMUSCULAR; INTRAVENOUS EVERY 8 HOURS
Status: DISCONTINUED | OUTPATIENT
Start: 2021-09-15 | End: 2021-09-15

## 2021-09-14 RX ORDER — DEXTROSE AND SODIUM CHLORIDE 5; .45 G/100ML; G/100ML
INJECTION, SOLUTION INTRAVENOUS
Status: COMPLETED
Start: 2021-09-14 | End: 2021-09-14

## 2021-09-14 RX ORDER — MAGNESIUM SULFATE 1 G/100ML
1000 INJECTION INTRAVENOUS PRN
Status: DISCONTINUED | OUTPATIENT
Start: 2021-09-14 | End: 2021-09-16 | Stop reason: HOSPADM

## 2021-09-14 RX ORDER — POTASSIUM CHLORIDE 7.45 MG/ML
10 INJECTION INTRAVENOUS PRN
Status: DISCONTINUED | OUTPATIENT
Start: 2021-09-14 | End: 2021-09-16 | Stop reason: HOSPADM

## 2021-09-14 RX ORDER — ONDANSETRON 4 MG/1
4 TABLET, ORALLY DISINTEGRATING ORAL EVERY 8 HOURS PRN
Status: DISCONTINUED | OUTPATIENT
Start: 2021-09-14 | End: 2021-09-16 | Stop reason: HOSPADM

## 2021-09-14 RX ORDER — DEXTROSE AND SODIUM CHLORIDE 5; .45 G/100ML; G/100ML
INJECTION, SOLUTION INTRAVENOUS CONTINUOUS
Status: DISCONTINUED | OUTPATIENT
Start: 2021-09-14 | End: 2021-09-15

## 2021-09-14 RX ORDER — ALBUTEROL SULFATE 2.5 MG/3ML
2.5 SOLUTION RESPIRATORY (INHALATION)
Status: DISCONTINUED | OUTPATIENT
Start: 2021-09-14 | End: 2021-09-14

## 2021-09-14 RX ORDER — DEXTROSE MONOHYDRATE 25 G/50ML
12.5 INJECTION, SOLUTION INTRAVENOUS PRN
Status: DISCONTINUED | OUTPATIENT
Start: 2021-09-14 | End: 2021-09-16 | Stop reason: HOSPADM

## 2021-09-14 RX ORDER — ALBUTEROL SULFATE 2.5 MG/3ML
15 SOLUTION RESPIRATORY (INHALATION)
Status: DISCONTINUED | OUTPATIENT
Start: 2021-09-14 | End: 2021-09-14

## 2021-09-14 RX ORDER — POTASSIUM CHLORIDE 7.45 MG/ML
10 INJECTION INTRAVENOUS ONCE
Status: COMPLETED | OUTPATIENT
Start: 2021-09-14 | End: 2021-09-14

## 2021-09-14 RX ORDER — ACETAMINOPHEN 325 MG/1
650 TABLET ORAL EVERY 6 HOURS PRN
Status: DISCONTINUED | OUTPATIENT
Start: 2021-09-14 | End: 2021-09-16 | Stop reason: HOSPADM

## 2021-09-14 RX ORDER — BUDESONIDE AND FORMOTEROL FUMARATE DIHYDRATE 160; 4.5 UG/1; UG/1
2 AEROSOL RESPIRATORY (INHALATION) 2 TIMES DAILY
Status: DISCONTINUED | OUTPATIENT
Start: 2021-09-14 | End: 2021-09-16 | Stop reason: HOSPADM

## 2021-09-14 RX ORDER — MAGNESIUM SULFATE 1 G/100ML
1000 INJECTION INTRAVENOUS ONCE
Status: DISCONTINUED | OUTPATIENT
Start: 2021-09-14 | End: 2021-09-16 | Stop reason: HOSPADM

## 2021-09-14 RX ORDER — DEXTROSE MONOHYDRATE 25 G/50ML
12.5 INJECTION, SOLUTION INTRAVENOUS PRN
Status: DISCONTINUED | OUTPATIENT
Start: 2021-09-14 | End: 2021-09-14

## 2021-09-14 RX ADMIN — SODIUM CHLORIDE 11.39 UNITS/HR: 9 INJECTION, SOLUTION INTRAVENOUS at 17:49

## 2021-09-14 RX ADMIN — IPRATROPIUM BROMIDE 0.5 MG: 0.5 SOLUTION RESPIRATORY (INHALATION) at 11:14

## 2021-09-14 RX ADMIN — INSULIN HUMAN 6 UNITS: 100 INJECTION, SOLUTION PARENTERAL at 14:01

## 2021-09-14 RX ADMIN — ACETAMINOPHEN 650 MG: 325 TABLET ORAL at 20:52

## 2021-09-14 RX ADMIN — ALBUTEROL SULFATE 15 MG: 2.5 SOLUTION RESPIRATORY (INHALATION) at 11:14

## 2021-09-14 RX ADMIN — POTASSIUM CHLORIDE 10 MEQ: 7.46 INJECTION, SOLUTION INTRAVENOUS at 14:42

## 2021-09-14 RX ADMIN — INSULIN LISPRO 10 UNITS: 100 INJECTION, SOLUTION INTRAVENOUS; SUBCUTANEOUS at 15:56

## 2021-09-14 RX ADMIN — SODIUM CHLORIDE: 9 INJECTION, SOLUTION INTRAVENOUS at 17:49

## 2021-09-14 RX ADMIN — BUDESONIDE AND FORMOTEROL FUMARATE DIHYDRATE 2 PUFF: 160; 4.5 AEROSOL RESPIRATORY (INHALATION) at 19:55

## 2021-09-14 RX ADMIN — SODIUM CHLORIDE 1000 ML: 9 INJECTION, SOLUTION INTRAVENOUS at 12:45

## 2021-09-14 RX ADMIN — POTASSIUM CHLORIDE 10 MEQ: 7.46 INJECTION, SOLUTION INTRAVENOUS at 23:09

## 2021-09-14 RX ADMIN — SODIUM CHLORIDE 1000 ML: 9 INJECTION, SOLUTION INTRAVENOUS at 15:57

## 2021-09-14 RX ADMIN — LORAZEPAM 0.5 MG: 2 INJECTION INTRAMUSCULAR; INTRAVENOUS at 11:18

## 2021-09-14 RX ADMIN — DEXTROSE AND SODIUM CHLORIDE: 5; 450 INJECTION, SOLUTION INTRAVENOUS at 22:30

## 2021-09-14 ASSESSMENT — ENCOUNTER SYMPTOMS
NAUSEA: 0
CONSTIPATION: 0
SORE THROAT: 0
VOMITING: 0
COLOR CHANGE: 0
SINUS PRESSURE: 0
SHORTNESS OF BREATH: 1
CHEST TIGHTNESS: 1
DIARRHEA: 0
ABDOMINAL PAIN: 0
EYE PAIN: 0
PHOTOPHOBIA: 0
EYE REDNESS: 0
COUGH: 0
RHINORRHEA: 0

## 2021-09-14 ASSESSMENT — PAIN DESCRIPTION - LOCATION
LOCATION: CHEST
LOCATION: ABDOMEN

## 2021-09-14 ASSESSMENT — PAIN DESCRIPTION - DIRECTION: RADIATING_TOWARDS: BACK

## 2021-09-14 ASSESSMENT — PAIN SCALES - GENERAL
PAINLEVEL_OUTOF10: 10
PAINLEVEL_OUTOF10: 10
PAINLEVEL_OUTOF10: 7

## 2021-09-14 ASSESSMENT — PAIN DESCRIPTION - PAIN TYPE
TYPE: ACUTE PAIN
TYPE: ACUTE PAIN

## 2021-09-14 ASSESSMENT — PAIN DESCRIPTION - ORIENTATION: ORIENTATION: LEFT

## 2021-09-14 NOTE — H&P
[facility]     Department of Internal Medicine - Staff Internal Medicine Teaching Service          ADMISSION NOTE/HISTORY AND PHYSICAL EXAMINATION   Date: 9/14/2021  Patient Name: Sav Pierre  Date of admission: 9/14/2021 10:56 AM  YOB: 1995  PCP: Lia Hung PA-C  History Obtained From:  patient    CHIEF COMPLAINT     Chief complaint: Shortness of breath and Chest pain    HISTORY OF PRESENTING ILLNESS     The patient is a pleasant 27 y.o. female presents with a chief complaint of shortness of breath and chest pain. The patient has PMH significant of asthma, type II DM. The patient reported feeling shortness of breath and wheezing that started yesterday which is typical for her \" asthma attack\". She reported that her last asthma exacerbation was about a year ago. Patient reported using inhalers that did not relieve her shortness of breath. She went to an urgent care yesterday and was prescribed prednisone 50 mg oral that did not relieve her symptoms. She reported chest pain associated with dry cough. Chest pain is sharp in nature with intensity 8/10, that worsens with deep breaths and cough. The patient also reported 3 episodes of watery diarrhea and nausea. She reported being compliant to her diabetes medication including insulin dosage. She denies fevers, chills, dysuria, hematuria, vomiting, headache. In the ED, the patient was afebrile Temp 98.3, RR 23, , /89. SpO2 100% on room air. Physical exam showed tachypnea and mild inspiratory wheezing, able to talk in full sentences. CXR was negative for any acute processes. ECG showed sinus tachycardia. Labs were significant for Na 130, K 4.3, bicarb 10, Cr 0.61, Anion gap 21, Mag 1.7, glucose 366, beta-hydroxy butyrate 0.16, Procal 0.08, Trops negative, pregnancy test negative, WBC 24.7, Hgb 14.3, D-dimer negative, covid negative, lactic acid 6.5, UA negative for UTI.  Blood gas pH 7.284/ pCO2 30.1/pO2 74.3/HCO3 13.8. The patient was found to be in DKA and lactic acidosis possibly secondary to metformin use. Urine tox, urine culture and blood culture ordered, will follow up. Review of Systems:  Review of Systems   Constitutional: Negative for chills, diaphoresis and fever. HENT: Negative for facial swelling, rhinorrhea and sore throat. Eyes: Negative for photophobia, redness and visual disturbance. Respiratory: Positive for cough, chest tightness and shortness of breath. Cardiovascular: Positive for chest pain. Negative for palpitations and leg swelling. Gastrointestinal: Positive for diarrhea and nausea. Negative for abdominal pain, blood in stool and vomiting. Endocrine: Negative for polydipsia, polyphagia and polyuria. Genitourinary: Negative for dysuria, frequency, hematuria and urgency. Musculoskeletal: Negative for back pain, myalgias and neck pain. Neurological: Negative for syncope, numbness and headaches. Psychiatric/Behavioral: Negative for agitation, behavioral problems and confusion.          PAST MEDICAL HISTORY     Past Medical History:   Diagnosis Date    Asthma     uses inhaler    Diabetes mellitus type 2 in obese ([address]     Hepatic steatosis     Hypertension     started when approx 6 mos pregnant with first pregnancy    Morbid obesity with body mass index (BMI) of 40.0 to 49.9 (HCC)     Neuropathy     Postpartum depression        PAST SURGICAL HISTORY     Past Surgical History:   Procedure Laterality Date    BREAST SURGERY Left 2019    BREAST INCISION AND DRAINAGE performed by Lottie Allen MD at [address] [place] [address] Right 2019    BREAST INCISION AND DRAINAGE performed by Lottie Allen MD at [address] N/A 3/23/2017     SECTION REPEAT  performed by Jason Landry MD at [facility] L&D OR    COLONOSCOPY N/A 2020    COLONOSCOPY POLYPECTOMY HOT BIOPSY performed by Ronda Jeffers MD at [address] [facility] RECTAL SURGERY N/A 2/3/2020    I & D PERIANAL ABSCESS performed by Luke Walker MD at [address] N/A 11/27/2018    EGD BIOPSY performed by Kaden Bob MD at [facility] N/A 9/16/2020    EGD BIOPSY performed by Kaden Bob MD at [facility]     Prior to Admission medications    Medication Sig Start Date End Date Taking? Authorizing Provider   LEVEMIR FLEXTOUCH 100 UNIT/ML injection pen inject 55 units subcutaneously nightly 9/13/21  Yes MARCELO Caceres CNP   valACYclovir (VALTREX) 500 MG tablet take 1 tablet by mouth twice a day for 7 days 8/23/21  Yes MARCELO Pratt CNP   metFORMIN (GLUCOPHAGE) 1000 MG tablet take 1 tablet by mouth twice a day with meals 8/2/21  Yes Joseph Bernal PA-C   omeprazole (PRILOSEC) 20 MG delayed release capsule Take 1 capsule by mouth every morning (before breakfast) 7/12/21 11/9/21 Yes MARCELO Diaz NP   insulin aspart (NOVOLOG FLEXPEN) 100 UNIT/ML injection pen Inject 4 Units into the skin 3 times daily (before meals) 4 units before breakfast, 4 units before lunch, 6 units before dinner and 6 units before bedtime 5/26/21  Yes Joseph Bernal PA-C   lisinopril (PRINIVIL;ZESTRIL) 5 MG tablet take 1 tablet by mouth once daily 5/21/21  Yes Joseph Bernal PA-C   clotrimazole-betamethasone (LOTRISONE) 1-0.05 % cream Apply topically 2 times daily.  1/27/21  Yes Willard Al MD   QUEtiapine (SEROQUEL) 50 MG tablet take 1 tablet by mouth at bedtime 12/8/20  Yes MARCELO Hood NP   acetaminophen (TYLENOL) 500 MG tablet Take 2 tablets by mouth 3 times daily 10/28/20  Yes Juan J Workman MD   albuterol sulfate HFA (PROVENTIL HFA) 108 (90 Base) MCG/ACT inhaler Inhale 2 puffs into the lungs every 6 hours as needed for Wheezing 7/23/19  Yes Azael Crain PA-C   UNIFINE PENTIPS 29G X 12MM MISC USE Five times a DAILY WITH BASAGLAR 7/27/21   Manuela Reis PA-C   Semaglutide,0.25 or 0.5MG/DOS, (OZEMPIC, 0.25 OR 0.5 MG/DOSE,) 2 MG/1.5ML SOPN 0.25 mg 1 weekly x 4 weeks then increase 0.5 mg once weekly x 4 weeks then 1 mg once weekly  Patient taking differently: 0.25 mg 1 weekly x 4 weeks then increase 0.5 mg once weekly x 4 weeks then 1 mg once weekly  Pt not taking 5/21/21   Manuela Reis PA-C   Insulin Pen Needle (PEN NEEDLES) 31G X 6 MM MISC 1 each by Does not apply route daily 5/21/21   Manuela Reis PA-C   TRUEplus Lancets 30G MISC TEST 2 TIMES A DAY AND AS NEEED FOR SYMPTOMS OF IRREGULAR BLOOD GLUCOSE 4/21/21   Manuela Reis PA-C   sucralfate (CARAFATE) 1 GM tablet take 1 tablet by mouth four times a day  Patient not taking: Reported on 9/14/2021 3/12/21   Manuela Reis PA-C   PREMPRO 0.625-2.5 MG per tablet take 1 tablet by mouth once daily  Patient not taking: Reported on 9/14/2021 4/20/20   MARCELO Stevenson - CNP   NARCAN 4 MG/0.1ML LIQD nasal spray ADMINISTER A SINGLE spray INTO ONE NOSTRIL CALL 911 MAY REPEAT ONCE 7/19/19   Historical Provider, MD   etonogestrel (NEXPLANON) 68 MG implant 68 mg by Subdermal route once for 1 dose  Patient not taking: Reported on 9/14/2021 5/23/17 6/24/20  Oliver Taylor MD       SOCIAL HISTORY     Tobacco: Current smoker- 1 pack per day  Alcohol: Denies use  Illicits: Denies use  Occupation: N/A    FAMILY HISTORY     Family History   Problem Relation Age of Onset    Breast Cancer Mother 28    Diabetes Father     Cancer Maternal Uncle 60        acute leukemia    Heart Disease Maternal Uncle     Diabetes Maternal Uncle     Colon Cancer Maternal Uncle     Diabetes Maternal Uncle     Heart Attack Maternal Uncle     Uterine Cancer Neg Hx     Ovarian Cancer Neg Hx        PHYSICAL EXAM     Vitals: BP (!) 156/89   Pulse 103   Temp 98.3 °F (36.8 °C) (Oral)   Resp 21   Ht 5' 7\" (1.702 m)   Wt 251 lb (113.9 kg)   SpO2 99%   BMI 39.31 kg/m²   Tmax: Temp (24hrs), Av.3 °F (36.8 °C), Min:98.3 °F (36.8 °C), Max:98.3 °F (36.8 °C)    Last Body weight:   Wt Readings from Last 3 Encounters:   21 251 lb (113.9 kg)   21 251 lb (113.9 kg)   21 249 lb (112.9 kg)     Body Mass Index : Body mass index is 39.31 kg/m². PHYSICAL EXAMINATION:  Constitutional: This is a well developed, well nourished, 35-39.9 - Obesity Grade II 27y.o. year old female who is alert, oriented, cooperative and in no apparent distress. Head:normocephalic and atraumatic. EENT:  PERRLA. No conjunctival injections. Septum was midline, mucosa was without erythema, exudates or cobblestoning. No thrush was noted. Neck: Supple without thyromegaly. No elevated JVP. Trachea was midline. Respiratory: Chest was symmetrical without dullness to percussion. Breath sounds bilaterally were clear to auscultation. There were no wheezes, rhonchi or rales. There is no intercostal retraction or use of accessory muscles. No egophony noted. Cardiovascular: Regular without murmur, clicks, gallops or rubs. Abdomen: Slightly rounded and soft without organomegaly. No rebound, rigidity or guarding was appreciated. Lymphatic: No lymphadenopathy. Musculoskeletal: Normal curvature of the spine. No gross muscle weakness. Extremities:  No lower extremity edema, ulcerations, tenderness, varicosities or erythema. Muscle size, tone and strength are normal.  No involuntary movements are noted. Skin:  Warm and dry. Good color, turgor and pigmentation. No lesions or scars.   No cyanosis or clubbing  Neurological/Psychiatric: The patient's general behavior, level of consciousness, thought content and emotional status is normal.          INVESTIGATIONS     Laboratory Testing:     Recent Results (from the past 24 hour(s))   D-DIMER, QUANTITATIVE    Collection Time: 21 11:23 AM   Result Value Ref Range D-Dimer, Quant <0.17 mg/L FEU   Basic Metabolic Panel w/ Reflex to MG    Collection Time: 09/14/21 11:31 AM   Result Value Ref Range    Glucose 453 (HH) 70 - 99 mg/dL    BUN 15 6 - 20 mg/dL    CREATININE 0.61 0.50 - 0.90 mg/dL    Bun/Cre Ratio NOT REPORTED 9 - 20    Calcium 10.4 8.6 - 10.4 mg/dL    Sodium 130 (L) 135 - 144 mmol/L    Potassium 4.3 3.7 - 5.3 mmol/L    Chloride 99 98 - 107 mmol/L    CO2 10 (L) 20 - 31 mmol/L    Anion Gap 21 (H) 9 - 17 mmol/L    GFR Non-African American >60 >60 mL/min    GFR African American >60 >60 mL/min    GFR Comment          GFR Staging NOT REPORTED    Troponin    Collection Time: 09/14/21 11:31 AM   Result Value Ref Range    Troponin, High Sensitivity <6 0 - 14 ng/L    Troponin T NOT REPORTED <0.03 ng/mL    Troponin Interp NOT REPORTED    HCG Qualitative, Serum    Collection Time: 09/14/21 11:31 AM   Result Value Ref Range    hCG Qual NEGATIVE NEGATIVE   Procalcitonin    Collection Time: 09/14/21 11:31 AM   Result Value Ref Range    Procalcitonin 0.08 <0.09 ng/mL   CBC Auto Differential    Collection Time: 09/14/21 11:36 AM   Result Value Ref Range    WBC 24.7 (H) 3.5 - 11.3 k/uL    RBC 4.55 3.95 - 5.11 m/uL    Hemoglobin 14.3 11.9 - 15.1 g/dL    Hematocrit 41.3 36.3 - 47.1 %    MCV 90.8 82.6 - 102.9 fL    MCH 31.4 25.2 - 33.5 pg    MCHC 34.6 28.4 - 34.8 g/dL    RDW 12.3 11.8 - 14.4 %    Platelets See Reflexed IPF Result 138 - 453 k/uL    MPV NOT REPORTED 8.1 - 13.5 fL    NRBC Automated 0.0 0.0 per 100 WBC    Differential Type NOT REPORTED     WBC Morphology NOT REPORTED     RBC Morphology NOT REPORTED     Platelet Estimate NOT REPORTED     Immature Granulocytes 0 0 %    Seg Neutrophils 83 (H) 36 - 66 %    Lymphocytes 13 (L) 24 - 44 %    Monocytes 4 1 - 7 %    Eosinophils % 0 (L) 1 - 4 %    Basophils 0 0 - 2 %    Absolute Immature Granulocyte 0.00 0.00 - 0.30 k/uL    Segs Absolute 20.50 (H) 1.8 - 7.7 k/uL    Absolute Lymph # 3.21 1.0 - 4.8 k/uL    Absolute Mono # 0.99 (H) 0.1 - 0.8 k/uL    Absolute Eos # 0.00 0.0 - 0.4 k/uL    Basophils Absolute 0.00 0.0 - 0.2 k/uL    Morphology Normal    Immature Platelet Fraction    Collection Time: 09/14/21 11:36 AM   Result Value Ref Range    Platelet, Fluorescence Platelet clumps present, count appears adequate. 138 - 453 k/uL   Urinalysis with Microscopic    Collection Time: 09/14/21  1:11 PM   Result Value Ref Range    Color, UA YELLOW YELLOW    Turbidity UA CLEAR CLEAR    Glucose, Ur 3+ (A) NEGATIVE    Bilirubin Urine NEGATIVE NEGATIVE    Ketones, Urine SMALL (A) NEGATIVE    Specific Gravity, UA 1.030 1.005 - 1.030    Urine Hgb NEGATIVE NEGATIVE    pH, UA 5.5 5.0 - 8.0    Protein, UA NEGATIVE NEGATIVE    Urobilinogen, Urine Normal Normal    Nitrite, Urine NEGATIVE NEGATIVE    Leukocyte Esterase, Urine NEGATIVE NEGATIVE    -          WBC, UA None 0 - 5 /HPF    RBC, UA 2 TO 5 0 - 4 /HPF    Casts UA NOT REPORTED 0 - 8 /LPF    Crystals, UA NOT REPORTED None /HPF    Epithelial Cells UA None 0 - 5 /HPF    Renal Epithelial, UA NOT REPORTED 0 /HPF    Bacteria, UA NOT REPORTED None    Mucus, UA NOT REPORTED None    Trichomonas, UA NOT REPORTED None    Amorphous, UA NOT REPORTED None    Other Observations UA NOT REPORTED NOT REQ.     Yeast, UA NOT REPORTED None   Blood Gas, Venous    Collection Time: 09/14/21  1:25 PM   Result Value Ref Range    pH, Nico 7.276 (L) 7.320 - 7.420    pCO2, Nico 25.8 (L) 39 - 55    pO2, Nico 59.9 (H) 30 - 50    HCO3, Venous 11.6 (L) 24 - 30 mmol/L    Positive Base Excess, Nico NOT REPORTED 0.0 - 2.0 mmol/L    Negative Base Excess, Nico 13.6 (H) 0.0 - 2.0 mmol/L    O2 Sat, Nico 86.6 (H) 60.0 - 85.0 %    Total Hb NOT REPORTED 12.0 - 16.0 g/dl    Oxyhemoglobin NOT REPORTED 95.0 - 98.0 %    Carboxyhemoglobin 0.7 0 - 5 %    Methemoglobin NOT REPORTED 0.0 - 1.5 %    Pt Temp 37.0     pH, Nico, Temp Adj NOT REPORTED 7.320 - 7.420    pCO2, Nico, Temp Adj NOT REPORTED 39 - 55 mmHg    pO2, Nico, Temp Adj NOT REPORTED 30 - 50 mmHg    O2 Device/Flow/% NOT REPORTED     Respiratory Rate NOT REPORTED     Keyur Test NOT REPORTED     Sample Site NOT REPORTED     Pt. Position NOT REPORTED     Mode NOT REPORTED     Set Rate NOT REPORTED     Total Rate NOT REPORTED     VT NOT REPORTED     FIO2 INFORMATION NOT PROVIDED     Peep/Cpap NOT REPORTED     PSV NOT REPORTED     Text for Respiratory NOT REPORTED     NOTIFICATION NOT REPORTED     NOTIFICATION TIME NOT REPORTED    COVID-19, Rapid    Collection Time: 09/14/21  1:41 PM    Specimen: Nasopharyngeal Swab   Result Value Ref Range    Specimen Description . NASOPHARYNGEAL SWAB     SARS-CoV-2, Rapid Not Detected Not Detected   POC Glucose Fingerstick    Collection Time: 09/14/21  2:33 PM   Result Value Ref Range    POC Glucose 366 (H) 65 - 105 mg/dL   POCT glucose    Collection Time: 09/14/21  2:37 PM   Result Value Ref Range    Glucose 366 mg/dL    QC OK? ok    LACTIC ACID, WHOLE BLOOD    Collection Time: 09/14/21  4:20 PM   Result Value Ref Range    Lactic Acid, Whole Blood 6.5 (H) 0.7 - 2.1 mmol/L   POC Glucose Fingerstick    Collection Time: 09/14/21  5:20 PM   Result Value Ref Range    POC Glucose 343 (H) 65 - 105 mg/dL   POCT Glucose    Collection Time: 09/14/21  5:23 PM   Result Value Ref Range    Glucose 343 mg/dL    QC OK? ok    BLOOD GAS, VENOUS    Collection Time: 09/14/21  5:59 PM   Result Value Ref Range    pH, Nico 7.284 (L) 7.320 - 7.420    pCO2, Nico 30.1 (L) 39 - 55    pO2, Nico 74.3 (H) 30 - 50    HCO3, Venous 13.8 (L) 24 - 30 mmol/L    Positive Base Excess, Nico NOT REPORTED 0.0 - 2.0 mmol/L    Negative Base Excess, Nico 11.4 (H) 0.0 - 2.0 mmol/L    O2 Sat, Nico 92.8 (H) 60.0 - 85.0 %    Total Hb NOT REPORTED 12.0 - 16.0 g/dl    Oxyhemoglobin NOT REPORTED 95.0 - 98.0 %    Carboxyhemoglobin 2.0 0 - 5 %    Methemoglobin NOT REPORTED 0.0 - 1.5 %    Pt Temp 37.0     pH, Nico, Temp Adj NOT REPORTED 7.320 - 7.420    pCO2, Nico, Temp Adj NOT REPORTED 39 - 55 mmHg    pO2, Nico, Temp Adj NOT REPORTED 30 - 50 mmHg    O2 Device/Flow/% NOT REPORTED     Respiratory Rate NOT REPORTED     Keyur Test NOT REPORTED     Sample Site NOT REPORTED     Pt. Position NOT REPORTED     Mode NOT REPORTED     Set Rate NOT REPORTED     Total Rate NOT REPORTED     VT NOT REPORTED     FIO2 INFORMATION NOT PROVIDED     Peep/Cpap NOT REPORTED     PSV NOT REPORTED     Text for Respiratory NOT REPORTED     NOTIFICATION NOT REPORTED     NOTIFICATION TIME NOT REPORTED        Imaging:   XR CHEST (2 VW)    Result Date: 9/14/2021  Negative chest.       ASSESSMENT & PLAN     ASSESSMENT / PLAN:     IMPRESSION  This is a 27 y.o. female who presented with shortness of breath and chest pain and found to have DKA. DKA  - Patient takes Levemir 55 units nightly; reported compliance to medicines  - Blood gas pH 7.284/ pCO2 30.1/pO2 74.3/HCO3 13.8  -  glucose 366, beta-hydroxy butyrate 0.16  - Received 2L NS IVF, 16 units insulin, 10 mEq potassium supplement in the ED  - Started on DKA protocol with IVF and insulin gtt  - Keep NPO, monitor blood glucose q1 hour  - Monitor BMP q4 hours  - Follow up blood and urine culture, CXR negative for infection. Asthma Exacerbation  - Continue Symbicort  - Continue Solumedrol 40 mg IV TID  - Continue to monitor respiratory status      Lactic acidosis  - lactic acid 6.5; possibly secondary to metformin  - Metformin on hold  - Continue to monitor lactic acid      Diarrhea- rule out Gastroenteritis  - 3 episodes of watery diarrhea, denies blood in stool  - Molecular GI panel ordered, will follow up  - C.diff ordered, will follow up    DVT ppx: Lovenox  GI ppx: Protonix    PT/OT: On board  Discharge Planning:  consulted, will follow up    Aleksandr Pineda MD  Internal Medicine Resident, PGY-1  [address], [place]  9/14/2021, 7:27 PM

## 2021-09-14 NOTE — ED PROVIDER NOTES
[address] [facility]     Emergency Department     Faculty Note/ Attestation      Pt Name: Shanell Villarreal                                       MRN: [id number]  Kellygfderek 1995  Date of evaluation: 9/14/2021  Patients PCP:    Rosamond Sandhoff, PA-C    Attestation  I performed a history and physical examination of the patient/ or directly observed  and discussed management with the resident. I reviewed the residents note and agree with the documented findings and plan of care. Any areas of disagreement are noted on the chart. I was personally present for the key portions of any procedures. I have documented in the chart those procedures where I was not present during the key portions. I have reviewed the emergency nurses triage note. I agree with the chief complaint, past medical history, past surgical history, allergies, medications, social and family history as documented unless otherwise noted below. For Physician Assistant/ Nurse Practitioner cases/documentation I have personally evaluated this patient and have completed at least one if not all key elements of the E/M (history, physical exam, and MDM). Additional findings are as noted. This patient was evaluated in the Emergency Department for symptoms described in the history of present illness. The patient was evaluated in the context of the global COVID-19 pandemic, which necessitated consideration that the patient might be at risk for infection with the SARS-CoV-2 virus that causes COVID-19. Institutional protocols and algorithms that pertain to the evaluation of patients at risk for COVID-19 are in a state of rapid change based on information released by regulatory bodies including the CDC and federal and state organizations. These policies and algorithms were followed during the patient's care in the ED.      Initial Screens:        Hallam Coma Scale  Eye Opening: Spontaneous  Best Verbal Response: Oriented  Best Motor Response: Obeys commands  Bertrand Coma Scale Score: 15    Vitals:    Vitals:    21 1106 21 1107   BP: (!) 156/89    Pulse: 115    Resp: 23 23   Temp: 98.3 °F (36.8 °C)    TempSrc: Oral    SpO2: 100% 100%   Weight: 251 lb (113.9 kg)    Height: 5' 7\" (1.702 m)        Chief Complaint      Chief Complaint   Patient presents with    Respiratory Distress    Chest Pain          height is 5' 7\" (1.702 m) and weight is 251 lb (113.9 kg). Her oral temperature is 98.3 °F (36.8 °C). Her blood pressure is 156/89 (abnormal) and her pulse is 115. Her respiration is 23 and oxygen saturation is 100%.             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    BMI 40.0-44.9    Generalized abdominal pain    Hepatic steatosis    Type 2 diabetes mellitus with diabetic neuropathy (HCC)    History of gonorrhea    History of chlamydia    Nausea & vomiting    Diarrhea    Bipolar affective disorder, currently depressed, mild (HCC)    Cellulitis of breast    Back pain    Noncompliance    Diabetes ([address]    History of  section x2    History of postpartum depression    Lost custody of children    Poor historian    Hyponatremia    Uncontrolled diabetes mellitus ([address]    Mood disorder (HCC)    Viral hepatitis A without hepatic coma    Abscess    Hyperlipidemia LDL goal <70    Breast abscess    Abscess of buttock    Rectal bleeding    Inflammatory polyp of transverse colon with complication ([address] Comments  Chronic Prob List noted          Marcelino Rosen MD,, MD, F.A.C.E.P.   Attending Emergency Physician         Marcelino Rosen MD  09/14/21 [id number]

## 2021-09-14 NOTE — ED PROVIDER NOTES
[address]  ED  Emergency Department Encounter  Emergency Medicine Resident     Pt Name: Marisa Miller  MRN: [id number]  [place] 1995  Date of evaluation: 21  PCP:  Ricco Roberto PA-C    CHIEF COMPLAINT       Chief Complaint   Patient presents with    Respiratory Distress    Chest Pain       HISTORY OFPRESENT ILLNESS  (Location/Symptom, Timing/Onset, Context/Setting, Quality, Duration, Modifying Allie Sanes.)      Marisa Miller is a 27 y.o. female who presents with chest pain and shortness of breath is been ongoing for 2 days. Patient has a history of asthma, states that 2 days ago she began having midsternal sharp chest pain gradual in onset and associated with shortness of breath. She is used her inhaler multiple times without relief. On exam, patient is anxious appearing, tachypneic, however able to speak in full sentences. Lung sounds are clear with no prolonged expiratory phase, no wheezing, upper airway noise however does not sound like stridor. Chest pain is not reproducible, increases on inspiration. Patient is a smoker, vaccinated against Covid, states that she has been around people who tested positive for Covid. No lower leg swelling or pain. PAST MEDICAL / SURGICAL / SOCIAL / FAMILY HISTORY      has a past medical history of Asthma, Diabetes mellitus type 2 in obese ([address] Hepatic steatosis, Hypertension, Morbid obesity with body mass index (BMI) of 40.0 to 49.9 ([address] Neuropathy, and Postpartum depression. has a past surgical history that includes Tonsillectomy (Bilateral);  section; eye surgery;  section (N/A, 3/23/2017); Upper gastrointestinal endoscopy (N/A, 2018); Breast surgery (Left, 2019); Breast surgery (Right, 2019); Rectal surgery (N/A, 2/3/2020); Upper gastrointestinal endoscopy (N/A, 2020); and Colonoscopy (N/A, 2020).     Social History     Socioeconomic History    Marital status: Single Spouse name: Not on file    Number of children: 2    Years of education: graduated high school, some college    Highest education level: Not on file   Occupational History    Occupation: housewife    Occupation:      Comment: last worked 2015   Tobacco Use    Smoking status: Current Every Day Smoker     Packs/day: 1.00     Years: 10.00     Pack years: 10.00     Types: Cigarettes     Start date: 11/7/2007    Smokeless tobacco: Never Used   Vaping Use    Vaping Use: Every day    Substances: Always   Substance and Sexual Activity    Alcohol use: No     Alcohol/week: 0.0 standard drinks    Drug use: No    Sexual activity: Yes     Partners: Male     Comment: chlamydia in 2016,   Other Topics Concern    Not on file   Social History Narrative    Lives with fiance and 2 daughters     Social Determinants of Health     Financial Resource Strain: Low Risk     Difficulty of Paying Living Expenses: Not hard at all   Food Insecurity: No Food Insecurity    Worried About [facility] in the Last Year: Never true    Fahad of Food in the Last Year: Never true   Transportation Needs: No Transportation Needs    Lack of Transportation (Medical): No    Lack of Transportation (Non-Medical):  No   Physical Activity:     Days of Exercise per Week:     Minutes of Exercise per Session:    Stress:     Feeling of Stress :    Social Connections:     Frequency of Communication with Friends and Family:     Frequency of Social Gatherings with Friends and Family:     Attends Judaism Services:     Active Member of Clubs or Organizations:     Attends Club or Organization Meetings:     Marital Status:    Intimate Partner Violence:     Fear of Current or Ex-Partner:     Emotionally Abused:     Physically Abused:     Sexually Abused:        Family History   Problem Relation Age of Onset    Breast Cancer Mother 28    Diabetes Father     Cancer Maternal Uncle 61        acute leukemia    Heart Disease Maternal Uncle     Diabetes Maternal Uncle     Colon Cancer Maternal Uncle     Diabetes Maternal Uncle     Heart Attack Maternal Uncle     Uterine Cancer Neg Hx     Ovarian Cancer Neg Hx        Allergies:  Benadryl [diphenhydramine]    Home Medications:  Prior to Admission medications    Medication Sig Start Date End Date Taking? Authorizing Provider   LEVEMIR FLEXTOUCH 100 UNIT/ML injection pen inject 55 units subcutaneously nightly 9/13/21  Yes Maritza Goldmann, APRN - CNP   valACYclovir (VALTREX) 500 MG tablet take 1 tablet by mouth twice a day for 7 days 8/23/21  Yes MARCELO Nicholson CNP   metFORMIN (GLUCOPHAGE) 1000 MG tablet take 1 tablet by mouth twice a day with meals 8/2/21  Yes Ashley Kraft PA-C   omeprazole (PRILOSEC) 20 MG delayed release capsule Take 1 capsule by mouth every morning (before breakfast) 7/12/21 11/9/21 Yes MARCELO Garcia NP   insulin aspart (NOVOLOG FLEXPEN) 100 UNIT/ML injection pen Inject 4 Units into the skin 3 times daily (before meals) 4 units before breakfast, 4 units before lunch, 6 units before dinner and 6 units before bedtime 5/26/21  Yes Ashley Kraft PA-C   lisinopril (PRINIVIL;ZESTRIL) 5 MG tablet take 1 tablet by mouth once daily 5/21/21  Yes Ashley Kraft PA-C   clotrimazole-betamethasone (LOTRISONE) 1-0.05 % cream Apply topically 2 times daily.  1/27/21  Yes Wilma Seals MD   QUEtiapine (SEROQUEL) 50 MG tablet take 1 tablet by mouth at bedtime 12/8/20  Yes MARCELO Tang NP   acetaminophen (TYLENOL) 500 MG tablet Take 2 tablets by mouth 3 times daily 10/28/20  Yes Cesar Carter MD   albuterol sulfate HFA (PROVENTIL HFA) 108 (90 Base) MCG/ACT inhaler Inhale 2 puffs into the lungs every 6 hours as needed for Wheezing 7/23/19  Yes Akin Jay PA-C   UNIFINE PENTIPS 29G X 12MM MISC USE Five times a DAILY WITH BASAGLAR 7/27/21   Ashley Kraft PA-C   Semaglutide,0.25 or 0.5MG/DOS, (OZEMPIC, 0.25 OR 0.5 MG/DOSE,) 2 MG/1.5ML SOPN 0.25 mg 1 weekly x 4 weeks then increase 0.5 mg once weekly x 4 weeks then 1 mg once weekly  Patient taking differently: 0.25 mg 1 weekly x 4 weeks then increase 0.5 mg once weekly x 4 weeks then 1 mg once weekly  Pt not taking 5/21/21   Ashley Kraft PA-C   Insulin Pen Needle (PEN NEEDLES) 31G X 6 MM MISC 1 each by Does not apply route daily 5/21/21   Ashley Kraft PA-C   TRUEplus Lancets 30G MISC TEST 2 TIMES A DAY AND AS NEEED FOR SYMPTOMS OF IRREGULAR BLOOD GLUCOSE 4/21/21   Ashley Kraft PA-C   sucralfate (CARAFATE) 1 GM tablet take 1 tablet by mouth four times a day  Patient not taking: Reported on 9/14/2021 3/12/21   Ashley Kraft PA-C   PREMPRO 0.625-2.5 MG per tablet take 1 tablet by mouth once daily  Patient not taking: Reported on 9/14/2021 4/20/20   MARCELO Nicholson - CNP   NARCAN 4 MG/0.1ML LIQD nasal spray ADMINISTER A SINGLE spray INTO ONE NOSTRIL CALL 911 MAY REPEAT ONCE 7/19/19   Historical Provider, MD   etonogestrel (NEXPLANON) 68 MG implant 68 mg by Subdermal route once for 1 dose  Patient not taking: Reported on 9/14/2021 5/23/17 6/24/20  Wilma eSals MD       REVIEW OF SYSTEMS    (2-9 systems for level 4, 10 or more for level 5)      Review of Systems   Constitutional: Positive for fatigue. Negative for activity change, chills, diaphoresis and fever. HENT: Negative for congestion, rhinorrhea, sinus pressure and sore throat. Eyes: Negative for photophobia, pain and redness. Respiratory: Positive for chest tightness and shortness of breath. Negative for cough. Cardiovascular: Positive for chest pain. Negative for leg swelling. Gastrointestinal: Negative for abdominal pain, constipation, diarrhea, nausea and vomiting. Genitourinary: Negative for dysuria, flank pain, frequency and urgency. Musculoskeletal: Negative for arthralgias, myalgias and neck stiffness. Skin: Negative for color change, pallor and rash.    Neurological: Negative for dizziness, syncope, weakness, light-headedness, numbness and headaches. PHYSICAL EXAM   (up to 7 for level 4, 8 or more for level 5)     INITIAL VITALS:    height is 5' 7\" (1.702 m) and weight is 251 lb (113.9 kg). Her oral temperature is 98.3 °F (36.8 °C). Her blood pressure is 156/89 (abnormal) and her pulse is 93. Her respiration is 25 and oxygen saturation is 99%. Physical Exam  GENERAL APPEARANCE:  AxOx4, anxious appearing female, tachypneic  HEENT:  NC, AT. MMM. EOMI, clear conjunctiva, oropharynx clear, no swelling in oropharynx  NECK:  Supple without lymphadenopathy. No stiffness or restricted ROM. HEART:  Normal rate and regular rhythm, normal S1/S1, no m/r/g  LUNGS:  CTAB, moving air well. No crackles or wheezes are heard, using voice on exhalation  ABDOMEN:  Soft, nontender, nondistended with good bowel sounds heard. BACK: No CVAT, no obvious deformity. EXTREMITIES:  Without cyanosis, clubbing or edema. NEUROLOGICAL:  Grossly nonfocal. Alert and oriented, moving all 4 extremities. CN not formally tested but appear grossly intact. Observed to ambulate with normal gait. Skin:  Warm and dry without any rash.     DIFFERENTIAL  DIAGNOSIS     PLAN (LABS / IMAGING / EKG):  Orders Placed This Encounter   Procedures    Culture, Urine    COVID-19, Rapid    Culture, Blood 1    Culture, Blood 2    Clostridium Difficile Toxin/Antigen    XR CHEST (2 VW)    Basic Metabolic Panel w/ Reflex to MG    Troponin    D-DIMER, QUANTITATIVE    CBC Auto Differential    PREVIOUS SPECIMEN    Immature Platelet Fraction    Urinalysis with Microscopic    Beta-Hydroxybutyrate    PREVIOUS SPECIMEN    Blood Gas, Venous    Basic Metabolic Panel w/ Reflex to MG    MAGNESIUM    PHOSPHORUS    CBC auto differential    CBC auto differential    LACTIC ACID, WHOLE BLOOD    HCG Qualitative, Serum    Procalcitonin    BLOOD GAS, VENOUS    Diet NPO    HYPOGLYCEMIA TREATMENT: blood glucose less than 50 mg/dL and patient ALERT and TOLERATING PO    HYPOGLYCEMIA TREATMENT: blood glucose less than 70 mg/dL and patient ALERT and TOLERATING PO    HYPOGLYCEMIA TREATMENT: blood glucose less than 70 mg/dL and patient NOT ALERT or NPO    Telemetry monitoring - continuous duration    Vital signs per unit routine    Daily weights    Intake and output    Place intermittent pneumatic compression device    Telemetry monitoring - continuous duration    Notify physician    Up as tolerated    Full Code    Inpatient consult to Internal Medicine    Initiate Oxygen Therapy Protocol    Respiratory care evaluation only    POCT glucose    POCT Glucose    POCT Glucose    POC Glucose Fingerstick    POC Glucose Fingerstick    EKG 12 Lead    PATIENT STATUS (FROM ED OR OR/PROCEDURAL) Inpatient    PATIENT STATUS (FROM ED OR OR/PROCEDURAL) Inpatient       MEDICATIONS ORDERED:  Orders Placed This Encounter   Medications    DISCONTD: albuterol (PROVENTIL) nebulizer solution 2.5 mg     Order Specific Question:   Initiate RT Bronchodilator Protocol     Answer: Yes    DISCONTD: ipratropium (ATROVENT) 0.02 % nebulizer solution 0.25 mg     Order Specific Question:   Initiate RT Bronchodilator Protocol     Answer: Yes    DISCONTD: albuterol (PROVENTIL) nebulizer solution 15 mg     Order Specific Question:   Initiate RT Bronchodilator Protocol     Answer: Yes    ipratropium (ATROVENT) 0.02 % nebulizer solution 0.25 mg     Order Specific Question:   Initiate RT Bronchodilator Protocol     Answer:    Yes    LORazepam (ATIVAN) injection 0.5 mg    0.9 % sodium chloride bolus    insulin regular (HUMULIN R;NOVOLIN R) injection 6 Units    0.9 % sodium chloride bolus    potassium chloride 10 mEq/100 mL IVPB (Peripheral Line)    DISCONTD: glucose (GLUTOSE) 40 % oral gel 15 g    DISCONTD: dextrose 50 % IV solution    DISCONTD: glucagon (rDNA) injection 1 mg    DISCONTD: dextrose 5 % solution    insulin regular (HUMULIN R;NOVOLIN R) 100 Units in sodium chloride 0.9 % 100 mL infusion    albuterol sulfate  (90 Base) MCG/ACT inhaler 2 puff     Order Specific Question:   Initiate RT Bronchodilator Protocol     Answer: Yes    sodium chloride flush 0.9 % injection 5-40 mL    sodium chloride flush 0.9 % injection 5-40 mL    0.9 % sodium chloride infusion    enoxaparin (LOVENOX) injection 30 mg    OR Linked Order Group     ondansetron (ZOFRAN-ODT) disintegrating tablet 4 mg     ondansetron (ZOFRAN) injection 4 mg    polyethylene glycol (GLYCOLAX) packet 17 g    OR Linked Order Group     acetaminophen (TYLENOL) tablet 650 mg     acetaminophen (TYLENOL) suppository 650 mg    0.9 % sodium chloride infusion    insulin lispro (HUMALOG) injection vial 10 Units       DDX: Asthma exacerbation, pneumonia, pulmonary embolism, pneumothorax, anaphylactic reaction    Initial MDM/Plan: 27 y.o. female who presents with 2 days of chest pain shortness of breath with a history of asthma. Low suspicion for asthma exacerbation on presentation, given clear lung sounds with no prolonged expiratory phase, upper airway noise without stridor, SPO2 99%, tachycardic 118's. Likely due to anxiety, low suspicion for pulmonary embolism, however given patient's chest pain associated with tachycardia, will obtain D-dimer. Will obtain chest x-ray evaluate upper airway as well as signs of pneumonia. Do cardiac work-up with EKG and troponin get basic lab work. Blood sugar 453 with anion gap, will give 1 L fluid, obtain VBG and beta hydroxybutyrate. Her normal insulin regimen is NovoLog 4 4 and 6 units a day. D-dimer negative, no concern for pulmonary embolism. Will administer 2 L fluid, 6 units insulin, 10 mEq potassium, repeat glucose 366    Patient most likely be admitted to medicine service for DKA, and will require diabetic education.     Diabetic ketoacidosis    DIAGNOSTIC RESULTS / EMERGENCY DEPARTMENT COURSE / MDM     LABS:  Labs Reviewed   BASIC METABOLIC PANEL W/ REFLEX TO MG FOR LOW K - Abnormal; Notable for the following components:       Result Value    Glucose 453 (*)     Sodium 130 (*)     CO2 10 (*)     Anion Gap 21 (*)     All other components within normal limits   CBC WITH AUTO DIFFERENTIAL - Abnormal; Notable for the following components:    WBC 24.7 (*)     Seg Neutrophils 83 (*)     Lymphocytes 13 (*)     Eosinophils % 0 (*)     Segs Absolute 20.50 (*)     Absolute Mono # 0.99 (*)     All other components within normal limits   URINALYSIS WITH MICROSCOPIC - Abnormal; Notable for the following components:    Glucose, Ur 3+ (*)     Ketones, Urine SMALL (*)     All other components within normal limits   BLOOD GAS, VENOUS - Abnormal; Notable for the following components:    pH, Nico 7.276 (*)     pCO2, Nico 25.8 (*)     pO2, Nico 59.9 (*)     HCO3, Venous 11.6 (*)     Negative Base Excess, Nico 13.6 (*)     O2 Sat, Nico 86.6 (*)     All other components within normal limits   LACTIC ACID, WHOLE BLOOD - Abnormal; Notable for the following components:    Lactic Acid, Whole Blood 6.5 (*)     All other components within normal limits   POC GLUCOSE FINGERSTICK - Abnormal; Notable for the following components:    POC Glucose 366 (*)     All other components within normal limits   POC GLUCOSE FINGERSTICK - Abnormal; Notable for the following components:    POC Glucose 343 (*)     All other components within normal limits   POCT GLUCOSE - Normal   POCT GLUCOSE - Normal   COVID-19, RAPID   CULTURE, URINE   C DIFF TOXIN/ANTIGEN   CULTURE, BLOOD 1   CULTURE, BLOOD 2   TROPONIN   D-DIMER, QUANTITATIVE   IMMATURE PLATELET FRACTION   HCG, SERUM, QUALITATIVE   PROCALCITONIN   PREVIOUS SPECIMEN   BETA-HYDROXYBUTYRATE   PREVIOUS SPECIMEN   BLOOD GAS, VENOUS   BASIC METABOLIC PANEL W/ REFLEX TO MG FOR LOW K   BASIC METABOLIC PANEL W/ REFLEX TO MG FOR LOW K   BASIC METABOLIC PANEL W/ REFLEX TO MG FOR LOW K   MAGNESIUM   MAGNESIUM   MAGNESIUM   PHOSPHORUS   PHOSPHORUS

## 2021-09-14 NOTE — PROGRESS NOTES
Attending Physician Statement  Please refer to resident note for details. I have discussed the case, including pertinent history and exam findings with the resident and the team.  I have seen and examined the patient and the key elements of the encounter have been performed by me. I agree with the assessment, plan and orders as documented by the resident. Review of Systems:   In addition to the pertinent positives and negatives as stated within HPI and the review of systems as documented in their notes, all other systems were reviewed when able to and are reported negative. 66-year-old female with history of asthma, came in because of tachypnea and shortness of breath. Patient was found to be in severe acidosis. Concern for diabetic ketoacidosis. Believed acidosis is the cause for her tachypnea. She also has some leukocytosis but no fever. Admit as an inpatient. Start on DKA protocol and monitor blood sugars. Check beta hydroxybutyrate. Follow-up on the lactic acid levels  Check VBG  Check pancultures including UTI  Will obtain pregnancy test  Check procalcitonin as well. Given leukocytosis could be reactive. We will monitor vitals closely  Check tox screen  Lab showed lactic acidosis, this could be related to Metformin use. Patient denies any abdominal pain. Will monitor lactic acid, hold Metformin  Monitor troponins.   EKG has not been scanned into the system yet      Ankita Douglas MD  Attending Physician, Internal Medicine Service    Internal Medicine Residency Program  9/14/2021, 11:14 PM

## 2021-09-14 NOTE — PROGRESS NOTES
Pharmacist Note for Home Medication Reconciliation     Pharmacist reviewed home medications with patient and home med list was updated, some discrepancies was discovered please note the following:    Medications added: none      Medications reported as not taken per patient:  Ozempic  Sucralfate  Prempro  Nexplanon      Medication changes in doses or instructions: none      Recommendations: Please note changes above.  For further detail, please look at home medication list.     Thank you,   Joslyn Newsome, PharmD 9/14/2021 4:36 PM

## 2021-09-15 LAB
-: NORMAL
ABSOLUTE EOS #: 0.19 K/UL (ref 0–0.44)
ABSOLUTE IMMATURE GRANULOCYTE: 0.13 K/UL (ref 0–0.3)
ABSOLUTE LYMPH #: 2.4 K/UL (ref 1.1–3.7)
ABSOLUTE MONO #: 0.72 K/UL (ref 0.1–1.2)
ALLEN TEST: ABNORMAL
AMPHETAMINE SCREEN URINE: NEGATIVE
ANION GAP SERPL CALCULATED.3IONS-SCNC: 13 MMOL/L (ref 9–17)
ANION GAP SERPL CALCULATED.3IONS-SCNC: 14 MMOL/L (ref 9–17)
ANION GAP SERPL CALCULATED.3IONS-SCNC: 15 MMOL/L (ref 9–17)
ANION GAP SERPL CALCULATED.3IONS-SCNC: 15 MMOL/L (ref 9–17)
ANION GAP SERPL CALCULATED.3IONS-SCNC: 16 MMOL/L (ref 9–17)
ANION GAP SERPL CALCULATED.3IONS-SCNC: 17 MMOL/L (ref 9–17)
BARBITURATE SCREEN URINE: NEGATIVE
BASOPHILS # BLD: 0 % (ref 0–2)
BASOPHILS ABSOLUTE: 0.06 K/UL (ref 0–0.2)
BENZODIAZEPINE SCREEN, URINE: NEGATIVE
BETA-HYDROXYBUTYRATE: 0.16 MMOL/L (ref 0.02–0.27)
BUN BLDV-MCNC: 12 MG/DL (ref 6–20)
BUN BLDV-MCNC: 12 MG/DL (ref 6–20)
BUN BLDV-MCNC: 13 MG/DL (ref 6–20)
BUN BLDV-MCNC: 13 MG/DL (ref 6–20)
BUN BLDV-MCNC: 14 MG/DL (ref 6–20)
BUN BLDV-MCNC: 15 MG/DL (ref 6–20)
BUN/CREAT BLD: ABNORMAL (ref 9–20)
BUPRENORPHINE URINE: NORMAL
CALCIUM SERPL-MCNC: 8.5 MG/DL (ref 8.6–10.4)
CALCIUM SERPL-MCNC: 8.5 MG/DL (ref 8.6–10.4)
CALCIUM SERPL-MCNC: 8.6 MG/DL (ref 8.6–10.4)
CALCIUM SERPL-MCNC: 8.7 MG/DL (ref 8.6–10.4)
CALCIUM SERPL-MCNC: 8.9 MG/DL (ref 8.6–10.4)
CALCIUM SERPL-MCNC: 8.9 MG/DL (ref 8.6–10.4)
CANNABINOID SCREEN URINE: NEGATIVE
CARBOXYHEMOGLOBIN: 1.1 % (ref 0–5)
CHLORIDE BLD-SCNC: 104 MMOL/L (ref 98–107)
CHLORIDE BLD-SCNC: 105 MMOL/L (ref 98–107)
CHLORIDE BLD-SCNC: 106 MMOL/L (ref 98–107)
CHLORIDE BLD-SCNC: 106 MMOL/L (ref 98–107)
CHLORIDE, UR: 145 MMOL/L
CO2: 12 MMOL/L (ref 20–31)
CO2: 12 MMOL/L (ref 20–31)
CO2: 13 MMOL/L (ref 20–31)
CO2: 18 MMOL/L (ref 20–31)
COCAINE METABOLITE, URINE: NEGATIVE
CREAT SERPL-MCNC: 0.46 MG/DL (ref 0.5–0.9)
CREAT SERPL-MCNC: 0.5 MG/DL (ref 0.5–0.9)
CREAT SERPL-MCNC: 0.52 MG/DL (ref 0.5–0.9)
CREAT SERPL-MCNC: 0.53 MG/DL (ref 0.5–0.9)
CREAT SERPL-MCNC: 0.55 MG/DL (ref 0.5–0.9)
CREAT SERPL-MCNC: 0.6 MG/DL (ref 0.5–0.9)
CULTURE: NORMAL
DIFFERENTIAL TYPE: ABNORMAL
EKG ATRIAL RATE: 104 BPM
EKG P AXIS: 40 DEGREES
EKG P-R INTERVAL: 146 MS
EKG Q-T INTERVAL: 346 MS
EKG QRS DURATION: 92 MS
EKG QTC CALCULATION (BAZETT): 454 MS
EKG R AXIS: 11 DEGREES
EKG T AXIS: 11 DEGREES
EKG VENTRICULAR RATE: 104 BPM
EOSINOPHILS RELATIVE PERCENT: 1 % (ref 1–4)
ESTIMATED AVERAGE GLUCOSE: 134 MG/DL
FIO2: ABNORMAL
GFR AFRICAN AMERICAN: >60 ML/MIN
GFR NON-AFRICAN AMERICAN: >60 ML/MIN
GFR SERPL CREATININE-BSD FRML MDRD: ABNORMAL ML/MIN/{1.73_M2}
GLUCOSE BLD-MCNC: 131 MG/DL (ref 65–105)
GLUCOSE BLD-MCNC: 132 MG/DL (ref 70–99)
GLUCOSE BLD-MCNC: 134 MG/DL (ref 65–105)
GLUCOSE BLD-MCNC: 172 MG/DL (ref 65–105)
GLUCOSE BLD-MCNC: 173 MG/DL (ref 65–105)
GLUCOSE BLD-MCNC: 179 MG/DL (ref 65–105)
GLUCOSE BLD-MCNC: 181 MG/DL (ref 65–105)
GLUCOSE BLD-MCNC: 193 MG/DL (ref 70–99)
GLUCOSE BLD-MCNC: 204 MG/DL (ref 65–105)
GLUCOSE BLD-MCNC: 206 MG/DL (ref 65–105)
GLUCOSE BLD-MCNC: 206 MG/DL (ref 65–105)
GLUCOSE BLD-MCNC: 207 MG/DL (ref 65–105)
GLUCOSE BLD-MCNC: 232 MG/DL (ref 65–105)
GLUCOSE BLD-MCNC: 242 MG/DL (ref 65–105)
GLUCOSE BLD-MCNC: 244 MG/DL (ref 70–99)
GLUCOSE BLD-MCNC: 245 MG/DL (ref 65–105)
GLUCOSE BLD-MCNC: 257 MG/DL (ref 70–99)
GLUCOSE BLD-MCNC: 264 MG/DL (ref 65–105)
GLUCOSE BLD-MCNC: 269 MG/DL (ref 65–105)
GLUCOSE BLD-MCNC: 271 MG/DL (ref 65–105)
GLUCOSE BLD-MCNC: 275 MG/DL (ref 65–105)
GLUCOSE BLD-MCNC: 289 MG/DL (ref 65–105)
GLUCOSE BLD-MCNC: 293 MG/DL (ref 70–99)
GLUCOSE BLD-MCNC: 299 MG/DL (ref 65–105)
GLUCOSE BLD-MCNC: 307 MG/DL (ref 70–99)
HBA1C MFR BLD: 6.3 % (ref 4–6)
HCO3 VENOUS: 21.9 MMOL/L (ref 24–30)
HCT VFR BLD CALC: 38.4 % (ref 36.3–47.1)
HEMOGLOBIN: 12.8 G/DL (ref 11.9–15.1)
IMMATURE GRANULOCYTES: 1 %
LACTIC ACID, WHOLE BLOOD: 2.1 MMOL/L (ref 0.7–2.1)
LYMPHOCYTES # BLD: 12 % (ref 24–43)
Lab: NORMAL
MAGNESIUM: 1.8 MG/DL (ref 1.6–2.6)
MAGNESIUM: 1.9 MG/DL (ref 1.6–2.6)
MAGNESIUM: 2 MG/DL (ref 1.6–2.6)
MAGNESIUM: 2.1 MG/DL (ref 1.6–2.6)
MCH RBC QN AUTO: 31.7 PG (ref 25.2–33.5)
MCHC RBC AUTO-ENTMCNC: 33.3 G/DL (ref 28.4–34.8)
MCV RBC AUTO: 95 FL (ref 82.6–102.9)
MDMA URINE: NORMAL
METHADONE SCREEN, URINE: NEGATIVE
METHAMPHETAMINE, URINE: NORMAL
METHEMOGLOBIN: ABNORMAL % (ref 0–1.5)
MODE: ABNORMAL
MONOCYTES # BLD: 4 % (ref 3–12)
NEGATIVE BASE EXCESS, VEN: 1.4 MMOL/L (ref 0–2)
NOTIFICATION TIME: ABNORMAL
NOTIFICATION: ABNORMAL
NRBC AUTOMATED: 0 PER 100 WBC
O2 DEVICE/FLOW/%: ABNORMAL
O2 SAT, VEN: 75.2 % (ref 60–85)
OPIATES, URINE: NEGATIVE
OSMOLALITY URINE: 784 MOSM/KG (ref 80–1300)
OXYCODONE SCREEN URINE: NEGATIVE
OXYHEMOGLOBIN: ABNORMAL % (ref 95–98)
PATIENT TEMP: 37
PCO2, VEN, TEMP ADJ: ABNORMAL MMHG (ref 39–55)
PCO2, VEN: 34.1 (ref 39–55)
PDW BLD-RTO: 12.5 % (ref 11.8–14.4)
PEEP/CPAP: ABNORMAL
PH VENOUS: 7.42 (ref 7.32–7.42)
PH, VEN, TEMP ADJ: ABNORMAL (ref 7.32–7.42)
PHENCYCLIDINE, URINE: NEGATIVE
PHOSPHORUS: 1.7 MG/DL (ref 2.6–4.5)
PHOSPHORUS: 1.9 MG/DL (ref 2.6–4.5)
PHOSPHORUS: 2 MG/DL (ref 2.6–4.5)
PHOSPHORUS: 2.5 MG/DL (ref 2.6–4.5)
PHOSPHORUS: 2.9 MG/DL (ref 2.6–4.5)
PHOSPHORUS: 3.1 MG/DL (ref 2.6–4.5)
PLATELET # BLD: 223 K/UL (ref 138–453)
PLATELET ESTIMATE: ABNORMAL
PMV BLD AUTO: 10 FL (ref 8.1–13.5)
PO2, VEN, TEMP ADJ: ABNORMAL MMHG (ref 30–50)
PO2, VEN: 42.7 (ref 30–50)
POSITIVE BASE EXCESS, VEN: ABNORMAL MMOL/L (ref 0–2)
POTASSIUM SERPL-SCNC: 3.6 MMOL/L (ref 3.7–5.3)
POTASSIUM SERPL-SCNC: 4.1 MMOL/L (ref 3.7–5.3)
POTASSIUM SERPL-SCNC: 4.1 MMOL/L (ref 3.7–5.3)
POTASSIUM SERPL-SCNC: 4.4 MMOL/L (ref 3.7–5.3)
POTASSIUM SERPL-SCNC: 4.4 MMOL/L (ref 3.7–5.3)
POTASSIUM SERPL-SCNC: 4.6 MMOL/L (ref 3.7–5.3)
POTASSIUM, UR: 67.8 MMOL/L
PROCALCITONIN: 0.1 NG/ML
PROPOXYPHENE, URINE: NORMAL
PSV: ABNORMAL
PT. POSITION: ABNORMAL
RBC # BLD: 4.04 M/UL (ref 3.95–5.11)
RBC # BLD: ABNORMAL 10*6/UL
REASON FOR REJECTION: NORMAL
RESPIRATORY RATE: ABNORMAL
SALICYLATE LEVEL: <1 MG/DL (ref 3–10)
SAMPLE SITE: ABNORMAL
SEG NEUTROPHILS: 82 % (ref 36–65)
SEGMENTED NEUTROPHILS ABSOLUTE COUNT: 16.54 K/UL (ref 1.5–8.1)
SERUM OSMOLALITY: 289 MOSM/KG (ref 275–295)
SET RATE: ABNORMAL
SODIUM BLD-SCNC: 131 MMOL/L (ref 135–144)
SODIUM BLD-SCNC: 133 MMOL/L (ref 135–144)
SODIUM BLD-SCNC: 134 MMOL/L (ref 135–144)
SODIUM BLD-SCNC: 138 MMOL/L (ref 135–144)
SODIUM,UR: 78 MMOL/L
SPECIMEN DESCRIPTION: NORMAL
TEST INFORMATION: NORMAL
TEXT FOR RESPIRATORY: ABNORMAL
TOTAL HB: ABNORMAL G/DL (ref 12–16)
TOTAL RATE: ABNORMAL
TRICYCLIC ANTIDEPRESSANTS, UR: NORMAL
TROPONIN INTERP: NORMAL
TROPONIN T: NORMAL NG/ML
TROPONIN, HIGH SENSITIVITY: <6 NG/L (ref 0–14)
VT: ABNORMAL
WBC # BLD: 20 K/UL (ref 3.5–11.3)
WBC # BLD: ABNORMAL 10*3/UL
ZZ NTE CLEAN UP: ORDERED TEST: NORMAL
ZZ NTE WITH NAME CLEAN UP: SPECIMEN SOURCE: NORMAL

## 2021-09-15 PROCEDURE — 83930 ASSAY OF BLOOD OSMOLALITY: CPT

## 2021-09-15 PROCEDURE — 6370000000 HC RX 637 (ALT 250 FOR IP): Performed by: STUDENT IN AN ORGANIZED HEALTH CARE EDUCATION/TRAINING PROGRAM

## 2021-09-15 PROCEDURE — 6360000002 HC RX W HCPCS: Performed by: STUDENT IN AN ORGANIZED HEALTH CARE EDUCATION/TRAINING PROGRAM

## 2021-09-15 PROCEDURE — 6360000002 HC RX W HCPCS

## 2021-09-15 PROCEDURE — 84133 ASSAY OF URINE POTASSIUM: CPT

## 2021-09-15 PROCEDURE — 84484 ASSAY OF TROPONIN QUANT: CPT

## 2021-09-15 PROCEDURE — 82805 BLOOD GASES W/O2 SATURATION: CPT

## 2021-09-15 PROCEDURE — 2580000003 HC RX 258: Performed by: STUDENT IN AN ORGANIZED HEALTH CARE EDUCATION/TRAINING PROGRAM

## 2021-09-15 PROCEDURE — 99232 SBSQ HOSP IP/OBS MODERATE 35: CPT | Performed by: INTERNAL MEDICINE

## 2021-09-15 PROCEDURE — 2060000000 HC ICU INTERMEDIATE R&B

## 2021-09-15 PROCEDURE — 83605 ASSAY OF LACTIC ACID: CPT

## 2021-09-15 PROCEDURE — 83735 ASSAY OF MAGNESIUM: CPT

## 2021-09-15 PROCEDURE — 94640 AIRWAY INHALATION TREATMENT: CPT

## 2021-09-15 PROCEDURE — 83935 ASSAY OF URINE OSMOLALITY: CPT

## 2021-09-15 PROCEDURE — 82010 KETONE BODYS QUAN: CPT

## 2021-09-15 PROCEDURE — 93010 ELECTROCARDIOGRAM REPORT: CPT | Performed by: INTERNAL MEDICINE

## 2021-09-15 PROCEDURE — 80307 DRUG TEST PRSMV CHEM ANLYZR: CPT

## 2021-09-15 PROCEDURE — 84100 ASSAY OF PHOSPHORUS: CPT

## 2021-09-15 PROCEDURE — 97161 PT EVAL LOW COMPLEX 20 MIN: CPT

## 2021-09-15 PROCEDURE — 84145 PROCALCITONIN (PCT): CPT

## 2021-09-15 PROCEDURE — 2580000003 HC RX 258

## 2021-09-15 PROCEDURE — 2500000003 HC RX 250 WO HCPCS: Performed by: STUDENT IN AN ORGANIZED HEALTH CARE EDUCATION/TRAINING PROGRAM

## 2021-09-15 PROCEDURE — G0480 DRUG TEST DEF 1-7 CLASSES: HCPCS

## 2021-09-15 PROCEDURE — 36415 COLL VENOUS BLD VENIPUNCTURE: CPT

## 2021-09-15 PROCEDURE — 80179 DRUG ASSAY SALICYLATE: CPT

## 2021-09-15 PROCEDURE — 82947 ASSAY GLUCOSE BLOOD QUANT: CPT

## 2021-09-15 PROCEDURE — 82436 ASSAY OF URINE CHLORIDE: CPT

## 2021-09-15 PROCEDURE — C9113 INJ PANTOPRAZOLE SODIUM, VIA: HCPCS

## 2021-09-15 PROCEDURE — 85025 COMPLETE CBC W/AUTO DIFF WBC: CPT

## 2021-09-15 PROCEDURE — 80048 BASIC METABOLIC PNL TOTAL CA: CPT

## 2021-09-15 PROCEDURE — 84300 ASSAY OF URINE SODIUM: CPT

## 2021-09-15 PROCEDURE — 82693 ASSAY OF ETHYLENE GLYCOL: CPT

## 2021-09-15 RX ORDER — MORPHINE SULFATE 2 MG/ML
1 INJECTION, SOLUTION INTRAMUSCULAR; INTRAVENOUS ONCE
Status: COMPLETED | OUTPATIENT
Start: 2021-09-15 | End: 2021-09-15

## 2021-09-15 RX ORDER — SODIUM CHLORIDE 9 MG/ML
10 INJECTION INTRAVENOUS DAILY
Status: DISCONTINUED | OUTPATIENT
Start: 2021-09-15 | End: 2021-09-16 | Stop reason: HOSPADM

## 2021-09-15 RX ORDER — QUETIAPINE FUMARATE 25 MG/1
50 TABLET, FILM COATED ORAL NIGHTLY
Status: DISCONTINUED | OUTPATIENT
Start: 2021-09-15 | End: 2021-09-16 | Stop reason: HOSPADM

## 2021-09-15 RX ORDER — LISINOPRIL 5 MG/1
5 TABLET ORAL DAILY
Status: DISCONTINUED | OUTPATIENT
Start: 2021-09-15 | End: 2021-09-16 | Stop reason: HOSPADM

## 2021-09-15 RX ORDER — IPRATROPIUM BROMIDE AND ALBUTEROL SULFATE 2.5; .5 MG/3ML; MG/3ML
1 SOLUTION RESPIRATORY (INHALATION)
Status: DISCONTINUED | OUTPATIENT
Start: 2021-09-15 | End: 2021-09-16 | Stop reason: HOSPADM

## 2021-09-15 RX ORDER — PANTOPRAZOLE SODIUM 40 MG/10ML
40 INJECTION, POWDER, LYOPHILIZED, FOR SOLUTION INTRAVENOUS DAILY
Status: DISCONTINUED | OUTPATIENT
Start: 2021-09-15 | End: 2021-09-16 | Stop reason: HOSPADM

## 2021-09-15 RX ADMIN — DEXTROSE AND SODIUM CHLORIDE: 5; 450 INJECTION, SOLUTION INTRAVENOUS at 05:00

## 2021-09-15 RX ADMIN — SODIUM CHLORIDE 32.64 UNITS/HR: 9 INJECTION, SOLUTION INTRAVENOUS at 14:41

## 2021-09-15 RX ADMIN — MORPHINE SULFATE 1 MG: 2 INJECTION, SOLUTION INTRAMUSCULAR; INTRAVENOUS at 01:02

## 2021-09-15 RX ADMIN — POTASSIUM CHLORIDE 10 MEQ: 7.46 INJECTION, SOLUTION INTRAVENOUS at 07:00

## 2021-09-15 RX ADMIN — SODIUM PHOSPHATE, MONOBASIC, MONOHYDRATE 15 MMOL: 276; 142 INJECTION, SOLUTION INTRAVENOUS at 04:43

## 2021-09-15 RX ADMIN — SODIUM CHLORIDE 28.68 UNITS/HR: 9 INJECTION, SOLUTION INTRAVENOUS at 11:00

## 2021-09-15 RX ADMIN — IPRATROPIUM BROMIDE AND ALBUTEROL SULFATE 1 AMPULE: .5; 3 SOLUTION RESPIRATORY (INHALATION) at 15:40

## 2021-09-15 RX ADMIN — METHYLPREDNISOLONE SODIUM SUCCINATE 40 MG: 125 INJECTION, POWDER, FOR SOLUTION INTRAMUSCULAR; INTRAVENOUS at 01:18

## 2021-09-15 RX ADMIN — POTASSIUM CHLORIDE 10 MEQ: 7.46 INJECTION, SOLUTION INTRAVENOUS at 02:05

## 2021-09-15 RX ADMIN — SODIUM CHLORIDE, PRESERVATIVE FREE 10 ML: 5 INJECTION INTRAVENOUS at 08:55

## 2021-09-15 RX ADMIN — METHYLPREDNISOLONE SODIUM SUCCINATE 40 MG: 125 INJECTION, POWDER, FOR SOLUTION INTRAMUSCULAR; INTRAVENOUS at 07:58

## 2021-09-15 RX ADMIN — PANTOPRAZOLE SODIUM 40 MG: 40 INJECTION, POWDER, FOR SOLUTION INTRAVENOUS at 08:52

## 2021-09-15 RX ADMIN — POTASSIUM CHLORIDE 10 MEQ: 7.46 INJECTION, SOLUTION INTRAVENOUS at 23:01

## 2021-09-15 RX ADMIN — SODIUM CHLORIDE, PRESERVATIVE FREE 10 ML: 5 INJECTION INTRAVENOUS at 07:58

## 2021-09-15 RX ADMIN — ENOXAPARIN SODIUM 30 MG: 30 INJECTION SUBCUTANEOUS at 07:58

## 2021-09-15 RX ADMIN — POTASSIUM CHLORIDE 10 MEQ: 7.46 INJECTION, SOLUTION INTRAVENOUS at 05:52

## 2021-09-15 RX ADMIN — POTASSIUM CHLORIDE 10 MEQ: 7.46 INJECTION, SOLUTION INTRAVENOUS at 00:44

## 2021-09-15 RX ADMIN — LISINOPRIL 5 MG: 5 TABLET ORAL at 10:57

## 2021-09-15 RX ADMIN — QUETIAPINE FUMARATE 50 MG: 25 TABLET ORAL at 20:56

## 2021-09-15 RX ADMIN — SODIUM CHLORIDE 12.58 UNITS/HR: 9 INJECTION, SOLUTION INTRAVENOUS at 18:08

## 2021-09-15 RX ADMIN — ENOXAPARIN SODIUM 30 MG: 30 INJECTION SUBCUTANEOUS at 20:56

## 2021-09-15 RX ADMIN — SODIUM BICARBONATE: 84 INJECTION, SOLUTION INTRAVENOUS at 14:17

## 2021-09-15 RX ADMIN — SODIUM CHLORIDE 8.82 UNITS/HR: 9 INJECTION, SOLUTION INTRAVENOUS at 02:08

## 2021-09-15 ASSESSMENT — ENCOUNTER SYMPTOMS
EYE REDNESS: 0
FACIAL SWELLING: 0
SORE THROAT: 0
NAUSEA: 1
BLOOD IN STOOL: 0
VOMITING: 0
DIARRHEA: 1
PHOTOPHOBIA: 0
SHORTNESS OF BREATH: 1
ABDOMINAL PAIN: 0
RHINORRHEA: 0
BACK PAIN: 0
COUGH: 1
CHEST TIGHTNESS: 1

## 2021-09-15 ASSESSMENT — PAIN SCALES - GENERAL: PAINLEVEL_OUTOF10: 10

## 2021-09-15 NOTE — PLAN OF CARE
Problem: Pain:  Goal: Control of chronic pain  Description: Control of chronic pain  Outcome: Met This Shift     Problem: Physical Regulation:  Goal: Ability to maintain vital signs within normal range will improve  Description: Ability to maintain vital signs within normal range will improve  Outcome: Met This Shift

## 2021-09-15 NOTE — PROGRESS NOTES
Physical Therapy    Facility/Department: [facility] 4B STEPDOWN  Initial Assessment    NAME: Kathy Roberto  : 1995  MRN: [id number]    Date of Service: 9/15/2021  Chief Complaint   Patient presents with    Respiratory Distress    Chest Pain      Discharge Recommendations:   (no further therapy recommended at this time)   PT Equipment Recommendations  Equipment Needed: No    Assessment   Assessment: Pt performed well at therapy today. Pt demonstrates ability to ambulate 40 feet without a device with supervision with good stability. Pt transfers with supervision. Pt reports not having any mobility concerns and is expected to be safe to return to prior living arrangements. Pt is being discharged from physical therapy at this time. Pt is not expected to require additional therapy upon discharge. Prognosis: Good  Decision Making: Low Complexity  PT Education: PT Role;General Safety  REQUIRES PT FOLLOW UP: No  Activity Tolerance  Activity Tolerance: Patient Tolerated treatment well;Patient limited by cognitive status  Activity Tolerance: Once aroused, pt was able to tolerate therapy session well. Patient Diagnosis(es): The encounter diagnosis was Diabetic ketoacidosis without coma associated with type 2 diabetes mellitus ([facility] [address] has a past medical history of Asthma, Diabetes mellitus type 2 in obese [facility]), Hepatic steatosis, Hypertension, Morbid obesity with body mass index (BMI) of 40.0 to 49.9 ([address] Neuropathy, and Postpartum depression. has a past surgical history that includes Tonsillectomy (Bilateral);  section; eye surgery;  section (N/A, 3/23/2017); Upper gastrointestinal endoscopy (N/A, 2018); Breast surgery (Left, 2019); Breast surgery (Right, 2019); Rectal surgery (N/A, 2/3/2020); Upper gastrointestinal endoscopy (N/A, 2020); and Colonoscopy (N/A, 2020).     Restrictions  Restrictions/Precautions  Required Braces or Orthoses?: No  Position Activity Restriction  Other position/activity restrictions: up as tolerated  Vision/Hearing  Vision: Within Functional Limits  Hearing: Within functional limits     Subjective  General  Patient assessed for rehabilitation services?: Yes  Response To Previous Treatment: Not applicable  Family / Caregiver Present: No  Follows Commands: Within Functional Limits  Subjective  Subjective: Pt supine in bed and very tired but was able to be be aroused. RN agreeable to therapy. Pain Screening  Patient Currently in Pain: Denies  Vital Signs  Patient Currently in Pain: Denies       Social/Functional History  Social/Functional History  Lives With: Spouse  Type of Home: Apartment  Home Layout: One level  Home Access: Elevator  Bathroom Shower/Tub: Tub/Shower unit  Bathroom Toilet: Standard  Bathroom Equipment: Grab bars in shower  Home Equipment: Crutches (does not use DME at baseline)  Receives Help From: Family  ADL Assistance: [address] [place] [address] Avenue: Independent  Homemaking Responsibilities: Yes  Ambulation Assistance: Independent  Transfer Assistance: Independent  Active : No  Patient's  Info:   Mode of Transportation: Car  Occupation: Full time employment  Type of occupation: RN  Cognition   Cognition  Overall Cognitive Status: Exceptions  Arousal/Alertness: Delayed responses to stimuli  Following Commands:  Follows multistep commands with repitition  Insights: Fully aware of deficits    Objective     Observation/Palpation  Posture: Good    AROM RLE (degrees)  RLE AROM: WFL  AROM LLE (degrees)  LLE AROM : WFL  AROM RUE (degrees)  RUE AROM : WFL  AROM LUE (degrees)  LUE AROM : WFL  Strength RLE  Strength RLE: WFL  Comment: Grossly 4+/5  Strength LLE  Strength LLE: WFL  Comment: Grossly 4+/5  Strength RUE  Strength RUE: WFL  Comment: Grossly 4+/5  Strength LUE  Strength LUE: WFL  Comment: Grossly 4+/5     Sensation  Overall Sensation Status: WFL (pt denies numbness/tingling)  Bed mobility  Supine to Sit: Supervision  Sit to Supine: Supervision  Scooting: Supervision  Transfers  Sit to Stand: Supervision  Stand to sit: Supervision  Ambulation  Ambulation?: Yes  More Ambulation?: No  Ambulation 1  Surface: level tile  Device: No Device  Assistance: Supervision  Quality of Gait: Pt with good gait quality, slightly decreased gait speed. Gait Deviations: Slow Aby  Distance: 40 feet  Stairs/Curb  Stairs?: No     Balance  Posture: Good  Sitting - Static: Good  Sitting - Dynamic: Good  Standing - Static: Good;-  Standing - Dynamic: Good;-  Comments: standing balance assessed without a device. Plan   Plan  Times per week: Pt is without acute care PT needs, discharged from therapy at this time. Safety Devices  Type of devices: Left in bed, Call light within reach, Gait belt  Restraints  Initially in place: No                                                 AM-PAC Score  AM-PAC Inpatient Mobility Raw Score : 24 (09/15/21 1513)  AM-PAC Inpatient T-Scale Score : 61.14 (09/15/21 1513)  Mobility Inpatient CMS 0-100% Score: 0 (09/15/21 1513)  Mobility Inpatient CMS G-Code Modifier : [address] (09/15/21 1513)          Goals  Short term goals  Time Frame for Short term goals: Pt is without acute care PT needs, discharged from therapy at this time.        Therapy Time   Individual Concurrent Group Co-treatment   Time In 6451         Time Out 1457         Minutes 14         Timed Code Treatment Minutes: 0 Minutes       Michael Diego PT

## 2021-09-15 NOTE — H&P
Attending Physician Statement  Please refer to resident note for details. I have discussed the case, including pertinent history and exam findings with the resident and the team.  I have seen and examined the patient and the key elements of the encounter have been performed by me. I agree with the assessment, plan and orders as documented by the resident. Review of Systems:   In addition to the pertinent positives and negatives as stated within HPI and the review of systems as documented in their notes, all other systems were reviewed when able to and are reported negative. 42-year-old female with history of asthma, came in because of tachypnea and shortness of breath. Patient was found to be in severe acidosis. Concern for diabetic ketoacidosis. Believed acidosis is the cause for her tachypnea. She also has some leukocytosis but no fever. Admit as an inpatient. Start on DKA protocol and monitor blood sugars. Check beta hydroxybutyrate. Follow-up on the lactic acid levels  Check VBG  Check pancultures including UTI  Will obtain pregnancy test  Check procalcitonin as well. Given leukocytosis could be reactive. We will monitor vitals closely  Check tox screen  Lab showed lactic acidosis, this could be related to Metformin use. Patient denies any abdominal pain. Will monitor lactic acid, hold Metformin  Monitor troponins.   EKG has not been scanned into the system yet         Sally Márquez MD  Attending Physician, Internal Medicine Service    Internal Medicine Residency Program  9/14/2021, 11:30 PM

## 2021-09-15 NOTE — PLAN OF CARE
Problem: Pain:  Goal: Pain level will decrease  Description: Pain level will decrease  Outcome: Met This Shift  Goal: Control of acute pain  Description: Control of acute pain  Outcome: Met This Shift  Goal: Control of chronic pain  Description: Control of chronic pain  Outcome: Met This Shift     Problem: Nutritional:  Goal: Nutritional status will improve  Description: Nutritional status will improve  Outcome: Met This Shift     Problem: Physical Regulation:  Goal: Will remain free from infection  Description: Will remain free from infection  Outcome: Met This Shift  Goal: Ability to maintain vital signs within normal range will improve  Description: Ability to maintain vital signs within normal range will improve  Outcome: Met This Shift     Problem: Health Behavior:  Goal: Ability to manage health-related needs will improve  Description: Ability to manage health-related needs will improve  Outcome: Met This Shift     Problem: Metabolic:  Goal: Ability to maintain appropriate glucose levels will improve  Description: Ability to maintain appropriate glucose levels will improve  Outcome: Ongoing

## 2021-09-15 NOTE — ED NOTES
Patient c/o of chest pain in mid-sternal area and reports \"I have been having chest pain since I got here and no one has addressed it.  It hurts when I lay on my right side, but not my left\"     Flora Rosas RN  09/14/21 2056

## 2021-09-15 NOTE — ED NOTES
Mabel reassessed for CP and patient reports she still has CP and now complains of SOB. Patient was visually seen eating Lauro Loya and educated on DKA, a diabetic diet and that she needs to be NPO. Patient reports she was not aware of NPO status. Patient's boyfriend was also in room and at bedside and was educated on diet and condition.      Flora Hooper RN  09/14/21 2123       Flora Hooper RN  09/14/21 2128

## 2021-09-15 NOTE — PROGRESS NOTES
[facility]  Internal Medicine Teaching Residency Program  Inpatient Daily Progress Note  ______________________________________________________________________________    Patient: Coty Egan  YOB: 1995   XEL:[id number]    Acct: [de-identified]     Room: [address] [place] [address]  Admit date: 9/14/2021  Today's date: 09/15/21  Number of days in the hospital: 1    SUBJECTIVE   Admitting Diagnosis: DKA, type 2, not at goal [facility])     CC: Shortness of breath and chest pain    Pt examined at bedside. Chart & results reviewed. No acute events overnight. Patient was afebrile and hemodynamically stable. She reported improvement with breathing but still reports chest tightness. The patient denies abdominal pain, nausea, vomiting, fever, chills. Labs reviewed: Na 131, bicarb 13, AG 13, lactic acid 2.0, WBC 20.0<--24.7, Hgb 12.8<--14.3; Plt 223    ROS:  Constitutional:  negative for chills, fevers, sweats  Respiratory:  negative for cough, dyspnea on exertion, hemoptysis, shortness of breath, wheezing  Cardiovascular:  negative for chest pain, chest pressure/discomfort, lower extremity edema, palpitations  Gastrointestinal:  negative for abdominal pain, constipation, diarrhea, nausea, vomiting  Neurological:  negative for dizziness, headache    BRIEF HISTORY     The patient is a pleasant 27 y.o. female presents with a chief complaint of shortness of breath and chest pain. The patient has PMH significant of asthma, type II DM. The patient reported feeling shortness of breath and wheezing that started yesterday which is typical for her \" asthma attack\". She reported that her last asthma exacerbation was about a year ago. Patient reported using inhalers that did not relieve her shortness of breath. She went to an urgent care yesterday and was prescribed prednisone 50 mg oral that did not relieve her symptoms. She reported chest pain associated with dry cough.   Chest pain is sharp in nature with intensity 8/10, that worsens with deep breaths and cough. The patient also reported 3 episodes of watery diarrhea and nausea. She reported being compliant to her diabetes medication including insulin dosage. She denies fevers, chills, dysuria, hematuria, vomiting, headache.      In the ED, the patient was afebrile Temp 98.3, RR 23, , /89. SpO2 100% on room air. Physical exam showed tachypnea and mild inspiratory wheezing, able to talk in full sentences. CXR was negative for any acute processes. ECG showed sinus tachycardia. Labs were significant for Na 130, K 4.3, bicarb 10, Cr 0.61, Anion gap 21, Mag 1.7, glucose 366, beta-hydroxy butyrate 0.16, Procal 0.08, Trops negative, pregnancy test negative, WBC 24.7, Hgb 14.3, D-dimer negative, covid negative, lactic acid 6.5, UA negative for UTI. Blood gas pH 7.284/ pCO2 30.1/pO2 74.3/HCO3 13.8. The patient was found to be in DKA and lactic acidosis possibly secondary to metformin use. Urine tox, urine culture and blood culture ordered, will follow up. OBJECTIVE     Vital Signs:  /76   Pulse 82   Temp 98.2 °F (36.8 °C) (Oral)   Resp 23   Ht 5' 7\" (1.702 m)   Wt 255 lb 1.2 oz (115.7 kg)   SpO2 99%   BMI 39.95 kg/m²     Temp (24hrs), Av.3 °F (36.8 °C), Min:98.2 °F (36.8 °C), Max:98.3 °F (36.8 °C)    In: 3595.6   Out: -     Physical Exam:  Constitutional: This is a well developed, well nourished, 35-39.9 - Obesity Grade II 27y.o. year old female who is alert, oriented, cooperative and in no apparent distress. Head:normocephalic and atraumatic. EENT:  PERRLA. No conjunctival injections. Septum was midline, mucosa was without erythema, exudates or cobblestoning. No thrush was noted. Neck: Supple without thyromegaly. No elevated JVP. Trachea was midline. Respiratory: Chest was symmetrical without dullness to percussion. Breath sounds bilaterally were clear to auscultation.  There were no wheezes, rhonchi or rales. There is no intercostal retraction or use of accessory muscles. No egophony noted. Cardiovascular: Regular without murmur, clicks, gallops or rubs. Abdomen: Slightly rounded and soft without organomegaly. No rebound, rigidity or guarding was appreciated. Lymphatic: No lymphadenopathy. Musculoskeletal: Normal curvature of the spine. No gross muscle weakness. Extremities:  No lower extremity edema, ulcerations, tenderness, varicosities or erythema. Muscle size, tone and strength are normal.  No involuntary movements are noted. Skin:  Warm and dry. Good color, turgor and pigmentation. No lesions or scars.   No cyanosis or clubbing  Neurological/Psychiatric: The patient's general behavior, level of consciousness, thought content and emotional status is normal.        Medications:  Scheduled Medications:    pantoprazole  40 mg IntraVENous Daily    And    sodium chloride (PF)  10 mL IntraVENous Daily    enoxaparin  30 mg SubCUTAneous BID    methylPREDNISolone  40 mg IntraVENous Q8H    budesonide-formoterol  2 puff Inhalation BID    magnesium sulfate  1,000 mg IntraVENous Once     Continuous Infusions:    insulin 17.2 Units/hr (09/15/21 0801)    sodium chloride      sodium chloride      dextrose 5 % and 0.45 % NaCl 150 mL/hr at 09/15/21 0500     PRN Medicationsalbuterol sulfate HFA, 2 puff, Q6H PRN  sodium chloride flush, 5-40 mL, PRN  sodium chloride, 25 mL, PRN  ondansetron, 4 mg, Q8H PRN   Or  ondansetron, 4 mg, Q6H PRN  polyethylene glycol, 17 g, Daily PRN  acetaminophen, 650 mg, Q6H PRN   Or  acetaminophen, 650 mg, Q6H PRN  dextrose, 12.5 g, PRN  potassium chloride, 10 mEq, PRN  magnesium sulfate, 1,000 mg, PRN  sodium phosphate IVPB, 10 mmol, PRN   Or  sodium phosphate IVPB, 15 mmol, PRN   Or  sodium phosphate IVPB, 20 mmol, PRN        Diagnostic Labs:  CBC:   Recent Labs     09/14/21  1136 09/15/21  0320   WBC 24.7* 20.0*   RBC 4.55 4.04   HGB 14.3 12.8   HCT 41.3 38.4   MCV 90.8 95.0   RDW 12.3 12.5   PLT See Reflexed IPF Result 223     BMP:   Recent Labs     09/14/21  1950 09/15/21  0006 09/15/21  0320   * 134* 131*   K 4.2 4.1 4.4    106 105   CO2 14* 13* 13*   PHOS 3.2 3.1 1.7*   BUN 12 15 13   CREATININE 0.65 0.60 0.50     BNP: No results for input(s): BNP in the last 72 hours. PT/INR: No results for input(s): PROTIME, INR in the last 72 hours. APTT: No results for input(s): APTT in the last 72 hours. CARDIAC ENZYMES: No results for input(s): CKMB, CKMBINDEX, TROPONINI in the last 72 hours. Invalid input(s): CKTOTAL;3  FASTING LIPID PANEL:  Lab Results   Component Value Date    CHOL 206 (H) 06/24/2020    HDL 34 (L) 06/24/2020    TRIG 298 (H) 06/24/2020     LIVER PROFILE: No results for input(s): AST, ALT, ALB, BILIDIR, BILITOT, ALKPHOS in the last 72 hours.    MICROBIOLOGY:   Lab Results   Component Value Date/Time    CULTURE NO GROWTH 11 HOURS 09/14/2021 05:48 PM       Imaging:    XR CHEST (2 VW)    Result Date: 9/14/2021  Negative chest.       ASSESSMENT & PLAN     ASSESSMENT / PLAN:     DKA  - Patient takes Levemir 55 units nightly; reported compliance to medicines  - Blood gas pH 7.284/ pCO2 30.1/pO2 74.3/HCO3 13.8  -  glucose 366, beta-hydroxy butyrate 0.16  - Received 2L NS IVF, 16 units insulin, 10 mEq potassium supplement in the ED  - Continue on DKA protocol with IVF and insulin gtt  - monitor blood glucose q1 hour  - Monitor BMP q4 hours  - Blood glucose: 232<--242<--173<--271; AG 13, bicarb 13  - Started on diet- advance as tolerated, if able to tolerate PO diet- will bridge her to Lantus  - Blood and urine culture- negative so far, CXR negative for infection.         Asthma Exacerbation  - Continue Symbicort  - Continue Solumedrol 40 mg IV TID  - Continue to monitor respiratory status        Lactic acidosis- resolving  - lactic acid 6.5--> 2.7-->2.1; possibly secondary to metformin  - Metformin on hold  - Continue to monitor lactic acid        Diarrhea- rule out Gastroenteritis  - 3 episodes of watery diarrhea, denies blood in stool  - Molecular GI panel ordered, will follow up  - C.diff ordered, will follow up       DVT ppx: Lovenox  GI ppx: Protonix     PT/OT: On board  Discharge Planning:  consulted, will follow up      Iliana Hylton MD  Internal Medicine Resident, PGY-1  [facility];  [place], [place]  9/15/2021, 8:07 AM

## 2021-09-15 NOTE — FLOWSHEET NOTE
Assessment: Patient is a 27 y.o. female who arrived to the hospital due to \"respiratory distress and chest pain. \" Patient was being moved from ED room to hospital room, when  visited. Intervention:  visited patient per initial rounding visits in the ED.  introduced herself to patient in room. Patient was coping well and remained passive throughout encounter. Patient indicated no needs at time of  visit. Outcome: Patient remained passive throughout encounter. Plan: Chaplains can make follow-up visit, per request. Nonnie Hair can be reached 24/7 via [facility].     Tal [facility]     09/14/21 6772   Encounter Summary   Services provided to: Patient   Referral/Consult From: Rounding  (ED Rounding)   Continue Visiting   (9/14/2021)   Complexity of Encounter Low   Length of Encounter 15 minutes   Spiritual Assessment Completed Yes   Routine   Type Initial   Spiritual/Faith   Type Spiritual support   Assessment Passive;Coping   Intervention Sustaining presence/ Ministry of presence   Outcome Coping

## 2021-09-15 NOTE — CARE COORDINATION
Case Management Initial Discharge Plan  Arsen Zuniga,             Met with:patient to discuss discharge plans. Information verified: address, contacts, phone number, , insurance Yes  Insurance Provider: [address] [place] [address]    Emergency Contact/Next of Kin name & number: Apolinar Bazan and Fracisco Ontiveros on chart  Who are involved in patient's support system? Spouse and parents    PCP: Brad Sanabria PA-C  Date of last visit: May 2021      Discharge Planning    Living Arrangements:  Spouse/Significant Other     Home has 1 stories  Elevator to get into front door    Patient able to perform ADL's:Independent    Current Services (outpatient & in home) None  DME equipment: None  DME provider: None    Is patient receiving oral anticoagulation therapy? No    Potential Assistance Needed:  N/A    Patient agreeable to home care: If needed  Freedom of choice provided:  n/a    Prior SNF/Rehab Placement and Facility: None  Agreeable to SNF/Rehab: If needed  [place] of choice provided: n/a     Evaluation: n/a    Expected Discharge date:       Patient expects to be discharged to: If home: is the family and/or caregiver wiling & able to provide support at home? Yes per patient  Who will be providing this support? Spouse    Follow Up Appointment: Best Day/ Time:      Transportation provider: Spouse  Transportation arrangements needed for discharge: No    Readmission Risk              Risk of Unplanned Readmission:  18             Does patient have a readmission risk score greater than 14?: Yes  If yes, follow-up appointment must be made within 7 days of discharge. Goals of Care: Increased comfort      Educated patient on transitional options, provided freedom of choice and are agreeable with plan      Discharge Plan: Plan to return home with  at discharge. Has PCP and independent.            Electronically signed by Leslie Nagel RN on 9/15/21 at 12:19 PM EDT

## 2021-09-15 NOTE — PROGRESS NOTES
Occupational [address]  Occupational Therapy Not Seen Note    DATE: 9/15/2021    NAME: Shanell Villarreal  MRN: [id number]   : 1995      Patient not seen this date for Occupational Therapy due to:    Writer spoke with pt who reports no concerns with returning home. Patient independent with ADLs and functional tasks with no acute OT needs. Will defer OT evaluation at this time. Please reorder OT if future needs arise.      Electronically signed by Herrera SAXENA/S on 9/15/2021 at 7:55 AM

## 2021-09-16 VITALS
HEIGHT: 67 IN | OXYGEN SATURATION: 98 % | DIASTOLIC BLOOD PRESSURE: 66 MMHG | RESPIRATION RATE: 16 BRPM | SYSTOLIC BLOOD PRESSURE: 136 MMHG | BODY MASS INDEX: 41.35 KG/M2 | WEIGHT: 263.45 LBS | HEART RATE: 71 BPM | TEMPERATURE: 98.4 F

## 2021-09-16 LAB
-: NORMAL
-: NORMAL
ABSOLUTE EOS #: 0.15 K/UL (ref 0–0.44)
ABSOLUTE IMMATURE GRANULOCYTE: 0.17 K/UL (ref 0–0.3)
ABSOLUTE LYMPH #: 4.52 K/UL (ref 1.1–3.7)
ABSOLUTE MONO #: 1.17 K/UL (ref 0.1–1.2)
ANION GAP SERPL CALCULATED.3IONS-SCNC: 14 MMOL/L (ref 9–17)
ANION GAP SERPL CALCULATED.3IONS-SCNC: 15 MMOL/L (ref 9–17)
ANION GAP SERPL CALCULATED.3IONS-SCNC: 16 MMOL/L (ref 9–17)
BASOPHILS # BLD: 0 % (ref 0–2)
BASOPHILS ABSOLUTE: 0.05 K/UL (ref 0–0.2)
BUN BLDV-MCNC: 15 MG/DL (ref 6–20)
BUN BLDV-MCNC: 15 MG/DL (ref 6–20)
BUN BLDV-MCNC: 16 MG/DL (ref 6–20)
BUN/CREAT BLD: ABNORMAL (ref 9–20)
CALCIUM SERPL-MCNC: 8 MG/DL (ref 8.6–10.4)
CALCIUM SERPL-MCNC: 8.1 MG/DL (ref 8.6–10.4)
CALCIUM SERPL-MCNC: 8.2 MG/DL (ref 8.6–10.4)
CHLORIDE BLD-SCNC: 103 MMOL/L (ref 98–107)
CHLORIDE BLD-SCNC: 103 MMOL/L (ref 98–107)
CHLORIDE BLD-SCNC: 105 MMOL/L (ref 98–107)
CO2: 20 MMOL/L (ref 20–31)
CO2: 20 MMOL/L (ref 20–31)
CO2: 21 MMOL/L (ref 20–31)
CREAT SERPL-MCNC: 0.54 MG/DL (ref 0.5–0.9)
CREAT SERPL-MCNC: 0.55 MG/DL (ref 0.5–0.9)
CREAT SERPL-MCNC: 0.57 MG/DL (ref 0.5–0.9)
DIFFERENTIAL TYPE: ABNORMAL
EOSINOPHILS RELATIVE PERCENT: 1 % (ref 1–4)
GFR AFRICAN AMERICAN: >60 ML/MIN
GFR NON-AFRICAN AMERICAN: >60 ML/MIN
GFR SERPL CREATININE-BSD FRML MDRD: ABNORMAL ML/MIN/{1.73_M2}
GLUCOSE BLD-MCNC: 106 MG/DL (ref 65–105)
GLUCOSE BLD-MCNC: 107 MG/DL (ref 70–99)
GLUCOSE BLD-MCNC: 113 MG/DL (ref 65–105)
GLUCOSE BLD-MCNC: 113 MG/DL (ref 65–105)
GLUCOSE BLD-MCNC: 114 MG/DL (ref 65–105)
GLUCOSE BLD-MCNC: 114 MG/DL (ref 65–105)
GLUCOSE BLD-MCNC: 115 MG/DL (ref 65–105)
GLUCOSE BLD-MCNC: 116 MG/DL (ref 65–105)
GLUCOSE BLD-MCNC: 118 MG/DL (ref 65–105)
GLUCOSE BLD-MCNC: 120 MG/DL (ref 65–105)
GLUCOSE BLD-MCNC: 120 MG/DL (ref 65–105)
GLUCOSE BLD-MCNC: 121 MG/DL (ref 70–99)
GLUCOSE BLD-MCNC: 123 MG/DL (ref 65–105)
GLUCOSE BLD-MCNC: 124 MG/DL (ref 65–105)
GLUCOSE BLD-MCNC: 125 MG/DL (ref 65–105)
GLUCOSE BLD-MCNC: 127 MG/DL (ref 65–105)
GLUCOSE BLD-MCNC: 135 MG/DL (ref 65–105)
GLUCOSE BLD-MCNC: 139 MG/DL (ref 65–105)
GLUCOSE BLD-MCNC: 140 MG/DL (ref 65–105)
GLUCOSE BLD-MCNC: 141 MG/DL (ref 70–99)
GLUCOSE BLD-MCNC: 168 MG/DL (ref 65–105)
GLUCOSE BLD-MCNC: 234 MG/DL (ref 65–105)
HCT VFR BLD CALC: 36.4 % (ref 36.3–47.1)
HEMOGLOBIN: 12.3 G/DL (ref 11.9–15.1)
IMMATURE GRANULOCYTES: 1 %
LYMPHOCYTES # BLD: 24 % (ref 24–43)
MAGNESIUM: 1.9 MG/DL (ref 1.6–2.6)
MAGNESIUM: 2 MG/DL (ref 1.6–2.6)
MAGNESIUM: 2.1 MG/DL (ref 1.6–2.6)
MCH RBC QN AUTO: 31.9 PG (ref 25.2–33.5)
MCHC RBC AUTO-ENTMCNC: 33.8 G/DL (ref 28.4–34.8)
MCV RBC AUTO: 94.3 FL (ref 82.6–102.9)
MONOCYTES # BLD: 6 % (ref 3–12)
NRBC AUTOMATED: 0 PER 100 WBC
PDW BLD-RTO: 13 % (ref 11.8–14.4)
PHOSPHORUS: 3.3 MG/DL (ref 2.6–4.5)
PHOSPHORUS: 3.4 MG/DL (ref 2.6–4.5)
PHOSPHORUS: 3.4 MG/DL (ref 2.6–4.5)
PLATELET # BLD: 185 K/UL (ref 138–453)
PLATELET ESTIMATE: ABNORMAL
PMV BLD AUTO: 10.2 FL (ref 8.1–13.5)
POTASSIUM SERPL-SCNC: 3.3 MMOL/L (ref 3.7–5.3)
POTASSIUM SERPL-SCNC: 3.4 MMOL/L (ref 3.7–5.3)
POTASSIUM SERPL-SCNC: 3.4 MMOL/L (ref 3.7–5.3)
RBC # BLD: 3.86 M/UL (ref 3.95–5.11)
RBC # BLD: ABNORMAL 10*6/UL
REASON FOR REJECTION: NORMAL
REASON FOR REJECTION: NORMAL
SEG NEUTROPHILS: 68 % (ref 36–65)
SEGMENTED NEUTROPHILS ABSOLUTE COUNT: 12.43 K/UL (ref 1.5–8.1)
SODIUM BLD-SCNC: 139 MMOL/L (ref 135–144)
WBC # BLD: 18.5 K/UL (ref 3.5–11.3)
WBC # BLD: ABNORMAL 10*3/UL
ZZ NTE CLEAN UP: ORDERED TEST: NORMAL
ZZ NTE CLEAN UP: ORDERED TEST: NORMAL
ZZ NTE WITH NAME CLEAN UP: SPECIMEN SOURCE: NORMAL
ZZ NTE WITH NAME CLEAN UP: SPECIMEN SOURCE: NORMAL

## 2021-09-16 PROCEDURE — 83735 ASSAY OF MAGNESIUM: CPT

## 2021-09-16 PROCEDURE — 6370000000 HC RX 637 (ALT 250 FOR IP)

## 2021-09-16 PROCEDURE — 99232 SBSQ HOSP IP/OBS MODERATE 35: CPT | Performed by: INTERNAL MEDICINE

## 2021-09-16 PROCEDURE — 84100 ASSAY OF PHOSPHORUS: CPT

## 2021-09-16 PROCEDURE — 6360000002 HC RX W HCPCS: Performed by: STUDENT IN AN ORGANIZED HEALTH CARE EDUCATION/TRAINING PROGRAM

## 2021-09-16 PROCEDURE — 6370000000 HC RX 637 (ALT 250 FOR IP): Performed by: STUDENT IN AN ORGANIZED HEALTH CARE EDUCATION/TRAINING PROGRAM

## 2021-09-16 PROCEDURE — 36415 COLL VENOUS BLD VENIPUNCTURE: CPT

## 2021-09-16 PROCEDURE — 85025 COMPLETE CBC W/AUTO DIFF WBC: CPT

## 2021-09-16 PROCEDURE — 94640 AIRWAY INHALATION TREATMENT: CPT

## 2021-09-16 PROCEDURE — 82947 ASSAY GLUCOSE BLOOD QUANT: CPT

## 2021-09-16 PROCEDURE — 2500000003 HC RX 250 WO HCPCS: Performed by: STUDENT IN AN ORGANIZED HEALTH CARE EDUCATION/TRAINING PROGRAM

## 2021-09-16 PROCEDURE — 80048 BASIC METABOLIC PNL TOTAL CA: CPT

## 2021-09-16 PROCEDURE — C9113 INJ PANTOPRAZOLE SODIUM, VIA: HCPCS

## 2021-09-16 PROCEDURE — 2580000003 HC RX 258

## 2021-09-16 PROCEDURE — 6360000002 HC RX W HCPCS

## 2021-09-16 PROCEDURE — 2580000003 HC RX 258: Performed by: STUDENT IN AN ORGANIZED HEALTH CARE EDUCATION/TRAINING PROGRAM

## 2021-09-16 RX ORDER — BUDESONIDE AND FORMOTEROL FUMARATE DIHYDRATE 160; 4.5 UG/1; UG/1
2 AEROSOL RESPIRATORY (INHALATION) 2 TIMES DAILY
Qty: 10.2 G | Refills: 3 | Status: SHIPPED | OUTPATIENT
Start: 2021-09-16

## 2021-09-16 RX ORDER — DEXTROSE MONOHYDRATE 50 MG/ML
100 INJECTION, SOLUTION INTRAVENOUS PRN
Status: DISCONTINUED | OUTPATIENT
Start: 2021-09-16 | End: 2021-09-16 | Stop reason: HOSPADM

## 2021-09-16 RX ORDER — IPRATROPIUM BROMIDE AND ALBUTEROL SULFATE 2.5; .5 MG/3ML; MG/3ML
3 SOLUTION RESPIRATORY (INHALATION)
Qty: 360 ML | Refills: 0 | Status: SHIPPED | OUTPATIENT
Start: 2021-09-16

## 2021-09-16 RX ORDER — BLOOD PRESSURE TEST KIT
1 KIT MISCELLANEOUS 2 TIMES DAILY
Qty: 1 KIT | Refills: 0 | Status: SHIPPED | OUTPATIENT
Start: 2021-09-16

## 2021-09-16 RX ORDER — DEXTROSE MONOHYDRATE 25 G/50ML
12.5 INJECTION, SOLUTION INTRAVENOUS PRN
Status: DISCONTINUED | OUTPATIENT
Start: 2021-09-16 | End: 2021-09-16 | Stop reason: HOSPADM

## 2021-09-16 RX ORDER — INSULIN GLARGINE 100 [IU]/ML
30 INJECTION, SOLUTION SUBCUTANEOUS ONCE
Status: DISCONTINUED | OUTPATIENT
Start: 2021-09-16 | End: 2021-09-16

## 2021-09-16 RX ORDER — INSULIN GLARGINE 100 [IU]/ML
35 INJECTION, SOLUTION SUBCUTANEOUS ONCE
Status: DISCONTINUED | OUTPATIENT
Start: 2021-09-16 | End: 2021-09-16 | Stop reason: HOSPADM

## 2021-09-16 RX ORDER — INSULIN GLARGINE 100 [IU]/ML
55 INJECTION, SOLUTION SUBCUTANEOUS NIGHTLY
Status: DISCONTINUED | OUTPATIENT
Start: 2021-09-17 | End: 2021-09-16 | Stop reason: HOSPADM

## 2021-09-16 RX ORDER — INSULIN GLARGINE 100 [IU]/ML
55 INJECTION, SOLUTION SUBCUTANEOUS NIGHTLY
Status: DISCONTINUED | OUTPATIENT
Start: 2021-09-16 | End: 2021-09-16

## 2021-09-16 RX ORDER — INSULIN GLARGINE 100 [IU]/ML
20 INJECTION, SOLUTION SUBCUTANEOUS ONCE
Status: COMPLETED | OUTPATIENT
Start: 2021-09-16 | End: 2021-09-16

## 2021-09-16 RX ORDER — NICOTINE POLACRILEX 4 MG
15 LOZENGE BUCCAL PRN
Status: DISCONTINUED | OUTPATIENT
Start: 2021-09-16 | End: 2021-09-16 | Stop reason: HOSPADM

## 2021-09-16 RX ORDER — INSULIN DETEMIR 100 [IU]/ML
60 INJECTION, SOLUTION SUBCUTANEOUS NIGHTLY
Qty: 15 ML | Refills: 0 | Status: SHIPPED | OUTPATIENT
Start: 2021-09-16 | End: 2021-10-15

## 2021-09-16 RX ORDER — POTASSIUM CHLORIDE 20 MEQ/1
40 TABLET, EXTENDED RELEASE ORAL ONCE
Status: COMPLETED | OUTPATIENT
Start: 2021-09-16 | End: 2021-09-16

## 2021-09-16 RX ADMIN — PANTOPRAZOLE SODIUM 40 MG: 40 INJECTION, POWDER, FOR SOLUTION INTRAVENOUS at 09:39

## 2021-09-16 RX ADMIN — POTASSIUM CHLORIDE 40 MEQ: 1500 TABLET, EXTENDED RELEASE ORAL at 11:13

## 2021-09-16 RX ADMIN — IPRATROPIUM BROMIDE AND ALBUTEROL SULFATE 1 AMPULE: .5; 3 SOLUTION RESPIRATORY (INHALATION) at 08:59

## 2021-09-16 RX ADMIN — SODIUM BICARBONATE: 84 INJECTION, SOLUTION INTRAVENOUS at 09:52

## 2021-09-16 RX ADMIN — POTASSIUM CHLORIDE 10 MEQ: 7.46 INJECTION, SOLUTION INTRAVENOUS at 00:26

## 2021-09-16 RX ADMIN — POTASSIUM CHLORIDE 10 MEQ: 7.46 INJECTION, SOLUTION INTRAVENOUS at 06:05

## 2021-09-16 RX ADMIN — BUDESONIDE AND FORMOTEROL FUMARATE DIHYDRATE 2 PUFF: 160; 4.5 AEROSOL RESPIRATORY (INHALATION) at 09:00

## 2021-09-16 RX ADMIN — SODIUM BICARBONATE: 84 INJECTION, SOLUTION INTRAVENOUS at 01:05

## 2021-09-16 RX ADMIN — INSULIN GLARGINE 20 UNITS: 100 INJECTION, SOLUTION SUBCUTANEOUS at 11:12

## 2021-09-16 RX ADMIN — ENOXAPARIN SODIUM 30 MG: 30 INJECTION SUBCUTANEOUS at 09:21

## 2021-09-16 RX ADMIN — POTASSIUM CHLORIDE 10 MEQ: 7.46 INJECTION, SOLUTION INTRAVENOUS at 02:01

## 2021-09-16 RX ADMIN — SODIUM CHLORIDE, PRESERVATIVE FREE 10 ML: 5 INJECTION INTRAVENOUS at 09:39

## 2021-09-16 RX ADMIN — POTASSIUM BICARBONATE 40 MEQ: 391 TABLET, EFFERVESCENT ORAL at 14:30

## 2021-09-16 RX ADMIN — POTASSIUM CHLORIDE 10 MEQ: 7.46 INJECTION, SOLUTION INTRAVENOUS at 07:36

## 2021-09-16 RX ADMIN — LISINOPRIL 5 MG: 5 TABLET ORAL at 09:21

## 2021-09-16 RX ADMIN — POTASSIUM CHLORIDE 10 MEQ: 7.46 INJECTION, SOLUTION INTRAVENOUS at 09:39

## 2021-09-16 NOTE — PROGRESS NOTES
CLINICAL PHARMACY NOTE: MEDS TO BEDS    Total # of Prescriptions Filled: 5   The following medications were delivered to the patient:  · Lancets  · Ipratropium  · Test strips  · Glucose meter  · symbicort    Additional Documentation:

## 2021-09-16 NOTE — PROGRESS NOTES
[facility]  Internal Medicine Teaching Residency Program  Inpatient Daily Progress Note  ______________________________________________________________________________    Patient: Chau Escalante  YOB: 1995   APO:[id number]    Acct: [de-identified]     Room: [address] [place] [address]  Admit date: 9/14/2021  Today's date: 09/16/21  Number of days in the hospital: 2    SUBJECTIVE   Admitting Diagnosis: DKA, type 2, not at goal [facility])     CC: Shortness of breath and Chest pain    Pt examined at bedside. Chart & results reviewed. No acute events events overnight. Patient was afebrile and hemodynamically stable. She denies abdominal pain, nausea, vomiting, shortness of breath. Reported improvement with chest pain. Bicarb gtt stopped. Patient was bridged to Lantus 20 units this morning. Tolerated diet well. Latus 35 nightly today and home dose increased to 60 units nightly from tomorrow. K 3.4<-- replaced potassium; CO2 20; Recommended to stop home med- Metrformin       ROS:  Constitutional:  negative for chills, fevers, sweats  Respiratory:  negative for cough, dyspnea on exertion, hemoptysis, shortness of breath, wheezing  Cardiovascular:  negative for chest pain, chest pressure/discomfort, lower extremity edema, palpitations  Gastrointestinal:  negative for abdominal pain, constipation, diarrhea, nausea, vomiting  Neurological:  negative for dizziness, headache    BRIEF HISTORY     The patient is a pleasant 25 y. o. female presents with a chief complaint of shortness of breath and chest pain.  The patient has PMH significant of asthma, type II DM. The patient reported feeling shortness of breath and wheezing that started yesterday which is typical for her \" asthma attack\".   She reported that her last asthma exacerbation was about a year ago. Verdene Junes reported using inhalers that did not relieve her shortness of breath.  She went to an urgent care yesterday and was prescribed midline. Respiratory: Chest was symmetrical without dullness to percussion. Breath sounds bilaterally were clear to auscultation. There were no wheezes, rhonchi or rales. There is no intercostal retraction or use of accessory muscles. No egophony noted. Cardiovascular: Regular without murmur, clicks, gallops or rubs. Abdomen: Slightly rounded and soft without organomegaly. No rebound, rigidity or guarding was appreciated. Lymphatic: No lymphadenopathy. Musculoskeletal: Normal curvature of the spine. No gross muscle weakness. Extremities:  No lower extremity edema, ulcerations, tenderness, varicosities or erythema. Muscle size, tone and strength are normal.  No involuntary movements are noted. Skin:  Warm and dry. Good color, turgor and pigmentation. No lesions or scars.   No cyanosis or clubbing  Neurological/Psychiatric: The patient's general behavior, level of consciousness, thought content and emotional status is normal.        Medications:  Scheduled Medications:    insulin lispro  0-6 Units SubCUTAneous TID WC    insulin lispro  0-3 Units SubCUTAneous Nightly    [START ON 9/17/2021] insulin glargine  55 Units SubCUTAneous Nightly    insulin glargine  35 Units SubCUTAneous Once    pantoprazole  40 mg IntraVENous Daily    And    sodium chloride (PF)  10 mL IntraVENous Daily    lisinopril  5 mg Oral Daily    QUEtiapine  50 mg Oral Nightly    ipratropium-albuterol  1 ampule Inhalation Q4H WA    enoxaparin  30 mg SubCUTAneous BID    budesonide-formoterol  2 puff Inhalation BID    magnesium sulfate  1,000 mg IntraVENous Once     Continuous Infusions:    dextrose      insulin 0.8 Units/hr (09/16/21 1213)    sodium chloride       PRN Medicationsglucose, 15 g, PRN  dextrose, 12.5 g, PRN  glucagon (rDNA), 1 mg, PRN  dextrose, 100 mL/hr, PRN  albuterol sulfate HFA, 2 puff, Q6H PRN  sodium chloride flush, 5-40 mL, PRN  sodium chloride, 25 mL, PRN  ondansetron, 4 mg, Q8H PRN Or  ondansetron, 4 mg, Q6H PRN  polyethylene glycol, 17 g, Daily PRN  acetaminophen, 650 mg, Q6H PRN   Or  acetaminophen, 650 mg, Q6H PRN  dextrose, 12.5 g, PRN  potassium chloride, 10 mEq, PRN  magnesium sulfate, 1,000 mg, PRN  sodium phosphate IVPB, 10 mmol, PRN   Or  sodium phosphate IVPB, 15 mmol, PRN   Or  sodium phosphate IVPB, 20 mmol, PRN        Diagnostic Labs:  CBC:   Recent Labs     09/14/21  1136 09/15/21  0320 09/16/21  0450   WBC 24.7* 20.0* 18.5*   RBC 4.55 4.04 3.86*   HGB 14.3 12.8 12.3   HCT 41.3 38.4 36.4   MCV 90.8 95.0 94.3   RDW 12.3 12.5 13.0   PLT See Reflexed IPF Result 223 185     BMP:   Recent Labs     09/16/21  0450 09/16/21  0733 09/16/21  1135    139 139   K 3.3* 3.4* 3.4*    103 103   CO2 20 20 21   PHOS 3.4 3.4 3.3   BUN 15 15 16   CREATININE 0.54 0.57 0.55     BNP: No results for input(s): BNP in the last 72 hours. PT/INR: No results for input(s): PROTIME, INR in the last 72 hours. APTT: No results for input(s): APTT in the last 72 hours. CARDIAC ENZYMES: No results for input(s): CKMB, CKMBINDEX, TROPONINI in the last 72 hours. Invalid input(s): CKTOTAL;3  FASTING LIPID PANEL:  Lab Results   Component Value Date    CHOL 206 (H) 06/24/2020    HDL 34 (L) 06/24/2020    TRIG 298 (H) 06/24/2020     LIVER PROFILE: No results for input(s): AST, ALT, ALB, BILIDIR, BILITOT, ALKPHOS in the last 72 hours.    MICROBIOLOGY:   Lab Results   Component Value Date/Time    CULTURE NO GROWTH 2 DAYS 09/14/2021 05:48 PM       Imaging:    XR CHEST (2 VW)    Result Date: 9/14/2021  Negative chest.       ASSESSMENT & PLAN     ASSESSMENT / PLAN:     Hyperglycemia with acidosis- likely secondary to lactic acidosis - resolved  - Patient takes Levemir 55 units nightly; reported compliance to medicines  - Blood gas pH 7.284/ pCO2 30.1/pO2 74.3/HCO3 13.8  -  glucose 366, beta-hydroxy butyrate 0.16  - Received 2L NS IVF, 16 units insulin, 10 mEq potassium supplement in the ED  - Blood glucose: 234<--114<--116<--125; AG 15, bicarb 20- stopped bicarb gtt  - Started on diet this morning- advance as tolerated  - Patient was bridged to Lantus 20 units this morning. Tolerated diet well. Latus 35 nightly today and home dose increased to 60 units nightly from tomorrow. - Blood and urine culture- negative so far, CXR negative for infection.         Asthma Exacerbation  - Continue Symbicort  - Continue Solumedrol 40 mg IV TID  - Continue to monitor respiratory status        Lactic acidosis- resolving  - lactic acid 6.5--> 2.7-->2.1; possibly secondary to metformin  - Metformin on hold; patient advised to stop metfomin at home; follow up with PCP for further management  - Continue to monitor lactic acid        Diarrhea- rule out Gastroenteritis  - 3 episodes of watery diarrhea, denies blood in stool  - no episodes of diarrhea during hospital course        DVT ppx: Lovenox  GI ppx: Protonix     PT/OT: On board  Discharge Planning: Patient will go home upon discharge     Claudene Betters, MD  Internal Medicine Resident, PGY-1  [address], [place]  9/16/2021, 3:49 PM

## 2021-09-16 NOTE — CARE COORDINATION
Discharge Report    Met with patient at bedside to discuss needs.  She states she is going home with family and is denying any concerns or needs    [facility] Case Management Department  Written by: Ct Bryan RN    Patient Name: Sony Bauer  Attending Provider: Joseph Landon MD  Admit Date: 2021 10:56 AM  MRN: [id number]  Account: [de-identified]                     : 1995  Discharge Date: 21      Disposition: home    Ct Bryan RN

## 2021-09-17 LAB
MISCELLANEOUS LAB TEST RESULT: NORMAL
TEST NAME: NORMAL

## 2021-09-17 NOTE — DISCHARGE SUMMARY
[address]     Department of Internal Medicine - Staff Internal Medicine Teaching Service    INPATIENT DISCHARGE SUMMARY      Patient Identification:  Alison Jose is a 27 y.o. female. :  1995  MRN: [id number]     Acct: [de-identified]   PCP: Carrington De La Torre PA-C  Admit Date:  2021  Discharge date and time: 2021  5:35 PM   Attending Provider: Dr. Veronica Silva Problem Lists:  Principal Problem:    DKA, type 2, not at goal [facility])  Active Problems:    Asthma exacerbation    Class 2 obesity in adult    Uncontrolled diabetes mellitus ([facility] [facility]    Diabetic acidosis without coma (HCC)    Metabolic acidosis due to diabetes mellitus ([facility]  Resolved Problems:    * No resolved hospital problems. *      HOSPITAL STAY     Brief Inpatient course:   Alison Jose is a 27 y.o. female who was admitted for the management of DKA, type 2, not at goal [facility]), presented to the emergency department with a chief complaint of shortness of breath and chest pain.  The patient has PMH significant of asthma, type II DM. The patient reported feeling shortness of breath and wheezing that started yesterday which is typical for her \" asthma attack\".   She reported that her last asthma exacerbation was about a year ago. Alice Aponte reported using inhalers that did not relieve her shortness of breath.  She went to an urgent care yesterday and was prescribed prednisone 50 mg oral that did not relieve her symptoms.  She reported chest pain associated with dry cough.  Chest pain is sharp in nature with intensity 8/10, that worsens with deep breaths and cough.  The patient also reported 3 episodes of watery diarrhea and nausea.  She reported being compliant to her diabetes medication including insulin dosage. She denies fevers, chills, dysuria, hematuria, vomiting, headache.      The patient was treated for hyperglycemia with acidosis likely secondary to lactic acidosis from Metformin. The patient was treated with insulin drip, IV fluids and bicarb drip. She was eventually bridged to Lantus 55 units. Home dose Lantus increased to 60 units nightly at discharge and patient was advised to stop Metformin and follow-up with PCP within a week. Lactic acid resolved. Blood and urine culture negative for infection, chest x-ray was negative for infection, leukocytosis was resolved likely secondary to steroid use. The patient was also treated for asthma exacerbation with Symbicort, Solu-Medrol IV, she improved on her respiratory status. Patient's diarrhea was resolved, with no episodes of diarrhea during the hospital course. The patient was discharged home in stable condition.      Procedures/ Significant Interventions:    N/A    Consults:     Consults:     Final Specialist Recommendations/Findings:   IP CONSULT TO INTERNAL MEDICINE  IP CONSULT TO CASE MANAGEMENT      Any Hospital Acquired Infections: none    Discharge Functional Status:  stable    DISCHARGE PLAN     Disposition: home    Patient Instructions:   Discharge Medication List as of 9/16/2021  3:46 PM      START taking these medications    Details   budesonide-formoterol (SYMBICORT) 160-4.5 MCG/ACT AERO Inhale 2 puffs into the lungs 2 times daily, Disp-10.2 g, R-3Normal      ipratropium-albuterol (DUONEB) 0.5-2.5 (3) MG/3ML SOLN nebulizer solution Inhale 3 mLs into the lungs every 4 hours (while awake), Disp-360 mL, R-0Normal         CONTINUE these medications which have CHANGED    Details   insulin detemir (LEVEMIR FLEXTOUCH) 100 UNIT/ML injection pen Inject 60 Units into the skin nightly, Disp-15 mL, R-0Normal      blood glucose monitor kit and supplies 1 kit by Other route three times daily Dispense product that insurance will cover, Other, 3 TIMES DAILY Starting Thu 9/16/2021, Disp-1 kit, R-0, Normal      Blood Pressure KIT 2 TIMES DAILY Starting Thu 9/16/2021, Disp-1 kit, R-0, Normal CONTINUE these medications which have NOT CHANGED    Details   valACYclovir (VALTREX) 500 MG tablet take 1 tablet by mouth twice a day for 7 days, Disp-14 tablet, R-2Normal      !! UNIFINE PENTIPS 29G X 12MM MISC Disp-120 each, R-5, JOSE, NormalUSE Five times a DAILY WITH BASAGLAR      omeprazole (PRILOSEC) 20 MG delayed release capsule Take 1 capsule by mouth every morning (before breakfast), Disp-30 capsule, R-3Normal      insulin aspart (NOVOLOG FLEXPEN) 100 UNIT/ML injection pen Inject 4 Units into the skin 3 times daily (before meals) 4 units before breakfast, 4 units before lunch, 6 units before dinner and 6 units before bedtime, Disp-5 pen, R-2Normal      lisinopril (PRINIVIL;ZESTRIL) 5 MG tablet take 1 tablet by mouth once daily, Disp-30 tablet, R-5Normal      Semaglutide,0.25 or 0.5MG/DOS, (OZEMPIC, 0.25 OR 0.5 MG/DOSE,) 2 MG/1.5ML SOPN 0.25 mg 1 weekly x 4 weeks then increase 0.5 mg once weekly x 4 weeks then 1 mg once weekly, Disp-2 pen, R-3Normal      !! Insulin Pen Needle (PEN NEEDLES) 31G X 6 MM MISC DAILY Starting Fri 5/21/2021, Disp-200 each, R-5, Normal      TRUEplus Lancets 30G MISC Disp-300 each, R-5, Normal      sucralfate (CARAFATE) 1 GM tablet take 1 tablet by mouth four times a day, Disp-120 tablet, R-0Normal      clotrimazole-betamethasone (LOTRISONE) 1-0.05 % cream Apply topically 2 times daily. , Disp-45 g, R-1, Normal      QUEtiapine (SEROQUEL) 50 MG tablet take 1 tablet by mouth at bedtime, Disp-30 tablet, R-1Normal      acetaminophen (TYLENOL) 500 MG tablet Take 2 tablets by mouth 3 times daily, Disp-90 tablet,R-0Print      PREMPRO 0.625-2.5 MG per tablet take 1 tablet by mouth once daily, Disp-28 tablet, R-1Normal      NARCAN 4 MG/0.1ML LIQD nasal spray ADMINISTER A SINGLE spray INTO ONE NOSTRIL CALL 911 MAY REPEAT ONCE, R-0, DAWHistorical Med      albuterol sulfate HFA (PROVENTIL HFA) 108 (90 Base) MCG/ACT inhaler Inhale 2 puffs into the lungs every 6 hours as needed for Wheezing, Disp-1 Inhaler, R-2Normal       !! - Potential duplicate medications found. Please discuss with provider. STOP taking these medications       metFORMIN (GLUCOPHAGE) 1000 MG tablet Comments:   Reason for Stopping:         blood glucose monitor strips Comments:   Reason for Stopping:         etonogestrel (Peg Slight) 68 MG implant Comments:   Reason for Stopping:               Activity: activity as tolerated    Diet: diabetic diet    Follow-up:    Olamide Verde PA-C  [facility]  [address] [facility] [address]  [address] [address]  [phone]    Schedule an appointment as soon as possible for a visit in 1 week  Please follow up for post hospitalization follow up. Patient Instructions:   Please stop taking metformin, take 35 U lantus today, otherwise continue to take 55 U nightly from tomorrow night. Check your blood glucose levels 4 times daily, if fasting levels > 200, increase to 60 U and follow up with your PCP as soon as possible for follow up of diabetes. Get a BMP in 2 days, results to be followed up by PCP. You may need oral potassium supplementation based on the same. Follow up labs: BMP in 2 days    Follow up imaging: N/A      Silver Bradley MD  Internal Medicine Resident, PGY-1  [address];  [place], [place]  9/17/2021, 3:11 PM

## 2021-09-19 LAB
MISCELLANEOUS LAB TEST RESULT: NORMAL
TEST NAME: NORMAL

## 2021-09-20 ENCOUNTER — APPOINTMENT (OUTPATIENT)
Dept: CT IMAGING | Age: 26
DRG: 364 | End: 2021-09-20
Payer: COMMERCIAL

## 2021-09-20 ENCOUNTER — ANESTHESIA EVENT (OUTPATIENT)
Dept: OPERATING ROOM | Age: 26
DRG: 364 | End: 2021-09-20
Payer: COMMERCIAL

## 2021-09-20 ENCOUNTER — APPOINTMENT (OUTPATIENT)
Dept: ULTRASOUND IMAGING | Age: 26
DRG: 364 | End: 2021-09-20
Payer: COMMERCIAL

## 2021-09-20 ENCOUNTER — ANESTHESIA (OUTPATIENT)
Dept: OPERATING ROOM | Age: 26
DRG: 364 | End: 2021-09-20
Payer: COMMERCIAL

## 2021-09-20 ENCOUNTER — HOSPITAL ENCOUNTER (INPATIENT)
Age: 26
LOS: 2 days | Discharge: HOME HEALTH | DRG: 364 | End: 2021-09-22
Attending: EMERGENCY MEDICINE | Admitting: FAMILY MEDICINE
Payer: COMMERCIAL

## 2021-09-20 VITALS — TEMPERATURE: 98.6 F | SYSTOLIC BLOOD PRESSURE: 106 MMHG | OXYGEN SATURATION: 95 % | DIASTOLIC BLOOD PRESSURE: 55 MMHG

## 2021-09-20 DIAGNOSIS — L02.91 ABSCESS: Primary | ICD-10-CM

## 2021-09-20 LAB
ABSOLUTE BANDS #: 0.39 K/UL (ref 0–1)
ABSOLUTE EOS #: 0.97 K/UL (ref 0–0.4)
ABSOLUTE IMMATURE GRANULOCYTE: ABNORMAL K/UL (ref 0–0.3)
ABSOLUTE LYMPH #: 2.91 K/UL (ref 1–4.8)
ABSOLUTE MONO #: 1.16 K/UL (ref 0.1–1.3)
ANION GAP SERPL CALCULATED.3IONS-SCNC: 15 MMOL/L (ref 9–17)
BANDS: 2 % (ref 0–10)
BASOPHILS # BLD: 0 % (ref 0–2)
BASOPHILS ABSOLUTE: 0 K/UL (ref 0–0.2)
BUN BLDV-MCNC: 10 MG/DL (ref 6–20)
BUN/CREAT BLD: ABNORMAL (ref 9–20)
CALCIUM SERPL-MCNC: 9.3 MG/DL (ref 8.6–10.4)
CHLORIDE BLD-SCNC: 101 MMOL/L (ref 98–107)
CO2: 20 MMOL/L (ref 20–31)
CREAT SERPL-MCNC: 0.6 MG/DL (ref 0.5–0.9)
CULTURE: NORMAL
CULTURE: NORMAL
DIFFERENTIAL TYPE: ABNORMAL
EOSINOPHILS RELATIVE PERCENT: 5 % (ref 0–4)
GFR AFRICAN AMERICAN: >60 ML/MIN
GFR NON-AFRICAN AMERICAN: >60 ML/MIN
GFR SERPL CREATININE-BSD FRML MDRD: ABNORMAL ML/MIN/{1.73_M2}
GFR SERPL CREATININE-BSD FRML MDRD: ABNORMAL ML/MIN/{1.73_M2}
GLUCOSE BLD-MCNC: 208 MG/DL (ref 65–105)
GLUCOSE BLD-MCNC: 208 MG/DL (ref 65–105)
GLUCOSE BLD-MCNC: 276 MG/DL (ref 70–99)
HCG QUALITATIVE: NEGATIVE
HCT VFR BLD CALC: 35.5 % (ref 36–46)
HEMOGLOBIN: 11.9 G/DL (ref 12–16)
IMMATURE GRANULOCYTES: ABNORMAL %
LYMPHOCYTES # BLD: 15 % (ref 24–44)
Lab: NORMAL
Lab: NORMAL
MCH RBC QN AUTO: 31.1 PG (ref 26–34)
MCHC RBC AUTO-ENTMCNC: 33.7 G/DL (ref 31–37)
MCV RBC AUTO: 92.5 FL (ref 80–100)
MONOCYTES # BLD: 6 % (ref 1–7)
MORPHOLOGY: ABNORMAL
NRBC AUTOMATED: ABNORMAL PER 100 WBC
PDW BLD-RTO: 12.7 % (ref 11.5–14.9)
PLATELET # BLD: 252 K/UL (ref 150–450)
PLATELET ESTIMATE: ABNORMAL
PMV BLD AUTO: 7.4 FL (ref 6–12)
POTASSIUM SERPL-SCNC: 3.9 MMOL/L (ref 3.7–5.3)
RBC # BLD: 3.83 M/UL (ref 4–5.2)
RBC # BLD: ABNORMAL 10*6/UL
SEG NEUTROPHILS: 72 % (ref 36–66)
SEGMENTED NEUTROPHILS ABSOLUTE COUNT: 13.97 K/UL (ref 1.3–9.1)
SODIUM BLD-SCNC: 136 MMOL/L (ref 135–144)
SPECIMEN DESCRIPTION: NORMAL
SPECIMEN DESCRIPTION: NORMAL
WBC # BLD: 19.4 K/UL (ref 3.5–11)
WBC # BLD: ABNORMAL 10*3/UL

## 2021-09-20 PROCEDURE — 2580000003 HC RX 258: Performed by: ANESTHESIOLOGY

## 2021-09-20 PROCEDURE — 85025 COMPLETE CBC W/AUTO DIFF WBC: CPT

## 2021-09-20 PROCEDURE — 82947 ASSAY GLUCOSE BLOOD QUANT: CPT

## 2021-09-20 PROCEDURE — 84703 CHORIONIC GONADOTROPIN ASSAY: CPT

## 2021-09-20 PROCEDURE — 3600000002 HC SURGERY LEVEL 2 BASE: Performed by: SURGERY

## 2021-09-20 PROCEDURE — 2500000003 HC RX 250 WO HCPCS: Performed by: NURSE ANESTHETIST, CERTIFIED REGISTERED

## 2021-09-20 PROCEDURE — 7100000000 HC PACU RECOVERY - FIRST 15 MIN: Performed by: SURGERY

## 2021-09-20 PROCEDURE — 7100000001 HC PACU RECOVERY - ADDTL 15 MIN: Performed by: SURGERY

## 2021-09-20 PROCEDURE — 99999 PR OFFICE/OUTPT VISIT,PROCEDURE ONLY: CPT | Performed by: PHYSICIAN ASSISTANT

## 2021-09-20 PROCEDURE — 3600000012 HC SURGERY LEVEL 2 ADDTL 15MIN: Performed by: SURGERY

## 2021-09-20 PROCEDURE — 2580000003 HC RX 258: Performed by: SURGERY

## 2021-09-20 PROCEDURE — 87077 CULTURE AEROBIC IDENTIFY: CPT

## 2021-09-20 PROCEDURE — 2500000003 HC RX 250 WO HCPCS: Performed by: SURGERY

## 2021-09-20 PROCEDURE — 82948 REAGENT STRIP/BLOOD GLUCOSE: CPT

## 2021-09-20 PROCEDURE — 2580000003 HC RX 258: Performed by: PHYSICIAN ASSISTANT

## 2021-09-20 PROCEDURE — 0J9D0ZZ DRAINAGE OF RIGHT UPPER ARM SUBCUTANEOUS TISSUE AND FASCIA, OPEN APPROACH: ICD-10-PCS | Performed by: SURGERY

## 2021-09-20 PROCEDURE — 80048 BASIC METABOLIC PNL TOTAL CA: CPT

## 2021-09-20 PROCEDURE — 3700000000 HC ANESTHESIA ATTENDED CARE: Performed by: SURGERY

## 2021-09-20 PROCEDURE — 86403 PARTICLE AGGLUT ANTBDY SCRN: CPT

## 2021-09-20 PROCEDURE — 99283 EMERGENCY DEPT VISIT LOW MDM: CPT

## 2021-09-20 PROCEDURE — 87070 CULTURE OTHR SPECIMN AEROBIC: CPT

## 2021-09-20 PROCEDURE — 2709999900 HC NON-CHARGEABLE SUPPLY: Performed by: SURGERY

## 2021-09-20 PROCEDURE — 76882 US LMTD JT/FCL EVL NVASC XTR: CPT

## 2021-09-20 PROCEDURE — 6370000000 HC RX 637 (ALT 250 FOR IP): Performed by: PHYSICIAN ASSISTANT

## 2021-09-20 PROCEDURE — 36415 COLL VENOUS BLD VENIPUNCTURE: CPT

## 2021-09-20 PROCEDURE — 3700000001 HC ADD 15 MINUTES (ANESTHESIA): Performed by: SURGERY

## 2021-09-20 PROCEDURE — 87205 SMEAR GRAM STAIN: CPT

## 2021-09-20 PROCEDURE — 87186 SC STD MICRODIL/AGAR DIL: CPT

## 2021-09-20 PROCEDURE — 6360000002 HC RX W HCPCS: Performed by: PHYSICIAN ASSISTANT

## 2021-09-20 PROCEDURE — 6370000000 HC RX 637 (ALT 250 FOR IP): Performed by: FAMILY MEDICINE

## 2021-09-20 PROCEDURE — 6360000002 HC RX W HCPCS: Performed by: NURSE ANESTHETIST, CERTIFIED REGISTERED

## 2021-09-20 PROCEDURE — 6360000002 HC RX W HCPCS: Performed by: ANESTHESIOLOGY

## 2021-09-20 PROCEDURE — 1200000000 HC SEMI PRIVATE

## 2021-09-20 RX ORDER — ONDANSETRON 4 MG/1
4 TABLET, ORALLY DISINTEGRATING ORAL EVERY 8 HOURS PRN
Status: DISCONTINUED | OUTPATIENT
Start: 2021-09-20 | End: 2021-09-22 | Stop reason: HOSPADM

## 2021-09-20 RX ORDER — OXYCODONE HYDROCHLORIDE AND ACETAMINOPHEN 5; 325 MG/1; MG/1
1 TABLET ORAL
Status: DISCONTINUED | OUTPATIENT
Start: 2021-09-20 | End: 2021-09-20

## 2021-09-20 RX ORDER — INSULIN GLARGINE 100 [IU]/ML
60 INJECTION, SOLUTION SUBCUTANEOUS NIGHTLY
Status: DISCONTINUED | OUTPATIENT
Start: 2021-09-20 | End: 2021-09-22 | Stop reason: HOSPADM

## 2021-09-20 RX ORDER — BUDESONIDE AND FORMOTEROL FUMARATE DIHYDRATE 160; 4.5 UG/1; UG/1
2 AEROSOL RESPIRATORY (INHALATION) 2 TIMES DAILY
Status: DISCONTINUED | OUTPATIENT
Start: 2021-09-20 | End: 2021-09-22 | Stop reason: HOSPADM

## 2021-09-20 RX ORDER — PANTOPRAZOLE SODIUM 20 MG/1
20 TABLET, DELAYED RELEASE ORAL
Status: DISCONTINUED | OUTPATIENT
Start: 2021-09-21 | End: 2021-09-22 | Stop reason: HOSPADM

## 2021-09-20 RX ORDER — HYDROCODONE BITARTRATE AND ACETAMINOPHEN 5; 325 MG/1; MG/1
2 TABLET ORAL EVERY 4 HOURS PRN
Status: DISCONTINUED | OUTPATIENT
Start: 2021-09-20 | End: 2021-09-22 | Stop reason: HOSPADM

## 2021-09-20 RX ORDER — SULFAMETHOXAZOLE AND TRIMETHOPRIM 800; 160 MG/1; MG/1
1 TABLET ORAL ONCE
Status: DISCONTINUED | OUTPATIENT
Start: 2021-09-20 | End: 2021-09-20

## 2021-09-20 RX ORDER — QUETIAPINE FUMARATE 100 MG/1
50 TABLET, FILM COATED ORAL NIGHTLY
Status: DISCONTINUED | OUTPATIENT
Start: 2021-09-20 | End: 2021-09-22 | Stop reason: HOSPADM

## 2021-09-20 RX ORDER — METOCLOPRAMIDE HYDROCHLORIDE 5 MG/ML
10 INJECTION INTRAMUSCULAR; INTRAVENOUS EVERY 6 HOURS
Status: ACTIVE | OUTPATIENT
Start: 2021-09-20 | End: 2021-09-20

## 2021-09-20 RX ORDER — HYDROCODONE BITARTRATE AND ACETAMINOPHEN 5; 325 MG/1; MG/1
1 TABLET ORAL EVERY 4 HOURS PRN
Status: DISCONTINUED | OUTPATIENT
Start: 2021-09-20 | End: 2021-09-22 | Stop reason: HOSPADM

## 2021-09-20 RX ORDER — HYDRALAZINE HYDROCHLORIDE 20 MG/ML
5 INJECTION INTRAMUSCULAR; INTRAVENOUS EVERY 10 MIN PRN
Status: DISCONTINUED | OUTPATIENT
Start: 2021-09-20 | End: 2021-09-20

## 2021-09-20 RX ORDER — PROPOFOL 10 MG/ML
INJECTION, EMULSION INTRAVENOUS PRN
Status: DISCONTINUED | OUTPATIENT
Start: 2021-09-20 | End: 2021-09-20 | Stop reason: SDUPTHER

## 2021-09-20 RX ORDER — ONDANSETRON 4 MG/1
TABLET, FILM COATED ORAL
Qty: 20 TABLET | Refills: 0 | Status: SHIPPED | OUTPATIENT
Start: 2021-09-20 | End: 2021-11-30

## 2021-09-20 RX ORDER — PANTOPRAZOLE SODIUM 40 MG/1
40 TABLET, DELAYED RELEASE ORAL
Status: DISCONTINUED | OUTPATIENT
Start: 2021-09-21 | End: 2021-09-20

## 2021-09-20 RX ORDER — SODIUM CHLORIDE 0.9 % (FLUSH) 0.9 %
5-40 SYRINGE (ML) INJECTION EVERY 12 HOURS SCHEDULED
Status: DISCONTINUED | OUTPATIENT
Start: 2021-09-20 | End: 2021-09-22 | Stop reason: HOSPADM

## 2021-09-20 RX ORDER — SODIUM CHLORIDE 0.9 % (FLUSH) 0.9 %
5-40 SYRINGE (ML) INJECTION PRN
Status: DISCONTINUED | OUTPATIENT
Start: 2021-09-20 | End: 2021-09-22 | Stop reason: HOSPADM

## 2021-09-20 RX ORDER — SODIUM CHLORIDE 9 MG/ML
INJECTION, SOLUTION INTRAVENOUS CONTINUOUS
Status: DISCONTINUED | OUTPATIENT
Start: 2021-09-20 | End: 2021-09-20

## 2021-09-20 RX ORDER — ONDANSETRON 2 MG/ML
4 INJECTION INTRAMUSCULAR; INTRAVENOUS EVERY 6 HOURS PRN
Status: DISCONTINUED | OUTPATIENT
Start: 2021-09-20 | End: 2021-09-22 | Stop reason: HOSPADM

## 2021-09-20 RX ORDER — LABETALOL HYDROCHLORIDE 5 MG/ML
5 INJECTION, SOLUTION INTRAVENOUS EVERY 10 MIN PRN
Status: DISCONTINUED | OUTPATIENT
Start: 2021-09-20 | End: 2021-09-20

## 2021-09-20 RX ORDER — LISINOPRIL 5 MG/1
5 TABLET ORAL DAILY
Status: DISCONTINUED | OUTPATIENT
Start: 2021-09-21 | End: 2021-09-22 | Stop reason: HOSPADM

## 2021-09-20 RX ORDER — DEXTROSE MONOHYDRATE 50 MG/ML
100 INJECTION, SOLUTION INTRAVENOUS PRN
Status: DISCONTINUED | OUTPATIENT
Start: 2021-09-20 | End: 2021-09-22 | Stop reason: HOSPADM

## 2021-09-20 RX ORDER — MORPHINE SULFATE 4 MG/ML
2 INJECTION, SOLUTION INTRAMUSCULAR; INTRAVENOUS
Status: DISCONTINUED | OUTPATIENT
Start: 2021-09-20 | End: 2021-09-21

## 2021-09-20 RX ORDER — MIDAZOLAM HYDROCHLORIDE 1 MG/ML
INJECTION INTRAMUSCULAR; INTRAVENOUS PRN
Status: DISCONTINUED | OUTPATIENT
Start: 2021-09-20 | End: 2021-09-20 | Stop reason: SDUPTHER

## 2021-09-20 RX ORDER — ACETAMINOPHEN 325 MG/1
650 TABLET ORAL EVERY 4 HOURS PRN
Status: DISCONTINUED | OUTPATIENT
Start: 2021-09-20 | End: 2021-09-22 | Stop reason: HOSPADM

## 2021-09-20 RX ORDER — NICOTINE POLACRILEX 4 MG
15 LOZENGE BUCCAL PRN
Status: DISCONTINUED | OUTPATIENT
Start: 2021-09-20 | End: 2021-09-22 | Stop reason: HOSPADM

## 2021-09-20 RX ORDER — OXYCODONE HYDROCHLORIDE AND ACETAMINOPHEN 5; 325 MG/1; MG/1
1 TABLET ORAL EVERY 6 HOURS PRN
Qty: 28 TABLET | Refills: 0 | Status: SHIPPED | OUTPATIENT
Start: 2021-09-20 | End: 2021-09-21 | Stop reason: HOSPADM

## 2021-09-20 RX ORDER — ONDANSETRON 2 MG/ML
INJECTION INTRAMUSCULAR; INTRAVENOUS PRN
Status: DISCONTINUED | OUTPATIENT
Start: 2021-09-20 | End: 2021-09-20 | Stop reason: SDUPTHER

## 2021-09-20 RX ORDER — FENTANYL CITRATE 50 UG/ML
INJECTION, SOLUTION INTRAMUSCULAR; INTRAVENOUS PRN
Status: DISCONTINUED | OUTPATIENT
Start: 2021-09-20 | End: 2021-09-20 | Stop reason: SDUPTHER

## 2021-09-20 RX ORDER — METOCLOPRAMIDE HYDROCHLORIDE 5 MG/ML
10 INJECTION INTRAMUSCULAR; INTRAVENOUS
Status: DISCONTINUED | OUTPATIENT
Start: 2021-09-20 | End: 2021-09-20

## 2021-09-20 RX ORDER — 0.9 % SODIUM CHLORIDE 0.9 %
80 INTRAVENOUS SOLUTION INTRAVENOUS ONCE
Status: DISCONTINUED | OUTPATIENT
Start: 2021-09-20 | End: 2021-09-22 | Stop reason: HOSPADM

## 2021-09-20 RX ORDER — 0.9 % SODIUM CHLORIDE 0.9 %
500 INTRAVENOUS SOLUTION INTRAVENOUS
Status: DISCONTINUED | OUTPATIENT
Start: 2021-09-20 | End: 2021-09-20

## 2021-09-20 RX ORDER — SULFAMETHOXAZOLE AND TRIMETHOPRIM 800; 160 MG/1; MG/1
1 TABLET ORAL 2 TIMES DAILY
Qty: 28 TABLET | Refills: 0 | Status: SHIPPED | OUTPATIENT
Start: 2021-09-20 | End: 2021-10-04

## 2021-09-20 RX ORDER — SODIUM CHLORIDE 9 MG/ML
25 INJECTION, SOLUTION INTRAVENOUS PRN
Status: DISCONTINUED | OUTPATIENT
Start: 2021-09-20 | End: 2021-09-22 | Stop reason: HOSPADM

## 2021-09-20 RX ORDER — DEXAMETHASONE SODIUM PHOSPHATE 4 MG/ML
INJECTION, SOLUTION INTRA-ARTICULAR; INTRALESIONAL; INTRAMUSCULAR; INTRAVENOUS; SOFT TISSUE PRN
Status: DISCONTINUED | OUTPATIENT
Start: 2021-09-20 | End: 2021-09-20 | Stop reason: SDUPTHER

## 2021-09-20 RX ORDER — LIDOCAINE HYDROCHLORIDE 10 MG/ML
1 INJECTION, SOLUTION EPIDURAL; INFILTRATION; INTRACAUDAL; PERINEURAL
Status: DISCONTINUED | OUTPATIENT
Start: 2021-09-20 | End: 2021-09-20

## 2021-09-20 RX ORDER — CEPHALEXIN 500 MG/1
CAPSULE ORAL
Qty: 21 CAPSULE | Refills: 0 | Status: SHIPPED | OUTPATIENT
Start: 2021-09-20 | End: 2021-11-30 | Stop reason: ALTCHOICE

## 2021-09-20 RX ORDER — DEXTROSE MONOHYDRATE 25 G/50ML
12.5 INJECTION, SOLUTION INTRAVENOUS PRN
Status: DISCONTINUED | OUTPATIENT
Start: 2021-09-20 | End: 2021-09-22 | Stop reason: HOSPADM

## 2021-09-20 RX ORDER — SODIUM CHLORIDE 9 MG/ML
INJECTION, SOLUTION INTRAVENOUS CONTINUOUS
Status: DISCONTINUED | OUTPATIENT
Start: 2021-09-20 | End: 2021-09-21

## 2021-09-20 RX ORDER — PROMETHAZINE HYDROCHLORIDE 25 MG/ML
6.25 INJECTION, SOLUTION INTRAMUSCULAR; INTRAVENOUS
Status: DISCONTINUED | OUTPATIENT
Start: 2021-09-20 | End: 2021-09-20

## 2021-09-20 RX ORDER — CEPHALEXIN 250 MG/1
500 CAPSULE ORAL ONCE
Status: DISCONTINUED | OUTPATIENT
Start: 2021-09-20 | End: 2021-09-20

## 2021-09-20 RX ORDER — LIDOCAINE HYDROCHLORIDE 10 MG/ML
INJECTION, SOLUTION EPIDURAL; INFILTRATION; INTRACAUDAL; PERINEURAL PRN
Status: DISCONTINUED | OUTPATIENT
Start: 2021-09-20 | End: 2021-09-20 | Stop reason: SDUPTHER

## 2021-09-20 RX ORDER — MEPERIDINE HYDROCHLORIDE 25 MG/ML
12.5 INJECTION INTRAMUSCULAR; INTRAVENOUS; SUBCUTANEOUS EVERY 5 MIN PRN
Status: DISCONTINUED | OUTPATIENT
Start: 2021-09-20 | End: 2021-09-20

## 2021-09-20 RX ORDER — FENTANYL CITRATE 50 UG/ML
25 INJECTION, SOLUTION INTRAMUSCULAR; INTRAVENOUS EVERY 5 MIN PRN
Status: DISCONTINUED | OUTPATIENT
Start: 2021-09-20 | End: 2021-09-20

## 2021-09-20 RX ORDER — MORPHINE SULFATE 4 MG/ML
4 INJECTION, SOLUTION INTRAMUSCULAR; INTRAVENOUS
Status: DISCONTINUED | OUTPATIENT
Start: 2021-09-20 | End: 2021-09-21

## 2021-09-20 RX ORDER — SUCCINYLCHOLINE/SOD CL,ISO/PF 200MG/10ML
SYRINGE (ML) INTRAVENOUS PRN
Status: DISCONTINUED | OUTPATIENT
Start: 2021-09-20 | End: 2021-09-20 | Stop reason: SDUPTHER

## 2021-09-20 RX ORDER — SODIUM CHLORIDE 0.9 % (FLUSH) 0.9 %
10 SYRINGE (ML) INJECTION PRN
Status: DISCONTINUED | OUTPATIENT
Start: 2021-09-20 | End: 2021-09-22 | Stop reason: HOSPADM

## 2021-09-20 RX ADMIN — VANCOMYCIN HYDROCHLORIDE 2500 MG: 1.5 INJECTION, POWDER, LYOPHILIZED, FOR SOLUTION INTRAVENOUS at 13:08

## 2021-09-20 RX ADMIN — ONDANSETRON 4 MG: 2 INJECTION INTRAMUSCULAR; INTRAVENOUS at 13:08

## 2021-09-20 RX ADMIN — HYDROMORPHONE HYDROCHLORIDE 0.5 MG: 1 INJECTION, SOLUTION INTRAMUSCULAR; INTRAVENOUS; SUBCUTANEOUS at 16:00

## 2021-09-20 RX ADMIN — ONDANSETRON 4 MG: 2 INJECTION, SOLUTION INTRAMUSCULAR; INTRAVENOUS at 15:12

## 2021-09-20 RX ADMIN — MIDAZOLAM 2 MG: 1 INJECTION INTRAMUSCULAR; INTRAVENOUS at 14:47

## 2021-09-20 RX ADMIN — SODIUM CHLORIDE: 9 INJECTION, SOLUTION INTRAVENOUS at 19:26

## 2021-09-20 RX ADMIN — PROPOFOL 200 MG: 10 INJECTION, EMULSION INTRAVENOUS at 14:54

## 2021-09-20 RX ADMIN — QUETIAPINE FUMARATE 50 MG: 100 TABLET ORAL at 22:54

## 2021-09-20 RX ADMIN — HYDROCODONE BITARTRATE AND ACETAMINOPHEN 2 TABLET: 5; 325 TABLET ORAL at 22:57

## 2021-09-20 RX ADMIN — CEFEPIME HYDROCHLORIDE 2000 MG: 2 INJECTION, POWDER, FOR SOLUTION INTRAVENOUS at 23:22

## 2021-09-20 RX ADMIN — LIDOCAINE HYDROCHLORIDE 50 MG: 10 INJECTION, SOLUTION EPIDURAL; INFILTRATION; INTRACAUDAL; PERINEURAL at 14:54

## 2021-09-20 RX ADMIN — SODIUM CHLORIDE: 9 INJECTION, SOLUTION INTRAVENOUS at 18:27

## 2021-09-20 RX ADMIN — Medication 140 MG: at 14:54

## 2021-09-20 RX ADMIN — INSULIN GLARGINE 60 UNITS: 100 INJECTION, SOLUTION SUBCUTANEOUS at 22:54

## 2021-09-20 RX ADMIN — MORPHINE SULFATE 4 MG: 4 INJECTION INTRAVENOUS at 13:08

## 2021-09-20 RX ADMIN — DEXAMETHASONE SODIUM PHOSPHATE 4 MG: 4 INJECTION, SOLUTION INTRAMUSCULAR; INTRAVENOUS at 15:12

## 2021-09-20 RX ADMIN — CEFEPIME HYDROCHLORIDE 2000 MG: 2 INJECTION, POWDER, FOR SOLUTION INTRAVENOUS at 12:15

## 2021-09-20 RX ADMIN — FENTANYL CITRATE 50 MCG: 50 INJECTION, SOLUTION INTRAMUSCULAR; INTRAVENOUS at 14:47

## 2021-09-20 RX ADMIN — FAMOTIDINE 20 MG: 10 INJECTION, SOLUTION INTRAVENOUS at 22:54

## 2021-09-20 RX ADMIN — FENTANYL CITRATE 50 MCG: 50 INJECTION, SOLUTION INTRAMUSCULAR; INTRAVENOUS at 15:03

## 2021-09-20 RX ADMIN — HYDROMORPHONE HYDROCHLORIDE 0.5 MG: 1 INJECTION, SOLUTION INTRAMUSCULAR; INTRAVENOUS; SUBCUTANEOUS at 17:18

## 2021-09-20 RX ADMIN — MORPHINE SULFATE 4 MG: 4 INJECTION INTRAVENOUS at 18:22

## 2021-09-20 RX ADMIN — INSULIN LISPRO 3 UNITS: 100 INJECTION, SOLUTION INTRAVENOUS; SUBCUTANEOUS at 23:02

## 2021-09-20 ASSESSMENT — PULMONARY FUNCTION TESTS
PIF_VALUE: 1
PIF_VALUE: 3
PIF_VALUE: 24
PIF_VALUE: 16
PIF_VALUE: 1
PIF_VALUE: 22
PIF_VALUE: 1
PIF_VALUE: 1
PIF_VALUE: 16
PIF_VALUE: 16
PIF_VALUE: 2
PIF_VALUE: 23
PIF_VALUE: 3
PIF_VALUE: 23
PIF_VALUE: 18
PIF_VALUE: 1
PIF_VALUE: 16
PIF_VALUE: 1
PIF_VALUE: 16
PIF_VALUE: 16
PIF_VALUE: 3
PIF_VALUE: 1
PIF_VALUE: 6
PIF_VALUE: 1
PIF_VALUE: 22
PIF_VALUE: 2
PIF_VALUE: 19
PIF_VALUE: 16
PIF_VALUE: 16
PIF_VALUE: 22
PIF_VALUE: 16
PIF_VALUE: 11
PIF_VALUE: 1
PIF_VALUE: 22
PIF_VALUE: 16
PIF_VALUE: 1
PIF_VALUE: 1
PIF_VALUE: 3
PIF_VALUE: 1
PIF_VALUE: 22
PIF_VALUE: 4
PIF_VALUE: 19
PIF_VALUE: 11
PIF_VALUE: 3
PIF_VALUE: 5
PIF_VALUE: 4
PIF_VALUE: 2
PIF_VALUE: 4

## 2021-09-20 ASSESSMENT — PAIN SCALES - GENERAL
PAINLEVEL_OUTOF10: 10
PAINLEVEL_OUTOF10: 4
PAINLEVEL_OUTOF10: 10
PAINLEVEL_OUTOF10: 10
PAINLEVEL_OUTOF10: 5
PAINLEVEL_OUTOF10: 5
PAINLEVEL_OUTOF10: 0
PAINLEVEL_OUTOF10: 5
PAINLEVEL_OUTOF10: 6
PAINLEVEL_OUTOF10: 5
PAINLEVEL_OUTOF10: 10

## 2021-09-20 ASSESSMENT — LIFESTYLE VARIABLES: SMOKING_STATUS: 1

## 2021-09-20 ASSESSMENT — PAIN DESCRIPTION - FREQUENCY
FREQUENCY: CONTINUOUS
FREQUENCY: CONTINUOUS

## 2021-09-20 ASSESSMENT — PAIN DESCRIPTION - PAIN TYPE
TYPE: SURGICAL PAIN

## 2021-09-20 ASSESSMENT — PAIN DESCRIPTION - LOCATION
LOCATION: ARM

## 2021-09-20 ASSESSMENT — PAIN DESCRIPTION - DESCRIPTORS
DESCRIPTORS: ACHING;BURNING
DESCRIPTORS: BURNING

## 2021-09-20 ASSESSMENT — PAIN DESCRIPTION - ONSET
ONSET: AWAKENED FROM SLEEP
ONSET: AWAKENED FROM SLEEP

## 2021-09-20 ASSESSMENT — PAIN DESCRIPTION - PROGRESSION
CLINICAL_PROGRESSION: GRADUALLY WORSENING
CLINICAL_PROGRESSION: RAPIDLY WORSENING
CLINICAL_PROGRESSION: RAPIDLY IMPROVING
CLINICAL_PROGRESSION: GRADUALLY IMPROVING

## 2021-09-20 ASSESSMENT — PAIN DESCRIPTION - ORIENTATION
ORIENTATION: RIGHT

## 2021-09-20 ASSESSMENT — PAIN - FUNCTIONAL ASSESSMENT: PAIN_FUNCTIONAL_ASSESSMENT: 0-10

## 2021-09-20 NOTE — LETTER
[address] [facility] [address]  Phone: [phone]             September 22, 2021    Patient: Goldie Macdonald   YOB: 1995   Date of Visit: 9/20/2021       To Whom It May Concern:    Power Gonzalez was seen and treated in our facility  beginning 9/20/2021 until 9/22/2021. She may return to work on 9/27/2021.       Sincerely,       Osmar Fitzgerald RN         Signature:__________________________________

## 2021-09-20 NOTE — PLAN OF CARE
Problem: Pain:  Goal: Pain level will decrease  Description: Pain level will decrease  Outcome: Ongoing  Note: Pain decreased with prescribed medication.     Goal: Control of acute pain  Description: Control of acute pain  Outcome: Ongoing  Goal: Control of chronic pain  Description: Control of chronic pain  Outcome: Ongoing

## 2021-09-20 NOTE — PROGRESS NOTES
ADMISSION NOTE       Patient admitted to room  2047. Time of admit:  [address] from:  OR    Reason for admission:  Right Axillary Abscess     Where patient has been residing for the last 24 hrs:  Private residence      Family at bedside:  no    Patient is currently in pain yes    Patient has been oriented to room, educated on how to use call light, and to call for assistance prior to getting up. Bed in lowest and locked position. 2 siderails up for safety. Call light within reach. Patient in no distress.

## 2021-09-20 NOTE — ED PROVIDER NOTES
[address] ED  eMERGENCY dEPARTMENT eNCOUnter      Pt Name: Ria Negron  MRN: [id number]  [place] 1995  Date of evaluation: 21      CHIEF COMPLAINT:  Chief Complaint   Patient presents with    Abscess     under right arm       HISTORY OF PRESENT ILLNESS    Ria Negron is a 27 y.o. female who presents to the emergency room complaining of abscess to right axilla. States that they noticed this area painful, red and swollen since Friday night. Pt did go to [facility] ED on Saturday. Abscess was drained. Pt has not picked up antibiotics from pharmacy. Report the abscess is getting more swollen and painful. States she can barely lift right arm. She denies fever, chills, nausea, emesis. Pt is diabetic. Was admitted on  for DKA. Pt states she gets boils a lot and follows with Dr. Marla Diego. Pt did call his office this morning and she was told to come to [facility] ED because Dr. Marla Diego is here. No other complaints. Nursing Notes were reviewed. REVIEW OF SYSTEMS       Constitutional:  Per HPI  Eyes: No visual changes. Neck: No neck pain. Respiratory: Denies recent shortness of breath. Cardiac:  Denies recent chest pain. GI:  Per HPI  Musculoskeletal: Denies focal weakness. Neurologic: Denies headache or focal weakness. Skin:  rash    Negative in 10 essential Systems except as mentioned above and in the HPI. PAST MEDICAL HISTORY   PMH:  has a past medical history of Asthma, Diabetes mellitus type 2 in obese ([address] Hepatic steatosis, Hypertension, Morbid obesity with body mass index (BMI) of 40.0 to 49.9 ([address] Neuropathy, and Postpartum depression. Surgical History:  has a past surgical history that includes Tonsillectomy (Bilateral);  section; eye surgery;  section (N/A, 3/23/2017); Upper gastrointestinal endoscopy (N/A, 2018); Breast surgery (Left, 2019); Breast surgery (Right, 2019); Rectal surgery (N/A, 2/3/2020);  Upper gastrointestinal endoscopy (N/A, 9/16/2020); and Colonoscopy (N/A, 9/16/2020). Social History:  reports that she has been smoking cigarettes. She started smoking about 13 years ago. She has a 10.00 pack-year smoking history. She has never used smokeless tobacco. She reports that she does not drink alcohol and does not use drugs. Family History: None  Psychiatric History: None    Allergies:is allergic to benadryl [diphenhydramine]. PHYSICAL EXAM     INITIAL VITALS: /85   Pulse 79   Temp 98 °F (36.7 °C)   Resp 16   Ht 5' 5\" (1.651 m)   Wt 263 lb (119.3 kg)   SpO2 98%   BMI 43.77 kg/m²   Constitutional:  Well developed   Eyes:  Pupils equal and readily reactive to light  HENT:  Atraumatic, external ears normal, nose normal, oropharynx moist. Neck- supple   Respiratory:  Clear to auscultation bilaterally with good air exchange, no W/R/R  Cardiovascular:  RRR with normal S1 and S2  Gastrointestinal/Abdomen:  Soft, NT.  BS present. Musculoskeletal:  No edema, no tenderness, no deformities. Back:  No CVA tenderness. Normal to inspection. Integument:  Large area of induration, erythema, tenderness in right axilla. There is some surrounding erythema. There is no fluctuance or drainage. Pain with lifting right arm. 2/2 radial pulse. Distal sensation intact. Brisk cap refill.    Neurologic:  Alert & oriented x 3, no focal deficits noted       DIAGNOSTIC RESULTS     EKG: All EKG's are interpreted by the Emergency Department Physician who either signs or Co-signs this chart in the absence of a cardiologist.  Not indicated    RADIOLOGY:   Reviewed the radiologist:  US EXTREMITY RIGHT NON VASC LIMITED    (Results Pending)     Not indicated      LABS:  Labs Reviewed   CBC WITH AUTO DIFFERENTIAL - Abnormal; Notable for the following components:       Result Value    WBC 19.4 (*)     RBC 3.83 (*)     Hemoglobin 11.9 (*)     Hematocrit 35.5 (*)     Seg Neutrophils 72 (*)     Lymphocytes 15 (*) Eosinophils % 5 (*)     Segs Absolute 13.97 (*)     Absolute Eos # 0.97 (*)     All other components within normal limits   BASIC METABOLIC PANEL - Abnormal; Notable for the following components:    Glucose 276 (*)     All other components within normal limits   HCG, SERUM, QUALITATIVE         EMERGENCY DEPARTMENT COURSE:   -------------------------  Large area of induration, pain, erythema to right axilla. Drained at [facility] on Saturday. Pt has not started abx. WBC of 19. I did recommend I&D. Pt refusing. States she only wants Dr. Harper Garcia to do it. I did speak with dr. Harper Garcia. He would like patient started on cefepime and vanco. US of right axilla. Admitted to medicine. I spoke with dr. Hellen Mosley who accepted admission.          Orders Placed This Encounter   Medications    DISCONTD: sulfamethoxazole-trimethoprim (BACTRIM DS;SEPTRA DS) 800-160 MG per tablet 1 tablet     Order Specific Question:   Antimicrobial Indications     Answer:   Skin and Soft Tissue Infection    DISCONTD: cephALEXin (KEFLEX) capsule 500 mg     Order Specific Question:   Antimicrobial Indications     Answer:   Skin and Soft Tissue Infection    cefepime (MAXIPIME) 2000 mg IVPB minibag     Order Specific Question:   Antimicrobial Indications     Answer:   Skin and Soft Tissue Infection    vancomycin (VANCOCIN) 2,500 mg in dextrose 5 % 500 mL IVPB     Order Specific Question:   Antimicrobial Indications     Answer:   Skin and Soft Tissue Infection    vancomycin (VANCOCIN) intermittent dosing (placeholder)     Order Specific Question:   Antimicrobial Indications     Answer:   Skin and Soft Tissue Infection    OR Linked Order Group     ondansetron (ZOFRAN-ODT) disintegrating tablet 4 mg     ondansetron (ZOFRAN) injection 4 mg    OR Linked Order Group     morphine sulfate (PF) injection 2 mg     morphine sulfate (PF) injection 4 mg       CONSULTS:  IP CONSULT TO GENERAL SURGERY  IP CONSULT TO INTERNAL MEDICINE  PHARMACY TO

## 2021-09-20 NOTE — ED PROVIDER NOTES
eMERGENCY dEPARTMENT eNCOUnter   Independent Attestation     Pt Name: Raghavendra Beach  MRN: [id number]  [place] 1995  Date of evaluation: 9/20/21     Raghavendra Beach is a 27 y.o. female with CC: Abscess (under right arm)      Based on the medical record the care appears appropriate. I was personally available for consultation in the Emergency Department.     Jennifer Funk MD  Attending Emergency Physician                   Jennifer Funk MD  09/20/21 [id number]

## 2021-09-20 NOTE — ANESTHESIA PRE PROCEDURE
Department of Anesthesiology  Preprocedure Note       Name:  Tanvi Age   Age:  27 y.o.  :  1995                                          MRN:  [id number]         Date:  2021      Surgeon: Sascha Ballesteros):  Harper Antonio MD    Procedure: Procedure(s):  AXILLARY ABSCESS I&D    Medications prior to admission:   Prior to Admission medications    Medication Sig Start Date End Date Taking?  Authorizing Provider   budesonide-formoterol (SYMBICORT) 160-4.5 MCG/ACT AERO Inhale 2 puffs into the lungs 2 times daily 21  Yes Ruddy Bray MD   ipratropium-albuterol (DUONEB) 0.5-2.5 (3) MG/3ML SOLN nebulizer solution Inhale 3 mLs into the lungs every 4 hours (while awake) 21  Yes Ruddy Bray MD   insulin detemir (LEVEMIR FLEXTOUCH) 100 UNIT/ML injection pen Inject 60 Units into the skin nightly 21  Yes Ruddy Bray MD   omeprazole (PRILOSEC) 20 MG delayed release capsule Take 1 capsule by mouth every morning (before breakfast) 21 Yes MARCELO Gupta NP   insulin aspart (NOVOLOG FLEXPEN) 100 UNIT/ML injection pen Inject 4 Units into the skin 3 times daily (before meals) 4 units before breakfast, 4 units before lunch, 6 units before dinner and 6 units before bedtime 21  Yes Brenda Vargas PA-C   lisinopril (PRINIVIL;ZESTRIL) 5 MG tablet take 1 tablet by mouth once daily 21  Yes Brenda Vargas PA-C   acetaminophen (TYLENOL) 500 MG tablet Take 2 tablets by mouth 3 times daily 10/28/20  Yes Tariq Toribio MD   NARCAN 4 MG/0.1ML LIQD nasal spray ADMINISTER A SINGLE spray INTO ONE NOSTRIL CALL 911 MAY REPEAT ONCE 19  Yes Historical Provider, MD   albuterol sulfate HFA (PROVENTIL HFA) 108 (90 Base) MCG/ACT inhaler Inhale 2 puffs into the lungs every 6 hours as needed for Wheezing 19  Yes Kandi Thakkar PA-C   blood glucose monitor kit and supplies 1 kit by Other route three times daily Dispense product that insurance will cover 21   Ruddy Bray, MD   Blood Pressure KIT 1 kit by Does not apply route 2 times daily 9/16/21   Pat Maharaj MD   valACYclovir (VALTREX) 500 MG tablet take 1 tablet by mouth twice a day for 7 days 8/23/21   MARCELO Ivey - CNP   UNIFINE PENTIPS 29G X 12MM MISC USE Five times a DAILY WITH BASAGLAR 7/27/21   Mary Beth Jacobs PA-C   Semaglutide,0.25 or 0.5MG/DOS, (OZEMPIC, 0.25 OR 0.5 MG/DOSE,) 2 MG/1.5ML SOPN 0.25 mg 1 weekly x 4 weeks then increase 0.5 mg once weekly x 4 weeks then 1 mg once weekly  Patient taking differently: 0.25 mg 1 weekly x 4 weeks then increase 0.5 mg once weekly x 4 weeks then 1 mg once weekly  Pt not taking 5/21/21   Mary Beth Jacobs PA-C   Insulin Pen Needle (PEN NEEDLES) 31G X 6 MM MISC 1 each by Does not apply route daily 5/21/21   Mary Beth Jacobs PA-C   TRUEplus Lancets 30G MISC TEST 2 TIMES A DAY AND AS NEEED FOR SYMPTOMS OF IRREGULAR BLOOD GLUCOSE 4/21/21   Mary Beth Jacobs PA-C   clotrimazole-betamethasone (LOTRISONE) 1-0.05 % cream Apply topically 2 times daily.  1/27/21   Liam Bronson MD   QUEtiapine (SEROQUEL) 50 MG tablet take 1 tablet by mouth at bedtime 12/8/20   Mely Lynch, APRN - NP       Current medications:    Current Facility-Administered Medications   Medication Dose Route Frequency Provider Last Rate Last Admin    cefepime (MAXIPIME) 2000 mg IVPB minibag  2,000 mg IntraVENous Q12H Adilene Ordoñez PA-C   Stopped at 09/20/21 1245    vancomycin (VANCOCIN) 2,500 mg in dextrose 5 % 500 mL IVPB  2,500 mg IntraVENous Once Adilene Ordoñez PA-C 166.7 mL/hr at 09/20/21 1308 2,500 mg at 09/20/21 1308    vancomycin (VANCOCIN) intermittent dosing (placeholder)   Other RX Placeholder Adilene Ordoñez PA-C        ondansetron (ZOFRAN-ODT) disintegrating tablet 4 mg  4 mg Oral Q8H PRN Adilene Ordoñez PA-C        Or    ondansetron [facility]) injection 4 mg  4 mg IntraVENous Q6H PRN Adilene Ordoñez PA-C   4 mg at 09/20/21 1308    morphine sulfate (PF) injection 2 mg  2 mg IntraVENous tablet 5    acetaminophen (TYLENOL) 500 MG tablet Take 2 tablets by mouth 3 times daily 90 tablet 0    NARCAN 4 MG/0.1ML LIQD nasal spray ADMINISTER A SINGLE spray INTO ONE NOSTRIL CALL 911 MAY REPEAT ONCE  0    albuterol sulfate HFA (PROVENTIL HFA) 108 (90 Base) MCG/ACT inhaler Inhale 2 puffs into the lungs every 6 hours as needed for Wheezing 1 Inhaler 2    blood glucose monitor kit and supplies 1 kit by Other route three times daily Dispense product that insurance will cover 1 kit 0    Blood Pressure KIT 1 kit by Does not apply route 2 times daily 1 kit 0    valACYclovir (VALTREX) 500 MG tablet take 1 tablet by mouth twice a day for 7 days 14 tablet 2    UNIFINE PENTIPS 29G X 12MM MISC USE Five times a DAILY WITH BASAGLAR 120 each 5    Semaglutide,0.25 or 0.5MG/DOS, (OZEMPIC, 0.25 OR 0.5 MG/DOSE,) 2 MG/1.5ML SOPN 0.25 mg 1 weekly x 4 weeks then increase 0.5 mg once weekly x 4 weeks then 1 mg once weekly (Patient taking differently: 0.25 mg 1 weekly x 4 weeks then increase 0.5 mg once weekly x 4 weeks then 1 mg once weekly  Pt not taking) 2 pen 3    Insulin Pen Needle (PEN NEEDLES) 31G X 6 MM MISC 1 each by Does not apply route daily 200 each 5    TRUEplus Lancets 30G MISC TEST 2 TIMES A DAY AND AS NEEED FOR SYMPTOMS OF IRREGULAR BLOOD GLUCOSE 300 each 5    clotrimazole-betamethasone (LOTRISONE) 1-0.05 % cream Apply topically 2 times daily. 45 g 1    QUEtiapine (SEROQUEL) 50 MG tablet take 1 tablet by mouth at bedtime 30 tablet 1       Allergies:     Allergies   Allergen Reactions    Benadryl [Diphenhydramine] Hives and Shortness Of Breath       Problem List:    Patient Active Problem List   Diagnosis Code    Essential hypertension I10    Asthma J45.909    Class 2 obesity in adult E66.9    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.4    Hepatic steatosis K76.0    History of gonorrhea Z86.19    History of chlamydia Z86.19    Diarrhea R19.7    Bipolar affective disorder, currently depressed, mild ([facility] F31.31    Noncompliance Z91.19    History of  section x2 Z98.891    History of postpartum depression Z87.59, Z80.58    Lost custody of children Z76.3    Uncontrolled diabetes mellitus ([facility] E11.65    Mood disorder ([facility] F39    Hyperlipidemia LDL goal <70 E78.5    Inflammatory polyp of transverse colon with complication ([facility] P99.167    Diabetic acidosis without coma (HCC) E11.10    DKA, type 2, not at goal [facility]) E11.10    Asthma exacerbation J45. 466    Metabolic acidosis due to diabetes mellitus (HCC) E11.69, E87.2    Abscess L02.91       Past Medical History:        Diagnosis Date    Asthma     uses inhaler    Diabetes mellitus type 2 in obese ([facility]     Hepatic steatosis     Hypertension     started when approx 6 mos pregnant with first pregnancy    Morbid obesity with body mass index (BMI) of 40.0 to 49.9 (HCC)     Neuropathy     Postpartum depression        Past Surgical History:        Procedure Laterality Date    BREAST SURGERY Left 2019    BREAST INCISION AND DRAINAGE performed by Sasha Dickerson MD at [address] Right 2019    BREAST INCISION AND DRAINAGE performed by Sasha Dickerson MD at [address] N/A 3/23/2017     SECTION REPEAT  performed by Iban Doyle MD at [facility] L&D OR    COLONOSCOPY N/A 2020    COLONOSCOPY POLYPECTOMY HOT BIOPSY performed by Tricia Pabon MD at [facility] N/A 2/3/2020    I & D PERIANAL ABSCESS performed by Nanci Beltran MD at [address] [facility] Bilateral    [address] [place] [address] Drive N/A 2018    EGD BIOPSY performed by Tricia Pabon MD at [id number] [place] [address] 2020    EGD BIOPSY performed by Tricia aPbon MD at [facility] History:    Social History     Tobacco Use    Smoking status: Current Every Day Smoker     Packs/day: 1.00 Years: 10.00     Pack years: 10.00     Types: Cigarettes     Start date: 11/7/2007    Smokeless tobacco: Never Used   Substance Use Topics    Alcohol use: No     Alcohol/week: 0.0 standard drinks                                Ready to quit: Not Answered  Counseling given: Not Answered      Vital Signs (Current):   Vitals:    09/20/21 0956 09/20/21 1316   BP: 132/85 111/72   Pulse: 79 79   Resp: 16 16   Temp: 98 °F (36.7 °C)    SpO2: 98% 97%   Weight: 263 lb (119.3 kg)    Height: 5' 5\" (1.651 m)                                               BP Readings from Last 3 Encounters:   09/20/21 111/72   09/16/21 136/66   05/21/21 120/76       NPO Status:                                                                                 BMI:   Wt Readings from Last 3 Encounters:   09/20/21 263 lb (119.3 kg)   09/16/21 263 lb 7.2 oz (119.5 kg)   05/21/21 251 lb (113.9 kg)     Body mass index is 43.77 kg/m². CBC:   Lab Results   Component Value Date    WBC 19.4 09/20/2021    RBC 3.83 09/20/2021    HGB 11.9 09/20/2021    HCT 35.5 09/20/2021    MCV 92.5 09/20/2021    RDW 12.7 09/20/2021     09/20/2021       CMP:   Lab Results   Component Value Date     09/20/2021    K 3.9 09/20/2021     09/20/2021    CO2 20 09/20/2021    BUN 10 09/20/2021    CREATININE 0.60 09/20/2021    GFRAA >60 09/20/2021    LABGLOM >60 09/20/2021    GLUCOSE 276 09/20/2021    PROT 6.9 06/15/2020    CALCIUM 9.3 09/20/2021    BILITOT 1.00 06/15/2020    ALKPHOS 73 06/15/2020    AST 37 06/15/2020    ALT 52 06/15/2020       POC Tests: No results for input(s): POCGLU, POCNA, POCK, POCCL, POCBUN, POCHEMO, POCHCT in the last 72 hours.     Coags:   Lab Results   Component Value Date    PROTIME 12.0 06/15/2020    INR 0.9 06/15/2020    APTT 22.4 06/15/2020       HCG (If Applicable):   Lab Results   Component Value Date    PREGTESTUR neg 02/02/2020    HCG NEGATIVE 09/16/2020    HCGQUANT <1 05/14/2020        ABGs: No results found for: PHART, PO2ART, DRI0NBM, TFQ9TOM, BEART, S5QUPAQW     Type & Screen (If Applicable):  No results found for: LABABO, LABRH    Drug/Infectious Status (If Applicable):  Lab Results   Component Value Date    HEPCAB NONREACTIVE 06/21/2019       COVID-19 Screening (If Applicable):   Lab Results   Component Value Date    COVID19 Not Detected 09/14/2021    COVID19 Not Detected 09/12/2020           Anesthesia Evaluation  Patient summary reviewed and Nursing notes reviewed no history of anesthetic complications:   Airway: Mallampati: III  TM distance: >3 FB   Neck ROM: full  Mouth opening: > = 3 FB Dental:          Pulmonary:normal exam  breath sounds clear to auscultation  (+) asthma: current smoker (vapes)                           Cardiovascular:    (+) hypertension:,       ECG reviewed  Rhythm: regular  Rate: normal                    Neuro/Psych:   (+) psychiatric history:            GI/Hepatic/Renal:   (+) morbid obesity          Endo/Other:    (+) DiabetesType II DM, using insulin, . ROS comment: RIGHT AXILLA ABSCESS Abdominal:             Vascular: Other Findings:             Anesthesia Plan      general     ASA 3 - emergent       Induction: intravenous. MIPS: Postoperative opioids intended and Prophylactic antiemetics administered. Anesthetic plan and risks discussed with patient. Plan discussed with CRNA.                   Akbar Schwartz MD   9/20/2021

## 2021-09-20 NOTE — CONSULTS
General Surgery Consult      Pt Name: Alanis Barrios  MRN: [id number]  YOB: 1995  Date of evaluation: 2021  Primary Care Physician: Ashley Kraft PA-C   Patient evaluated at the request of  Dr. See Sotelo  Reason for evaluation: Right axillary lump    SUBJECTIVE:   History of Chief Complaint:    Alanis Barrios is a 27 y.o. female who presents with right axillary lump. Patient was in the emergency department at [facility] over the weekend. She stated it was drained. Pain did not improve. She came to the emergency room at [facility]. No fever chills. Patient is known to me from her previous drainage of breast abscesses back in 2019. Emergency room work-up revealed significant leukocytosis. Case discussed with the PA in the emergency department. Patient is admitted to medical service. Ultrasound of the right axilla shows a complex subdermal fluid collection in the right axilla measuring 3.6 cm consistent with abscess formation. Symptom onset has been acute for a time period of few day(s). Severity is described as moderate. Course of her symptoms over time is acute. Past Medical History   has a past medical history of Asthma, Diabetes mellitus type 2 in [facility]), Hepatic steatosis, Hypertension, Morbid obesity with body mass index (BMI) of 40.0 to 49.9 ([address] Neuropathy, and Postpartum depression. Past Surgical History   has a past surgical history that includes Tonsillectomy (Bilateral);  section; eye surgery;  section (N/A, 3/23/2017); Upper gastrointestinal endoscopy (N/A, 2018); Breast surgery (Left, 2019); Breast surgery (Right, 2019); Rectal surgery (N/A, 2/3/2020); Upper gastrointestinal endoscopy (N/A, 2020); and Colonoscopy (N/A, 2020). Medications  Prior to Admission medications    Medication Sig Start Date End Date Taking?  Authorizing Provider   budesonide-formoterol (SYMBICORT) 160-4.5 MCG/ACT AERO Inhale 2 puffs into the lungs 2 times daily 9/16/21   Alex Marcano MD   ipratropium-albuterol (DUONEB) 0.5-2.5 (3) MG/3ML SOLN nebulizer solution Inhale 3 mLs into the lungs every 4 hours (while awake) 9/16/21   Alex Marcano MD   insulin detemir (LEVEMIR FLEXTOUCH) 100 UNIT/ML injection pen Inject 60 Units into the skin nightly 9/16/21   Alex Marcano MD   blood glucose monitor kit and supplies 1 kit by Other route three times daily Dispense product that insurance will cover 9/16/21   Alex Marcano MD   Blood Pressure KIT 1 kit by Does not apply route 2 times daily 9/16/21   Alex Marcano MD   valACYclovir (VALTREX) 500 MG tablet take 1 tablet by mouth twice a day for 7 days 8/23/21   MARCELO Garza CNP   UNIFINE PENTIPS 29G X 12MM MISC USE Five times a DAILY WITH BASAGLAR 7/27/21   Lanette Lerner PA-C   omeprazole (PRILOSEC) 20 MG delayed release capsule Take 1 capsule by mouth every morning (before breakfast) 7/12/21 11/9/21  MARCELO Tate NP   insulin aspart (NOVOLOG FLEXPEN) 100 UNIT/ML injection pen Inject 4 Units into the skin 3 times daily (before meals) 4 units before breakfast, 4 units before lunch, 6 units before dinner and 6 units before bedtime 5/26/21   Lanette Lerner PA-C   lisinopril (PRINIVIL;ZESTRIL) 5 MG tablet take 1 tablet by mouth once daily 5/21/21   Lanette Lerner PA-C   Semaglutide,0.25 or 0.5MG/DOS, (OZEMPIC, 0.25 OR 0.5 MG/DOSE,) 2 MG/1.5ML SOPN 0.25 mg 1 weekly x 4 weeks then increase 0.5 mg once weekly x 4 weeks then 1 mg once weekly  Patient taking differently: 0.25 mg 1 weekly x 4 weeks then increase 0.5 mg once weekly x 4 weeks then 1 mg once weekly  Pt not taking 5/21/21   Lanette Lerner PA-C   Insulin Pen Needle (PEN NEEDLES) 31G X 6 MM MISC 1 each by Does not apply route daily 5/21/21   Lanette Lerner PA-C   TRUEplus Lancets 30G MISC TEST 2 TIMES A DAY AND AS NEEED FOR SYMPTOMS OF IRREGULAR BLOOD GLUCOSE 4/21/21   Lanette Lerner PA-C   sucralfate (CARAFATE) 1 GM tablet take 1 tablet by mouth four times a day  Patient not taking: Reported on 9/14/2021 3/12/21   Deonna Brownlee PA-C   clotrimazole-betamethasone (LOTRISONE) 1-0.05 % cream Apply topically 2 times daily. 1/27/21   Zach Mauricio MD   QUEtiapine (SEROQUEL) 50 MG tablet take 1 tablet by mouth at bedtime 12/8/20   MARCELO Napier NP   acetaminophen (TYLENOL) 500 MG tablet Take 2 tablets by mouth 3 times daily 10/28/20   Savage Camacho MD   PREMPRO 0.625-2.5 MG per tablet take 1 tablet by mouth once daily  Patient not taking: Reported on 9/14/2021 4/20/20   MARCELO Cronin CNP   NARCAN 4 MG/0.1ML LIQD nasal spray ADMINISTER A SINGLE spray INTO ONE NOSTRIL CALL 911 MAY REPEAT ONCE 7/19/19   Historical Provider, MD   albuterol sulfate HFA (PROVENTIL HFA) 108 (90 Base) MCG/ACT inhaler Inhale 2 puffs into the lungs every 6 hours as needed for Wheezing 7/23/19   Oli Randolph PA-C     Allergies  is allergic to benadryl [diphenhydramine]. Family History  family history includes Breast Cancer (age of onset: 28) in her mother; Cancer (age of onset: 61) in her maternal uncle; Colon Cancer in her maternal uncle; Diabetes in her father, maternal uncle, and maternal uncle; Heart Attack in her maternal uncle; Heart Disease in her maternal uncle. Social History   reports that she has been smoking cigarettes. She started smoking about 13 years ago. She has a 10.00 pack-year smoking history. She has never used smokeless tobacco. She reports that she does not drink alcohol and does not use drugs. Review of Systems:  All 10 system review was conducted. Please refer to chart. OBJECTIVE:   VITALS:  height is 5' 5\" (1.651 m) and weight is 263 lb (119.3 kg). Her temperature is 98 °F (36.7 °C). Her blood pressure is 111/72 and her pulse is 79. Her respiration is 16 and oxygen saturation is 97%.    CONSTITUTIONAL: Alert and oriented times 3, no acute distress and cooperative to examination with proper mood and affect. SKIN: Skin color, texture, turgor normal. No rashes or lesions. LYMPH: no cervical nodes, no inguinal nodes  HEENT: Head is normocephalic, atraumatic. EOMI, PERRLA  NECK: Supple, symmetrical, trachea midline, no adenopathy, thyroid symmetric, not enlarged and no tenderness, skin normal  CHEST/LUNGS: chest symmetric with normal A/P diameter, normal respiratory rate and rhythm, lungs clear to auscultation without wheezes, rales or rhonchi. No accessory muscle use. Scars None   CARDIOVASCULAR: Heart regular rate and rhythm Normal S1 and S2. . Carotid and femoral pulses 2+/4 and equal bilaterally  ABDOMEN: Soft nondistended nontender   RECTAL: deferred, not clinically indicated  NEUROLOGIC: There are no focalizing motor or sensory deficits. CN II-XII are grossly intact. EXTREMITIES: no cyanosis, no clubbing, no edema and right axillary abscess with induration. Mild erythema. No crepitus.     LABS:   CBC with Differential:    Lab Results   Component Value Date    WBC 19.4 09/20/2021    RBC 3.83 09/20/2021    HGB 11.9 09/20/2021    HCT 35.5 09/20/2021     09/20/2021    MCV 92.5 09/20/2021    MCH 31.1 09/20/2021    MCHC 33.7 09/20/2021    RDW 12.7 09/20/2021    LYMPHOPCT 15 09/20/2021    MONOPCT 6 09/20/2021    BASOPCT 0 09/20/2021    MONOSABS 1.16 09/20/2021    LYMPHSABS 2.91 09/20/2021    EOSABS 0.97 09/20/2021    BASOSABS 0.00 09/20/2021    DIFFTYPE NOT REPORTED 09/20/2021     BMP:    Lab Results   Component Value Date     09/20/2021    K 3.9 09/20/2021     09/20/2021    CO2 20 09/20/2021    BUN 10 09/20/2021    LABALBU 4.3 06/15/2020    CREATININE 0.60 09/20/2021    CALCIUM 9.3 09/20/2021    GFRAA >60 09/20/2021    LABGLOM >60 09/20/2021    GLUCOSE 276 09/20/2021     Hepatic Function Panel:    Lab Results   Component Value Date    ALKPHOS 73 06/15/2020    ALT 52 06/15/2020    AST 37 06/15/2020    PROT 6.9 06/15/2020    BILITOT 1.00 06/15/2020    BILIDIR 0.09 04/21/2015    IBILI 0.15 04/21/2015    LABALBU 4.3 06/15/2020     Calcium:    Lab Results   Component Value Date    CALCIUM 9.3 09/20/2021     Magnesium:    Lab Results   Component Value Date    MG 1.9 09/16/2021     Phosphorus:    Lab Results   Component Value Date    PHOS 3.3 09/16/2021     PT/INR:    Lab Results   Component Value Date    PROTIME 12.0 06/15/2020    INR 0.9 06/15/2020     ABG:  No results found for: PHART, PH, OXS0PUN, PCO2, PO2ART, PO2, VDQ2TYN, HCO3, BEART, BE, THGBART, THB, OXJ3OXP, C7GFISMR, O2SAT  Urine Culture:  No components found for: CURINE  Blood Culture:  No components found for: CBLOOD, CFUNGUSBL  Stool Culture:  No components found for: CSTOOL    RADIOLOGY:   I have personally reviewed the following films:  US EXTREMITY RIGHT NON VASC LIMITED    Result Date: 9/20/2021  EXAMINATION: NONVASCULAR ULTRASOUND OF THE RIGHT EXTREMITY 9/20/2021 12:41 pm COMPARISON: None. HISTORY: ORDERING SYSTEM PROVIDED HISTORY: abscess in right axilla TECHNOLOGIST PROVIDED HISTORY: abscess in right axilla Acuity: Acute Type of Exam: Initial FINDINGS: Targeted ultrasound was performed in the area of clinical concern, located in the right axilla. A complex subdermal collection measures 3.6 x 2.6 x 2.7 cm. No evidence for communication with the skin surface. Complex subdermal fluid collection in the right axilla measuring up to 3.6 cm, consistent with abscess formation. IMPRESSION:   1. Right axillary abscess    does not have any pertinent problems on file. PLAN:   1. IV antibiotics. IV hydration. We will proceed with incision and drainage under anesthesia today. Informed consent was obtained. Discussed with patient. Thank you for this interesting consult and for allowing us to participate in the care of this patient. If you have any questions please don't hesitate to call.           Electronically signed by Arcenio Alvarenga MD  on 9/20/2021 at 1:28 PM

## 2021-09-20 NOTE — PROGRESS NOTES
Vancomycin Dosing by Pharmacy - Initial Note   TODAY'S DATE:  9/20/2021  Patient name, age:  Fercho Bhatti, 27 y.o. Indication: SSTI, MRSA suspected. Additional antimicrobials:  cefepime 2000 mg IV every 12 hours    Allergies:  Benadryl [diphenhydramine]   Actual Weight:    Wt Readings from Last 1 Encounters:   09/20/21 263 lb (119.3 kg)     Labs/Ancillary Data  Estimated Creatinine Clearance: 180 mL/min (based on SCr of 0.6 mg/dL). Recent Labs     09/20/21  1024   CREATININE 0.60   BUN 10   WBC 19.4*     Procalcitonin   Date Value Ref Range Status   09/15/2021 0.10 (H) <0.09 ng/mL Final     Comment:           Suspected Sepsis:  <0.50 ng/mL     Low likelihood of sepsis. 0.50-2.00 ng/mL     Increased likelihood of sepsis. Antibiotics encouraged. >2.00 ng/mL     High risk of sepsis/shock. Antibiotics strongly encouraged. Suspected Lower Resp Tract Infections:  <0.24 ng/mL     Low likelihood of bacterial infection. >0.24 ng/mL     Increased likelihood of bacterial infection. Antibiotics encouraged. With successful antibiotic therapy, PCT levels should decrease rapidly. (Half-life of 24 to   36 hours.)        Procalcitonin values from samples collected within the first 6 hours of systemic infection   may still be low. Retesting may be indicated. Values from day 1 and day 4 can be entered into the Change in Procalcitonin Calculator   ([email or web address]. [email or web address]) to determine the patient's Mortality Risk Prognosis        In healthy neonates, plasma Procalcitonin (PCT) concentrations increase gradually after   birth, reaching peak values at about 24 hours of age then decrease to normal values below   0.5 ng/mL by 48-72 hours of age. No intake or output data in the 24 hours ending 09/20/21 1229  Temp: 98    Recent vancomycin administrations        No vancomycin IV orders with administrations found.             Orders not given:            vancomycin (VANCOCIN) 2,500 mg in dextrose 5 % 500 mL IVPB    vancomycin (VANCOCIN) intermittent dosing (placeholder)                  Culture Date / Source  /  Results      MRSA Nasal Swab: N/A. Non-respiratory infection. Marika Carey PLAN   Initial loading dose of 25mg/kg (max of 2500) = 2500  mg  x 1, then  vancomycin 1750 mg IV every 12 hours. Ensured BUN/sCr ordered at baseline and every 48 hours x at least 3 levels, then at least weekly. Vancomycin level ordered for 9/21/21 @ 0600. Will use bayesian method for dosing. This level will not be a trough. Target AUC/LEANA 400-500, with trough goal estimate of 10-15. Vancomycin Target Concentration Parameters  Treatment  Population Target AUC/LEANA Target Trough   Invasive MRSA Infection (bacteremia, pneumonia, meningitis, endocarditis, osteomyelitis)  Sepsis (undifferentiated) 400-600 N/A   Infection due to non-MRSA pathogen  Empiric treatment of non-invasive MRSA infection  (SSTI, UTI) <500 10-15 mg/L   CrCl < 29 mL/min  Rapidly fluctuating serum creatinine   CHIARA N/A < 15 mg/L     Renal replacement therapy is dosed by levels, per hospital protocol. Abbreviations  * Pauc: probability that AUC is >400 (efficacy); Pconc: probability that Ctrough is above 20 ?g/mL (toxicity); Tox: Probability of nephrotoxicity, based on Lodtylor et al. Clin Infect Dis 2009. Loading dose: 2500 mg at 13:00 09/20/2021. Regimen: 1750 mg IV every 12 hours. Start time: 12:44 on 09/20/2021  Exposure target: Ctrough10-15 mg/L   AUC24,ss: 526 mg/L.hr  Probability of AUC24 > 400: 69 %  Ctrough,ss: 12.3 mg/L  Probability of Ctrough,ss > 20: 30 %  Probability of nephrotoxicity (Lodise WES 2009): 8 %      Thank you for the consult. Pharmacy will continue to follow.     Teodoro Rose, PharmD  9/20/2021 12:49 PM

## 2021-09-20 NOTE — DISCHARGE INSTR - COC
[facility] OR    UPPER GASTROINTESTINAL ENDOSCOPY N/A 2020    EGD BIOPSY performed by Silvia Muniz MD at [facility]       Immunization History:   Immunization History   Administered Date(s) Administered    COVID-19, Pfizer, PF, 30mcg/0.3mL 2021, 07/15/2021    HPV Quadrivalent (Gardasil) 2011    Influenza, Darrall Spittle, IM, (6 mo and older Fluzone, Flulaval, Fluarix and 3 yrs and older Afluria) 10/04/2016    Tdap (Boostrix, Adacel) 2015       Active Problems:  Patient Active Problem List   Diagnosis Code    Essential hypertension I10    Asthma J45.909    Class 2 obesity in adult E66.9    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.4    Hepatic steatosis K76.0    History of gonorrhea Z86.19    History of chlamydia Z86.19    Diarrhea R19.7    Bipolar affective disorder, currently depressed, mild ([address] F31.31    Noncompliance Z91.19    History of  section x2 Z98.891    History of postpartum depression Z87.59, Z80.58    Lost custody of children Z76.3    Uncontrolled diabetes mellitus ([address] E11.65    Mood disorder ([address] F39    Hyperlipidemia LDL goal <70 E78.5    Inflammatory polyp of transverse colon with complication ([address] Q85.330    Diabetic acidosis without coma ([address] E11.10    DKA, type 2, not at goal ([address] E11.10    Asthma exacerbation J45. 631    Metabolic acidosis due to diabetes mellitus (HCC) E11.69, E87.2    Abscess L02.91       Isolation/Infection:   Isolation          No Isolation        Patient Infection Status     Infection Onset Added Last Indicated Last Indicated By Review Planned Expiration Resolved Resolved By    C-diff Rule Out 21 Clostridium Difficile Toxin/Antigen (Ordered)        Resolved    COVID-19 Rule Out 21 COVID-19, Rapid (Ordered)   21 Rule-Out Test Resulted    COVID-19 Rule Out 20 Covid-19 Ambulatory (Ordered)   09/15/20 Rule-Out Test Resulted          Nurse Assessment:  Last Vital Signs: /69   Pulse 86   Temp 98.6 °F (37 °C) (Temporal)   Resp 16   Ht 5' 5\" (1.651 m)   Wt 263 lb (119.3 kg)   SpO2 95%   BMI 43.77 kg/m²     Last documented pain score (0-10 scale): Pain Level: 10  Last Weight:   Wt Readings from Last 1 Encounters:   09/20/21 263 lb (119.3 kg)     Mental Status:  oriented and alert    IV Access:  - None    Nursing Mobility/ADLs:  Walking   Independent  Transfer  Independent  Bathing  Independent  Dressing  Independent  Toileting  Independent  Feeding  Independent  Med Admin  Independent  Med Delivery   whole    Wound Care Documentation and Therapy:        Elimination:  Continence:   · Bowel: Yes  · Bladder: Yes  Urinary Catheter: None   Colostomy/Ileostomy/Ileal Conduit: No       Date of Last BM: 9/20/2021    Intake/Output Summary (Last 24 hours) at 9/20/2021 1521  Last data filed at 9/20/2021 1245  Gross per 24 hour   Intake 100 ml   Output --   Net 100 ml     I/O last 3 completed shifts: In: 100 [IV Piggyback:100]  Out: -     Safety Concerns:     None    Impairments/Disabilities:      None    Nutrition Therapy:  Current Nutrition Therapy:   - Oral Diet:  General    Routes of Feeding: Oral  Liquids: No Restrictions  Daily Fluid Restriction: no  Last Modified Barium Swallow with Video (Video Swallowing Test): not done    Treatments at the Time of Hospital Discharge:   Respiratory Treatments: N/A  Oxygen Therapy:  is not on home oxygen therapy.   Ventilator:    - No ventilator support    Rehab Therapies: N/A  Weight Bearing Status/Restrictions: No weight bearing restirctions  Other Medical Equipment (for information only, NOT a DME order):  N/A  Other Treatments: Skilled Nursing Assessment, Medication Education and Monitoring  Daily dressing changes - see orders below    Patient's personal belongings (please select all that are sent with patient):  None    RN SIGNATURE:  Electronically signed by Bertin Rodriguez RN on 9/22/21 at 1:59 PM EDT    CASE MANAGEMENT/SOCIAL WORK SECTION    Inpatient Status Date: 9/20/2021    Readmission Risk Assessment Score:  Readmission Risk              Risk of Unplanned Readmission:  16           Discharging to Facility/ [address] [place], [place] [address]Jaelyn Springer [address], [address] [place] [address]  P: [phone]  F: [phone]      / signature: Electronically signed by Deshaun Chino RN on 9/21/21 at 2:07 PM EDT    PHYSICIAN SECTION    Prognosis: Good    Condition at Discharge: Stable    Rehab Potential (if transferring to Rehab): Good    Recommended Labs or Other Treatments After Discharge: Wet-to-dry dressing changes daily with half-inch iodoform gauze. No driving until off pain medications. Patient may shower. Activity as tolerated. Physician Certification: I certify the above information and transfer of Alanis Barrios  is necessary for the continuing treatment of the diagnosis listed and that she requires Home Care for less 30 days.      Update Admission H&P: No change in H&P    PHYSICIAN SIGNATURE:  Electronically signed by Timothy Bang MD on 9/20/21 at 3:21 PM EDT

## 2021-09-20 NOTE — OP NOTE
Operative Note      Patient: Tanvi Age  YOB: 1995  MRN: [id number]    Date of Procedure: 9/20/2021                Preoperative diagnosis: Right axillary abscess    Postoperative diagnosis: Same    Procedure: Incision and drainage right axillary abscess down to fascia    Surgeon: Dr. Hari Perez    Asst.: MELANIE Brooks    Anesthesia: General    Preparation: Hibiclens    EBL: Less than 25 mL    Specimen: Cultures obtained    Procedure: Informed consent was obtained. Site was marked and confirmed. Patient was taken to the operating room. General anesthesia was given. Operative site was prepped and draped in usual sterile fashion. Timeout was done. Incision and drainage of right axillary abscess was performed down to the fascia. Copious amount of pus was drained. Cultures were obtained. Wound was irrigated. Hemostasis was confirmed. Sponge needle instrument count was found to be correct. Wound was packed open using half-inch iodoform gauze. Clean dressing was applied. Patient tolerated procedure well and was transferred to the recovery room in a stable condition.     -  Recommendations: Resume diet. Wet-to-dry dressing changes daily. Likely discharge to home tomorrow. MELISA was signed. Prescriptions called in. Discharge instructions in the chart.

## 2021-09-21 LAB
ABSOLUTE EOS #: 0.16 K/UL (ref 0–0.4)
ABSOLUTE IMMATURE GRANULOCYTE: ABNORMAL K/UL (ref 0–0.3)
ABSOLUTE LYMPH #: 1.93 K/UL (ref 1–4.8)
ABSOLUTE MONO #: 0.81 K/UL (ref 0.1–1.3)
ANION GAP SERPL CALCULATED.3IONS-SCNC: 9 MMOL/L (ref 9–17)
BASOPHILS # BLD: 0 % (ref 0–2)
BASOPHILS ABSOLUTE: 0 K/UL (ref 0–0.2)
BUN BLDV-MCNC: 9 MG/DL (ref 6–20)
BUN/CREAT BLD: ABNORMAL (ref 9–20)
CALCIUM SERPL-MCNC: 8.6 MG/DL (ref 8.6–10.4)
CHLORIDE BLD-SCNC: 101 MMOL/L (ref 98–107)
CO2: 20 MMOL/L (ref 20–31)
CREAT SERPL-MCNC: 0.5 MG/DL (ref 0.5–0.9)
DIFFERENTIAL TYPE: ABNORMAL
EOSINOPHILS RELATIVE PERCENT: 1 % (ref 0–4)
GFR AFRICAN AMERICAN: >60 ML/MIN
GFR NON-AFRICAN AMERICAN: >60 ML/MIN
GFR SERPL CREATININE-BSD FRML MDRD: ABNORMAL ML/MIN/{1.73_M2}
GFR SERPL CREATININE-BSD FRML MDRD: ABNORMAL ML/MIN/{1.73_M2}
GLUCOSE BLD-MCNC: 205 MG/DL (ref 65–105)
GLUCOSE BLD-MCNC: 216 MG/DL (ref 65–105)
GLUCOSE BLD-MCNC: 220 MG/DL (ref 65–105)
GLUCOSE BLD-MCNC: 252 MG/DL (ref 70–99)
GLUCOSE BLD-MCNC: 290 MG/DL (ref 65–105)
GLUCOSE BLD-MCNC: 328 MG/DL (ref 65–105)
HCT VFR BLD CALC: 35.4 % (ref 36–46)
HEMOGLOBIN: 11.8 G/DL (ref 12–16)
IMMATURE GRANULOCYTES: ABNORMAL %
LYMPHOCYTES # BLD: 12 % (ref 24–44)
MCH RBC QN AUTO: 31.7 PG (ref 26–34)
MCHC RBC AUTO-ENTMCNC: 33.3 G/DL (ref 31–37)
MCV RBC AUTO: 95.3 FL (ref 80–100)
MONOCYTES # BLD: 5 % (ref 1–7)
MORPHOLOGY: ABNORMAL
MORPHOLOGY: ABNORMAL
NRBC AUTOMATED: ABNORMAL PER 100 WBC
PDW BLD-RTO: 12.3 % (ref 11.5–14.9)
PLATELET # BLD: 265 K/UL (ref 150–450)
PLATELET ESTIMATE: ABNORMAL
PMV BLD AUTO: 7.5 FL (ref 6–12)
POTASSIUM SERPL-SCNC: 4.4 MMOL/L (ref 3.7–5.3)
RBC # BLD: 3.72 M/UL (ref 4–5.2)
RBC # BLD: ABNORMAL 10*6/UL
SEG NEUTROPHILS: 82 % (ref 36–66)
SEGMENTED NEUTROPHILS ABSOLUTE COUNT: 13.2 K/UL (ref 1.3–9.1)
SODIUM BLD-SCNC: 130 MMOL/L (ref 135–144)
VANCOMYCIN RANDOM DATE LAST DOSE: NORMAL
VANCOMYCIN RANDOM DOSE AMOUNT: 1750
VANCOMYCIN RANDOM TIME LAST DOSE: 58
VANCOMYCIN RANDOM: 5.1 UG/ML
WBC # BLD: 16.1 K/UL (ref 3.5–11)
WBC # BLD: ABNORMAL 10*3/UL

## 2021-09-21 PROCEDURE — 99223 1ST HOSP IP/OBS HIGH 75: CPT | Performed by: FAMILY MEDICINE

## 2021-09-21 PROCEDURE — 6370000000 HC RX 637 (ALT 250 FOR IP): Performed by: FAMILY MEDICINE

## 2021-09-21 PROCEDURE — 80202 ASSAY OF VANCOMYCIN: CPT

## 2021-09-21 PROCEDURE — 2580000003 HC RX 258: Performed by: SURGERY

## 2021-09-21 PROCEDURE — 82948 REAGENT STRIP/BLOOD GLUCOSE: CPT

## 2021-09-21 PROCEDURE — 1200000000 HC SEMI PRIVATE

## 2021-09-21 PROCEDURE — 6370000000 HC RX 637 (ALT 250 FOR IP): Performed by: PHYSICIAN ASSISTANT

## 2021-09-21 PROCEDURE — 94640 AIRWAY INHALATION TREATMENT: CPT

## 2021-09-21 PROCEDURE — 2580000003 HC RX 258: Performed by: PHYSICIAN ASSISTANT

## 2021-09-21 PROCEDURE — 6360000002 HC RX W HCPCS: Performed by: SURGERY

## 2021-09-21 PROCEDURE — 36415 COLL VENOUS BLD VENIPUNCTURE: CPT

## 2021-09-21 PROCEDURE — 94761 N-INVAS EAR/PLS OXIMETRY MLT: CPT

## 2021-09-21 PROCEDURE — 85025 COMPLETE CBC W/AUTO DIFF WBC: CPT

## 2021-09-21 PROCEDURE — 6360000002 HC RX W HCPCS: Performed by: PHYSICIAN ASSISTANT

## 2021-09-21 PROCEDURE — 80048 BASIC METABOLIC PNL TOTAL CA: CPT

## 2021-09-21 RX ORDER — MORPHINE SULFATE 2 MG/ML
4 INJECTION, SOLUTION INTRAMUSCULAR; INTRAVENOUS
Status: DISCONTINUED | OUTPATIENT
Start: 2021-09-21 | End: 2021-09-22 | Stop reason: HOSPADM

## 2021-09-21 RX ORDER — MORPHINE SULFATE 2 MG/ML
2 INJECTION, SOLUTION INTRAMUSCULAR; INTRAVENOUS
Status: DISCONTINUED | OUTPATIENT
Start: 2021-09-21 | End: 2021-09-22 | Stop reason: HOSPADM

## 2021-09-21 RX ORDER — HYDROCODONE BITARTRATE AND ACETAMINOPHEN 5; 325 MG/1; MG/1
1 TABLET ORAL EVERY 6 HOURS PRN
Qty: 28 TABLET | Refills: 0 | Status: SHIPPED | OUTPATIENT
Start: 2021-09-21 | End: 2021-09-28

## 2021-09-21 RX ADMIN — INSULIN LISPRO 8 UNITS: 100 INJECTION, SOLUTION INTRAVENOUS; SUBCUTANEOUS at 12:45

## 2021-09-21 RX ADMIN — LISINOPRIL 5 MG: 5 TABLET ORAL at 09:38

## 2021-09-21 RX ADMIN — VANCOMYCIN HYDROCHLORIDE 1500 MG: 1.5 INJECTION, POWDER, LYOPHILIZED, FOR SOLUTION INTRAVENOUS at 17:45

## 2021-09-21 RX ADMIN — CEFEPIME HYDROCHLORIDE 2000 MG: 2 INJECTION, POWDER, FOR SOLUTION INTRAVENOUS at 11:12

## 2021-09-21 RX ADMIN — BUDESONIDE AND FORMOTEROL FUMARATE DIHYDRATE 2 PUFF: 160; 4.5 AEROSOL RESPIRATORY (INHALATION) at 07:59

## 2021-09-21 RX ADMIN — CEFEPIME HYDROCHLORIDE 2000 MG: 2 INJECTION, POWDER, FOR SOLUTION INTRAVENOUS at 23:57

## 2021-09-21 RX ADMIN — PANTOPRAZOLE SODIUM 20 MG: 20 TABLET, DELAYED RELEASE ORAL at 06:51

## 2021-09-21 RX ADMIN — MORPHINE SULFATE 4 MG: 2 INJECTION, SOLUTION INTRAMUSCULAR; INTRAVENOUS at 23:57

## 2021-09-21 RX ADMIN — INSULIN LISPRO 2 UNITS: 100 INJECTION, SOLUTION INTRAVENOUS; SUBCUTANEOUS at 22:27

## 2021-09-21 RX ADMIN — VANCOMYCIN HYDROCHLORIDE 1750 MG: 1 INJECTION, POWDER, LYOPHILIZED, FOR SOLUTION INTRAVENOUS at 00:58

## 2021-09-21 RX ADMIN — QUETIAPINE FUMARATE 50 MG: 100 TABLET ORAL at 23:59

## 2021-09-21 RX ADMIN — MORPHINE SULFATE 4 MG: 2 INJECTION, SOLUTION INTRAMUSCULAR; INTRAVENOUS at 11:10

## 2021-09-21 RX ADMIN — SODIUM CHLORIDE, PRESERVATIVE FREE 10 ML: 5 INJECTION INTRAVENOUS at 23:59

## 2021-09-21 RX ADMIN — SODIUM CHLORIDE: 9 INJECTION, SOLUTION INTRAVENOUS at 06:39

## 2021-09-21 RX ADMIN — Medication 10 ML: at 10:33

## 2021-09-21 RX ADMIN — BUDESONIDE AND FORMOTEROL FUMARATE DIHYDRATE 2 PUFF: 160; 4.5 AEROSOL RESPIRATORY (INHALATION) at 18:56

## 2021-09-21 RX ADMIN — HYDROCODONE BITARTRATE AND ACETAMINOPHEN 2 TABLET: 5; 325 TABLET ORAL at 18:05

## 2021-09-21 RX ADMIN — HYDROCODONE BITARTRATE AND ACETAMINOPHEN 2 TABLET: 5; 325 TABLET ORAL at 06:51

## 2021-09-21 RX ADMIN — INSULIN GLARGINE 60 UNITS: 100 INJECTION, SOLUTION SUBCUTANEOUS at 22:27

## 2021-09-21 RX ADMIN — INSULIN LISPRO 4 UNITS: 100 INJECTION, SOLUTION INTRAVENOUS; SUBCUTANEOUS at 07:54

## 2021-09-21 RX ADMIN — INSULIN LISPRO 4 UNITS: 100 INJECTION, SOLUTION INTRAVENOUS; SUBCUTANEOUS at 17:08

## 2021-09-21 ASSESSMENT — PAIN SCALES - GENERAL
PAINLEVEL_OUTOF10: 10
PAINLEVEL_OUTOF10: 6
PAINLEVEL_OUTOF10: 10

## 2021-09-21 ASSESSMENT — PAIN DESCRIPTION - PAIN TYPE
TYPE: SURGICAL PAIN
TYPE: SURGICAL PAIN

## 2021-09-21 ASSESSMENT — PAIN DESCRIPTION - LOCATION: LOCATION: OTHER (COMMENT)

## 2021-09-21 ASSESSMENT — PAIN DESCRIPTION - ORIENTATION: ORIENTATION: RIGHT

## 2021-09-21 ASSESSMENT — PAIN DESCRIPTION - DESCRIPTORS: DESCRIPTORS: ACHING;BURNING;CONSTANT

## 2021-09-21 ASSESSMENT — PAIN DESCRIPTION - DIRECTION: RADIATING_TOWARDS: BACK

## 2021-09-21 ASSESSMENT — PAIN DESCRIPTION - FREQUENCY: FREQUENCY: CONTINUOUS

## 2021-09-21 NOTE — FLOWSHEET NOTE
09/21/21 1640   Encounter Summary   Services provided to: Patient   Referral/Consult From: Rounding   Complexity of Encounter Low   Length of Encounter 15 minutes   Spiritual/Gnosticism   Type Spiritual support   Assessment Sleeping   Intervention Prayer   Outcome Did not respond

## 2021-09-21 NOTE — H&P
Family Medicine Admit Note    PCP: Manuela Reis PA-C    Date of Admission: 2021    Date of Service: Pt seen/examined on 21 and Admitted to Inpatient. Chief Complaint:  Abscess      History Of Present Illness: The patient is a 27 y.o. female who presents to [facility] with complaints of an abscess in the right axilla. She reports that this area has been red, swollen & painful since Friday night. She states that she went to [facility] ED on Saturday. The abscess was drained but the patient did not get to  her antibiotics from the pharmacy. She states that the abscess is becoming more swollen & painful. She states that she can barely lift her right arm. She is diabetic and was recently admitted for DKA but sugars are not that high. She reports that she gets recurring boils and follows with Dr. Torrey Peryr. The patient called Dr. Oly Couch office yesterday and they instructed her to go to [facility] because Dr. Torrey Perry was there. She denies any fevers, chills, congestion, cough, chest pain, SOB, abdominal pain, N/V, diarrhea, dysuria, hematuria, headaches, dizziness or confusion.     Past Medical History:        Diagnosis Date    Asthma     uses inhaler    Diabetes mellitus type 2 in obese ([address]     Hepatic steatosis     Hypertension     started when approx 6 mos pregnant with first pregnancy    Morbid obesity with body mass index (BMI) of 40.0 to 49.9 (HCC)     Neuropathy     Postpartum depression        Past Surgical History:        Procedure Laterality Date    AXILLARY SURGERY Right 2021    AXILLARY ABSCESS I&D performed by Vianca Agrawal MD at [address] [place] [address] Left 2019    BREAST INCISION AND DRAINAGE performed by Vianca Agrawal MD at [address] [place] [address] Right 2019    BREAST INCISION AND DRAINAGE performed by Vianca Agrawal MD at [address] N/A 3/23/2017     SECTION REPEAT  performed by Ness Rodrigues Floyd Antony MD at [facility] L&D OR    COLONOSCOPY N/A 9/16/2020    [facility]2020    I & D PERIANAL ABSCESS performed by Jaxson Rich MD at [address] N/A 11/27/2018    EGD BIOPSY performed by Hyun Pa MD at [address] [place] [address] N/A 9/16/2020    EGD BIOPSY performed by Hyun Pa MD at [facility] ENDO       Medications Prior to Admission:    Prior to Admission medications    Medication Sig Start Date End Date Taking? Authorizing Provider   cephALEXin (KEFLEX) 500 MG capsule 500 mgTake three times daily 9/20/21  Yes Gemini Boggs MD   ondansetron (ZOFRAN) 4 MG tablet Take every six hours as needed 9/20/21  Yes Gemini Boggs MD   sulfamethoxazole-trimethoprim (BACTRIM DS) 800-160 MG per tablet Take 1 tablet by mouth 2 times daily for 14 days 9/20/21 10/4/21 Yes Gemini Boggs MD   oxyCODONE-acetaminophen (PERCOCET) 5-325 MG per tablet Take 1 tablet by mouth every 6 hours as needed for Pain for up to 7 days.  . Take lowest dose possible to manage pain 9/20/21 9/27/21 Yes Gemini Boggs MD   budesonide-formoterol (SYMBICORT) 160-4.5 MCG/ACT AERO Inhale 2 puffs into the lungs 2 times daily 9/16/21  Yes Sekou Sheehan MD   ipratropium-albuterol (DUONEB) 0.5-2.5 (3) MG/3ML SOLN nebulizer solution Inhale 3 mLs into the lungs every 4 hours (while awake) 9/16/21  Yes Sekou Sheehan MD   insulin detemir (LEVEMIR FLEXTOUCH) 100 UNIT/ML injection pen Inject 60 Units into the skin nightly 9/16/21  Yes Sekou Sheehan MD   omeprazole (PRILOSEC) 20 MG delayed release capsule Take 1 capsule by mouth every morning (before breakfast) 7/12/21 11/9/21 Yes Kevin Dinorah, APRN - NP   insulin aspart (NOVOLOG FLEXPEN) 100 UNIT/ML injection pen Inject 4 Units into the skin 3 times daily (before meals) 4 units before breakfast, 4 units before lunch, 6 units before dinner and 6 units before bedtime 5/26/21  Yes Erin Zhou PA-C   lisinopril (PRINIVIL;ZESTRIL) 5 MG tablet take 1 tablet by mouth once daily 5/21/21  Yes Erin Zhou PA-C   albuterol sulfate HFA (PROVENTIL HFA) 108 (90 Base) MCG/ACT inhaler Inhale 2 puffs into the lungs every 6 hours as needed for Wheezing 7/23/19  Yes Gordo Card PA-C   blood glucose monitor kit and supplies 1 kit by Other route three times daily Dispense product that insurance will cover 9/16/21   Mitch Will MD   Blood Pressure KIT 1 kit by Does not apply route 2 times daily 9/16/21   Mitch Will MD   valACYclovir (VALTREX) 500 MG tablet take 1 tablet by mouth twice a day for 7 days 8/23/21   MARCELO Rogers CNP   UNIFINE PENTIPS 29G X 12MM MISC USE Five times a DAILY WITH BASAGLAR 7/27/21   Erin Zhou PA-C   Semaglutide,0.25 or 0.5MG/DOS, (OZEMPIC, 0.25 OR 0.5 MG/DOSE,) 2 MG/1.5ML SOPN 0.25 mg 1 weekly x 4 weeks then increase 0.5 mg once weekly x 4 weeks then 1 mg once weekly  Patient taking differently: 0.25 mg 1 weekly x 4 weeks then increase 0.5 mg once weekly x 4 weeks then 1 mg once weekly  Pt not taking 5/21/21   Erin Zhou PA-C   Insulin Pen Needle (PEN NEEDLES) 31G X 6 MM MISC 1 each by Does not apply route daily 5/21/21   Erin Zhou PA-C   TRUEplus Lancets 30G MISC TEST 2 TIMES A DAY AND AS NEEED FOR SYMPTOMS OF IRREGULAR BLOOD GLUCOSE 4/21/21   Erin Zhou PA-C   QUEtiapine (SEROQUEL) 50 MG tablet take 1 tablet by mouth at bedtime 12/8/20   MARCELO Pedraza NP   acetaminophen (TYLENOL) 500 MG tablet Take 2 tablets by mouth 3 times daily 10/28/20   Otto Valencia MD       Allergies:  Benadryl [diphenhydramine]    Social History:  The patient currently lives at home    TOBACCO:   reports that she quit smoking about a year ago. Her smoking use included cigarettes. She started smoking about 13 years ago.  She has a 10.00 pack-year smoking history. She has never used smokeless tobacco.  ETOH:   reports no history of alcohol use. Review of Systems - General ROS: positive for  - obesity  Psychological ROS: positive for - anxiety, depression and mood swings  Ophthalmic ROS: negative  ENT ROS: negative  Endocrine ROS: positive for - hyperglycemia  Respiratory ROS: negative  Cardiovascular ROS: negative  Gastrointestinal ROS: negative  Musculoskeletal ROS: positive for - muscle pain  Dermatological ROS: positive for - skin lesion changes      Family History:          Problem Relation Age of Onset    Breast Cancer Mother 28    Diabetes Father     Cancer Maternal Uncle 61        acute leukemia    Heart Disease Maternal Uncle     Diabetes Maternal Uncle     Colon Cancer Maternal Uncle     Diabetes Maternal Uncle     Heart Attack Maternal Uncle     Uterine Cancer Neg Hx     Ovarian Cancer Neg Hx        PHYSICAL EXAM:    /76   Pulse 67   Temp 98.1 °F (36.7 °C)   Resp 18   Ht 5' 5\" (1.651 m)   Wt 263 lb (119.3 kg)   SpO2 95%   BMI 43.77 kg/m²     General appearance: No apparent distress appears stated age and cooperative. HEENT Normal cephalic, atraumatic without obvious deformity. Pupils equal, round, and reactive to light. Extra ocular muscles intact. Conjunctivae/corneas clear. Neck: Supple, No jugular venous distention/bruits. Trachea midline without thyromegaly or adenopathy with full range of motion. Lungs: Clear to auscultation, bilaterally without Rales/Wheezes/Rhonchi with good respiratory effort. Heart: Regular rate and rhythm with Normal S1/S2 without murmurs, rubs or gallops, point of maximum impulse non-displaced  Abdomen: Soft, non-tender or non-distended without rigidity or guarding and positive bowel sounds all four quadrants. Extremities: No clubbing, cyanosis, or edema bilaterally. Full range of motion without deformity and normal gait intact.   Skin: Skin color, texture, turgor normal.  No rashes, surgical wound right axilla with packing. Neurologic: Alert and oriented X 3, neurovascularly intact with sensory/motor intact upper extremities/lower extremities, bilaterally. Cranial nerves: II-XII intact, grossly non-focal.  Mental status: Alert, oriented, thought content appropriate. CXR:  I have reviewed the CXR with the following interpretation: not done  EKG:  I have reviewed the EKG with the following interpretation: sinus tachycardia    CBC   Recent Labs     09/20/21  1024   WBC 19.4*   HGB 11.9*   HCT 35.5*         RENAL  Recent Labs     09/20/21  1024      K 3.9      CO2 20   BUN 10   CREATININE 0.60     LFT'S  No results for input(s): AST, ALT, ALB, BILIDIR, BILITOT, ALKPHOS in the last 72 hours. COAG  No results for input(s): INR in the last 72 hours. CARDIAC ENZYMES  No results for input(s): CKTOTAL, CKMB, CKMBINDEX, TROPONINI in the last 72 hours. U/A:    Lab Results   Component Value Date    NITRITE neg 05/03/2016    COLORU YELLOW 09/14/2021    WBCUA None 09/14/2021    RBCUA 2 TO 5 09/14/2021    MUCUS NOT REPORTED 09/14/2021    BACTERIA NOT REPORTED 09/14/2021    CLARITYU clear 05/03/2016    SPECGRAV 1.030 09/14/2021    LEUKOCYTESUR NEGATIVE 09/14/2021    BLOODU neg 05/03/2016    GLUCOSEU 3+ 09/14/2021    AMORPHOUS NOT REPORTED 09/14/2021       ABG  No results found for: ATU7AQM, BEART, C8HSBYJQ, PHART, THGBART, BPA0EFQ, PO2ART, RTC8AMW        Active Hospital Problems    Diagnosis Date Noted    Abscess [L02.91] 09/20/2021    Mood disorder ([address] [F39]     Uncontrolled diabetes mellitus ([address] [E11.65] 06/18/2019    Bipolar affective disorder, currently depressed, mild ([address] [F31.31] 05/24/2019    Asthma [J45.909]     Essential hypertension [I10]          ASSESSMENT/PLAN:  Patient admitted with right axillary abscess. Co-morbidities as above.     Orders Placed This Encounter   Procedures    Culture, Wound    Culture, Anaerobic and Aerobic    Culture, Wound    US EXTREMITY RIGHT NON VASC LIMITED    CBC Auto Differential    Basic Metabolic Prof    HCG Screen, Blood    CBC Auto Differential    Basic Metabolic Panel    CBC WITH AUTO DIFFERENTIAL    Basic Metabolic Panel w/ Reflex to MG    VANCOMYCIN, RANDOM    Vancomycin, trough    ADULT DIET;  Regular; 4 carb choices (60 gm/meal)    Vital signs per unit routine    Notify physician    Notify physician    Up as tolerated    Place intermittent pneumatic compression device    Place sequential compression device    Vital signs per unit routine    HYPOGLYCEMIA TREATMENT: blood glucose less than 50 mg/dL and patient  ALERT and TOLERATING PO    HYPOGLYCEMIA TREATMENT: blood glucose less than 70 mg/dL and patient ALERT and TOLERATING PO    HYPOGLYCEMIA TREATMENT: blood glucose less than 70 mg/dL and patient NOT ALERT or NPO    Full Code    Inpatient consult to General Surgery    Inpatient consult to Internal Medicine   [address] to Dose: Vancomycin    Inpatient consult to General Surgery    POC Glucose Fingerstick    POC Glucose Fingerstick    POCT Glucose    POC Glucose Fingerstick    POC Glucose Fingerstick    POC Glucose Fingerstick    POC Glucose Fingerstick    POC Glucose Fingerstick    Insert peripheral IV    PATIENT STATUS (FROM ED OR OR/PROCEDURAL) Inpatient         DVT Prophylaxis:   Diet: Diet NPO  Code Status: Full Code      Dispo - admitted to Med/Surg       @[facility]@

## 2021-09-21 NOTE — PROGRESS NOTES
[facility]                 PATIENT NAME: Arsen Zuniga     TODAY'S DATE: 9/21/2021, 12:33 PM    SUBJECTIVE:  POD#1  Pt seen and examined. Afebrile, VSS. Leukocytosis improving, hemoglobin stable. S/p right axillary abscess I&D. Patient is still having pain but states it is slightly improved. Tolerating diet, no N/V. Surrounding erythema and induration is improved. Small amount of bloody drainage on dressing. Packing was changed and clean dressing was applied. Patient did have significant pain with dressing change; required morphine. OBJECTIVE:   VITALS:  /68   Pulse 80   Temp 98.2 °F (36.8 °C) (Oral)   Resp 16   Ht 5' 5\" (1.651 m)   Wt 263 lb (119.3 kg)   SpO2 97%   BMI 43.77 kg/m²      INTAKE/OUTPUT:      Intake/Output Summary (Last 24 hours) at 9/21/2021 1233  Last data filed at 9/21/2021 1134  Gross per 24 hour   Intake 740 ml   Output 1100 ml   Net -360 ml                 CONSTITUTIONAL:  awake and alert. mild distress during dressing change 2/2 discomfort.    HEART:   RRR  LUNGS:   CTA  ABDOMEN:   Abdomen soft, non-tender, non-distended  EXTREMITIES:   No pedal edema    Data:  CBC:   Lab Results   Component Value Date    WBC 16.1 09/21/2021    RBC 3.72 09/21/2021    HGB 11.8 09/21/2021    HCT 35.4 09/21/2021    MCV 95.3 09/21/2021    MCH 31.7 09/21/2021    MCHC 33.3 09/21/2021    RDW 12.3 09/21/2021     09/21/2021    MPV 7.5 09/21/2021     BMP:    Lab Results   Component Value Date     09/21/2021    K 4.4 09/21/2021     09/21/2021    CO2 20 09/21/2021    BUN 9 09/21/2021    LABALBU 4.3 06/15/2020    CREATININE 0.50 09/21/2021    CALCIUM 8.6 09/21/2021    GFRAA >60 09/21/2021    LABGLOM >60 09/21/2021    GLUCOSE 252 09/21/2021       Radiology Review:  No new images to review       ASSESSMENT     Principal Problem:    Abscess  Active Problems:    Essential hypertension    Asthma    Bipolar affective disorder, currently depressed, mild ([facility]    Uncontrolled diabetes mellitus ([facility]    Mood disorder ([facility]    Morbid obesity with BMI of 40.0-44.9, adult ([facility]  Resolved Problems:    * No resolved hospital problems. *      Plan  1. Diet as tolerated  2. Continue local wound care with 1/2inch iodoform packing changes daily  3. Surgically stable for discharge to home  4. Discharge instructions discussed at length   5. Patient has follow up appointment in office scheduled  6. Prescriptions called in to pharmacy  7. Continue medical management   8. Patient was seen and examined. Packing was changed. Still in pain. WBC count is improved. Continue local wound care IV antibiotics. Oral Norco.  Prescriptions called in. Likely discharge home tomorrow. MELISA was signed.       Electronically signed by Michelle Duenas PA-C  [id number] [address]

## 2021-09-21 NOTE — PLAN OF CARE
Problem: Pain:  Goal: Pain level will decrease  Description: Pain level will decrease  9/21/2021 0406 by Marcio Romero RN  Outcome: Ongoing  9/20/2021 1924 by Vidal Conway RN  Outcome: Ongoing  Note: Pain decreased with prescribed medication. Goal: Control of acute pain  Description: Control of acute pain  9/21/2021 0406 by Marcio Romero RN  Outcome: Ongoing  Note: Assess the location, characteristics, onset, duration, frequency, quality, and severity of pain. Encourage immediate report of pain. Use appropriate pain scale to rate pain. Manage pain using nonpharmacologic/pharmacologic interventions. 9/20/2021 1924 by Vidal Conway RN  Outcome: Ongoing  Goal: Control of chronic pain  Description: Control of chronic pain  9/21/2021 0406 by Marcio Romero RN  Outcome: Ongoing  9/20/2021 1924 by Vidal Conway RN  Outcome: Ongoing     Problem: Falls - Risk of:  Goal: Will remain free from falls  Description: Will remain free from falls  Outcome: Ongoing  Note: Patient remains free of falls and injuries throughout shift. Bed remains in the lowest position, wheels locked, call light and bedside table are within reach.    Goal: Absence of physical injury  Description: Absence of physical injury  Outcome: Ongoing

## 2021-09-21 NOTE — CARE COORDINATION
DISCHARGE PLANNING NOTE:    Patient is agreeable to [facility]NS - List provided and chose [facility] - Danii notified and will call me back to inform if accepted or not - Referral faxed to [facility].      Electronically signed by Laxmi Diehl RN on 9/21/2021 at 2:06 PM

## 2021-09-21 NOTE — CARE COORDINATION
DISCHARGE PLANNING NOTE:    Follow up appointment made for patient with Dr. Paige Garay on 10/6 at 9:30 AM.  Notified patient of date and time. Patient verbalizes understanding. Appointment entered into discharge navigator.     Electronically signed by Anand Hughes RN on 9/21/2021 at 10:33 AM

## 2021-09-21 NOTE — PROGRESS NOTES
Vancomycin Dosing by Pharmacy - Daily Note   Vancomycin Therapy Day:  2  Indication: SSTI, MRSA suspected    Allergies:  Benadryl [diphenhydramine]   Actual Weight:    Wt Readings from Last 1 Encounters:   09/20/21 263 lb (119.3 kg)       Labs/Ancillary Data  Estimated Creatinine Clearance: 217 mL/min (based on SCr of 0.5 mg/dL). Recent Labs     09/20/21  1024 09/21/21  0734   CREATININE 0.60 0.50   BUN 10 9   WBC 19.4* 16.1*     Procalcitonin   Date Value Ref Range Status   09/15/2021 0.10 (H) <0.09 ng/mL Final     Comment:           Suspected Sepsis:  <0.50 ng/mL     Low likelihood of sepsis. 0.50-2.00 ng/mL     Increased likelihood of sepsis. Antibiotics encouraged. >2.00 ng/mL     High risk of sepsis/shock. Antibiotics strongly encouraged. Suspected Lower Resp Tract Infections:  <0.24 ng/mL     Low likelihood of bacterial infection. >0.24 ng/mL     Increased likelihood of bacterial infection. Antibiotics encouraged. With successful antibiotic therapy, PCT levels should decrease rapidly. (Half-life of 24 to   36 hours.)        Procalcitonin values from samples collected within the first 6 hours of systemic infection   may still be low. Retesting may be indicated. Values from day 1 and day 4 can be entered into the Change in Procalcitonin Calculator   (www.[employer]-pct-calculator. [employer]) to determine the patient's Mortality Risk Prognosis        In healthy neonates, plasma Procalcitonin (PCT) concentrations increase gradually after   birth, reaching peak values at about 24 hours of age then decrease to normal values below   0.5 ng/mL by 48-72 hours of age.          Intake/Output Summary (Last 24 hours) at 9/21/2021 1605  Last data filed at 9/21/2021 1134  Gross per 24 hour   Intake 640 ml   Output 1100 ml   Net -460 ml     Temp: 99.1 F    Culture Date / Source  /  Results  9/20 wound - LF GNR    Recent vancomycin administrations                     vancomycin (VANCOCIN) 1,750 mg in dextrose 5 % 500 mL IVPB (mg) 1,750 mg New Bag 09/21/21 0058    vancomycin (VANCOCIN) 2,500 mg in dextrose 5 % 500 mL IVPB (mg) 2,500 mg New Bag 09/20/21 1308                    Vancomycin Concentrations:   TROUGH:  No results for input(s): VANCOTROUGH in the last 72 hours. RANDOM:    Recent Labs     09/21/21  1510   VANCORANDOM 5.1       MRSA Nasal Swab: N/A. Non-respiratory infection. Lucas Salter PLAN     Increase dose to 1500 mg q8h IV  Ensured BUN/sCr ordered at baseline and every 48 hours x at least 3 levels, then at least weekly. Repeat vancomycin concentration ordered for 9/23 @ 1600   Pharmacy will continue to monitor patient and adjust therapy as indicated      Vancomycin Target Concentration Parameters  Treatment  Population Target AUC/LEANA Target Trough   Invasive MRSA Infection (bacteremia, pneumonia, meningitis, endocarditis, osteomyelitis)  Sepsis (undifferentiated) 400-600 N/A   Infection due to non-MRSA pathogen  Empiric treatment of non-invasive MRSA infection  (SSTI, UTI) <500 10-15 mg/L   CrCl < 29 mL/min  Rapidly fluctuating serum creatinine   CHIARA N/A < 15 mg/L     Renal replacement therapy is dosed by levels, per hospital protocol. Abbreviations  * Pauc: probability that AUC is >400 (efficacy); Pconc: probability that Ctrough is above 20 ?g/mL (toxicity); Tox: Probability of nephrotoxicity, based on Lodtylor et al. Clin Infect Dis 2009. Thank you for the consult. Pharmacy will continue to follow.      Yasmine Mcdermott [facility],PharmD,  9/21/2021, 4:08 PM

## 2021-09-21 NOTE — CARE COORDINATION
CASE MANAGEMENT NOTE:    Admission Date:  9/20/2021 Daivd Age is a 27 y.o.  female    Admitted for : Abscess [L02.91]    Met with:  Patient    PCP:  Brenda Vargas NP                                Insurance:  [facility]      Is patient alert and oriented at time of discussion:  Yes    Current Residence/ Living Arrangements:  independently at home             Current Services PTA:  No    Does patient go to outpatient dialysis: No  If yes, location and chair time: N/A    Is patient agreeable to [facility]: Yes    Freedom of choice provided:  Yes    List of [address] provided: Yes    VNS chosen:  No - Not sure she needs it yet - May be able to do dressing change herself or BF    DME:  none    Home Oxygen: No    Nebulizer: No    CPAP/BIPAP: No    Supplier: N/A    Potential Assistance Needed: Yes    SNF needed: No    Freedom of choice and list provided: NA    Pharmacy:  [facility]       Does Patient want to use MEDS to BEDS? No    Is patient currently receiving oral anticoagulation therapy? No    Is the Patient an [facility] with Readmission Risk Score greater than 14%? Yes  If yes, pt needs a follow up appointment made within 7 days. Family Members/Caregivers that pt would like involved in their care:    Yes    If yes, list name here:  BF Joanna Hamilton or Father Kenny Dempsey    Transportation Provider:  Pari Morales            Discharge Plan:  9/21: [facility] - From 2-story home with BF. States she is independent and depends on  for transportation. DME - None. POD #1 right axillary abscess - May need [facility]. On IV cefepime and IV vanco. [place] - 19% - [place] F/U appt 10/6 @ 9:30 AM. Needs note for work.  //MEHDI                 Electronically signed by: Marion Strauss RN on 9/21/2021 at 11:04 AM

## 2021-09-21 NOTE — PROGRESS NOTES
Patient with dry dressing to right axillary incision, no drainage. Alert and oriented. Eating and drinking well.

## 2021-09-22 VITALS
TEMPERATURE: 98 F | DIASTOLIC BLOOD PRESSURE: 89 MMHG | WEIGHT: 263 LBS | RESPIRATION RATE: 16 BRPM | HEIGHT: 65 IN | HEART RATE: 70 BPM | SYSTOLIC BLOOD PRESSURE: 137 MMHG | OXYGEN SATURATION: 98 % | BODY MASS INDEX: 43.82 KG/M2

## 2021-09-22 LAB
ABSOLUTE EOS #: 0.4 K/UL (ref 0–0.4)
ABSOLUTE IMMATURE GRANULOCYTE: ABNORMAL K/UL (ref 0–0.3)
ABSOLUTE LYMPH #: 3.5 K/UL (ref 1–4.8)
ABSOLUTE MONO #: 0.9 K/UL (ref 0.1–1.3)
ANION GAP SERPL CALCULATED.3IONS-SCNC: 12 MMOL/L (ref 9–17)
BASOPHILS # BLD: 1 % (ref 0–2)
BASOPHILS ABSOLUTE: 0.1 K/UL (ref 0–0.2)
BUN BLDV-MCNC: 13 MG/DL (ref 6–20)
BUN/CREAT BLD: ABNORMAL (ref 9–20)
CALCIUM SERPL-MCNC: 8.6 MG/DL (ref 8.6–10.4)
CHLORIDE BLD-SCNC: 103 MMOL/L (ref 98–107)
CO2: 23 MMOL/L (ref 20–31)
CREAT SERPL-MCNC: 0.56 MG/DL (ref 0.5–0.9)
DIFFERENTIAL TYPE: ABNORMAL
EOSINOPHILS RELATIVE PERCENT: 3 % (ref 0–4)
GFR AFRICAN AMERICAN: >60 ML/MIN
GFR NON-AFRICAN AMERICAN: >60 ML/MIN
GFR SERPL CREATININE-BSD FRML MDRD: ABNORMAL ML/MIN/{1.73_M2}
GFR SERPL CREATININE-BSD FRML MDRD: ABNORMAL ML/MIN/{1.73_M2}
GLUCOSE BLD-MCNC: 159 MG/DL (ref 65–105)
GLUCOSE BLD-MCNC: 195 MG/DL (ref 70–99)
HCT VFR BLD CALC: 34 % (ref 36–46)
HEMOGLOBIN: 11.5 G/DL (ref 12–16)
IMMATURE GRANULOCYTES: ABNORMAL %
LYMPHOCYTES # BLD: 29 % (ref 24–44)
MCH RBC QN AUTO: 31.5 PG (ref 26–34)
MCHC RBC AUTO-ENTMCNC: 33.9 G/DL (ref 31–37)
MCV RBC AUTO: 93 FL (ref 80–100)
MONOCYTES # BLD: 7 % (ref 1–7)
NRBC AUTOMATED: ABNORMAL PER 100 WBC
PDW BLD-RTO: 12.3 % (ref 11.5–14.9)
PLATELET # BLD: 263 K/UL (ref 150–450)
PLATELET ESTIMATE: ABNORMAL
PMV BLD AUTO: 6.9 FL (ref 6–12)
POTASSIUM SERPL-SCNC: 3.8 MMOL/L (ref 3.7–5.3)
RBC # BLD: 3.66 M/UL (ref 4–5.2)
RBC # BLD: ABNORMAL 10*6/UL
SEG NEUTROPHILS: 60 % (ref 36–66)
SEGMENTED NEUTROPHILS ABSOLUTE COUNT: 7.4 K/UL (ref 1.3–9.1)
SODIUM BLD-SCNC: 138 MMOL/L (ref 135–144)
WBC # BLD: 12.2 K/UL (ref 3.5–11)
WBC # BLD: ABNORMAL 10*3/UL

## 2021-09-22 PROCEDURE — 82947 ASSAY GLUCOSE BLOOD QUANT: CPT

## 2021-09-22 PROCEDURE — 6370000000 HC RX 637 (ALT 250 FOR IP): Performed by: FAMILY MEDICINE

## 2021-09-22 PROCEDURE — 94761 N-INVAS EAR/PLS OXIMETRY MLT: CPT

## 2021-09-22 PROCEDURE — 36415 COLL VENOUS BLD VENIPUNCTURE: CPT

## 2021-09-22 PROCEDURE — 6370000000 HC RX 637 (ALT 250 FOR IP): Performed by: PHYSICIAN ASSISTANT

## 2021-09-22 PROCEDURE — 2580000003 HC RX 258: Performed by: PHYSICIAN ASSISTANT

## 2021-09-22 PROCEDURE — 85025 COMPLETE CBC W/AUTO DIFF WBC: CPT

## 2021-09-22 PROCEDURE — 6360000002 HC RX W HCPCS: Performed by: SURGERY

## 2021-09-22 PROCEDURE — 82948 REAGENT STRIP/BLOOD GLUCOSE: CPT

## 2021-09-22 PROCEDURE — 80048 BASIC METABOLIC PNL TOTAL CA: CPT

## 2021-09-22 PROCEDURE — 99239 HOSP IP/OBS DSCHRG MGMT >30: CPT | Performed by: FAMILY MEDICINE

## 2021-09-22 PROCEDURE — 94640 AIRWAY INHALATION TREATMENT: CPT

## 2021-09-22 PROCEDURE — 2580000003 HC RX 258: Performed by: SURGERY

## 2021-09-22 PROCEDURE — 6360000002 HC RX W HCPCS: Performed by: PHYSICIAN ASSISTANT

## 2021-09-22 RX ADMIN — HYDROCODONE BITARTRATE AND ACETAMINOPHEN 2 TABLET: 5; 325 TABLET ORAL at 05:43

## 2021-09-22 RX ADMIN — VANCOMYCIN HYDROCHLORIDE 1500 MG: 1.5 INJECTION, POWDER, LYOPHILIZED, FOR SOLUTION INTRAVENOUS at 08:49

## 2021-09-22 RX ADMIN — BUDESONIDE AND FORMOTEROL FUMARATE DIHYDRATE 2 PUFF: 160; 4.5 AEROSOL RESPIRATORY (INHALATION) at 07:19

## 2021-09-22 RX ADMIN — INSULIN LISPRO 4 UNITS: 100 INJECTION, SOLUTION INTRAVENOUS; SUBCUTANEOUS at 11:23

## 2021-09-22 RX ADMIN — LISINOPRIL 5 MG: 5 TABLET ORAL at 07:57

## 2021-09-22 RX ADMIN — Medication 10 ML: at 00:04

## 2021-09-22 RX ADMIN — CEFEPIME HYDROCHLORIDE 2000 MG: 2 INJECTION, POWDER, FOR SOLUTION INTRAVENOUS at 11:23

## 2021-09-22 RX ADMIN — INSULIN LISPRO 2 UNITS: 100 INJECTION, SOLUTION INTRAVENOUS; SUBCUTANEOUS at 07:57

## 2021-09-22 RX ADMIN — HYDROCODONE BITARTRATE AND ACETAMINOPHEN 2 TABLET: 5; 325 TABLET ORAL at 11:23

## 2021-09-22 RX ADMIN — PANTOPRAZOLE SODIUM 20 MG: 20 TABLET, DELAYED RELEASE ORAL at 05:41

## 2021-09-22 RX ADMIN — MORPHINE SULFATE 4 MG: 2 INJECTION, SOLUTION INTRAMUSCULAR; INTRAVENOUS at 13:57

## 2021-09-22 RX ADMIN — VANCOMYCIN HYDROCHLORIDE 1500 MG: 1.5 INJECTION, POWDER, LYOPHILIZED, FOR SOLUTION INTRAVENOUS at 01:04

## 2021-09-22 ASSESSMENT — PAIN SCALES - GENERAL
PAINLEVEL_OUTOF10: 10
PAINLEVEL_OUTOF10: 0
PAINLEVEL_OUTOF10: 10
PAINLEVEL_OUTOF10: 7
PAINLEVEL_OUTOF10: 7

## 2021-09-22 ASSESSMENT — PAIN DESCRIPTION - LOCATION: LOCATION: OTHER (COMMENT)

## 2021-09-22 NOTE — PROGRESS NOTES
[facility]                 PATIENT NAME: Wyatt Almanza     TODAY'S DATE: 9/22/2021, 10:00 AM    SUBJECTIVE:  POD#2  Pt seen and examined. Afebrile, VSS. Leukocytosis improved, hemoglobin stable. Patient states she is  in right axillae but pain has slightly improved. Surrounding erythema and induration much improved. Current dressing clean and dry; packing to be changed prior to discharge today. OBJECTIVE:   VITALS:  /75   Pulse 64   Temp 98.8 °F (37.1 °C) (Oral)   Resp 17   Ht 5' 5\" (1.651 m)   Wt 263 lb (119.3 kg)   SpO2 100%   BMI 43.77 kg/m²      INTAKE/OUTPUT:      Intake/Output Summary (Last 24 hours) at 9/22/2021 1000  Last data filed at 9/22/2021 0849  Gross per 24 hour   Intake 1756 ml   Output 500 ml   Net 1256 ml                 CONSTITUTIONAL:  awake and alert.   No acute distress  HEART:   RRR  LUNGS:   CTA  ABDOMEN:   Abdomen soft, non-tender, non-distended  EXTREMITIES:   No pedal edema    Data:  CBC:   Lab Results   Component Value Date    WBC 12.2 09/22/2021    RBC 3.66 09/22/2021    HGB 11.5 09/22/2021    HCT 34.0 09/22/2021    MCV 93.0 09/22/2021    MCH 31.5 09/22/2021    MCHC 33.9 09/22/2021    RDW 12.3 09/22/2021     09/22/2021    MPV 6.9 09/22/2021     BMP:    Lab Results   Component Value Date     09/22/2021    K 3.8 09/22/2021     09/22/2021    CO2 23 09/22/2021    BUN 13 09/22/2021    LABALBU 4.3 06/15/2020    CREATININE 0.56 09/22/2021    CALCIUM 8.6 09/22/2021    GFRAA >60 09/22/2021    LABGLOM >60 09/22/2021    GLUCOSE 195 09/22/2021       Radiology Review:  No new images to review      ASSESSMENT     Principal Problem:    Abscess  Active Problems:    Essential hypertension    Asthma    Bipolar affective disorder, currently depressed, mild (HCC)    Uncontrolled diabetes mellitus ([address]    Mood disorder ([address]    Morbid obesity with BMI of 40.0-44.9, adult ([facility]  Resolved Problems:    * No resolved hospital problems. *      Plan  1. Diet as tolerated  2. Packing change to be completed today per RN  3. Surgically stable for discharge to home  4. Discharge instructions discussed at length   5.  Prescriptions called in to pharmacy       Electronically signed by Maximo Trinidad

## 2021-09-22 NOTE — FLOWSHEET NOTE
09/22/21 1415   Encounter Summary   Services provided to: Patient   Referral/Consult From: Venecia   Continue Visiting   (9/22/21V)   Volunteer Visit Yes   Complexity of Encounter Low   Length of Encounter 15 minutes   Spiritual/Buddhism   Type Spiritual support   Intervention Prayer

## 2021-09-22 NOTE — PROGRESS NOTES
Writer reviewed discharge instructions with patient. All questions and concerns addressed. Pt left with all personal belongings. Pt in no distress at time of discharge. Pt left unit at 1500.

## 2021-09-22 NOTE — CARE COORDINATION
ONGOING DISCHARGE PLAN:    Patient is alert and oriented x4. Spoke with patient regarding discharge plan and patient confirms that plan is still to return to home with [facility] - Waiting to hear back from [facility] as to whether they can do start of care tomorrow for this patient. [place] - 19% - F/U appt scheduled. Will continue to follow for additional discharge needs.     Electronically signed by Anand Hughes RN on 9/22/2021 at 9:09 AM normal...

## 2021-09-22 NOTE — PROGRESS NOTES
Vancomycin Dosing by Pharmacy - Daily Note   Vancomycin Therapy Day:  3  Indication: abcess    Allergies:  Benadryl [diphenhydramine]   Actual Weight:    Wt Readings from Last 1 Encounters:   09/20/21 263 lb (119.3 kg)       Labs/Ancillary Data  Estimated Creatinine Clearance: 193 mL/min (based on SCr of 0.56 mg/dL). Recent Labs     09/20/21  1024 09/21/21  0734 09/22/21  0537   CREATININE 0.60 0.50 0.56   BUN 10 9 13   WBC 19.4* 16.1* 12.2*     Procalcitonin   Date Value Ref Range Status   09/15/2021 0.10 (H) <0.09 ng/mL Final     Comment:           Suspected Sepsis:  <0.50 ng/mL     Low likelihood of sepsis. 0.50-2.00 ng/mL     Increased likelihood of sepsis. Antibiotics encouraged. >2.00 ng/mL     High risk of sepsis/shock. Antibiotics strongly encouraged. Suspected Lower Resp Tract Infections:  <0.24 ng/mL     Low likelihood of bacterial infection. >0.24 ng/mL     Increased likelihood of bacterial infection. Antibiotics encouraged. With successful antibiotic therapy, PCT levels should decrease rapidly. (Half-life of 24 to   36 hours.)        Procalcitonin values from samples collected within the first 6 hours of systemic infection   may still be low. Retesting may be indicated. Values from day 1 and day 4 can be entered into the Change in Procalcitonin Calculator   (www.[employer]-pct-calculator. [employer]) to determine the patient's Mortality Risk Prognosis        In healthy neonates, plasma Procalcitonin (PCT) concentrations increase gradually after   birth, reaching peak values at about 24 hours of age then decrease to normal values below   0.5 ng/mL by 48-72 hours of age.          Intake/Output Summary (Last 24 hours) at 9/22/2021 1043  Last data filed at 9/22/2021 0849  Gross per 24 hour   Intake 1756 ml   Output 500 ml   Net 1256 ml     Temp: 98.8 F    Culture Date / Source  /  Results  9/18 - wound - staph aureus and klebsiella    Recent vancomycin administrations                     vancomycin (VANCOCIN) 1,500 mg in dextrose 5 % 250 mL IVPB (ADDAVIAL) (mg) 1,500 mg New Bag 09/22/21 0849     1,500 mg New Bag  0104     1,500 mg New Bag 09/21/21 1745    vancomycin (VANCOCIN) 1,750 mg in dextrose 5 % 500 mL IVPB (mg) 1,750 mg New Bag 09/21/21 0058    vancomycin (VANCOCIN) 2,500 mg in dextrose 5 % 500 mL IVPB (mg) 2,500 mg New Bag 09/20/21 1308                    Vancomycin Concentrations:   TROUGH:  No results for input(s): VANCOTROUGH in the last 72 hours. RANDOM:    Recent Labs     09/21/21  1510   VANCORANDOM 5.1       MRSA Nasal Swab: N/A. Non-respiratory infection. Sathish Jha PLAN     Continue current dose of 1500 mg q8h IV  Ensured BUN/sCr ordered at baseline and every 48 hours x at least 3 levels, then at least weekly. Repeat vancomycin concentration ordered for 9/23 @ 1700   Pharmacy will continue to monitor patient and adjust therapy as indicated      Vancomycin Target Concentration Parameters  Treatment  Population Target AUC/LEANA Target Trough   Invasive MRSA Infection (bacteremia, pneumonia, meningitis, endocarditis, osteomyelitis)  Sepsis (undifferentiated) 400-600 N/A   Infection due to non-MRSA pathogen  Empiric treatment of non-invasive MRSA infection  (SSTI, UTI) <500 10-15 mg/L   CrCl < 29 mL/min  Rapidly fluctuating serum creatinine   CHIARA N/A < 15 mg/L     Renal replacement therapy is dosed by levels, per hospital protocol. Abbreviations  * Pauc: probability that AUC is >400 (efficacy); Pconc: probability that Ctrough is above 20 ?g/mL (toxicity); Tox: Probability of nephrotoxicity, based on Jada et al. Clin Infect Dis 2009. Thank you for the consult. Pharmacy will continue to follow.     Alex Alberts [facility],PharmD,  9/22/2021, 10:46 AM

## 2021-09-22 NOTE — DISCHARGE SUMMARY
[facility] Discharge Summary      Patient ID: Sony Bauer    MRN: [id number]     Acct:  [de-identified]       Patient's PCP: Shahriar Wu PA-C    Admit Date: 9/20/2021     Discharge Date: 9/22/2021      Admitting Physician: Bala Mejias MD    Discharge Physician: Bala Mejias MD     Discharge Diagnoses:    Primary Problem  Abscess    Active Hospital Problems    Diagnosis Date Noted    Morbid obesity with BMI of 40.0-44.9, adult ([address] [E66.01, Z68.41]     Abscess [L02.91] 09/20/2021    Mood disorder ([address] [F39]     Uncontrolled diabetes mellitus ([address] [E11.65] 06/18/2019    Bipolar affective disorder, currently depressed, mild ([address] [F31.31] 05/24/2019    Asthma [J45.909]     Essential hypertension [I10]      Past Medical History:   Diagnosis Date    Asthma     uses inhaler    Diabetes mellitus type 2 in obese ([address]     Hepatic steatosis     Hypertension     started when approx 6 mos pregnant with first pregnancy    Morbid obesity with body mass index (BMI) of 40.0 to 49.9 ([address]     Neuropathy     Postpartum depression      The patient was seen and examined on day of discharge and this discharge summary is in conjunction with any daily progress note from day of discharge.     Code Status:  Full Code    Hospital Course: uncomplicated    Consults:  general surgery    Significant Diagnostic Studies: as above, and as follows: I&D axillary abscess    Treatments: as above    Disposition: home    Discharged Condition: Stable    Follow Up:  Shahriar Wu PA-C in one week    Discharge Medications:    Darren Rose   [place] Medication Instructions [facility]:[id number]    Printed on:09/22/21 153   Medication Information                      acetaminophen (TYLENOL) 500 MG tablet  Take 2 tablets by mouth 3 times daily             albuterol sulfate HFA (PROVENTIL HFA) 108 (90 Base) MCG/ACT inhaler  Inhale 2 puffs into the lungs every 6 hours as needed for Wheezing             blood glucose monitor kit and supplies  1 kit by Other route three times daily Dispense product that insurance will cover             Blood Pressure KIT  1 kit by Does not apply route 2 times daily             budesonide-formoterol (SYMBICORT) 160-4.5 MCG/ACT AERO  Inhale 2 puffs into the lungs 2 times daily             cephALEXin (KEFLEX) 500 MG capsule  500 mgTake three times daily             HYDROcodone-acetaminophen (NORCO) 5-325 MG per tablet  Take 1 tablet by mouth every 6 hours as needed for Pain for up to 7 days. Intended supply: 7 days.  Take lowest dose possible to manage pain             insulin aspart (NOVOLOG FLEXPEN) 100 UNIT/ML injection pen  Inject 4 Units into the skin 3 times daily (before meals) 4 units before breakfast, 4 units before lunch, 6 units before dinner and 6 units before bedtime             insulin detemir (LEVEMIR FLEXTOUCH) 100 UNIT/ML injection pen  Inject 60 Units into the skin nightly             Insulin Pen Needle (PEN NEEDLES) 31G X 6 MM MISC  1 each by Does not apply route daily             ipratropium-albuterol (DUONEB) 0.5-2.5 (3) MG/3ML SOLN nebulizer solution  Inhale 3 mLs into the lungs every 4 hours (while awake)             lisinopril (PRINIVIL;ZESTRIL) 5 MG tablet  take 1 tablet by mouth once daily             omeprazole (PRILOSEC) 20 MG delayed release capsule  Take 1 capsule by mouth every morning (before breakfast)             ondansetron (ZOFRAN) 4 MG tablet  Take every six hours as needed             QUEtiapine (SEROQUEL) 50 MG tablet  take 1 tablet by mouth at bedtime             Semaglutide,0.25 or 0.5MG/DOS, (OZEMPIC, 0.25 OR 0.5 MG/DOSE,) 2 MG/1.5ML SOPN  0.25 mg 1 weekly x 4 weeks then increase 0.5 mg once weekly x 4 weeks then 1 mg once weekly             sulfamethoxazole-trimethoprim (BACTRIM DS) 800-160 MG per tablet  Take 1 tablet by mouth 2 times daily for 14 days             TRUEplus Lancets 30G MISC  TEST 2 TIMES A DAY AND AS NEEED FOR SYMPTOMS OF IRREGULAR BLOOD GLUCOSE             UNIFINE PENTIPS 29G X 12MM MISC  USE Five times a DAILY WITH BASAGLAR             valACYclovir (VALTREX) 500 MG tablet  take 1 tablet by mouth twice a day for 7 days                  Activity: activity as tolerated    Diet: diabetic diet    Time Spent on discharge is more than 35 minutes in the examination, evaluation, counseling and review of medications and discharge plan.       Electronically signed by Arturo Bello MD on 9/22/2021 at 3:33 PM

## 2021-09-22 NOTE — FLOWSHEET NOTE
09/22/21 1149   Encounter Summary   Services provided to: Patient   Referral/Consult From: Rounding   Complexity of Encounter Low   Length of Encounter 15 minutes   Spiritual/Hindu   Type Spiritual support   Assessment Sleeping   Intervention Prayer   Outcome Did not respond

## 2021-09-22 NOTE — CARE COORDINATION
Continuity of Care Form    Patient Name: Angie Ovalles   :  1995  MRN:  [id number]    Admit date:  2021  Discharge date:  2021    Code Status Order: Prior   Advance Directives:   [facility] Documentation     Date/Time Healthcare Directive Type of Healthcare Directive Copy in [facility] [address] Agent's Name Healthcare Agent's Phone Number    21 1423  No, patient does not have an advance directive for healthcare treatment  --  --  --  --  --          Admitting Physician:  Venice Henderson MD  PCP: Erin Zhou PA-C    Discharging Nurse: Ashley Shook Unit/Room#: REE/REE  Discharging Unit Phone Number: [phone]    Emergency Contact:   Extended Emergency Contact Information  Primary Emergency Contact: Caterina Caryew  Address: [address] [facility] [address] Phone: [phone]  Mobile Phone: [phone]  Relation: Other  Secondary Emergency Contact: [facility]  Address: [address] [place] [address], [address] [place] [address] Phone: [phone]  Mobile Phone: [phone]  Relation: Parent    Past Surgical History:  Past Surgical History:   Procedure Laterality Date    BREAST SURGERY Left 2019    BREAST INCISION AND DRAINAGE performed by Faisal Veras MD at [address] [place] [address] Right 2019    BREAST INCISION AND DRAINAGE performed by Faisal Veras MD at [place] [address] [address] N/A 3/23/2017     SECTION REPEAT  performed by Teja Edwards MD at [facility]D OR    COLONOSCOPY N/A 2020    [facility]    I & D PERIANAL ABSCESS performed by Tricia Livingston MD at [address] N/A 2018    EGD BIOPSY performed by Janet Balbuena MD at [facility] OR    UPPER GASTROINTESTINAL ENDOSCOPY N/A 2020    EGD BIOPSY performed by Patti Abraham MD at [facility] ENDO       Immunization History:   Immunization History   Administered Date(s) Administered    COVID-19, Pfizer, PF, 30mcg/0.3mL 2021, 07/15/2021    HPV Quadrivalent (Gardasil) 2011    Influenza, Bernis Bump, IM, (6 mo and older Fluzone, Flulaval, Fluarix and 3 yrs and older Afluria) 10/04/2016    Tdap (Boostrix, Adacel) 2015       Active Problems:  Patient Active Problem List   Diagnosis Code    Essential hypertension I10    Asthma J45.909    Class 2 obesity in adult E66.9    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.4    Hepatic steatosis K76.0    History of gonorrhea Z86.19    History of chlamydia Z86.19    Diarrhea R19.7    Bipolar affective disorder, currently depressed, mild ([address] F31.31    Noncompliance Z91.19    History of  section x2 Z98.891    History of postpartum depression Z87.59, Z80.58    Lost custody of children Z76.3    Uncontrolled diabetes mellitus ([address] E11.65    Mood disorder ([address] F39    Hyperlipidemia LDL goal <70 E78.5    Inflammatory polyp of transverse colon with complication ([address] H82.322    Diabetic acidosis without coma ([address] E11.10    DKA, type 2, not at goal ([address] E11.10    Asthma exacerbation J45. 773    Metabolic acidosis due to diabetes mellitus (HCC) E11.69, E87.2    Abscess L02.91       Isolation/Infection:   Isolation          No Isolation        Patient Infection Status     Infection Onset Added Last Indicated Last Indicated By Review Planned Expiration Resolved Resolved By    C-diff Rule Out 21 Clostridium Difficile Toxin/Antigen (Ordered)        Resolved    COVID-19 Rule Out 21 COVID-19, Rapid (Ordered)   21 Rule-Out Test Resulted    COVID-19 Rule Out 20 Covid-19 Ambulatory (Ordered)   09/15/20 Rule-Out Test Resulted          Nurse Assessment:  Last Vital Signs: /69   Pulse 86   Temp 98.6 °F (37 °C) (Temporal)   Resp 16   Ht 5' 5\" (1.651 m)   Wt 263 lb (119.3 kg)   SpO2 95%   BMI 43.77 kg/m²     Last documented pain score (0-10 scale): Pain Level: 10  Last Weight:   Wt Readings from Last 1 Encounters:   09/20/21 263 lb (119.3 kg)     Mental Status:  oriented and alert    IV Access:  - None    Nursing Mobility/ADLs:  Walking   Independent  Transfer  Independent  Bathing  Independent  Dressing  Independent  Toileting  Independent  Feeding  Independent  Med Admin  Independent  Med Delivery   whole    Wound Care Documentation and Therapy:        Elimination:  Continence:   · Bowel: Yes  · Bladder: Yes  Urinary Catheter: None   Colostomy/Ileostomy/Ileal Conduit: No       Date of Last BM: 9/20/2021    Intake/Output Summary (Last 24 hours) at 9/20/2021 1521  Last data filed at 9/20/2021 1245  Gross per 24 hour   Intake 100 ml   Output --   Net 100 ml     I/O last 3 completed shifts: In: 100 [IV Piggyback:100]  Out: -     Safety Concerns:     None    Impairments/Disabilities:      None    Nutrition Therapy:  Current Nutrition Therapy:   - Oral Diet:  General    Routes of Feeding: Oral  Liquids: No Restrictions  Daily Fluid Restriction: no  Last Modified Barium Swallow with Video (Video Swallowing Test): not done    Treatments at the Time of Hospital Discharge:   Respiratory Treatments: N/A  Oxygen Therapy:  is not on home oxygen therapy.   Ventilator:    - No ventilator support    Rehab Therapies: N/A  Weight Bearing Status/Restrictions: No weight bearing restirctions  Other Medical Equipment (for information only, NOT a DME order):  N/A  Other Treatments: Skilled Nursing Assessment, Medication Education and Monitoring  Daily dressing changes - see orders below    Patient's personal belongings (please select all that are sent with patient):  None    RN SIGNATURE:  Electronically signed by Lida Epstein RN on 9/22/21 at 1:59 PM EDT    CASE MANAGEMENT/SOCIAL WORK SECTION    Inpatient Status Date: 9/20/2021    Readmission Risk Assessment Score:  Readmission Risk              Risk of Unplanned Readmission:  16           Discharging to Facility/ [address] [place], [place] [address]Jaelyn Springer [address], [address] [place] [address]  P: [phone]  F: [phone]      / signature: Electronically signed by Spring Tavares RN on 9/21/21 at 2:07 PM EDT    PHYSICIAN SECTION    Prognosis: Good    Condition at Discharge: Stable    Rehab Potential (if transferring to Rehab): Good    Recommended Labs or Other Treatments After Discharge: Wet-to-dry dressing changes daily with half-inch iodoform gauze. No driving until off pain medications. Patient may shower. Activity as tolerated. Physician Certification: I certify the above information and transfer of David Garnica  is necessary for the continuing treatment of the diagnosis listed and that she requires Home Care for less 30 days. Update Admission H&P: No change in H&P    PHYSICIAN SIGNATURE:  Electronically signed by Diandra Reyes MD on 9/20/21 at 3:21 PM EDT                          Current Discharge Medication List    START taking these medications    Medication Dose   HYDROcodone-acetaminophen (NORCO) 5-325 MG per tablet 1 tablet   Take 1 tablet by mouth every 6 hours as needed for Pain for up to 7 days. Intended supply: 7 days.  Take lowest dose possible to manage pain   Quantity: 28 tablet Refills: 0       Comments: Reduce doses taken as pain becomes manageable      cephALEXin (KEFLEX) 500 MG capsule    500 mgTake three times daily   Quantity: 21 capsule Refills: 0       ondansetron (ZOFRAN) 4 MG tablet    Take every six hours as needed   Quantity: 20 tablet Refills: 0       sulfamethoxazole-trimethoprim (BACTRIM DS) 800-160 MG per tablet 1 tablet   Take 1 tablet by mouth 2 times daily for 14 days   Quantity: 28 tablet Refills: 0           CONTINUE these medications which have NOT CHANGED    Medication Dose   budesonide-formoterol (SYMBICORT) 160-4.5 MCG/ACT AERO 2 puffs   Inhale 2 puffs into the lungs 2 times daily   Quantity: 10.2 g Refills: 3       ipratropium-albuterol (DUONEB) 0.5-2.5 (3) MG/3ML SOLN nebulizer solution 3 mLs   Inhale 3 mLs into the lungs every 4 hours (while awake)   Quantity: 360 mL Refills: 0       insulin detemir (LEVEMIR FLEXTOUCH) 100 UNIT/ML injection pen 60 Units   Inject 60 Units into the skin nightly   Quantity: 15 mL Refills: 0       omeprazole (PRILOSEC) 20 MG delayed release capsule 20 mg   Take 1 capsule by mouth every morning (before breakfast)   Quantity: 30 capsule Refills: 3       insulin aspart (NOVOLOG FLEXPEN) 100 UNIT/ML injection pen 4 Units   Inject 4 Units into the skin 3 times daily (before meals) 4 units before breakfast, 4 units before lunch, 6 units before dinner and 6 units before bedtime   Quantity: 5 pen Refills: 2       lisinopril (PRINIVIL;ZESTRIL) 5 MG tablet    take 1 tablet by mouth once daily   Quantity: 30 tablet Refills: 5       albuterol sulfate HFA (PROVENTIL HFA) 108 (90 Base) MCG/ACT inhaler 2 puffs   Inhale 2 puffs into the lungs every 6 hours as needed for Wheezing   Quantity: 1 Inhaler Refills: 2       blood glucose monitor kit and supplies 1 kit   1 kit by Other route three times daily Dispense product that insurance will cover   Quantity: 1 kit Refills: 0       Blood Pressure KIT 1 kit   1 kit by Does not apply route 2 times daily   Quantity: 1 kit Refills: 0       valACYclovir (VALTREX) 500 MG tablet    take 1 tablet by mouth twice a day for 7 days   Quantity: 14 tablet Refills: 2       !! UNIFINE PENTIPS 29G X 12MM MISC    USE Five times a DAILY WITH BASAGLAR   Quantity: 120 each Refills: 5       Semaglutide,0.25 or 0.5MG/DOS, (OZEMPIC, 0.25 OR 0.5 MG/DOSE,) 2 MG/1.5ML SOPN    0.25 mg 1 weekly x 4 weeks then increase 0.5 mg once weekly x 4 weeks then 1 mg once weekly   Quantity: 2 pen Refills: 3       !! Insulin Pen Needle (PEN NEEDLES) 31G X 6 MM MISC 1 each   1 each by Does not apply route daily   Quantity: 200 each Refills: 5       TRUEplus Lancets 30G MISC    TEST 2 TIMES A DAY AND AS NEEED FOR SYMPTOMS OF IRREGULAR BLOOD GLUCOSE   Quantity: 300 each Refills: 5       QUEtiapine (SEROQUEL) 50 MG tablet    take 1 tablet by mouth at bedtime   Quantity: 30 tablet Refills: 1       acetaminophen (TYLENOL) 500 MG tablet 1,000 mg   Take 2 tablets by mouth 3 times daily   Quantity: 90 tablet Refills: 0        !! Potential duplicate medications found. Please discuss with provider.    STOP taking these previous medications    Medication Dose Reason for Stopping Comments   (STOP TAKING) sucralfate (CARAFATE) 1 GM tablet            (STOP TAKING) clotrimazole-betamethasone (LOTRISONE) 1-0.05 % cream            (STOP TAKING) PREMPRO 0.625-2.5 MG per tablet            (STOP TAKING) NARCAN 4 MG/0.1ML LIQD nasal spray

## 2021-09-23 LAB
CULTURE: ABNORMAL
CULTURE: ABNORMAL
DIRECT EXAM: ABNORMAL
DIRECT EXAM: ABNORMAL
GLUCOSE BLD-MCNC: 224 MG/DL (ref 65–105)
Lab: ABNORMAL
SPECIMEN DESCRIPTION: ABNORMAL

## 2021-09-27 ENCOUNTER — APPOINTMENT (OUTPATIENT)
Dept: GENERAL RADIOLOGY | Age: 26
End: 2021-09-27
Payer: COMMERCIAL

## 2021-09-27 ENCOUNTER — HOSPITAL ENCOUNTER (EMERGENCY)
Age: 26
Discharge: HOME | End: 2021-09-27
Attending: EMERGENCY MEDICINE
Payer: COMMERCIAL

## 2021-09-27 VITALS
BODY MASS INDEX: 40.81 KG/M2 | TEMPERATURE: 97.9 F | HEIGHT: 67 IN | OXYGEN SATURATION: 98 % | HEART RATE: 96 BPM | WEIGHT: 260 LBS | RESPIRATION RATE: 15 BRPM | SYSTOLIC BLOOD PRESSURE: 141 MMHG | DIASTOLIC BLOOD PRESSURE: 91 MMHG

## 2021-09-27 DIAGNOSIS — S93.401A SPRAIN OF RIGHT ANKLE, UNSPECIFIED LIGAMENT, INITIAL ENCOUNTER: Primary | ICD-10-CM

## 2021-09-27 PROCEDURE — 73610 X-RAY EXAM OF ANKLE: CPT

## 2021-09-27 PROCEDURE — 6370000000 HC RX 637 (ALT 250 FOR IP): Performed by: PHYSICIAN ASSISTANT

## 2021-09-27 PROCEDURE — 99285 EMERGENCY DEPT VISIT HI MDM: CPT

## 2021-09-27 RX ORDER — IBUPROFEN 800 MG/1
800 TABLET ORAL ONCE
Status: COMPLETED | OUTPATIENT
Start: 2021-09-27 | End: 2021-09-27

## 2021-09-27 RX ORDER — IBUPROFEN 600 MG/1
600 TABLET ORAL EVERY 8 HOURS PRN
Qty: 20 TABLET | Refills: 0 | Status: SHIPPED | OUTPATIENT
Start: 2021-09-27

## 2021-09-27 RX ADMIN — IBUPROFEN 800 MG: 800 TABLET, FILM COATED ORAL at 14:26

## 2021-09-27 ASSESSMENT — PAIN DESCRIPTION - FREQUENCY: FREQUENCY: CONTINUOUS

## 2021-09-27 ASSESSMENT — PAIN DESCRIPTION - LOCATION: LOCATION: ANKLE

## 2021-09-27 ASSESSMENT — PAIN DESCRIPTION - DESCRIPTORS: DESCRIPTORS: SHOOTING;THROBBING

## 2021-09-27 ASSESSMENT — PAIN SCALES - GENERAL
PAINLEVEL_OUTOF10: 10
PAINLEVEL_OUTOF10: 10

## 2021-09-27 ASSESSMENT — PAIN DESCRIPTION - PAIN TYPE: TYPE: ACUTE PAIN

## 2021-09-27 ASSESSMENT — PAIN DESCRIPTION - ORIENTATION: ORIENTATION: RIGHT

## 2021-09-27 NOTE — ED NOTES
Bed: G  Expected date:   Expected time:   Means of arrival:   Comments:  Medic 13     Liz Dugan  09/27/21 5366

## 2021-09-27 NOTE — ED PROVIDER NOTES
[address] ED  eMERGENCY dEPARTMENT eNCOUnter      Pt Name: Driss Anguiano  MRN: [id number]  [place] 1995  Date of evaluation: 9/27/2021  Provider: Lasha Varela PA-C    CHIEF COMPLAINT       Chief Complaint   Patient presents with    Ankle Pain     right           HISTORY OF PRESENT ILLNESS  (Location/Symptom, Timing/Onset, Context/Setting, Quality, Duration, Modifying Factors, Severity.)   Driss Anguiano is a 27 y.o. female who presents to the emergency department via EMS for evaluation of right ankle injury. Patient states she was walking home from a friend's house and came down a step and stepped into a mud hole. Patient states she rolled her ankle. She is complaining of pain and swelling over the lateral aspect of the ankle. She has been unable to bear any weight. Denies any numbness. No other injuries. Nursing Notes were reviewed. REVIEW OF SYSTEMS    (2-9 systems for level 4, 10 or more for level 5)     Review of Systems   Ankle pain  Swelling      Except as noted above the remainder of the review of systems was reviewed and negative.        PAST MEDICAL HISTORY     Past Medical History:   Diagnosis Date    Asthma     uses inhaler    Diabetes mellitus type 2 in obese ([facility]     Hepatic steatosis     Hypertension     started when approx 6 mos pregnant with first pregnancy    Morbid obesity with body mass index (BMI) of 40.0 to 49.9 (HCC)     Neuropathy     Postpartum depression      None otherwise stated in nurses notes    SURGICAL HISTORY       Past Surgical History:   Procedure Laterality Date    AXILLARY SURGERY Right 9/20/2021    AXILLARY ABSCESS I&D performed by Sasha Dickerson MD at [address] [place] [address] Left 6/19/2019    BREAST INCISION AND DRAINAGE performed by Sasha Dickerson MD at [address] [place] [address] Right 9/24/2019    BREAST INCISION AND DRAINAGE performed by Sasha Dickerson MD at [address] N/A 3/23/2017  SECTION REPEAT  performed by Lu Raines MD at [facility]D OR    COLONOSCOPY N/A 2020    COLONOS[facility]    I & D PERIANAL ABSCESS performed by Harman Phalen, MD at [address] N/A 2018    EGD BIOPSY performed by Bhakti Mandujano MD at [address] [place] [address] 2020    EGD BIOPSY performed by Bhakti Mandujano MD at [place] [address]     None otherwise stated in nurses notes    [address]       Discharge Medication List as of 2021  2:51 PM      CONTINUE these medications which have NOT CHANGED    Details   HYDROcodone-acetaminophen (NORCO) 5-325 MG per tablet Take 1 tablet by mouth every 6 hours as needed for Pain for up to 7 days. Intended supply: 7 days.  Take lowest dose possible to manage pain, Disp-28 tablet, R-0Normal      cephALEXin (KEFLEX) 500 MG capsule 500 mgTake three times daily, Disp-21 capsule, R-0Normal      ondansetron (ZOFRAN) 4 MG tablet Take every six hours as needed, Disp-20 tablet, R-0Normal      sulfamethoxazole-trimethoprim (BACTRIM DS) 800-160 MG per tablet Take 1 tablet by mouth 2 times daily for 14 days, Disp-28 tablet, R-0Normal      budesonide-formoterol (SYMBICORT) 160-4.5 MCG/ACT AERO Inhale 2 puffs into the lungs 2 times daily, Disp-10.2 g, R-3Normal      ipratropium-albuterol (DUONEB) 0.5-2.5 (3) MG/3ML SOLN nebulizer solution Inhale 3 mLs into the lungs every 4 hours (while awake), Disp-360 mL, R-0Normal      insulin detemir (LEVEMIR FLEXTOUCH) 100 UNIT/ML injection pen Inject 60 Units into the skin nightly, Disp-15 mL, R-0Normal      blood glucose monitor kit and supplies 1 kit by Other route three times daily Dispense product that insurance will cover, Other, 3 TIMES DAILY Starting Thu 2021, Disp-1 kit, R-0, Normal      Blood Pressure KIT 2 TIMES DAILY Starting u 9/16/2021, Disp-1 kit, R-0, Normal      valACYclovir (VALTREX) 500 MG tablet take 1 tablet by mouth twice a day for 7 days, Disp-14 tablet, R-2Normal      !! UNIFINE PENTIPS 29G X 12MM MISC Disp-120 each, R-5, JOSE, NormalUSE Five times a DAILY WITH BASAGLAR      omeprazole (PRILOSEC) 20 MG delayed release capsule Take 1 capsule by mouth every morning (before breakfast), Disp-30 capsule, R-3Normal      insulin aspart (NOVOLOG FLEXPEN) 100 UNIT/ML injection pen Inject 4 Units into the skin 3 times daily (before meals) 4 units before breakfast, 4 units before lunch, 6 units before dinner and 6 units before bedtime, Disp-5 pen, R-2Normal      lisinopril (PRINIVIL;ZESTRIL) 5 MG tablet take 1 tablet by mouth once daily, Disp-30 tablet, R-5Normal      Semaglutide,0.25 or 0.5MG/DOS, (OZEMPIC, 0.25 OR 0.5 MG/DOSE,) 2 MG/1.5ML SOPN 0.25 mg 1 weekly x 4 weeks then increase 0.5 mg once weekly x 4 weeks then 1 mg once weekly, Disp-2 pen, R-3Normal      !! Insulin Pen Needle (PEN NEEDLES) 31G X 6 MM MISC DAILY Starting Fri 5/21/2021, Disp-200 each, R-5, Normal      TRUEplus Lancets 30G MISC Disp-300 each, R-5, Normal      QUEtiapine (SEROQUEL) 50 MG tablet take 1 tablet by mouth at bedtime, Disp-30 tablet, R-1Normal      acetaminophen (TYLENOL) 500 MG tablet Take 2 tablets by mouth 3 times daily, Disp-90 tablet,R-0Print      albuterol sulfate HFA (PROVENTIL HFA) 108 (90 Base) MCG/ACT inhaler Inhale 2 puffs into the lungs every 6 hours as needed for Wheezing, Disp-1 Inhaler, R-2Normal       !! - Potential duplicate medications found. Please discuss with provider.           ALLERGIES     Benadryl [diphenhydramine]    FAMILY HISTORY           Problem Relation Age of Onset    Breast Cancer Mother 28    Diabetes Father     Cancer Maternal Uncle 61        acute leukemia    Heart Disease Maternal Uncle     Diabetes Maternal Uncle     Colon Cancer Maternal Uncle     Diabetes Maternal Uncle     Heart Attack Maternal Uncle     Uterine Cancer Neg Hx     Ovarian Cancer Neg Hx      Family Status   Relation Name Status    Mother  Alive    Father  Alive    MUnc  (Not Specified)    MUnc  (Not Specified)    MUnc  (Not Specified)    MUnc  (Not Specified)    Neg Hx  (Not Specified)      None otherwise stated in nurses notes    SOCIAL HISTORY      reports that she quit smoking about a year ago. Her smoking use included cigarettes. She started smoking about 13 years ago. She has a 10.00 pack-year smoking history. She has never used smokeless tobacco. She reports that she does not drink alcohol and does not use drugs. lives at home with others     PHYSICAL EXAM    (up to 7 for level 4, 8 or more for level 5)     ED Triage Vitals [09/27/21 1358]   BP Temp Temp src Pulse Resp SpO2 Height Weight   (!) 141/91 97.9 °F (36.6 °C) -- 96 15 98 % 5' 7\" (1.702 m) 260 lb (117.9 kg)       Physical Exam   Nursing note and vitals reviewed. Constitutional: well-developed, well-nourished, nontoxic, well appearing, not distressed  HEENT:  normocephalic atraumatic, external ears normal appearance, no nasal deformity, no neck masses or edema, patient protecting airway, no stridor, phonating well  Eyes: pupils equal, sclera non-icteric, no discharge  Cardiovascular: no JVD  Respiratory: non-labored breathing, effort normal, no accessory muscle use visulized, no audible wheezing  Gastrointestinal: Abdomen not distended  Musculoskeletal:  Examination of right ankle reveals swelling, tenderness over the lateral malleolus. There is no tenderness in the foot. Achilles intact. Painful ROM. 2/2 dp and pt pulses. Brisk cap refill. Distal sensation intact. Unable to bear weight.     Skin: no pallor, no rashes visible  Neuro: alert and oriented times 3, GCS 15, normal coordination, no dysarthria or aphasia  Psych: normal mood and affect, cooperative, normal thought content            DIAGNOSTIC RESULTS     EKG: All EKG's are interpreted by the Emergency Department Physician who either signs or Co-signs this chart in the absence of a cardiologist.        RADIOLOGY:   All plain film, CT, MRI, and formal ultrasound images (except ED bedside ultrasound) are read by the radiologist, see reports below, unless otherwise noted in MDM or here. XR ANKLE RIGHT (MIN 3 VIEWS)   Final Result   1. Mild soft tissue swelling of the lateral and anterior ankle. 2. No acute fracture or dislocation. Mild plantar calcaneal spur. XR CHEST (2 VW)    Result Date: 9/14/2021  EXAMINATION: TWO XRAY VIEWS OF THE CHEST 9/14/2021 12:21 pm COMPARISON: 04/22/2015 HISTORY: ORDERING SYSTEM PROVIDED HISTORY: chest pain, SOB, asthmatic FINDINGS: The lungs are without acute focal process. No effusion or pneumothorax. The cardiomediastinal silhouette is normal.  The osseous structures are intact without acute process. Negative chest.     US EXTREMITY RIGHT NON VASC LIMITED    Result Date: 9/20/2021  EXAMINATION: NONVASCULAR ULTRASOUND OF THE RIGHT EXTREMITY 9/20/2021 12:41 pm COMPARISON: None. HISTORY: ORDERING SYSTEM PROVIDED HISTORY: abscess in right axilla TECHNOLOGIST PROVIDED HISTORY: abscess in right axilla Acuity: Acute Type of Exam: Initial FINDINGS: Targeted ultrasound was performed in the area of clinical concern, located in the right axilla. A complex subdermal collection measures 3.6 x 2.6 x 2.7 cm. No evidence for communication with the skin surface. Complex subdermal fluid collection in the right axilla measuring up to 3.6 cm, consistent with abscess formation. LABS:  Labs Reviewed - No data to display    All other labs were within normal range or not returned as of this dictation.     EMERGENCY DEPARTMENT COURSE and DIFFERENTIAL DIAGNOSIS/MDM:   Vitals:    Vitals:    09/27/21 1358   BP: (!) 141/91   Pulse: 96   Resp: 15   Temp: 97.9 °F (36.6 °C)   SpO2: 98%   Weight: 260 lb (117.9 kg)   Height: 5' 7\" (1.702 m)         Patient instructed to return to the emergency room if symptoms worsen, return, or any other concern right away which is agreed by the patient    ED MEDS:  Orders Placed This Encounter   Medications    ibuprofen (ADVIL;MOTRIN) tablet 800 mg    ibuprofen (IBU) 600 MG tablet     Sig: Take 1 tablet by mouth every 8 hours as needed for Pain     Dispense:  20 tablet     Refill:  0         CONSULTS:  None    PROCEDURES:  None      FINAL IMPRESSION      1. Sprain of right ankle, unspecified ligament, initial encounter          DISPOSITION/PLAN   DISPOSITION Decision To Discharge    PATIENT REFERRED TO:  Kelly Cummings PA-C  [address] [facility]  [address] ED  [facility] [address]  C[address]Marisol [address], [address]  [phone]    Call         DISCHARGE MEDICATIONS:  Discharge Medication List as of 9/27/2021  2:51 PM      START taking these medications    Details   ibuprofen (IBU) 600 MG tablet Take 1 tablet by mouth every 8 hours as needed for Pain, Disp-20 tablet, R-0Print               Summation      Patient Course:    Right ankle pain. Rolled it PTA. Swelling over lateral malleolus. Imaging is unremarkable. Suspect strain. Will dc home with crutches, ace wrap. Recommend motrin, tylenol, rest, elevation. Referred to podiatry. Discussed results and plan with the pt. They expressed appropriate understanding. Pt given close follow up, supportive care instructions and strict return instructions at the bedside.         ED Medications administered this visit:    Medications   ibuprofen (ADVIL;MOTRIN) tablet 800 mg (800 mg Oral Given 9/27/21 1426)       New Prescriptions from this visit:    Discharge Medication List as of 9/27/2021  2:51 PM      START taking these medications    Details   ibuprofen (IBU) 600 MG tablet Take 1 tablet by mouth every 8 hours as needed for Pain, Disp-20 tablet, R-0Print Follow-up:  Cricket Au PA-C  [address] [facility]  [address] [facility],[address]  [address] [facility] [id number] [phone]          [facility] ED  [facility] [place] [address]  [address] [facility], [address] [facility]  [address] [facility] [address]  [phone]    Call           Final Impression:   1.  Sprain of right ankle, unspecified ligament, initial encounter               (Please note that portions of this note were completed with a voice recognition program )        Hueyn Rogers [address], [place]  09/27/21 5462

## 2021-09-29 ENCOUNTER — OFFICE VISIT (OUTPATIENT)
Dept: PODIATRY | Age: 26
End: 2021-09-29
Payer: COMMERCIAL

## 2021-09-29 VITALS — BODY MASS INDEX: 40.81 KG/M2 | HEIGHT: 67 IN | WEIGHT: 260 LBS

## 2021-09-29 DIAGNOSIS — M25.571 ACUTE RIGHT ANKLE PAIN: ICD-10-CM

## 2021-09-29 DIAGNOSIS — S93.491A SPRAIN OF ANTERIOR TALOFIBULAR LIGAMENT OF RIGHT ANKLE, INITIAL ENCOUNTER: Primary | ICD-10-CM

## 2021-09-29 PROCEDURE — 1111F DSCHRG MED/CURRENT MED MERGE: CPT | Performed by: PODIATRIST

## 2021-09-29 PROCEDURE — G8427 DOCREV CUR MEDS BY ELIG CLIN: HCPCS | Performed by: PODIATRIST

## 2021-09-29 PROCEDURE — 1036F TOBACCO NON-USER: CPT | Performed by: PODIATRIST

## 2021-09-29 PROCEDURE — 99203 OFFICE O/P NEW LOW 30 MIN: CPT | Performed by: PODIATRIST

## 2021-09-29 PROCEDURE — G8417 CALC BMI ABV UP PARAM F/U: HCPCS | Performed by: PODIATRIST

## 2021-09-29 PROCEDURE — L4360 PNEUMAT WALKING BOOT PRE CST: HCPCS | Performed by: PODIATRIST

## 2021-09-29 NOTE — LETTER
[address] [address]  Phone: [phone]  Fax: [phone]    Ko Stinson        September 29, 2021     Patient: Harshad [place]   YOB: 1995   Date of Visit: 9/29/2021       To Whom It May Concern: It is my medical opinion that Daylin Spain should remain out of work until 10/29/2021. If you have any questions or concerns, please don't hesitate to call.     Sincerely,        Johanna Isbell DPM

## 2021-09-29 NOTE — PROGRESS NOTES
[facility]  New Patient History and Physical    Chief Complaint:   Chief Complaint   Patient presents with    Ankle Injury     fell on Monday 9/27/2021        HPI: Lizz Maurice is a 27 y.o. female who presents to the office today complaining of right ankle pain. Symptoms began 3 day(s) ago. Patient relates pain is Present. Pain is rated 6 out of 10 and is described as constant, moderate. Treatments prior to today's visit include: crutches, ACE wrap. Went to ER. Currently denies F/C/N/V. Allergies   Allergen Reactions    Benadryl [Diphenhydramine] Hives and Shortness Of Breath       Past Medical History:   Diagnosis Date    Asthma     uses inhaler    Diabetes mellitus type 2 in obese ([address]     Hepatic steatosis     Hypertension     started when approx 6 mos pregnant with first pregnancy    Morbid obesity with body mass index (BMI) of 40.0 to 49.9 (HCC)     Neuropathy     Postpartum depression        Prior to Admission medications    Medication Sig Start Date End Date Taking?  Authorizing Provider   ibuprofen (IBU) 600 MG tablet Take 1 tablet by mouth every 8 hours as needed for Pain 9/27/21  Yes Petty Kraus PA-C   cephALEXin (KEFLEX) 500 MG capsule 500 mgTake three times daily 9/20/21  Yes Erica Moreno MD   ondansetron (ZOFRAN) 4 MG tablet Take every six hours as needed 9/20/21  Yes Erica Moreno MD   sulfamethoxazole-trimethoprim (BACTRIM DS) 800-160 MG per tablet Take 1 tablet by mouth 2 times daily for 14 days 9/20/21 10/4/21 Yes Erica Moreno MD   budesonide-formoterol (SYMBICORT) 160-4.5 MCG/ACT AERO Inhale 2 puffs into the lungs 2 times daily 9/16/21  Yes Michael Verdugo MD   ipratropium-albuterol (DUONEB) 0.5-2.5 (3) MG/3ML SOLN nebulizer solution Inhale 3 mLs into the lungs every 4 hours (while awake) 9/16/21  Yes Michael Verdugo MD   insulin detemir (LEVEMIR FLEXTOUCH) 100 UNIT/ML injection pen Inject 60 Units into the skin nightly 9/16/21  Yes Michael Verdugo Gastrointestinal: Negative for constipation, diarrhea, nausea and vomiting. Musculoskeletal: Positive for gait problem. Negative for arthralgias and joint swelling. Skin: Negative for color change, pallor, rash and wound. Neurological: Negative for weakness and numbness. Lower Extremity Physical Examination:     Vitals: There were no vitals filed for this visit. General: AAO x 3 in NAD. Vascular: DP and PT pulses palpable 2/4, Bilateral.  CFT <3 seconds, Bilateral.  Hair growth present to the level of the digits, Bilateral.  Edema present, Right. Varicosities absent, Bilateral. Erythema present, Bilateral    Neurological:  Sensation present to light touch to level of digits, Bilateral.    Musculoskeletal: Muscle strength 5/5, Bilateral.  Pain present upon palpation of entire right ankle, abnormal pain response to palpation and passive ROM testing, Right. normal medial longitudinal arch, Right. Ankle ROM abnormal - painful,Right. Integument: Warm, dry, supple, Bilateral.  Open lesion absent, Bilateral.     Gait analysis:     Asessment: Patient is a 27 y.o. female with:   1. Sprain of anterior talofibular ligament of right ankle, initial encounter    2. Acute right ankle pain          Plan: Pt was evaluated and examined. Patient was given personalized discharge instructions. Pt will follow up in 4 weeks or sooner if any problems arise. Diagnosis was discussed with the pt and all of their questions were answered in detail. Proper foot hygiene and care was discussed with the pt. Patient to check feet daily and contact the office with any questions/problems/concerns. Other comorbidity noted and will be managed by PCP. Rest, ice  Crutches for 2 weeks  ACE wrap    I have dispensed a pneumatic equalizer walker. Due to the patient's diagnosis and related symptoms this is medically necessary for the treatment.  The function of this device is to restrict and limit motion and provide stabilization and compression to the affected area. This device will allow the patient to slowly increase strength and ROM of the extremity. Specific instructions on weight bearing and ROM exercises were discussed and given to the patient. The goals and function of this device was explained in detail to the patient. Upon gait analysis, the device appeared to be fitting well and the patient states that the device is comfortable at this time. The patient was shown how to properly apply, wear, and care for the device. The patient was able to apply properly and ambulate without distress. At that time, the device was  dispensed, it was suitable for the patient's condition and was not substandard. No guarantees were given and the precautions were reviewed. Written instructions and warranty information was given along with the list of the twenty-one (21) Durable Medical Equipment Supplier Guidelines. All the questions were answered satisfactorily    Orders Placed This Encounter   Procedures    MO PNEUMAT WALKING BOOT PRE CST     Right Foot     No orders of the defined types were placed in this encounter.        RTC in 4week(s).    9/29/2021

## 2021-10-01 ASSESSMENT — ENCOUNTER SYMPTOMS
DIARRHEA: 0
VOMITING: 0
CONSTIPATION: 0
COLOR CHANGE: 0
NAUSEA: 0

## 2021-10-18 ENCOUNTER — HOSPITAL ENCOUNTER (EMERGENCY)
Age: 26
Discharge: HOME | End: 2021-10-18
Attending: EMERGENCY MEDICINE
Payer: COMMERCIAL

## 2021-10-18 VITALS
SYSTOLIC BLOOD PRESSURE: 140 MMHG | TEMPERATURE: 97.9 F | BODY MASS INDEX: 41.78 KG/M2 | WEIGHT: 260 LBS | HEIGHT: 66 IN | RESPIRATION RATE: 18 BRPM | HEART RATE: 96 BPM | OXYGEN SATURATION: 98 % | DIASTOLIC BLOOD PRESSURE: 88 MMHG

## 2021-10-18 DIAGNOSIS — L02.91 ABSCESS: Primary | ICD-10-CM

## 2021-10-18 LAB — GLUCOSE BLD-MCNC: 355 MG/DL (ref 65–105)

## 2021-10-18 PROCEDURE — 82947 ASSAY GLUCOSE BLOOD QUANT: CPT

## 2021-10-18 PROCEDURE — 10060 I&D ABSCESS SIMPLE/SINGLE: CPT

## 2021-10-18 PROCEDURE — 99284 EMERGENCY DEPT VISIT MOD MDM: CPT

## 2021-10-18 RX ORDER — SULFAMETHOXAZOLE AND TRIMETHOPRIM 800; 160 MG/1; MG/1
1 TABLET ORAL 2 TIMES DAILY
Qty: 14 TABLET | Refills: 0 | Status: SHIPPED | OUTPATIENT
Start: 2021-10-18 | End: 2021-10-25

## 2021-10-18 ASSESSMENT — ENCOUNTER SYMPTOMS
ROS SKIN COMMENTS: ABSCESS
SHORTNESS OF BREATH: 0
BACK PAIN: 0
VOMITING: 0
ABDOMINAL PAIN: 0
NAUSEA: 0

## 2021-10-18 NOTE — ED TRIAGE NOTES
Patient arrives c/o blistered abscess to right  Breast. States she gets them frequently. Noticed it this morning.

## 2021-10-18 NOTE — ED NOTES
This nurse cartside for breast abscess drainage with physician.       Rachel Gallardo, JAYDEN  10/18/21 1510

## 2021-10-18 NOTE — ED PROVIDER NOTES
[facility] [address]  ED  Emergency Department Encounter  Emergency Medicine Resident     Pt Name: Lizz Maurice  MRN: [id number]  Armsrenégfderek 1995  Date of evaluation: 10/18/21  PCP:  Estela Suazo PA-C    CHIEF COMPLAINT       Chief Complaint   Patient presents with    Abscess     right breast       HISTORY OFPRESENT ILLNESS  (Location/Symptom, Timing/Onset, Context/Setting, Quality, Duration, Modifying Factors,Severity.)      Lizz Maurice is a 27 y. o.yo female who presents with abscess. Patient here with abscess on breast, right breast, medial aspect states that she has history of abscesses, has had abscesses before the to be incision and drainage. States this 1 started by 2 to 3 days ago, no fevers or chills, states last abscess was a month ago. States she was put on Keflex and Bactrim at the time, states that she does well on antibiotics as no known allergies. States no traumatic injuries to the breast, there is slight redness around the abscess site however there is no pain. Denies Covid exposure, abdominal pain nausea or vomiting. PAST MEDICAL / SURGICAL / SOCIAL / FAMILY HISTORY      has a past medical history of Asthma, Diabetes mellitus type 2 in obese ([address] Hepatic steatosis, Hypertension, Morbid obesity with body mass index (BMI) of 40.0 to 49.9 ([address] Neuropathy, and Postpartum depression. has a past surgical history that includes Tonsillectomy (Bilateral);  section; eye surgery;  section (N/A, 3/23/2017); Upper gastrointestinal endoscopy (N/A, 2018); Breast surgery (Left, 2019); Breast surgery (Right, 2019); Rectal surgery (N/A, 2/3/2020); Upper gastrointestinal endoscopy (N/A, 2020); Colonoscopy (N/A, 2020); and Axillary Surgery (Right, 2021).      Social History     Socioeconomic History    Marital status: Single     Spouse name: Not on file    Number of children: 2    Years of education: graduated high school, some Colon Cancer Maternal Uncle     Diabetes Maternal Uncle     Heart Attack Maternal Uncle     Uterine Cancer Neg Hx     Ovarian Cancer Neg Hx         Allergies:  Benadryl [diphenhydramine]    Home Medications:  Prior to Admission medications    Medication Sig Start Date End Date Taking?  Authorizing Provider   sulfamethoxazole-trimethoprim (BACTRIM DS) 800-160 MG per tablet Take 1 tablet by mouth 2 times daily for 7 days 10/18/21 10/25/21 Yes Dawna Cherry MD   LEVEMIR FLEXTOUCH 100 UNIT/ML injection pen inject 55 units subcutaneously nightly 10/15/21   Tiffanie Portillo PA-C   ibuprofen (IBU) 600 MG tablet Take 1 tablet by mouth every 8 hours as needed for Pain 9/27/21   Deepthi Ayala PA-C   cephALEXin (KEFLEX) 500 MG capsule 500 mgTake three times daily 9/20/21   Amos Betts MD   ondansetron (ZOFRAN) 4 MG tablet Take every six hours as needed 9/20/21   Amos Betts MD   budesonide-formoterol (SYMBICORT) 160-4.5 MCG/ACT AERO Inhale 2 puffs into the lungs 2 times daily 9/16/21   Nadeen Pressley MD   ipratropium-albuterol (DUONEB) 0.5-2.5 (3) MG/3ML SOLN nebulizer solution Inhale 3 mLs into the lungs every 4 hours (while awake) 9/16/21   Nadeen Pressley MD   blood glucose monitor kit and supplies 1 kit by Other route three times daily Dispense product that insurance will cover 9/16/21   Nadeen Pressley MD   Blood Pressure KIT 1 kit by Does not apply route 2 times daily 9/16/21   Nadeen Pressley MD   valACYclovir (VALTREX) 500 MG tablet take 1 tablet by mouth twice a day for 7 days 8/23/21   MARCELO Rush - CNP   UNIFINE PENTIPS 29G X 12MM MISC USE Five times a DAILY WITH BASAGLAR 7/27/21   Tiffanie Portillo PA-C   omeprazole (PRILOSEC) 20 MG delayed release capsule Take 1 capsule by mouth every morning (before breakfast) 7/12/21 11/9/21  MARCELO Figueroa NP   insulin aspart (NOVOLOG FLEXPEN) 100 UNIT/ML injection pen Inject 4 Units into the skin 3 times daily (before meals) 4 units before breakfast, 4 units before lunch, 6 units before dinner and 6 units before bedtime 5/26/21   Brooklyn Franco PA-C   lisinopril (PRINIVIL;ZESTRIL) 5 MG tablet take 1 tablet by mouth once daily 5/21/21   Brooklyn Franco PA-C   Semaglutide,0.25 or 0.5MG/DOS, (OZEMPIC, 0.25 OR 0.5 MG/DOSE,) 2 MG/1.5ML SOPN 0.25 mg 1 weekly x 4 weeks then increase 0.5 mg once weekly x 4 weeks then 1 mg once weekly  Patient taking differently: 0.25 mg 1 weekly x 4 weeks then increase 0.5 mg once weekly x 4 weeks then 1 mg once weekly  Pt not taking 5/21/21   Brooklyn Franco PA-C   Insulin Pen Needle (PEN NEEDLES) 31G X 6 MM MISC 1 each by Does not apply route daily 5/21/21   Brooklyn Franco PA-C   TRUEplus Lancets 30G MISC TEST 2 TIMES A DAY AND AS NEEED FOR SYMPTOMS OF IRREGULAR BLOOD GLUCOSE 4/21/21   Brooklyn Franco PA-C   QUEtiapine (SEROQUEL) 50 MG tablet take 1 tablet by mouth at bedtime 12/8/20   BernadineMARCELO Cain - NP   acetaminophen (TYLENOL) 500 MG tablet Take 2 tablets by mouth 3 times daily 10/28/20   Martin Arriaza MD   albuterol sulfate HFA (PROVENTIL HFA) 108 (90 Base) MCG/ACT inhaler Inhale 2 puffs into the lungs every 6 hours as needed for Wheezing 7/23/19   Kevin Horne PA-C       REVIEW OFSYSTEMS    (2-9 systems for level 4, 10 or more for level 5)      Review of Systems   Constitutional: Negative for diaphoresis and fever. HENT: Negative for congestion. Eyes: Negative for visual disturbance. Respiratory: Negative for shortness of breath. Cardiovascular: Negative for chest pain. Gastrointestinal: Negative for abdominal pain, nausea and vomiting. Endocrine: Negative for polyuria. Genitourinary: Negative for dysuria. Musculoskeletal: Negative for back pain. Skin: Positive for rash and wound. Abscess     Neurological: Negative for headaches. Psychiatric/Behavioral: Negative for confusion.        PHYSICAL EXAM   (up to 7 for level 4, 8 or more forlevel 5)      ED TRIAGE VITALS BP: (!) 140/88, Temp: 97.9 °F (36.6 °C), Pulse: 96, Resp: 18, SpO2: 98 %    Vitals:    10/18/21 0551   BP: (!) 140/88   Pulse: 96   Resp: 18   Temp: 97.9 °F (36.6 °C)   TempSrc: Infrared   SpO2: 98%   Weight: 260 lb (117.9 kg)   Height: 5' 5.5\" (1.664 m)       Physical Exam  HENT:      Head: Normocephalic. Nose: Nose normal.      Mouth/Throat:      Mouth: Mucous membranes are moist.   Cardiovascular:      Rate and Rhythm: Normal rate. Pulmonary:      Effort: Pulmonary effort is normal.   Chest:       Neurological:      Mental Status: She is alert. DIFFERENTIAL  DIAGNOSIS     PLAN (LABS / IMAGING / EKG):  Orders Placed This Encounter   Procedures    POCT Glucose       MEDICATIONS ORDERED:  Orders Placed This Encounter   Medications    sulfamethoxazole-trimethoprim (BACTRIM DS) 800-160 MG per tablet     Sig: Take 1 tablet by mouth 2 times daily for 7 days     Dispense:  14 tablet     Refill:  0       DDX:     abscess    Initial MDM/Plan: 27 y.o. female who presents with abscess. Abscess  Superficial  On right breast   No fevers  States needs are glucose checked  Plan to de-roof, I&D  bactrium  Outpatient follow up  Recommend breast US    Dispo:  Discharge with f/u  PCP vs gen surgery  On bactrium abx oral     DIAGNOSTIC RESULTS / EMERGENCYDEPARTMENT COURSE / MDM     LABS:  No results found for this visit on 10/18/21. RADIOLOGY:  No orders to display         EMERGENCY DEPARTMENT COURSE:  ED Course as of Oct 18 0624   Mon Oct 18, 2021   5276 Patient seen and assessed in emergency department no acute respiratory cardiovascular distress. Patient here with abscess on breast, right breast, medial aspect states that she has history of abscesses, has had abscesses before the to be incision and drainage. States this 1 started by 2 to 3 days ago, no fevers or chills, states last abscess was a month ago.   States she was put on Keflex and Bactrim at the time, states that she does well on antibiotics as no known allergies. States no traumatic injuries to the breast, there is slight redness around the abscess site however there is no pain. Denies Covid exposure, abdominal pain nausea or vomiting.    [PS]      ED Course User Index  [PS] Khadra Copeland MD          PROCEDURES:  Incision/Drainage    Date/Time: 10/18/2021 6:27 AM  Performed by: Khadra Copeland MD  Authorized by: Juan Antonio So MD     Consent:     Consent obtained:  Verbal  Location:     Type:  Abscess    Location:  Trunk    Trunk location:  R breast  Anesthesia (see MAR for exact dosages): Anesthesia method:  None  Procedure type:     Complexity:  Simple  Procedure details:     Incision types:  Stab incision    Incision and drainage depth: epidermal     Scalpel blade:  11    Drainage:  Purulent    Packing materials:  None  Post-procedure details:     Patient tolerance of procedure: Tolerated well, no immediate complications      CONSULTS:  None    CRITICAL CARE:  Please see attending note    FINAL IMPRESSION      1.  Abscess          DISPOSITION / PLAN     DISPOSITION Discharge - Pending Orders Complete 10/18/2021 06:20:10 AM       PATIENT REFERRED TO:  Brooklyn Franco PA-C  [address] [facility]  [address]  [address] [facility] [phone]    In 3 days      [facility]  [address] [address]  [phone]    As needed, If symptoms worsen      DISCHARGE MEDICATIONS:  New Prescriptions    SULFAMETHOXAZOLE-TRIMETHOPRIM (BACTRIM DS) 800-160 MG PER TABLET    Take 1 tablet by mouth 2 times daily for 7 days       Khadra Copeland MD  Emergency Medicine Resident    (Please note that portions of this note were completed with a voice recognition program.Efforts were made to edit the dictations but occasionally words are mis-transcribed.)       Khadra Copeland MD  Resident  10/18/21 8066       Khadra Copeland MD  Resident  10/18/21 7139

## 2021-10-18 NOTE — ED NOTES
[facility] arranged transportation for patient back to residence on file after hospital discharge.  Trip # [id number] ETA 1-3 hours      KEVYN Ruiz  10/18/21 3269

## 2021-10-24 NOTE — ED PROVIDER NOTES
[address] [facility]   Emergency Department  Faculty Attestation       I performed a history and physical examination of the patient and discussed management with the resident. I reviewed the residents note and agree with the documented findings including all diagnostic interpretations and plan of care. Any areas of disagreement are noted on the chart. I was personally present for the key portions of any procedures. I have documented in the chart those procedures where I was not present during the key portions. I have reviewed the emergency nurses triage note. I agree with the chief complaint, past medical history, past surgical history, allergies, medications, social and family history as documented unless otherwise noted below. Documentation of the HPI, Physical Exam and Medical Decision Making performed by scribdeonna is based on my personal performance of the HPI, PE and MDM. For Physician Assistant/ Nurse Practitioner cases/documentation I have personally evaluated this patient and have completed at least one if not all key elements of the E/M (history, physical exam, and MDM). Additional findings are as noted. Pertinent Comments     Primary Care Physician: Jocy Lombardo PA-C      ED Triage Vitals [10/18/21 0551]   BP Temp Temp Source Pulse Resp SpO2 Height Weight   (!) 140/88 97.9 °F (36.6 °C) Infrared 96 18 98 % 5' 5.5\" (1.664 m) 260 lb (117.9 kg)        History/Physical: This is a 27 y.o. female who presents to the Emergency Department with complaint of small blister like abscess on her right breast.  On the superior portion of the breat ~ 1 cm is shape with thinning of skin and clear purulent material underneath, minimal surrounding erythema. No other change of the breast with no nipple inversion and no orange peel appearance of the skin      MDM/Plan:   Very superficial blisterlike lesion on the top of the right breast with purulent material underneath it.   Had discussion with patient this

## 2021-10-27 ENCOUNTER — OFFICE VISIT (OUTPATIENT)
Dept: PODIATRY | Age: 26
End: 2021-10-27
Payer: COMMERCIAL

## 2021-10-27 VITALS — HEIGHT: 66 IN | BODY MASS INDEX: 41.78 KG/M2 | WEIGHT: 260 LBS

## 2021-10-27 DIAGNOSIS — S93.401A RUPTURE OF LIGAMENT OF ANKLE, RIGHT, INITIAL ENCOUNTER: ICD-10-CM

## 2021-10-27 DIAGNOSIS — S93.491A SPRAIN OF ANTERIOR TALOFIBULAR LIGAMENT OF RIGHT ANKLE, INITIAL ENCOUNTER: Primary | ICD-10-CM

## 2021-10-27 DIAGNOSIS — M25.571 ACUTE RIGHT ANKLE PAIN: ICD-10-CM

## 2021-10-27 DIAGNOSIS — M25.371 ANKLE INSTABILITY, RIGHT: ICD-10-CM

## 2021-10-27 PROCEDURE — G8427 DOCREV CUR MEDS BY ELIG CLIN: HCPCS | Performed by: PODIATRIST

## 2021-10-27 PROCEDURE — G8417 CALC BMI ABV UP PARAM F/U: HCPCS | Performed by: PODIATRIST

## 2021-10-27 PROCEDURE — 1036F TOBACCO NON-USER: CPT | Performed by: PODIATRIST

## 2021-10-27 PROCEDURE — 99213 OFFICE O/P EST LOW 20 MIN: CPT | Performed by: PODIATRIST

## 2021-10-27 PROCEDURE — G8484 FLU IMMUNIZE NO ADMIN: HCPCS | Performed by: PODIATRIST

## 2021-10-27 ASSESSMENT — ENCOUNTER SYMPTOMS
COLOR CHANGE: 0
CONSTIPATION: 0
NAUSEA: 0
VOMITING: 0
DIARRHEA: 0

## 2021-10-27 NOTE — LETTER
[address] [address]  Phone: [phone]  Fax: [phone]    Mariloutete Felipa        October 27, 2021     Patient: Otf Ramírez   YOB: 1995   Date of Visit: 10/27/2021       To Whom It May Concern: It is my medical opinion that Marek Vandanaan should remain out of work until 11/29/2021. If you have any questions or concerns, please don't hesitate to call.     Sincerely,        Nilam Jenkins DPM

## 2021-10-27 NOTE — PROGRESS NOTES
times daily Dispense product that insurance will cover 9/16/21  Yes Meenakshi Lang MD   Blood Pressure KIT 1 kit by Does not apply route 2 times daily 9/16/21  Yes Meenakshi Lang MD   valACYclovir (VALTREX) 500 MG tablet take 1 tablet by mouth twice a day for 7 days 8/23/21  Yes MARCELO Ball CNP   UNIFINE PENTIPS 29G X 12MM MISC USE Five times a DAILY WITH BASAGLAR 7/27/21  Yes Genevieve Rivera PA-C   omeprazole (PRILOSEC) 20 MG delayed release capsule Take 1 capsule by mouth every morning (before breakfast) 7/12/21 11/9/21 Yes MARCELO Grover NP   insulin aspart (NOVOLOG FLEXPEN) 100 UNIT/ML injection pen Inject 4 Units into the skin 3 times daily (before meals) 4 units before breakfast, 4 units before lunch, 6 units before dinner and 6 units before bedtime 5/26/21  Yes Genevieve Rivera PA-C   lisinopril (PRINIVIL;ZESTRIL) 5 MG tablet take 1 tablet by mouth once daily 5/21/21  Yes Genevieve Rivera PA-C   Semaglutide,0.25 or 0.5MG/DOS, (OZEMPIC, 0.25 OR 0.5 MG/DOSE,) 2 MG/1.5ML SOPN 0.25 mg 1 weekly x 4 weeks then increase 0.5 mg once weekly x 4 weeks then 1 mg once weekly  Patient taking differently: 0.25 mg 1 weekly x 4 weeks then increase 0.5 mg once weekly x 4 weeks then 1 mg once weekly  Pt not taking 5/21/21  Yes Genevieve Rivera PA-C   Insulin Pen Needle (PEN NEEDLES) 31G X 6 MM MISC 1 each by Does not apply route daily 5/21/21  Yes Genevieve Rivera PA-C   TRUEplus Lancets 30G MISC TEST 2 TIMES A DAY AND AS NEEED FOR SYMPTOMS OF IRREGULAR BLOOD GLUCOSE 4/21/21  Yes Genevieve Rivera PA-C   QUEtiapine (SEROQUEL) 50 MG tablet take 1 tablet by mouth at bedtime 12/8/20  Yes MARCELO Dan NP   acetaminophen (TYLENOL) 500 MG tablet Take 2 tablets by mouth 3 times daily 10/28/20  Yes Conner Enciso MD   albuterol sulfate HFA (PROVENTIL HFA) 108 (90 Base) MCG/ACT inhaler Inhale 2 puffs into the lungs every 6 hours as needed for Wheezing 7/23/19  Yes Amadeo Ndiaye PA-C       Past Surgical Musculoskeletal: Positive for gait problem. Negative for arthralgias and joint swelling. Skin: Negative for color change, pallor, rash and wound. Neurological: Negative for weakness and numbness. Lower Extremity Physical Examination:     Vitals: There were no vitals filed for this visit. General: AAO x 3 in NAD. Vascular: DP and PT pulses palpable 2/4, Bilateral.  CFT <3 seconds, Bilateral.  Hair growth present to the level of the digits, Bilateral.  Edema present, Right. Varicosities absent, Bilateral. Erythema present, Bilateral    Neurological:  Sensation present to light touch to level of digits, Bilateral.    Musculoskeletal: Muscle strength 5/5, Bilateral.  Pain present upon palpation of entire right ankle, abnormal pain response to palpation and passive ROM testing, Right. normal medial longitudinal arch, Right. Ankle ROM abnormal - painful,Right. Integument: Warm, dry, supple, Bilateral.  Open lesion absent, Bilateral.     Gait analysis:     Asessment: Patient is a 27 y.o. female with:   1. Sprain of anterior talofibular ligament of right ankle, initial encounter    2. Acute right ankle pain    3. Rupture of ligament of ankle, right, initial encounter    4. Ankle instability, right          Plan: Pt was evaluated and examined. Patient was given personalized discharge instructions. Pt will follow up in 4 weeks or sooner if any problems arise. Diagnosis was discussed with the pt and all of their questions were answered in detail. Proper foot hygiene and care was discussed with the pt. Patient to check feet daily and contact the office with any questions/problems/concerns. Other comorbidity noted and will be managed by PCP. Rest, ice  CAM walker    MRI ordered: He/She has gone through conservative care including CAM walker, ice, rest and his/her pain has increased and persisted. I am concerned for   1. Sprain of anterior talofibular ligament of right ankle, initial encounter    2. Acute right ankle pain    3. Rupture of ligament of ankle, right, initial encounter    4. Ankle instability, right    . I feel an MRI is needed to assess the pathology here and to guide further treatment whether it be immobilization or surgical intervention. Orders Placed This Encounter   Procedures    MRI ANKLE RIGHT WO CONTRAST     Standing Status:   Future     Standing Expiration Date:   10/28/2022     No orders of the defined types were placed in this encounter. RTC in 4week(s).     10/27/2021

## 2021-11-02 ENCOUNTER — TELEPHONE (OUTPATIENT)
Dept: PODIATRY | Age: 26
End: 2021-11-02

## 2021-11-02 NOTE — TELEPHONE ENCOUNTER
Vadim Mccoy called with a lot of concern that when she took her cam walker off this morning, her entire lower leg to the knee was extremely swollen. She did take pictures if there is anyway she can send them in. She has kept the boot off, and a lot of the swelling has gone down. She has the same pain in her leg that she always has, just some shooting pains, with no noticeable change. She has an MRI scheduled for November 8th. She wants to know what she can do, but says she can not come to the office today as she doesn't have a ride. Please advise.

## 2021-11-07 ENCOUNTER — HOSPITAL ENCOUNTER (EMERGENCY)
Age: 26
Discharge: HOME | End: 2021-11-07
Attending: EMERGENCY MEDICINE
Payer: COMMERCIAL

## 2021-11-07 ENCOUNTER — APPOINTMENT (OUTPATIENT)
Dept: GENERAL RADIOLOGY | Age: 26
End: 2021-11-07
Payer: COMMERCIAL

## 2021-11-07 VITALS
BODY MASS INDEX: 43.32 KG/M2 | DIASTOLIC BLOOD PRESSURE: 90 MMHG | HEART RATE: 88 BPM | SYSTOLIC BLOOD PRESSURE: 147 MMHG | WEIGHT: 260 LBS | TEMPERATURE: 98.4 F | OXYGEN SATURATION: 98 % | RESPIRATION RATE: 22 BRPM | HEIGHT: 65 IN

## 2021-11-07 DIAGNOSIS — R19.7 DIARRHEA, UNSPECIFIED TYPE: ICD-10-CM

## 2021-11-07 DIAGNOSIS — N30.01 ACUTE CYSTITIS WITH HEMATURIA: Primary | ICD-10-CM

## 2021-11-07 DIAGNOSIS — R11.0 NAUSEA: ICD-10-CM

## 2021-11-07 LAB
-: ABNORMAL
ABSOLUTE EOS #: 0.16 K/UL (ref 0–0.44)
ABSOLUTE IMMATURE GRANULOCYTE: 0.03 K/UL (ref 0–0.3)
ABSOLUTE LYMPH #: 3.18 K/UL (ref 1.1–3.7)
ABSOLUTE MONO #: 0.57 K/UL (ref 0.1–1.2)
ALBUMIN SERPL-MCNC: 4.1 G/DL (ref 3.5–5.2)
ALBUMIN/GLOBULIN RATIO: 1.7 (ref 1–2.5)
ALP BLD-CCNC: 56 U/L (ref 35–104)
ALT SERPL-CCNC: 29 U/L (ref 5–33)
AMORPHOUS: ABNORMAL
ANION GAP SERPL CALCULATED.3IONS-SCNC: 12 MMOL/L (ref 9–17)
AST SERPL-CCNC: 32 U/L
BACTERIA: ABNORMAL
BASOPHILS # BLD: 0 % (ref 0–2)
BASOPHILS ABSOLUTE: 0.03 K/UL (ref 0–0.2)
BILIRUB SERPL-MCNC: 0.7 MG/DL (ref 0.3–1.2)
BILIRUBIN URINE: NEGATIVE
BUN BLDV-MCNC: 11 MG/DL (ref 6–20)
BUN/CREAT BLD: ABNORMAL (ref 9–20)
CALCIUM SERPL-MCNC: 9.1 MG/DL (ref 8.6–10.4)
CASTS UA: ABNORMAL /LPF (ref 0–8)
CHLORIDE BLD-SCNC: 99 MMOL/L (ref 98–107)
CO2: 20 MMOL/L (ref 20–31)
COLOR: YELLOW
COMMENT UA: ABNORMAL
CREAT SERPL-MCNC: 0.66 MG/DL (ref 0.5–0.9)
CRYSTALS, UA: ABNORMAL /HPF
DIFFERENTIAL TYPE: NORMAL
EOSINOPHILS RELATIVE PERCENT: 2 % (ref 1–4)
EPITHELIAL CELLS UA: ABNORMAL /HPF (ref 0–5)
GFR AFRICAN AMERICAN: >60 ML/MIN
GFR NON-AFRICAN AMERICAN: >60 ML/MIN
GFR SERPL CREATININE-BSD FRML MDRD: ABNORMAL ML/MIN/{1.73_M2}
GFR SERPL CREATININE-BSD FRML MDRD: ABNORMAL ML/MIN/{1.73_M2}
GLUCOSE BLD-MCNC: 334 MG/DL (ref 70–99)
GLUCOSE URINE: ABNORMAL
HCG QUALITATIVE: NEGATIVE
HCT VFR BLD CALC: 36.8 % (ref 36.3–47.1)
HEMOGLOBIN: 12.6 G/DL (ref 11.9–15.1)
IMMATURE GRANULOCYTES: 0 %
KETONES, URINE: ABNORMAL
LEUKOCYTE ESTERASE, URINE: ABNORMAL
LIPASE: 59 U/L (ref 13–60)
LYMPHOCYTES # BLD: 35 % (ref 24–43)
MCH RBC QN AUTO: 31.2 PG (ref 25.2–33.5)
MCHC RBC AUTO-ENTMCNC: 34.2 G/DL (ref 28.4–34.8)
MCV RBC AUTO: 91.1 FL (ref 82.6–102.9)
MONOCYTES # BLD: 6 % (ref 3–12)
MUCUS: ABNORMAL
NITRITE, URINE: NEGATIVE
NRBC AUTOMATED: 0 PER 100 WBC
OTHER OBSERVATIONS UA: ABNORMAL
PDW BLD-RTO: 12.7 % (ref 11.8–14.4)
PH UA: 5.5 (ref 5–8)
PLATELET # BLD: 199 K/UL (ref 138–453)
PLATELET ESTIMATE: NORMAL
PMV BLD AUTO: 10.2 FL (ref 8.1–13.5)
POTASSIUM SERPL-SCNC: 3.8 MMOL/L (ref 3.7–5.3)
PROTEIN UA: NEGATIVE
RBC # BLD: 4.04 M/UL (ref 3.95–5.11)
RBC # BLD: NORMAL 10*6/UL
RBC UA: ABNORMAL /HPF (ref 0–4)
RENAL EPITHELIAL, UA: ABNORMAL /HPF
SEG NEUTROPHILS: 57 % (ref 36–65)
SEGMENTED NEUTROPHILS ABSOLUTE COUNT: 5.16 K/UL (ref 1.5–8.1)
SODIUM BLD-SCNC: 131 MMOL/L (ref 135–144)
SPECIFIC GRAVITY UA: 1.04 (ref 1–1.03)
TOTAL PROTEIN: 6.5 G/DL (ref 6.4–8.3)
TRICHOMONAS: ABNORMAL
TURBIDITY: ABNORMAL
URINE HGB: ABNORMAL
UROBILINOGEN, URINE: NORMAL
WBC # BLD: 9.1 K/UL (ref 3.5–11.3)
WBC # BLD: NORMAL 10*3/UL
WBC UA: ABNORMAL /HPF (ref 0–5)
YEAST: ABNORMAL

## 2021-11-07 PROCEDURE — 6360000002 HC RX W HCPCS: Performed by: GENERAL PRACTICE

## 2021-11-07 PROCEDURE — 71045 X-RAY EXAM CHEST 1 VIEW: CPT

## 2021-11-07 PROCEDURE — 2580000003 HC RX 258: Performed by: GENERAL PRACTICE

## 2021-11-07 PROCEDURE — 85025 COMPLETE CBC W/AUTO DIFF WBC: CPT

## 2021-11-07 PROCEDURE — 83690 ASSAY OF LIPASE: CPT

## 2021-11-07 PROCEDURE — 81001 URINALYSIS AUTO W/SCOPE: CPT

## 2021-11-07 PROCEDURE — 87086 URINE CULTURE/COLONY COUNT: CPT

## 2021-11-07 PROCEDURE — 99282 EMERGENCY DEPT VISIT SF MDM: CPT

## 2021-11-07 PROCEDURE — 80053 COMPREHEN METABOLIC PANEL: CPT

## 2021-11-07 PROCEDURE — 93005 ELECTROCARDIOGRAM TRACING: CPT | Performed by: GENERAL PRACTICE

## 2021-11-07 PROCEDURE — 96374 THER/PROPH/DIAG INJ IV PUSH: CPT

## 2021-11-07 PROCEDURE — 84703 CHORIONIC GONADOTROPIN ASSAY: CPT

## 2021-11-07 RX ORDER — NITROFURANTOIN 25; 75 MG/1; MG/1
100 CAPSULE ORAL 2 TIMES DAILY
Qty: 10 CAPSULE | Refills: 0 | Status: SHIPPED | OUTPATIENT
Start: 2021-11-07 | End: 2021-11-12

## 2021-11-07 RX ORDER — ONDANSETRON 2 MG/ML
4 INJECTION INTRAMUSCULAR; INTRAVENOUS ONCE
Status: COMPLETED | OUTPATIENT
Start: 2021-11-07 | End: 2021-11-07

## 2021-11-07 RX ORDER — SODIUM CHLORIDE 9 MG/ML
1000 INJECTION, SOLUTION INTRAVENOUS CONTINUOUS
Status: ACTIVE | OUTPATIENT
Start: 2021-11-07 | End: 2021-11-07

## 2021-11-07 RX ADMIN — SODIUM CHLORIDE 1000 ML: 9 INJECTION, SOLUTION INTRAVENOUS at 21:32

## 2021-11-07 RX ADMIN — SODIUM CHLORIDE 1000 ML: 9 INJECTION, SOLUTION INTRAVENOUS at 23:17

## 2021-11-07 RX ADMIN — ONDANSETRON 4 MG: 2 INJECTION INTRAMUSCULAR; INTRAVENOUS at 21:32

## 2021-11-07 ASSESSMENT — ENCOUNTER SYMPTOMS
BACK PAIN: 0
COUGH: 1
SHORTNESS OF BREATH: 0
ABDOMINAL PAIN: 1
VOMITING: 0
NAUSEA: 1
BLOOD IN STOOL: 1
DIARRHEA: 1

## 2021-11-07 ASSESSMENT — PAIN DESCRIPTION - LOCATION: LOCATION: ABDOMEN

## 2021-11-07 ASSESSMENT — PAIN DESCRIPTION - DESCRIPTORS: DESCRIPTORS: SHARP;SHOOTING

## 2021-11-07 ASSESSMENT — PAIN SCALES - GENERAL: PAINLEVEL_OUTOF10: 8

## 2021-11-08 ENCOUNTER — HOSPITAL ENCOUNTER (OUTPATIENT)
Dept: MRI IMAGING | Facility: CLINIC | Age: 26
Discharge: HOME | End: 2021-11-10
Payer: COMMERCIAL

## 2021-11-08 DIAGNOSIS — S93.491A SPRAIN OF ANTERIOR TALOFIBULAR LIGAMENT OF RIGHT ANKLE, INITIAL ENCOUNTER: ICD-10-CM

## 2021-11-08 DIAGNOSIS — M25.571 ACUTE RIGHT ANKLE PAIN: ICD-10-CM

## 2021-11-08 PROCEDURE — 73721 MRI JNT OF LWR EXTRE W/O DYE: CPT

## 2021-11-08 NOTE — ED PROVIDER NOTES
[facility]     Emergency Department     Faculty Note/ Attestation      Pt Name: Harshad Garcia                                       MRN: [id number]  [place] 1995  Date of evaluation: 11/7/2021    Patients PCP:    Annalisa Jerry PA-C    Attestation  I performed a history and physical examination of the patient and discussed management with the resident. I reviewed the residents note and agree with the documented findings and plan of care. Any areas of disagreement are noted on the chart. I was personally present for the key portions of any procedures. I have documented in the chart those procedures where I was not present during the key portions. I have reviewed the emergency nurses triage note. I agree with the chief complaint, past medical history, past surgical history, allergies, medications, social and family history as documented unless otherwise noted below. For Physician Assistant/ Nurse Practitioner cases/documentation I have personally evaluated this patient and have completed at least one if not all key elements of the E/M (history, physical exam, and MDM). Additional findings are as noted.     Initial Screens:             Vitals:    Vitals:    11/07/21 2027 11/07/21 2112   BP: (!) 147/90    Pulse: 105 88   Resp: 22    Temp: 98.4 °F (36.9 °C)    TempSrc: Temporal    SpO2: 98%    Weight: 260 lb (117.9 kg)    Height: 5' 5\" (1.651 m)        CHIEF COMPLAINT       Chief Complaint   Patient presents with    Abdominal Pain     x one week     Dysuria    Diarrhea    Nausea    Chest Pain       Patient 73-year-old female with abdominal pain x1 week has not had a period in over a year concerns with dysuria no vaginal bleeding or discharge no concerns for STI however does have a history of cyst patient also having significant diarrhea nausea pain in the upper abdomen no chest pain on my exam or discussion patient declines chest pain just notes the pain is in the upper abdomen she has no shortness of breath difficulty breathing        EMERGENCY DEPARTMENT COURSE:     -------------------------  BP: (!) 147/90, Temp: 98.4 °F (36.9 °C), Pulse: 88, Resp: 22  Physical Exam  Constitutional:       Appearance: She is well-developed. She is not diaphoretic. HENT:      Head: Normocephalic and atraumatic. Right Ear: External ear normal.      Left Ear: External ear normal.   Eyes:      General: No scleral icterus. Right eye: No discharge. Left eye: No discharge. Neck:      Trachea: No tracheal deviation. Pulmonary:      Effort: Pulmonary effort is normal. No respiratory distress. Breath sounds: No stridor. Abdominal:      General: Bowel sounds are normal.      Palpations: Abdomen is soft. Musculoskeletal:         General: Normal range of motion. Cervical back: Normal range of motion. Skin:     General: Skin is warm and dry. Neurological:      Mental Status: She is alert and oriented to person, place, and time. Coordination: Coordination normal.   Psychiatric:         Behavior: Behavior normal.           Comments    Patient abdominal pain will need evaluation including pregnancy test urinalysis evaluation for stone given the intermittent nature of the pain if there is an obstructing stone with the UA showing signs of UTI patient may need urology consult. María Cool DO,, , RDMS.   Attending Emergency Physician         María Cool DO  11/07/21 2117

## 2021-11-08 NOTE — ED NOTES
The following labs labeled with pt sticker and tubed to lab:     [] Blue     [x] Lavender   [] on ice  [x] Green/yellow  [] Green/black [] on ice  [] Yellow  [] Red  [] Pink      [] COVID-19 swab    [] Rapid  [] PCR  [] Peds Viral Panel     [] Urine Sample  [] Pelvic Cultures  [] Blood Cultures            Joshua Mayberry RN  11/07/21 7679

## 2021-11-08 NOTE — ED PROVIDER NOTES
[facility] ED  Emergency Department Encounter  EmergencyMedicine Resident     Pt Abigail Vásquez  MRN: [id number]  [place] 1995  Date of evaluation: 21  PCP:  Luisa Chandler PA-C    CHIEF COMPLAINT       Chief Complaint   Patient presents with    Abdominal Pain     x one week     Dysuria    Diarrhea    Nausea    Chest Pain       HISTORY OF PRESENT ILLNESS  (Location/Symptom, Timing/Onset, Context/Setting, Quality, Duration, Modifying Factors, Severity.)      Lupe Mathias is a 27 y.o. female who presents with multiple medical complaints. Patient main complaint is abdominal pain which she states she has had for approximately 1 week. Describes bilateral flank pain, with dysuria and urinary hesitancy. Denies any vaginal discharge or vaginal bleeding. Patient states that she has not had a period in the last year she does have the Implanon. Patient states that she has had some bloody diarrhea as well for the last week, states that she has generalized abdominal pain, is worse with any oral intake has had decreased appetite, denies any fever chills. Patient states that she does have a chronic nonproductive cough, does have history of smoking, still vapes. Chief complaint mentioned chest pain however patient describes it more as abdominal discomfort. Patient states that she has a diabetic, and her sugars have been running high, she is concerned that she has a infection. Does have history of 2 C-sections but denies any other abdominal surgeries, no history of renal stones. PAST MEDICAL / SURGICAL / SOCIAL / FAMILY HISTORY      has a past medical history of Asthma, Diabetes mellitus type 2 in obese ([address] Hepatic steatosis, Hypertension, Morbid obesity with body mass index (BMI) of 40.0 to 49.9 ([address] Neuropathy, and Postpartum depression. has a past surgical history that includes Tonsillectomy (Bilateral);  section; eye surgery;   section (N/A, 3/23/2017); Upper gastrointestinal endoscopy (N/A, 2018); Breast surgery (Left, 2019); Breast surgery (Right, 2019); Rectal surgery (N/A, 2/3/2020); Upper gastrointestinal endoscopy (N/A, 2020); Colonoscopy (N/A, 2020); and Axillary Surgery (Right, 2021). Social History     Socioeconomic History    Marital status: Single     Spouse name: Not on file    Number of children: 2    Years of education: graduated high school, some college    Highest education level: Not on file   Occupational History    Occupation: housewife    Occupation:      Comment: last worked    Tobacco Use    Smoking status: Former Smoker     Packs/day: 1.00     Years: 10.00     Pack years: 10.00     Types: Cigarettes     Start date: 2007     Quit date: 2020     Years since quittin.1    Smokeless tobacco: Never Used   Vaping Use    Vaping Use: Every day    Substances: Always   Substance and Sexual Activity    Alcohol use: No     Alcohol/week: 0.0 standard drinks    Drug use: No    Sexual activity: Yes     Partners: Male     Comment: chlamydia in 2016,   Other Topics Concern    Not on file   Social History Narrative    Lives with fiance and 2 daughters     Social Determinants of Health     Financial Resource Strain: Low Risk     Difficulty of Paying Living Expenses: Not hard at all   Food Insecurity: No Food Insecurity    Worried About [facility] in the Last Year: Never true    [place]ahad of Food in the Last Year: Never true   Transportation Needs: No Transportation Needs    Lack of Transportation (Medical): No    Lack of Transportation (Non-Medical):  No   Physical Activity:     Days of Exercise per Week: Not on file    Minutes of Exercise per Session: Not on file   Stress:     Feeling of Stress : Not on file   Social Connections:     Frequency of Communication with Friends and Family: Not on file    Frequency of Social Gatherings with Friends and Family: Not on file    Attends Mu-ism Services: Not on file    Active Member of Clubs or Organizations: Not on file    Attends Club or Organization Meetings: Not on file    Marital Status: Not on file   Intimate Partner Violence:     Fear of Current or Ex-Partner: Not on file    Emotionally Abused: Not on file    Physically Abused: Not on file    Sexually Abused: Not on file   Housing Stability:     Unable to Pay for Housing in the Last Year: Not on file    Number of [place] in the Last Year: Not on file    Unstable Housing in the Last Year: Not on file       Family History   Problem Relation Age of Onset    Breast Cancer Mother 28    Diabetes Father     Cancer Maternal Uncle 61        acute leukemia    Heart Disease Maternal Uncle     Diabetes Maternal Uncle     Colon Cancer Maternal Uncle     Diabetes Maternal Uncle     Heart Attack Maternal Uncle     Uterine Cancer Neg Hx     Ovarian Cancer Neg Hx        Allergies:  Benadryl [diphenhydramine]    Home Medications:  Prior to Admission medications    Medication Sig Start Date End Date Taking?  Authorizing Provider   nitrofurantoin, macrocrystal-monohydrate, (MACROBID) 100 MG capsule Take 1 capsule by mouth 2 times daily for 5 days 11/7/21 11/12/21 Yes Jacquelin Hernandez DO   LEVEMIR FLEXTOUCH 100 UNIT/ML injection pen inject 55 units subcutaneously nightly 10/15/21   Dawit Khanna PA-C   ibuprofen (IBU) 600 MG tablet Take 1 tablet by mouth every 8 hours as needed for Pain 9/27/21   Nikki Gross PA-C   cephALEXin (KEFLEX) 500 MG capsule 500 mgTake three times daily 9/20/21   Zack Fernando MD   ondansetron (ZOFRAN) 4 MG tablet Take every six hours as needed 9/20/21   Zack Fernando MD   budesonide-formoterol (SYMBICORT) 160-4.5 MCG/ACT AERO Inhale 2 puffs into the lungs 2 times daily 9/16/21   Rich Bustamante MD   ipratropium-albuterol (DUONEB) 0.5-2.5 (3) MG/3ML SOLN nebulizer solution Inhale 3 mLs into the lungs every 4 hours (while awake) 9/16/21   Michael Verdugo MD   blood glucose monitor kit and supplies 1 kit by Other route three times daily Dispense product that insurance will cover 9/16/21   Michael Verdugo MD   Blood Pressure KIT 1 kit by Does not apply route 2 times daily 9/16/21   Michael Verdugo MD   valACYclovir (VALTREX) 500 MG tablet take 1 tablet by mouth twice a day for 7 days 8/23/21   MARCELO Johnson CNP   UNIFINE PENTIPS 29G X 12MM MISC USE Five times a DAILY WITH BASAGLAR 7/27/21   Estela ABIMBOLA Suazo   omeprazole (PRILOSEC) 20 MG delayed release capsule Take 1 capsule by mouth every morning (before breakfast) 7/12/21 11/9/21  MARCELO Gupta NP   insulin aspart (NOVOLOG FLEXPEN) 100 UNIT/ML injection pen Inject 4 Units into the skin 3 times daily (before meals) 4 units before breakfast, 4 units before lunch, 6 units before dinner and 6 units before bedtime 5/26/21   Estela PassABIMBOLA   lisinopril (PRINIVIL;ZESTRIL) 5 MG tablet take 1 tablet by mouth once daily 5/21/21   Estela Pass PA-C   Semaglutide,0.25 or 0.5MG/DOS, (OZEMPIC, 0.25 OR 0.5 MG/DOSE,) 2 MG/1.5ML SOPN 0.25 mg 1 weekly x 4 weeks then increase 0.5 mg once weekly x 4 weeks then 1 mg once weekly  Patient taking differently: 0.25 mg 1 weekly x 4 weeks then increase 0.5 mg once weekly x 4 weeks then 1 mg once weekly  Pt not taking 5/21/21   Estela Gabriele PA-C   Insulin Pen Needle (PEN NEEDLES) 31G X 6 MM MISC 1 each by Does not apply route daily 5/21/21   Estela ABIMBOLA Suazo   TRUEplus Lancets 30G MISC TEST 2 TIMES A DAY AND AS NEEED FOR SYMPTOMS OF IRREGULAR BLOOD GLUCOSE 4/21/21   Estela Pass PA-C   QUEtiapine (SEROQUEL) 50 MG tablet take 1 tablet by mouth at bedtime 12/8/20   MARCELO Cabrera NP   acetaminophen (TYLENOL) 500 MG tablet Take 2 tablets by mouth 3 times daily 10/28/20   Baldomero Pritchett MD   albuterol sulfate HFA (PROVENTIL HFA) 108 (90 Base) MCG/ACT inhaler Inhale 2 puffs into the lungs every 6 hours as needed for Wheezing 7/23/19   Amadeo Ndiaye PA-C       REVIEW OF SYSTEMS    (2-9 systems for level 4, 10 or more for level 5)      Review of Systems   Constitutional: Positive for appetite change. Negative for activity change, chills and fever. HENT: Negative for congestion. Respiratory: Positive for cough. Negative for shortness of breath. Cardiovascular: Negative for chest pain. Gastrointestinal: Positive for abdominal pain, blood in stool, diarrhea and nausea. Negative for vomiting. Genitourinary: Positive for difficulty urinating, dysuria, flank pain and menstrual problem. Negative for decreased urine volume, frequency, hematuria, urgency, vaginal bleeding, vaginal discharge and vaginal pain. Musculoskeletal: Negative for back pain. Skin: Negative for rash and wound. Neurological: Negative for light-headedness and headaches. Psychiatric/Behavioral: Negative for agitation. PHYSICAL EXAM   (up to 7 for level 4, 8 or more for level 5)      INITIAL VITALS:   BP (!) 147/90   Pulse 88   Temp 98.4 °F (36.9 °C) (Temporal)   Resp 22   Ht 5' 5\" (1.651 m)   Wt 260 lb (117.9 kg)   SpO2 98%   BMI 43.27 kg/m²     Physical Exam  Vitals reviewed. Exam conducted with a chaperone present. Constitutional:       General: She is not in acute distress. Appearance: She is obese. She is not ill-appearing, toxic-appearing or diaphoretic. Comments: Patient is resting comfortably on stretcher, appears in no acute distress, answers questions appropriately   HENT:      Head: Normocephalic and atraumatic. Eyes:      Conjunctiva/sclera: Conjunctivae normal.      Pupils: Pupils are equal, round, and reactive to light. Cardiovascular:      Rate and Rhythm: Normal rate. Pulmonary:      Comments: Breathing comfortable in room air, symmetric chest rise, speaking full sentences, no evidence of respiratory distress  Abdominal:      Palpations: Abdomen is soft. Tenderness:  There is generalized abdominal tenderness. There is no guarding or rebound. Negative signs include Herring's sign and McBurney's sign. Genitourinary:     Rectum: Guaiac result negative. Musculoskeletal:      Comments: Patient is an a right walking boot   Neurological:      General: No focal deficit present. Mental Status: She is alert and oriented to person, place, and time. Psychiatric:         Mood and Affect: Mood normal.         Thought Content:  Thought content normal.         Judgment: Judgment normal.         DIFFERENTIAL  DIAGNOSIS     PLAN (LABS / IMAGING / EKG):  Orders Placed This Encounter   Procedures    Culture, Urine    XR CHEST PORTABLE    CT ABDOMEN PELVIS WO CONTRAST Additional Contrast? None    Urinalysis Reflex to Culture    CBC Auto Differential    Comprehensive Metabolic Panel    HCG Qualitative, Serum    Lipase    Microscopic Urinalysis    EKG 12 Lead       MEDICATIONS ORDERED:  Orders Placed This Encounter   Medications    0.9 % sodium chloride infusion    ondansetron (ZOFRAN) injection 4 mg    nitrofurantoin, macrocrystal-monohydrate, (MACROBID) 100 MG capsule     Sig: Take 1 capsule by mouth 2 times daily for 5 days     Dispense:  10 capsule     Refill:  0       DDX: Cystitis, peptic ulcer disease, gastritis, viral illness, renal/ureteral lithiasis, gallbladder dyskinesia, cholecystitis, pancreatitis    DIAGNOSTIC RESULTS / EMERGENCY DEPARTMENT COURSE / MDM   :  Results for orders placed or performed during the hospital encounter of 11/07/21   Urinalysis Reflex to Culture    Specimen: Urine, clean catch   Result Value Ref Range    Color, UA Yellow Yellow    Turbidity UA Cloudy (A) Clear    Glucose, Ur 3+ (A) NEGATIVE    Bilirubin Urine NEGATIVE NEGATIVE    Ketones, Urine TRACE (A) NEGATIVE    Specific Gravity, UA 1.036 (H) 1.005 - 1.030    Urine Hgb TRACE (A) NEGATIVE    pH, UA 5.5 5.0 - 8.0    Protein, UA NEGATIVE NEGATIVE    Urobilinogen, Urine Normal Normal    Nitrite, Urine NEGATIVE NEGATIVE    Leukocyte Esterase, Urine TRACE (A) NEGATIVE    Urinalysis Comments NOT REPORTED    CBC Auto Differential   Result Value Ref Range    WBC 9.1 3.5 - 11.3 k/uL    RBC 4.04 3.95 - 5.11 m/uL    Hemoglobin 12.6 11.9 - 15.1 g/dL    Hematocrit 36.8 36.3 - 47.1 %    MCV 91.1 82.6 - 102.9 fL    MCH 31.2 25.2 - 33.5 pg    MCHC 34.2 28.4 - 34.8 g/dL    RDW 12.7 11.8 - 14.4 %    Platelets 223 174 - 709 k/uL    MPV 10.2 8.1 - 13.5 fL    NRBC Automated 0.0 0.0 per 100 WBC    Differential Type NOT REPORTED     Seg Neutrophils 57 36 - 65 %    Lymphocytes 35 24 - 43 %    Monocytes 6 3 - 12 %    Eosinophils % 2 1 - 4 %    Basophils 0 0 - 2 %    Immature Granulocytes 0 0 %    Segs Absolute 5.16 1.50 - 8.10 k/uL    Absolute Lymph # 3.18 1.10 - 3.70 k/uL    Absolute Mono # 0.57 0.10 - 1.20 k/uL    Absolute Eos # 0.16 0.00 - 0.44 k/uL    Basophils Absolute 0.03 0.00 - 0.20 k/uL    Absolute Immature Granulocyte 0.03 0.00 - 0.30 k/uL    WBC Morphology NOT REPORTED     RBC Morphology NOT REPORTED     Platelet Estimate NOT REPORTED    Comprehensive Metabolic Panel   Result Value Ref Range    Glucose 334 (H) 70 - 99 mg/dL    BUN 11 6 - 20 mg/dL    CREATININE 0.66 0.50 - 0.90 mg/dL    Bun/Cre Ratio NOT REPORTED 9 - 20    Calcium 9.1 8.6 - 10.4 mg/dL    Sodium 131 (L) 135 - 144 mmol/L    Potassium 3.8 3.7 - 5.3 mmol/L    Chloride 99 98 - 107 mmol/L    CO2 20 20 - 31 mmol/L    Anion Gap 12 9 - 17 mmol/L    Alkaline Phosphatase 56 35 - 104 U/L    ALT 29 5 - 33 U/L    AST 32 (H) <32 U/L    Total Bilirubin 0.70 0.3 - 1.2 mg/dL    Total Protein 6.5 6.4 - 8.3 g/dL    Albumin 4.1 3.5 - 5.2 g/dL    Albumin/Globulin Ratio 1.7 1.0 - 2.5    GFR Non-African American >60 >60 mL/min    GFR African American >60 >60 mL/min    GFR Comment          GFR Staging NOT REPORTED    HCG Qualitative, Serum   Result Value Ref Range    hCG Qual NEGATIVE NEGATIVE   Lipase   Result Value Ref Range    Lipase 59 13 - 60 U/L   Microscopic Urinalysis   Result Value Ref Range    -          WBC, UA 10 TO 20 0 - 5 /HPF    RBC, UA 2 TO 5 0 - 4 /HPF    Casts UA  0 - 8 /LPF     2 TO 5 HYALINE Reference range defined for non-centrifuged specimen. Crystals, UA NOT REPORTED None /HPF    Epithelial Cells UA 2 TO 5 0 - 5 /HPF    Renal Epithelial, UA NOT REPORTED 0 /HPF    Bacteria, UA MODERATE (A) None    Mucus, UA NOT REPORTED None    Trichomonas, UA NOT REPORTED None    Amorphous, UA NOT REPORTED None    Other Observations UA NOT REPORTED NOT REQ. Yeast, UA NOT REPORTED None           RADIOLOGY:  Narrative & Impression  EXAMINATION:  ONE XRAY VIEW OF THE CHEST     11/7/2021 9:38 pm     COMPARISON:  Two-view chest from 09/14/2021     HISTORY:  ORDERING SYSTEM PROVIDED HISTORY: cough  TECHNOLOGIST PROVIDED HISTORY:  cough     History of asthma, hypertension and obesity.     FINDINGS:  Cardiac silhouette nonenlarged and unchanged; mediastinal structures midline  and stable.     Lungs and costophrenic angles clear.     Bones and soft tissues intact.     IMPRESSION:  No interval change. No acute cardiopulmonary disease. EKG  EKG Interpretation    Interpreted by me    Rhythm: normal sinus   Rate: normal  Axis: normal  Ectopy: none  Conduction: normal  ST Segments: no acute change  T Waves: no acute change  Q Waves: none    Clinical Impression: no acute changes and normal EKG    All EKG's are interpreted by the Emergency Department Physician who either signs or Co-signs this chart in the absence of a cardiologist.    EMERGENCY DEPARTMENT COURSE/IMPRESSION: 64-year-old female present emergency department with 7 days of generalized abdominal pain, bilateral flank pain dysuria, patient is well-appearing, afebrile, urinalysis shows evidence of cystitis. Chest x-ray, EKG, unremarkable.   Patient was given Zofran, tolerating oral intake in emergency department, [facility], educated focus on oral hydration, follow-up with primary care provider, strict return precautions given.     ED Course as of 11/07/21 2311   Lindsey Negron Nov 07, 2021   4797 Patient does not want a wait for CT scan, patient requesting discharge. Patient has clinicals in a capacity, will plan to treat patient with Keflex, educated patient on the reason why the CT scan was ordered, patient understands and does not want to wait. Education follow with primary care provider, additional return precautions given. [WG]      ED Course User Index  [WG] Valeriy Roper DO         PROCEDURES:  None    CONSULTS:  None    CRITICAL CARE:  None    FINAL IMPRESSION      1. Acute cystitis with hematuria    2. Nausea    3. Diarrhea, unspecified type          DISPOSITION / PLAN     DISPOSITION        PATIENT REFERRED TO:  No follow-up provider specified.     DISCHARGE MEDICATIONS:  New Prescriptions    NITROFURANTOIN, MACROCRYSTAL-MONOHYDRATE, (MACROBID) 100 MG CAPSULE    Take 1 capsule by mouth 2 times daily for 5 days       Valeriy Roper DO  Emergency Medicine Resident    (Please note that portions of thisnote were completed with a voice recognition program.  Efforts were made to edit the dictations but occasionally words are mis-transcribed.)     Valeriy Roper DO  Resident  11/07/21 2311

## 2021-11-08 NOTE — ED NOTES
The following labs labeled with pt sticker and tubed to lab:     [] Blue     [] Lavender   [] on ice  [] Green/yellow  [] Green/black [] on ice  [] Yellow  [] Red  [] Pink      [] COVID-19 swab    [] Rapid  [] PCR  [] Peds Viral Panel     [x] Urine Sample  [] Pelvic Cultures  [] Blood Cultures            Ángel Downey RN  11/07/21 6736

## 2021-11-08 NOTE — ED NOTES
Narrator at bedside as a chaperone for pt's rectal exam. Pt tolerated well.       Cassandra Cr RN  11/07/21 7420

## 2021-11-09 ENCOUNTER — TELEPHONE (OUTPATIENT)
Dept: PODIATRY | Age: 26
End: 2021-11-09

## 2021-11-09 LAB
CULTURE: NORMAL
EKG ATRIAL RATE: 93 BPM
EKG P AXIS: 32 DEGREES
EKG P-R INTERVAL: 130 MS
EKG Q-T INTERVAL: 376 MS
EKG QRS DURATION: 84 MS
EKG QTC CALCULATION (BAZETT): 467 MS
EKG R AXIS: 14 DEGREES
EKG T AXIS: 14 DEGREES
EKG VENTRICULAR RATE: 93 BPM
Lab: NORMAL
SPECIMEN DESCRIPTION: NORMAL

## 2021-11-09 NOTE — TELEPHONE ENCOUNTER
Patient called and wanted to know if you have had a chance to look at her MRI and the next steps she should take.

## 2021-11-10 NOTE — ED PROVIDER NOTES
[address]   Emergency Department  Emergency Medicine Attending Sign-out     Care of Madeline Ahn was assumed from previous attending Dr. Arnulfo Angel and is being seen for Abdominal Pain (x one week ), Dysuria, Diarrhea, Nausea, and Chest Pain  . The patient's initial evaluation and plan have been discussed with the prior provider who initially evaluated the patient. Attestation  I was available and discussed any additional care issues that arose and coordinated the management plans with the resident(s) caring for the patient during my duty period. Any areas of disagreement with resident's documentation of care or procedures are noted on the chart. I was personally present for the key portions of any/all procedures, during my duty period. I have documented in the chart those procedures where I was not present during the key portions. BRIEF PATIENT SUMMARY/MDM COURSE PER INITIAL PROVIDER:   RECENT VITALS:     Temp: 98.4 °F (36.9 °C),  Pulse: 88, Resp: 22, BP: (!) 147/90, SpO2: 98 %    This patient is a 27 y.o. Female with abdominal pain and dysuria.   Awaiting labs     DIAGNOSTICS/MEDICATIONS:     MEDICATIONS GIVEN:  ED Medication Orders (From admission, onward)    Start Ordered     Status Ordering Provider    11/07/21 2130 11/07/21 2116  0.9 % sodium chloride infusion  CONTINUOUS         Last MAR action: Stopped - by Clinton Murguia on 11/07/21 at [facility]    11/07/21 2130 11/07/21 2116  ondansetron (ZOFRAN) injection 4 mg  ONCE         Last MAR action: Given - by Audrey Seals on 11/07/21 at [address] [facility], [address] [place] [address] Avenue Reviewed   URINE RT REFLEX TO CULTURE - Abnormal; Notable for the following components:       Result Value    Turbidity UA Cloudy (*)     Glucose, Ur 3+ (*)     Ketones, Urine TRACE (*)     Specific Gravity, UA 1.036 (*)     Urine Hgb TRACE (*)     Leukocyte Esterase, Urine TRACE (*)     All other components within normal limits COMPREHENSIVE METABOLIC PANEL - Abnormal; Notable for the following components:    Glucose 334 (*)     Sodium 131 (*)     AST 32 (*)     All other components within normal limits   MICROSCOPIC URINALYSIS - Abnormal; Notable for the following components:    Bacteria, UA MODERATE (*)     All other components within normal limits   CULTURE, URINE   CBC WITH AUTO DIFFERENTIAL   HCG, SERUM, QUALITATIVE   LIPASE         OUTSTANDING TASKS / ADDITIONAL COMMENTS   1.  Anne Lanier MD  Emergency Medicine Attending  Lisseth Goodwin MD  11/10/21 3917

## 2021-11-15 ENCOUNTER — TELEPHONE (OUTPATIENT)
Dept: PODIATRY | Age: 26
End: 2021-11-15

## 2021-11-30 VITALS — BODY MASS INDEX: 43.32 KG/M2 | HEIGHT: 65 IN | WEIGHT: 260 LBS

## 2021-12-01 ENCOUNTER — HOSPITAL ENCOUNTER (OUTPATIENT)
Dept: PREADMISSION TESTING | Age: 26
Discharge: HOME | End: 2021-12-01

## 2021-12-28 NOTE — PROGRESS NOTES
DAY OF SURGERY/PROCEDURE  GUIDELINES    As a patient at the [facility], you can expect quality medical and nursing care that is centered on your individual needs. It is our goal to make your surgical experience as comfortable and excellent as possible.  ________________________________________________________________________    The following instructions are general guidelines, if any information on this sheet is different from what your doctor has instructed you to do, please follow your doctor's instructions. · Please arrive on 1/10 @ 800am      · Enter through entrance C. Check in at registration     · Upon arrival you will be taken to the pre-operative area to get ready for surgery, your family will stay in the waiting room and visit with you once you are ready for surgery. Due to special limitations please limit visitation to 1-2 members of your family at a time. When it is time for surgery your family will return to the waiting room. · You will be given a specific time when you will NOT be able to eat, drink, smoke, suck or chew ANYTHING (no water, gum, mints, cigarettes, cigars, pipes, snuff, chewing tobacco, etc.) or your surgery may be canceled. This will occur during your PAT appointment or by phone 1-2 days before surgery    · Take a shower or bath on the morning of your surgery/procedure (Hibiclens if directed)    · Brush your teeth, but do not swallow any water    · IN CASE OF ILLNESS - If you have a cold or flu symptoms (high fever, runny nose, sore throat, cough, etc.) rash, nausea, vomiting, loose stools, and/or recent contact with someone who has a contagious disease (chick pox, measles, etc.) please call your doctor before coming to the surgery center    · Take a small sip of water with heart, blood pressure, and/or seizure medication the morning of surgery.  Lisinopril and omeprazole    · If applicable bring your:  · Inhaler (s)  · Hearing aid(s)  · Eyeglasses and Case (If you wear contacts they have to be removed before surgery, bring case and solution)  · CPAP     · DO NOT take anticoagulants (blood thinners, aspirin or aspirin-containing products) as instructed by your physician. · DO NOT take any diabetic pills or insulin morning of your surgery. · Wear loose, comfortable clothing that is easy to put on and take off. They will remain in post-op with the nurse. · If you will be returning home the same day as your surgery, you will need to have a responsible adult (25years of age or older) present to drive you home. You will need someone stay with you at home for the first 24 hours following your surgery. This is due to the anesthesia and the medication given to you during surgery and recovery.

## 2022-01-05 ENCOUNTER — ANESTHESIA EVENT (OUTPATIENT)
Dept: OPERATING ROOM | Age: 27
End: 2022-01-05
Payer: COMMERCIAL

## 2022-01-06 ENCOUNTER — HOSPITAL ENCOUNTER (OUTPATIENT)
Facility: CLINIC | Age: 27
Discharge: HOME | End: 2022-01-08
Payer: COMMERCIAL

## 2022-01-06 ENCOUNTER — HOSPITAL ENCOUNTER (OUTPATIENT)
Dept: GENERAL RADIOLOGY | Facility: CLINIC | Age: 27
Discharge: HOME | End: 2022-01-08
Payer: COMMERCIAL

## 2022-01-06 ENCOUNTER — HOSPITAL ENCOUNTER (OUTPATIENT)
Age: 27
Setting detail: SPECIMEN
Discharge: HOME | End: 2022-01-06

## 2022-01-06 DIAGNOSIS — Z01.818 PRE-OP EVALUATION: ICD-10-CM

## 2022-01-06 DIAGNOSIS — E11.65 UNCONTROLLED TYPE 2 DIABETES MELLITUS WITH HYPERGLYCEMIA (HCC): ICD-10-CM

## 2022-01-06 DIAGNOSIS — E11.40 TYPE 2 DIABETES MELLITUS WITH DIABETIC NEUROPATHY, UNSPECIFIED WHETHER LONG TERM INSULIN USE (HCC): ICD-10-CM

## 2022-01-06 DIAGNOSIS — E87.6 HYPOKALEMIA: ICD-10-CM

## 2022-01-06 DIAGNOSIS — E11.10 DKA, TYPE 2, NOT AT GOAL (HCC): ICD-10-CM

## 2022-01-06 DIAGNOSIS — Z13.220 SCREENING CHOLESTEROL LEVEL: ICD-10-CM

## 2022-01-06 DIAGNOSIS — I10 ESSENTIAL HYPERTENSION: ICD-10-CM

## 2022-01-06 DIAGNOSIS — E55.9 VITAMIN D INSUFFICIENCY: ICD-10-CM

## 2022-01-06 DIAGNOSIS — E11.10 METABOLIC ACIDOSIS DUE TO DIABETES MELLITUS (HCC): ICD-10-CM

## 2022-01-06 LAB
-: ABNORMAL
ABSOLUTE EOS #: 0.38 K/UL (ref 0–0.44)
ABSOLUTE IMMATURE GRANULOCYTE: 0.06 K/UL (ref 0–0.3)
ABSOLUTE LYMPH #: 4.12 K/UL (ref 1.1–3.7)
ABSOLUTE MONO #: 0.72 K/UL (ref 0.1–1.2)
ALBUMIN SERPL-MCNC: 4.4 G/DL (ref 3.5–5.2)
ALBUMIN/GLOBULIN RATIO: 1.7 (ref 1–2.5)
ALP BLD-CCNC: 69 U/L (ref 35–104)
ALT SERPL-CCNC: 45 U/L (ref 5–33)
AMORPHOUS: ABNORMAL
ANION GAP SERPL CALCULATED.3IONS-SCNC: 17 MMOL/L (ref 9–17)
AST SERPL-CCNC: 31 U/L
BACTERIA: ABNORMAL
BASOPHILS # BLD: 0 % (ref 0–2)
BASOPHILS ABSOLUTE: 0.05 K/UL (ref 0–0.2)
BILIRUB SERPL-MCNC: 0.83 MG/DL (ref 0.3–1.2)
BILIRUBIN URINE: NEGATIVE
BUN BLDV-MCNC: 7 MG/DL (ref 6–20)
BUN/CREAT BLD: ABNORMAL (ref 9–20)
CALCIUM SERPL-MCNC: 9.8 MG/DL (ref 8.6–10.4)
CASTS UA: ABNORMAL /LPF (ref 0–2)
CHLORIDE BLD-SCNC: 101 MMOL/L (ref 98–107)
CHOLESTEROL/HDL RATIO: 5.8
CHOLESTEROL: 209 MG/DL
CO2: 19 MMOL/L (ref 20–31)
COLOR: ABNORMAL
COMMENT UA: ABNORMAL
CREAT SERPL-MCNC: 0.6 MG/DL (ref 0.5–0.9)
CRYSTALS, UA: ABNORMAL /HPF
DIFFERENTIAL TYPE: ABNORMAL
EOSINOPHILS RELATIVE PERCENT: 3 % (ref 1–4)
EPITHELIAL CELLS UA: ABNORMAL /HPF (ref 0–5)
GFR AFRICAN AMERICAN: >60 ML/MIN
GFR NON-AFRICAN AMERICAN: >60 ML/MIN
GFR SERPL CREATININE-BSD FRML MDRD: ABNORMAL ML/MIN/{1.73_M2}
GFR SERPL CREATININE-BSD FRML MDRD: ABNORMAL ML/MIN/{1.73_M2}
GLUCOSE BLD-MCNC: 194 MG/DL (ref 70–99)
GLUCOSE URINE: NEGATIVE
HCT VFR BLD CALC: 42.5 % (ref 36.3–47.1)
HDLC SERPL-MCNC: 36 MG/DL
HEMOGLOBIN: 14.6 G/DL (ref 11.9–15.1)
IMMATURE GRANULOCYTES: 1 %
INR BLD: 1
KETONES, URINE: ABNORMAL
LDL CHOLESTEROL: 107 MG/DL (ref 0–130)
LEUKOCYTE ESTERASE, URINE: NEGATIVE
LYMPHOCYTES # BLD: 34 % (ref 24–43)
MCH RBC QN AUTO: 30.5 PG (ref 25.2–33.5)
MCHC RBC AUTO-ENTMCNC: 34.4 G/DL (ref 28.4–34.8)
MCV RBC AUTO: 88.7 FL (ref 82.6–102.9)
MONOCYTES # BLD: 6 % (ref 3–12)
MUCUS: ABNORMAL
NITRITE, URINE: NEGATIVE
NRBC AUTOMATED: 0 PER 100 WBC
OTHER OBSERVATIONS UA: ABNORMAL
PARTIAL THROMBOPLASTIN TIME: 22 SEC (ref 20.5–30.5)
PDW BLD-RTO: 12.6 % (ref 11.8–14.4)
PH UA: 5.5 (ref 5–8)
PLATELET # BLD: 260 K/UL (ref 138–453)
PLATELET ESTIMATE: ABNORMAL
PMV BLD AUTO: 10.6 FL (ref 8.1–13.5)
POTASSIUM SERPL-SCNC: 3.6 MMOL/L (ref 3.7–5.3)
PROTEIN UA: ABNORMAL
PROTHROMBIN TIME: 10.3 SEC (ref 9.1–12.3)
RBC # BLD: 4.79 M/UL (ref 3.95–5.11)
RBC # BLD: ABNORMAL 10*6/UL
RBC UA: ABNORMAL /HPF (ref 0–2)
RENAL EPITHELIAL, UA: ABNORMAL /HPF
SEG NEUTROPHILS: 56 % (ref 36–65)
SEGMENTED NEUTROPHILS ABSOLUTE COUNT: 6.84 K/UL (ref 1.5–8.1)
SODIUM BLD-SCNC: 137 MMOL/L (ref 135–144)
SPECIFIC GRAVITY UA: 1.02 (ref 1–1.03)
TOTAL PROTEIN: 7 G/DL (ref 6.4–8.3)
TRICHOMONAS: ABNORMAL
TRIGL SERPL-MCNC: 329 MG/DL
TURBIDITY: ABNORMAL
URINE HGB: NEGATIVE
UROBILINOGEN, URINE: NORMAL
VLDLC SERPL CALC-MCNC: ABNORMAL MG/DL (ref 1–30)
WBC # BLD: 12.2 K/UL (ref 3.5–11.3)
WBC # BLD: ABNORMAL 10*3/UL
WBC UA: ABNORMAL /HPF (ref 0–5)
YEAST: ABNORMAL

## 2022-01-06 PROCEDURE — 71046 X-RAY EXAM CHEST 2 VIEWS: CPT

## 2022-01-07 LAB
ESTIMATED AVERAGE GLUCOSE: 246 MG/DL
HBA1C MFR BLD: 10.2 % (ref 4–6)
VITAMIN D 25-HYDROXY: 20.8 NG/ML (ref 30–100)

## 2022-01-10 ENCOUNTER — HOSPITAL ENCOUNTER (OUTPATIENT)
Age: 27
Setting detail: OUTPATIENT SURGERY
Discharge: HOME | End: 2022-01-10
Attending: PODIATRIST | Admitting: PODIATRIST
Payer: COMMERCIAL

## 2022-01-10 ENCOUNTER — ANESTHESIA (OUTPATIENT)
Dept: OPERATING ROOM | Age: 27
End: 2022-01-10
Payer: COMMERCIAL

## 2022-01-10 VITALS
TEMPERATURE: 96.9 F | WEIGHT: 259 LBS | HEART RATE: 72 BPM | RESPIRATION RATE: 16 BRPM | OXYGEN SATURATION: 97 % | HEIGHT: 65 IN | BODY MASS INDEX: 43.15 KG/M2 | SYSTOLIC BLOOD PRESSURE: 132 MMHG | DIASTOLIC BLOOD PRESSURE: 80 MMHG

## 2022-01-10 VITALS — SYSTOLIC BLOOD PRESSURE: 124 MMHG | TEMPERATURE: 95.9 F | OXYGEN SATURATION: 94 % | DIASTOLIC BLOOD PRESSURE: 77 MMHG

## 2022-01-10 DIAGNOSIS — G89.18 POST-OP PAIN: Primary | ICD-10-CM

## 2022-01-10 DIAGNOSIS — Z98.890 STATUS POST RIGHT FOOT SURGERY: ICD-10-CM

## 2022-01-10 LAB
GLUCOSE BLD-MCNC: 216 MG/DL (ref 65–105)
HCG, PREGNANCY URINE (POC): NEGATIVE

## 2022-01-10 PROCEDURE — 6360000002 HC RX W HCPCS: Performed by: SPECIALIST

## 2022-01-10 PROCEDURE — 2500000003 HC RX 250 WO HCPCS: Performed by: PODIATRIST

## 2022-01-10 PROCEDURE — 2500000003 HC RX 250 WO HCPCS: Performed by: SPECIALIST

## 2022-01-10 PROCEDURE — 7100000001 HC PACU RECOVERY - ADDTL 15 MIN: Performed by: PODIATRIST

## 2022-01-10 PROCEDURE — 27680 RELEASE OF LOWER LEG TENDON: CPT | Performed by: PODIATRIST

## 2022-01-10 PROCEDURE — 3700000000 HC ANESTHESIA ATTENDED CARE: Performed by: PODIATRIST

## 2022-01-10 PROCEDURE — 82947 ASSAY GLUCOSE BLOOD QUANT: CPT

## 2022-01-10 PROCEDURE — 6370000000 HC RX 637 (ALT 250 FOR IP)

## 2022-01-10 PROCEDURE — 29898 ANKLE ARTHROSCOPY/SURGERY: CPT | Performed by: PODIATRIST

## 2022-01-10 PROCEDURE — 7100000011 HC PHASE II RECOVERY - ADDTL 15 MIN: Performed by: PODIATRIST

## 2022-01-10 PROCEDURE — 64445 NJX AA&/STRD SCIATIC NRV IMG: CPT | Performed by: ANESTHESIOLOGY

## 2022-01-10 PROCEDURE — C1713 ANCHOR/SCREW BN/BN,TIS/BN: HCPCS | Performed by: PODIATRIST

## 2022-01-10 PROCEDURE — 3600000014 HC SURGERY LEVEL 4 ADDTL 15MIN: Performed by: PODIATRIST

## 2022-01-10 PROCEDURE — 2500000003 HC RX 250 WO HCPCS: Performed by: ANESTHESIOLOGY

## 2022-01-10 PROCEDURE — 7100000010 HC PHASE II RECOVERY - FIRST 15 MIN: Performed by: PODIATRIST

## 2022-01-10 PROCEDURE — 81025 URINE PREGNANCY TEST: CPT

## 2022-01-10 PROCEDURE — 2580000003 HC RX 258: Performed by: PODIATRIST

## 2022-01-10 PROCEDURE — 6360000002 HC RX W HCPCS

## 2022-01-10 PROCEDURE — 2709999900 HC NON-CHARGEABLE SUPPLY: Performed by: PODIATRIST

## 2022-01-10 PROCEDURE — 7100000000 HC PACU RECOVERY - FIRST 15 MIN: Performed by: PODIATRIST

## 2022-01-10 PROCEDURE — 27698 REPAIR OF ANKLE LIGAMENT: CPT | Performed by: PODIATRIST

## 2022-01-10 PROCEDURE — 3700000001 HC ADD 15 MINUTES (ANESTHESIA): Performed by: PODIATRIST

## 2022-01-10 PROCEDURE — 3600000004 HC SURGERY LEVEL 4 BASE: Performed by: PODIATRIST

## 2022-01-10 PROCEDURE — 2580000003 HC RX 258: Performed by: SPECIALIST

## 2022-01-10 PROCEDURE — 2580000003 HC RX 258: Performed by: ANESTHESIOLOGY

## 2022-01-10 DEVICE — DEVICE GRFT FIX FOR LIGMNT AUG ANCHR REP PEEK INT BRAC: Type: IMPLANTABLE DEVICE | Site: ANKLE | Status: FUNCTIONAL

## 2022-01-10 DEVICE — ANCHOR SUT NDL DX FIBERTAK: Type: IMPLANTABLE DEVICE | Site: ANKLE | Status: FUNCTIONAL

## 2022-01-10 RX ORDER — FENTANYL CITRATE 50 UG/ML
25 INJECTION, SOLUTION INTRAMUSCULAR; INTRAVENOUS EVERY 5 MIN PRN
Status: DISCONTINUED | OUTPATIENT
Start: 2022-01-10 | End: 2022-01-10 | Stop reason: HOSPADM

## 2022-01-10 RX ORDER — CEFAZOLIN SODIUM 2 G/50ML
SOLUTION INTRAVENOUS PRN
Status: DISCONTINUED | OUTPATIENT
Start: 2022-01-10 | End: 2022-01-10 | Stop reason: SDUPTHER

## 2022-01-10 RX ORDER — MAGNESIUM HYDROXIDE 1200 MG/15ML
LIQUID ORAL CONTINUOUS PRN
Status: COMPLETED | OUTPATIENT
Start: 2022-01-10 | End: 2022-01-10

## 2022-01-10 RX ORDER — PROPOFOL 10 MG/ML
INJECTION, EMULSION INTRAVENOUS PRN
Status: DISCONTINUED | OUTPATIENT
Start: 2022-01-10 | End: 2022-01-10 | Stop reason: SDUPTHER

## 2022-01-10 RX ORDER — DEXAMETHASONE SODIUM PHOSPHATE 10 MG/ML
INJECTION INTRAMUSCULAR; INTRAVENOUS
Status: DISCONTINUED
Start: 2022-01-10 | End: 2022-01-10 | Stop reason: HOSPADM

## 2022-01-10 RX ORDER — MORPHINE SULFATE 1 MG/ML
1 INJECTION, SOLUTION EPIDURAL; INTRATHECAL; INTRAVENOUS EVERY 5 MIN PRN
Status: DISCONTINUED | OUTPATIENT
Start: 2022-01-10 | End: 2022-01-10 | Stop reason: HOSPADM

## 2022-01-10 RX ORDER — ONDANSETRON 2 MG/ML
4 INJECTION INTRAMUSCULAR; INTRAVENOUS
Status: DISCONTINUED | OUTPATIENT
Start: 2022-01-10 | End: 2022-01-10 | Stop reason: HOSPADM

## 2022-01-10 RX ORDER — OXYCODONE HYDROCHLORIDE AND ACETAMINOPHEN 5; 325 MG/1; MG/1
1 TABLET ORAL EVERY 6 HOURS PRN
Qty: 28 TABLET | Refills: 0 | Status: SHIPPED | OUTPATIENT
Start: 2022-01-10 | End: 2022-01-17

## 2022-01-10 RX ORDER — SODIUM CHLORIDE 0.9 % (FLUSH) 0.9 %
10 SYRINGE (ML) INJECTION EVERY 12 HOURS SCHEDULED
Status: DISCONTINUED | OUTPATIENT
Start: 2022-01-10 | End: 2022-01-10 | Stop reason: HOSPADM

## 2022-01-10 RX ORDER — SODIUM CHLORIDE, SODIUM LACTATE, POTASSIUM CHLORIDE, CALCIUM CHLORIDE 600; 310; 30; 20 MG/100ML; MG/100ML; MG/100ML; MG/100ML
INJECTION, SOLUTION INTRAVENOUS CONTINUOUS PRN
Status: DISCONTINUED | OUTPATIENT
Start: 2022-01-10 | End: 2022-01-10

## 2022-01-10 RX ORDER — MEPERIDINE HYDROCHLORIDE 50 MG/ML
12.5 INJECTION INTRAMUSCULAR; INTRAVENOUS; SUBCUTANEOUS EVERY 5 MIN PRN
Status: DISCONTINUED | OUTPATIENT
Start: 2022-01-10 | End: 2022-01-10 | Stop reason: HOSPADM

## 2022-01-10 RX ORDER — HYDRALAZINE HYDROCHLORIDE 20 MG/ML
5 INJECTION INTRAMUSCULAR; INTRAVENOUS EVERY 10 MIN PRN
Status: DISCONTINUED | OUTPATIENT
Start: 2022-01-10 | End: 2022-01-10 | Stop reason: HOSPADM

## 2022-01-10 RX ORDER — MEPERIDINE HYDROCHLORIDE 50 MG/ML
INJECTION INTRAMUSCULAR; INTRAVENOUS; SUBCUTANEOUS
Status: COMPLETED
Start: 2022-01-10 | End: 2022-01-10

## 2022-01-10 RX ORDER — BUPIVACAINE HYDROCHLORIDE 5 MG/ML
INJECTION, SOLUTION EPIDURAL; INTRACAUDAL
Status: COMPLETED
Start: 2022-01-10 | End: 2022-01-10

## 2022-01-10 RX ORDER — ONDANSETRON 2 MG/ML
INJECTION INTRAMUSCULAR; INTRAVENOUS PRN
Status: DISCONTINUED | OUTPATIENT
Start: 2022-01-10 | End: 2022-01-10 | Stop reason: SDUPTHER

## 2022-01-10 RX ORDER — BUPIVACAINE HYDROCHLORIDE 5 MG/ML
INJECTION, SOLUTION EPIDURAL; INTRACAUDAL
Status: DISCONTINUED | OUTPATIENT
Start: 2022-01-10 | End: 2022-01-10 | Stop reason: SDUPTHER

## 2022-01-10 RX ORDER — SODIUM CHLORIDE 0.9 % (FLUSH) 0.9 %
10 SYRINGE (ML) INJECTION PRN
Status: DISCONTINUED | OUTPATIENT
Start: 2022-01-10 | End: 2022-01-10 | Stop reason: HOSPADM

## 2022-01-10 RX ORDER — FENTANYL CITRATE 50 UG/ML
INJECTION, SOLUTION INTRAMUSCULAR; INTRAVENOUS PRN
Status: DISCONTINUED | OUTPATIENT
Start: 2022-01-10 | End: 2022-01-10 | Stop reason: SDUPTHER

## 2022-01-10 RX ORDER — ROCURONIUM BROMIDE 10 MG/ML
INJECTION, SOLUTION INTRAVENOUS PRN
Status: DISCONTINUED | OUTPATIENT
Start: 2022-01-10 | End: 2022-01-10 | Stop reason: SDUPTHER

## 2022-01-10 RX ORDER — HYDROCODONE BITARTRATE AND ACETAMINOPHEN 5; 325 MG/1; MG/1
TABLET ORAL
Status: COMPLETED
Start: 2022-01-10 | End: 2022-01-10

## 2022-01-10 RX ORDER — RIVAROXABAN 10 MG/1
10 TABLET, FILM COATED ORAL
Qty: 30 TABLET | Refills: 1 | Status: SHIPPED | OUTPATIENT
Start: 2022-01-10 | End: 2022-05-17 | Stop reason: ALTCHOICE

## 2022-01-10 RX ORDER — SODIUM CHLORIDE 9 MG/ML
INJECTION, SOLUTION INTRAVENOUS CONTINUOUS
Status: DISCONTINUED | OUTPATIENT
Start: 2022-01-10 | End: 2022-01-10 | Stop reason: HOSPADM

## 2022-01-10 RX ORDER — LIDOCAINE HYDROCHLORIDE 10 MG/ML
INJECTION, SOLUTION EPIDURAL; INFILTRATION; INTRACAUDAL; PERINEURAL PRN
Status: DISCONTINUED | OUTPATIENT
Start: 2022-01-10 | End: 2022-01-10 | Stop reason: SDUPTHER

## 2022-01-10 RX ORDER — SODIUM CHLORIDE 9 MG/ML
25 INJECTION, SOLUTION INTRAVENOUS PRN
Status: DISCONTINUED | OUTPATIENT
Start: 2022-01-10 | End: 2022-01-10 | Stop reason: HOSPADM

## 2022-01-10 RX ORDER — HYDROCODONE BITARTRATE AND ACETAMINOPHEN 5; 325 MG/1; MG/1
1 TABLET ORAL PRN
Status: COMPLETED | OUTPATIENT
Start: 2022-01-10 | End: 2022-01-10

## 2022-01-10 RX ORDER — SODIUM CHLORIDE, SODIUM LACTATE, POTASSIUM CHLORIDE, CALCIUM CHLORIDE 600; 310; 30; 20 MG/100ML; MG/100ML; MG/100ML; MG/100ML
INJECTION, SOLUTION INTRAVENOUS CONTINUOUS
Status: DISCONTINUED | OUTPATIENT
Start: 2022-01-10 | End: 2022-01-10 | Stop reason: HOSPADM

## 2022-01-10 RX ORDER — MIDAZOLAM HYDROCHLORIDE 1 MG/ML
INJECTION INTRAMUSCULAR; INTRAVENOUS PRN
Status: DISCONTINUED | OUTPATIENT
Start: 2022-01-10 | End: 2022-01-10 | Stop reason: SDUPTHER

## 2022-01-10 RX ORDER — PROMETHAZINE HYDROCHLORIDE 25 MG/ML
6.25 INJECTION, SOLUTION INTRAMUSCULAR; INTRAVENOUS
Status: DISCONTINUED | OUTPATIENT
Start: 2022-01-10 | End: 2022-01-10 | Stop reason: HOSPADM

## 2022-01-10 RX ORDER — NEOSTIGMINE METHYLSULFATE 5 MG/5 ML
SYRINGE (ML) INTRAVENOUS PRN
Status: DISCONTINUED | OUTPATIENT
Start: 2022-01-10 | End: 2022-01-10 | Stop reason: SDUPTHER

## 2022-01-10 RX ORDER — GLYCOPYRROLATE 1 MG/5 ML
SYRINGE (ML) INTRAVENOUS PRN
Status: DISCONTINUED | OUTPATIENT
Start: 2022-01-10 | End: 2022-01-10 | Stop reason: SDUPTHER

## 2022-01-10 RX ORDER — HYDROCODONE BITARTRATE AND ACETAMINOPHEN 5; 325 MG/1; MG/1
2 TABLET ORAL PRN
Status: COMPLETED | OUTPATIENT
Start: 2022-01-10 | End: 2022-01-10

## 2022-01-10 RX ORDER — SODIUM CHLORIDE 9 MG/ML
INJECTION, SOLUTION INTRAVENOUS CONTINUOUS PRN
Status: DISCONTINUED | OUTPATIENT
Start: 2022-01-10 | End: 2022-01-10 | Stop reason: SDUPTHER

## 2022-01-10 RX ADMIN — ONDANSETRON 4 MG: 2 INJECTION INTRAMUSCULAR; INTRAVENOUS at 07:30

## 2022-01-10 RX ADMIN — FENTANYL CITRATE 100 MCG: 50 INJECTION, SOLUTION INTRAMUSCULAR; INTRAVENOUS at 07:05

## 2022-01-10 RX ADMIN — ROCURONIUM BROMIDE 50 MG: 10 INJECTION INTRAVENOUS at 07:05

## 2022-01-10 RX ADMIN — FENTANYL CITRATE 50 MCG: 50 INJECTION, SOLUTION INTRAMUSCULAR; INTRAVENOUS at 09:16

## 2022-01-10 RX ADMIN — MEPERIDINE HYDROCHLORIDE 12.5 MG: 50 INJECTION, SOLUTION INTRAMUSCULAR; INTRAVENOUS; SUBCUTANEOUS at 10:12

## 2022-01-10 RX ADMIN — PROPOFOL 200 MG: 10 INJECTION, EMULSION INTRAVENOUS at 07:05

## 2022-01-10 RX ADMIN — HYDROMORPHONE HYDROCHLORIDE 0.5 MG: 1 INJECTION, SOLUTION INTRAMUSCULAR; INTRAVENOUS; SUBCUTANEOUS at 10:28

## 2022-01-10 RX ADMIN — BUPIVACAINE HYDROCHLORIDE 20 ML: 5 INJECTION, SOLUTION EPIDURAL; INTRACAUDAL; PERINEURAL at 10:30

## 2022-01-10 RX ADMIN — HYDROCODONE BITARTRATE AND ACETAMINOPHEN 1 TABLET: 5; 325 TABLET ORAL at 11:07

## 2022-01-10 RX ADMIN — Medication 0.5 MG: at 10:28

## 2022-01-10 RX ADMIN — CEFAZOLIN SODIUM 2000 MG: 2 SOLUTION INTRAVENOUS at 07:13

## 2022-01-10 RX ADMIN — Medication 0.4 MG: at 09:24

## 2022-01-10 RX ADMIN — FENTANYL CITRATE 50 MCG: 50 INJECTION, SOLUTION INTRAMUSCULAR; INTRAVENOUS at 09:42

## 2022-01-10 RX ADMIN — SODIUM CHLORIDE: 9 INJECTION, SOLUTION INTRAVENOUS at 06:51

## 2022-01-10 RX ADMIN — SODIUM CHLORIDE: 9 INJECTION, SOLUTION INTRAVENOUS at 09:19

## 2022-01-10 RX ADMIN — ONDANSETRON 4 MG: 2 INJECTION INTRAMUSCULAR; INTRAVENOUS at 09:19

## 2022-01-10 RX ADMIN — MEPERIDINE HYDROCHLORIDE 12.5 MG: 50 INJECTION INTRAMUSCULAR; INTRAVENOUS; SUBCUTANEOUS at 10:12

## 2022-01-10 RX ADMIN — Medication 4 MG: at 09:24

## 2022-01-10 RX ADMIN — MIDAZOLAM HYDROCHLORIDE 2 MG: 1 INJECTION, SOLUTION INTRAMUSCULAR; INTRAVENOUS at 06:59

## 2022-01-10 RX ADMIN — LIDOCAINE HYDROCHLORIDE 50 MG: 10 INJECTION, SOLUTION EPIDURAL; INFILTRATION; INTRACAUDAL; PERINEURAL at 07:05

## 2022-01-10 ASSESSMENT — PULMONARY FUNCTION TESTS
PIF_VALUE: 4
PIF_VALUE: 3
PIF_VALUE: 22
PIF_VALUE: 21
PIF_VALUE: 22
PIF_VALUE: 4
PIF_VALUE: 22
PIF_VALUE: 23
PIF_VALUE: 21
PIF_VALUE: 7
PIF_VALUE: 22
PIF_VALUE: 23
PIF_VALUE: 4
PIF_VALUE: 22
PIF_VALUE: 22
PIF_VALUE: 23
PIF_VALUE: 20
PIF_VALUE: 22
PIF_VALUE: 23
PIF_VALUE: 22
PIF_VALUE: 20
PIF_VALUE: 3
PIF_VALUE: 22
PIF_VALUE: 11
PIF_VALUE: 3
PIF_VALUE: 21
PIF_VALUE: 3
PIF_VALUE: 21
PIF_VALUE: 21
PIF_VALUE: 23
PIF_VALUE: 3
PIF_VALUE: 22
PIF_VALUE: 21
PIF_VALUE: 11
PIF_VALUE: 20
PIF_VALUE: 20
PIF_VALUE: 23
PIF_VALUE: 22
PIF_VALUE: 21
PIF_VALUE: 21
PIF_VALUE: 3
PIF_VALUE: 22
PIF_VALUE: 21
PIF_VALUE: 4
PIF_VALUE: 21
PIF_VALUE: 4
PIF_VALUE: 22
PIF_VALUE: 23
PIF_VALUE: 21
PIF_VALUE: 3
PIF_VALUE: 3
PIF_VALUE: 22
PIF_VALUE: 21
PIF_VALUE: 22
PIF_VALUE: 22
PIF_VALUE: 21
PIF_VALUE: 21
PIF_VALUE: 22
PIF_VALUE: 21
PIF_VALUE: 3
PIF_VALUE: 23
PIF_VALUE: 23
PIF_VALUE: 3
PIF_VALUE: 22
PIF_VALUE: 23
PIF_VALUE: 1
PIF_VALUE: 21
PIF_VALUE: 3
PIF_VALUE: 21
PIF_VALUE: 23
PIF_VALUE: 22
PIF_VALUE: 1
PIF_VALUE: 4
PIF_VALUE: 21
PIF_VALUE: 24
PIF_VALUE: 22
PIF_VALUE: 21
PIF_VALUE: 22
PIF_VALUE: 21
PIF_VALUE: 2
PIF_VALUE: 21
PIF_VALUE: 22
PIF_VALUE: 22
PIF_VALUE: 10
PIF_VALUE: 21
PIF_VALUE: 22
PIF_VALUE: 21
PIF_VALUE: 21
PIF_VALUE: 5
PIF_VALUE: 22
PIF_VALUE: 1
PIF_VALUE: 3
PIF_VALUE: 22
PIF_VALUE: 5
PIF_VALUE: 3
PIF_VALUE: 20
PIF_VALUE: 22
PIF_VALUE: 4
PIF_VALUE: 1
PIF_VALUE: 21
PIF_VALUE: 22
PIF_VALUE: 21
PIF_VALUE: 4
PIF_VALUE: 7
PIF_VALUE: 23
PIF_VALUE: 22
PIF_VALUE: 3
PIF_VALUE: 21
PIF_VALUE: 22
PIF_VALUE: 3
PIF_VALUE: 21
PIF_VALUE: 23
PIF_VALUE: 21
PIF_VALUE: 20
PIF_VALUE: 22
PIF_VALUE: 22
PIF_VALUE: 21
PIF_VALUE: 6
PIF_VALUE: 22
PIF_VALUE: 21
PIF_VALUE: 21
PIF_VALUE: 22
PIF_VALUE: 21
PIF_VALUE: 22
PIF_VALUE: 22
PIF_VALUE: 23
PIF_VALUE: 9
PIF_VALUE: 22
PIF_VALUE: 3
PIF_VALUE: 22
PIF_VALUE: 22
PIF_VALUE: 21
PIF_VALUE: 22
PIF_VALUE: 22
PIF_VALUE: 4
PIF_VALUE: 21
PIF_VALUE: 22
PIF_VALUE: 4
PIF_VALUE: 5
PIF_VALUE: 5
PIF_VALUE: 21

## 2022-01-10 ASSESSMENT — PAIN - FUNCTIONAL ASSESSMENT
PAIN_FUNCTIONAL_ASSESSMENT: 0-10
PAIN_FUNCTIONAL_ASSESSMENT: PREVENTS OR INTERFERES WITH MANY ACTIVE NOT PASSIVE ACTIVITIES

## 2022-01-10 ASSESSMENT — PAIN DESCRIPTION - DESCRIPTORS: DESCRIPTORS: ACHING;NAGGING

## 2022-01-10 ASSESSMENT — ENCOUNTER SYMPTOMS
NAUSEA: 0
VOMITING: 0

## 2022-01-10 ASSESSMENT — PAIN SCALES - GENERAL
PAINLEVEL_OUTOF10: 10
PAINLEVEL_OUTOF10: 10

## 2022-01-10 NOTE — ANESTHESIA PRE PROCEDURE
Department of Anesthesiology  Preprocedure Note       Name:  Viktor Lambert   Age:  27 y.o.  :  1995                                          MRN:  [id number]         Date:  1/10/2022      Surgeon: Carter Mcintosh):  Leonid Daniels DPM    Procedure: Procedure(s):  RIGHT ANKLE ARTHROSCOPY WITH REPAIR OF ANTERIOR TALOFIBULAR LIGAMENT AND SYNOVECTOMY OF PERONEAL TENDON WITH GASTROC RECESSION    Medications prior to admission:   Prior to Admission medications    Medication Sig Start Date End Date Taking?  Authorizing Provider   metFORMIN (GLUCOPHAGE) 1000 MG tablet Take 1 tablet by mouth 2 times daily (with meals) 22  Yes Radhames Porter PA-C   lisinopril (PRINIVIL;ZESTRIL) 5 MG tablet take 1 tablet by mouth once daily 21  Yes Radhames Porter PA-C   LEVEMIR FLEXTOUCH 100 UNIT/ML injection pen inject 55 units subcutaneously nightly 10/15/21  Yes Radhames Porter PA-C   ipratropium-albuterol (DUONEB) 0.5-2.5 (3) MG/3ML SOLN nebulizer solution Inhale 3 mLs into the lungs every 4 hours (while awake) 21  Yes Agnieszka Sánchez MD   omeprazole (PRILOSEC) 20 MG delayed release capsule Take 1 capsule by mouth every morning (before breakfast) 21 Yes MARCELO Grady NP   insulin aspart (NOVOLOG FLEXPEN) 100 UNIT/ML injection pen Inject 4 Units into the skin 3 times daily (before meals) 4 units before breakfast, 4 units before lunch, 6 units before dinner and 6 units before bedtime 21  Yes Radhames Porter PA-C   acetaminophen (TYLENOL) 500 MG tablet Take 2 tablets by mouth 3 times daily 10/28/20  Yes Sandip Ndiaye MD   ibuprofen (IBU) 600 MG tablet Take 1 tablet by mouth every 8 hours as needed for Pain 21   Pao Land PA-C   budesonide-formoterol (SYMBICORT) 160-4.5 MCG/ACT AERO Inhale 2 puffs into the lungs 2 times daily 21   Agnieszka Sánchez MD   blood glucose monitor kit and supplies 1 kit by Other route three times daily Dispense product that insurance will cover 9/16/21   Rosmery Simon MD   Blood Pressure KIT 1 kit by Does not apply route 2 times daily 9/16/21   Rosmery Simon MD   UNIFINE PENTIPS 29G X 12MM MISC USE Five times a DAILY WITH BASAGLAR 7/27/21   Tana Jhaveri PA-C   Insulin Pen Needle (PEN NEEDLES) 31G X 6 MM MISC 1 each by Does not apply route daily 5/21/21   Tana Jhaveri PA-C   TRUEplus Lancets 30G MISC TEST 2 TIMES A DAY AND AS NEEED FOR SYMPTOMS OF IRREGULAR BLOOD GLUCOSE 4/21/21   Tana Jhaveri PA-C   albuterol sulfate HFA (PROVENTIL HFA) 108 (90 Base) MCG/ACT inhaler Inhale 2 puffs into the lungs every 6 hours as needed for Wheezing 7/23/19   Hilary Joseph PA-C       Current medications:    Current Facility-Administered Medications   Medication Dose Route Frequency Provider Last Rate Last Admin    0.9 % sodium chloride infusion   IntraVENous Continuous Alvarado Sarkar  mL/hr at 01/10/22 0651 New Bag at 01/10/22 0651    lactated ringers infusion   IntraVENous Continuous Alvarado Sarkar MD        sodium chloride flush 0.9 % injection 10 mL  10 mL IntraVENous 2 times per day Alvarado Sarkar MD        sodium chloride flush 0.9 % injection 10 mL  10 mL IntraVENous PRN Alvarado Sarkar MD        0.9 % sodium chloride infusion  25 mL IntraVENous PRN Alvarado Sarkar MD        ceFAZolin (ANCEF) IVPB              Facility-Administered Medications Ordered in Other Encounters   Medication Dose Route Frequency Provider Last Rate Last Admin    lactated ringers infusion   IntraVENous Continuous PRN MARCELO Hunter - CRNA   New Bag at 01/10/22 9590       Allergies:     Allergies   Allergen Reactions    Benadryl [Diphenhydramine] Hives and Shortness Of Breath       Problem List:    Patient Active Problem List   Diagnosis Code    Essential hypertension I10    Asthma J45.909    Class 2 obesity in adult E66.9    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 [address] F31.31    Noncompliance Z91.19    History of  section x2 Z98.891    History of postpartum depression Z87.59, Z80.58    Lost custody of children Z76.3    Uncontrolled diabetes mellitus ([facility] [address] E11.65    Mood disorder ([facility] [facility] F39    Hyperlipidemia LDL goal <70 E78.5    Inflammatory polyp of transverse colon with complication ([facility] [address] A70.742    Diabetic acidosis without coma ([facility] [facility] E11.10    DKA, type 2, not at goal [facility]) E11.10    Asthma exacerbation J45. 401    Metabolic acidosis due to diabetes mellitus (HCC) E11.69, E87.2    Abscess L02.91    Morbid obesity with BMI of 40.0-44.9, adult ([facility]) E66.01, Z68.41       Past Medical History:        Diagnosis Date    Asthma     uses inhaler    Bipolar 1 disorder ([facility]     Diabetes mellitus type 2 in obese ([facility] [address] [facility]     GERD (gastroesophageal reflux disease)     Hepatic steatosis     Hypertension     started when approx 6 mos pregnant with first pregnancy    Morbid obesity with body mass index (BMI) of 40.0 to 49.9 ([facility])     Neuropathy     Postpartum depression        Past Surgical History:        Procedure Laterality Date    AXILLARY SURGERY Right 2021    AXILLARY ABSCESS I&D performed by Tatianna Aguiar MD at [address] [place] [address] Left 2019    BREAST INCISION AND DRAINAGE performed by Tatianna Aguiar MD at [address] [place] [address] Right 2019    BREAST INCISION AND DRAINAGE performed by Tatianna Aguiar MD at [address] N/A 3/23/2017     SECTION REPEAT  performed by Ronny Bhatt MD at [facility] L&D OR    COLONOSCOPY N/A 2020    COLONOSCOPY POLYPECTOMY HOT BIOPSY performed by Gabe Hi MD at [facility] [address] N/A 2/3/2020    I & D PERIANAL ABSCESS performed by Mari Reyes MD at [address] N/A 2018    EGD BIOPSY performed by Tonja Weeks MD at [address] [facility] N/A 2020    EGD BIOPSY performed by Tonja Weeks MD at [facility]       Social History:    Social History     Tobacco Use    Smoking status: Former Smoker     Packs/day: 1.00     Years: 10.00     Pack years: 10.00     Types: Cigarettes     Start date: 2007     Quit date: 2020     Years since quittin.3    Smokeless tobacco: Never Used   Substance Use Topics    Alcohol use: No     Alcohol/week: 0.0 standard drinks                                Counseling given: Not Answered      Vital Signs (Current):   Vitals:    21 1422 01/10/22 0625   BP:  110/69   Pulse:  83   Resp:  16   Temp:  98.9 °F (37.2 °C)   TempSrc:  Infrared   SpO2:  98%   Weight: 260 lb (117.9 kg) 259 lb (117.5 kg)   Height: 5' 5\" (1.651 m) 5' 5\" (1.651 m)                                              BP Readings from Last 3 Encounters:   01/10/22 110/69   22 136/62   21 (!) 147/90       NPO Status: Time of last liquid consumption:                         Time of last solid consumption:                         Date of last liquid consumption: 22                        Date of last solid food consumption: 22    BMI:   Wt Readings from Last 3 Encounters:   01/10/22 259 lb (117.5 kg)   22 254 lb (115.2 kg)   21 260 lb (117.9 kg)     Body mass index is 43.1 kg/m².     CBC:   Lab Results   Component Value Date    WBC 12.2 2022    RBC 4.79 2022    HGB 14.6 2022    HCT 42.5 2022    MCV 88.7 2022    RDW 12.6 2022     2022       CMP:   Lab Results   Component Value Date     2022    K 3.6 2022     2022    CO2 19 2022    BUN 7 2022    CREATININE 0.60 2022    GFRAA >60 2022    LABGLOM >60 2022    GLUCOSE 194 2022    PROT 7.0 2022    CALCIUM 9.8 2022    BILITOT 0.83 01/06/2022    ALKPHOS 69 01/06/2022    AST 31 01/06/2022    ALT 45 01/06/2022       POC Tests:   Recent Labs     01/10/22  0649   POCGLU 216*       Coags:   Lab Results   Component Value Date    PROTIME 10.3 01/06/2022    INR 1.0 01/06/2022    APTT 22.0 01/06/2022       HCG (If Applicable):   Lab Results   Component Value Date    PREGTESTUR neg 02/02/2020    HCG NEGATIVE 01/10/2022    HCGQUANT <1 05/14/2020        ABGs: No results found for: PHART, PO2ART, NYE1JDS, WGM2KYM, BEART, C6KIRQVN     Type & Screen (If Applicable):  No results found for: LABABO, LABRH    Drug/Infectious Status (If Applicable):  Lab Results   Component Value Date    HEPCAB NONREACTIVE 06/21/2019       COVID-19 Screening (If Applicable):   Lab Results   Component Value Date    COVID19 Not Detected 09/14/2021    COVID19 Not Detected 09/12/2020           Anesthesia Evaluation  Patient summary reviewed and Nursing notes reviewed no history of anesthetic complications:   Airway: Mallampati: III  TM distance: >3 FB   Neck ROM: full  Mouth opening: > = 3 FB Dental: normal exam         Pulmonary:normal exam  breath sounds clear to auscultation  (+) asthma:                            Cardiovascular:  Exercise tolerance: no interval change,   (+) hypertension: no interval change,         Rhythm: regular  Rate: normal                    Neuro/Psych:   (+) psychiatric history:            GI/Hepatic/Renal:   (+) GERD:, liver disease:, morbid obesity          Endo/Other:    (+) DiabetesType II DM, , .                 Abdominal:   (+) obese,     Abdomen: soft. Vascular: negative vascular ROS. Other Findings:             Anesthesia Plan      general     ASA 3     (GA, possible popliteal block)  Induction: intravenous. MIPS: Postoperative opioids intended and Prophylactic antiemetics administered. Anesthetic plan and risks discussed with patient. Plan discussed with CRNA.                   Mauri Bowden MD   1/10/2022

## 2022-01-10 NOTE — ANESTHESIA PROCEDURE NOTES
Peripheral Block    Patient location during procedure: PACU  Start time: 1/10/2022 10:25 AM  End time: 1/10/2022 10:30 AM  Staffing  Performed: anesthesiologist   Anesthesiologist: Trenton Payton MD  Preanesthetic Checklist  Completed: patient identified, IV checked, site marked, risks and benefits discussed, surgical consent, monitors and equipment checked, pre-op evaluation, timeout performed, anesthesia consent given, oxygen available and patient being monitored  Peripheral Block  Patient position: left lateral decubitus  Prep: ChloraPrep  Patient monitoring: cardiac monitor, continuous pulse ox, frequent blood pressure checks and IV access  Block type: Sciatic  Laterality: right  Injection technique: single-shot  Guidance: ultrasound guided  Local infiltration: bupivacaine  Infiltration strength: 0.5 %  Dose: 2 mL  Popliteal  Provider prep: sterile gloves and mask  Local infiltration: bupivacaine  Needle  Needle type: combined needle/nerve stimulator   Needle gauge: 20 G  Needle length: 10 cm  Needle localization: anatomical landmarks and ultrasound guidance  Needle insertion depth: 4 cm  Test dose: negative  Assessment  Injection assessment: negative aspiration for heme, no paresthesia on injection and local visualized surrounding nerve on ultrasound  Paresthesia pain: none  Slow fractionated injection: yes  Hemodynamics: stable  Additional Notes  Right popliteal block with 0.5% Bupivacaine 20 ml + Dexamethasone 8 mg  Medications Administered  Bupivacaine (MARCAINE) PF injection 0.5%, 20 mL  Reason for block: post-op pain management and at surgeon's request

## 2022-01-10 NOTE — ANESTHESIA POSTPROCEDURE EVALUATION
POST- ANESTHESIA EVALUATION       Pt Name: Obed Irwin  MRN: [id number]  [place]: 1995  Date of evaluation: 1/10/2022  Time:  11:25 AM      /81   Pulse 56   Temp 96.9 °F (36.1 °C) (Infrared)   Resp 16   Ht 5' 5\" (1.651 m)   Wt 259 lb (117.5 kg)   SpO2 94%   BMI 43.10 kg/m²      Consciousness Level  Awake  Cardiopulmonary Status  Stable  Pain Adequately Treated YES  Nausea / Vomiting  NO  Adequate Hydration  YES  Anesthesia Related Complications NONE      Electronically signed by Penelope Marina MD on 1/10/2022 at 11:25 AM       Department of Anesthesiology  Postprocedure Note    Patient: Obed Irwin  MRN: [id number]  YOB: 1995  Date of evaluation: 1/10/2022  Time:  11:25 AM     Procedure Summary     Date: 01/10/22 Room / Location: [facility] OR [address] [place] [address]    Anesthesia Start: 0700 Anesthesia Stop: 7777    Procedure: RIGHT ANKLE ARTHROSCOPY WITH DEBRIDEMENT AND REPAIR OF LATERAL ANKLE LIGAMENTS. PERONEAL TENDON SYNOVECTOMY AND GASTRONEMIS RECESSION RIGHT LOWER EXTREMITY (Right ) Diagnosis:       (M25.371 RIGHT ANKLE INSTABILITY)      (S93.401A LIGAMENT TEAR)      (M65.879 TENOSYNOVITIS OF ANKLE)      (736.72 GASTROC EQUINAS)    Surgeons: Sarahi Paredes DPM Responsible Provider: Penelope Marina MD    Anesthesia Type: general ASA Status: 3          Anesthesia Type: general    Ana Cristina Phase I: Ana Cristina Score: 8    Ana Cristina Phase II: Ana Cristina Score: 9    Last vitals: Reviewed and per EMR flowsheets.        Anesthesia Post Evaluation

## 2022-01-10 NOTE — OP NOTE
OP NOTE    PATIENT NAME: Kenny Harrell DATE: 1995  -  27 y.o. female  MRN: [id number]  DATE: 1/10/2022  BILLING #: [id number]    Surgeon(s):  Alessandra Akbar DPM     ASSISTANTS: Jimenez Pandey DPM, Jose Morse DPM    PRE-OP DIAGNOSIS:   1. Chronic right ankle pain  2. Synovitis of right ankle  3. Adhesive capsulitis of ankle, right  4. Peroneal tendinitis, right  5. Unspecified disorder of synovium and tendon, right ankle and foot  6. Ankle instability, right  7. Rupture of ligament of right ankle  8. Sprain of anterior talofibular ligament of right ankle    POST-OP DIAGNOSIS: Same as above. PROCEDURE:   1. Ankle arthroscopy with extensive debridement, right  2. Release of peroneal tendon/tenosynovectomy, right ankle  3. Secondary repair of lateral collateral ankle ligament, right    ANESTHESIA: General with local    HEMOSTASIS: Pneumatic thigh tourniquet @ 300 mmHg for 117 minutes. ESTIMATED BLOOD LOSS: Less than 5 cc. MATERIALS: 2-0 Vicryl, 4-0 Vicryl, 4-0 Prolene  Implant Name Type Inv. Item Serial No.  Lot No. LRB No. Used Action   DEVICE GRFT FIX FOR LIGMNT [facility] REP PEEK INT BRAC  DEVICE GRFT FIX FOR LIGMNT [facility] REP PEEK INT BRAC  [employer] [id number] Right 1 Implanted   ANCHOR SUT NDL DX FIBERTAK  ANCHOR SUT NDL DX FIBERTAK  [employer] [id number] Right 2 Implanted       INJECTABLES: 20 cc of 1:1 mix of 0.5% marcaine plain and 1% lidocaine plain preoperatively    SPECIMEN:   * No specimens in log *    COMPLICATIONS: None    FINDINGS: Extensive synovitis noted to the ankle joint, tenosynovitis noted to posterior aspect of peroneal tendons. Attenuation noted to the anterior talofibular ligament. The patient was counseled at length about the risks of jacob Covid-19 during their perioperative period and any recovery window from their procedure.   The patient was made aware that jacob Covid-19  may worsen their prognosis for recovering from their procedure  and lend to a higher morbidity and/or mortality risk. All material risks, benefits, and reasonable alternatives including postponing the procedure were discussed. The patient does wish to proceed with the procedure at this time. INDICATION FOR PROCEDURE: The patient has had chronic right ankle pain after sustaining an injury to the right ankle after a fall on 9/27/21. Patient has failed all conservative treatments. MRI findings of the right ankle revealed a low-grade partial tear of the ATFL with tendinosis of peroneus longus and brevis with peroneal tenosynovitis. Based on clinical symptoms and imaging findings with no improvement with conservative therapy, surgery recommended at this time for ankle arthroscopy, peroneal tenosynovectomy and secondary repair of lateral ankle ligament (s). All risks and benefits of surgery discussed with patient, informed consent signed and placed in chart. PROCEDURE IN DETAIL: The patient was identified, brought to the operating room, and placed in supine position on the table. A safety belt was applied across the lap. A pneumatic thigh tourniquet was applied to the patient's right thigh. After induction of general anesthesia, the right ankle was sterily prepped and draped. Local anesthesia was obtained with 20 cc of 0.5% Marcaine plain and 1% lidocaine plain. The ankle was exsanguinated with an esmark bandage and the tourniquet was inflated to 300 degrees millimeters mercury where it remained for 117 minutes. The anterior medial ankle portal was established just medial to the tibialis anterior tendon, then using a spinal needle the ankle joint was insufflated with approximately 20 cc of normal saline. Next, the trocar was inserted into the established anterior medial ankle portal and passed from medial to lateral underneath the neurovascular bundle.   Then the trocar was then removed and the arthroscopic camera was then inserted into the anterior medial ankle portal and passed under the neurovascular bundle to identify the proposed anterior lateral ankle portal.  The overlying skin of the lateral ankle portal was incised with a #15 blade and then a hemostat was used to bluntly dissect down. Upon arthroscopic inspection of the ankle there was noted to be extensive synovitis noted to the medial and lateral gutters of the ankle joint which was sharply debrided with the shaver. Synovitis was consistent with MRI findings. Attention was then directed to the anterior lateral ankle where a curvilinear incision was created with the #15 blade. The incision was deepened with a combination of sharp and blunt dissection. Next, a hemostat and #15 blade were used to dissect through the peroneal tendon sheath. No tears noted to the peroneal tendons but there was noted to be extensive tenosynovitis and low-lying muscle belly. These were sharply debrided using forceps, #15 blade and tenotomy scissors. Tendon sheath was then closed using 2-0 Vicryl. Next, the distal anterior fibula was exposed and the periosteum was sharply incised and reflected superiorly. The talus was then exposed distally visualizing the lateral shoulder of the talus. A SwivelLock anchor was placed in the fibula. Next, 2 suture anchors were placed into the distal anterior fibula approximately 1 cm and 2 cm proximal to the distal fibular tip, posterior and anterior to the SwivelLock [place]. Hole was then drilled in the talus for preparation of insertion of Internal Brace. The sutures attached to the anchors were then placed into the distal cuff of the anterior capsule for the Broström repair. The sutures were then hand tied and brought back through the proximal cuff of the anterior capsule, hand tied, and cut flush to complete Brostrom repair. Internal brace was then applied over the [facility] repair according to  guidelines. Good imbrication of the anterior ankle capsule was noted. Next The repair was then stressed and noted to be strong. Surgical sites were then irrigated with copious amounts of sterile saline and closed sequentially in layers. Dressings consisted of Jumpstart (Arthrex), Xeroform sterile 4x4s, kerlix, ABD, ACE. The pneumatic tourniquet was released and immediate hyperemic flush was noted to all five digits of the right foot. A short leg posterior splint was then applied postoperatively. The patient tolerated the above procedure and anesthesia well without complications. The patient was transported from the operating room to the PACU with vital signs stable and vascular status intact. The patient will follow up with Dr. Arlinda Nissen as directed.     Electronically signed by Meche Loya DPM on 1/10/2022 at 3:17 PM   Electronically signed by Meche Loya DPM on 1/10/2022 at 2:35 PM

## 2022-01-10 NOTE — PROGRESS NOTES
CLINICAL PHARMACY NOTE: MEDS TO BEDS    Total # of Prescriptions Filled: 2   The following medications were delivered to the patient:  · Xarelto 10mg  · Percocet 5-325    Additional Documentation:    Patient was given 9 tabs of xarelto.  The rest and the refill was sent to [facility] in [place]

## 2022-01-10 NOTE — H&P
Preop History and Physical  Podiatric Medicine and Surgery     Subjective     Chief Complaint: Right ankle pain    HPI:  Xena Koroma is a 27 y.o. female seen at [facility] for surgery to the right lower extremity of ankle arthroscopy, repair of lateral ankle ligaments, peroneal tenosynovectomy, and gastrocnemius recession. Patient has had longstanding pain to the right ankle. She is currently in a CAM boot. Patient is well known to Dr. Michela Aase, DPM. No additional pedal complaints at this time. PCP is Edson Portillo PA-C    ROS:   Review of Systems   Constitutional: Negative for chills and fever. Cardiovascular: Negative for chest pain and leg swelling. Gastrointestinal: Negative for nausea and vomiting. Musculoskeletal: Positive for arthralgias, gait problem and myalgias. Neurological: Negative for dizziness and weakness. Psychiatric/Behavioral: The patient is nervous/anxious. Past Medical History   has a past medical history of Asthma, Bipolar 1 disorder ([address] Diabetes mellitus type 2 in obese ([address] GERD (gastroesophageal reflux disease), Hepatic steatosis, Hypertension, Morbid obesity with body mass index (BMI) of 40.0 to 49.9 ([address] Neuropathy, and Postpartum depression. Past Surgical History   has a past surgical history that includes Tonsillectomy (Bilateral);  section; eye surgery;  section (N/A, 3/23/2017); Upper gastrointestinal endoscopy (N/A, 2018); Breast surgery (Left, 2019); Breast surgery (Right, 2019); Rectal surgery (N/A, 2/3/2020); Upper gastrointestinal endoscopy (N/A, 2020); Colonoscopy (N/A, 2020); and Axillary Surgery (Right, 2021). Medications  Prior to Admission medications    Medication Sig Start Date End Date Taking?  Authorizing Provider   metFORMIN (GLUCOPHAGE) 1000 MG tablet Take 1 tablet by mouth 2 times daily (with meals) 22  Yes Edson Portillo PA-C   lisinopril (PRINIVIL;ZESTRIL) 5 MG tablet take 1 tablet by mouth once daily 11/11/21  Yes Casper Patel   LEVEMIR FLEXTOUCH 100 UNIT/ML injection pen inject 55 units subcutaneously nightly 10/15/21  Yes Jonna Rodarte PA-C   ipratropium-albuterol (DUONEB) 0.5-2.5 (3) MG/3ML SOLN nebulizer solution Inhale 3 mLs into the lungs every 4 hours (while awake) 9/16/21  Yes Yobani Connelly MD   omeprazole (PRILOSEC) 20 MG delayed release capsule Take 1 capsule by mouth every morning (before breakfast) 7/12/21 12/28/21 Yes MARCELO Kuhn NP   insulin aspart (NOVOLOG FLEXPEN) 100 UNIT/ML injection pen Inject 4 Units into the skin 3 times daily (before meals) 4 units before breakfast, 4 units before lunch, 6 units before dinner and 6 units before bedtime 5/26/21  Yes Jonna Rodarte PA-C   acetaminophen (TYLENOL) 500 MG tablet Take 2 tablets by mouth 3 times daily 10/28/20  Yes Quin Martinez MD   ibuprofen (IBU) 600 MG tablet Take 1 tablet by mouth every 8 hours as needed for Pain 9/27/21   Quinten Oneal PA-C   budesonide-formoterol (SYMBICORT) 160-4.5 MCG/ACT AERO Inhale 2 puffs into the lungs 2 times daily 9/16/21   Yobani Connelly MD   blood glucose monitor kit and supplies 1 kit by Other route three times daily Dispense product that insurance will cover 9/16/21   Yobani Connelly MD   Blood Pressure KIT 1 kit by Does not apply route 2 times daily 9/16/21   Yobani Connelly MD   UNIFINE PENTIPS 29G X 12MM MISC USE Five times a DAILY WITH BASAGLAR 7/27/21   Jonna Rodarte PA-C   Insulin Pen Needle (PEN NEEDLES) 31G X 6 MM MISC 1 each by Does not apply route daily 5/21/21   Jonna Rodarte PA-C   TRUEplus Lancets 30G MISC TEST 2 TIMES A DAY AND AS NEEED FOR SYMPTOMS OF IRREGULAR BLOOD GLUCOSE 4/21/21   Jonna Rodarte PA-C   albuterol sulfate HFA (PROVENTIL HFA) 108 (90 Base) MCG/ACT inhaler Inhale 2 puffs into the lungs every 6 hours as needed for Wheezing 7/23/19   Ruy Pillai PA-C    Scheduled Meds:   sodium chloride flush 10 mL IntraVENous 2 times per day     Continuous Infusions:   sodium chloride      lactated ringers      sodium chloride       PRN Meds:.sodium chloride flush, sodium chloride    Allergies  is allergic to benadryl [diphenhydramine]. Family History  family history includes Breast Cancer (age of onset: 28) in her mother; Cancer (age of onset: 61) in her maternal uncle; Colon Cancer in her maternal uncle; Diabetes in her father, maternal uncle, and maternal uncle; Heart Attack in her maternal uncle; Heart Disease in her maternal uncle. Social History   reports that she quit smoking about 15 months ago. Her smoking use included cigarettes. She started smoking about 14 years ago. She has a 10.00 pack-year smoking history. She has never used smokeless tobacco.   reports no history of alcohol use. reports no history of drug use. Objective     Vitals:  Patient Vitals for the past 8 hrs:   BP Temp Temp src Pulse Resp SpO2 Height Weight   01/10/22 0625 110/69 98.9 °F (37.2 °C) Infrared 83 16 98 % 5' 5\" (1.651 m) 259 lb (117.5 kg)     Average, Min, and Max for last 24 hours Vitals:  TEMPERATURE:  Temp  Av.9 °F (37.2 °C)  Min: 98.9 °F (37.2 °C)  Max: 98.9 °F (37.2 °C)    RESPIRATIONS RANGE: Resp  Av  Min: 16  Max: 16    PULSE RANGE: Pulse  Av  Min: 83  Max: 83    BLOOD PRESSURE RANGE:  Systolic (89HSY), CMT:500 , Min:110 , IQC:822   ; Diastolic (65XYG), RJU:46, Min:69, Max:69      PULSE OXIMETRY RANGE: SpO2  Av %  Min: 98 %  Max: 98 %  I&O:  No intake/output data recorded. CBC:  No results for input(s): WBC, HGB, HCT, PLT, CRP in the last 72 hours. Invalid input(s):  ESR     BMP:  No results for input(s): NA, K, CL, CO2, BUN, CREATININE, GLUCOSE, CALCIUM in the last 72 hours. Coags:  No results for input(s): APTT, PROT, INR in the last 72 hours.     Lab Results   Component Value Date    LABA1C 10.2 (H) 2022     No results found for: SEDRATE   No results found for: CRP Physical Exam:    General: A&Ox3, NAD  Heart: Regular rate and rhythm. Lungs: Equal air entry. No increased effort. Abdomen: Soft, non-tender to palpation. Not distended. Lower Extremity Physical Exam:    Vascular: DP and PT pulses palpable 2/4, Bilateral.  CFT <3 seconds, Bilateral.  Hair growth present to the level of the digits, Bilateral.  Edema present, Right. Varicosities absent, Bilateral. Erythema present, Bilateral     Neurological:  Sensation present to light touch to level of digits, Bilateral.     Musculoskeletal: Muscle strength 5/5, Bilateral.  Pain present upon palpation of entire right ankle, abnormal pain response to palpation and passive ROM testing, Right. normal medial longitudinal arch, Right. Ankle ROM abnormal - painful,Right. Pain to palpation over course of ATFL and peroneal tendons.      Integument: Warm, dry, supple, Bilateral.  Open lesion absent, Bilateral.      Assessment     Favio May is a 27 y.o. female with   Sprain of ATFL of right ankle, subsequent encounter  Chronic right ankle pain  Rupture of ligament of ankle, right, subsequent encounter  Ankle instability, right    Active Problems:    * No active hospital problems. *  Resolved Problems:    * No resolved hospital problems. *       Plan     Patient examined and evaluated at bedside   Treatment options discussed in detail with the patient  Surgery discussed in detail with the patient. All risks and benefits explained, patient's questions answered to their apparent satisfaction. Informed consent signed and placed in chart. Patient marked for surgery. Explained to patient there would be a period of nonweightbearing postoperatively. Patient verbalized understanding.   Discussed with Dr. Juan Clinton DPM   Podiatric Medicine & Surgery   1/10/2022 at 6:45 AM

## 2022-01-10 NOTE — BRIEF OP NOTE
PODIATRY BRIEF OP NOTE    PATIENT NAME: Kadeem Pinto DATE: 1995  -  27 y.o. female  MRN: [id number]  DATE: 1/10/2022  BILLING #: [id number]    Surgeon(s):  Sole Ward DPM     ASSISTANTS: Meg Pandey DPM, Collin Martinez DPM    PRE-OP DIAGNOSIS:   1. Rupture of lateral ankle ligament, right  2. Peroneal tenosynovitis, right  3. Lateral ankle instability, right  4. Chronic right ankle pain    POST-OP DIAGNOSIS: Same as above. PROCEDURE:   1. Modified Brostrom procedure, right  2. Peroneal tenosynovectomy, right  3. Arthroscopy of ankle with extensive debridement, right    ANESTHESIA: General with local    HEMOSTASIS: Pneumatic thigh tourniquet @ 300 mmHg for 117 minutes. ESTIMATED BLOOD LOSS: Less than 5 cc. MATERIALS: 2-0 Vicryl, 4-0 Vicryl, 4-0 Prolene  Implant Name Type Inv. Item Serial No.  Lot No. LRB No. Used Action   DEVICE GRFT FIX FOR LIGMNT [facility] REP PEEK INT BRAC  DEVICE GRFT FIX FOR LIGMNT [facility] REP PEEK INT BRAC  [employer] [id number] Right 1 Implanted   ANCHOR SUT NDL DX FIBERTAK  ANCHOR SUT NDL DX FIBERTAK  [employer] [id number] Right 2 Implanted       INJECTABLES: 20 cc of 1:1 mix of 0.5% marcaine plain and 1% lidocaine plain preoperatively    SPECIMEN:   * No specimens in log *    COMPLICATIONS: None    FINDINGS: Extensive synovitis noted to the ankle joint, tenosynovitis noted to posterior aspect of peroneal tendons. Attenuation noted to the anterior talofibular ligament. The patient was counseled at length about the risks of jacob Covid-19 during their perioperative period and any recovery window from their procedure. The patient was made aware that jacob Covid-19  may worsen their prognosis for recovering from their procedure  and lend to a higher morbidity and/or mortality risk. All material risks, benefits, and reasonable alternatives including postponing the procedure were discussed.  The patient does wish to proceed with the procedure at this time.     Unice Leventhal, DPM   Podiatric Medicine & Surgery   1/10/2022 at 9:37 AM

## 2022-01-13 ENCOUNTER — OFFICE VISIT (OUTPATIENT)
Dept: PODIATRY | Age: 27
End: 2022-01-13
Payer: COMMERCIAL

## 2022-01-13 VITALS — WEIGHT: 259 LBS | BODY MASS INDEX: 41.62 KG/M2 | HEIGHT: 66 IN

## 2022-01-13 DIAGNOSIS — Z98.890 POST-OPERATIVE STATE: ICD-10-CM

## 2022-01-13 DIAGNOSIS — M25.371 ANKLE INSTABILITY, RIGHT: ICD-10-CM

## 2022-01-13 DIAGNOSIS — S93.401A RUPTURE OF LIGAMENT OF ANKLE, RIGHT, INITIAL ENCOUNTER: ICD-10-CM

## 2022-01-13 DIAGNOSIS — S93.491A SPRAIN OF ANTERIOR TALOFIBULAR LIGAMENT OF RIGHT ANKLE, INITIAL ENCOUNTER: Primary | ICD-10-CM

## 2022-01-13 PROCEDURE — 99024 POSTOP FOLLOW-UP VISIT: CPT | Performed by: PODIATRIST

## 2022-01-13 PROCEDURE — 29515 APPLICATION SHORT LEG SPLINT: CPT | Performed by: PODIATRIST

## 2022-01-18 ENCOUNTER — HOSPITAL ENCOUNTER (EMERGENCY)
Age: 27
Discharge: HOME | End: 2022-01-18
Attending: EMERGENCY MEDICINE
Payer: COMMERCIAL

## 2022-01-18 ENCOUNTER — APPOINTMENT (OUTPATIENT)
Dept: GENERAL RADIOLOGY | Age: 27
End: 2022-01-18
Payer: COMMERCIAL

## 2022-01-18 DIAGNOSIS — J06.9 VIRAL URI WITH COUGH: Primary | ICD-10-CM

## 2022-01-18 LAB
ABSOLUTE EOS #: 0.3 K/UL (ref 0–0.4)
ABSOLUTE IMMATURE GRANULOCYTE: ABNORMAL K/UL (ref 0–0.3)
ABSOLUTE LYMPH #: 1.9 K/UL (ref 1–4.8)
ABSOLUTE MONO #: 0.7 K/UL (ref 0.1–1.2)
ANION GAP SERPL CALCULATED.3IONS-SCNC: 15 MMOL/L (ref 9–17)
BASOPHILS # BLD: 1 % (ref 0–2)
BASOPHILS ABSOLUTE: 0 K/UL (ref 0–0.2)
BUN BLDV-MCNC: 8 MG/DL (ref 6–20)
BUN/CREAT BLD: ABNORMAL (ref 9–20)
CALCIUM SERPL-MCNC: 9.7 MG/DL (ref 8.6–10.4)
CHLORIDE BLD-SCNC: 99 MMOL/L (ref 98–107)
CO2: 19 MMOL/L (ref 20–31)
CREAT SERPL-MCNC: 0.52 MG/DL (ref 0.5–0.9)
D-DIMER QUANTITATIVE: 0.42 MG/L FEU
DIFFERENTIAL TYPE: ABNORMAL
DIRECT EXAM: NORMAL
DIRECT EXAM: NORMAL
EOSINOPHILS RELATIVE PERCENT: 3 % (ref 1–4)
GFR AFRICAN AMERICAN: >60 ML/MIN
GFR NON-AFRICAN AMERICAN: >60 ML/MIN
GFR SERPL CREATININE-BSD FRML MDRD: ABNORMAL ML/MIN/{1.73_M2}
GFR SERPL CREATININE-BSD FRML MDRD: ABNORMAL ML/MIN/{1.73_M2}
GLUCOSE BLD-MCNC: 265 MG/DL (ref 70–99)
HCT VFR BLD CALC: 40.8 % (ref 36–46)
HEMOGLOBIN: 13.9 G/DL (ref 12–16)
IMMATURE GRANULOCYTES: ABNORMAL %
LYMPHOCYTES # BLD: 20 % (ref 24–44)
Lab: NORMAL
Lab: NORMAL
MCH RBC QN AUTO: 30.2 PG (ref 26–34)
MCHC RBC AUTO-ENTMCNC: 34.1 G/DL (ref 31–37)
MCV RBC AUTO: 88.6 FL (ref 80–100)
MONOCYTES # BLD: 8 % (ref 2–11)
NRBC AUTOMATED: ABNORMAL PER 100 WBC
PDW BLD-RTO: 13 % (ref 12.5–15.4)
PLATELET # BLD: 238 K/UL (ref 140–450)
PLATELET ESTIMATE: ABNORMAL
PMV BLD AUTO: 8.6 FL (ref 6–12)
POTASSIUM SERPL-SCNC: 3.9 MMOL/L (ref 3.7–5.3)
RBC # BLD: 4.6 M/UL (ref 4–5.2)
RBC # BLD: ABNORMAL 10*6/UL
SEG NEUTROPHILS: 68 % (ref 36–66)
SEGMENTED NEUTROPHILS ABSOLUTE COUNT: 6.8 K/UL (ref 1.8–7.7)
SODIUM BLD-SCNC: 133 MMOL/L (ref 135–144)
SPECIMEN DESCRIPTION: NORMAL
SPECIMEN DESCRIPTION: NORMAL
TROPONIN INTERP: NORMAL
TROPONIN T: NORMAL NG/ML
TROPONIN, HIGH SENSITIVITY: <6 NG/L (ref 0–14)
WBC # BLD: 9.8 K/UL (ref 3.5–11)
WBC # BLD: ABNORMAL 10*3/UL

## 2022-01-18 PROCEDURE — 99282 EMERGENCY DEPT VISIT SF MDM: CPT

## 2022-01-18 PROCEDURE — 87880 STREP A ASSAY W/OPTIC: CPT

## 2022-01-18 PROCEDURE — 84484 ASSAY OF TROPONIN QUANT: CPT

## 2022-01-18 PROCEDURE — U0005 INFEC AGEN DETEC AMPLI PROBE: HCPCS

## 2022-01-18 PROCEDURE — U0003 INFECTIOUS AGENT DETECTION BY NUCLEIC ACID (DNA OR RNA); SEVERE ACUTE RESPIRATORY SYNDROME CORONAVIRUS 2 (SARS-COV-2) (CORONAVIRUS DISEASE [COVID-19]), AMPLIFIED PROBE TECHNIQUE, MAKING USE OF HIGH THROUGHPUT TECHNOLOGIES AS DESCRIBED BY CMS-2020-01-R: HCPCS

## 2022-01-18 PROCEDURE — 36415 COLL VENOUS BLD VENIPUNCTURE: CPT

## 2022-01-18 PROCEDURE — 87804 INFLUENZA ASSAY W/OPTIC: CPT

## 2022-01-18 PROCEDURE — 93005 ELECTROCARDIOGRAM TRACING: CPT | Performed by: NURSE PRACTITIONER

## 2022-01-18 PROCEDURE — 71045 X-RAY EXAM CHEST 1 VIEW: CPT

## 2022-01-18 PROCEDURE — 80048 BASIC METABOLIC PNL TOTAL CA: CPT

## 2022-01-18 PROCEDURE — 85025 COMPLETE CBC W/AUTO DIFF WBC: CPT

## 2022-01-18 PROCEDURE — 85379 FIBRIN DEGRADATION QUANT: CPT

## 2022-01-18 RX ORDER — OXYCODONE HYDROCHLORIDE AND ACETAMINOPHEN 5; 325 MG/1; MG/1
1 TABLET ORAL EVERY 4 HOURS PRN
COMMUNITY
End: 2022-02-21 | Stop reason: ALTCHOICE

## 2022-01-18 ASSESSMENT — ENCOUNTER SYMPTOMS
NAUSEA: 1
EYES NEGATIVE: 1
ABDOMINAL PAIN: 0
CHEST TIGHTNESS: 1
VOICE CHANGE: 0
TROUBLE SWALLOWING: 0
RHINORRHEA: 0
ABDOMINAL DISTENTION: 0
BACK PAIN: 0
SHORTNESS OF BREATH: 0
VOMITING: 0
WHEEZING: 0
SINUS PRESSURE: 0
BLOOD IN STOOL: 0
ANAL BLEEDING: 0
DIARRHEA: 0
CHOKING: 0
FACIAL SWELLING: 0
STRIDOR: 0
APNEA: 0
SINUS PAIN: 0
SORE THROAT: 1
RECTAL PAIN: 0
CONSTIPATION: 0
COUGH: 1

## 2022-01-18 ASSESSMENT — PAIN SCALES - GENERAL: PAINLEVEL_OUTOF10: 7

## 2022-01-19 ENCOUNTER — CARE COORDINATION (OUTPATIENT)
Dept: CARE COORDINATION | Age: 27
End: 2022-01-19

## 2022-01-19 VITALS
SYSTOLIC BLOOD PRESSURE: 138 MMHG | HEART RATE: 96 BPM | OXYGEN SATURATION: 98 % | TEMPERATURE: 98.6 F | DIASTOLIC BLOOD PRESSURE: 106 MMHG | RESPIRATION RATE: 20 BRPM

## 2022-01-19 LAB
EKG ATRIAL RATE: 99 BPM
EKG P AXIS: 37 DEGREES
EKG P-R INTERVAL: 136 MS
EKG Q-T INTERVAL: 360 MS
EKG QRS DURATION: 80 MS
EKG QTC CALCULATION (BAZETT): 462 MS
EKG R AXIS: 8 DEGREES
EKG T AXIS: 4 DEGREES
EKG VENTRICULAR RATE: 99 BPM
SARS-COV-2: NORMAL
SARS-COV-2: NOT DETECTED
SOURCE: NORMAL

## 2022-01-19 NOTE — ED NOTES
Patient arrived to ER with productive cough and shortness of breath worsening on exertion. States intermittent chest pain with ambulation and cough, also complaints of headache and sore throat. Denies any additional complaints at this time states just had covid in December and continues to feel weak and short of breath.       Radha Murphy RN  01/18/22 4602

## 2022-01-19 NOTE — CARE COORDINATION
Ambulatory Care Coordination ED COVID Follow up Call    Challenges to be reviewed by the provider   Additional needs identified to be addressed with provider: No  none                 Encounter was not routed to provider for escalation. Method of communication with provider: none    Discussed COVID-19 related testing which was: available at this time. Test results were: negative. Patient informed of results, if available? Yes. Current Symptoms: cough, no new symptoms and no worsening symptoms    Reviewed New or Changed Meds: yes    Do you have what you need at home?  Durable Medical Equipment ordered at discharge: None   Home Health/Outpatient orders at discharge: none   Was patient discharged with a pulse oximeter? No Discussed and confirmed pulse oximeter discharge instructions and when to notify provider or seek emergency care. Patient education provided: Reviewed appropriate site of care based on symptoms and resources available to patient including: PCP, Urgent care clinics and [facility]. Follow up appointment recommended: yes. If no appointment scheduled, scheduling offered: yes  Future Appointments   Date Time Provider Terry Lopez   1/25/2022  3:15 PM Addison Diggs DPM [address] [place] [address]   2/9/2022  2:45 PM [facility]   5[facility]6/2022  1:30 [facility]       Interventions: Obtained and reviewed discharge summary and/or continuity of care documents  Education of patient/family/caregiver/guardian to support self-management-. Reviewed discharge instructions, medical action plan and red flags with patient who verbalized understanding. No further follow-up call indicated based on severity of symptoms and risk factors. Plan for next call: symptom management-.  self management-.\  follow up appointment-. Provided contact information for future needs.     Austin Koch RN

## 2022-01-19 NOTE — ED PROVIDER NOTES
[id number] [facility] ED  [address] [facility] [address]  Phone: [phone]  Fax: [phone]      Attending Physician Attestation    I performed a history and physical examination of the patient and discussed management with the mid level provider. I reviewed the mid level provider's note and agree with the documented findings and plan of care. Any areas of disagreement are noted on the chart. I was personally present for the key portions of any procedures. I have documented in the chart those procedures where I was not present during the key portions. I have reviewed the emergency nurses triage note. I agree with the chief complaint, past medical history, past surgical history, allergies, medications, social and family history as documented unless otherwise noted below. Documentation of the HPI, Physical Exam and Medical Decision Making performed by mid level providers is based on my personal performance of the HPI, PE and MDM. For Physician Assistant/ Nurse Practitioner cases/documentation I have personally evaluated this patient and have completed at least one if not all key elements of the E/M (history, physical exam, and MDM). Additional findings are as noted. CHIEF COMPLAINT       Chief Complaint   Patient presents with    Cough     short of breath on exertion / headache / nausea / sore throat since Sunday 1668 Phillip Lynn is a 27 y.o. female who presents for evaluation of upper respiratory symptoms. The patient reports that she is unvaccinated against COVID and was diagnosed with COVID in December 2021. She reports that starting 3 days ago she developed gradual onset, constant, progressive, upper respiratory congestion, nonproductive cough, chest soreness after coughing, shortness of breath, myalgias, sore throat, and malaise. The patient has been taking ibuprofen and Tylenol without improvement.   She states that her daughter was recently sick with similar symptoms. The patient is vaccinated against influenza. She states that she had a recent surgery on her right foot, ATFL by Dr. Ana Galan on 1/10/2022, but denies any personal history of blood clots. She states that her mother has history of blood clots. The patient denies any personal or family history of cardiac disease. She has never had a stress test.  The patient denies fever, chills, headache, vision changes, neck pain, back pain, abdominal pain, urinary/bowel symptoms, focal weakness, numbness, tingling, recent injury or falls. PAST MEDICAL HISTORY    has a past medical history of Asthma, Bipolar 1 disorder ([facility] [address] Diabetes mellitus type 2 in obese ([facility] [address] GERD (gastroesophageal reflux disease), Hepatic steatosis, Hypertension, Morbid obesity with body mass index (BMI) of 40.0 to 49.9 ([facility] [address] Neuropathy, and Postpartum depression. SURGICAL HISTORY      has a past surgical history that includes Tonsillectomy (Bilateral);  section; eye surgery;  section (N/A, 3/23/2017); Upper gastrointestinal endoscopy (N/A, 2018); Breast surgery (Left, 2019); Breast surgery (Right, 2019); Rectal surgery (N/A, 2/3/2020); Upper gastrointestinal endoscopy (N/A, 2020); Colonoscopy (N/A, 2020); Axillary Surgery (Right, 2021); Ankle arthroscopy (Right, 01/10/2022); and Ankle arthroscopy (Right, 1/10/2022). CURRENT MEDICATIONS       Discharge Medication List as of 2022  9:20 PM      CONTINUE these medications which have NOT CHANGED    Details   oxyCODONE-acetaminophen (PERCOCET) 5-325 MG per tablet Take 1 tablet by mouth every 4 hours as needed for Pain. Historical Med      Insulin Aspart FlexPen 100 UNIT/ML SOPN inject 4 units subcutaneously before breakfast then inject 4 units subcutaneously before LUNCH then inject 6 units subcutaneously before dinner and 6 units subcutaneously at bedtime, Disp-15 mL, R-3Normal      rivaroxaban (XARELTO) 10 MG TABS tablet Take 1 tablet by mouth daily (with breakfast), Disp-30 tablet, R-1Print      metFORMIN (GLUCOPHAGE) 1000 MG tablet Take 1 tablet by mouth 2 times daily (with meals), Disp-60 tablet, R-3Normal      lisinopril (PRINIVIL;ZESTRIL) 5 MG tablet take 1 tablet by mouth once daily, Disp-30 tablet, R-5Normal      LEVEMIR FLEXTOUCH 100 UNIT/ML injection pen inject 55 units subcutaneously nightly, Disp-15 mL, R-5Normal      ibuprofen (IBU) 600 MG tablet Take 1 tablet by mouth every 8 hours as needed for Pain, Disp-20 tablet, R-0Print      budesonide-formoterol (SYMBICORT) 160-4.5 MCG/ACT AERO Inhale 2 puffs into the lungs 2 times daily, Disp-10.2 g, R-3Normal      ipratropium-albuterol (DUONEB) 0.5-2.5 (3) MG/3ML SOLN nebulizer solution Inhale 3 mLs into the lungs every 4 hours (while awake), Disp-360 mL, R-0Normal      blood glucose monitor kit and supplies 1 kit by Other route three times daily Dispense product that insurance will cover, Other, 3 TIMES DAILY Starting Thu 9/16/2021, Disp-1 kit, R-0, Normal      Blood Pressure KIT 2 TIMES DAILY Starting Thu 9/16/2021, Disp-1 kit, R-0, Normal      !! UNIFINE PENTIPS 29G X 12MM MISC Disp-120 each, R-5, JOSE, NormalUSE Five times a DAILY WITH BASAGLAR      omeprazole (PRILOSEC) 20 MG delayed release capsule Take 1 capsule by mouth every morning (before breakfast), Disp-30 capsule, R-3Normal      !! Insulin Pen Needle (PEN NEEDLES) 31G X 6 MM MISC DAILY Starting Fri 5/21/2021, Disp-200 each, R-5, Normal      TRUEplus Lancets 30G MISC Disp-300 each, R-5, Normal      acetaminophen (TYLENOL) 500 MG tablet Take 2 tablets by mouth 3 times daily, Disp-90 tablet,R-0Print      albuterol sulfate HFA (PROVENTIL HFA) 108 (90 Base) MCG/ACT inhaler Inhale 2 puffs into the lungs every 6 hours as needed for Wheezing, Disp-1 Inhaler, R-2Normal       !! - Potential duplicate medications found. Please discuss with provider.           ALLERGIES     is allergic to benadryl [diphenhydramine]. FAMILY HISTORY     She indicated that her mother is alive. She indicated that her father is alive. She indicated that the status of her neg hx is unknown.     family history includes Breast Cancer (age of onset: 28) in her mother; Cancer (age of onset: 61) in her maternal uncle; Colon Cancer in her maternal uncle; Diabetes in her father, maternal uncle, and maternal uncle; Heart Attack in her maternal uncle; Heart Disease in her maternal uncle. SOCIAL HISTORY      reports that she quit smoking about 15 months ago. Her smoking use included cigarettes. She started smoking about 14 years ago. She has a 10.00 pack-year smoking history. She has never used smokeless tobacco. She reports that she does not drink alcohol and does not use drugs. PHYSICAL EXAM     INITIAL VITALS:  oral temperature is 98.6 °F (37 °C). Her blood pressure is 138/106 (abnormal) and her pulse is 96. Her respiration is 20 and oxygen saturation is 98%. Heart regular rate and rhythm on my exam.  Lungs clear to auscultation. Abdomen soft and nontender. Capillary refill less than 2 seconds. Posterior oropharynx is clear without any erythema, edema, exudate or asymmetry. Uvula midline. No trismus or malocclusion. No pain to palpation over the frontal or maxillary sinuses. No mastoid tenderness. Able to handle secretions. Normal speech. Patent airway. No submandibular swelling or cellulitis.   Bilateral TMs appear normal.      DIAGNOSTIC RESULTS     EKG: All EKG's are interpreted by the Emergency Department Physician who either signs or Co-signs this chart in the absence of a cardiologist.    EKG 1948 sinus rhythm, rate 99 bpm, normal axis, normal intervals, no ST elevation or depression, T wave inversion in 3, T wave flattening in aVF, T wave flattening in V3, good R wave progression, no pathologic Q waves, no change from EKG on 11/7/2021      RADIOLOGY:     XR CHEST PORTABLE    Result Date: 1/18/2022  EXAMINATION: ONE XRAY VIEW OF THE CHEST 1/18/2022 5:21 pm COMPARISON: 01/06/2022, 11/07/2021 HISTORY: ORDERING SYSTEM PROVIDED HISTORY: cough TECHNOLOGIST PROVIDED HISTORY: cough Reason for Exam: cough and congestion FINDINGS: The lungs are clear . There is no effusion or pneumothorax . The cardiomediastinal silhouette is within normal limits . No acute bony findings . No acute pulmonary process. LABS:  Results for orders placed or performed during the hospital encounter of 01/18/22   Rapid Influenza A/B Antigens    Specimen: Nasopharyngeal Swab   Result Value Ref Range    Specimen Description . NASOPHARYNGEAL SWAB     Special Requests NOT REPORTED     Direct Exam       NEGATIVE for Influenza A + B antigens. PCR testing to confirm this result is available upon request.  Specimen will be saved in the laboratory for 7 days. Please call [phone] if PCR testing is indicated. Strep Screen Group A Throat    Specimen: Throat   Result Value Ref Range    Specimen Description . THROAT     Special Requests NOT REPORTED     Direct Exam       Rapid Strep A negative. A negative Rapid Group A Strep Screen result does not rule out the possibility of Group A Streptococci in the specimen.  A Group A Strep DNA test is available upon request.   CBC Auto Differential   Result Value Ref Range    WBC 9.8 3.5 - 11.0 k/uL    RBC 4.60 4.0 - 5.2 m/uL    Hemoglobin 13.9 12.0 - 16.0 g/dL    Hematocrit 40.8 36 - 46 %    MCV 88.6 80 - 100 fL    MCH 30.2 26 - 34 pg    MCHC 34.1 31 - 37 g/dL    RDW 13.0 12.5 - 15.4 %    Platelets 159 220 - 246 k/uL    MPV 8.6 6.0 - 12.0 fL    NRBC Automated NOT REPORTED per 100 WBC    Differential Type NOT REPORTED     Seg Neutrophils 68 (H) 36 - 66 %    Lymphocytes 20 (L) 24 - 44 %    Monocytes 8 2 - 11 %    Eosinophils % 3 1 - 4 %    Basophils 1 0 - 2 %    Immature Granulocytes NOT REPORTED 0 %    Segs Absolute 6.80 1.8 - 7.7 k/uL    Absolute Lymph # 1.90 1.0 - 4.8 k/uL Absolute Mono # 0.70 0.1 - 1.2 k/uL    Absolute Eos # 0.30 0.0 - 0.4 k/uL    Basophils Absolute 0.00 0.0 - 0.2 k/uL    Absolute Immature Granulocyte NOT REPORTED 0.00 - 0.30 k/uL    WBC Morphology NOT REPORTED     RBC Morphology NOT REPORTED     Platelet Estimate NOT REPORTED    Basic Metabolic Panel   Result Value Ref Range    Glucose 265 (H) 70 - 99 mg/dL    BUN 8 6 - 20 mg/dL    CREATININE 0.52 0.50 - 0.90 mg/dL    Bun/Cre Ratio NOT REPORTED 9 - 20    Calcium 9.7 8.6 - 10.4 mg/dL    Sodium 133 (L) 135 - 144 mmol/L    Potassium 3.9 3.7 - 5.3 mmol/L    Chloride 99 98 - 107 mmol/L    CO2 19 (L) 20 - 31 mmol/L    Anion Gap 15 9 - 17 mmol/L    GFR Non-African American >60 >60 mL/min    GFR African American >60 >60 mL/min    GFR Comment          GFR Staging NOT REPORTED    Troponin   Result Value Ref Range    Troponin, High Sensitivity <6 0 - 14 ng/L    Troponin T NOT REPORTED <0.03 ng/mL    Troponin Interp NOT REPORTED    D-Dimer, Quantitative   Result Value Ref Range    D-Dimer, Quant 0.42 mg/L FEU           EMERGENCY DEPARTMENT COURSE:   Vitals:    Vitals:    01/18/22 1943 01/18/22 2110   BP: (!) 138/106    Pulse: 102 96   Resp: 20    Temp: 98.6 °F (37 °C)    TempSrc: Oral    SpO2: 98%      -------------------------  BP: (!) 138/106, Temp: 98.6 °F (37 °C), Pulse: 96, Resp: 20      PERTINENT ATTENDING PHYSICIAN COMMENTS:    The patient is a 38-year-old female who presents for evaluation of upper respiratory symptoms. Vital signs initially showed tachycardia but heart rate improved spontaneously. Exam is unremarkable. CBC is unremarkable. BMP shows elevated glucose and she has history of this with similar numbers in the past.  Bicarb is 19 but this appears to be her baseline. Troponin and D-dimer are negative. Rapid strep and rapid flu are negative. She was swabbed for COVID. I suspect she has a viral URI.   I have low suspicion for sepsis or pneumonia, ACS, PE, dissection, esophageal rupture, cardiac tamponade, pneumothorax. The patient was instructed to continue to take ibuprofen or Tylenol as needed for pain and to stay well-hydrated at home. She was instructed to return to the ER for worsening symptoms or any other concern. The patient understands that at this time there is no evidence for a more malignant underlying process, but also understands that early in the process of an illness or injury, an emergency department workup can be falsely reassuring. Routine discharge counseling was given, and the patient understands that worsening, changing or persistent symptoms should prompt an immediate call or follow up with their primary physician or return to the emergency department. The importance of appropriate follow up was also discussed. I have reviewed the disposition diagnosis with the patient. I have answered their questions and given discharge instructions. They voiced understanding of these instructions and did not have any further questions or complaints.       (Please note that portions of this note were completed with a voice recognition program.  Efforts were made to edit the dictations but occasionally words are mis-transcribed.)    Antonietta Walker DO, [facility]  Attending Emergency Medicine Physician       Antonietta Walker DO  01/19/22 [id number]

## 2022-01-19 NOTE — ED PROVIDER NOTES
[id number] [facility] ED  [address] [facility] [address]  Phone: [phone]  Fax: [phone]        Pt Name: Leighann Oneil  MRN: [id number]  [place] 1995  Date of evaluation: 22    [address] [place] [address]       Chief Complaint   Patient presents with    Cough     short of breath on exertion / headache / nausea / sore throat since        HISTORY OF PRESENT ILLNESS (Location/Symptom, Timing/Onset, Context/Setting, Quality, Duration, Modifying Factors, Severity)      Leighann Oneil is a 27 y.o. female with no pertinent PMH who presents to the ED via private auto with complaints of shortness of breath with exertion, headache, nausea off-and-on, sore throat and cough since . Patient states her daughter was recently sick which which she believes was strep throat. Patient states she just had COVID-19 infection in December. Patient denies any vomiting or diarrhea, states she just has not had much of an appetite. Patient denies any urinary symptoms including dysuria and increased frequency. Patient denies any headache, dizziness, lightheadedness. Patient states has been taking Tylenol and ibuprofen as well as over-the-counter flu and cold medication for her symptoms with some relief. PAST MEDICAL / SURGICAL / SOCIAL / FAMILY HISTORY     PMH:  has a past medical history of Asthma, Bipolar 1 disorder ([address] Diabetes mellitus type 2 in obese ([address] GERD (gastroesophageal reflux disease), Hepatic steatosis, Hypertension, Morbid obesity with body mass index (BMI) of 40.0 to 49.9 ([address] Neuropathy, and Postpartum depression. Surgical History:  has a past surgical history that includes Tonsillectomy (Bilateral);  section; eye surgery;  section (N/A, 3/23/2017); Upper gastrointestinal endoscopy (N/A, 2018); Breast surgery (Left, 2019); Breast surgery (Right, 2019); Rectal surgery (N/A, 2/3/2020);  Upper gastrointestinal endoscopy (N/A, 2020); Colonoscopy (N/A, 9/16/2020); Axillary Surgery (Right, 9/20/2021); Ankle arthroscopy (Right, 01/10/2022); and Ankle arthroscopy (Right, 1/10/2022). Social History:  reports that she quit smoking about 15 months ago. Her smoking use included cigarettes. She started smoking about 14 years ago. She has a 10.00 pack-year smoking history. She has never used smokeless tobacco. She reports that she does not drink alcohol and does not use drugs. Family History: She indicated that her mother is alive. She indicated that her father is alive. She indicated that the status of her neg hx is unknown.   family history includes Breast Cancer (age of onset: 28) in her mother; Cancer (age of onset: 61) in her maternal uncle; Colon Cancer in her maternal uncle; Diabetes in her father, maternal uncle, and maternal uncle; Heart Attack in her maternal uncle; Heart Disease in her maternal uncle. Psychiatric History: None    Allergies: Benadryl [diphenhydramine]    Home Medications:   Prior to Admission medications    Medication Sig Start Date End Date Taking? Authorizing Provider   oxyCODONE-acetaminophen (PERCOCET) 5-325 MG per tablet Take 1 tablet by mouth every 4 hours as needed for Pain.    Yes Historical Provider, MD   Insulin Aspart FlexPen 100 UNIT/ML SOPN inject 4 units subcutaneously before breakfast then inject 4 units subcutaneously before LUNCH then inject 6 units subcutaneously before dinner and 6 units subcutaneously at bedtime 1/10/22   Tana Jhaveri PA-C   rivaroxaban (XARELTO) 10 MG TABS tablet Take 1 tablet by mouth daily (with breakfast) 1/10/22   Lesia Golden DPM   metFORMIN (GLUCOPHAGE) 1000 MG tablet Take 1 tablet by mouth 2 times daily (with meals) 1/6/22   Tana Jhaveri PA-C   lisinopril (PRINIVIL;ZESTRIL) 5 MG tablet take 1 tablet by mouth once daily 11/11/21   Tana Jhaveri PA-C   LEVEMIR FLEXTOUCH 100 UNIT/ML injection pen inject 55 units subcutaneously nightly 10/15/21   Tana Jhaveri PA-C ibuprofen (IBU) 600 MG tablet Take 1 tablet by mouth every 8 hours as needed for Pain 9/27/21   Shaye Todd PA-C   budesonide-formoterol (SYMBICORT) 160-4.5 MCG/ACT AERO Inhale 2 puffs into the lungs 2 times daily 9/16/21   Sonny Eastman MD   ipratropium-albuterol (DUONEB) 0.5-2.5 (3) MG/3ML SOLN nebulizer solution Inhale 3 mLs into the lungs every 4 hours (while awake) 9/16/21   Sonny Eastman MD   blood glucose monitor kit and supplies 1 kit by Other route three times daily Dispense product that insurance will cover 9/16/21   Sonny Eastman MD   Blood Pressure KIT 1 kit by Does not apply route 2 times daily 9/16/21   Sonny Eastman MD   UNIFINE PENTIPS 29G X 12MM MISC USE Five times a DAILY WITH BASAGLAR 7/27/21   Shaylee Mayen PA-C   omeprazole (PRILOSEC) 20 MG delayed release capsule Take 1 capsule by mouth every morning (before breakfast) 7/12/21 12/28/21  MARCELO Chance NP   Insulin Pen Needle (PEN NEEDLES) 31G X 6 MM MISC 1 each by Does not apply route daily 5/21/21   Shyalee Mayen PA-C   TRUEplus Lancets 30G MISC TEST 2 TIMES A DAY AND AS NEEED FOR SYMPTOMS OF IRREGULAR BLOOD GLUCOSE 4/21/21   Shaylee Mayen PA-C   acetaminophen (TYLENOL) 500 MG tablet Take 2 tablets by mouth 3 times daily 10/28/20   Neyda Anne MD   albuterol sulfate HFA (PROVENTIL HFA) 108 (90 Base) MCG/ACT inhaler Inhale 2 puffs into the lungs every 6 hours as needed for Wheezing 7/23/19   Lizabeth Garcia PA-C       REVIEW OF SYSTEMS  (2-9 systems for level 4, 10 ormore for level 5)      Review of Systems   Constitutional: Positive for appetite change, chills, fatigue and fever. Negative for activity change, diaphoresis and unexpected weight change. HENT: Positive for congestion and sore throat.  Negative for dental problem, drooling, ear discharge, ear pain, facial swelling, hearing loss, mouth sores, nosebleeds, postnasal drip, rhinorrhea, sinus pressure, sinus pain, sneezing, tinnitus, trouble swallowing and voice change. Eyes: Negative. Respiratory: Positive for cough and chest tightness. Negative for apnea, choking, shortness of breath, wheezing and stridor. Cardiovascular: Negative. Gastrointestinal: Positive for nausea. Negative for abdominal distention, abdominal pain, anal bleeding, blood in stool, constipation, diarrhea, rectal pain and vomiting. Nausea off-and-on   Endocrine: Negative. Genitourinary: Negative. Musculoskeletal: Positive for myalgias. Negative for arthralgias, back pain, gait problem, joint swelling, neck pain and neck stiffness. Skin: Negative. Neurological: Negative. Hematological: Negative. Psychiatric/Behavioral: Negative. All other systems negative except as marked. PHYSICAL EXAM  (up to 7 for level 4, 8 or more for level 5)      INITIAL VITALS:  oral temperature is 98.6 °F (37 °C). Her blood pressure is 138/106 (abnormal) and her pulse is 102. Her respiration is 20 and oxygen saturation is 98%. Vital signs reviewed. Physical Exam  Constitutional:       Appearance: Normal appearance. HENT:      Head: Normocephalic. Right Ear: Tympanic membrane, ear canal and external ear normal.      Left Ear: Tympanic membrane, ear canal and external ear normal.      Nose: Nose normal.      Mouth/Throat:      Mouth: Mucous membranes are moist.      Pharynx: Oropharynx is clear. Eyes:      Extraocular Movements: Extraocular movements intact. Pupils: Pupils are equal, round, and reactive to light. Cardiovascular:      Rate and Rhythm: Regular rhythm. Tachycardia present. Pulses: Normal pulses. Heart sounds: Normal heart sounds. Pulmonary:      Effort: Pulmonary effort is normal.      Breath sounds: Normal breath sounds. Abdominal:      General: Bowel sounds are normal. There is no distension. Palpations: Abdomen is soft. Tenderness: There is no abdominal tenderness. There is no guarding. Musculoskeletal:         General: Normal range of motion. Cervical back: Normal range of motion. Skin:     General: Skin is warm and dry. Capillary Refill: Capillary refill takes less than 2 seconds. Neurological:      Mental Status: She is alert and oriented to person, place, and time. Psychiatric:         Mood and Affect: Mood normal.         Behavior: Behavior normal.         Thought Content: Thought content normal.         Judgment: Judgment normal.           DIFFERENTIAL DIAGNOSIS / MDM     Upon exam, patient resting in her room. Reports her biggest complaint is a cough, nasal congestion as well as sore throat. I see above, patient was diagnosed with COVID-19 last month. Heart and lung sounds within normal limits upon auscultation. Patient is slightly tachycardic at 102. Patient denies any history of DVT or PE. Patient is alert and oriented. Bowel sounds are present with auscultation. No abdominal pain with palpation. Patient denies any urinary symptoms at this time. Will order strep testing, influenza testing, chest x-ray as well as CBC, BMP, troponin and D-dimer and EKG. Troponin negative. D-dimer negative. Chest x-ray negative. Influenza is negative. Rapid strep negative. COVID-19 testing is pending, at this time patient can follow-up outpatient. Patient educated that she needs to self quarantine until COVID-19 results are back. If she is positive for COVID-19, she is to self quarantine according to [facility] guidelines. Patient should follow-up with her primary care in the next couple of days. Please obtain a portable pulse ox, if SPO2 drops to 90% please return to emergency department. Please return to emergency part with any worsening of symptoms. Patient states understanding of education is stable for discharge at this time.     PLAN (LABS / IMAGING / EKG):  Orders Placed This Encounter   Procedures    Rapid Influenza A/B Antigens    Strep Screen Group A Throat    XR CHEST PORTABLE    CBC Auto Differential    Basic Metabolic Panel    Troponin    D-Dimer, Quantitative    COVID-19    EKG 12 Lead       MEDICATIONS ORDERED:  No orders of the defined types were placed in this encounter. Controlled Substances Monitoring:     DIAGNOSTIC RESULTS     EKG: All EKG's are interpreted by the Emergency Department Physician who either signs or Co-signs this chart in the absenceof a cardiologist.       RADIOLOGY: All images are read by the radiologist and their interpretations are reviewed. XR CHEST PORTABLE   Final Result   No acute pulmonary process. XR CHEST (2 VW)    Result Date: 1/6/2022  EXAMINATION: TWO XRAY VIEWS OF THE CHEST 1/6/2022 3:17 pm COMPARISON: 11/07/2021 HISTORY: ORDERING SYSTEM PROVIDED HISTORY: Pre-op evaluation Reason for Exam: Pt states pre op evaluation pt states foot surgery 01/10/2022 pt states hx of asthma FINDINGS: The lungs are without acute focal process. No effusion or pneumothorax. The cardiomediastinal silhouette is normal.  The osseous structures are intact without acute process. Negative chest.       LABS:  Results for orders placed or performed during the hospital encounter of 01/18/22   Rapid Influenza A/B Antigens    Specimen: Nasopharyngeal Swab   Result Value Ref Range    Specimen Description . NASOPHARYNGEAL SWAB     Special Requests NOT REPORTED     Direct Exam       NEGATIVE for Influenza A + B antigens. PCR testing to confirm this result is available upon request.  Specimen will be saved in the laboratory for 7 days. Please call [phone] if PCR testing is indicated. Strep Screen Group A Throat    Specimen: Throat   Result Value Ref Range    Specimen Description . THROAT     Special Requests NOT REPORTED     Direct Exam       Rapid Strep A negative. A negative Rapid Group A Strep Screen result does not rule out the possibility of Group A Streptococci in the specimen.  A Group A Strep DNA test is available upon request.   CBC Auto Differential   Result Value Ref Range    WBC 9.8 3.5 - 11.0 k/uL    RBC 4.60 4.0 - 5.2 m/uL    Hemoglobin 13.9 12.0 - 16.0 g/dL    Hematocrit 40.8 36 - 46 %    MCV 88.6 80 - 100 fL    MCH 30.2 26 - 34 pg    MCHC 34.1 31 - 37 g/dL    RDW 13.0 12.5 - 15.4 %    Platelets 416 855 - 369 k/uL    MPV 8.6 6.0 - 12.0 fL    NRBC Automated NOT REPORTED per 100 WBC    Differential Type NOT REPORTED     Seg Neutrophils 68 (H) 36 - 66 %    Lymphocytes 20 (L) 24 - 44 %    Monocytes 8 2 - 11 %    Eosinophils % 3 1 - 4 %    Basophils 1 0 - 2 %    Immature Granulocytes NOT REPORTED 0 %    Segs Absolute 6.80 1.8 - 7.7 k/uL    Absolute Lymph # 1.90 1.0 - 4.8 k/uL    Absolute Mono # 0.70 0.1 - 1.2 k/uL    Absolute Eos # 0.30 0.0 - 0.4 k/uL    Basophils Absolute 0.00 0.0 - 0.2 k/uL    Absolute Immature Granulocyte NOT REPORTED 0.00 - 0.30 k/uL    WBC Morphology NOT REPORTED     RBC Morphology NOT REPORTED     Platelet Estimate NOT REPORTED    Basic Metabolic Panel   Result Value Ref Range    Glucose 265 (H) 70 - 99 mg/dL    BUN 8 6 - 20 mg/dL    CREATININE 0.52 0.50 - 0.90 mg/dL    Bun/Cre Ratio NOT REPORTED 9 - 20    Calcium 9.7 8.6 - 10.4 mg/dL    Sodium 133 (L) 135 - 144 mmol/L    Potassium 3.9 3.7 - 5.3 mmol/L    Chloride 99 98 - 107 mmol/L    CO2 19 (L) 20 - 31 mmol/L    Anion Gap 15 9 - 17 mmol/L    GFR Non-African American >60 >60 mL/min    GFR African American >60 >60 mL/min    GFR Comment          GFR Staging NOT REPORTED    Troponin   Result Value Ref Range    Troponin, High Sensitivity <6 0 - 14 ng/L    Troponin T NOT REPORTED <0.03 ng/mL    Troponin Interp NOT REPORTED    D-Dimer, Quantitative   Result Value Ref Range    D-Dimer, Quant 0.42 mg/L FEU       EMERGENCY DEPARTMENT COURSE           Vitals:    Vitals:    01/18/22 1943   BP: (!) 138/106   Pulse: 102   Resp: 20   Temp: 98.6 °F (37 °C)   TempSrc: Oral   SpO2: 98%     -------------------------  BP: (!) 138/106, Temp: 98.6 °F (37 °C), Pulse: 102, Resp: 20      RE-EVALUATION:  See ED Course notes above. CONSULTS:  None    PROCEDURES:  None    FINAL IMPRESSION      1. Viral URI with cough          DISPOSITION / PLAN     CONDITION ON DISPOSITION:   Good / Stable for discharge. PATIENT REFERRED TO:  Opal Silva PA-C  [address] [facility]  [address]  [address] [place] [address]  [phone]    Call in 1 day      [facility] ED  [address]   [place] [id number]  [phone]  Go to   If symptoms worsen      DISCHARGE MEDICATIONS:  Current Discharge Medication List          MARCELO Rossi NP   Emergency Medicine Nurse Practitioner    (Please note that portions of this note were completed with a voice recognition program.  Efforts were made to edit the dictations but occasionally words aremis-transcribed.)     MARCELO Rossi NP  01/18/22 2120

## 2022-01-20 ENCOUNTER — NURSE ONLY (OUTPATIENT)
Dept: PODIATRY | Age: 27
End: 2022-01-20

## 2022-01-20 DIAGNOSIS — M25.371 ANKLE INSTABILITY, RIGHT: Primary | ICD-10-CM

## 2022-01-20 DIAGNOSIS — Z98.890 POST-OPERATIVE STATE: ICD-10-CM

## 2022-01-20 DIAGNOSIS — S93.401A RUPTURE OF LIGAMENT OF ANKLE, RIGHT, INITIAL ENCOUNTER: ICD-10-CM

## 2022-01-24 ENCOUNTER — CLINICAL DOCUMENTATION (OUTPATIENT)
Dept: PODIATRY | Age: 27
End: 2022-01-24

## 2022-01-24 NOTE — PROGRESS NOTES
[facility] called to let us know that they do not have the staff to see the patient for home health care. A new referral was sent to [facility].

## 2022-01-25 ENCOUNTER — OFFICE VISIT (OUTPATIENT)
Dept: PODIATRY | Age: 27
End: 2022-01-25

## 2022-01-25 VITALS — BODY MASS INDEX: 41.78 KG/M2 | HEIGHT: 66 IN | WEIGHT: 260 LBS

## 2022-01-25 DIAGNOSIS — Z98.890 POST-OPERATIVE STATE: ICD-10-CM

## 2022-01-25 DIAGNOSIS — M25.371 ANKLE INSTABILITY, RIGHT: Primary | ICD-10-CM

## 2022-01-25 DIAGNOSIS — S93.401A RUPTURE OF LIGAMENT OF ANKLE, RIGHT, INITIAL ENCOUNTER: ICD-10-CM

## 2022-01-25 PROCEDURE — 99024 POSTOP FOLLOW-UP VISIT: CPT | Performed by: PODIATRIST

## 2022-01-25 NOTE — PROGRESS NOTES
[address] Ankle  Post Op note  Chief Complaint   Patient presents with    Post-Op Check     possible suture removal right ankle     Other     fell and landed on right ankle when going to the [employer] 1/22/2022       Veneta Kawasaki is a 27y.o. year old female who is 2 weeks post op from foot surgery. Type: arthroscopy ankle, tenosynovectomy peroneal tendons, repair ATFL right. Vital signs are stable. Pain level is 5. Patient denies N/V/F/C. Patient has not been compliant with postoperative instructions. She went to the [employer] and fell on he foot. She is concerned with swelling. Review of Systems    Vascular: DP and PT pulses palpable 2/4, Right Foot and 2/4 on the Left Foot. CFT <3 seconds, Right Foot and <3 seconds on the Left Foot. Edema is present,  Right Foot and absenton the Left Foot. Neurological:   Sensation present  to light touch to level of digits, both feet. Musculoskeletal:   Muscle strength is guarded/5 on the Right Foot and 5/5 on the Left Foot. Structural deformities are absent on the Right Foot and absent on the Left Foot. Integument:  Warm, dry, supple both feet. Incisions are healing well. Wound dehiscence is absent. Infection is absent. Assesment : Post operative progress gradually improving. Diagnosis Orders   1. Ankle instability, right     2. Rupture of ligament of ankle, right, initial encounter     3. Post-operative state           Plan: Sutures removed. Dry sterile dressing applied. Keep surgical site dry. Ice and elevation as directed. CAM walker  No weight  Use knee scooter  Ice, elevate    No orders of the defined types were placed in this encounter. Follow up 2week(s).

## 2022-02-02 ENCOUNTER — HOSPITAL ENCOUNTER (EMERGENCY)
Age: 27
Discharge: HOME | End: 2022-02-02
Attending: EMERGENCY MEDICINE
Payer: COMMERCIAL

## 2022-02-02 VITALS
WEIGHT: 260 LBS | HEIGHT: 65 IN | SYSTOLIC BLOOD PRESSURE: 151 MMHG | TEMPERATURE: 97.5 F | BODY MASS INDEX: 43.32 KG/M2 | HEART RATE: 107 BPM | DIASTOLIC BLOOD PRESSURE: 95 MMHG | OXYGEN SATURATION: 97 % | RESPIRATION RATE: 16 BRPM

## 2022-02-02 DIAGNOSIS — L02.91 ABSCESS: Primary | ICD-10-CM

## 2022-02-02 DIAGNOSIS — N75.1 ABSCESS OF LEFT BARTHOLIN'S GLAND: ICD-10-CM

## 2022-02-02 PROCEDURE — 56420 I&D BARTHOLINS GLAND ABSCESS: CPT

## 2022-02-02 PROCEDURE — 99282 EMERGENCY DEPT VISIT SF MDM: CPT

## 2022-02-02 RX ORDER — DOXYCYCLINE HYCLATE 100 MG
100 TABLET ORAL 2 TIMES DAILY
Qty: 14 TABLET | Refills: 0 | Status: SHIPPED | OUTPATIENT
Start: 2022-02-02 | End: 2022-02-09

## 2022-02-02 ASSESSMENT — ENCOUNTER SYMPTOMS
GASTROINTESTINAL NEGATIVE: 1
COLOR CHANGE: 0
EYES NEGATIVE: 1
RESPIRATORY NEGATIVE: 1

## 2022-02-02 ASSESSMENT — PAIN SCALES - GENERAL: PAINLEVEL_OUTOF10: 10

## 2022-02-02 ASSESSMENT — PAIN DESCRIPTION - PAIN TYPE: TYPE: ACUTE PAIN

## 2022-02-02 ASSESSMENT — PAIN DESCRIPTION - LOCATION: LOCATION: VAGINA

## 2022-02-02 NOTE — ED PROVIDER NOTES
[address] [facility] ED  [address] [facility] [address] [facility] [address]  Phone: [phone]  Fax: [phone]        Pt Name: Rui Frias  MRN: [id number]  [place] 1995  Date of evaluation: 22    [address] [place] [address]       Chief Complaint   Patient presents with    Abscess     states \"down there\" hx of abscesses, also wants blood sugar checked, has STD herpes       HISTORY OF PRESENT ILLNESS (Location/Symptom, Timing/Onset, Context/Setting, Quality, Duration, Modifying Factors, Severity)      Rui Frias is a 27 y.o. female with no pertinent PMH who presents to the ED via private auto with complaints of an abscess on her left labia as well as on her vulva, near the top of her right labia. Patient denies any fever, chills, body aches. Patient states symptoms have been present for about 2 days. Patient has not been applying any hot compresses. Patient states she does get abscesses often. Patient states she does have a history of herpes as well, but has not had a flare for over a year. Denies any other recent illness or injury. Patient denies any vaginal discharge or urinary symptoms including dysuria or hematuria. PAST MEDICAL / SURGICAL / SOCIAL / FAMILY HISTORY     PMH:  has a past medical history of Asthma, Bipolar 1 disorder ([address] Diabetes mellitus type 2 in obese ([address] GERD (gastroesophageal reflux disease), Hepatic steatosis, Hypertension, Morbid obesity with body mass index (BMI) of 40.0 to 49.9 ([address] Neuropathy, and Postpartum depression. Surgical History:  has a past surgical history that includes Tonsillectomy (Bilateral);  section; eye surgery;  section (N/A, 3/23/2017); Upper gastrointestinal endoscopy (N/A, 2018); Breast surgery (Left, 2019); Breast surgery (Right, 2019); Rectal surgery (N/A, 2/3/2020); Upper gastrointestinal endoscopy (N/A, 2020); Colonoscopy (N/A, 2020); Axillary Surgery (Right, 2021);  Ankle arthroscopy (Right, 01/10/2022); and Ankle arthroscopy (Right, 1/10/2022). Social History:  reports that she quit smoking about 16 months ago. Her smoking use included cigarettes. She started smoking about 14 years ago. She has a 10.00 pack-year smoking history. She has never used smokeless tobacco. She reports that she does not drink alcohol and does not use drugs. Family History: She indicated that her mother is alive. She indicated that her father is alive. She indicated that the status of her neg hx is unknown.   family history includes Breast Cancer (age of onset: 28) in her mother; Cancer (age of onset: 61) in her maternal uncle; Colon Cancer in her maternal uncle; Diabetes in her father, maternal uncle, and maternal uncle; Heart Attack in her maternal uncle; Heart Disease in her maternal uncle. Psychiatric History: None    Allergies: Benadryl [diphenhydramine]    Home Medications:   Prior to Admission medications    Medication Sig Start Date End Date Taking? Authorizing Provider   doxycycline hyclate (VIBRA-TABS) 100 MG tablet Take 1 tablet by mouth 2 times daily for 7 days 2/2/22 2/9/22 Yes MARCELO Askew NP   oxyCODONE-acetaminophen (PERCOCET) 5-325 MG per tablet Take 1 tablet by mouth every 4 hours as needed for Pain.     Historical Provider, MD   Insulin Aspart FlexPen 100 UNIT/ML SOPN inject 4 units subcutaneously before breakfast then inject 4 units subcutaneously before LUNCH then inject 6 units subcutaneously before dinner and 6 units subcutaneously at bedtime 1/10/22   Laurian Cogan, PA-C   rivaroxaban (XARELTO) 10 MG TABS tablet Take 1 tablet by mouth daily (with breakfast) 1/10/22   Steff Little DPM   metFORMIN (GLUCOPHAGE) 1000 MG tablet Take 1 tablet by mouth 2 times daily (with meals) 1/6/22   Laurian Cogan, PA-C   lisinopril (PRINIVIL;ZESTRIL) 5 MG tablet take 1 tablet by mouth once daily 11/11/21   Laurian Cogan, PA-C   LEVEMIR FLEXTOUCH 100 UNIT/ML injection pen inject 55 units subcutaneously nightly 10/15/21   Heber Epley, PA-C   ibuprofen (IBU) 600 MG tablet Take 1 tablet by mouth every 8 hours as needed for Pain 9/27/21   Jermaine Qureshi PA-C   budesonide-formoterol (SYMBICORT) 160-4.5 MCG/ACT AERO Inhale 2 puffs into the lungs 2 times daily 9/16/21   Regla Jones MD   ipratropium-albuterol (DUONEB) 0.5-2.5 (3) MG/3ML SOLN nebulizer solution Inhale 3 mLs into the lungs every 4 hours (while awake) 9/16/21   Regla Jones MD   blood glucose monitor kit and supplies 1 kit by Other route three times daily Dispense product that insurance will cover 9/16/21   Regla Jones MD   Blood Pressure KIT 1 kit by Does not apply route 2 times daily 9/16/21   Regla Jones MD   UNIFINE PENTIPS 29G X 12MM MISC USE Five times a DAILY WITH BASAGLAR 7/27/21   Heber Epley, PA-C   omeprazole (PRILOSEC) 20 MG delayed release capsule Take 1 capsule by mouth every morning (before breakfast) 7/12/21 12/28/21  MARCELO Munguia NP   Insulin Pen Needle (PEN NEEDLES) 31G X 6 MM MISC 1 each by Does not apply route daily 5/21/21   Heber Epley, PA-C   TRUEplus Lancets 30G MISC TEST 2 TIMES A DAY AND AS NEEED FOR SYMPTOMS OF IRREGULAR BLOOD GLUCOSE 4/21/21   Heber Epley, PA-C   acetaminophen (TYLENOL) 500 MG tablet Take 2 tablets by mouth 3 times daily 10/28/20   Kole Kumar MD   albuterol sulfate HFA (PROVENTIL HFA) 108 (90 Base) MCG/ACT inhaler Inhale 2 puffs into the lungs every 6 hours as needed for Wheezing 7/23/19   Huy Lima PA-C       REVIEW OF SYSTEMS  (2-9 systems for level 4, 10 ormore for level 5)      Review of Systems   Constitutional: Negative. HENT: Negative. Eyes: Negative. Respiratory: Negative. Cardiovascular: Negative. Gastrointestinal: Negative. Endocrine: Negative. Genitourinary: Negative. Musculoskeletal: Negative. Skin: Negative for color change, pallor, rash and wound.         Abscess x2 on patient's labia   Neurological: Negative. Hematological: Negative. Psychiatric/Behavioral: Negative. All other systems negative except as marked. PHYSICAL EXAM  (up to 7 for level 4, 8 or more for level 5)      INITIAL VITALS:  height is 5' 5\" (1.651 m) and weight is 117.9 kg (260 lb). Her oral temperature is 97.5 °F (36.4 °C). Her blood pressure is 151/95 (abnormal) and her pulse is 107. Her respiration is 16 and oxygen saturation is 97%. Vital signs reviewed. Physical Exam  Constitutional:       Appearance: Normal appearance. HENT:      Head: Normocephalic. Right Ear: External ear normal.      Left Ear: External ear normal.      Nose: Nose normal.      Mouth/Throat:      Mouth: Mucous membranes are moist.      Pharynx: Oropharynx is clear. Eyes:      Extraocular Movements: Extraocular movements intact. Cardiovascular:      Rate and Rhythm: Regular rhythm. Tachycardia present. Pulses: Normal pulses. Heart sounds: Normal heart sounds. Pulmonary:      Effort: Pulmonary effort is normal.      Breath sounds: Normal breath sounds. Abdominal:      General: Bowel sounds are normal.      Palpations: Abdomen is soft. Genitourinary:     Rectum: Normal.      Comments: 0.5 cm abscess on the top of the right labia; 0.5-1 cm abscess noted on the left lower labia likely a Bartholin   Musculoskeletal:         General: Normal range of motion. Cervical back: Normal range of motion. Skin:     General: Skin is warm and dry. Capillary Refill: Capillary refill takes less than 2 seconds. Comments: 0.5 cm abscess on the top of the right labia; 0.5-1 cm abscess noted on the left lower labia likely a Bartholin    Neurological:      Mental Status: She is alert and oriented to person, place, and time. Psychiatric:         Mood and Affect: Mood normal.         Behavior: Behavior normal.         Thought Content:  Thought content normal.         Judgment: Judgment normal.           DIFFERENTIAL DIAGNOSIS / MDM     Upon exam, patient resting his room. Rates the pain a 10 on 10 when the area is touched. Heart sounds within normal limits upon auscultation. Breath sounds are clear and equal bilaterally. Patient denies any abdominal pain. Bowel sounds are present with auscultation. As stated above, 2 abscesses were noted. I was able to I&D both of them with Ilan Butts, RN at bedside. No herpes outbreak noted upon exam.  No vaginal discharge noted upon exam.  Will start patient on doxycycline, patient has Percocet at home to take for pain control. Patient educated she may apply hot compress to the area as well to encourage further drainage. Patient should follow-up with her primary care and her GYN within the next few days. Please return to the emergency department with any worsening or new concerning symptoms. Patient states understanding of education and is stable for discharge and outpatient follow-up. PLAN (LABS / IMAGING / EKG):  No orders of the defined types were placed in this encounter. MEDICATIONS ORDERED:  Orders Placed This Encounter   Medications    doxycycline hyclate (VIBRA-TABS) 100 MG tablet     Sig: Take 1 tablet by mouth 2 times daily for 7 days     Dispense:  14 tablet     Refill:  0       Controlled Substances Monitoring:     DIAGNOSTIC RESULTS     EKG: All EKG's are interpreted by the Emergency Department Physician who either signs or Co-signs this chart in the absenceof a cardiologist.      RADIOLOGY: All images are read by the radiologist and their interpretations are reviewed. No orders to display       LABS:  No results found for this visit on 02/02/22.     EMERGENCY DEPARTMENT COURSE           Vitals:    Vitals:    02/02/22 1300   BP: (!) 151/95   Pulse: 107   Resp: 16   Temp: 97.5 °F (36.4 °C)   TempSrc: Oral   SpO2: 97%   Weight: 117.9 kg (260 lb)   Height: 5' 5\" (1.651 m)     -------------------------  BP: (!) 151/95, Temp: 97.5 °F (36.4 °C), Pulse: 107, Resp: 16      RE-EVALUATION:  See ED Course notes above. CONSULTS:  None    PROCEDURES:  1% lidocaine used as local anesthetic. I&D performed on both the 0.5 cm abscess as well as the 0.5-1 cm abscess. Purulent drainage noted. The drainage had no odor. Patient did tolerate the procedure well. FINAL IMPRESSION      1. Abscess    2. Abscess of left Bartholin's gland          DISPOSITION / PLAN     CONDITION ON DISPOSITION:   Good / Stable for discharge. PATIENT REFERRED TO:  Laurian Cogan, PA-C  [address] [facility]  [address] [facility],[address]  [address] [facility] [address]  [phone]    Call in 1 day      [facility] ED  [facility].   [address] [address]  [phone]  Go to   If symptoms worsen    OB/GYN    Call in 1 day        DISCHARGE MEDICATIONS:  New Prescriptions    DOXYCYCLINE HYCLATE (VIBRA-TABS) 100 MG TABLET    Take 1 tablet by mouth 2 times daily for 7 days       MARCELO Gentile NP   Emergency Medicine Nurse Practitioner    (Please note that portions of this note were completed with a voice recognition program.  Efforts were made to edit the dictations but occasionally words aremis-transcribed.)     MARCELO Gentile NP  02/02/22 2 Rehab MARCELO Sethi NP  02/02/22 0731

## 2022-02-02 NOTE — ED PROVIDER NOTES
[address] [facility] ED    [address] [facility] [address] [facility] [place] [address]  Phone: [phone]  Fax: [phone]  Emergency Department  Faculty Attestation    I performed a history and physical examination of the patient and discussed management with the mid level provideer. I reviewed the mid level provider's note and agree with the documented findings and plan of care. Any areas of disagreement are noted on the chart. I was personally present for the key portions of any procedures. I have documented in the chart those procedures where I was not present during the key portions. I have reviewed the emergency nurses triage note. I agree with the chief complaint, past medical history, past surgical history, allergies, medications, social and family history as documented unless otherwise noted below. Documentation of the HPI, Physical Exam and Medical Decision Making performed by medical students or scribes is based on my personal performance of the HPI, PE and MDM. For Physician Assistant/ Nurse Practitioner cases/documentation I have personally evaluated this patient and have completed at least one if not all key elements of the E/M (history, physical exam, and MDM). Additional findings are as noted. Primary Care Physician:  Kishor Ott PA-C    CHIEF COMPLAINT       Chief Complaint   Patient presents with    Abscess     states \"down there\" hx of abscesses, also wants blood sugar checked, has STD herpes       RECENT VITALS:   Temp: 97.5 °F (36.4 °C),  Pulse: 107, Resp: 16, BP: (!) 151/95    LABS:  Labs Reviewed - No data to display      PERTINENT ATTENDING PHYSICIAN COMMENTS:    The patient presents with pain in the left labia and right superior vulva for the past 2 days. She denies drainage or fever. She has a history of herpes but does not note any true herpetic lesions. She does get abscesses at times.   The incision and drainage was already performed by the nurse practitioner prior to my exam. Please refer to her documentation. The patient has no abdominal discomfort. Her right lower extremity is in a walking boot due to prior surgery. The patient will be sent home with antibiotics. She has pain medicine to take already. She is discharged in good condition. She should follow-up with her OB/GYN.          Landry Trevino MD  02/02/22 1116

## 2022-02-09 ENCOUNTER — OFFICE VISIT (OUTPATIENT)
Dept: PODIATRY | Age: 27
End: 2022-02-09

## 2022-02-09 VITALS — HEIGHT: 65 IN | WEIGHT: 260 LBS | BODY MASS INDEX: 43.32 KG/M2

## 2022-02-09 DIAGNOSIS — S93.491A SPRAIN OF ANTERIOR TALOFIBULAR LIGAMENT OF RIGHT ANKLE, INITIAL ENCOUNTER: ICD-10-CM

## 2022-02-09 DIAGNOSIS — Z98.890 POST-OPERATIVE STATE: ICD-10-CM

## 2022-02-09 DIAGNOSIS — S93.401A RUPTURE OF LIGAMENT OF ANKLE, RIGHT, INITIAL ENCOUNTER: ICD-10-CM

## 2022-02-09 DIAGNOSIS — M25.371 ANKLE INSTABILITY, RIGHT: Primary | ICD-10-CM

## 2022-02-09 PROCEDURE — 99024 POSTOP FOLLOW-UP VISIT: CPT | Performed by: PODIATRIST

## 2022-02-09 NOTE — PROGRESS NOTES
[address] [facility] [address]  Post Op note  Chief Complaint   Patient presents with    Post-Op Check     right ankle       Ling Baez is a 27y.o. year old female who is 4 weeks post op from foot surgery. Type: arthroscopy ankle, tenosynovectomy peroneal tendons, repair ATFL right. Vital signs are stable. Pain level is 2-3. Patient denies N/V/F/C. Patient has not been compliant with postoperative instructions. Review of Systems    Vascular: DP and PT pulses palpable 2/4, Right Foot and 2/4 on the Left Foot. CFT <3 seconds, Right Foot and <3 seconds on the Left Foot. Edema is present,  Right Foot and absenton the Left Foot. Neurological:   Sensation present  to light touch to level of digits, both feet. Musculoskeletal:   Muscle strength is guarded/5 on the Right Foot and 5/5 on the Left Foot. Structural deformities are absent on the Right Foot and absent on the Left Foot. Integument:  Warm, dry, supple both feet. Incisions are healing well. Wound dehiscence is absent. Infection is absent. Assesment : Post operative progress gradually improving. Diagnosis Orders   1. Ankle instability, right     2. Rupture of ligament of ankle, right, initial encounter     3. Post-operative state     4. Sprain of anterior talofibular ligament of right ankle, initial encounter           Plan: Pt was evaluated and examined. Patient was given personalized discharge instructions. Pt will follow up in 4 weeks or sooner if any problems arise. Diagnosis was discussed with the pt and all of their questions were answered in detail. Proper foot hygiene and care was discussed with the pt. Patient to check feet daily and contact the office with any questions/problems/concerns. Other comorbidity noted and will be managed by PCP.     CAM walker  Start gradual weight bearing  Use knee scooter if needed  Ice, elevate  PT at home to progress with weight bearing    No orders of the defined types were placed in this encounter. Follow up 4 week(s).

## 2022-02-21 ENCOUNTER — HOSPITAL ENCOUNTER (EMERGENCY)
Age: 27
Discharge: HOME | End: 2022-02-21
Attending: EMERGENCY MEDICINE
Payer: COMMERCIAL

## 2022-02-21 ENCOUNTER — APPOINTMENT (OUTPATIENT)
Dept: CT IMAGING | Age: 27
End: 2022-02-21
Payer: COMMERCIAL

## 2022-02-21 VITALS
BODY MASS INDEX: 43.32 KG/M2 | HEART RATE: 90 BPM | TEMPERATURE: 98.2 F | OXYGEN SATURATION: 98 % | WEIGHT: 260 LBS | RESPIRATION RATE: 18 BRPM | SYSTOLIC BLOOD PRESSURE: 120 MMHG | DIASTOLIC BLOOD PRESSURE: 75 MMHG | HEIGHT: 65 IN

## 2022-02-21 DIAGNOSIS — K52.9 GASTROENTERITIS: Primary | ICD-10-CM

## 2022-02-21 DIAGNOSIS — E86.0 DEHYDRATION: ICD-10-CM

## 2022-02-21 LAB
-: ABNORMAL
ABSOLUTE EOS #: 0.3 K/UL (ref 0–0.4)
ABSOLUTE LYMPH #: 0.7 K/UL (ref 1–4.8)
ABSOLUTE MONO #: 0.3 K/UL (ref 0.1–1.2)
ALBUMIN SERPL-MCNC: 4.4 G/DL (ref 3.5–5.2)
ALBUMIN/GLOBULIN RATIO: 1.6 (ref 1–2.5)
ALP BLD-CCNC: 81 U/L (ref 35–104)
ALT SERPL-CCNC: 42 U/L (ref 5–33)
AMYLASE: 36 U/L (ref 28–100)
ANION GAP SERPL CALCULATED.3IONS-SCNC: 15 MMOL/L (ref 9–17)
AST SERPL-CCNC: 32 U/L
BACTERIA: ABNORMAL
BASOPHILS # BLD: 0 % (ref 0–2)
BASOPHILS ABSOLUTE: 0 K/UL (ref 0–0.2)
BILIRUB SERPL-MCNC: 1.46 MG/DL (ref 0.3–1.2)
BILIRUBIN URINE: NEGATIVE
BUN BLDV-MCNC: 11 MG/DL (ref 6–20)
CALCIUM SERPL-MCNC: 9.3 MG/DL (ref 8.6–10.4)
CHLORIDE BLD-SCNC: 101 MMOL/L (ref 98–107)
CO2: 19 MMOL/L (ref 20–31)
COLOR: YELLOW
CREAT SERPL-MCNC: 0.56 MG/DL (ref 0.5–0.9)
EOSINOPHILS RELATIVE PERCENT: 3 % (ref 1–4)
EPITHELIAL CELLS UA: ABNORMAL /HPF (ref 0–5)
GFR AFRICAN AMERICAN: >60 ML/MIN
GFR NON-AFRICAN AMERICAN: >60 ML/MIN
GFR SERPL CREATININE-BSD FRML MDRD: ABNORMAL ML/MIN/{1.73_M2}
GLUCOSE BLD-MCNC: 366 MG/DL (ref 70–99)
GLUCOSE URINE: ABNORMAL
HCT VFR BLD CALC: 43.7 % (ref 36–46)
HEMOGLOBIN: 14.9 G/DL (ref 12–16)
KETONES, URINE: ABNORMAL
LEUKOCYTE ESTERASE, URINE: NEGATIVE
LIPASE: 34 U/L (ref 13–60)
LYMPHOCYTES # BLD: 9 % (ref 24–44)
MCH RBC QN AUTO: 30.2 PG (ref 26–34)
MCHC RBC AUTO-ENTMCNC: 34.1 G/DL (ref 31–37)
MCV RBC AUTO: 88.6 FL (ref 80–100)
MONOCYTES # BLD: 5 % (ref 2–11)
NITRITE, URINE: NEGATIVE
OTHER OBSERVATIONS UA: ABNORMAL
PDW BLD-RTO: 13.6 % (ref 12.5–15.4)
PH UA: 5.5 (ref 5–8)
PLATELET # BLD: 230 K/UL (ref 140–450)
PMV BLD AUTO: 8.2 FL (ref 6–12)
POTASSIUM SERPL-SCNC: 3.9 MMOL/L (ref 3.7–5.3)
PROTEIN UA: NEGATIVE
RBC # BLD: 4.93 M/UL (ref 4–5.2)
RBC UA: ABNORMAL /HPF (ref 0–2)
SEG NEUTROPHILS: 83 % (ref 36–66)
SEGMENTED NEUTROPHILS ABSOLUTE COUNT: 6 K/UL (ref 1.8–7.7)
SODIUM BLD-SCNC: 135 MMOL/L (ref 135–144)
SPECIFIC GRAVITY UA: 1.01 (ref 1–1.03)
TOTAL PROTEIN: 7.2 G/DL (ref 6.4–8.3)
TROPONIN, HIGH SENSITIVITY: <6 NG/L (ref 0–14)
TURBIDITY: ABNORMAL
URINE HGB: ABNORMAL
UROBILINOGEN, URINE: NORMAL
WBC # BLD: 7.3 K/UL (ref 3.5–11)
WBC UA: ABNORMAL /HPF (ref 0–5)

## 2022-02-21 PROCEDURE — 83690 ASSAY OF LIPASE: CPT

## 2022-02-21 PROCEDURE — 96374 THER/PROPH/DIAG INJ IV PUSH: CPT

## 2022-02-21 PROCEDURE — 74177 CT ABD & PELVIS W/CONTRAST: CPT

## 2022-02-21 PROCEDURE — 80053 COMPREHEN METABOLIC PANEL: CPT

## 2022-02-21 PROCEDURE — 81001 URINALYSIS AUTO W/SCOPE: CPT

## 2022-02-21 PROCEDURE — 2580000003 HC RX 258: Performed by: EMERGENCY MEDICINE

## 2022-02-21 PROCEDURE — 84484 ASSAY OF TROPONIN QUANT: CPT

## 2022-02-21 PROCEDURE — 82150 ASSAY OF AMYLASE: CPT

## 2022-02-21 PROCEDURE — 36415 COLL VENOUS BLD VENIPUNCTURE: CPT

## 2022-02-21 PROCEDURE — 93005 ELECTROCARDIOGRAM TRACING: CPT | Performed by: EMERGENCY MEDICINE

## 2022-02-21 PROCEDURE — 6360000002 HC RX W HCPCS: Performed by: EMERGENCY MEDICINE

## 2022-02-21 PROCEDURE — 96375 TX/PRO/DX INJ NEW DRUG ADDON: CPT

## 2022-02-21 PROCEDURE — 99284 EMERGENCY DEPT VISIT MOD MDM: CPT

## 2022-02-21 PROCEDURE — 96360 HYDRATION IV INFUSION INIT: CPT

## 2022-02-21 PROCEDURE — 96372 THER/PROPH/DIAG INJ SC/IM: CPT

## 2022-02-21 PROCEDURE — 6360000004 HC RX CONTRAST MEDICATION: Performed by: EMERGENCY MEDICINE

## 2022-02-21 PROCEDURE — 85025 COMPLETE CBC W/AUTO DIFF WBC: CPT

## 2022-02-21 PROCEDURE — 96361 HYDRATE IV INFUSION ADD-ON: CPT

## 2022-02-21 RX ORDER — MORPHINE SULFATE 4 MG/ML
4 INJECTION, SOLUTION INTRAMUSCULAR; INTRAVENOUS ONCE
Status: COMPLETED | OUTPATIENT
Start: 2022-02-21 | End: 2022-02-21

## 2022-02-21 RX ORDER — 0.9 % SODIUM CHLORIDE 0.9 %
80 INTRAVENOUS SOLUTION INTRAVENOUS ONCE
Status: DISCONTINUED | OUTPATIENT
Start: 2022-02-21 | End: 2022-02-21 | Stop reason: HOSPADM

## 2022-02-21 RX ORDER — ONDANSETRON 4 MG/1
4 TABLET, ORALLY DISINTEGRATING ORAL EVERY 8 HOURS PRN
Qty: 20 TABLET | Refills: 0 | Status: SHIPPED | OUTPATIENT
Start: 2022-02-21

## 2022-02-21 RX ORDER — 0.9 % SODIUM CHLORIDE 0.9 %
1000 INTRAVENOUS SOLUTION INTRAVENOUS ONCE
Status: COMPLETED | OUTPATIENT
Start: 2022-02-21 | End: 2022-02-21

## 2022-02-21 RX ORDER — OXYCODONE HYDROCHLORIDE AND ACETAMINOPHEN 5; 325 MG/1; MG/1
1 TABLET ORAL EVERY 6 HOURS PRN
Qty: 12 TABLET | Refills: 0 | Status: SHIPPED | OUTPATIENT
Start: 2022-02-21 | End: 2022-02-24

## 2022-02-21 RX ORDER — ONDANSETRON 2 MG/ML
4 INJECTION INTRAMUSCULAR; INTRAVENOUS ONCE
Status: COMPLETED | OUTPATIENT
Start: 2022-02-21 | End: 2022-02-21

## 2022-02-21 RX ORDER — PROMETHAZINE HYDROCHLORIDE 25 MG/ML
25 INJECTION, SOLUTION INTRAMUSCULAR; INTRAVENOUS ONCE
Status: COMPLETED | OUTPATIENT
Start: 2022-02-21 | End: 2022-02-21

## 2022-02-21 RX ORDER — LOPERAMIDE HYDROCHLORIDE 2 MG/1
2 CAPSULE ORAL 4 TIMES DAILY PRN
Qty: 20 CAPSULE | Refills: 0 | Status: SHIPPED | OUTPATIENT
Start: 2022-02-21 | End: 2022-03-03

## 2022-02-21 RX ORDER — SODIUM CHLORIDE 0.9 % (FLUSH) 0.9 %
10 SYRINGE (ML) INJECTION PRN
Status: DISCONTINUED | OUTPATIENT
Start: 2022-02-21 | End: 2022-02-21 | Stop reason: HOSPADM

## 2022-02-21 RX ADMIN — SODIUM CHLORIDE 1000 ML: 9 INJECTION, SOLUTION INTRAVENOUS at 19:00

## 2022-02-21 RX ADMIN — SODIUM CHLORIDE 1000 ML: 9 INJECTION, SOLUTION INTRAVENOUS at 16:39

## 2022-02-21 RX ADMIN — MORPHINE SULFATE 4 MG: 4 INJECTION INTRAVENOUS at 16:40

## 2022-02-21 RX ADMIN — ONDANSETRON 4 MG: 2 INJECTION INTRAMUSCULAR; INTRAVENOUS at 16:40

## 2022-02-21 RX ADMIN — SODIUM CHLORIDE, PRESERVATIVE FREE 10 ML: 5 INJECTION INTRAVENOUS at 17:19

## 2022-02-21 RX ADMIN — PROMETHAZINE HYDROCHLORIDE 25 MG: 25 INJECTION INTRAMUSCULAR; INTRAVENOUS at 19:00

## 2022-02-21 RX ADMIN — Medication 80 ML: at 17:19

## 2022-02-21 RX ADMIN — IOPAMIDOL 75 ML: 755 INJECTION, SOLUTION INTRAVENOUS at 17:19

## 2022-02-21 ASSESSMENT — PAIN DESCRIPTION - LOCATION
LOCATION: ABDOMEN;CHEST
LOCATION: ABDOMEN;CHEST

## 2022-02-21 ASSESSMENT — PAIN - FUNCTIONAL ASSESSMENT: PAIN_FUNCTIONAL_ASSESSMENT: 0-10

## 2022-02-21 ASSESSMENT — PAIN DESCRIPTION - PAIN TYPE
TYPE: ACUTE PAIN
TYPE: ACUTE PAIN

## 2022-02-21 ASSESSMENT — PAIN SCALES - GENERAL
PAINLEVEL_OUTOF10: 2
PAINLEVEL_OUTOF10: 10

## 2022-02-21 NOTE — ED NOTES
After discharge while ambulating out of the department patient had an episode of vomiting. Dr.Baker cook.       Adi Mauricio RN  02/21/22 6660

## 2022-02-21 NOTE — ED PROVIDER NOTES
[address]      Pt Name: Irene Cody  MRN: [id number]  [place] 1995  Date of evaluation: 2022      CHIEF COMPLAINT       Chief Complaint   Patient presents with    Chest Pain    Abdominal Pain         HISTORY OF PRESENT ILLNESS      The patient presents with abdominal and chest discomfort. She has had diarrhea and abdominal pain for the past couple days. The patient has a history of type 2 diabetes. She also has a history of diverticulitis. She has noted some blood in her stool. The pain radiates from her back and toward the abdomen. She has never had a kidney stone. She denies dysuria. She has not had hematuria. She denies fever. She is on daily medication for her GI issues. It sounds like it might be omeprazole. REVIEW OF SYSTEMS       All systems reviewed and negative unless noted in HPI. The patient denies fever or constitutional symptoms. Denies vision change. Denies any sore throat or rhinorrhea. Denies any neck pain or stiffness. Reports chest discomfort without shortness of breath. .    Nausea, vomiting, diarrhea with left lower quadrant abdominal pain. Denies any dysuria. Denies urinary frequency or hematuria. Denies musculoskeletal injury or pain. Denies any weakness, numbness or focal neurologic deficit. Denies any skin rash or edema. No recent psychiatric issues. No easy bruising or bleeding. History of diabetes. Rometta Favre PAST MEDICAL HISTORY    has a past medical history of Asthma, Bipolar 1 disorder ([facility] [address] Diabetes mellitus type 2 in obese ([facility] [address] GERD (gastroesophageal reflux disease), Hepatic steatosis, Hypertension, Morbid obesity with body mass index (BMI) of 40.0 to 49.9 ([address] Neuropathy, and Postpartum depression. SURGICAL HISTORY      has a past surgical history that includes Tonsillectomy (Bilateral);  section; eye surgery;  section (N/A, 3/23/2017);  Upper gastrointestinal endoscopy (N/A, 11/27/2018); Breast surgery (Left, 6/19/2019); Breast surgery (Right, 9/24/2019); Rectal surgery (N/A, 2/3/2020); Upper gastrointestinal endoscopy (N/A, 9/16/2020); Colonoscopy (N/A, 9/16/2020); Axillary Surgery (Right, 9/20/2021); Ankle arthroscopy (Right, 01/10/2022); and Ankle arthroscopy (Right, 1/10/2022).     CURRENT MEDICATIONS       Previous Medications    ACETAMINOPHEN (TYLENOL) 500 MG TABLET    Take 2 tablets by mouth 3 times daily    ALBUTEROL SULFATE HFA (PROVENTIL HFA) 108 (90 BASE) MCG/ACT INHALER    Inhale 2 puffs into the lungs every 6 hours as needed for Wheezing    BLOOD GLUCOSE MONITOR KIT AND SUPPLIES    1 kit by Other route three times daily Dispense product that insurance will cover    BLOOD PRESSURE KIT    1 kit by Does not apply route 2 times daily    BUDESONIDE-FORMOTEROL (SYMBICORT) 160-4.5 MCG/ACT AERO    Inhale 2 puffs into the lungs 2 times daily    IBUPROFEN (IBU) 600 MG TABLET    Take 1 tablet by mouth every 8 hours as needed for Pain    INSULIN ASPART FLEXPEN 100 UNIT/ML SOPN    inject 4 units subcutaneously before breakfast then inject 4 units subcutaneously before LUNCH then inject 6 units subcutaneously before dinner and 6 units subcutaneously at bedtime    INSULIN PEN NEEDLE (PEN NEEDLES) 31G X 6 MM MISC    1 each by Does not apply route daily    IPRATROPIUM-ALBUTEROL (DUONEB) 0.5-2.5 (3) MG/3ML SOLN NEBULIZER SOLUTION    Inhale 3 mLs into the lungs every 4 hours (while awake)    LEVEMIR FLEXTOUCH 100 UNIT/ML INJECTION PEN    inject 55 units subcutaneously nightly    LISINOPRIL (PRINIVIL;ZESTRIL) 5 MG TABLET    take 1 tablet by mouth once daily    METFORMIN (GLUCOPHAGE) 1000 MG TABLET    Take 1 tablet by mouth 2 times daily (with meals)    OMEPRAZOLE (PRILOSEC) 20 MG DELAYED RELEASE CAPSULE    Take 1 capsule by mouth every morning (before breakfast)    RIVAROXABAN (XARELTO) 10 MG TABS TABLET    Take 1 tablet by mouth daily (with breakfast) TRUEPLUS LANCETS 30G MISC    TEST 2 TIMES A DAY AND AS NEEED FOR SYMPTOMS OF IRREGULAR BLOOD GLUCOSE    UNIFINE PENTIPS 29G X 12MM MISC    USE Five times a DAILY WITH BASAGLAR       ALLERGIES     is allergic to benadryl [diphenhydramine]. FAMILY HISTORY     She indicated that her mother is alive. She indicated that her father is alive. She indicated that the status of her neg hx is unknown.     family history includes Breast Cancer (age of onset: 28) in her mother; Cancer (age of onset: 61) in her maternal uncle; Colon Cancer in her maternal uncle; Diabetes in her father, maternal uncle, and maternal uncle; Heart Attack in her maternal uncle; Heart Disease in her maternal uncle. SOCIAL HISTORY      reports that she quit smoking about 17 months ago. Her smoking use included cigarettes. She started smoking about 14 years ago. She has a 10.00 pack-year smoking history. She has never used smokeless tobacco. She reports that she does not drink alcohol and does not use drugs. PHYSICAL EXAM     INITIAL VITALS:  height is 5' 5\" (1.651 m) and weight is 117.9 kg (260 lb). Her oral temperature is 98.2 °F (36.8 °C). Her blood pressure is 149/92 (abnormal) and her pulse is 106. Her respiration is 18 and oxygen saturation is 98%. The patient is alert and oriented, in distress secondary to nausea and pain. HEENT is atraumatic. Pupils are PERRL at 4 mm. Mucous membranes moist.    Neck is supple. Heart sounds regular rate and rhythm with no gallops, murmurs, or rubs. Lungs clear, no wheezes, rales or rhonchi. Abdomen: soft, with tenderness noted in left lower quadrant. Active bowel sounds. Musculoskeletal exam shows no evidence of trauma. Normal distal pulses in all extremities. Skin: no rash or edema. Neurological exam reveals cranial nerves 2 through 12 grossly intact. Patient has equal  and normal deep tendon reflexes. Psychiatric: no hallucinations or suicidal ideation. Lymphatics. Hugo Jones No lymphadenopathy. DIFFERENTIAL DIAGNOSIS/ MDM:     Diverticulitis, colitis, ACS, UTI, renal colic    DIAGNOSTIC RESULTS     EKG: All EKG's are interpreted by the Emergency Department Physician who either signs or Co-signs this chart in the absence of a cardiologist.    Sinus 110 with nonspecific ST change. Axis XX 7, , QRS 84, . No change from January 18, 2022. RADIOLOGY:   I reviewed the radiologist interpretations:  CT ABDOMEN PELVIS W IV CONTRAST Additional Contrast? None   Final Result   No acute CT abnormality in the abdomen or pelvis. Hepatic steatosis. CT ABDOMEN PELVIS W IV CONTRAST Additional Contrast? None (Final result)  Result time 02/21/22 18:00:23  Final result by Areli Morales MD (02/21/22 18:00:23)                Impression:    No acute CT abnormality in the abdomen or pelvis. Hepatic steatosis. Narrative:    EXAMINATION:   CT OF THE ABDOMEN AND PELVIS WITH CONTRAST 2/21/2022 4:35 pm     TECHNIQUE:   CT of the abdomen and pelvis was performed with the administration of   intravenous contrast. Multiplanar reformatted images are provided for review. Dose modulation, iterative reconstruction, and/or weight based adjustment of   the mA/kV was utilized to reduce the radiation dose to as low as reasonably   achievable. COMPARISON:   CT of the abdomen and pelvis of 06/15/2020     HISTORY:   ORDERING SYSTEM PROVIDED HISTORY: Q pain   TECHNOLOGIST PROVIDED HISTORY:   LLQ pain     Decision Support Exception - unselect if not a suspected or confirmed   emergency medical condition->Emergency Medical Condition (MA)   Reason for Exam: LLQ abdominal pain     FINDINGS:   Lower Chest:  Visualized portion of the lower chest demonstrates no acute   abnormality. Organs: Diffusely decreased attenuation of the hepatic parenchyma.  No focal   lesion identified. Clovis Breach, spleen, adrenals, and pancreas are   unremarkable. No biliary ductal dilation. Kidneys enhance symmetrically. No   hydroureteronephrosis.  Subcentimeter hypodensity in the interpolar left   kidney is too small to definitively characterize. GI/Bowel: No bowel obstruction. Normal appendix in the right lower quadrant. Colonic diverticulosis without CT evidence of acute diverticulitis. Pelvis: Bladder is unremarkable.  Uterus is unremarkable for patient age. Ovaries are within normal limits for patient age. Pravin Sails volume of free fluid   within the pelvis is physiologic for patient of this age. Peritoneum/Retroperitoneum: Abdominal aorta is normal in course and caliber. The portal vein is patent.  No lymphadenopathy.  No ascites or free   intraperitoneal air. Bones/Soft Tissues: No acute soft tissue abnormality. No acute osseous   abnormality or suspicious osseous lesion.                       LABS:  Results for orders placed or performed during the hospital encounter of 02/21/22   Urinalysis with Reflex to Culture    Specimen: Urine, clean catch   Result Value Ref Range    Color, UA Yellow Yellow    Turbidity UA SLIGHTLY CLOUDY (A) Clear    Glucose, Ur 3+ (A) NEGATIVE    Bilirubin Urine NEGATIVE NEGATIVE    Ketones, Urine MODERATE (A) NEGATIVE    Specific Gravity, UA 1.015 1.005 - 1.030    Urine Hgb TRACE (A) NEGATIVE    pH, UA 5.5 5.0 - 8.0    Protein, UA NEGATIVE NEGATIVE    Urobilinogen, Urine Normal Normal    Nitrite, Urine NEGATIVE NEGATIVE    Leukocyte Esterase, Urine NEGATIVE NEGATIVE   CBC with Auto Differential   Result Value Ref Range    WBC 7.3 3.5 - 11.0 k/uL    RBC 4.93 4.0 - 5.2 m/uL    Hemoglobin 14.9 12.0 - 16.0 g/dL    Hematocrit 43.7 36 - 46 %    MCV 88.6 80 - 100 fL    MCH 30.2 26 - 34 pg    MCHC 34.1 31 - 37 g/dL    RDW 13.6 12.5 - 15.4 %    Platelets 911 520 - 469 k/uL    MPV 8.2 6.0 - 12.0 fL    Seg Neutrophils 83 (H) 36 - 66 %    Lymphocytes 9 (L) 24 - 44 %    Monocytes 5 2 - 11 %    Eosinophils % 3 1 - 4 %    Basophils 0 0 - 2 %    Segs Absolute 6.00 1.8 - 7.7 k/uL    Absolute Lymph # 0.70 (L) 1.0 - 4.8 k/uL    Absolute Mono # 0.30 0.1 - 1.2 k/uL    Absolute Eos # 0.30 0.0 - 0.4 k/uL    Basophils Absolute 0.00 0.0 - 0.2 k/uL   Comprehensive Metabolic Panel   Result Value Ref Range    Glucose 366 (H) 70 - 99 mg/dL    BUN 11 6 - 20 mg/dL    CREATININE 0.56 0.50 - 0.90 mg/dL    Calcium 9.3 8.6 - 10.4 mg/dL    Sodium 135 135 - 144 mmol/L    Potassium 3.9 3.7 - 5.3 mmol/L    Chloride 101 98 - 107 mmol/L    CO2 19 (L) 20 - 31 mmol/L    Anion Gap 15 9 - 17 mmol/L    Alkaline Phosphatase 81 35 - 104 U/L    ALT 42 (H) 5 - 33 U/L    AST 32 (H) <32 U/L    Total Bilirubin 1.46 (H) 0.3 - 1.2 mg/dL    Total Protein 7.2 6.4 - 8.3 g/dL    Albumin 4.4 3.5 - 5.2 g/dL    Albumin/Globulin Ratio 1.6 1.0 - 2.5    GFR Non-African American >60 >60 mL/min    GFR African American >60 >60 mL/min    GFR Comment         Lipase   Result Value Ref Range    Lipase 34 13 - 60 U/L   Amylase   Result Value Ref Range    Amylase 36 28 - 100 U/L   Troponin   Result Value Ref Range    Troponin, High Sensitivity <6 0 - 14 ng/L   Microscopic Urinalysis   Result Value Ref Range    -          WBC, UA 0 TO 2 0 - 5 /HPF    RBC, UA 0 TO 2 0 - 2 /HPF    Epithelial Cells UA 20 TO 50 0 - 5 /HPF    Bacteria, UA MANY (A) None    Other Observations UA (A) NOT REQ. Utilizing a urinalysis as the only screening method to exclude a potential uropathogen can be unreliable in many patient populations. Rapid screening tests are less sensitive than culture and if UTI is a clinical possibility, culture should be considered despite a negative urinalysis.          EMERGENCY DEPARTMENT COURSE:   Vitals:    Vitals:    02/21/22 1615   BP: (!) 149/92   Pulse: 106   Resp: 18   Temp: 98.2 °F (36.8 °C)   TempSrc: Oral   SpO2: 98%   Weight: 117.9 kg (260 lb)   Height: 5' 5\" (1.651 m)     -------------------------  BP: (!) 149/92, Temp: 98.2 °F (36.8 °C), Pulse: 106, Resp: 18      Re-evaluation Notes    The patient felt improvement after IV fluids and was able to tolerate oral hydration after antiemetics and pain medicine. I will write for medicine for home. There is no evidence of acute surgical process or infection such as diverticulitis. She may have a viral syndrome. The patient is discharged in good condition. Controlled Substance Monitoring:    Acute and Chronic Pain Monitoring:   RX Monitoring 5/29/2020   Periodic Controlled Substance Monitoring No signs of potential drug abuse or diversion identified. FINAL IMPRESSION      1. Gastroenteritis    2. Dehydration          DISPOSITION/PLAN   DISPOSITION Discharge - Pending Orders Complete 02/21/2022 06:43:30 PM      Condition on Disposition    good    PATIENT REFERRED TO:  Shane Morales PA-C  [address] [facility]  [address]  [address] [facility] [address] [phone]    In 3 days      your gastroenterologist    In 3 days        DISCHARGE MEDICATIONS:  New Prescriptions    LOPERAMIDE (RA ANTI-DIARRHEAL) 2 MG CAPSULE    Take 1 capsule by mouth 4 times daily as needed for Diarrhea    ONDANSETRON (ZOFRAN ODT) 4 MG DISINTEGRATING TABLET    Take 1 tablet by mouth every 8 hours as needed for Nausea    OXYCODONE-ACETAMINOPHEN (PERCOCET) 5-325 MG PER TABLET    Take 1 tablet by mouth every 6 hours as needed for Pain for up to 3 days. Intended supply: 3 days.  Take lowest dose possible to manage pain       (Please note that portions of this note were completed with a voice recognition program.  Efforts were made to edit the dictations but occasionally words are mis-transcribed.)    Marcia Bella MD,, MD   Attending Emergency Physician         Adriana Apple MD  02/21/22 2373       Adriana Apple MD  02/25/22 3023

## 2022-02-22 LAB
EKG ATRIAL RATE: 111 BPM
EKG P AXIS: 43 DEGREES
EKG P-R INTERVAL: 128 MS
EKG Q-T INTERVAL: 390 MS
EKG QRS DURATION: 84 MS
EKG QTC CALCULATION (BAZETT): 530 MS
EKG R AXIS: 27 DEGREES
EKG T AXIS: 48 DEGREES
EKG VENTRICULAR RATE: 111 BPM

## 2022-02-23 ENCOUNTER — TELEPHONE (OUTPATIENT)
Dept: PODIATRY | Age: 27
End: 2022-02-23

## 2022-03-02 ENCOUNTER — OFFICE VISIT (OUTPATIENT)
Dept: PODIATRY | Age: 27
End: 2022-03-02
Payer: COMMERCIAL

## 2022-03-02 VITALS — BODY MASS INDEX: 43.32 KG/M2 | WEIGHT: 260 LBS | HEIGHT: 65 IN

## 2022-03-02 DIAGNOSIS — Z98.890 POST-OPERATIVE STATE: Primary | ICD-10-CM

## 2022-03-02 DIAGNOSIS — M25.371 ANKLE INSTABILITY, RIGHT: ICD-10-CM

## 2022-03-02 DIAGNOSIS — R60.0 EDEMA OF LOWER EXTREMITY: ICD-10-CM

## 2022-03-02 DIAGNOSIS — S93.401A RUPTURE OF LIGAMENT OF ANKLE, RIGHT, INITIAL ENCOUNTER: ICD-10-CM

## 2022-03-02 PROCEDURE — 99999 PR OFFICE/OUTPT VISIT,PROCEDURE ONLY: CPT | Performed by: PODIATRIST

## 2022-03-02 PROCEDURE — L4350 ANKLE CONTROL ORTHO PRE OTS: HCPCS | Performed by: PODIATRIST

## 2022-03-02 NOTE — Clinical Note
[address] [address]  Phone: [phone]  Fax: [phone]    Damaris Alvarez        March 2, 2022     Patient: Rui Frias   YOB: 1995   Date of Visit: 3/2/2022       To Whom It May Concern: It is my medical opinion that [employer] {Work release (duty restriction):35391}. If you have any questions or concerns, please don't hesitate to call.     Sincerely,        Lissette Castillo DPM

## 2022-03-02 NOTE — LETTER
[address] [address]  Phone: [phone]  Fax: [phone]    Mario Ekaterina        March 2, 2022     Patient: Dinah Guy   YOB: 1995   Date of Visit: 3/2/2022       To Whom It May Concern: It is my medical opinion that Kelvin Kwan should remain out of work until 4/26/2022. If you have any questions or concerns, please don't hesitate to call.     Sincerely,        Elsa Lange DPM

## 2022-03-02 NOTE — PROGRESS NOTES
[address] Ankle  Post Op note  Chief Complaint   Patient presents with    Follow-up     right ankle, a lot of swelling        Sukhdev Block is a 27y.o. year old female who is 7 weeks post op from foot surgery. Type: arthroscopy ankle, tenosynovectomy peroneal tendons, repair ATFL right. Vital signs are stable. Pain level is 2-3. Patient denies N/V/F/C. Patient has not been compliant with postoperative instructions. Review of Systems    Vascular: DP and PT pulses palpable 2/4, Right Foot and 2/4 on the Left Foot. CFT <3 seconds, Right Foot and <3 seconds on the Left Foot. Edema is present,  Right Foot and absenton the Left Foot. Neurological:   Sensation present  to light touch to level of digits, both feet. Musculoskeletal:   Muscle strength is guarded/5 on the Right Foot and 5/5 on the Left Foot. Structural deformities are absent on the Right Foot and absent on the Left Foot. Integument:  Warm, dry, supple both feet. Incisions are healing well. Wound dehiscence is absent. Infection is absent. Assesment : Post operative progress gradually improving. Diagnosis Orders   1. Post-operative state  Elastic Bandages & Supports (JOBST KNEE HIGH COMPRESSION SM) MISC   2. Ankle instability, right  Elastic Bandages & Supports (JOBST KNEE HIGH COMPRESSION ) MISC   3. Rupture of ligament of ankle, right, initial encounter  Elastic Bandages & Supports (JOBST KNEE HIGH COMPRESSION SM) MISC   4. Edema of lower extremity  Elastic Bandages & Supports (JOBST KNEE HIGH COMPRESSION ) MISC         Plan: Pt was evaluated and examined. Patient was given personalized discharge instructions. Pt will follow up in 4 weeks or sooner if any problems arise. Diagnosis was discussed with the pt and all of their questions were answered in detail. Proper foot hygiene and care was discussed with the pt.   Patient to check feet daily and contact the office with any questions/problems/concerns. Other comorbidity noted and will be managed by PCP. CAM walker  weight bearing  Physical therapy is being prescribed for the patient at this time. It will be used to decrease pain and swelling as well as stiffness and to try to improve range of motion, muscle strength and localized circulation to allow pain-free ambulation. Long-term goal is an attempt to restore the patient back to maximum function and restorative potential is good to guarded. The patient will be sent for therapy 2-3 times a week until goals are achieved, and we will reassess in 3 weeks to determine the success of therapy plan at that time. Compression stockings    Dispensed Aircast Ankle Brace:  Due to the patient's diagnosis and related symptoms this is medically necessary for the treatment. The function of this device is to restrict and limit motion and provide stabilization and compression to the affected area. The goals and function of this device was explained in detail to the patient. Upon gait analysis, the device appeared to be fitting well and the patient states that the device is comfortable at this time. The patient was shown how to properly apply, wear, and care for the device. The patient was able to apply properly and ambulate without distress. At that time, the device was  dispensed, it was suitable for the patient's condition and was not substandard. No guarantees were given and the precautions were reviewed. Written instructions and warranty information was given along with the list of the twenty-one (21) Durable Medical Equipment Supplier Guidelines. All the questions were answered satisfactorily. Orders Placed This Encounter   Medications    Elastic Bandages & Supports (JOBST KNEE HIGH COMPRESSION SM) MISC     Sig: Dispense Bilateral Jobst Below Knee 20-30mmHg compression stockings     Dispense:  3 each     Refill:  0       Follow up 4 week(s).

## 2022-03-02 NOTE — LETTER
[facility]  [address] [facility] [address]  Phone: [phone]  Fax: [phone]    Glenda Marte    March 2, 2022     Joaquin Bowen, [address] [place] [address] [place] [address],[address]  [address] [facility] [address]    Patient: Veneta Kawasaki   MR Number: [id number]   YOB: 1995   Date of Visit: 3/2/2022       Dear Joaquin Bowen: Thank you for referring Naun Palm to me for evaluation/treatment. Below are the relevant portions of my assessment and plan of care. If you have questions, please do not hesitate to call me. I look forward to following Adelia Hyman along with you.     Sincerely,      Kaylee Sethi DPM

## 2022-03-03 ENCOUNTER — HOSPITAL ENCOUNTER (OUTPATIENT)
Dept: PHYSICAL THERAPY | Facility: CLINIC | Age: 27
Setting detail: THERAPIES SERIES
Discharge: HOME | End: 2022-03-03
Payer: COMMERCIAL

## 2022-03-03 PROCEDURE — 97161 PT EVAL LOW COMPLEX 20 MIN: CPT

## 2022-03-03 PROCEDURE — 97113 AQUATIC THERAPY/EXERCISES: CPT

## 2022-03-03 NOTE — FLOWSHEET NOTE
[] [facility]) [facility] &  Therapy  [address]  [phone]  F: [phone] [] [address]  [address]   [address]  P: [phone]  F: [phone] [] [place]  [address]  P: [phone]  F: [phone] [] [address]  P: [phone]  F: [phone] [] [address]  [facility]   [address]   [place]: [phone]  F: [phone]      Physical Therapy Daily Treatment Note    Date:  3/3/2022  Patient Name:  Naty Stewart    :  1995  MRN: [id number]  Physician:Alvina Mcnulty DPM                                       Insurance: [address] [facility] [address],  [place] 30TH VISIT   Medical Diagnosis:   L18.188 (ICD-10-CM) - Ankle instability, right   S93.401A (ICD-10-CM) - Rupture of ligament of ankle, right, initial encounter   Z98.890 (ICD-10-CM) - Post-operative state   S93.491A (ICD-10-CM) - Sprain of anterior talofibular ligament of right ankle, initial encounter      Rehab Codes: R26.2 difficulty walking, M62.81 muscle weakness, M25.571  Onset date: 1/10/22                           Next Dr's appt. : 22    Subjective:    Pain:  [x] Yes  [] No Location: R ankle Pain Rating: (0-10 scale) 7/10  Pain altered Tx:  [] No  [] Yes  Action:  Comments: Pt arrives from evaluation reporting high pain level which she states is her usual.     Objective:  Precautions: No inversion  KEY  B = Belt G = Gloves P= Paddles   C = Collar K = Kickboard T = Theratube   DB = Dumbells N = Noodle W = Weights     Exercises/Activities  Warm-up/Amb 3/3 Dynamic Exercises 3/3   Forward 4L 2N March 2L 2N   Sideways 3L 2N Squat    Backwards Next Retro HS curls      Retro SLR    Stretches  Braiding    Gastroc/Soleus Heel to Toe amb    Hamstring  Toe amb    Hip flexor  Heel amb    Piriformis      SKTC      Pec Stretch      Post Deltoid  Static Exercises UE      Shoulder flex/ext    Static Exercises LE  Shoulder abd/add    Heel/toe raises HELD Shoulder H.  abd/add    Marches 15 Shoulder IR/ER    Mini-squats 15 Rowing    4-way hip  15 Arm Circles    Hamstring curls 15 UT shrugs/rolls    Hip Circles 15 Scap squeezes    Ankle ROM  Diagonals 1/2    Lunges   Elbow flex/ext      Pron/Sup    Functional Exercise  Wrist AROM    Step      Wall Push-ups  Deep H20/    SLS  Bike    Tandem Stance 4x20\"     Breast Stroke on Noodle  Hip abd/add    Noodle Twist  Hip flex/ext    Noodle Push down  Hip IR/ER    Kickboard push/pull  Knee flex/ext      Push/pull on [employer] 10m   Other:      Treatment Charges: Mins Units   []  Modalities     []  Ther Exercise     []  Manual Therapy     []  Ther Activities     [x]  Aquatics 30 2   []  Vasocompression     []  Other         Assessment: [x] Progressing toward goals. Initiated aquatic ex as noted in log above with fair tolerance. Education provided on proper depth to complete ex in for the most benefit along with demonstration of all. Utilized 2 noodles with ambulation to aide in stability and to offload RLE. Emphasized heel strike and toe off with poor follow through. Facial grimacing noted throughout with occasional winces d/t shooting pain. B UE support utilized for all static LE ex. Ended session with deep water hang, which pt reports did provide some pain relief. [] No change. [] Other:  [x] Patient would continue to benefit from skilled physical therapy services in order to:     STG/LTG    Pt. Education:  [x] Yes  [] No  [] Reviewed Prior HEP/Ed  Method of Education: [x] Verbal  [x] Demo  [] Written  Comprehension of Education:  [x] Verbalizes understanding. [x] Demonstrates understanding. [] Needs review. [] Demonstrates/verbalizes HEP/Ed previously given.      Plan: [x] Continue current frequency toward long and short term goals.     [] Specific Instructions for subsequent treatments:       Time In: 4:55 pm          Time Out: 5:45 pm    Electronically signed by:  Andressa Plata PTA

## 2022-03-03 NOTE — CONSULTS
[facility] [address] omicMeet Rodriges  P: [phone]  F: [phone]     Physical Therapy Lower Extremity Evaluation    Date:  3/3/2022  Patient: Favio May  : 1995  MRN: [id number]  Physician:Alvina Encarnacion DPM      Insurance: [address] [place] [address] [address],   AUTH AFTER 30TH VISIT   Medical Diagnosis:   Z80.707 (ICD-10-CM) - Ankle instability, right   S93.401A (ICD-10-CM) - Rupture of ligament of ankle, right, initial encounter   Z98.890 (ICD-10-CM) - Post-operative state   S93.491A (ICD-10-CM) - Sprain of anterior talofibular ligament of right ankle, initial encounter     Rehab Codes: R26.2 difficulty walking, M62.81 muscle weakness, M25.571  Onset date: 1/10/22   Next Dr's appt. : 22    Subjective:   CC/HPI:  Patient injured foot stepping into a hole. Patient was NWB x6wks then placed in a CAM boot and states she was told she could walk in CAM boot and wean to a shoe wearing an ankle brace    Surgery: 1/10/22   1. Ankle arthroscopy with extensive debridement, right  2. Release of peroneal tendon/tenosynovectomy, right ankle  3. Secondary repair of lateral collateral ankle ligament, right    Pain present?  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     PMHx: [] Unremarkable [x] Diabetes [x] HTN  [] Pacemaker   [x] MI/Heart Problems irregular heart rhythum [] Cancer [] Arthritis   [x] Other:Panic attacks, anxiety, asthma          [x] Refer to full medical chart  In EPIC       Medications:  [x] Refer to full medical record [] None [] Other:  Allergies:       [x] Refer to full medical record [] None [] Other: OBSERVATION No Deficit Deficit Comments   Posture      Forward Head [] [x]    Rounded Shoulders [] [x]    Kyphosis [] []    Lordosis [] []    Genu Valgus [] []    Genu Varus [] []    Genu Recurvatum [] []    Cavo Varus Foot [] []    Neutral Foot [] []    [place] Valgus Foot [] []    Gastroc Equinus [] []    Hallux Valgus [] []    Pronation [] []    Supination [] []    Leg Length Discrp [] []        Objective:    ROM  ° A/P STRENGTH    Left Right Left Right   Hip Flex       Ext       ER       IR       ABD       ADD       Knee Flex       Ext       Ankle PF       DF  (knee straight) -10 -22     DF   (knee flexed) -5 -18     Inv       Ever       1st MTP DF 15 20     1st MTP PF             TTP   Incisions all foot palpation   Callus Pattern      Sensation [] []    Edema [] [] Fig 8=L 53cm  R 53.5cm   Neurological [] []     [] []     [] []    Gait [] [] Analysis:antalgic with CAM boot       Discoloration R compared to L    Foot/Ankle Mobility  Left  Right    Talocrural Jt  dec    Subtalar Jt  nt    1st MTP Jt   dec    Forefoot  dec    Midfoot   dec        FUNCTION Normal Difficult Unable   Sitting [x] [] []   Standing [] [x] []   Ambulation [] [x] []   Groom/Dress [] [x] []   Lift/Carry [] [x] []   Stairs [] [] [x]   Bending [] [] [x]   Squat [] [] [x]   Kneel [] [] [x]     Comments:LEFI=40/80    Assessment:  Patient presents to physical therapy post op  above procedures demonstrating gait and functional deficits, intrinsic and extrinsic muscle weakness and is reporting high pain. STG: (to be met in 6 treatments)  1. ? Pain: Patient to report 5/10 pain at wost  2. ? ROM:  3. ? Strength: Patient to demonstrate te ability to maintain 1st ray pf with intrinsic and extrinsic  strengthening  4. ? Function: Patient to demonstrate the ability to ambulate in CAM bot with neutral pattern  5. Independent with Home Exercise Programs  6.  Demonstrate Knowledge of fall prevention    LTG: (to be met in 12 treatments)  1. Patient to report 3/10 pain at worst  2. Patient to demonstrate TCJ DF -10 degrees knee extended  3. Patient to demonstrate the ability to ambulate in a shoe with ankle brace with a neutral pattern  4. Patient to report 60/80 on LEFI                   Patient goals:to improve strength and return to work    Rehab Potential:  [x] Good  [] Fair  [] Poor   Suggested Professional Referral:  [x] No  [] Yes:  Barriers to Goal Achievement[de-identified]  [x] No  [] Yes:  Domestic Concerns:  [x] No  [] Yes:    Pt. Education:  [x] Plans/Goals, Risks/Benefits discussed  [x] Home exercise program    Method of Education: [] Verbal  [] Demo  [] Written  Comprehension of Education:  [] Verbalizes understanding. [] Demonstrates understanding. [] Needs Review. [] Demonstrates/verbalizes understanding of HEP/Ed previously given.     Treatment Plan:  [x] Therapeutic Exercise    [x] Aquatic Therapy   [x] Manual Therapy     [] Electrical Stimulation  [x] Instruction in HEP      [] Lumbar/Cervical Traction  [x] Neuromuscular Re-education [] Cold/hotpack  [] Iontophoresis: 4 mg/mL  [x] Vasocompression ([employer])                    Dexamethasone Sodium  [x] Gait Training             Phosphate 40-80 mAmin         Frequency:  2 x/week for 12 visits    Todays Treatment:  Precautions: Avoid inversion x12wks  Exercises:  Exercise   Reps/ Time Weight/ Level Comments     Calf stetch      PT edu   Importance of avoiding inversion foot position      ADD NEXT   Lift/splay reach      Toe yoga      Short foot      1st MTP press                  Other:Manual STR gastroc/soleus, 1st MTP,     Specific Instructions for next treatment: Begin aquatics    Evaluation Complexity:  History (Personal factors, comorbidities) [x] 0 [] 1-2 [] 3+   Exam (limitations, restrictions) [x] 1-2 [] 3 [] 4+   Clinical presentation (progression) [x] Stable [] Evolving  [] Unstable   Decision Making [x] Low [] Moderate [] High    [x] Low Complexity [] Moderate Complexity [] High Complexity       Treatment Charges: Mins Units   [x] Evaluation       [x]  Low       []  Moderate       []  High 45 1   []  Modalities     []  Ther Exercise     []  Manual Therapy     []  Ther Activities     []  Aquatics     []  Vasocompression     []  Other       TOTAL TREATMENT TIME: 45    Time in: 4pm  Time Out: 5pm    Electronically signed by: Shell Mendoza PT        Physician Signature:________________________________Date:__________________  By signing above or cosigning this note, I have reviewed this plan of care and certify a need for medically necessary rehabilitation services.      *PLEASE SIGN ABOVE AND FAX BACK ALL PAGES*

## 2022-03-08 NOTE — DISCHARGE INSTR - ACTIVITY
[x]  No lifting more than five to ten pounds 2 weeks  [x]  May Shower tomorrow  [x]  No driving until off pain medications
[de-identified] : His exam reveals no significant changes with regard to his cervical spine with reasonable motion present of pain at the limits.  Spasm is noted on both sides more so on the right no trigger points.  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  He has reasonable motion to the shoulder though there is pain at the limits and it is to be noted he does have mild limitations of full abduction and forward flexion.  His strength is acceptable.  There is obvious clicking on both active and passive motion no instability on stress of the glenohumeral joint.

## 2022-03-09 ENCOUNTER — HOSPITAL ENCOUNTER (OUTPATIENT)
Dept: PHYSICAL THERAPY | Facility: CLINIC | Age: 27
Setting detail: THERAPIES SERIES
Discharge: HOME | End: 2022-03-09
Payer: COMMERCIAL

## 2022-03-09 PROCEDURE — 97110 THERAPEUTIC EXERCISES: CPT

## 2022-03-09 PROCEDURE — 97113 AQUATIC THERAPY/EXERCISES: CPT

## 2022-03-09 PROCEDURE — 97140 MANUAL THERAPY 1/> REGIONS: CPT

## 2022-03-09 NOTE — FLOWSHEET NOTE
[] [facility]) - [facility] &  Therapy  [address]  [phone]  F: [phone] [] [address]  [facility]   [address]  P: [phone]  F: [phone] [] [address] &  Therapy  [address]  P: [phone]  F: [phone] [] [address]  P: [phone]  F: [phone] [] [address]  [facility]   [address]   [place]: [phone]  F: [phone]      Physical Therapy Daily Treatment Note    Date:  3/9/2022  Patient Name:  Tank Araujo    :  1995  MRN: [id number]   Physician:Alvina Orona, [facility]                                       Insurance: [address] [facility] [address],  [place] 30TH VISIT   Medical Diagnosis:   P30.868 (ICD-10-CM) - Ankle instability, right   S93.401A (ICD-10-CM) - Rupture of ligament of ankle, right, initial encounter   Z98.890 (ICD-10-CM) - Post-operative state   S93.491A (ICD-10-CM) - Sprain of anterior talofibular ligament of right ankle, initial encounter      Rehab Codes: R26.2 difficulty walking, M62.81 muscle weakness, M25.571  Onset date: 1/10/22                           Next 's appt. : 22  Visit# / total visits:     Cancels/No Shows: 0    Subjective:    Pain:  [x] Yes  [] No Location: Pain Rating: (0-10 scale) -/10  Pain altered Tx:  [] No  [] Yes  Action:  Comments: Patient reports compliance with HEP    Objective:  Modalities:   Precautions: Avoid inversion  Exercises:  Exercise    Reps/ Time Weight/ Level Comments      Calf stetch 2x60\"       LAX rolling 2x90'      gr toe stretch on ball  90'      Lift/splay reach  20       Toe yoga      ADD NEXT   Short foot      ADD NEXT   1st MTP press  20  lime                         Other:Manual STR gastroc/soleus     Specific Instructions for next treatment:      Treatment Charges: Mins Units   []  Modalities     [x]  Ther Exercise 30 2   [x]  Manual Therapy 15 1   []  Ther Activities     []  Aquatics     []  Vasocompression     []  Other     Total Treatment time 45 3       Assessment: [] Progressing toward goals. [] No change. [x] Other: Began treatment with STR to gastroc and soleus to reduce   [] Patient would continue to benefit from skilled physical therapy services in order to: improve functional mobility and strength. Followed with intrinsic activation. Patient with difficulty maintaining tripod foot position and biases toward inversion. Multi VC's used to improve positioning       STG: (to be met in 6 treatments)  1. ? Pain: Patient to report 5/10 pain at wost  2. ? ROM:  3. ? Strength: Patient to demonstrate te ability to maintain 1st ray pf with intrinsic and extrinsic  strengthening  4. ? Function: Patient to demonstrate the ability to ambulate in CAM bot with neutral pattern  5. Independent with Home Exercise Programs  6. Demonstrate Knowledge of fall prevention     LTG: (to be met in 12 treatments)  1. Patient to report 3/10 pain at worst  2. Patient to demonstrate TCJ DF -10 degrees knee extended  3. Patient to demonstrate the ability to ambulate in a shoe with ankle brace with a neutral pattern  4. Patient to report 60/80 on LEFI                    Patient goals:to improve strength and return to work     Pt. Education:  [x] Yes  [] No  [] Reviewed Prior HEP/Ed  Method of Education: [] Verbal  [] Demo  [] Written  Comprehension of Education:  [] Verbalizes understanding. [] Demonstrates understanding. [] Needs review. [] Demonstrates/verbalizes HEP/Ed previously given. Plan: [x] Continue current frequency toward long and short term goals.           Time In:2pm            Time Out: 245pm    Electronically signed by:  Jojo Leroy, PT

## 2022-03-09 NOTE — FLOWSHEET NOTE
[] [facility]) [facility] &  Therapy  [address]  [phone]  F: [phone] [] [address]  [address]   [address]  P: [phone]  F: [phone] [] [address] &  Therapy  [address] [facility]  P: [phone]  F: [phone] [] [address]  P: [phone]  F: [phone] [] [address]  [facility]   [address]   [place]: [phone]  F: [phone]      Physical Therapy Daily Treatment Note    Date:  3/9/2022  Patient Name:  Agueda Gary    :  1995  MRN: [id number]  Physician:Alvina Ham DPM                                       Insurance: [address] [place] [address],  [facility] 30TH VISIT   Medical Diagnosis:   V03.228 (ICD-10-CM) - Ankle instability, right   S93.401A (ICD-10-CM) - Rupture of ligament of ankle, right, initial encounter   Z98.890 (ICD-10-CM) - Post-operative state   S93.491A (ICD-10-CM) - Sprain of anterior talofibular ligament of right ankle, initial encounter      Rehab Codes: R26.2 difficulty walking, M62.81 muscle weakness, M25.571  Onset date: 1/10/22                           Next 's appt. : 22  Visit #/Total Visits:    Cancel/No Show:     Subjective:    Pain:  [x] Yes  [] No Location: R ankle Pain Rating: (0-10 scale) -/10  Pain altered Tx:  [] No  [] Yes  Action:  Comments: Pt cont to arrive with antalgic gait, no pain rating given. Objective:  Precautions: No inversion, No toe curls or towel crunch type exercises  Aquatic focus is gait training and WB tolerance. Foot and ankle stabilization to be completed on land.   KEY  B = Belt G = Gloves P= Paddles   C = Collar K = Kickboard T = Theratube   DB = Dumbells N = Noodle W = Weights Exercises/Activities  Warm-up/Amb 3/9 Dynamic Exercises 3/9   Forward 3L 2N March NT   Sideways 3L 2N Squat    Backwards 3L 2N Retro HS curls      Retro SLR    Stretches  Standing BAPS PF/DF only x20   Gastroc/Soleus 3x30\" @ railing      Hamstring  Toe Lifts x20 maintain tripod    Hip flexor  Toe Spreading x20 seated    Piriformis  Toe Yoga      SKTC  Toe Scrunches  x20   Pec Stretch  Aqua glove desensitization  3'    Post Deltoid  Static Exercises UE      Shoulder flex/ext    Static Exercises LE  Shoulder abd/add    Heel/toe raises HELD Shoulder H.  abd/add    Marches 15 Shoulder IR/ER    Mini-squats 15 Rowing    4-way hip  15 Arm Circles    Hamstring curls 15 UT shrugs/rolls    Hip Circles 15 Scap squeezes    Ankle ROM  Diagonals 1/2    Lunges   Elbow flex/ext      Pron/Sup    Functional Exercise  Wrist AROM    Step      Wall Push-ups  Deep H20/    SLS  Bike    Tandem Stance HELD     Breast Stroke on Noodle  Hip abd/add    Noodle Twist  Hip flex/ext    Noodle Push down  Hip IR/ER    Kickboard push/pull  Knee flex/ext      Push/pull on [employer] 10m   Other:      Treatment Charges: Mins Units   []  Modalities     []  Ther Exercise     [x]  Manual Therapy 5 0   []  Ther Activities     [x]  Aquatics 30 2   []  Vasocompression     []  Other         Assessment: [x] Progressing toward goals. Cont with warm up laps, adding backwards and visual/verbal cueing to remain mindful of tripod points on contact on ground. Cont wincing and cries of pain with no provocative movements throughout warm up amb. Initiated and educated pt on desensitization, good relief of pain in lateral portions and sole of foot noted. Completed intrinsic foot/ankle strengthening with fair tolerance. Pt noted some difficulty in coordination with toes during scrunching ex. Provided verbal encouragement to cont with strengthening for promotion of neuromm coordination. Concluded tx with deep water hang and stretching for pain relief.        [] No change. [] Other:                             [x]  Patient would continue to benefit from skilled physical therapy services in order to: improve gait mechanics, reduce pain and inc functional strength    STG: (to be met in 6 treatments)  ? Pain: Patient to report 5/10 pain at wost  ? ROM:  ? Strength: Patient to demonstrate te ability to maintain 1st ray pf with intrinsic and extrinsic  strengthening  ? Function: Patient to demonstrate the ability to ambulate in CAM bot with neutral pattern  Independent with Home Exercise Programs  Demonstrate Knowledge of fall prevention     LTG: (to be met in 12 treatments)  Patient to report 3/10 pain at worst  2. Patient to demonstrate TCJ DF -10 degrees knee extended  3. Patient to demonstrate the ability to ambulate in a shoe with ankle brace with a neutral pattern  4. Patient to report 60/80 on LEFI    Pt. Education:  [x] Yes  [] No  [] Reviewed Prior HEP/Ed  Method of Education: [x] Verbal  [x] Demo  [] Written  Comprehension of Education:  [x] Verbalizes understanding. [x] Demonstrates understanding. [] Needs review. [] Demonstrates/verbalizes HEP/Ed previously given. Plan: [x] Continue current frequency toward long and short term goals.     [] Specific Instructions for subsequent treatments:       Time In:  3:00 pm          Time Out: 3:50 pm    Electronically signed by:  Sally Romero PTA

## 2022-03-10 RX ORDER — CALCIUM CITRATE/VITAMIN D3 200MG-6.25
TABLET ORAL
Qty: 100 STRIP | Refills: 11 | OUTPATIENT
Start: 2022-03-10

## 2022-03-11 ENCOUNTER — HOSPITAL ENCOUNTER (OUTPATIENT)
Dept: PHYSICAL THERAPY | Facility: CLINIC | Age: 27
Setting detail: THERAPIES SERIES
End: 2022-03-11
Payer: COMMERCIAL

## 2022-03-11 ENCOUNTER — HOSPITAL ENCOUNTER (OUTPATIENT)
Dept: PHYSICAL THERAPY | Facility: CLINIC | Age: 27
Setting detail: THERAPIES SERIES
Discharge: HOME | End: 2022-03-11
Payer: COMMERCIAL

## 2022-03-11 NOTE — FLOWSHEET NOTE
[] [facility]) - [facility] &  Therapy  [address]    [phone]  F: [phone]   [] [address]  [facility] 36   [address]  P: [phone]  F: [phone]  [] [address] [facility] &  Therapy  [address] [facility]  P: [phone]  F: [phone] [] [address]  P: [phone]  F: [phone]  [] [address]  [facility]   [address]   [place]: [phone]  F: [phone]   [] [facility]  [address] [facility] [address]  [place]: [phone]   F: [phone]     Physical Therapy Cancel/No Show note    Date: 3/11/2022  Patient: Isac Fung  : 1995  MRN: [id number]    Cancels/No Shows to date:     For today's appointment patient:    [x]  Cancelled    [] Rescheduled appointment    [] No-show     Reason given by patient:    []  Patient ill    []  Conflicting appointment    [] No transportation      [] Conflict with work    [] No reason given    [] Weather related    [] COVID-19    [x] Other:      Comments:        [x] Next appointment was confirmed    Electronically signed by: José Luis Lemos PTA

## 2022-03-14 ENCOUNTER — HOSPITAL ENCOUNTER (OUTPATIENT)
Dept: PHYSICAL THERAPY | Facility: CLINIC | Age: 27
Setting detail: THERAPIES SERIES
End: 2022-03-14
Payer: COMMERCIAL

## 2022-03-14 ENCOUNTER — HOSPITAL ENCOUNTER (OUTPATIENT)
Dept: PHYSICAL THERAPY | Facility: CLINIC | Age: 27
Setting detail: THERAPIES SERIES
Discharge: HOME | End: 2022-03-14
Payer: COMMERCIAL

## 2022-03-14 NOTE — FLOWSHEET NOTE
[] [address] [facility] &  Therapy  [address]    [phone]  F: [phone]   [] [address] [facility] 36   [address]  P: [phone]  F: [phone]  [] [address] &  Therapy  [address]  P: [phone]  F: [phone] [] [address]  P: [phone]  F: [phone]  [] [address]  [facility]   [address]   [place]: [phone]  F: [phone]   [] [address]  [place]: [phone]   F: [phone]     Physical Therapy Cancel/No Show note    Date: 3/14/2022  Patient: Clementina Black  : 1995  MRN: [id number]    Cancels/No Shows to date:     For today's appointment patient:    [x]  Cancelled    [] Rescheduled appointment    [] No-show     Reason given by patient:    []  Patient ill    []  Conflicting appointment    [x] No transportation      [] Conflict with work    [] No reason given    [] Weather related    [] [id number]    [x] Other:      Comments:  Family emergency      [x] Next appointment was confirmed    Electronically signed by: Brayden Putnam PT

## 2022-03-28 ENCOUNTER — HOSPITAL ENCOUNTER (OUTPATIENT)
Dept: PHYSICAL THERAPY | Facility: CLINIC | Age: 27
Setting detail: THERAPIES SERIES
Discharge: HOME | End: 2022-03-28
Payer: COMMERCIAL

## 2022-03-28 PROCEDURE — 97110 THERAPEUTIC EXERCISES: CPT

## 2022-03-28 PROCEDURE — 97113 AQUATIC THERAPY/EXERCISES: CPT

## 2022-03-28 PROCEDURE — 97140 MANUAL THERAPY 1/> REGIONS: CPT

## 2022-03-28 NOTE — FLOWSHEET NOTE
[] [facility] [address]  [address]  [phone]  F: [phone] [] [address]  [facility] 36   [address]  P: [phone]  F: [phone] [x] [address] &  Therapy  [address] [facility]  P: [phone]  F: [phone] [] [address]  P: [phone]  F: [phone] [] [address]  [facility]   [address]   [place]: [phone]  F: [phone]      Physical Therapy Daily Treatment Note    Date:  3/28/2022  Patient Name:  Kylee Beach    :  1995  MRN: [id number]   Physician:Alvina Talley DPM                                       Insurance: [address] [facility] [address],  [place] 30TH VISIT   Medical Diagnosis:   Z72.161 (ICD-10-CM) - Ankle instability, right   S93.401A (ICD-10-CM) - Rupture of ligament of ankle, right, initial encounter   Z98.890 (ICD-10-CM) - Post-operative state   S93.491A (ICD-10-CM) - Sprain of anterior talofibular ligament of right ankle, initial encounter      Rehab Codes: R26.2 difficulty walking, M62.81 muscle weakness, M25.571  Onset date: 1/10/22                           Next 's appt. : 22  Visit# / total visits: 3/12    Cancels/No Shows: 0    Subjective:    Pain:  [x] Yes  [] No Location: Pain Rating: (0-10 scale) 6 /10  Pain altered Tx:  [] No  [] Yes  Action:  Comments: lateral ankle with burning sensation    Objective:  Modalities:   Precautions: Avoid inversion x12wks  Surgery: 1/10/22           1. Ankle arthroscopy with extensive debridement, right  2. Release of peroneal tendon/tenosynovectomy, right ankle  3.  Secondary repair of lateral collateral ankle ligament, right     Exercises:  Exercise    Reps/ Time Weight/ Level Comments       Calf stetch 2x60\"     x LAX rolling 2x90'   x   gr toe stretch on ball  90'    x   Lift/splay reach  20        Toe yoga 20    x   Short foot  x10     x   1st MTP press  20  lime  1st, 2-4, 5th x                         Other:Manual STR gastroc/soleus     Specific Instructions for next treatment: maintaining tripod position exercises     Treatment Charges: Mins Units   []  Modalities     [x]  Ther Exercise 30 2   [x]  Manual Therapy 10 1   []  Ther Activities     []  Aquatics     []  Vasocompression     []  Other     Total Treatment time 40 3       Assessment: [] Progressing toward goals. [] No change. [x] Other: Began treatment with STR to gastroc and soleus to reduce posterior compartment tissue tightness and improve TCJ mobility. Patient continues to have difficulty maintaining tripod position and VC's were used to improve position. Patient encouraged to avoid cross leg sitting to reduce medial compartment tightness and limiting EV. [x] Patient would continue to benefit from skilled physical therapy services in order to: improve functional mobility and strength. STG: (to be met in 6 treatments)  4. ? Pain: Patient to report 5/10 pain at wost  5. ? ROM:  6. ? Strength: Patient to demonstrate te ability to maintain 1st ray pf with intrinsic and extrinsic  strengthening  7. ? Function: Patient to demonstrate the ability to ambulate in CAM bot with neutral pattern  8. Independent with Home Exercise Programs  9. Demonstrate Knowledge of fall prevention     LTG: (to be met in 12 treatments)  1. Patient to report 3/10 pain at worst  2. Patient to demonstrate TCJ DF -10 degrees knee extended  3. Patient to demonstrate the ability to ambulate in a shoe with ankle brace with a neutral pattern  4. Patient to report 60/80 on LEFI                    Patient goals:to improve strength and return to work     Pt.  Education:  [x] Yes  [] No  [] Reviewed Prior HEP/Ed  Method of Education: [] Verbal  [] Demo  [] Written  Comprehension of Education:  [] Verbalizes understanding. [] Demonstrates understanding. [] Needs review. [] Demonstrates/verbalizes HEP/Ed previously given. Plan: [x] Continue current frequency toward long and short term goals.           Time In: 545pm            Time Out: 630pm    Electronically signed by:  Kitty Jade PT

## 2022-03-28 NOTE — FLOWSHEET NOTE
[] [facility]) - [facility] - [facility] &  Therapy  [address]  [phone]  F: [phone] [] [address]  [address]   [address]  P: [phone]  F: [phone] [] [address] &  Therapy  [address]  P: [phone]  F: [phone] [] [address]  P: [phone]  F: [phone] [] [address]  [facility]   [address]   [facility] [phone]  F: [phone]      Physical Therapy Daily Treatment Note    Date:  3/28/2022  Patient Name:  Obed Irwin    :  1995  MRN: [id number]  Physician:Alvina Nam DPM                                       Insurance: [address] [facility] [address] [facility], 37 / [address] [facility] 30TH VISIT   Medical Diagnosis:   U74.516 (ICD-10-CM) - Ankle instability, right   S93.401A (ICD-10-CM) - Rupture of ligament of ankle, right, initial encounter   Z98.890 (ICD-10-CM) - Post-operative state   S93.491A (ICD-10-CM) - Sprain of anterior talofibular ligament of right ankle, initial encounter      Rehab Codes: R26.2 difficulty walking, M62.81 muscle weakness, M25.571  Onset date: 1/10/22                           Next 's appt. : 22  Visit #/Total Visits: 3/12   Cancel/No Show:     Subjective:    Pain:  [] Yes  [x] No Location: R ankle Pain Rating: (0-10 scale) -/10  Pain altered Tx:  [x] No  [] Yes  Action:  Comments: Pt with no pain at tx time, however had pain last night. Objective:  Precautions: No inversion, No toe curls or towel crunch type exercises  Aquatic focus is gait training and WB tolerance. Foot and ankle stabilization to be completed on land.   KEY  B = Belt G = Gloves P= Paddles   C = Collar K = Kickboard T = Theratube   DB = Dumbells N = Noodle W = Weights Exercises/Activities  Warm-up/Amb 3/28 Dynamic Exercises 3/28   Forward 3L N March 3L 2N   Sideways 3L 2N Squat    Backwards 3L 2N Retro HS curls      Retro SLR    Stretches  Standing BAPS    Gastroc/Soleus 3x30\" @ railing      Hamstring  Toe Lifts def   Hip flexor  Toe Spreading def   Piriformis  Toe Yoga  def   SKTC  Toe Scrunches  NO   Pec Stretch  Aqua glove desensitization  def   Post Deltoid  Static Exercises UE      Shoulder flex/ext    Static Exercises LE  Shoulder abd/add    Heel/toe raises HELD Shoulder H.  abd/add    Marches 20 Shoulder IR/ER    Mini-squats 20 Rowing    4-way hip  20 Arm Circles    Hamstring curls 20 UT shrugs/rolls    Hip Circles NT Scap squeezes    Ankle ROM  Diagonals 1/2    Lunges   Elbow flex/ext      Pron/Sup    Functional Exercise  Wrist AROM    Step      Wall Push-ups  Deep H20/    SLS  Bike    Tandem Stance HELD     Breast Stroke on Noodle  Hip abd/add    Noodle Twist  Hip flex/ext    Noodle Push down  Hip IR/ER    Kickboard push/pull  Knee flex/ext      Push/pull on [employer] 5m   Other:      Treatment Charges: Mins Units   []  Modalities     []  Ther Exercise     [x]  Manual Therapy     []  Ther Activities     [x]  Aquatics 30 2   []  Vasocompression     []  Other         Assessment: [x] Progressing toward goals. Cont gait training in chest high water with 1-2 noodles. Backwards amb and marching more challenging for pt and required 2 noodles for balance. Pt with better celeste to WB ex today with no facial grimace and only 1 time where she had a sharp pain. Due to land tx immediately after pool, no progressions today. Plan to add to dynamic and functional ex and deep water ex for overall conditioning. [] No change.      [] Other:                             [x]  Patient would continue to benefit from skilled physical therapy services in order to: improve gait mechanics, reduce pain and inc functional strength    STG: (to be met in 6 treatments)  1. ? Pain: Patient to report 5/10 pain at wost  2. ? ROM:  3. ? Strength: Patient to demonstrate te ability to maintain 1st ray pf with intrinsic and extrinsic  strengthening  4. ? Function: Patient to demonstrate the ability to ambulate in CAM bot with neutral pattern  5. Independent with Home Exercise Programs  6. Demonstrate Knowledge of fall prevention     LTG: (to be met in 12 treatments)  1. Patient to report 3/10 pain at worst  2. Patient to demonstrate TCJ DF -10 degrees knee extended  3. Patient to demonstrate the ability to ambulate in a shoe with ankle brace with a neutral pattern  4. Patient to report 60/80 on LEFI    Pt. Education:  [x] Yes  [] No  [] Reviewed Prior HEP/Ed  Method of Education: [x] Verbal  [x] Demo  [] Written  Comprehension of Education:  [x] Verbalizes understanding. [x] Demonstrates understanding. [] Needs review. [] Demonstrates/verbalizes HEP/Ed previously given. Plan: [x] Continue current frequency toward long and short term goals. [x] Specific Instructions for subsequent treatments: Dynamic retro HS curls, lat march, step up in chest high water, deep water bike as celeste by pt.       Time In:  4:35 pm          Time Out: 5:20   pm    Electronically signed by:  Patti Newsome PTA

## 2022-04-03 ENCOUNTER — HOSPITAL ENCOUNTER (EMERGENCY)
Age: 27
Discharge: HOME | End: 2022-04-03
Attending: EMERGENCY MEDICINE
Payer: COMMERCIAL

## 2022-04-03 VITALS
HEART RATE: 86 BPM | WEIGHT: 260 LBS | SYSTOLIC BLOOD PRESSURE: 135 MMHG | TEMPERATURE: 97.7 F | OXYGEN SATURATION: 98 % | HEIGHT: 66 IN | DIASTOLIC BLOOD PRESSURE: 91 MMHG | RESPIRATION RATE: 16 BRPM | BODY MASS INDEX: 41.78 KG/M2

## 2022-04-03 DIAGNOSIS — L29.2 VULVAR ITCHING: ICD-10-CM

## 2022-04-03 DIAGNOSIS — R10.2 VULVAR PAIN: Primary | ICD-10-CM

## 2022-04-03 LAB
ABSOLUTE EOS #: 0.2 K/UL (ref 0–0.4)
ABSOLUTE LYMPH #: 2.9 K/UL (ref 1–4.8)
ABSOLUTE MONO #: 0.6 K/UL (ref 0.1–1.2)
ALBUMIN SERPL-MCNC: 4.1 G/DL (ref 3.5–5.2)
ALBUMIN/GLOBULIN RATIO: 1.5 (ref 1–2.5)
ALP BLD-CCNC: 80 U/L (ref 35–104)
ALT SERPL-CCNC: 29 U/L (ref 5–33)
ANION GAP SERPL CALCULATED.3IONS-SCNC: 13 MMOL/L (ref 9–17)
AST SERPL-CCNC: 15 U/L
BASOPHILS # BLD: 1 % (ref 0–2)
BASOPHILS ABSOLUTE: 0.1 K/UL (ref 0–0.2)
BILIRUB SERPL-MCNC: 0.82 MG/DL (ref 0.3–1.2)
BILIRUBIN URINE: NEGATIVE
BUN BLDV-MCNC: 11 MG/DL (ref 6–20)
CALCIUM SERPL-MCNC: 8.8 MG/DL (ref 8.6–10.4)
CANDIDA SPECIES, DNA PROBE: NEGATIVE
CHLORIDE BLD-SCNC: 100 MMOL/L (ref 98–107)
CO2: 19 MMOL/L (ref 20–31)
COLOR: YELLOW
COMMENT UA: ABNORMAL
CREAT SERPL-MCNC: 0.48 MG/DL (ref 0.5–0.9)
EOSINOPHILS RELATIVE PERCENT: 3 % (ref 1–4)
GARDNERELLA VAGINALIS, DNA PROBE: NEGATIVE
GFR AFRICAN AMERICAN: >60 ML/MIN
GFR NON-AFRICAN AMERICAN: >60 ML/MIN
GFR SERPL CREATININE-BSD FRML MDRD: ABNORMAL ML/MIN/{1.73_M2}
GLUCOSE BLD-MCNC: 347 MG/DL (ref 70–99)
GLUCOSE URINE: ABNORMAL
HCG QUALITATIVE: NEGATIVE
HCT VFR BLD CALC: 39.9 % (ref 36–46)
HEMOGLOBIN: 13.6 G/DL (ref 12–16)
KETONES, URINE: ABNORMAL
LEUKOCYTE ESTERASE, URINE: NEGATIVE
LIPASE: 35 U/L (ref 13–60)
LYMPHOCYTES # BLD: 31 % (ref 24–44)
MCH RBC QN AUTO: 30 PG (ref 26–34)
MCHC RBC AUTO-ENTMCNC: 34.1 G/DL (ref 31–37)
MCV RBC AUTO: 87.9 FL (ref 80–100)
MONOCYTES # BLD: 6 % (ref 2–11)
NITRITE, URINE: NEGATIVE
PDW BLD-RTO: 12.8 % (ref 12.5–15.4)
PH UA: 6 (ref 5–8)
PLATELET # BLD: 215 K/UL (ref 140–450)
PMV BLD AUTO: 8.2 FL (ref 6–12)
POTASSIUM SERPL-SCNC: 4 MMOL/L (ref 3.7–5.3)
PROTEIN UA: NEGATIVE
RBC # BLD: 4.55 M/UL (ref 4–5.2)
SEG NEUTROPHILS: 59 % (ref 36–66)
SEGMENTED NEUTROPHILS ABSOLUTE COUNT: 5.5 K/UL (ref 1.8–7.7)
SODIUM BLD-SCNC: 132 MMOL/L (ref 135–144)
SOURCE: NORMAL
SPECIFIC GRAVITY UA: 1.01 (ref 1–1.03)
TOTAL PROTEIN: 6.8 G/DL (ref 6.4–8.3)
TRICHOMONAS VAGINALIS DNA: NEGATIVE
TURBIDITY: CLEAR
URINE HGB: NEGATIVE
UROBILINOGEN, URINE: NORMAL
WBC # BLD: 9.3 K/UL (ref 3.5–11)

## 2022-04-03 PROCEDURE — 87510 GARDNER VAG DNA DIR PROBE: CPT

## 2022-04-03 PROCEDURE — 85025 COMPLETE CBC W/AUTO DIFF WBC: CPT

## 2022-04-03 PROCEDURE — 99283 EMERGENCY DEPT VISIT LOW MDM: CPT

## 2022-04-03 PROCEDURE — 87591 N.GONORRHOEAE DNA AMP PROB: CPT

## 2022-04-03 PROCEDURE — 36415 COLL VENOUS BLD VENIPUNCTURE: CPT

## 2022-04-03 PROCEDURE — 84703 CHORIONIC GONADOTROPIN ASSAY: CPT

## 2022-04-03 PROCEDURE — 87480 CANDIDA DNA DIR PROBE: CPT

## 2022-04-03 PROCEDURE — 80053 COMPREHEN METABOLIC PANEL: CPT

## 2022-04-03 PROCEDURE — 87491 CHLMYD TRACH DNA AMP PROBE: CPT

## 2022-04-03 PROCEDURE — 87660 TRICHOMONAS VAGIN DIR PROBE: CPT

## 2022-04-03 PROCEDURE — 83690 ASSAY OF LIPASE: CPT

## 2022-04-03 PROCEDURE — 81003 URINALYSIS AUTO W/O SCOPE: CPT

## 2022-04-03 ASSESSMENT — PAIN SCALES - GENERAL: PAINLEVEL_OUTOF10: 10

## 2022-04-03 ASSESSMENT — ENCOUNTER SYMPTOMS
ABDOMINAL DISTENTION: 0
RESPIRATORY NEGATIVE: 1
CONSTIPATION: 1
VOMITING: 0
ABDOMINAL PAIN: 1
DIARRHEA: 0
RECTAL PAIN: 0
NAUSEA: 0
BLOOD IN STOOL: 0
EYES NEGATIVE: 1
ANAL BLEEDING: 1

## 2022-04-03 ASSESSMENT — PAIN - FUNCTIONAL ASSESSMENT: PAIN_FUNCTIONAL_ASSESSMENT: 0-10

## 2022-04-03 NOTE — ED NOTES
Patient provided with discharge instructions, prescriptions, and follow up information. Verbalized understanding. No IV access to discontinue. A&OX3. Steady gait noted at discharge. Wheelchair declined by patient.       Jamie Salazra RN  04/03/22 6529

## 2022-04-03 NOTE — ED PROVIDER NOTES
[address] [facility]  [address] [place] [address]  Phone: [phone]  Fax: [phone]        Pt Name: Mary Brand  MRN: [id number]  Lupe 1995  Date of evaluation: 4/3/22    CHIEFCOMPLAINT       Chief Complaint   Patient presents with    Pelvic Pain     states taht she was at [address] tuesday and they did not check pelvic exam, states vaginal pain, burning pain, on macrobid (5 days) for UTI and took diflucan    Rectal Bleeding     for 1 month, states missed her GI appointment, hx of hemorrhoids    Abdominal Pain     pelvis       HISTORY OF PRESENT ILLNESS (Location/Symptom, Timing/Onset, Context/Setting, Quality, Duration, Modifying Factors, Severity)      Mary Brand is a 27 y.o. female with no pertinent PMH who presents to the ED via private auto with complaints of right lower quadrant pain, generalized abdominal pain, vaginal discharge, pain with urination, increased urinary frequency, rectal bleeding for 1 month, states she had an appointment with GI but she missed them, does have a history of hemorrhoids has been applying a cream at home. She states she has a history of constipation and hard stools well. She denies any dizziness lightheadedness or headache. Denies any numbness or tingling in her extremities. She denies any chest pain or shortness of breath. She denies any fever chills or body aches. She is currently taking Macrobid for urinary tract infection, she also took Diflucan for yeast infection which is since resolved. She does have STD concerns. Her last menstrual period was 3/1/2022. She denies any hematuria. Denies any nausea vomiting or diarrhea.   She states she is upset because she was at a park earlier this past week, and did not receive a pelvic exam or rectal exam.    PAST MEDICAL / SURGICAL / SOCIAL / FAMILY HISTORY     PMH:  has a past medical history of Asthma, Bipolar 1 disorder ([address] Diabetes mellitus type 2 in obese ([address] GERD (gastroesophageal reflux disease), Hepatic steatosis, Hypertension, Morbid obesity with body mass index (BMI) of 40.0 to 49.9 ([facility] Neuropathy, and Postpartum depression. Surgical History:  has a past surgical history that includes Tonsillectomy (Bilateral);  section; eye surgery;  section (N/A, 3/23/2017); Upper gastrointestinal endoscopy (N/A, 2018); Breast surgery (Left, 2019); Breast surgery (Right, 2019); Rectal surgery (N/A, 2/3/2020); Upper gastrointestinal endoscopy (N/A, 2020); Colonoscopy (N/A, 2020); Axillary Surgery (Right, 2021); Ankle arthroscopy (Right, 01/10/2022); and Ankle arthroscopy (Right, 1/10/2022). Social History:  reports that she quit smoking about 18 months ago. Her smoking use included cigarettes. She started smoking about 14 years ago. She has a 10.00 pack-year smoking history. She has never used smokeless tobacco. She reports that she does not drink alcohol and does not use drugs. Family History: She indicated that her mother is alive. She indicated that her father is alive. She indicated that the status of her neg hx is unknown.   family history includes Breast Cancer (age of onset: 28) in her mother; Cancer (age of onset: 61) in her maternal uncle; Colon Cancer in her maternal uncle; Diabetes in her father, maternal uncle, and maternal uncle; Heart Attack in her maternal uncle; Heart Disease in her maternal uncle. Psychiatric History: None    Allergies: Benadryl [diphenhydramine]    Home Medications:   Prior to Admission medications    Medication Sig Start Date End Date Taking?  Authorizing Provider   insulin detemir (LEVEMIR FLEXTOUCH) 100 UNIT/ML injection pen inject 55 units subcutaneously nightly 3/10/22   MARCELO Celeste - CNP   Insulin Aspart FlexPen 100 UNIT/ML SOPN inject 4 units subcutaneously before breakfast then inject 4 units subcutaneously before LUNCH then inject 6 units subcutaneously before dinner and 6 units subcutaneously at bedtime 3/10/22   MARCELO Borges - CNP   UNIFINE PENTIPS 29G X 12MM MISC USE 4 times a DAILY WITH basal and short acting insulin 3/10/22   NayanMARCELO Soto - CNP   blood glucose monitor kit and supplies 1 kit by Other route three times daily Dispense product that insurance will cover 3/10/22   Christ MARCELO Edwards - CNP   Lancets MISC 1 each by Does not apply route 3 times daily 3/10/22   Nayanyancy Edwards, APRN - CNP   blood glucose monitor strips Test 3 times a day & as needed for symptoms of irregular blood glucose. Dispense sufficient amount for indicated testing frequency plus additional to accommodate PRN testing needs.  3/10/22   Nayanyancy Edwards APRN - CNP   Elastic Bandages & Supports (JOBST KNEE HIGH COMPRESSION SM) MISC Dispense Bilateral Jobst Below Knee 20-30mmHg compression stockings 3/2/22   Rakan Lua DPM   ondansetron (ZOFRAN ODT) 4 MG disintegrating tablet Take 1 tablet by mouth every 8 hours as needed for Nausea 2/21/22   Criss Matos MD   rivaroxaban (XARELTO) 10 MG TABS tablet Take 1 tablet by mouth daily (with breakfast) 1/10/22   Mikey Russell DPM   metFORMIN (GLUCOPHAGE) 1000 MG tablet Take 1 tablet by mouth 2 times daily (with meals) 1/6/22   Michael Glover PA-C   lisinopril (PRINIVIL;ZESTRIL) 5 MG tablet take 1 tablet by mouth once daily 11/11/21   Michael Glover PA-C   ibuprofen (IBU) 600 MG tablet Take 1 tablet by mouth every 8 hours as needed for Pain 9/27/21   Ector Dixon PA-C   budesonide-formoterol (SYMBICORT) 160-4.5 MCG/ACT AERO Inhale 2 puffs into the lungs 2 times daily 9/16/21   Deisy Moore MD   ipratropium-albuterol (DUONEB) 0.5-2.5 (3) MG/3ML SOLN nebulizer solution Inhale 3 mLs into the lungs every 4 hours (while awake) 9/16/21   Deisy Moore MD   Blood Pressure KIT 1 kit by Does not apply route 2 times daily 9/16/21   Deisy Moore MD   omeprazole (PRILOSEC) 20 MG delayed release capsule Take 1 capsule by mouth every morning (before breakfast) 7/12/21 12/28/21  MARCELO Palmer NP   Insulin Pen Needle (PEN NEEDLES) 31G X 6 MM MISC 1 each by Does not apply route daily 5/21/21   Mary Beth Jacobs PA-C   TRUEplus Lancets 30G MISC TEST 2 TIMES A DAY AND AS NEEED FOR SYMPTOMS OF IRREGULAR BLOOD GLUCOSE 4/21/21   Mary Beth Jacobs PA-C   acetaminophen (TYLENOL) 500 MG tablet Take 2 tablets by mouth 3 times daily 10/28/20   Dharmesh Maharaj MD   albuterol sulfate HFA (PROVENTIL HFA) 108 (90 Base) MCG/ACT inhaler Inhale 2 puffs into the lungs every 6 hours as needed for Wheezing 7/23/19   Rosalia Londono PA-C       REVIEW OF SYSTEMS  (2-9 systems for level 4, 10 ormore for level 5)      Review of Systems   Constitutional: Negative. HENT: Negative. Eyes: Negative. Respiratory: Negative. Cardiovascular: Negative. Gastrointestinal: Positive for abdominal pain, anal bleeding and constipation. Negative for abdominal distention, blood in stool, diarrhea, nausea, rectal pain and vomiting. Endocrine: Negative. Genitourinary: Positive for dysuria. Negative for decreased urine volume, difficulty urinating, dyspareunia, enuresis, flank pain, frequency, genital sores, hematuria, menstrual problem, pelvic pain, urgency, vaginal bleeding, vaginal discharge and vaginal pain. Musculoskeletal: Negative. Skin: Negative. Neurological: Negative. Hematological: Negative. Psychiatric/Behavioral: Negative. All other systems negative except as marked. PHYSICAL EXAM  (up to 7 for level 4, 8 or more for level 5)      INITIAL VITALS:  height is 5' 5.5\" (1.664 m) and weight is 117.9 kg (260 lb). Her oral temperature is 97.7 °F (36.5 °C). Her blood pressure is 135/91 (abnormal) and her pulse is 86. Her respiration is 16 and oxygen saturation is 98%. Vital signs reviewed. Physical Exam  Constitutional:       Appearance: She is well-developed. HENT:      Head: Normocephalic. Mouth/Throat:      Mouth: Mucous membranes are moist.      Pharynx: Oropharynx is clear. Eyes:      Extraocular Movements: Extraocular movements intact. Pupils: Pupils are equal, round, and reactive to light. Cardiovascular:      Rate and Rhythm: Normal rate and regular rhythm. Heart sounds: Normal heart sounds. Pulmonary:      Effort: Pulmonary effort is normal.      Breath sounds: Normal breath sounds. Abdominal:      General: Abdomen is flat. Bowel sounds are normal. There is no distension. There are no signs of injury. Palpations: Abdomen is soft. Tenderness: There is generalized abdominal tenderness. There is no right CVA tenderness, left CVA tenderness, guarding or rebound. Negative signs include McBurney's sign. Genitourinary:     Vagina: No signs of injury. Vaginal discharge present. No erythema or bleeding. Cervix: Normal.      Adnexa:         Right: No tenderness. Left: No tenderness. Rectum: Normal. Guaiac result negative. No mass or tenderness. Comments: Some kind she is no discharge noted, patient recently took Diflucan for yeast infection  Skin:     General: Skin is warm and dry. Capillary Refill: Capillary refill takes less than 2 seconds. Neurological:      Mental Status: She is alert and oriented to person, place, and time. DIFFERENTIAL DIAGNOSIS / MDM     Upon exam, patient resting in her room. Reports her pain is a 10 out of 10 currently, primarily in her volar region. Heart sounds within normal limits auscultation. Lung sounds are clear bilaterally. Bowel sounds are present auscultation. Patient reports generalized abdominal pain with palpation, negative McBurney's, no CVA tenderness, no guarding or rebound tenderness. Patient was recently diagnosed with UTI, and is currently on Macrobid. This is an ongoing issue, patient has been seen multiple times with similar complaints. Her last menstrual period was on 3/1/2022. including any severe abdominal pain, hematuria, hematemesis, hematochezia, chest pain, shortness of breath, fever chills or body aches, intractable nausea vomiting or diarrhea, patient states understanding of education and is stable for outpatient follow-up. PLAN (LABS / IMAGING / EKG):  Orders Placed This Encounter   Procedures    Vaginitis DNA Probe    C.trachomatis N.gonorrhoeae DNA (Cervical or Vaginal Swab)    CBC with Auto Differential    Comprehensive Metabolic Panel w/ Reflex to MG    Lipase    Urinalysis with Reflex to Culture    HCG Qualitative, Serum    Blood Occult Stool Screen #1    Vaginal exam       MEDICATIONS ORDERED:  No orders of the defined types were placed in this encounter. Controlled Substances Monitoring:     DIAGNOSTIC RESULTS     EKG: All EKG's are interpreted by the Emergency Department Physician who either signs or Co-signs this chart in the absenceof a cardiologist.      RADIOLOGY: All images are read by the radiologist and their interpretations are reviewed. No orders to display       No results found. LABS:  Results for orders placed or performed during the hospital encounter of 04/03/22   Vaginitis DNA Probe    Specimen: Vaginal   Result Value Ref Range    Source . VAGINAL SWAB     Trichomonas Vaginalis DNA NEGATIVE NEGATIVE    GARDNERELLA VAGINALIS, DNA PROBE NEGATIVE NEGATIVE    CANDIDA SPECIES, DNA PROBE NEGATIVE NEGATIVE   CBC with Auto Differential   Result Value Ref Range    WBC 9.3 3.5 - 11.0 k/uL    RBC 4.55 4.0 - 5.2 m/uL    Hemoglobin 13.6 12.0 - 16.0 g/dL    Hematocrit 39.9 36 - 46 %    MCV 87.9 80 - 100 fL    MCH 30.0 26 - 34 pg    MCHC 34.1 31 - 37 g/dL    RDW 12.8 12.5 - 15.4 %    Platelets 370 135 - 915 k/uL    MPV 8.2 6.0 - 12.0 fL    Seg Neutrophils 59 36 - 66 %    Lymphocytes 31 24 - 44 %    Monocytes 6 2 - 11 %    Eosinophils % 3 1 - 4 %    Basophils 1 0 - 2 %    Segs Absolute 5.50 1.8 - 7.7 k/uL    Absolute Lymph # 2.90 1.0 - 4.8 k/uL Absolute Mono # 0.60 0.1 - 1.2 k/uL    Absolute Eos # 0.20 0.0 - 0.4 k/uL    Basophils Absolute 0.10 0.0 - 0.2 k/uL   Comprehensive Metabolic Panel w/ Reflex to MG   Result Value Ref Range    Glucose 347 (H) 70 - 99 mg/dL    BUN 11 6 - 20 mg/dL    CREATININE 0.48 (L) 0.50 - 0.90 mg/dL    Calcium 8.8 8.6 - 10.4 mg/dL    Sodium 132 (L) 135 - 144 mmol/L    Potassium 4.0 3.7 - 5.3 mmol/L    Chloride 100 98 - 107 mmol/L    CO2 19 (L) 20 - 31 mmol/L    Anion Gap 13 9 - 17 mmol/L    Alkaline Phosphatase 80 35 - 104 U/L    ALT 29 5 - 33 U/L    AST 15 <32 U/L    Total Bilirubin 0.82 0.3 - 1.2 mg/dL    Total Protein 6.8 6.4 - 8.3 g/dL    Albumin 4.1 3.5 - 5.2 g/dL    Albumin/Globulin Ratio 1.5 1.0 - 2.5    GFR Non-African American >60 >60 mL/min    GFR African American >60 >60 mL/min    GFR Comment         Lipase   Result Value Ref Range    Lipase 35 13 - 60 U/L   Urinalysis with Reflex to Culture    Specimen: Urine, clean catch   Result Value Ref Range    Color, UA Yellow Yellow    Turbidity UA Clear Clear    Glucose, Ur 3+ (A) NEGATIVE    Bilirubin Urine NEGATIVE NEGATIVE    Ketones, Urine TRACE (A) NEGATIVE    Specific Gravity, UA 1.015 1.005 - 1.030    Urine Hgb NEGATIVE NEGATIVE    pH, UA 6.0 5.0 - 8.0    Protein, UA NEGATIVE NEGATIVE    Urobilinogen, Urine Normal Normal    Nitrite, Urine NEGATIVE NEGATIVE    Leukocyte Esterase, Urine NEGATIVE NEGATIVE    Urinalysis Comments       Microscopic exam not performed based on chemical results unless requested in original order. Urinalysis Comments          Urinalysis Comments       Utilizing a urinalysis as the only screening method to exclude a potential uropathogen can be unreliable in many patient populations. Rapid screening tests are less sensitive than culture and if UTI is a clinical possibility, culture should be considered despite a negative urinalysis.    HCG Qualitative, Serum   Result Value Ref Range    hCG Qual NEGATIVE NEGATIVE       EMERGENCY DEPARTMENT COURSE           Vitals:    Vitals:    04/03/22 1423   BP: (!) 135/91   Pulse: 86   Resp: 16   Temp: 97.7 °F (36.5 °C)   TempSrc: Oral   SpO2: 98%   Weight: 117.9 kg (260 lb)   Height: 5' 5.5\" (1.664 m)     -------------------------  BP: (!) 135/91, Temp: 97.7 °F (36.5 °C), Pulse: 86, Resp: 16      RE-EVALUATION:  See ED Course notes above. CONSULTS:  None    PROCEDURES:  With JAYDEN Nick, digital rectal exam performed. No external hemorrhoid noted at this time. Occult stool is negative. Pelvic exam performed with Park RN at bedside. Cervical os is closed. There are some cottage cheeselike discharge noted, patient did recently take Diflucan for a yeast infection reports improvement of her symptoms. Normal vaginal discharge noted. No odor noted. No adnexal tenderness. Rectum is normal.    Patient tolerated well. FINAL IMPRESSION      1. Vulvar pain    2. Vulvar itching          DISPOSITION / PLAN     CONDITION ON DISPOSITION:   Good / Stable for discharge. PATIENT REFERRED TO:  Erin Zhou PA-C  [address] [facility]  [address] [facility]  [address] [facility] [address]  [phone]    Call in 1 day      [facility]  [address]   [address] [id number] [phone]  Go to   If symptoms worsen    MARCELO Singh CNP  [address] [phone]    Call in 1 day        DISCHARGE MEDICATIONS:  Current Discharge Medication List          MARCELO Golden NP   Emergency Medicine Nurse Practitioner    (Please note that portions of this note were completed with a voice recognition program.  Efforts were made to edit the dictations but occasionally words aremis-transcribed.)     MARCELO Golden NP  04/03/22 0807

## 2022-04-03 NOTE — ED PROVIDER NOTES
I was present in the ED during this patient's evaluation and management by the Advance Practice Provider and was available to address any concerns about their medical management.     Starlin Sandifer, MD  Attending, Emergency Department      Carolyn Rausch MD  04/03/22 5709

## 2022-04-05 ENCOUNTER — OFFICE VISIT (OUTPATIENT)
Dept: PODIATRY | Age: 27
End: 2022-04-05
Payer: COMMERCIAL

## 2022-04-05 VITALS — BODY MASS INDEX: 41.78 KG/M2 | HEIGHT: 66 IN | WEIGHT: 260 LBS

## 2022-04-05 DIAGNOSIS — Z98.890 POST-OPERATIVE STATE: ICD-10-CM

## 2022-04-05 DIAGNOSIS — M25.371 ANKLE INSTABILITY, RIGHT: Primary | ICD-10-CM

## 2022-04-05 DIAGNOSIS — S93.401A RUPTURE OF LIGAMENT OF ANKLE, RIGHT, INITIAL ENCOUNTER: ICD-10-CM

## 2022-04-05 PROCEDURE — 1036F TOBACCO NON-USER: CPT | Performed by: PODIATRIST

## 2022-04-05 PROCEDURE — G8427 DOCREV CUR MEDS BY ELIG CLIN: HCPCS | Performed by: PODIATRIST

## 2022-04-05 PROCEDURE — 99024 POSTOP FOLLOW-UP VISIT: CPT | Performed by: PODIATRIST

## 2022-04-05 PROCEDURE — G8417 CALC BMI ABV UP PARAM F/U: HCPCS | Performed by: PODIATRIST

## 2022-04-05 NOTE — PROGRESS NOTES
[address] and Ankle  Post Op note  Chief Complaint   Patient presents with    Post-Op Check     right ankle, still having pain     Other     missed 6 appointments of Yao Ferro is a 32y.o. year old female who is 12 weeks post op from foot surgery. Type: arthroscopy ankle, tenosynovectomy peroneal tendons, repair ATFL right. Vital signs are stable. Pain level is 2-3. Patient denies N/V/F/C. Patient has not been compliant with postoperative instructions. She is still in the CAM walker today, but has been walking a lot with out it. She has missed PT appointments. Having some pain, swelling at the end of the day. She does not feel she can go back to work, and stand for 8 hours. Review of Systems    Vascular: DP and PT pulses palpable 2/4, Right Foot and 2/4 on the Left Foot. CFT <3 seconds, Right Foot and <3 seconds on the Left Foot. Edema is present,  Right Foot and absenton the Left Foot. Neurological:   Sensation present  to light touch to level of digits, both feet. Musculoskeletal:   Muscle strength is guarded/5 on the Right Foot and 5/5 on the Left Foot. Structural deformities are absent on the Right Foot and absent on the Left Foot. Integument:  Warm, dry, supple both feet. Incisions are healing well. Wound dehiscence is absent. Infection is absent. Assesment : Post operative progress gradually improving. Diagnosis Orders   1. Ankle instability, right  External Referral To Physical Therapy   2. Rupture of ligament of ankle, right, initial encounter  External Referral To Physical Therapy   3. Post-operative state  External Referral To Physical Therapy         Plan: Pt was evaluated and examined. Patient was given personalized discharge instructions. Pt will follow up in 4 weeks or sooner if any problems arise. Diagnosis was discussed with the pt and all of their questions were answered in detail.  Proper foot hygiene and care was discussed with the pt. Patient to check feet daily and contact the office with any questions/problems/concerns. Other comorbidity noted and will be managed by PCP. CAM walker, wean out of CAM walker and into a good shoe. weight bearing  Finish therapy      No orders of the defined types were placed in this encounter. Follow up 4 week(s).

## 2022-04-12 ENCOUNTER — HOSPITAL ENCOUNTER (OUTPATIENT)
Age: 27
Setting detail: SPECIMEN
Discharge: HOME | End: 2022-04-12

## 2022-04-12 ENCOUNTER — OFFICE VISIT (OUTPATIENT)
Dept: OBGYN CLINIC | Age: 27
End: 2022-04-12
Payer: COMMERCIAL

## 2022-04-12 VITALS
DIASTOLIC BLOOD PRESSURE: 82 MMHG | HEART RATE: 103 BPM | WEIGHT: 239 LBS | SYSTOLIC BLOOD PRESSURE: 126 MMHG | BODY MASS INDEX: 38.41 KG/M2 | HEIGHT: 66 IN

## 2022-04-12 DIAGNOSIS — N76.4 VULVAR ABSCESS: ICD-10-CM

## 2022-04-12 DIAGNOSIS — N76.0 VULVOVAGINITIS: ICD-10-CM

## 2022-04-12 DIAGNOSIS — N76.4 VULVAR ABSCESS: Primary | ICD-10-CM

## 2022-04-12 PROCEDURE — 99213 OFFICE O/P EST LOW 20 MIN: CPT | Performed by: SPECIALIST

## 2022-04-12 PROCEDURE — G8417 CALC BMI ABV UP PARAM F/U: HCPCS | Performed by: SPECIALIST

## 2022-04-12 PROCEDURE — G8427 DOCREV CUR MEDS BY ELIG CLIN: HCPCS | Performed by: SPECIALIST

## 2022-04-12 PROCEDURE — 1036F TOBACCO NON-USER: CPT | Performed by: SPECIALIST

## 2022-04-12 PROCEDURE — 1111F DSCHRG MED/CURRENT MED MERGE: CPT | Performed by: SPECIALIST

## 2022-04-12 RX ORDER — SULFAMETHOXAZOLE AND TRIMETHOPRIM 800; 160 MG/1; MG/1
1 TABLET ORAL 2 TIMES DAILY
Qty: 20 TABLET | Refills: 0 | Status: SHIPPED | OUTPATIENT
Start: 2022-04-12 | End: 2022-04-22

## 2022-04-12 RX ORDER — IBUPROFEN 800 MG/1
800 TABLET ORAL EVERY 8 HOURS PRN
Qty: 20 TABLET | Refills: 0 | Status: SHIPPED | OUTPATIENT
Start: 2022-04-12

## 2022-04-12 RX ORDER — FLUCONAZOLE 100 MG/1
100 TABLET ORAL DAILY
Qty: 7 TABLET | Refills: 1 | Status: SHIPPED | OUTPATIENT
Start: 2022-04-12 | End: 2022-04-19

## 2022-04-12 ASSESSMENT — ENCOUNTER SYMPTOMS
COUGH: 0
APNEA: 0
ABDOMINAL PAIN: 0
CONSTIPATION: 0
VOMITING: 0
NAUSEA: 0
ABDOMINAL DISTENTION: 0
DIARRHEA: 0
EYE PAIN: 0

## 2022-04-12 NOTE — PROGRESS NOTES
Subjective:      Patient ID: Ulices Tate is a 32 y.o. female. Chief Complaint   Patient presents with    Follow-Up from Hospital     /82   Pulse 103   Ht 5' 5.5\" (1.664 m)   Wt 239 lb (108.4 kg)   LMP 04/07/2022 (LMP Unknown)   BMI 39.17 kg/m²   Patient's last menstrual period was 04/07/2022 (lmp unknown).     [id number]    Past Medical History:   Diagnosis Date    Asthma     uses inhaler    Bipolar 1 disorder ([facility] [address]     Diabetes mellitus type 2 in obese ([facility] [address]     GERD (gastroesophageal reflux disease)     Hepatic steatosis     Hypertension     started when approx 6 mos pregnant with first pregnancy    Morbid obesity with body mass index (BMI) of 40.0 to 49.9 (HCC)     Neuropathy     Postpartum depression      Current Outpatient Medications Ordered in Epic   Medication Sig Dispense Refill    fluconazole (DIFLUCAN) 100 MG tablet Take 1 tablet by mouth daily for 7 days 7 tablet 1    sulfamethoxazole-trimethoprim (BACTRIM DS) 800-160 MG per tablet Take 1 tablet by mouth 2 times daily for 10 days 20 tablet 0    ibuprofen (ADVIL;MOTRIN) 800 MG tablet Take 1 tablet by mouth every 8 hours as needed for Pain 20 tablet 0    lisinopril (PRINIVIL;ZESTRIL) 5 MG tablet take 1 tablet by mouth once daily 30 tablet 5    insulin detemir (LEVEMIR FLEXTOUCH) 100 UNIT/ML injection pen inject 55 units subcutaneously nightly 15 mL 5    Insulin Aspart FlexPen 100 UNIT/ML SOPN inject 4 units subcutaneously before breakfast then inject 4 units subcutaneously before LUNCH then inject 6 units subcutaneously before dinner and 6 units subcutaneously at bedtime 15 mL 3    UNIFINE PENTIPS 29G X 12MM MISC USE 4 times a DAILY WITH basal and short acting insulin 120 each 5    blood glucose monitor kit and supplies 1 kit by Other route three times daily Dispense product that insurance will cover 1 kit 0    Lancets MISC 1 each by Does not apply route 3 times daily 100 each 3    blood glucose monitor strips Test 3 times a day & as needed for symptoms of irregular blood glucose. Dispense sufficient amount for indicated testing frequency plus additional to accommodate PRN testing needs. 100 strip 3    Elastic Bandages & Supports (JOBST KNEE HIGH COMPRESSION SM) MISC Dispense Bilateral Jobst Below Knee 20-30mmHg compression stockings 3 each 0    ondansetron (ZOFRAN ODT) 4 MG disintegrating tablet Take 1 tablet by mouth every 8 hours as needed for Nausea 20 tablet 0    rivaroxaban (XARELTO) 10 MG TABS tablet Take 1 tablet by mouth daily (with breakfast) 30 tablet 1    metFORMIN (GLUCOPHAGE) 1000 MG tablet Take 1 tablet by mouth 2 times daily (with meals) 60 tablet 3    ibuprofen (IBU) 600 MG tablet Take 1 tablet by mouth every 8 hours as needed for Pain 20 tablet 0    budesonide-formoterol (SYMBICORT) 160-4.5 MCG/ACT AERO Inhale 2 puffs into the lungs 2 times daily 10.2 g 3    Blood Pressure KIT 1 kit by Does not apply route 2 times daily 1 kit 0    Insulin Pen Needle (PEN NEEDLES) 31G X 6 MM MISC 1 each by Does not apply route daily 200 each 5    TRUEplus Lancets 30G MISC TEST 2 TIMES A DAY AND AS NEEED FOR SYMPTOMS OF IRREGULAR BLOOD GLUCOSE 300 each 5    acetaminophen (TYLENOL) 500 MG tablet Take 2 tablets by mouth 3 times daily 90 tablet 0    albuterol sulfate HFA (PROVENTIL HFA) 108 (90 Base) MCG/ACT inhaler Inhale 2 puffs into the lungs every 6 hours as needed for Wheezing 1 Inhaler 2    ipratropium-albuterol (DUONEB) 0.5-2.5 (3) MG/3ML SOLN nebulizer solution Inhale 3 mLs into the lungs every 4 hours (while awake) (Patient not taking: Reported on 4/12/2022) 360 mL 0    omeprazole (PRILOSEC) 20 MG delayed release capsule Take 1 capsule by mouth every morning (before breakfast) 30 capsule 3     No current [facility]-ordered facility-administered medications on file.      Problem List Items Addressed This Visit     None      Visit Diagnoses     Vulvar abscess    -  Primary    Relevant Orders    Culture, Aerobic Vulvovaginitis            Allergies   Allergen Reactions    Benadryl [Diphenhydramine] Hives and Shortness Of Breath     Orders Placed This Encounter   Procedures    Culture, Aerobic     Vulva tissue     Standing Status:   Future     Standing Expiration Date:   4/12/2023        HPI  Patient is here today complaining of a cyst inside of her clitoris. It is very painful. She states that the pain is excruciating and goes up into her side and makes it so she cannot move. She states that the cyst popped but she does not think that it has popped all the way and it is still hard. [facility] [place] [facility] and was treated for a severe UTI. After she took the antibiotic, she started having the pain. She states she has another cyst forming on the other side of her clitoris also. Review of Systems   Constitutional: Negative for activity change, appetite change and fever. HENT: Negative for ear discharge and ear pain. Eyes: Negative for pain and visual disturbance. Respiratory: Negative for apnea and cough. Cardiovascular: Negative for chest pain, palpitations and leg swelling. Gastrointestinal: Negative for abdominal distention, abdominal pain, constipation, diarrhea, nausea and vomiting. Endocrine: Negative. Genitourinary: Positive for genital sores. Negative for difficulty urinating, dysuria, menstrual problem and pelvic pain. Musculoskeletal: Negative for neck pain and neck stiffness. Skin: Negative. Neurological: Negative for light-headedness and numbness. Hematological: Negative. Does not bruise/bleed easily. Objective:   Physical Exam  Vitals and nursing note reviewed. Exam conducted with a chaperone present. Constitutional:       Appearance: She is well-developed. HENT:      Head: Normocephalic and atraumatic. Neck:      Thyroid: No thyromegaly. Cardiovascular:      Rate and Rhythm: Normal rate and regular rhythm.    Pulmonary:      Effort: Pulmonary effort is normal. Breath sounds: Normal breath sounds. No wheezing. Abdominal:      General: Bowel sounds are normal. There is no distension. Palpations: Abdomen is soft. There is no mass. Tenderness: There is no abdominal tenderness. There is no guarding. Genitourinary:     Labia:         Right: Rash and tenderness present. Left: Rash and tenderness present. Vagina: Vaginal discharge present. Comments: right at the end labia minora that forms the melchor for the clitoris there is inflammation and tiny hole that has apparently been draining pus  Musculoskeletal:         General: Normal range of motion. Cervical back: Normal range of motion and neck supple. Skin:     General: Skin is dry. Neurological:      Mental Status: She is alert and oriented to person, place, and time. Psychiatric:         Behavior: Behavior normal.         Thought Content: Thought content normal.         Assessment:      Patient with vulvar abscess and severe vulvovaginitis typical of diabetes. Cultures were done. Explained to patient that the most likely cause of the abscess is due to bacteria. Will treat with Bactrim and Diflucan. Patient was also advised to use OTC Monistat Dual for 3 days. Will treat pain and inflation with Motrin. Patient was also told to use warm sitz baths.   Patient was told to return in 2 weeks for reevaluation and annual exam.      Plan:      Orders Placed This Encounter   Procedures    Culture, Aerobic     Orders Placed This Encounter   Medications    fluconazole (DIFLUCAN) 100 MG tablet     Sig: Take 1 tablet by mouth daily for 7 days     Dispense:  7 tablet     Refill:  1    sulfamethoxazole-trimethoprim (BACTRIM DS) 800-160 MG per tablet     Sig: Take 1 tablet by mouth 2 times daily for 10 days     Dispense:  20 tablet     Refill:  0    ibuprofen (ADVIL;MOTRIN) 800 MG tablet     Sig: Take 1 tablet by mouth every 8 hours as needed for Pain     Dispense:  20 tablet     Refill:  0 Appointment in 2 weeks    I, Rajiv Vidal, am scribing for, and in the presence of Dr. Mikayla Rodriguez. Electronically signed by: Rajiv Vidal 4/12/22 4:49 PM       I agree to the above documentation placed by my scribe Rajiv Vidal. I reviewed the scribe's note and agree with the documented findings and plan of care. Any areas of disagreement are noted on the chart. I have personally evaluated this patient. Additional findings are as noted. I agree with the chief complaint, past medical history, past surgical history, allergies, medications, social and family history as documented unless otherwise noted below.      Electronically signed by Mikayla Rodriguez MD on 4/13/2022 at 10:10 AM

## 2022-04-13 PROBLEM — N76.0 VULVOVAGINITIS: Status: ACTIVE | Noted: 2022-04-13

## 2022-04-13 PROBLEM — N76.4 VULVAR ABSCESS: Status: ACTIVE | Noted: 2022-04-13

## 2022-04-14 ENCOUNTER — TELEPHONE (OUTPATIENT)
Dept: OBGYN CLINIC | Age: 27
End: 2022-04-14

## 2022-04-14 NOTE — TELEPHONE ENCOUNTER
ER referred her to us for vulvar pain/itching. Please see if pt would like to follow up with one of us in office, she has previously been seen at Dr. Barrett Baum office last year, she can follow with them or us.

## 2022-04-16 LAB
CULTURE: ABNORMAL
DIRECT EXAM: ABNORMAL
SPECIMEN DESCRIPTION: ABNORMAL

## 2022-04-20 ENCOUNTER — OFFICE VISIT (OUTPATIENT)
Dept: OBGYN CLINIC | Age: 27
End: 2022-04-20
Payer: COMMERCIAL

## 2022-04-20 ENCOUNTER — HOSPITAL ENCOUNTER (OUTPATIENT)
Age: 27
Setting detail: SPECIMEN
Discharge: HOME | End: 2022-04-20

## 2022-04-20 VITALS
HEIGHT: 65 IN | WEIGHT: 242 LBS | HEART RATE: 88 BPM | BODY MASS INDEX: 40.32 KG/M2 | DIASTOLIC BLOOD PRESSURE: 86 MMHG | SYSTOLIC BLOOD PRESSURE: 124 MMHG

## 2022-04-20 DIAGNOSIS — Z87.42 HISTORY OF ABNORMAL CERVICAL PAP SMEAR: ICD-10-CM

## 2022-04-20 DIAGNOSIS — N76.4 VULVAR ABSCESS: ICD-10-CM

## 2022-04-20 DIAGNOSIS — Z80.3 FAMILY HISTORY OF BREAST CANCER IN MOTHER: ICD-10-CM

## 2022-04-20 DIAGNOSIS — Z01.419 WELL WOMAN EXAM: Primary | ICD-10-CM

## 2022-04-20 DIAGNOSIS — N76.0 VULVOVAGINITIS: ICD-10-CM

## 2022-04-20 PROCEDURE — G8427 DOCREV CUR MEDS BY ELIG CLIN: HCPCS | Performed by: SPECIALIST

## 2022-04-20 PROCEDURE — 1036F TOBACCO NON-USER: CPT | Performed by: SPECIALIST

## 2022-04-20 PROCEDURE — G8417 CALC BMI ABV UP PARAM F/U: HCPCS | Performed by: SPECIALIST

## 2022-04-20 PROCEDURE — 99395 PREV VISIT EST AGE 18-39: CPT | Performed by: SPECIALIST

## 2022-04-20 RX ORDER — CEPHALEXIN 500 MG/1
500 CAPSULE ORAL 4 TIMES DAILY
Qty: 28 CAPSULE | Refills: 0 | Status: SHIPPED | OUTPATIENT
Start: 2022-04-20

## 2022-04-20 RX ORDER — FLUCONAZOLE 100 MG/1
100 TABLET ORAL DAILY
Qty: 7 TABLET | Refills: 1 | Status: SHIPPED | OUTPATIENT
Start: 2022-04-20 | End: 2022-04-27

## 2022-04-20 RX ORDER — IBUPROFEN 800 MG/1
800 TABLET ORAL EVERY 6 HOURS PRN
Qty: 28 TABLET | Refills: 0 | Status: SHIPPED | OUTPATIENT
Start: 2022-04-20

## 2022-04-20 ASSESSMENT — ENCOUNTER SYMPTOMS
VOMITING: 0
ABDOMINAL PAIN: 0
APNEA: 0
CONSTIPATION: 0
ABDOMINAL DISTENTION: 0
COUGH: 0
NAUSEA: 0
DIARRHEA: 0
EYE PAIN: 0

## 2022-04-20 NOTE — PROGRESS NOTES
Subjective:      Patient ID: Stefanie Servin is a 32 y.o. female. Chief Complaint   Patient presents with    Annual Exam     /86   Pulse 88   Ht 5' 5\" (1.651 m)   Wt 242 lb (109.8 kg)   LMP 04/07/2022 (LMP Unknown)   BMI 40.27 kg/m²   Patient's last menstrual period was 04/07/2022 (lmp unknown).     [id number]    Past Medical History:   Diagnosis Date    Asthma     uses inhaler    B[facility] 1 disorder ([facility] [address]     Diabetes mellitus type 2 in obese ([facility] [address]     GERD (gastroesophageal reflux disease)     Hepatic steatosis     Hypertension     started when approx 6 mos pregnant with first pregnancy    Morbid obesity with body mass index (BMI) of 40.0 to 49.9 (HCC)     Neuropathy     Postpartum depression      Current Outpatient Medications Ordered in Epic   Medication Sig Dispense Refill    cephALEXin (KEFLEX) 500 MG capsule Take 1 capsule by mouth 4 times daily 28 capsule 0    ibuprofen (ADVIL;MOTRIN) 800 MG tablet Take 1 tablet by mouth every 6 hours as needed for Pain 28 tablet 0    fluconazole (DIFLUCAN) 100 MG tablet Take 1 tablet by mouth daily for 7 days 7 tablet 1    sulfamethoxazole-trimethoprim (BACTRIM DS) 800-160 MG per tablet Take 1 tablet by mouth 2 times daily for 10 days 20 tablet 0    ibuprofen (ADVIL;MOTRIN) 800 MG tablet Take 1 tablet by mouth every 8 hours as needed for Pain 20 tablet 0    lisinopril (PRINIVIL;ZESTRIL) 5 MG tablet take 1 tablet by mouth once daily 30 tablet 5    insulin detemir (LEVEMIR FLEXTOUCH) 100 UNIT/ML injection pen inject 55 units subcutaneously nightly 15 mL 5    Insulin Aspart FlexPen 100 UNIT/ML SOPN inject 4 units subcutaneously before breakfast then inject 4 units subcutaneously before LUNCH then inject 6 units subcutaneously before dinner and 6 units subcutaneously at bedtime 15 mL 3    UNIFINE PENTIPS 29G X 12MM MISC USE 4 times a DAILY WITH basal and short acting insulin 120 each 5    blood glucose monitor kit and supplies 1 kit by Other route three times daily Dispense product that insurance will cover 1 kit 0    Lancets MISC 1 each by Does not apply route 3 times daily 100 each 3    blood glucose monitor strips Test 3 times a day & as needed for symptoms of irregular blood glucose. Dispense sufficient amount for indicated testing frequency plus additional to accommodate PRN testing needs. 100 strip 3    Elastic Bandages & Supports (JOBST KNEE HIGH COMPRESSION SM) MISC Dispense Bilateral Jobst Below Knee 20-30mmHg compression stockings 3 each 0    ondansetron (ZOFRAN ODT) 4 MG disintegrating tablet Take 1 tablet by mouth every 8 hours as needed for Nausea 20 tablet 0    rivaroxaban (XARELTO) 10 MG TABS tablet Take 1 tablet by mouth daily (with breakfast) 30 tablet 1    metFORMIN (GLUCOPHAGE) 1000 MG tablet Take 1 tablet by mouth 2 times daily (with meals) 60 tablet 3    ibuprofen (IBU) 600 MG tablet Take 1 tablet by mouth every 8 hours as needed for Pain 20 tablet 0    budesonide-formoterol (SYMBICORT) 160-4.5 MCG/ACT AERO Inhale 2 puffs into the lungs 2 times daily 10.2 g 3    ipratropium-albuterol (DUONEB) 0.5-2.5 (3) MG/3ML SOLN nebulizer solution Inhale 3 mLs into the lungs every 4 hours (while awake) 360 mL 0    Blood Pressure KIT 1 kit by Does not apply route 2 times daily 1 kit 0    Insulin Pen Needle (PEN NEEDLES) 31G X 6 MM MISC 1 each by Does not apply route daily 200 each 5    TRUEplus Lancets 30G MISC TEST 2 TIMES A DAY AND AS NEEED FOR SYMPTOMS OF IRREGULAR BLOOD GLUCOSE 300 each 5    acetaminophen (TYLENOL) 500 MG tablet Take 2 tablets by mouth 3 times daily 90 tablet 0    albuterol sulfate HFA (PROVENTIL HFA) 108 (90 Base) MCG/ACT inhaler Inhale 2 puffs into the lungs every 6 hours as needed for Wheezing 1 Inhaler 2    omeprazole (PRILOSEC) 20 MG delayed release capsule Take 1 capsule by mouth every morning (before breakfast) 30 capsule 3     No current Epic-ordered facility-administered medications on file.      Problem List Items Addressed This Visit     Well woman exam - Primary    Relevant Orders    PAP SMEAR    BRCA1 and BRCA2    History of abnormal cervical Pap smear    Relevant Orders    BRCA1 and BRCA2    Family history of breast cancer in mother    Relevant Orders    BRCA1 and BRCA2    Vulvar abscess    Relevant Orders    BRCA1 and BRCA2    Vulvovaginitis    Relevant Orders    BRCA1 and BRCA2        Allergies   Allergen Reactions    Benadryl [Diphenhydramine] Hives and Shortness Of Breath     Orders Placed This Encounter   Procedures    PAP SMEAR     Patient History:    Patient's last menstrual period was 2022 (lmp unknown). OBGYN Status: Implant  Past Surgical History:  01/10/2022: ANKLE ARTHROSCOPY; Right      Comment:  RIGHT ANKLE ARTHROSCOPY WITH DEBRIDEMENT AND REPAIR OF                LATERAL ANKLE LIGAMENTS. PERONEAL TENDON SYNOVECTOMY AND                GASTRONEMIS RECESSION RIGHT LOWER EXTREMITY   1/10/2022: ANKLE ARTHROSCOPY; Right      Comment:  RIGHT ANKLE ARTHROSCOPY WITH DEBRIDEMENT AND REPAIR OF                LATERAL ANKLE LIGAMENTS. PERONEAL TENDON SYNOVECTOMY AND                GASTROCNEMIUS RECESSION RIGHT LOWER EXTREMITY performed                by Neri Colbert DPM at [address] [facility].  2021: [address]; Right      Comment:  AXILLARY ABSCESS I&D performed by Faisal Veras MD at               [facility] [address]  2019: BREAST SURGERY; Left      Comment:  BREAST INCISION AND DRAINAGE performed by Sabina Edwards MD at [facility] [address]  2019: BREAST SURGERY;  Right      Comment:  BREAST INCISION AND DRAINAGE performed by Sabina Edwards MD at [facility] [address]  No date:  SECTION  3/23/2017:  SECTION; N/A      Comment:   SECTION REPEAT  performed by Carol Mota MD at [facility] L&D OR  2020: COLONOSCOPY; N/A      Comment:  COLONOSCOPY POLYPECTOMY HOT BIOPSY performed by Judith De La O MD at [facility] ENDO  No date: EYE SURGERY  2/3/2020: RECTAL SURGERY; N/A      Comment:  I & D PERIANAL ABSCESS performed by Anabelle Araya MD at                [address]  No date: TONSILLECTOMY; Bilateral  2018: UPPER GASTROINTESTINAL ENDOSCOPY; N/A      Comment:  EGD BIOPSY performed by Ruben Jerome MD at [address] [facility]  2020: UPPER GASTROINTESTINAL ENDOSCOPY; N/A      Comment:  EGD BIOPSY performed by Ruben Jerome MD at [facility]    Problem List       Edg Problems Affecting Cytology    History of gonorrhea    History of chlamydia     Social History    Tobacco Use      Smoking status: Former Smoker        Packs/day: 1.00        Years: 10.00        Pack years: 10        Types: Cigarettes        Start date: 2007        Quit date: 2020        Years since quittin.5      Smokeless tobacco: Never Used       Standing Status:   Future     Standing Expiration Date:   2023     Order Specific Question:   Collection Type     Answer: Thin Prep     Order Specific Question:   Prior Abnormal Pap Test     Answer:   No     Order Specific Question:   Screening or Diagnostic     Answer:   Screening     Order Specific Question:   HPV Requested? Answer:   Yes - If Abnormal Reflex HPV     Order Specific Question:   High Risk Patient     Answer:   N/A    BRCA1 and BRCA2     Standing Status:   Future     Standing Expiration Date:   2023        HPI  Patient is here for an annual exam today. Her last pap smear was done on 10/28/2016 and was abnormal with ASCUS and positive other high risk HPV. Patient was here last week and treated for a vulvar abscess. The abscess is still bleeding. She states that her mom had breast cancer at age 28. Review of Systems   Constitutional: Negative for activity change, appetite change and fever. HENT: Negative for ear discharge and ear pain. Eyes: Negative for pain and visual disturbance. Respiratory: Negative for apnea and cough.     Cardiovascular: Negative for chest pain, palpitations and leg swelling. Gastrointestinal: Negative for abdominal distention, abdominal pain, constipation, diarrhea, nausea and vomiting. Endocrine: Negative. Genitourinary: Negative for difficulty urinating, dysuria, menstrual problem and pelvic pain. Musculoskeletal: Negative for neck pain and neck stiffness. Skin: Negative. Neurological: Negative for light-headedness and numbness. Hematological: Negative. Does not bruise/bleed easily. Objective:   Physical Exam  Vitals and nursing note reviewed. Exam conducted with a chaperone present. Constitutional:       Appearance: She is well-developed. HENT:      Head: Normocephalic and atraumatic. Neck:      Thyroid: No thyroid mass or thyromegaly. Cardiovascular:      Rate and Rhythm: Normal rate and regular rhythm. Pulmonary:      Effort: Pulmonary effort is normal.      Breath sounds: Normal breath sounds. Chest:   Breasts:      Right: Normal. No inverted nipple, mass, nipple discharge, skin change or tenderness. Left: Normal. No inverted nipple, mass, nipple discharge, skin change or tenderness. Abdominal:      General: Bowel sounds are normal. There is no distension. Palpations: Abdomen is soft. There is no mass. Tenderness: There is no abdominal tenderness. There is no guarding or rebound. Hernia: There is no hernia in the left inguinal area. Genitourinary:     General: Normal vulva. Exam position: Supine. Labia:         Right: Rash present. No lesion. Left: Rash present. No lesion. Vagina: No signs of injury. Vaginal discharge present. No tenderness. Cervix: No cervical motion tenderness or discharge. Uterus: Normal. Not enlarged, not fixed and not tender. Adnexa: Right adnexa normal and left adnexa normal.        Right: No mass or tenderness. Left: No mass or tenderness. Rectum: Normal. No mass or anal fissure. Normal anal tone. Comments: Abscess near clitoris in the process of healing with mild tenderness  Musculoskeletal:         General: No tenderness. Normal range of motion. Skin:     General: Skin is warm and dry. Neurological:      Mental Status: She is alert and oriented to person, place, and time. Psychiatric:         Judgment: Judgment normal.         Assessment:       Patient with history of abnormal pap smear found to have persistent vulvovaginitis and vulvar abscess, otherwise normal annual exam.  Pap smear was done. Will repeat treatment with Diflucan. Patient with family history of breast cancer. Patient advised to have BRCA testing done. Patient with vulvar abscess in the process of healing. Culture was reviewed. Will treat with Keflex. Patient was advised to use Motrin for pain. Recent Results (from the past 336 hour(s))   Culture, Aerobic    Collection Time: 04/12/22 12:39 AM    Specimen: Vulva   Result Value Ref Range    Specimen Description . VULVAR SWAB     Direct Exam RARE NEUTROPHILS (A)     Direct Exam MANY GRAM POSITIVE RODS (A)     Direct Exam FEW GRAM NEGATIVE RODS (A)     Culture NORMAL URO-GENITAL MORALES     Culture Candida albicans/dubliniensis MODERATE GROWTH     Culture (A)      STAPHYLOCOCCUS AUREUS LIGHT GROWTH This isolate is methicillin susceptible. Culture ENTERIC MORALES PRESENT MODERATE GROWTH     Culture STREPTOCOCCI, BETA HEMOLYTIC GROUP B ISOLATED (A)     Culture NEGATIVE FOR NEISSERIA GONORRHOEAE        Susceptibility    Staphylococcus aureus - BACTERIAL SUSCEPTIBILITY PANEL LEANA     penicillin >=0.5 Resistant      clindamycin <=0.25 Sensitive      erythromycin <=0.25 Sensitive      gentamicin* <=0.5 Sensitive       * Gentamicin is used only in combination with other active agents that test susceptible.      levofloxacin 0.25 Sensitive      oxacillin 0.5 Sensitive      tetracycline <=1 Sensitive      trimethoprim-sulfamethoxazole >=320 Resistant Plan:      Orders Placed This Encounter   Procedures    PAP SMEAR    BRCA1 and BRCA2     Orders Placed This Encounter   Medications    cephALEXin (KEFLEX) 500 MG capsule     Sig: Take 1 capsule by mouth 4 times daily     Dispense:  28 capsule     Refill:  0    ibuprofen (ADVIL;MOTRIN) 800 MG tablet     Sig: Take 1 tablet by mouth every 6 hours as needed for Pain     Dispense:  28 tablet     Refill:  0    fluconazole (DIFLUCAN) 100 MG tablet     Sig: Take 1 tablet by mouth daily for 7 days     Dispense:  7 tablet     Refill:  1      I, Paulette Vidal, am scribing for, and in the presence of Dr. Lu Raines. Electronically signed by: Paulette Vidal 4/20/22 3:42 PM       I agree to the above documentation placed by my scribe Paulette Vidal. I reviewed the scribe's note and agree with the documented findings and plan of care. Any areas of disagreement are noted on the chart. I have personally evaluated this patient. Additional findings are as noted. I agree with the chief complaint, past medical history, past surgical history, allergies, medications, social and family history as documented unless otherwise noted below.      Electronically signed by Lu Raines MD on 4/21/2022 at 2:07 PM

## 2022-04-27 ENCOUNTER — TELEPHONE (OUTPATIENT)
Dept: OBGYN CLINIC | Age: 27
End: 2022-04-27

## 2022-04-27 NOTE — TELEPHONE ENCOUNTER
COMPLETED BRCA PAPERWORK FOR ADDITIONAL AFFECTED RELATIVE. FAXED TO [employer]. RECEIVED FAX CONFIRMATION.

## 2022-04-29 LAB — CYTOLOGY REPORT: NORMAL

## 2022-05-03 ENCOUNTER — HOSPITAL ENCOUNTER (EMERGENCY)
Age: 27
Discharge: HOME | End: 2022-05-03
Attending: EMERGENCY MEDICINE
Payer: COMMERCIAL

## 2022-05-03 VITALS
OXYGEN SATURATION: 95 % | RESPIRATION RATE: 18 BRPM | DIASTOLIC BLOOD PRESSURE: 87 MMHG | TEMPERATURE: 97.4 F | SYSTOLIC BLOOD PRESSURE: 148 MMHG | HEART RATE: 102 BPM

## 2022-05-03 DIAGNOSIS — J06.9 VIRAL URI: Primary | ICD-10-CM

## 2022-05-03 LAB
ANION GAP SERPL CALCULATED.3IONS-SCNC: 14 MMOL/L (ref 9–17)
BETA-HYDROXYBUTYRATE: 0.22 MMOL/L (ref 0.02–0.27)
BUN BLDV-MCNC: 11 MG/DL (ref 6–20)
CALCIUM SERPL-MCNC: 10.7 MG/DL (ref 8.6–10.4)
CHLORIDE BLD-SCNC: 100 MMOL/L (ref 98–107)
CO2: 20 MMOL/L (ref 20–31)
CREAT SERPL-MCNC: 0.46 MG/DL (ref 0.5–0.9)
FLU A ANTIGEN: NEGATIVE
FLU B ANTIGEN: NEGATIVE
GFR AFRICAN AMERICAN: >60 ML/MIN
GFR NON-AFRICAN AMERICAN: >60 ML/MIN
GFR SERPL CREATININE-BSD FRML MDRD: ABNORMAL ML/MIN/{1.73_M2}
GLUCOSE BLD-MCNC: 369 MG/DL (ref 70–99)
POTASSIUM SERPL-SCNC: 3.9 MMOL/L (ref 3.7–5.3)
SODIUM BLD-SCNC: 134 MMOL/L (ref 135–144)

## 2022-05-03 PROCEDURE — 80048 BASIC METABOLIC PNL TOTAL CA: CPT

## 2022-05-03 PROCEDURE — 93005 ELECTROCARDIOGRAM TRACING: CPT | Performed by: STUDENT IN AN ORGANIZED HEALTH CARE EDUCATION/TRAINING PROGRAM

## 2022-05-03 PROCEDURE — 99284 EMERGENCY DEPT VISIT MOD MDM: CPT

## 2022-05-03 PROCEDURE — 87804 INFLUENZA ASSAY W/OPTIC: CPT

## 2022-05-03 PROCEDURE — 82010 KETONE BODYS QUAN: CPT

## 2022-05-03 RX ORDER — ALBUTEROL SULFATE 90 UG/1
2 AEROSOL, METERED RESPIRATORY (INHALATION) EVERY 6 HOURS PRN
Qty: 18 G | Refills: 0 | Status: SHIPPED | OUTPATIENT
Start: 2022-05-03

## 2022-05-03 ASSESSMENT — ENCOUNTER SYMPTOMS
EYE ITCHING: 0
WHEEZING: 1
DIARRHEA: 0
SORE THROAT: 0
NAUSEA: 0
COUGH: 1
RHINORRHEA: 1
EYE DISCHARGE: 0
SHORTNESS OF BREATH: 1
CONSTIPATION: 0
ABDOMINAL PAIN: 1
EYE PAIN: 0
SINUS PRESSURE: 1
VOMITING: 0
SINUS PAIN: 0

## 2022-05-03 NOTE — ED PROVIDER NOTES
[facility]     Emergency Department     Faculty Attestation    I performed a history and physical examination of the patient and discussed management with the resident. I reviewed the residents note and agree with the documented findings and plan of care. Any areas of disagreement are noted on the chart. I was personally present for the key portions of any procedures. I have documented in the chart those procedures where I was not present during the key portions. I have reviewed the emergency nurses triage note. I agree with the chief complaint, past medical history, past surgical history, allergies, medications, social and family history as documented unless otherwise noted below. For Physician Assistant/ Nurse Practitioner cases/documentation I have personally evaluated this patient and have completed at least one if not all key elements of the E/M (history, physical exam, and MDM). Additional findings are as noted. I have personally seen and evaluated the patient. I find the patient's history and physical exam are consistent with the NP/PA documentation. I agree with the care provided, treatment rendered, disposition and follow-up plan. Patient is resting comfortably no acute distress describing difficulty breathing and concerns for DKA lungs are clear bilaterally no wheezes rales or rhonchi saturations are 95% or greater borderline tachycardia is noted. Critical Care     Dana Jarvis M.D.   Attending Emergency  Physician              Gwen Parikh MD  05/03/22 0524

## 2022-05-03 NOTE — ED PROVIDER NOTES
[facility]  Emergency Department Encounter  Emergency Medicine Resident     Pt Name: Beba Kessler  MRN: [id number]  [place] 1995  Date of evaluation: 5/3/22  PCP:  Broderick Amaro PA-C    CHIEF COMPLAINT       Chief Complaint   Patient presents with    Shortness of Breath     states for past 2 days with chest pain, states hx of asthma, out of inhalers and using husbands as needed       HISTORY OF PRESENT ILLNESS  (Location/Symptom, Timing/Onset, Context/Setting, Quality, Duration, Modifying Factors, Severity.)    Beba Kessler is a 32 y.o. female with pmhx of type 2 diabetes, DKA, asthma, bipolar disorder who presents with sob x 2 days. States started having congestion, rhinorrhea, sore throat, cough, wheezing, and chest discomfort. Patient's 10year old daughter recently ill with similar symptoms. Patient is worried that she may also be in DKA due to previous admission last year. She takes levemir nightly, novolog before meals and metformin 1000mg bid. She is compliant with medications and took her novlog and metformin this AM. Did not check her glucose level this AM. Previously when admitted patient presented with SOB and chest pain. Otherwise, she denies any fevers, dizziness, tinnitus, ear pain, n/v/d, weakness, tingling, numbness, syncope, or seizures. PAST MEDICAL / SURGICAL / SOCIAL / FAMILY HISTORY    has a past medical history of Asthma, Bipolar 1 disorder ([address] Diabetes mellitus type 2 in obese ([address] GERD (gastroesophageal reflux disease), Hepatic steatosis, Hypertension, Morbid obesity with body mass index (BMI) of 40.0 to 49.9 ([address] Neuropathy, and Postpartum depression. has a past surgical history that includes Tonsillectomy (Bilateral);  section; eye surgery;  section (N/A, 3/23/2017); Upper gastrointestinal endoscopy (N/A, 2018); Breast surgery (Left, 2019); Breast surgery (Right, 2019);  Rectal surgery (N/A, 2/3/2020); Upper gastrointestinal endoscopy (N/A, 2020); Colonoscopy (N/A, 2020); Axillary Surgery (Right, 2021); Ankle arthroscopy (Right, 01/10/2022); and Ankle arthroscopy (Right, 1/10/2022). Social History     Socioeconomic History    Marital status: Single     Spouse name: Not on file    Number of children: 2    Years of education: graduated high school, some college    Highest education level: Not on file   Occupational History    Occupation: housewife    Occupation:      Comment: last worked    Tobacco Use    Smoking status: Former Smoker     Packs/day: 1.00     Years: 10.00     Pack years: 10.00     Types: Cigarettes     Start date: 2007     Quit date: 2020     Years since quittin.6    Smokeless tobacco: Never Used   Vaping Use    Vaping Use: Every day    Substances: Always   Substance and Sexual Activity    Alcohol use: No     Alcohol/week: 0.0 standard drinks    Drug use: No    Sexual activity: Yes     Partners: Male     Comment: chlamydia in 2016,   Other Topics Concern    Not on file   Social History Narrative    Lives with fiance and 2 daughters     Social Determinants of Health     Financial Resource Strain: Low Risk     Difficulty of Paying Living Expenses: Not hard at all   Food Insecurity: No Food Insecurity    Worried About [address] in the Last Year: Never true    [address] in the Last Year: Never true   Transportation Needs: No Transportation Needs    Lack of Transportation (Medical): No    Lack of Transportation (Non-Medical):  No   Physical Activity:     Days of Exercise per Week: Not on file    Minutes of Exercise per Session: Not on file   Stress:     Feeling of Stress : Not on file   Social Connections:     Frequency of Communication with Friends and Family: Not on file    Frequency of Social Gatherings with Friends and Family: Not on file    Attends [employer]ism Services: Not on file   [employer]s or Organizations: Not on file    Attends Club or Organization Meetings: Not on file    Marital Status: Not on file   Intimate Partner Violence:     Fear of Current or Ex-Partner: Not on file    Emotionally Abused: Not on file    Physically Abused: Not on file    Sexually Abused: Not on file   Housing Stability:     Unable to Pay for Housing in the Last Year: Not on file    Number of [place] in the Last Year: Not on file    Unstable Housing in the Last Year: Not on file       Family History   Problem Relation Age of Onset    Breast Cancer Mother 28    Diabetes Father     Cancer Maternal Uncle 61        acute leukemia    Heart Disease Maternal Uncle     Diabetes Maternal Uncle     Colon Cancer Maternal Uncle     Diabetes Maternal Uncle     Heart Attack Maternal Uncle     Uterine Cancer Neg Hx     Ovarian Cancer Neg Hx        Allergies:    Benadryl [diphenhydramine]    Home Medications:  Prior to Admission medications    Medication Sig Start Date End Date Taking?  Authorizing Provider   albuterol sulfate  (90 Base) MCG/ACT inhaler Inhale 2 puffs into the lungs every 6 hours as needed for Wheezing 5/3/22  Yes Navdeep Lockett MD   metFORMIN (GLUCOPHAGE) 1000 MG tablet take 1 tablet by mouth twice a day with meals 5/2/22   Cricket Au PA-C   cephALEXin (KEFLEX) 500 MG capsule Take 1 capsule by mouth 4 times daily 4/20/22   Mansi Lassiter MD   ibuprofen (ADVIL;MOTRIN) 800 MG tablet Take 1 tablet by mouth every 6 hours as needed for Pain 4/20/22   Mansi Lassiter MD   ibuprofen (ADVIL;MOTRIN) 800 MG tablet Take 1 tablet by mouth every 8 hours as needed for Pain 4/12/22   Mansi Lassiter MD   lisinopril (PRINIVIL;ZESTRIL) 5 MG tablet take 1 tablet by mouth once daily 4/4/22   Cricket Au PA-C   insulin detemir (LEVEMIR FLEXTOUCH) 100 UNIT/ML injection pen inject 55 units subcutaneously nightly 3/10/22   MARCELO Crenshaw - CNP   Insulin Aspart FlexPen 100 UNIT/ML SOPN inject 4 units subcutaneously before breakfast then inject 4 units subcutaneously before LUNCH then inject 6 units subcutaneously before dinner and 6 units subcutaneously at bedtime 3/10/22   Shira , APRN - CNP   UNIFINE PENTIPS 29G X 12MM MISC USE 4 times a DAILY WITH basal and short acting insulin 3/10/22   MARCELO Swanson CNP   blood glucose monitor kit and supplies 1 kit by Other route three times daily Dispense product that insurance will cover 3/10/22   MARCELO Swanson CNP   Lancets MISC 1 each by Does not apply route 3 times daily 3/10/22   [place] MARCELO elizalde CNP   blood glucose monitor strips Test 3 times a day & as needed for symptoms of irregular blood glucose. Dispense sufficient amount for indicated testing frequency plus additional to accommodate PRN testing needs.  3/10/22   Shira , APRN - CNP   Elastic Bandages & Supports (JOBST KNEE HIGH COMPRESSION SM) MISC Dispense Bilateral Jobst Below Knee 20-30mmHg compression stockings 3/2/22   Juan Conklin DPM   ondansetron (ZOFRAN ODT) 4 MG disintegrating tablet Take 1 tablet by mouth every 8 hours as needed for Nausea 2/21/22   Jaja Vo MD   rivaroxaban (XARELTO) 10 MG TABS tablet Take 1 tablet by mouth daily (with breakfast) 1/10/22   Jason Alfonso DPM   ibuprofen (IBU) 600 MG tablet Take 1 tablet by mouth every 8 hours as needed for Pain 9/27/21   Walter Barros PA-C   budesonide-formoterol (SYMBICORT) 160-4.5 MCG/ACT AERO Inhale 2 puffs into the lungs 2 times daily 9/16/21   Jaxson Pardo MD   ipratropium-albuterol (DUONEB) 0.5-2.5 (3) MG/3ML SOLN nebulizer solution Inhale 3 mLs into the lungs every 4 hours (while awake) 9/16/21   Jaxson Pardo MD   Blood Pressure KIT 1 kit by Does not apply route 2 times daily 9/16/21   Jaxson Pardo MD   omeprazole (PRILOSEC) 20 MG delayed release capsule Take 1 capsule by mouth every morning (before breakfast) 7/12/21 12/28/21  Quentin Mix, APRN - NP   Insulin Pen Needle (PEN NEEDLES) 31G X 6 MM MISC 1 each by Does not apply route daily 5/21/21   Laentte Tran PA-C   TRUEplus Lancets 30G MISC TEST 2 TIMES A DAY AND AS NEEED FOR SYMPTOMS OF IRREGULAR BLOOD GLUCOSE 4/21/21   Lanette Tran PA-C   acetaminophen (TYLENOL) 500 MG tablet Take 2 tablets by mouth 3 times daily 10/28/20   Karyn Carreon MD   albuterol sulfate HFA (PROVENTIL HFA) 108 (90 Base) MCG/ACT inhaler Inhale 2 puffs into the lungs every 6 hours as needed for Wheezing 7/23/19   Blade Woodard PA-C       REVIEW OF SYSTEMS    (2-9 systems for level 4, 10 or more for level 5)    Review of Systems   Constitutional: Negative for activity change, appetite change, chills, fatigue and fever. HENT: Positive for congestion, rhinorrhea and sinus pressure. Negative for ear pain, mouth sores, sinus pain and sore throat. Eyes: Negative for pain, discharge and itching. Respiratory: Positive for cough, shortness of breath and wheezing. Cardiovascular: Positive for chest pain. Gastrointestinal: Positive for abdominal pain. Negative for constipation, diarrhea, nausea and vomiting. Diffuse abdominal pain, crampy. States she's on her menstrual period and similar pain   Genitourinary: Negative for decreased urine volume, difficulty urinating, dysuria and hematuria. Musculoskeletal: Positive for myalgias. Negative for neck pain and neck stiffness. Skin: Negative for rash. Neurological: Positive for headaches. Negative for seizures, weakness, light-headedness and numbness. Hematological: Negative for adenopathy. PHYSICAL EXAM   (up to 7 for level 4, 8 or more for level 5)    INITIAL VITALS:   ED Triage Vitals   BP Temp Temp Source Pulse Resp SpO2 Height Weight   05/03/22 1648 05/03/22 1648 05/03/22 1648 05/03/22 1648 05/03/22 1648 05/03/22 1629 -- --   (!) 148/87 97.4 °F (36.3 °C) Oral 102 22 100 %         Physical Exam  Vitals and nursing note reviewed.    Constitutional: Appearance: She is well-developed. She is not toxic-appearing. HENT:      Head: Normocephalic and atraumatic. Nose: Congestion present. Mouth/Throat:      Mouth: Mucous membranes are moist.      Pharynx: Oropharynx is clear. No pharyngeal swelling or oropharyngeal exudate. Eyes:      Extraocular Movements: Extraocular movements intact. Cardiovascular:      Rate and Rhythm: Normal rate and regular rhythm. Pulmonary:      Effort: Pulmonary effort is normal.      Breath sounds: Normal breath sounds. No decreased breath sounds, wheezing, rhonchi or rales. Chest:      Chest wall: No tenderness. Abdominal:      General: Bowel sounds are normal.      Palpations: Abdomen is soft. Musculoskeletal:         General: Normal range of motion. Cervical back: Normal range of motion and neck supple. Right lower leg: No edema. Left lower leg: No edema. Skin:     General: Skin is warm. Capillary Refill: Capillary refill takes less than 2 seconds. Neurological:      General: No focal deficit present. Mental Status: She is alert and oriented to person, place, and time. DIFFERENTIAL  DIAGNOSIS   PLAN (LABS / IMAGING / EKG):  Orders Placed This Encounter   Procedures    RAPID INFLUENZA A/B ANTIGENS    Basic Metabolic Panel    Beta-Hydroxybutyrate    EKG 12 Lead       MEDICATIONS ORDERED:  Orders Placed This Encounter   Medications    DISCONTD: albuterol (PROVENTIL) nebulizer solution 2.5 mg     Order Specific Question:   Initiate RT Bronchodilator Protocol     Answer:    Yes    albuterol sulfate  (90 Base) MCG/ACT inhaler     Sig: Inhale 2 puffs into the lungs every 6 hours as needed for Wheezing     Dispense:  18 g     Refill:  0         DIAGNOSTIC RESULTS / EMERGENCYDEPARTMENT COURSE / MDM   LABS:  Labs Reviewed   BASIC METABOLIC PANEL - Abnormal; Notable for the following components:       Result Value    Glucose 369 (*)     CREATININE 0.46 (*)     Calcium 10.7 (*)     Sodium 134 (*)     All other components within normal limits   RAPID INFLUENZA A/B ANTIGENS   BETA-HYDROXYBUTYRATE       RADIOLOGY:  No results found. Impression:  32year old female presents with URI like symptoms including headache, sob, congestion, sore throat, rhinorrhea, cough, and myalgias. Due to daughter being sick with similar symptoms likely that patient has similar infectious etiology. On exam, pharynx and tonsil appear normal, along with cough, less likely to be related to strep throat. Due to patient's history of type 2 diabetes and admission for DKA, labs drawn to rule out DKA. Patient's labs were WNL with beta-hydroxybutyrate being 0.22 and BMP benign. Will discharge with viral URI precautions. Of note, no wheezing on exam and patient no longer requesting albuterol. O2 sats remained above 95% throughout stay and no increased wob noted. Patient was given a script for albuterol inhaler since she lost hers, and advised she follow up with your PCP within the week. EMERGENCY DEPARTMENT COURSE:  ED Course as of 05/04/22 1139   Tue May 03, 2022   1845 Patient resting comfortably in bed. Tolerating po. Discussed lab results with patient and that she is not in DKA and flu results negative. Aware that sodium is slightly decreased at 134 and encouraged increasing fluids containing electrolytes. Patient did not receive albuterol treatment yet. Still no wheezing on exam, but patient would like a treatment prior to discharge. [TH]   1901 Called RT, will stop by to give albuterol treatment. [TH]      ED Course User Index  [TH] Louisa Morales MD       [facility]    PROCEDURES:  None    CONSULTS:  None    CRITICAL CARE:  Please see attending note    FINAL IMPRESSION     1. Viral URI         DISPOSITION / PLAN   DISPOSITION  Home      Evaluation and treatment course in the ED, and plan of care upon discharge was discussed in length with the patient.   Patient had no further questions prior to being discharged and was instructed to return to the ED for new or worsening symptoms. Any changes to existing medications or new prescriptions were reviewed with patient and they expressed understanding of how to correctly take their medications and the possible side effects.     PATIENT REFERRED TO:  Rosamond Sandhoff, PA-C  [address] [facility]  [address] [facility],[address]  [address] [facility] [address]  [phone]    In 2 days        DISCHARGE MEDICATIONS:  Discharge Medication List as of 5/3/2022  7:39 PM          Joanna Browning MD  Emergency Medicine Resident Physician, PGY-2    (Please note that portions of this note were completed with a voice recognition program.  Efforts were made to edit the dictations but occasionally words are mis-transcribed.)       Joanna Browning MD  Resident  05/04/22 8490

## 2022-05-03 NOTE — ED NOTES
The following labs labeled with pt sticker and tubed to lab:     [] Blue     [x] Lavender   [] on ice  [x] Green/yellow  [] Green/black [] on ice  [x] Yellow  [] Red  [] Pink      [] COVID-19 swab    [] Rapid  [] PCR  [x] Flu swab  [] Peds Viral Panel     [] Urine Sample  [] Pelvic Cultures  [] Blood Cultures            Jose Buitrago RN  05/03/22 7600

## 2022-05-04 LAB
EKG ATRIAL RATE: 87 BPM
EKG P AXIS: 22 DEGREES
EKG P-R INTERVAL: 140 MS
EKG Q-T INTERVAL: 398 MS
EKG QRS DURATION: 84 MS
EKG QTC CALCULATION (BAZETT): 478 MS
EKG R AXIS: -2 DEGREES
EKG T AXIS: 24 DEGREES
EKG VENTRICULAR RATE: 87 BPM

## 2022-05-09 DIAGNOSIS — Z01.419 WELL WOMAN EXAM: ICD-10-CM

## 2022-05-09 DIAGNOSIS — N76.4 VULVAR ABSCESS: ICD-10-CM

## 2022-05-09 DIAGNOSIS — N76.0 VULVOVAGINITIS: ICD-10-CM

## 2022-05-09 DIAGNOSIS — Z87.42 HISTORY OF ABNORMAL CERVICAL PAP SMEAR: ICD-10-CM

## 2022-05-09 DIAGNOSIS — Z80.3 FAMILY HISTORY OF BREAST CANCER IN MOTHER: ICD-10-CM

## 2022-05-17 ENCOUNTER — OFFICE VISIT (OUTPATIENT)
Dept: PODIATRY | Age: 27
End: 2022-05-17
Payer: COMMERCIAL

## 2022-05-17 VITALS — HEIGHT: 65 IN | WEIGHT: 240 LBS | BODY MASS INDEX: 39.99 KG/M2

## 2022-05-17 DIAGNOSIS — M25.371 ANKLE INSTABILITY, RIGHT: Primary | ICD-10-CM

## 2022-05-17 DIAGNOSIS — G57.91 NEURITIS OF FOOT, RIGHT: ICD-10-CM

## 2022-05-17 DIAGNOSIS — R60.0 EDEMA OF LOWER EXTREMITY: ICD-10-CM

## 2022-05-17 DIAGNOSIS — S93.401A RUPTURE OF LIGAMENT OF ANKLE, RIGHT, INITIAL ENCOUNTER: ICD-10-CM

## 2022-05-17 PROCEDURE — 1036F TOBACCO NON-USER: CPT | Performed by: PODIATRIST

## 2022-05-17 PROCEDURE — 99213 OFFICE O/P EST LOW 20 MIN: CPT | Performed by: PODIATRIST

## 2022-05-17 PROCEDURE — G8417 CALC BMI ABV UP PARAM F/U: HCPCS | Performed by: PODIATRIST

## 2022-05-17 PROCEDURE — G8427 DOCREV CUR MEDS BY ELIG CLIN: HCPCS | Performed by: PODIATRIST

## 2022-05-17 RX ORDER — GABAPENTIN 300 MG/1
300 CAPSULE ORAL 3 TIMES DAILY
Qty: 180 CAPSULE | Refills: 1 | Status: SHIPPED | OUTPATIENT
Start: 2022-05-17 | End: 2022-09-14

## 2022-05-17 NOTE — PROGRESS NOTES
[address] Ankle  Return Patient  Chief Complaint   Patient presents with    Ankle Pain     right ankle, burning going up the leg     Diabetes     last a1c 10.2       Marisa Miller is a 32y.o. year old female who is 18 weeks post op from foot surgery. Type: arthroscopy ankle, tenosynovectomy peroneal tendons, repair ATFL right. Vital signs are stable. Pain level is 2-3. Patient denies N/V/F/C. She is still in the CAM walker today, but has been walking a lot with out it. She did not attend all PT appointments. Having some pain, swelling at the end of the day. She does not feel she can go back to work, and stand for 8 hours. She does relate a new burning type of pain on top of her foot. Wakes her up at night, only on the right. She is a diabetic, uncontrolled. Review of Systems    Vascular: DP and PT pulses palpable 2/4, Right Foot and 2/4 on the Left Foot. CFT <3 seconds, Right Foot and <3 seconds on the Left Foot. Edema is present,  Right Foot, but minimal,  and absenton the Left Foot. Neurological:   Sensation present  to light touch to level of digits, both feet. Hypersensitivity dorsal and dorsal lateral right foot. Musculoskeletal:   Muscle strength is 4-5/5 on the Right Foot and 5/5 on the Left Foot. Structural deformities are absent on the Right Foot and absent on the Left Foot. Integument:  Warm, dry, supple both feet. Incisions are healed  Wound dehiscence is absent. Infection is absent. Assesment : Post operative progress gradually improving. Diagnosis Orders   1. Ankle instability, right     2. Rupture of ligament of ankle, right, initial encounter     3. Edema of lower extremity     4. Neuritis of foot, right           Plan: Pt was evaluated and examined. Patient was given personalized discharge instructions. Pt will follow up in 8 weeks or sooner if any problems arise.  Diagnosis was discussed with the pt and all of their questions were answered in detail. Proper foot hygiene and care was discussed with the pt. Patient to check feet daily and contact the office with any questions/problems/concerns. Other comorbidity noted and will be managed by PCP. Wean completely out of the CAM walker  Good shoe  Brace if needed    Rx Gabapentin, start with one at night then progress to BID after 1 week, may progress to TID if needed after week 2. Orders Placed This Encounter   Medications    gabapentin (NEURONTIN) 300 MG capsule     Sig: Take 1 capsule by mouth 3 times daily for 120 days. Intended supply: 90 days     Dispense:  180 capsule     Refill:  1       Follow up 8 week(s).

## 2022-05-18 ENCOUNTER — TELEPHONE (OUTPATIENT)
Dept: PODIATRY | Age: 27
End: 2022-05-18

## 2022-05-18 NOTE — LETTER
[address] [address]  Phone: [phone]  Fax: [phone]    Geri Raul        May 19, 2022     Patient: Roseann Rausch   YOB: 1995   Date of Visit: 5/18/2022       To Whom It May Concern: It is my medical opinion that Carolina Vitale should remain out of work until 1/3/2023. If you have any questions or concerns, please don't hesitate to call.     Sincerely,        Kacy Warren DPM

## 2022-05-18 NOTE — TELEPHONE ENCOUNTER
Patient called to see if she can get a note stating that she needs to be off the rest of the year. She said it was talked about at her appointment yesterday.

## 2022-05-20 ENCOUNTER — TELEPHONE (OUTPATIENT)
Dept: OBGYN CLINIC | Age: 27
End: 2022-05-20

## 2022-05-20 PROBLEM — Z01.419 WELL WOMAN EXAM: Status: RESOLVED | Noted: 2022-04-20 | Resolved: 2022-05-20

## 2022-05-20 NOTE — TELEPHONE ENCOUNTER
Patient is calling to say she finished her medications from her last visit. She is having vaginal pain. The tylenol is not helping very much. She has no bleeding. I recommended she go to [facility] today and follow up in the office on Monday. She voiced understanding.

## 2022-06-02 ENCOUNTER — TELEPHONE (OUTPATIENT)
Dept: OBGYN CLINIC | Age: 27
End: 2022-06-02

## 2022-07-05 ENCOUNTER — OFFICE VISIT (OUTPATIENT)
Dept: PODIATRY | Age: 27
End: 2022-07-05
Payer: COMMERCIAL

## 2022-07-05 VITALS — BODY MASS INDEX: 41.65 KG/M2 | WEIGHT: 250 LBS | HEIGHT: 65 IN

## 2022-07-05 DIAGNOSIS — G57.91 NEURITIS OF FOOT, RIGHT: ICD-10-CM

## 2022-07-05 DIAGNOSIS — M25.371 ANKLE INSTABILITY, RIGHT: Primary | ICD-10-CM

## 2022-07-05 DIAGNOSIS — S93.401A RUPTURE OF LIGAMENT OF ANKLE, RIGHT, INITIAL ENCOUNTER: ICD-10-CM

## 2022-07-05 DIAGNOSIS — R60.0 EDEMA OF LOWER EXTREMITY: ICD-10-CM

## 2022-07-05 PROCEDURE — G8417 CALC BMI ABV UP PARAM F/U: HCPCS | Performed by: PODIATRIST

## 2022-07-05 PROCEDURE — 99213 OFFICE O/P EST LOW 20 MIN: CPT | Performed by: PODIATRIST

## 2022-07-05 PROCEDURE — 1036F TOBACCO NON-USER: CPT | Performed by: PODIATRIST

## 2022-07-05 PROCEDURE — G8427 DOCREV CUR MEDS BY ELIG CLIN: HCPCS | Performed by: PODIATRIST

## 2022-07-05 NOTE — PROGRESS NOTES
[address] and Ankle  Return Patient  Chief Complaint   Patient presents with    Ankle Pain     right ankle having a lot of pain        Marium Soto is a 32y.o. year old female who is for follow up right foot and ankle pain. She is having pain, feels like she is slowly getting better. Thinks it due to diabetes. Still some swelling after a long day. Gabapentin has not helped    6 months foot surgery. Type: arthroscopy ankle, tenosynovectomy peroneal tendons, repair ATFL right. Vital signs are stable. Pain level is 2-3. Patient denies N/V/F/C. She did not attend all PT appointments. She does not feel she can go back to work, and stand for 8 hours. . She is a diabetic, uncontrolled. Review of Systems    Vascular: DP and PT pulses palpable 2/4, Right Foot and 2/4 on the Left Foot. CFT <3 seconds, Right Foot and <3 seconds on the Left Foot. Edema is absent  Right Foot,   and absenton the Left Foot. Neurological:   Sensation present  to light touch to level of digits, both feet. Hypersensitivity dorsal and dorsal lateral right foot. Musculoskeletal:   Muscle strength is 5/5 on the Right Foot and 5/5 on the Left Foot. Structural deformities are absent on the Right Foot and absent on the Left Foot. Pain anterior lateral gutter, sinus tarsi,  Medial gutter. Ankle in general. Pain response out of proportion to physical findings. No pain with rom right ankle. Integument:  Warm, dry, supple both feet. Incisions are healed  Wound dehiscence is absent. Infection is absent. Assesment : Post operative progress gradually improving. Diagnosis Orders   1. Ankle instability, right  MRI ANKLE RIGHT WO CONTRAST   2. Rupture of ligament of ankle, right, initial encounter  MRI ANKLE RIGHT WO CONTRAST   3. Edema of lower extremity  MRI ANKLE RIGHT WO CONTRAST   4. Neuritis of foot, right           Plan: Pt was evaluated and examined.  Patient was given personalized discharge instructions. Pt will follow up in 8 weeks or sooner if any problems arise. Diagnosis was discussed with the pt and all of their questions were answered in detail. Proper foot hygiene and care was discussed with the pt. Patient to check feet daily and contact the office with any questions/problems/concerns. Other comorbidity noted and will be managed by PCP. Good shoe  Brace if needed    Continue Gabapentin,     MRI ordered: He/She has gone through conservative care including surgery, bracing, PT and his/her pain has increased and persisted. I am concerned for   1. Ankle instability, right    2. Rupture of ligament of ankle, right, initial encounter    3. Edema of lower extremity    4. Neuritis of foot, right    . I feel an MRI is needed to assess the pathology here and to guide further treatment whether it be immobilization or surgical intervention. No orders of the defined types were placed in this encounter. Follow up 8 week(s).

## 2022-07-12 ENCOUNTER — NURSE TRIAGE (OUTPATIENT)
Dept: OTHER | Facility: CLINIC | Age: 27
End: 2022-07-12

## 2022-07-12 NOTE — TELEPHONE ENCOUNTER
Received call from Latricia Cameron at [facility] with [employer]. Subjective: Caller states \"I have abscesses under my arms\"     Current Symptoms: Shortness of breath and feels like she is going to choke when she swallows, this a chronic problem for her. Not worse than usual.  Abscesses under both of her arms. Getting larger, developing more, hard and look like filled with pus. History of abscesses with staph before and had to have them surgically drained and was hospitalized for 2 days. Needed IV abx. Sunburn like rash on her face, not widespread. Onset: 1 week ago; worsening    Associated Symptoms: NA    Pain Severity: 10/10 constant, severe, unable to wear bra    Temperature: denies fever     What has been tried: nothing     LMP: NA Pregnant: No    Recommended disposition: Go to ED/UCC Now (Or to Office with PCP Approval)    Care advice provided, patient verbalizes understanding; denies any other questions or concerns; instructed to call back for any new or worsening symptoms. Tried to reach PCP office and staff never answered. Advised patient to go to ED or UCC. She is going to call the general surgeon who drained her last ones to see if he wants to see her or just for her to be admitted again. Sent message to PCP office as high priority. Attention Provider: Thank you for allowing me to participate in the care of your patient. The patient was connected to triage in response to information provided to the ECC/PSC. Please do not respond through this encounter as the response is not directed to a shared pool.       Reason for Disposition   Patient sounds very sick or weak to the triager    Protocols used: BOIL (SKIN ABSCESS)-ADULT-[place]

## 2022-07-14 ENCOUNTER — HOSPITAL ENCOUNTER (OUTPATIENT)
Dept: MRI IMAGING | Facility: CLINIC | Age: 27
Discharge: HOME | End: 2022-07-16
Payer: COMMERCIAL

## 2022-07-14 DIAGNOSIS — M25.371 ANKLE INSTABILITY, RIGHT: ICD-10-CM

## 2022-07-14 DIAGNOSIS — S93.401A RUPTURE OF LIGAMENT OF ANKLE, RIGHT, INITIAL ENCOUNTER: ICD-10-CM

## 2022-07-14 DIAGNOSIS — R60.0 EDEMA OF LOWER EXTREMITY: ICD-10-CM

## 2022-07-14 PROCEDURE — 73721 MRI JNT OF LWR EXTRE W/O DYE: CPT

## 2022-07-15 ENCOUNTER — TELEPHONE (OUTPATIENT)
Dept: PODIATRY | Age: 27
End: 2022-07-15

## 2022-07-15 NOTE — TELEPHONE ENCOUNTER
Patient called and would like her MRI results. She did get them in mychart but does not know what they mean.  Please advise

## 2022-07-20 ENCOUNTER — TELEPHONE (OUTPATIENT)
Dept: OBGYN CLINIC | Age: 27
End: 2022-07-20

## 2022-07-20 RX ORDER — PREDNISONE 10 MG/1
1 TABLET ORAL DAILY
Qty: 21 EACH | Refills: 0 | Status: SHIPPED | OUTPATIENT
Start: 2022-07-20

## 2022-07-20 NOTE — TELEPHONE ENCOUNTER
Patient called and complains of vaginal bleeding. She hasn't gotten her Nexplanon taken out. She has had it in for 5 yrs. She had a period at the end of June thru 7/3/22. She has started again today and is bleeding really heavy. She was told if she were to soak thru a pad in less than an hour x 2 hours to go the the ED. Patient voiced understanding. She will call back to set up an appointment to have the Nexplanon taken out after this issue has resolved.

## 2022-09-06 ENCOUNTER — HOSPITAL ENCOUNTER (EMERGENCY)
Age: 27
Discharge: HOME | End: 2022-09-06
Attending: EMERGENCY MEDICINE
Payer: COMMERCIAL

## 2022-09-06 ENCOUNTER — APPOINTMENT (OUTPATIENT)
Dept: CT IMAGING | Age: 27
End: 2022-09-06
Payer: COMMERCIAL

## 2022-09-06 VITALS
TEMPERATURE: 97.2 F | OXYGEN SATURATION: 99 % | RESPIRATION RATE: 16 BRPM | HEART RATE: 77 BPM | DIASTOLIC BLOOD PRESSURE: 85 MMHG | SYSTOLIC BLOOD PRESSURE: 126 MMHG

## 2022-09-06 DIAGNOSIS — G89.18 POSTOPERATIVE RIGHT UPPER QUADRANT ABDOMINAL PAIN: Primary | ICD-10-CM

## 2022-09-06 DIAGNOSIS — R10.11 POSTOPERATIVE RIGHT UPPER QUADRANT ABDOMINAL PAIN: Primary | ICD-10-CM

## 2022-09-06 LAB
ALBUMIN SERPL-MCNC: 4.4 G/DL (ref 3.5–5.2)
ALBUMIN/GLOBULIN RATIO: 1.6 (ref 1–2.5)
ALP BLD-CCNC: 78 U/L (ref 35–104)
ALT SERPL-CCNC: 37 U/L (ref 5–33)
ANION GAP SERPL CALCULATED.3IONS-SCNC: 13 MMOL/L (ref 9–17)
AST SERPL-CCNC: 41 U/L
BILIRUB SERPL-MCNC: 0.9 MG/DL (ref 0.3–1.2)
BILIRUBIN DIRECT: 0.2 MG/DL
BILIRUBIN URINE: NEGATIVE
BILIRUBIN, INDIRECT: 0.7 MG/DL (ref 0–1)
BUN BLDV-MCNC: 11 MG/DL (ref 6–20)
CALCIUM SERPL-MCNC: 9.4 MG/DL (ref 8.6–10.4)
CHLORIDE BLD-SCNC: 100 MMOL/L (ref 98–107)
CO2: 19 MMOL/L (ref 20–31)
COLOR: ABNORMAL
CREAT SERPL-MCNC: 0.52 MG/DL (ref 0.5–0.9)
EPITHELIAL CELLS UA: ABNORMAL /HPF (ref 0–5)
GFR AFRICAN AMERICAN: >60 ML/MIN
GFR NON-AFRICAN AMERICAN: >60 ML/MIN
GFR SERPL CREATININE-BSD FRML MDRD: ABNORMAL ML/MIN/{1.73_M2}
GLUCOSE BLD-MCNC: 333 MG/DL (ref 70–99)
GLUCOSE URINE: ABNORMAL
HCG QUALITATIVE: NEGATIVE
HCT VFR BLD CALC: 40.2 % (ref 36.3–47.1)
HEMOGLOBIN: 14.3 G/DL (ref 11.9–15.1)
KETONES, URINE: NEGATIVE
LEUKOCYTE ESTERASE, URINE: ABNORMAL
LIPASE: 187 U/L (ref 13–60)
MCH RBC QN AUTO: 31.4 PG (ref 25.2–33.5)
MCHC RBC AUTO-ENTMCNC: 35.6 G/DL (ref 28.4–34.8)
MCV RBC AUTO: 88.2 FL (ref 82.6–102.9)
NITRITE, URINE: NEGATIVE
NRBC AUTOMATED: 0 PER 100 WBC
PDW BLD-RTO: 12.8 % (ref 11.8–14.4)
PH UA: 5.5 (ref 5–8)
PLATELET # BLD: 236 K/UL (ref 138–453)
PMV BLD AUTO: 10.5 FL (ref 8.1–13.5)
POTASSIUM SERPL-SCNC: 4.2 MMOL/L (ref 3.7–5.3)
PROTEIN UA: ABNORMAL
RBC # BLD: 4.56 M/UL (ref 3.95–5.11)
RBC UA: ABNORMAL /HPF (ref 0–2)
SODIUM BLD-SCNC: 132 MMOL/L (ref 135–144)
SPECIFIC GRAVITY UA: 1.04 (ref 1–1.03)
TOTAL PROTEIN: 7.1 G/DL (ref 6.4–8.3)
TURBIDITY: ABNORMAL
URINE HGB: ABNORMAL
UROBILINOGEN, URINE: NORMAL
WBC # BLD: 10.1 K/UL (ref 3.5–11.3)
WBC UA: ABNORMAL /HPF (ref 0–5)

## 2022-09-06 PROCEDURE — 84703 CHORIONIC GONADOTROPIN ASSAY: CPT

## 2022-09-06 PROCEDURE — 96372 THER/PROPH/DIAG INJ SC/IM: CPT

## 2022-09-06 PROCEDURE — 99285 EMERGENCY DEPT VISIT HI MDM: CPT

## 2022-09-06 PROCEDURE — 6360000004 HC RX CONTRAST MEDICATION

## 2022-09-06 PROCEDURE — 6360000002 HC RX W HCPCS: Performed by: EMERGENCY MEDICINE

## 2022-09-06 PROCEDURE — 80048 BASIC METABOLIC PNL TOTAL CA: CPT

## 2022-09-06 PROCEDURE — 85027 COMPLETE CBC AUTOMATED: CPT

## 2022-09-06 PROCEDURE — 81001 URINALYSIS AUTO W/SCOPE: CPT

## 2022-09-06 PROCEDURE — 83690 ASSAY OF LIPASE: CPT

## 2022-09-06 PROCEDURE — 96374 THER/PROPH/DIAG INJ IV PUSH: CPT

## 2022-09-06 PROCEDURE — 2580000003 HC RX 258: Performed by: EMERGENCY MEDICINE

## 2022-09-06 PROCEDURE — 74177 CT ABD & PELVIS W/CONTRAST: CPT

## 2022-09-06 PROCEDURE — 80076 HEPATIC FUNCTION PANEL: CPT

## 2022-09-06 RX ORDER — 0.9 % SODIUM CHLORIDE 0.9 %
1000 INTRAVENOUS SOLUTION INTRAVENOUS ONCE
Status: COMPLETED | OUTPATIENT
Start: 2022-09-06 | End: 2022-09-06

## 2022-09-06 RX ORDER — DICYCLOMINE HYDROCHLORIDE 10 MG/ML
20 INJECTION INTRAMUSCULAR ONCE
Status: COMPLETED | OUTPATIENT
Start: 2022-09-06 | End: 2022-09-06

## 2022-09-06 RX ORDER — ONDANSETRON 2 MG/ML
4 INJECTION INTRAMUSCULAR; INTRAVENOUS ONCE
Status: COMPLETED | OUTPATIENT
Start: 2022-09-06 | End: 2022-09-06

## 2022-09-06 RX ADMIN — DICYCLOMINE HYDROCHLORIDE 20 MG: 10 INJECTION, SOLUTION INTRAMUSCULAR at 12:47

## 2022-09-06 RX ADMIN — IOPAMIDOL 75 ML: 755 INJECTION, SOLUTION INTRAVENOUS at 17:04

## 2022-09-06 RX ADMIN — ONDANSETRON 4 MG: 2 INJECTION INTRAMUSCULAR; INTRAVENOUS at 12:58

## 2022-09-06 RX ADMIN — SODIUM CHLORIDE 1000 ML: 9 INJECTION, SOLUTION INTRAVENOUS at 12:49

## 2022-09-06 ASSESSMENT — ENCOUNTER SYMPTOMS
ANAL BLEEDING: 1
SORE THROAT: 0
BLOOD IN STOOL: 1
CONSTIPATION: 0
ABDOMINAL PAIN: 1
EYE REDNESS: 0
VOMITING: 0
COUGH: 0
NAUSEA: 0
APNEA: 0
EYE DISCHARGE: 0
RHINORRHEA: 0

## 2022-09-06 ASSESSMENT — PAIN - FUNCTIONAL ASSESSMENT: PAIN_FUNCTIONAL_ASSESSMENT: NONE - DENIES PAIN

## 2022-09-06 NOTE — ED PROVIDER NOTES
[facility]     Emergency Department     Faculty Attestation    I performed a history and physical examination of the patient and discussed management with the resident. I have reviewed and agree with the residents findings including all diagnostic interpretations, and treatment plans as written. Any areas of disagreement are noted on the chart. I was personally present for the key portions of any procedures. I have documented in the chart those procedures where I was not present during the key portions. I have reviewed the emergency nurses triage note. I agree with the chief complaint, past medical history, past surgical history, allergies, medications, social and family history as documented unless otherwise noted below. Documentation of the HPI, Physical Exam and Medical Decision Making performed by doc is based on my personal performance of the HPI, PE and MDM. For Physician Assistant/ Nurse Practitioner cases/documentation I have personally evaluated this patient and have completed at least one if not all key elements of the E/M (history, physical exam, and MDM). Additional findings are as noted. With continued right-sided abdominal pain she is status postcholecystectomy 2 weeks ago at [facility].. But reports continuing to have pain. She felt nauseated this morning but no vomiting. She is also having some blood per rectum. And straining with bowel movements. More so this morning she is currently on her menstrual cycle. Last time she took anything for her discomfort was a few days ago which was Motrin. Patient is a diabetic, poorly controlled. She does report that after her gallbladder was removed she did have some elevated liver enzymes. The pain has continued since the surgery. And is worsening. Patient on exam is well-appearing nontoxic she has right upper quadrant tenderness to palpation but abdomen is soft without peritoneal signs. Rectal exam was performed with Dr. Paul Mcgovern present in the room. She does have a fissure noted at the 11 o'clock position. No active bleeding noted. Patient is wearing a pad due to her menstrual cycle and there is blood noted grossly. No hemorrhoids were noted. Patient with continued right upper quadrant pain we will plan on pain control, check labs. LFTs pain control antiemetics. She will need to be discharged home on stool softener.     Janae Sears D.O, M.P.H  Attending Emergency Medicine Physician         Janae eSars,   09/06/22 1232

## 2022-09-06 NOTE — ED PROVIDER NOTES
[address]  ED  Emergency Department Encounter  EmergencyMedicine Resident     Pt Hong Greenfield  MRN: [id number]  [place] 1995  Date of evaluation: 22  PCP:  Boston Colunga PA-C    CHIEF COMPLAINT       Chief Complaint   Patient presents with    Flank Pain     rt    Abdominal Pain     Lower pain; pt stated may be due to menstrual cycle        HISTORY OF PRESENT ILLNESS  (Location/Symptom, Timing/Onset, Context/Setting, Quality, Duration, Modifying Factors, Severity.)      Sara Diaz is a 32 y.o. female who presents with flank pain and abdominal pain since yesterday. Patient states she has been experiencing right flank pain since last night (22), pain level 10/10, comes and goes, non radiating, when she is laying down and resting, the pain is a little better, and when she is taking barbeque sauce, the pain worsens. Patient also states abdominal cramps, she thinks its from the period that started yesterday. Patient states she had gall bladder removal 2 weeks ago due to the gall bladder stones and bile duct blockage, but still has right shoulder shooting pain which is same pain as the prior surgery. PAST MEDICAL / SURGICAL / SOCIAL / FAMILY HISTORY      has a past medical history of Asthma, Bipolar 1 disorder ([address] Diabetes mellitus type 2 in obese ([address] GERD (gastroesophageal reflux disease), Hepatic steatosis, Hypertension, Morbid obesity with body mass index (BMI) of 40.0 to 49.9 ([address] Neuropathy, and Postpartum depression. Reviewed with the patient     has a past surgical history that includes Tonsillectomy (Bilateral);  section; eye surgery;  section (N/A, 3/23/2017); Upper gastrointestinal endoscopy (N/A, 2018); Breast surgery (Left, 2019); Breast surgery (Right, 2019); Rectal surgery (N/A, 2/3/2020); Upper gastrointestinal endoscopy (N/A, 2020); Colonoscopy (N/A, 2020); Axillary Surgery (Right, 2021);  Ankle arthroscopy (Right, 01/10/2022); and Ankle arthroscopy (Right, 1/10/2022). Reviewed with the patient    Social History     Socioeconomic History    Marital status: Single     Spouse name: Not on file    Number of children: 2    Years of education: graduated high school, some college    Highest education level: Not on file   Occupational History    Occupation: housewife    Occupation:      Comment: last worked    Tobacco Use    Smoking status: Former     Packs/day: 1.00     Years: 10.00     Pack years: 10.00     Types: Cigarettes     Start date: 2007     Quit date: 2020     Years since quittin.9    Smokeless tobacco: Never   Vaping Use    Vaping Use: Every day    Substances: Always   Substance and Sexual Activity    Alcohol use: No     Alcohol/week: 0.0 standard drinks    Drug use: No    Sexual activity: Yes     Partners: Male     Comment: chlamydia in 2016,   Other Topics Concern    Not on file   Social History Narrative    Lives with fipatricia and 2 daughters     Social Determinants of Health     Financial Resource Strain: Not on file   Food Insecurity: Not on file   Transportation Needs: Not on file   Physical Activity: Not on file   Stress: Not on file   Social Connections: Not on file   Intimate Partner Violence: Not on file   Housing Stability: Not on file       Family History   Problem Relation Age of Onset    Breast Cancer Mother 28    Diabetes Father     Cancer Maternal Uncle 60        acute leukemia    Heart Disease Maternal Uncle     Diabetes Maternal Uncle     Colon Cancer Maternal Uncle     Diabetes Maternal Uncle     Heart Attack Maternal Uncle     Uterine Cancer Neg Hx     Ovarian Cancer Neg Hx        Allergies:  Benadryl [diphenhydramine]    Home Medications:  Prior to Admission medications    Medication Sig Start Date End Date Taking?  Authorizing Provider   predniSONE 10 MG (21) TBPK Take 1 Dose by mouth daily Take 60mg po on day 1, 50mg po on day 2, 40mg po on day 3, 30mg po on day 4, 20 mg po on day 5, 10mg po on day 6 7/20/22   Vinay Henderson DPM   gabapentin (NEURONTIN) 300 MG capsule Take 1 capsule by mouth 3 times daily for 120 days. Intended supply: 90 days 5/17/22 9/14/22  Vinay Henderson DPM   albuterol sulfate  (90 Base) MCG/ACT inhaler Inhale 2 puffs into the lungs every 6 hours as needed for Wheezing 5/3/22   Rickey Nuñez MD   metFORMIN (GLUCOPHAGE) 1000 MG tablet take 1 tablet by mouth twice a day with meals 5/2/22   Urbano Cardona PA-C   cephALEXin (KEFLEX) 500 MG capsule Take 1 capsule by mouth 4 times daily 4/20/22   Gwendolyn Lion MD   ibuprofen (ADVIL;MOTRIN) 800 MG tablet Take 1 tablet by mouth every 6 hours as needed for Pain 4/20/22   Gwendolyn Lion MD   ibuprofen (ADVIL;MOTRIN) 800 MG tablet Take 1 tablet by mouth every 8 hours as needed for Pain 4/12/22   Gwendolyn Lion MD   lisinopril (PRINIVIL;ZESTRIL) 5 MG tablet take 1 tablet by mouth once daily 4/4/22   Urbano Cardona PA-C   insulin detemir (LEVEMIR FLEXTOUCH) 100 UNIT/ML injection pen inject 55 units subcutaneously nightly 3/10/22   MARCELO Morales CNP   Insulin Aspart FlexPen 100 UNIT/ML SOPN inject 4 units subcutaneously before breakfast then inject 4 units subcutaneously before LUNCH then inject 6 units subcutaneously before dinner and 6 units subcutaneously at bedtime 3/10/22   MARCELO Morales CNP   UNIFINE PENTIPS 29G X 12MM MISC USE 4 times a DAILY WITH basal and short acting insulin 3/10/22   MARCELO Morales CNP   blood glucose monitor kit and supplies 1 kit by Other route three times daily Dispense product that insurance will cover 3/10/22   MARCELO Morales CNP   Lancets MISC 1 each by Does not apply route 3 times daily 3/10/22   MARCELO Morales CNP   blood glucose monitor strips Test 3 times a day & as needed for symptoms of irregular blood glucose.  Dispense sufficient amount for indicated testing frequency plus additional to accommodate PRN testing needs. 3/10/22   MARCELO Lemons - CNP   Elastic Bandages & Supports (JOBST KNEE HIGH COMPRESSION SM) MISC Dispense Bilateral Jobst Below Knee 20-30mmHg compression stockings 3/2/22   Jose Tolbert DPM   ondansetron (ZOFRAN ODT) 4 MG disintegrating tablet Take 1 tablet by mouth every 8 hours as needed for Nausea 2/21/22   Tashia Mccrary MD   ibuprofen (IBU) 600 MG tablet Take 1 tablet by mouth every 8 hours as needed for Pain 9/27/21   Carina Cuevas PA-C   budesonide-formoterol (SYMBICORT) 160-4.5 MCG/ACT AERO Inhale 2 puffs into the lungs 2 times daily 9/16/21   Disha Castellon MD   ipratropium-albuterol (DUONEB) 0.5-2.5 (3) MG/3ML SOLN nebulizer solution Inhale 3 mLs into the lungs every 4 hours (while awake) 9/16/21   Disha Castellon MD   Blood Pressure KIT 1 kit by Does not apply route 2 times daily 9/16/21   Disha Castellon MD   omeprazole (PRILOSEC) 20 MG delayed release capsule Take 1 capsule by mouth every morning (before breakfast) 7/12/21 12/28/21  MARCELO Correia - NP   Insulin Pen Needle (PEN NEEDLES) 31G X 6 MM MISC 1 each by Does not apply route daily 5/21/21   Brooklyn Franco PA-C   TRUEplus Lancets 30G MISC TEST 2 TIMES A DAY AND AS NEEED FOR SYMPTOMS OF IRREGULAR BLOOD GLUCOSE 4/21/21   Brooklyn Franco PA-C   acetaminophen (TYLENOL) 500 MG tablet Take 2 tablets by mouth 3 times daily 10/28/20   Martin Arriaza MD   albuterol sulfate HFA (PROVENTIL HFA) 108 (90 Base) MCG/ACT inhaler Inhale 2 puffs into the lungs every 6 hours as needed for Wheezing 7/23/19   Kevin Horne PA-C       REVIEW OF SYSTEMS    (2-9 systems for level 4, 10 or more for level 5)      Review of Systems   Constitutional:  Positive for appetite change. Negative for chills and fever. HENT:  Negative for congestion, rhinorrhea and sore throat. Eyes:  Negative for discharge and redness. Respiratory:  Negative for apnea and cough.     Cardiovascular: Negative for chest pain, palpitations and leg swelling. Gastrointestinal:  Positive for abdominal pain, anal bleeding and blood in stool. Negative for constipation, nausea and vomiting. Endocrine: Negative for cold intolerance and heat intolerance. Genitourinary:  Positive for flank pain. Negative for dysuria. Musculoskeletal:         Right shoulder pain     Neurological:  Positive for headaches. Psychiatric/Behavioral:  Negative for agitation and confusion. PHYSICAL EXAM   (up to 7 for level 4, 8 or more for level 5)      INITIAL VITALS:   /85   Pulse 77   Temp 97.2 °F (36.2 °C) (Oral)   Resp 16   SpO2 99%     Physical Exam  Constitutional:       General: She is in acute distress. Appearance: She is obese. HENT:      Head: Normocephalic and atraumatic. Eyes:      Extraocular Movements: Extraocular movements intact. Pupils: Pupils are equal, round, and reactive to light. Cardiovascular:      Rate and Rhythm: Normal rate and regular rhythm. Pulmonary:      Effort: Pulmonary effort is normal.      Breath sounds: Normal breath sounds. Abdominal:      Palpations: Abdomen is soft. Tenderness: There is abdominal tenderness in the right upper quadrant and right lower quadrant. There is right CVA tenderness and rebound. There is no guarding. Neurological:      General: No focal deficit present. Mental Status: She is alert.    Psychiatric:         Mood and Affect: Mood normal.       DIFFERENTIAL  DIAGNOSIS     PLAN (LABS / IMAGING / EKG):  Orders Placed This Encounter   Procedures    CT ABDOMEN PELVIS W IV CONTRAST Additional Contrast? Radiologist Recommendation    CBC    Basic Metabolic Panel    Hepatic Function Panel    HCG Qualitative, Serum    Urinalysis with Microscopic    Lipase    Insert peripheral IV       MEDICATIONS ORDERED:  Orders Placed This Encounter   Medications    ondansetron (ZOFRAN) injection 4 mg    0.9 % sodium chloride bolus    dicyclomine (BENTYL) injection 20 mg    iopamidol (ISOVUE-370) 76 % injection 75 mL       DIAGNOSTIC RESULTS / EMERGENCY DEPARTMENT COURSE / MDM:  Anastasia Cruz is a 32 y.o. female who presents with flank pain and abdominal pain since yesterday. Pt is s/p gall bladder removal 2 weeks ago, has been experiencing abdominal pain since then. Physical exam shows RUQ and LUQ tenderness, with mild rebound pain, soft abdomin, can not exclude the pain from the surgery site vs menstrual pain. We will control the pain and nausea for now. And obtain LFT, CBC, Lipase, BMP, UA. Reaccess the pt after the lab results. Per attending's rectal physical exam, fissure noted at the 11 o'clock position. We will order stool softener for the pt to take home. Lab shows glucose and lipase elevated, no signs of infection. Pt complains the pain is back, physical exam shows RUQ and RLQ tenderness. We will obtain abdominal CT with IV contrast for further evaluation. Abdominal CT is unremarkable, pt states the pain is better, she is able to tolerate the pain. We will discharge the patient to home, tylenol/motrin are recommended for pain. Pt also recommended to follow up with general surgeon.             LAB RESULTS:  Results for orders placed or performed during the hospital encounter of 09/06/22   CBC   Result Value Ref Range    WBC 10.1 3.5 - 11.3 k/uL    RBC 4.56 3.95 - 5.11 m/uL    Hemoglobin 14.3 11.9 - 15.1 g/dL    Hematocrit 40.2 36.3 - 47.1 %    MCV 88.2 82.6 - 102.9 fL    MCH 31.4 25.2 - 33.5 pg    MCHC 35.6 (H) 28.4 - 34.8 g/dL    RDW 12.8 11.8 - 14.4 %    Platelets 661 162 - 679 k/uL    MPV 10.5 8.1 - 13.5 fL    NRBC Automated 0.0 0.0 per 100 WBC   Basic Metabolic Panel   Result Value Ref Range    Glucose 333 (H) 70 - 99 mg/dL    BUN 11 6 - 20 mg/dL    Creatinine 0.52 0.50 - 0.90 mg/dL    Calcium 9.4 8.6 - 10.4 mg/dL    Sodium 132 (L) 135 - 144 mmol/L    Potassium 4.2 3.7 - 5.3 mmol/L    Chloride 100 98 - 107 mmol/L    CO2 19 (L) 20 - 31 mmol/L    Anion Gap 13 9 - 17 mmol/L    GFR Non-African American >60 >60 mL/min    GFR African American >60 >60 mL/min    GFR Comment         Hepatic Function Panel   Result Value Ref Range    Albumin 4.4 3.5 - 5.2 g/dL    Alkaline Phosphatase 78 35 - 104 U/L    ALT 37 (H) 5 - 33 U/L    AST 41 (H) <32 U/L    Total Bilirubin 0.9 0.3 - 1.2 mg/dL    Bilirubin, Direct 0.2 <0.31 mg/dL    Bilirubin, Indirect 0.7 0.00 - 1.00 mg/dL    Total Protein 7.1 6.4 - 8.3 g/dL    Albumin/Globulin Ratio 1.6 1.0 - 2.5   HCG Qualitative, Serum   Result Value Ref Range    hCG Qual NEGATIVE NEGATIVE   Urinalysis with Microscopic   Result Value Ref Range    Color, UA Orange (A) Yellow    Turbidity UA Cloudy (A) Clear    Glucose, Ur 3+ (A) NEGATIVE    Bilirubin Urine NEGATIVE NEGATIVE    Ketones, Urine NEGATIVE NEGATIVE    Specific Gravity, UA 1.042 (H) 1.005 - 1.030    Urine Hgb LARGE (A) NEGATIVE    pH, UA 5.5 5.0 - 8.0    Protein, UA 2+ (A) NEGATIVE    Urobilinogen, Urine Normal Normal    Nitrite, Urine NEGATIVE NEGATIVE    Leukocyte Esterase, Urine TRACE (A) NEGATIVE    WBC, UA 5 TO 10 0 - 5 /HPF    RBC, UA TOO NUMEROUS TO COUNT 0 - 2 /HPF    Epithelial Cells UA 10 TO 20 0 - 5 /HPF   Lipase   Result Value Ref Range    Lipase 187 (H) 13 - 60 U/L       IMPRESSION:   Abdominal pain: Patient has been having RUQ and RLQ abdominal pain since last night, 10/10, s/p cholecystectomy 2 ago, complains of nausea without vomiting. RADIOLOGY:  CT ABDOMEN PELVIS W IV CONTRAST Additional Contrast? Radiologist Recommendation    Result Date: 9/6/2022  EXAMINATION: CT OF THE ABDOMEN AND PELVIS WITH CONTRAST 9/6/2022 4:47 pm TECHNIQUE: CT of the abdomen and pelvis was performed with the administration of intravenous contrast. Multiplanar reformatted images are provided for review.  Automated exposure control, iterative reconstruction, and/or weight based adjustment of the mA/kV was utilized to reduce the radiation dose to as low as reasonably achievable. COMPARISON: 02/21/2022 HISTORY: ORDERING SYSTEM PROVIDED HISTORY: abdominal pain, lipase 187, s/p cholecystectomy 2 weeks TECHNOLOGIST PROVIDED HISTORY: abdominal pain, lipase 187, s/p cholecystectomy 2 weeks Decision Support Exception - unselect if not a suspected or confirmed emergency medical condition->Emergency Medical Condition (MA) Reason for Exam: abdominal pain, lipase 187, s/p cholecystectomy 2 weeks FINDINGS: Lower Chest:  Visualized portion of the lower chest demonstrates no acute abnormality. Organs: Hepatomegaly with diffuse hepatic hypoattenuation. No focal hepatic lesion. Changes of recent cholecystectomy with minimal edema in the gallbladder fossa. No biliary ductal dilatation. The pancreas, spleen, adrenal glands, and right kidney are unremarkable. The left kidney is unremarkable other than a benign subcentimeter cortical cyst.  No hydronephrosis or urinary tract calculus. GI/Bowel: No acute bowel abnormality. Normal appendix. Pelvis: Urinary bladder and pelvic organs are unremarkable. Peritoneum/Retroperitoneum: No free air or free fluid. Normal abdominal aortic caliber. No abnormal lymph node. Bones/Soft Tissues: No acute osseous or soft tissue abnormality. 1. Changes of recent cholecystectomy with minimal edema in the gallbladder fossa. 2. Hepatomegaly with diffuse hepatic steatosis. EKG  N/A    All EKG's are interpreted by the Emergency Department Physician who either signs or Co-signs this chart in the absence of a cardiologist.    EMERGENCY DEPARTMENT COURSE:      -CBC    -BMP    -Lipase    -Hepatic function panel    -HCG urine    -UA    -Bentyl    -Zofran    -IVF bolus NS     -Abdominal CT with IV contrast    PROCEDURES:  N/A    CONSULTS:  None    CRITICAL CARE:  Please see attending note    FINAL IMPRESSION      1.  Postoperative right upper quadrant abdominal pain          DISPOSITION / PLAN     DISPOSITION Decision To Discharge 09/06/2022 05:49:22 PM      PATIENT REFERRED TO:  David Watt PA-C  [facility]  [address] [facility],[address]  [address] [address]  [phone]    Call in 2 days  As needed    Emelia Potter MD  [address] [place] [address] [address] [facility]  [address] [facility] [address]    Schedule an appointment as soon as possible for a visit in 2 days      DISCHARGE MEDICATIONS:  Discharge Medication List as of 9/6/2022  5:54 PM          Yvonne Jones MD  Emergency Medicine Resident    (Please note that portions of thisnote were completed with a voice recognition program.  Efforts were made to edit the dictations but occasionally words are mis-transcribed.)      Yvonne Jones MD  Resident  09/06/22 [id number]

## 2022-09-23 ENCOUNTER — HOSPITAL ENCOUNTER (EMERGENCY)
Age: 27
Discharge: HOME | End: 2022-09-23
Attending: EMERGENCY MEDICINE
Payer: COMMERCIAL

## 2022-09-23 ENCOUNTER — NURSE TRIAGE (OUTPATIENT)
Dept: OTHER | Facility: CLINIC | Age: 27
End: 2022-09-23

## 2022-09-23 VITALS
DIASTOLIC BLOOD PRESSURE: 64 MMHG | BODY MASS INDEX: 38.99 KG/M2 | WEIGHT: 234 LBS | HEIGHT: 65 IN | HEART RATE: 99 BPM | OXYGEN SATURATION: 100 % | RESPIRATION RATE: 20 BRPM | SYSTOLIC BLOOD PRESSURE: 107 MMHG | TEMPERATURE: 97.5 F

## 2022-09-23 DIAGNOSIS — L02.91 ABSCESS: Primary | ICD-10-CM

## 2022-09-23 LAB
ABSOLUTE EOS #: 0.3 K/UL (ref 0–0.4)
ABSOLUTE LYMPH #: 2.6 K/UL (ref 1–4.8)
ABSOLUTE MONO #: 0.7 K/UL (ref 0.1–1.3)
ALBUMIN SERPL-MCNC: 4.6 G/DL (ref 3.5–5.2)
ALP BLD-CCNC: 97 U/L (ref 35–104)
ALT SERPL-CCNC: 31 U/L (ref 5–33)
ANION GAP SERPL CALCULATED.3IONS-SCNC: 14 MMOL/L (ref 9–17)
AST SERPL-CCNC: 23 U/L
BASOPHILS # BLD: 1 % (ref 0–2)
BASOPHILS ABSOLUTE: 0.1 K/UL (ref 0–0.2)
BILIRUB SERPL-MCNC: 1.3 MG/DL (ref 0.3–1.2)
BUN BLDV-MCNC: 7 MG/DL (ref 6–20)
CALCIUM SERPL-MCNC: 9.4 MG/DL (ref 8.6–10.4)
CHLORIDE BLD-SCNC: 97 MMOL/L (ref 98–107)
CO2: 20 MMOL/L (ref 20–31)
CREAT SERPL-MCNC: 0.54 MG/DL (ref 0.5–0.9)
EOSINOPHILS RELATIVE PERCENT: 2 % (ref 0–4)
GFR AFRICAN AMERICAN: >60 ML/MIN
GFR NON-AFRICAN AMERICAN: >60 ML/MIN
GFR SERPL CREATININE-BSD FRML MDRD: ABNORMAL ML/MIN/{1.73_M2}
GLUCOSE BLD-MCNC: 440 MG/DL (ref 70–99)
HCT VFR BLD CALC: 40.1 % (ref 36–46)
HEMOGLOBIN: 14.2 G/DL (ref 12–16)
LYMPHOCYTES # BLD: 23 % (ref 24–44)
MCH RBC QN AUTO: 31 PG (ref 26–34)
MCHC RBC AUTO-ENTMCNC: 35.4 G/DL (ref 31–37)
MCV RBC AUTO: 87.7 FL (ref 80–100)
MONOCYTES # BLD: 6 % (ref 1–7)
PDW BLD-RTO: 13 % (ref 11.5–14.9)
PLATELET # BLD: 243 K/UL (ref 150–450)
PMV BLD AUTO: 8.3 FL (ref 6–12)
POTASSIUM SERPL-SCNC: 3.7 MMOL/L (ref 3.7–5.3)
RBC # BLD: 4.58 M/UL (ref 4–5.2)
SEG NEUTROPHILS: 68 % (ref 36–66)
SEGMENTED NEUTROPHILS ABSOLUTE COUNT: 7.5 K/UL (ref 1.3–9.1)
SODIUM BLD-SCNC: 131 MMOL/L (ref 135–144)
TOTAL PROTEIN: 7.1 G/DL (ref 6.4–8.3)
WBC # BLD: 11.1 K/UL (ref 3.5–11)

## 2022-09-23 PROCEDURE — 6370000000 HC RX 637 (ALT 250 FOR IP): Performed by: STUDENT IN AN ORGANIZED HEALTH CARE EDUCATION/TRAINING PROGRAM

## 2022-09-23 PROCEDURE — 10061 I&D ABSCESS COMP/MULTIPLE: CPT

## 2022-09-23 PROCEDURE — 96372 THER/PROPH/DIAG INJ SC/IM: CPT

## 2022-09-23 PROCEDURE — 99284 EMERGENCY DEPT VISIT MOD MDM: CPT

## 2022-09-23 PROCEDURE — 2580000003 HC RX 258: Performed by: EMERGENCY MEDICINE

## 2022-09-23 PROCEDURE — 96374 THER/PROPH/DIAG INJ IV PUSH: CPT

## 2022-09-23 PROCEDURE — 2500000003 HC RX 250 WO HCPCS: Performed by: STUDENT IN AN ORGANIZED HEALTH CARE EDUCATION/TRAINING PROGRAM

## 2022-09-23 PROCEDURE — 85025 COMPLETE CBC W/AUTO DIFF WBC: CPT

## 2022-09-23 PROCEDURE — 36415 COLL VENOUS BLD VENIPUNCTURE: CPT

## 2022-09-23 PROCEDURE — 80053 COMPREHEN METABOLIC PANEL: CPT

## 2022-09-23 PROCEDURE — 6360000002 HC RX W HCPCS: Performed by: STUDENT IN AN ORGANIZED HEALTH CARE EDUCATION/TRAINING PROGRAM

## 2022-09-23 RX ORDER — MORPHINE SULFATE 4 MG/ML
4 INJECTION, SOLUTION INTRAMUSCULAR; INTRAVENOUS ONCE
Status: COMPLETED | OUTPATIENT
Start: 2022-09-23 | End: 2022-09-23

## 2022-09-23 RX ORDER — INSULIN LISPRO 100 [IU]/ML
6 INJECTION, SOLUTION INTRAVENOUS; SUBCUTANEOUS ONCE
Status: COMPLETED | OUTPATIENT
Start: 2022-09-23 | End: 2022-09-23

## 2022-09-23 RX ORDER — SULFAMETHOXAZOLE AND TRIMETHOPRIM 800; 160 MG/1; MG/1
1 TABLET ORAL 2 TIMES DAILY
Qty: 14 TABLET | Refills: 0 | Status: SHIPPED | OUTPATIENT
Start: 2022-09-23 | End: 2022-09-30

## 2022-09-23 RX ORDER — SULFAMETHOXAZOLE AND TRIMETHOPRIM 800; 160 MG/1; MG/1
1 TABLET ORAL ONCE
Status: COMPLETED | OUTPATIENT
Start: 2022-09-23 | End: 2022-09-23

## 2022-09-23 RX ORDER — LIDOCAINE HYDROCHLORIDE 10 MG/ML
20 INJECTION, SOLUTION INFILTRATION; PERINEURAL ONCE
Status: COMPLETED | OUTPATIENT
Start: 2022-09-23 | End: 2022-09-23

## 2022-09-23 RX ORDER — CEPHALEXIN 500 MG/1
500 CAPSULE ORAL 2 TIMES DAILY
Qty: 14 CAPSULE | Refills: 0 | Status: SHIPPED | OUTPATIENT
Start: 2022-09-23 | End: 2022-09-30

## 2022-09-23 RX ORDER — CEPHALEXIN 250 MG/1
500 CAPSULE ORAL ONCE
Status: COMPLETED | OUTPATIENT
Start: 2022-09-23 | End: 2022-09-23

## 2022-09-23 RX ORDER — IBUPROFEN 400 MG/1
400 TABLET ORAL EVERY 6 HOURS PRN
Qty: 12 TABLET | Refills: 0 | Status: SHIPPED | OUTPATIENT
Start: 2022-09-23 | End: 2022-09-26

## 2022-09-23 RX ORDER — 0.9 % SODIUM CHLORIDE 0.9 %
1000 INTRAVENOUS SOLUTION INTRAVENOUS ONCE
Status: COMPLETED | OUTPATIENT
Start: 2022-09-23 | End: 2022-09-23

## 2022-09-23 RX ORDER — ACETAMINOPHEN 500 MG
1000 TABLET ORAL 3 TIMES DAILY
Qty: 18 TABLET | Refills: 0 | Status: SHIPPED | OUTPATIENT
Start: 2022-09-23 | End: 2022-09-26

## 2022-09-23 RX ADMIN — LIDOCAINE HYDROCHLORIDE 20 ML: 10 INJECTION, SOLUTION INFILTRATION; PERINEURAL at 19:27

## 2022-09-23 RX ADMIN — CEPHALEXIN 500 MG: 250 CAPSULE ORAL at 17:33

## 2022-09-23 RX ADMIN — SULFAMETHOXAZOLE AND TRIMETHOPRIM 1 TABLET: 800; 160 TABLET ORAL at 17:33

## 2022-09-23 RX ADMIN — INSULIN LISPRO 6 UNITS: 100 INJECTION, SOLUTION INTRAVENOUS; SUBCUTANEOUS at 19:22

## 2022-09-23 RX ADMIN — SODIUM CHLORIDE 1000 ML: 9 INJECTION, SOLUTION INTRAVENOUS at 18:08

## 2022-09-23 RX ADMIN — MORPHINE SULFATE 4 MG: 4 INJECTION, SOLUTION INTRAMUSCULAR; INTRAVENOUS at 18:29

## 2022-09-23 ASSESSMENT — ENCOUNTER SYMPTOMS
DIARRHEA: 0
CHEST TIGHTNESS: 0
ABDOMINAL DISTENTION: 0
RHINORRHEA: 0
PHOTOPHOBIA: 0
NAUSEA: 0
SHORTNESS OF BREATH: 0
VOMITING: 0
SORE THROAT: 0
CONSTIPATION: 0
ANAL BLEEDING: 0

## 2022-09-23 ASSESSMENT — PAIN SCALES - GENERAL: PAINLEVEL_OUTOF10: 10

## 2022-09-23 ASSESSMENT — PAIN - FUNCTIONAL ASSESSMENT: PAIN_FUNCTIONAL_ASSESSMENT: 0-10

## 2022-09-23 NOTE — ED PROVIDER NOTES
EMERGENCY DEPARTMENT ENCOUNTER   ATTENDING ATTESTATION     Pt Name: Anastasia Cruz  MRN: [id number]  [place] 1995  Date of evaluation: 9/23/22       Anastasia Cruz is a 32 y.o. female who presents with Abscess (Pt to ED for multiple abscesses- primarily concerned for that to right breast and right axilla//Called surgeons office and was advised to come to ED)      MDM:   Small right breast abscess, very superficial, no deep tenderness, about 2-3 cm in size  Small right axillary abscess about 1-2 cm in size  Sent in by her surgeon's office for I and D here in ED  IDDM, checking labs, starting po abx      Vitals:   Vitals:    09/23/22 1706   BP: 107/64   Pulse: 99   Resp: 20   Temp: 97.5 °F (36.4 °C)   TempSrc: Oral   SpO2: 100%   Weight: 234 lb (106.1 kg)   Height: 5' 5\" (1.651 m)         I personally saw and examined the patient. I have reviewed and agree with the resident's findings, including all diagnostic interpretations and treatment plan as written. I was present for the key portions of any procedures performed and the inclusive time noted for any critical care statement. The care is provided during an unprecedented national emergency due to the novel coronavirus, COVID 19.   Brigette Denise MD  Attending Emergency Physician            Brigette Denise MD  09/23/22 1619

## 2022-09-23 NOTE — ED NOTES
Critical lab received from lab, Glucose 440. Provider made aware.       Gerri Malone RN  09/23/22 9121

## 2022-09-23 NOTE — TELEPHONE ENCOUNTER
Received call from Ki Booth at [facility] with [employer]. Subjective: Caller states \"2 abscesses, 1 in right armpit and on the right breast, swelling in the throat and face\"     Current Symptoms: abbesses painful, swollen, hot and red, swelling in the throat and face, difficulty swallowing, sick to the stomach, bpg 353    Onset: 2 days ago; worsening    Associated Symptoms: reduced appetite    Pain Severity: 10/10; aching, throbbing; constant    Temperature: denies    What has been tried: nothing    LMP:  9/2022  Pregnant: No    Recommended disposition: Go to [facility] for further evaluation of abbesses painful, swollen, hot and red, swelling in the throat and face, difficulty swallowing, sick to the stomach, bpg 353    Care advice provided, patient verbalizes understanding; denies any other questions or concerns; instructed to call back for any new or worsening symptoms. Patient/Caller agrees with recommended disposition; writer provided warm transfer to Valeriy Mendez at [facility] for appointment scheduling    Attention Provider: Thank you for allowing me to participate in the care of your patient. The patient was connected to triage in response to information provided to the ECC/PSC. Please do not respond through this encounter as the response is not directed to a shared pool.         Reason for Disposition   SEVERE pain (e.g., excruciating)    Protocols used: Boil (Skin Abscess)-ADULT-OH

## 2022-09-23 NOTE — ED PROVIDER NOTES
education level: Not on file   Occupational History    Occupation: housewife    Occupation:      Comment: last worked 2015   Tobacco Use    Smoking status: Former     Packs/day: 1.00     Years: 10.00     Pack years: 10.00     Types: Cigarettes     Start date: 2007     Quit date: 2020     Years since quittin.0    Smokeless tobacco: Never   Vaping Use    Vaping Use: Every day    Substances: Always   Substance and Sexual Activity    Alcohol use: No     Alcohol/week: 0.0 standard drinks    Drug use: No    Sexual activity: Yes     Partners: Male     Comment: chlamydia in 2016,   Other Topics Concern    Not on file   Social History Narrative    Lives with fipatricia and 2 daughters     Social Determinants of Health     Financial Resource Strain: Not on file   Food Insecurity: Not on file   Transportation Needs: Not on file   Physical Activity: Not on file   Stress: Not on file   Social Connections: Not on file   Intimate Partner Violence: Not on file   Housing Stability: Not on file       Family History   Problem Relation Age of Onset    Breast Cancer Mother 28    Diabetes Father     Cancer Maternal Uncle 60        acute leukemia    Heart Disease Maternal Uncle     Diabetes Maternal Uncle     Colon Cancer Maternal Uncle     Diabetes Maternal Uncle     Heart Attack Maternal Uncle     Uterine Cancer Neg Hx     Ovarian Cancer Neg Hx        Allergies:  Benadryl [diphenhydramine]    Home Medications:  Prior to Admission medications    Medication Sig Start Date End Date Taking?  Authorizing Provider   cephALEXin (KEFLEX) 500 MG capsule Take 1 capsule by mouth 2 times daily for 7 days 22 Yes Poonam Dolan DO   sulfamethoxazole-trimethoprim (BACTRIM DS) 800-160 MG per tablet Take 1 tablet by mouth 2 times daily for 7 days 22 Yes Poonam Dolan DO   acetaminophen (TYLENOL) 500 MG tablet Take 2 tablets by mouth 3 times daily for 3 days 22 Yes Shanon Mal DO [place]   ibuprofen (IBU) 400 MG tablet Take 1 tablet by mouth every 6 hours as needed for Pain 9/23/22 9/26/22 Yes Jaison Holloway DO   LEVEMIR FLEXTOUCH 100 UNIT/ML injection pen inject 55 units subcutaneously NIGHTLY 9/9/22   MARCELO Fraire CNP   metFORMIN (GLUCOPHAGE) 1000 MG tablet take 1 tablet by mouth twice a day with meals 9/8/22   MARCELO Fraire CNP   predniSONE 10 MG (21) TBPK Take 1 Dose by mouth daily Take 60mg po on day 1, 50mg po on day 2, 40mg po on day 3, 30mg po on day 4, 20 mg po on day 5, 10mg po on day 6 7/20/22   Rubio Vance, DPM   gabapentin (NEURONTIN) 300 MG capsule Take 1 capsule by mouth 3 times daily for 120 days.  Intended supply: 90 days 5/17/22 9/14/22  Rubio Vance, DPM   albuterol sulfate  (90 Base) MCG/ACT inhaler Inhale 2 puffs into the lungs every 6 hours as needed for Wheezing 5/3/22   Brigid Cruz MD   cephALEXin (KEFLEX) 500 MG capsule Take 1 capsule by mouth 4 times daily 4/20/22   Tasha Chino MD   ibuprofen (ADVIL;MOTRIN) 800 MG tablet Take 1 tablet by mouth every 6 hours as needed for Pain 4/20/22   Tasha Chino MD   ibuprofen (ADVIL;MOTRIN) 800 MG tablet Take 1 tablet by mouth every 8 hours as needed for Pain 4/12/22   Tasha Chino MD   lisinopril (PRINIVIL;ZESTRIL) 5 MG tablet take 1 tablet by mouth once daily 4/4/22   Fausto Topete PA-C   Insulin Aspart FlexPen 100 UNIT/ML SOPN inject 4 units subcutaneously before breakfast then inject 4 units subcutaneously before LUNCH then inject 6 units subcutaneously before dinner and 6 units subcutaneously at bedtime 3/10/22   Claudetta Rainbow, APRN - CNP   UNIFINE PENTIPS 29G X 12MM MISC USE 4 times a DAILY WITH basal and short acting insulin 3/10/22   Claudetta Rainbow, APRN - CNP   blood glucose monitor kit and supplies 1 kit by Other route three times daily Dispense product that insurance will cover 3/10/22   Claudetta [place], APRN - CNP   Lancets MISC 1 each by Negative for chills and fever. HENT:  Negative for rhinorrhea and sore throat. Eyes:  Negative for photophobia. Respiratory:  Negative for chest tightness and shortness of breath. Cardiovascular:  Negative for chest pain. Gastrointestinal:  Negative for abdominal distention, anal bleeding, constipation, diarrhea, nausea and vomiting. Endocrine: Negative for polyuria. Genitourinary:  Negative for difficulty urinating and flank pain. Musculoskeletal:  Negative for arthralgias. Skin:  Negative for rash. Neurological:  Negative for weakness and headaches. PHYSICAL EXAM   (up to 7 for level 4, 8 or more for level 5)      INITIAL VITALS:   /64   Pulse 99   Temp 97.5 °F (36.4 °C) (Oral)   Resp 20   Ht 5' 5\" (1.651 m)   Wt 234 lb (106.1 kg)   LMP 09/04/2022 (Exact Date)   SpO2 100%   BMI 38.94 kg/m²     Physical Exam  Constitutional:       General: She is not in acute distress. Appearance: She is not ill-appearing. HENT:      Head: Normocephalic and atraumatic. Nose: Nose normal.      Mouth/Throat:      Mouth: Mucous membranes are moist.   Eyes:      Extraocular Movements: Extraocular movements intact. Pupils: Pupils are equal, round, and reactive to light. Cardiovascular:      Rate and Rhythm: Normal rate. Pulses: Normal pulses. Heart sounds: Normal heart sounds. Pulmonary:      Effort: Pulmonary effort is normal.      Breath sounds: Normal breath sounds. Abdominal:      Palpations: Abdomen is soft. Tenderness: There is no abdominal tenderness. Musculoskeletal:      Cervical back: No rigidity. Right lower leg: No edema. Left lower leg: No edema. Skin:     Comments: 6 cm abscess right breast.  2 cm abscess right axillary region.   Both with fluctuance and induration and erythema and tenderness       DIFFERENTIAL  DIAGNOSIS     PLAN (LABS / IMAGING / EKG):  Orders Placed This Encounter   Procedures    INCISION AND DRAINAGE    CBC with Auto Differential    Comprehensive Metabolic Panel    Inpatient consult to General Surgery    Insert peripheral IV       MEDICATIONS ORDERED:  Orders Placed This Encounter   Medications    cephALEXin (KEFLEX) capsule 500 mg     Order Specific Question:   Antimicrobial Indications     Answer:   Skin and Soft Tissue Infection    sulfamethoxazole-trimethoprim (BACTRIM DS;SEPTRA DS) 800-160 MG per tablet 1 tablet     Order Specific Question:   Antimicrobial Indications     Answer:   Skin and Soft Tissue Infection    0.9 % sodium chloride bolus    lidocaine 1 % injection 20 mL    morphine sulfate (PF) injection 4 mg    cephALEXin (KEFLEX) 500 MG capsule     Sig: Take 1 capsule by mouth 2 times daily for 7 days     Dispense:  14 capsule     Refill:  0    sulfamethoxazole-trimethoprim (BACTRIM DS) 800-160 MG per tablet     Sig: Take 1 tablet by mouth 2 times daily for 7 days     Dispense:  14 tablet     Refill:  0    acetaminophen (TYLENOL) 500 MG tablet     Sig: Take 2 tablets by mouth 3 times daily for 3 days     Dispense:  18 tablet     Refill:  0    ibuprofen (IBU) 400 MG tablet     Sig: Take 1 tablet by mouth every 6 hours as needed for Pain     Dispense:  12 tablet     Refill:  0    insulin lispro (HUMALOG) injection vial 6 Units       DIAGNOSTIC RESULTS / EMERGENCY DEPARTMENT COURSE / MDM   LAB RESULTS:  Results for orders placed or performed during the hospital encounter of 09/23/22   CBC with Auto Differential   Result Value Ref Range    WBC 11.1 (H) 3.5 - 11.0 k/uL    RBC 4.58 4.0 - 5.2 m/uL    Hemoglobin 14.2 12.0 - 16.0 g/dL    Hematocrit 40.1 36 - 46 %    MCV 87.7 80 - 100 fL    MCH 31.0 26 - 34 pg    MCHC 35.4 31 - 37 g/dL    RDW 13.0 11.5 - 14.9 %    Platelets 599 935 - 695 k/uL    MPV 8.3 6.0 - 12.0 fL    Seg Neutrophils 68 (H) 36 - 66 %    Lymphocytes 23 (L) 24 - 44 %    Monocytes 6 1 - 7 %    Eosinophils % 2 0 - 4 %    Basophils 1 0 - 2 %    Segs Absolute 7.50 1.3 - 9.1 k/uL    Absolute Lymph # 2.60 1.0 - R breast  Pre-procedure details:     Skin preparation:  Antiseptic wash  Sedation:     Sedation type:  None  Anesthesia:     Anesthesia method:  Local infiltration    Local anesthetic:  Lidocaine 1% w/o epi  Procedure type:     Complexity:  Simple  Procedure details:     Ultrasound guidance: no      Needle aspiration: no      Incision types:  Stab incision    Incision depth:  Dermal    Wound management:  Probed and deloculated    Drainage:  Purulent    Drainage amount: Moderate    Wound treatment:  Wound left open    Packing materials:  None  Post-procedure details:     Procedure completion:  Tolerated     CONSULTS:  IP CONSULT TO GENERAL SURGERY    CRITICAL CARE:  none    MDM  Here for evaluation of recurrent abscesses in the setting of insulin-dependent diabetes. Blood sugar was elevated no evidence of DKA. Right axillary abscess not able to I&D given depth of purulent material.  The right breast abscess however was successfully  Deloculated and drained purulent material was returned. Patient started on Keflex and Bactrim per general surgery. Patient discharged with follow-up with general surgery    FINAL IMPRESSION      1.  Abscess          DISPOSITION / PLAN     DISPOSITION Decision To Discharge 09/23/2022 07:11:02 PM      PATIENT REFERRED TO:  [facility] ED  [facility] [address] [place] [address] [address] [facility]    If symptoms worsen    Zack Fernando MD  [address] [address] 01 26    In 3 days  Follow-up for evaluation of abscess    DISCHARGE MEDICATIONS:  New Prescriptions    ACETAMINOPHEN (TYLENOL) 500 MG TABLET    Take 2 tablets by mouth 3 times daily for 3 days    CEPHALEXIN (KEFLEX) 500 MG CAPSULE    Take 1 capsule by mouth 2 times daily for 7 days    IBUPROFEN (IBU) 400 MG TABLET    Take 1 tablet by mouth every 6 hours as needed for Pain    SULFAMETHOXAZOLE-TRIMETHOPRIM (BACTRIM DS) 800-160 MG PER TABLET    Take 1 tablet by mouth 2 times daily for 7 days Preeti Dallas DO  Emergency Medicine Resident    (Please note that portions of thisnote were completed with a voice recognition program.  Efforts were made to edit the dictations but occasionally words are mis-transcribed.)        Ole Biggs DO  Resident  09/23/22 0015

## 2022-11-18 ENCOUNTER — HOSPITAL ENCOUNTER (EMERGENCY)
Age: 27
Discharge: HOME | End: 2022-11-18
Attending: EMERGENCY MEDICINE
Payer: COMMERCIAL

## 2022-11-18 VITALS
RESPIRATION RATE: 21 BRPM | OXYGEN SATURATION: 96 % | HEART RATE: 73 BPM | SYSTOLIC BLOOD PRESSURE: 118 MMHG | TEMPERATURE: 98.3 F | DIASTOLIC BLOOD PRESSURE: 90 MMHG

## 2022-11-18 DIAGNOSIS — Z91.14 NON COMPLIANCE W MEDICATION REGIMEN: ICD-10-CM

## 2022-11-18 DIAGNOSIS — R73.9 HYPERGLYCEMIA: Primary | ICD-10-CM

## 2022-11-18 LAB
ABSOLUTE EOS #: 0.26 K/UL (ref 0–0.44)
ABSOLUTE IMMATURE GRANULOCYTE: 0.04 K/UL (ref 0–0.3)
ABSOLUTE LYMPH #: 3.7 K/UL (ref 1.1–3.7)
ABSOLUTE MONO #: 0.7 K/UL (ref 0.1–1.2)
ANION GAP SERPL CALCULATED.3IONS-SCNC: 14 MMOL/L (ref 9–17)
BACTERIA: ABNORMAL
BASOPHILS # BLD: 1 % (ref 0–2)
BASOPHILS ABSOLUTE: 0.05 K/UL (ref 0–0.2)
BETA-HYDROXYBUTYRATE: 0.14 MMOL/L (ref 0.02–0.27)
BILIRUBIN URINE: NEGATIVE
BUN BLDV-MCNC: 9 MG/DL (ref 6–20)
CALCIUM SERPL-MCNC: 9.6 MG/DL (ref 8.6–10.4)
CARBOXYHEMOGLOBIN: 1.6 % (ref 0–5)
CASTS UA: ABNORMAL /LPF (ref 0–8)
CHLORIDE BLD-SCNC: 99 MMOL/L (ref 98–107)
CO2: 21 MMOL/L (ref 20–31)
COLOR: YELLOW
CREAT SERPL-MCNC: 0.48 MG/DL (ref 0.5–0.9)
EOSINOPHILS RELATIVE PERCENT: 3 % (ref 1–4)
EPITHELIAL CELLS UA: ABNORMAL /HPF (ref 0–5)
FIO2: ABNORMAL
GFR SERPL CREATININE-BSD FRML MDRD: >60 ML/MIN/1.73M2
GLUCOSE BLD-MCNC: 377 MG/DL (ref 65–105)
GLUCOSE BLD-MCNC: 382 MG/DL (ref 70–99)
GLUCOSE URINE: ABNORMAL
HCO3 VENOUS: 20.8 MMOL/L (ref 24–30)
HCT VFR BLD CALC: 40.7 % (ref 36.3–47.1)
HEMOGLOBIN: 14.4 G/DL (ref 11.9–15.1)
IMMATURE GRANULOCYTES: 0 %
KETONES, URINE: NEGATIVE
LEUKOCYTE ESTERASE, URINE: NEGATIVE
LYMPHOCYTES # BLD: 36 % (ref 24–43)
MCH RBC QN AUTO: 30.9 PG (ref 25.2–33.5)
MCHC RBC AUTO-ENTMCNC: 35.4 G/DL (ref 28.4–34.8)
MCV RBC AUTO: 87.3 FL (ref 82.6–102.9)
MONOCYTES # BLD: 7 % (ref 3–12)
NEGATIVE BASE EXCESS, VEN: 3.7 MMOL/L (ref 0–2)
NITRITE, URINE: NEGATIVE
NRBC AUTOMATED: 0 PER 100 WBC
O2 SAT, VEN: 87.6 % (ref 60–85)
PATIENT TEMP: 37
PCO2, VEN: 38 MM HG (ref 39–55)
PDW BLD-RTO: 12.4 % (ref 11.8–14.4)
PH UA: 5.5 (ref 5–8)
PH VENOUS: 7.36 (ref 7.32–7.42)
PLATELET # BLD: 222 K/UL (ref 138–453)
PMV BLD AUTO: 10.6 FL (ref 8.1–13.5)
PO2, VEN: 57 MM HG (ref 30–50)
POTASSIUM SERPL-SCNC: 4 MMOL/L (ref 3.7–5.3)
PROTEIN UA: NEGATIVE
RBC # BLD: 4.66 M/UL (ref 3.95–5.11)
RBC UA: ABNORMAL /HPF (ref 0–4)
SEG NEUTROPHILS: 53 % (ref 36–65)
SEGMENTED NEUTROPHILS ABSOLUTE COUNT: 5.5 K/UL (ref 1.5–8.1)
SODIUM BLD-SCNC: 134 MMOL/L (ref 135–144)
SPECIFIC GRAVITY UA: 1.04 (ref 1–1.03)
TURBIDITY: CLEAR
URINE HGB: ABNORMAL
UROBILINOGEN, URINE: NORMAL
WBC # BLD: 10.3 K/UL (ref 3.5–11.3)
WBC UA: ABNORMAL /HPF (ref 0–5)

## 2022-11-18 PROCEDURE — 82947 ASSAY GLUCOSE BLOOD QUANT: CPT

## 2022-11-18 PROCEDURE — 82805 BLOOD GASES W/O2 SATURATION: CPT

## 2022-11-18 PROCEDURE — 99283 EMERGENCY DEPT VISIT LOW MDM: CPT

## 2022-11-18 PROCEDURE — 36415 COLL VENOUS BLD VENIPUNCTURE: CPT

## 2022-11-18 PROCEDURE — 85025 COMPLETE CBC W/AUTO DIFF WBC: CPT

## 2022-11-18 PROCEDURE — 82010 KETONE BODYS QUAN: CPT

## 2022-11-18 PROCEDURE — 80048 BASIC METABOLIC PNL TOTAL CA: CPT

## 2022-11-18 PROCEDURE — 81001 URINALYSIS AUTO W/SCOPE: CPT

## 2022-11-18 PROCEDURE — 93005 ELECTROCARDIOGRAM TRACING: CPT | Performed by: EMERGENCY MEDICINE

## 2022-11-18 RX ORDER — ACETAMINOPHEN 500 MG
1000 TABLET ORAL 3 TIMES DAILY
Qty: 90 TABLET | Refills: 0 | Status: SHIPPED | OUTPATIENT
Start: 2022-11-18

## 2022-11-18 ASSESSMENT — ENCOUNTER SYMPTOMS
VOMITING: 0
PHOTOPHOBIA: 0
SINUS PRESSURE: 0
SINUS PAIN: 0
TROUBLE SWALLOWING: 0
CONSTIPATION: 0
BACK PAIN: 0
EYE REDNESS: 0
CHEST TIGHTNESS: 1
COUGH: 1
NAUSEA: 0
SHORTNESS OF BREATH: 0
COLOR CHANGE: 0
DIARRHEA: 0
ABDOMINAL PAIN: 0
SORE THROAT: 0

## 2022-11-18 ASSESSMENT — PAIN SCALES - GENERAL: PAINLEVEL_OUTOF10: 10

## 2022-11-18 ASSESSMENT — PAIN - FUNCTIONAL ASSESSMENT: PAIN_FUNCTIONAL_ASSESSMENT: 0-10

## 2022-11-18 NOTE — Clinical Note
Thee Pena was seen and treated in our emergency department on 11/18/2022. She may return to work on 11/19/2022. If you have any questions or concerns, please don't hesitate to call.       Russell Hoffman, DO

## 2022-11-18 NOTE — ED NOTES
Pt arrived to ED with report of hyperglycemia. Pt also reports headache x 1 week, dizziness, nausea, CP & SOB. Pt reports BS was 480 at home at states daily medications were not controlling Blood glucose.  Blood sugar      Sueellen Bence, RN  11/18/22 [id number]

## 2022-11-19 NOTE — ED PROVIDER NOTES
[facility]     Emergency Department     Faculty Attestation    I performed a history and physical examination of the patient and discussed management with the resident. I reviewed the resident´s note and agree with the documented findings and plan of care. Any areas of disagreement are noted on the chart. I was personally present for the key portions of any procedures. I have documented in the chart those procedures where I was not present during the key portions. I have reviewed the emergency nurses triage note. I agree with the chief complaint, past medical history, past surgical history, allergies, medications, social and family history as documented unless otherwise noted below. For Physician Assistant/ Nurse Practitioner cases/documentation I have personally evaluated this patient and have completed at least one if not all key elements of the E/M (history, physical exam, and MDM). Additional findings are as noted. Awake alert and oriented, no ataxia or nystagmus, chest clear, heart exam normal.  Lower extremity pain or swelling on examination. Hyperglycemic with DKA rule out.      Susan Pierce MD  11/18/22 [id number]

## 2022-11-21 LAB
EKG ATRIAL RATE: 92 BPM
EKG P AXIS: 29 DEGREES
EKG P-R INTERVAL: 132 MS
EKG Q-T INTERVAL: 378 MS
EKG QRS DURATION: 84 MS
EKG QTC CALCULATION (BAZETT): 467 MS
EKG R AXIS: 1 DEGREES
EKG T AXIS: 13 DEGREES
EKG VENTRICULAR RATE: 92 BPM

## 2022-11-24 ENCOUNTER — HOSPITAL ENCOUNTER (EMERGENCY)
Age: 27
Discharge: HOME | End: 2022-11-24
Attending: EMERGENCY MEDICINE
Payer: COMMERCIAL

## 2022-11-24 ENCOUNTER — APPOINTMENT (OUTPATIENT)
Dept: CT IMAGING | Age: 27
End: 2022-11-24
Payer: COMMERCIAL

## 2022-11-24 VITALS
SYSTOLIC BLOOD PRESSURE: 135 MMHG | TEMPERATURE: 97.2 F | HEART RATE: 95 BPM | WEIGHT: 234 LBS | DIASTOLIC BLOOD PRESSURE: 93 MMHG | BODY MASS INDEX: 38.99 KG/M2 | RESPIRATION RATE: 17 BRPM | HEIGHT: 65 IN | OXYGEN SATURATION: 96 %

## 2022-11-24 DIAGNOSIS — G43.009 MIGRAINE WITHOUT AURA AND WITHOUT STATUS MIGRAINOSUS, NOT INTRACTABLE: Primary | ICD-10-CM

## 2022-11-24 LAB
ABSOLUTE EOS #: 0.32 K/UL (ref 0–0.44)
ABSOLUTE IMMATURE GRANULOCYTE: 0.06 K/UL (ref 0–0.3)
ABSOLUTE LYMPH #: 3.84 K/UL (ref 1.1–3.7)
ABSOLUTE MONO #: 0.75 K/UL (ref 0.1–1.2)
ANION GAP SERPL CALCULATED.3IONS-SCNC: 15 MMOL/L (ref 9–17)
BASOPHILS # BLD: 1 % (ref 0–2)
BASOPHILS ABSOLUTE: 0.06 K/UL (ref 0–0.2)
BUN BLDV-MCNC: 14 MG/DL (ref 6–20)
CALCIUM SERPL-MCNC: 9.2 MG/DL (ref 8.6–10.4)
CHLORIDE BLD-SCNC: 102 MMOL/L (ref 98–107)
CO2: 19 MMOL/L (ref 20–31)
CREAT SERPL-MCNC: 0.61 MG/DL (ref 0.5–0.9)
EOSINOPHILS RELATIVE PERCENT: 3 % (ref 1–4)
GFR SERPL CREATININE-BSD FRML MDRD: >60 ML/MIN/1.73M2
GLUCOSE BLD-MCNC: 300 MG/DL (ref 70–99)
GLUCOSE BLD-MCNC: 308 MG/DL (ref 65–105)
HCG QUALITATIVE: NEGATIVE
HCT VFR BLD CALC: 39.1 % (ref 36.3–47.1)
HEMOGLOBIN: 13.9 G/DL (ref 11.9–15.1)
IMMATURE GRANULOCYTES: 1 %
LYMPHOCYTES # BLD: 33 % (ref 24–43)
MCH RBC QN AUTO: 31.2 PG (ref 25.2–33.5)
MCHC RBC AUTO-ENTMCNC: 35.5 G/DL (ref 28.4–34.8)
MCV RBC AUTO: 87.9 FL (ref 82.6–102.9)
MONOCYTES # BLD: 7 % (ref 3–12)
NRBC AUTOMATED: 0 PER 100 WBC
PDW BLD-RTO: 12.4 % (ref 11.8–14.4)
PLATELET # BLD: 234 K/UL (ref 138–453)
PMV BLD AUTO: 10.6 FL (ref 8.1–13.5)
POTASSIUM SERPL-SCNC: 3.7 MMOL/L (ref 3.7–5.3)
RBC # BLD: 4.45 M/UL (ref 3.95–5.11)
SEG NEUTROPHILS: 55 % (ref 36–65)
SEGMENTED NEUTROPHILS ABSOLUTE COUNT: 6.47 K/UL (ref 1.5–8.1)
SODIUM BLD-SCNC: 136 MMOL/L (ref 135–144)
WBC # BLD: 11.5 K/UL (ref 3.5–11.3)

## 2022-11-24 PROCEDURE — 99284 EMERGENCY DEPT VISIT MOD MDM: CPT

## 2022-11-24 PROCEDURE — 96374 THER/PROPH/DIAG INJ IV PUSH: CPT

## 2022-11-24 PROCEDURE — 85025 COMPLETE CBC W/AUTO DIFF WBC: CPT

## 2022-11-24 PROCEDURE — 80048 BASIC METABOLIC PNL TOTAL CA: CPT

## 2022-11-24 PROCEDURE — 70450 CT HEAD/BRAIN W/O DYE: CPT

## 2022-11-24 PROCEDURE — 2580000003 HC RX 258: Performed by: STUDENT IN AN ORGANIZED HEALTH CARE EDUCATION/TRAINING PROGRAM

## 2022-11-24 PROCEDURE — 84703 CHORIONIC GONADOTROPIN ASSAY: CPT

## 2022-11-24 PROCEDURE — 6360000002 HC RX W HCPCS: Performed by: STUDENT IN AN ORGANIZED HEALTH CARE EDUCATION/TRAINING PROGRAM

## 2022-11-24 PROCEDURE — 82947 ASSAY GLUCOSE BLOOD QUANT: CPT

## 2022-11-24 PROCEDURE — 96375 TX/PRO/DX INJ NEW DRUG ADDON: CPT

## 2022-11-24 RX ORDER — PROCHLORPERAZINE EDISYLATE 5 MG/ML
10 INJECTION INTRAMUSCULAR; INTRAVENOUS ONCE
Status: COMPLETED | OUTPATIENT
Start: 2022-11-24 | End: 2022-11-24

## 2022-11-24 RX ORDER — 0.9 % SODIUM CHLORIDE 0.9 %
1000 INTRAVENOUS SOLUTION INTRAVENOUS ONCE
Status: COMPLETED | OUTPATIENT
Start: 2022-11-24 | End: 2022-11-24

## 2022-11-24 RX ORDER — ACETAMINOPHEN, ASPIRIN AND CAFFEINE 250; 250; 65 MG/1; MG/1; MG/1
1 TABLET, FILM COATED ORAL EVERY 8 HOURS PRN
Qty: 21 TABLET | Refills: 0 | Status: SHIPPED | OUTPATIENT
Start: 2022-11-24

## 2022-11-24 RX ORDER — IBUPROFEN 600 MG/1
600 TABLET ORAL EVERY 8 HOURS PRN
Qty: 21 TABLET | Refills: 0 | Status: SHIPPED | OUTPATIENT
Start: 2022-11-24

## 2022-11-24 RX ORDER — KETOROLAC TROMETHAMINE 30 MG/ML
30 INJECTION, SOLUTION INTRAMUSCULAR; INTRAVENOUS ONCE
Status: COMPLETED | OUTPATIENT
Start: 2022-11-24 | End: 2022-11-24

## 2022-11-24 RX ORDER — ONDANSETRON 4 MG/1
4 TABLET, ORALLY DISINTEGRATING ORAL EVERY 8 HOURS PRN
Qty: 12 TABLET | Refills: 0 | Status: SHIPPED | OUTPATIENT
Start: 2022-11-24

## 2022-11-24 RX ADMIN — SODIUM CHLORIDE 1000 ML: 9 INJECTION, SOLUTION INTRAVENOUS at 19:38

## 2022-11-24 RX ADMIN — KETOROLAC TROMETHAMINE 30 MG: 30 INJECTION, SOLUTION INTRAMUSCULAR; INTRAVENOUS at 19:38

## 2022-11-24 RX ADMIN — PROCHLORPERAZINE EDISYLATE 10 MG: 5 INJECTION INTRAMUSCULAR; INTRAVENOUS at 19:40

## 2022-11-24 ASSESSMENT — PAIN DESCRIPTION - LOCATION: LOCATION: HEAD

## 2022-11-24 ASSESSMENT — PAIN SCALES - GENERAL: PAINLEVEL_OUTOF10: 10

## 2022-11-25 NOTE — DISCHARGE INSTRUCTIONS
For your symptoms, we check several labs including your blood sugar, blood counts, electrolytes, kidney function. These were all normal the exception of your blood sugar which was in the 300s. We gave you IV fluids to help treat this. Continue to take your metformin as prescribed. Your pregnancy test was negative. The CT scan of your head did not show any brain tumor, bleeds or strokes. It is likely that your symptoms are due to a migraine. We treated you with several different medications for migraine in the emergency department and your symptoms improved. To treat yourself at home  Get lots of rest and drink lots of fluids such as water  Stay in a cool, dark room   You can take Excedrin extra strength 1 tab every 8 hours as needed  You can add ibuprofen 600 mg every 8 hours as neededDo not add extra Tylenol or aspirin as there is Tylenol aspirin and the Excedrin Migraine  For nausea and vomiting you can take Zofran 4 mg every 8 hours as needed  You can also try over-the-counter supplements such as magnesium    I have given you the number to our neurology clinic and the neurologist Dr. Darcy Godinez you should follow-up with for discussion of further migraine management and possibly getting on preventative medications. I have given you the number for our family medicine clinic whom you can follow-up with as well    If you develop any worsening of her symptoms, severe pain, chest pain, trouble eating, passing out, fevers, vomiting or other worrisome symptoms, please return to emergency department immediately.

## 2022-11-25 NOTE — ED PROVIDER NOTES
[address]  ED  eMERGENCY dEPARTMENT eNCOUnter   Attending Attestation     Pt Name: Selvin De La Garza  MRN: [id number]  Lupe 1995  Date of evaluation: 11/24/22       Selvin De La Garza is a 32 y.o. female who presents with Headache, Dizziness, and Emesis          I performed a history and physical examination of the patient and discussed management with the resident. I reviewed the residents note and agree with the documented findings and plan of care. Any areas of disagreement are noted on the chart. I was personally present for the key portions of any procedures. I have documented in the chart those procedures where I was not present during the key portions. I have personally reviewed all images and agree with the resident's interpretation. I have reviewed the emergency nurses triage note. I agree with the chief complaint, past medical history, past surgical history, allergies, medications, social and family history as documented unless otherwise noted below. Documentation of the HPI, Physical Exam and Medical Decision Making performed by medical students or scribes is based on my personal performance of the HPI, PE and MDM. For Phys Assistant/ Nurse Practitioner cases/documentation I have had a face to face evaluation of this patient and have completed at least one if not all key elements of the E/M (history, physical exam, and MDM). Additional findings are as noted. For APC cases I have personally evaluated and examined the patient in conjunction with the APC and agree with the treatment plan and disposition of the patient as recorded by the APC.     Lokesh Ambriz MD  Attending Emergency  Physician       Keya Pelayo MD  12/03/22 [id number]

## 2022-11-25 NOTE — ED TRIAGE NOTES
Patient arrived to the ER room 7 with complaints of headache/ pressure/ dizziness/ hard to focus, blurry vision at times, patient vomited 1x on the way here. Pain in the neck as well. This has been 1-2 weeks. Pt respirations are even and unlabored, pt is oriented X 4, speaking in complete sentences, bed is in the lowest position, call light is within reach. Will continue to monitor.

## 2022-11-25 NOTE — ED PROVIDER NOTES
[address]  ED  Emergency Department Encounter  Emergency Medicine Resident     Pt Name: Oskar Hill  MRN: [id number]  [place] 1995  Date of evaluation: 22  PCP:  Ene Hairston PA-C    CHIEF COMPLAINT       Chief Complaint   Patient presents with    Headache    Dizziness    Emesis       HISTORY OFPRESENT ILLNESS  (Location/Symptom, Timing/Onset, Context/Setting, Quality, Duration, Modifying [place].)      Oskar Hill is a 32 y.o. female with past medical history of bipolar disorder, hypertension, asthma who presents with dull frontal headache described as pressure which has been present for approximately 1 week. Also reports associated blurred vision in both eyes, worse on the left, left facial numbness, dizziness and nausea. Patient had 1 episode of emesis while driving here. Denies fever, chills, chest pain, shortness of breath, abdominal pain, diarrhea or urinary symptoms. Denies any trauma. Patient has been taking over-the-counter medications without relief. Patient reports that she is a diabetic and has been taking her metformin, however blood sugars are running in the 400s. He is very anxious and concerned for a brain tumor or aneurysm. Denies prior history of brain tumor aneurysm or family history. PAST MEDICAL / SURGICAL / SOCIAL / FAMILY HISTORY      has a past medical history of Asthma, Bipolar 1 disorder ([address] Diabetes mellitus type 2 in obese ([address] GERD (gastroesophageal reflux disease), Hepatic steatosis, Hypertension, Morbid obesity with body mass index (BMI) of 40.0 to 49.9 ([address] Neuropathy, and Postpartum depression. has a past surgical history that includes Tonsillectomy (Bilateral);  section; eye surgery;  section (N/A, 3/23/2017); Upper gastrointestinal endoscopy (N/A, 2018); Breast surgery (Left, 2019); Breast surgery (Right, 2019); Rectal surgery (N/A, 2/3/2020);  Upper gastrointestinal endoscopy (N/A, 9/16/2020); Colonoscopy (N/A, 9/16/2020); Axillary Surgery (Right, 9/20/2021); Ankle arthroscopy (Right, 01/10/2022); and Ankle arthroscopy (Right, 1/10/2022). Social:  reports that she quit smoking about 2 years ago. Her smoking use included cigarettes. She started smoking about 15 years ago. She has a 10.00 pack-year smoking history. She has never used smokeless tobacco. She reports that she does not drink alcohol and does not use drugs. Family Hx:   Family History   Problem Relation Age of Onset    Breast Cancer Mother 28    Diabetes Father     Cancer Maternal Uncle 61        acute leukemia    Heart Disease Maternal Uncle     Diabetes Maternal Uncle     Colon Cancer Maternal Uncle     Diabetes Maternal Uncle     Heart Attack Maternal Uncle     Uterine Cancer Neg Hx     Ovarian Cancer Neg Hx         Allergies:  Benadryl [diphenhydramine]    Home Medications:  Prior to Admission medications    Medication Sig Start Date End Date Taking?  Authorizing Provider   aspirin-acetaminophen-caffeine (EXCEDRIN EXTRA STRENGTH) 392-496-41 MG per tablet Take 1 tablet by mouth every 8 hours as needed for Headaches 11/24/22  Yes Komal Henry,    ondansetron (ZOFRAN ODT) 4 MG disintegrating tablet Take 1 tablet by mouth every 8 hours as needed for Nausea 11/24/22  Yes Komal Henry DO   ibuprofen (IBU) 600 MG tablet Take 1 tablet by mouth every 8 hours as needed for Pain 11/24/22  Yes Komal Henry DO   acetaminophen (TYLENOL) 500 MG tablet Take 2 tablets by mouth 3 times daily 11/18/22   Shazia Sports, DO   lisinopril (PRINIVIL;ZESTRIL) 5 MG tablet take 1 tablet by mouth once daily 10/13/22   MD ANILA Pineda FLEXTOUCH 100 UNIT/ML injection pen inject 55 units subcutaneously NIGHTLY 9/9/22   MARCELO Mcintosh CNP   metFORMIN (GLUCOPHAGE) 1000 MG tablet take 1 tablet by mouth twice a day with meals 9/8/22   MARCELO Mcintosh CNP   predniSONE 10 MG (21) TBPK Take 1 Dose by mouth daily Take 60mg po on day 1, 50mg po on day 2, 40mg po on day 3, 30mg po on day 4, 20 mg po on day 5, 10mg po on day 6 7/20/22   Gera Kraus DPM   gabapentin (NEURONTIN) 300 MG capsule Take 1 capsule by mouth 3 times daily for 120 days. Intended supply: 90 days 5/17/22 9/14/22  Gera Kraus DPM   albuterol sulfate  (90 Base) MCG/ACT inhaler Inhale 2 puffs into the lungs every 6 hours as needed for Wheezing 5/3/22   Timmy Terry MD   cephALEXin (KEFLEX) 500 MG capsule Take 1 capsule by mouth 4 times daily 4/20/22   Cony Peña MD   Insulin Aspart FlexPen 100 UNIT/ML SOPN inject 4 units subcutaneously before breakfast then inject 4 units subcutaneously before LUNCH then inject 6 units subcutaneously before dinner and 6 units subcutaneously at bedtime 3/10/22   MARCELO Hess CNP   UNIFINE PENTIPS 29G X 12MM MISC USE 4 times a DAILY WITH basal and short acting insulin 3/10/22   MARCELO Hess CNP   blood glucose monitor kit and supplies 1 kit by Other route three times daily Dispense product that insurance will cover 3/10/22   MARCELO Hess CNP   Lancets MISC 1 each by Does not apply route 3 times daily 3/10/22   MARCELO Hess CNP   blood glucose monitor strips Test 3 times a day & as needed for symptoms of irregular blood glucose. Dispense sufficient amount for indicated testing frequency plus additional to accommodate PRN testing needs.  3/10/22   MARCELO Hess CNP   Elastic Bandages & Supports (JOBST KNEE HIGH COMPRESSION SM) MISC Dispense Bilateral Jobst Below Knee 20-30mmHg compression stockings 3/2/22   Gera Kraus DPM   budesonide-formoterol (SYMBICORT) 160-4.5 MCG/ACT AERO Inhale 2 puffs into the lungs 2 times daily 9/16/21   Mary Jo Gracia MD   ipratropium-albuterol (DUONEB) 0.5-2.5 (3) MG/3ML SOLN nebulizer solution Inhale 3 mLs into the lungs every 4 hours (while awake) 9/16/21   Mary Jo Gracia MD   Blood Pressure KIT 1 kit by Does not apply route 2 times daily 9/16/21   Polo Day MD   omeprazole (PRILOSEC) 20 MG delayed release capsule Take 1 capsule by mouth every morning (before breakfast) 7/12/21 12/28/21  MARCELO Jade - NP   Insulin Pen Needle (PEN NEEDLES) 31G X 6 MM MISC 1 each by Does not apply route daily 5/21/21   Yuliya Rehman PA-C   TRUEplus Lancets 30G MISC TEST 2 TIMES A DAY AND AS NEEED FOR SYMPTOMS OF IRREGULAR BLOOD GLUCOSE 4/21/21   Yuliya Rehman PA-C   albuterol sulfate HFA (PROVENTIL HFA) 108 (90 Base) MCG/ACT inhaler Inhale 2 puffs into the lungs every 6 hours as needed for Wheezing 7/23/19   Darby Driscoll PA-C       REVIEW OFSYSTEMS    (2-9 systems for level 4, 10 or more for level 5)      Review of Systems   Constitutional:  Negative for chills and fever. HENT:  Negative for congestion, rhinorrhea and sore throat. Eyes:  Negative for visual disturbance. Respiratory:  Negative for cough and shortness of breath. Cardiovascular:  Negative for chest pain and leg swelling. Gastrointestinal:  Positive for nausea and vomiting. Negative for abdominal pain, constipation and diarrhea. Genitourinary:  Negative for dysuria, frequency and hematuria. Musculoskeletal:  Negative for arthralgias, back pain and neck pain. Skin:  Negative for rash. Neurological:  Positive for numbness and headaches. Negative for weakness and light-headedness. Psychiatric/Behavioral:  Negative for confusion. All other systems reviewed and are negative. PHYSICAL EXAM   (up to 7 for level 4, 8 or more forlevel 5)      INITIAL VITALS:   Vitals:    11/24/22 1902 11/24/22 1915   BP: (!) 147/72 (!) 135/93   Pulse: (!) 106 95   Resp:  17   Temp: 97.2 °F (36.2 °C)    TempSrc: Oral    SpO2: 94% 96%   Weight: 234 lb (106.1 kg)    Height: 5' 5\" (1.651 m)         Physical Exam  Vitals and nursing note reviewed. Constitutional:       General: She is not in acute distress.      Comments: -year-old female lying in stretcher awake and alert in no acute distress. Does appear moderately uncomfortable. HENT:      Head: Normocephalic and atraumatic. Mouth/Throat:      Mouth: Mucous membranes are moist.      Pharynx: Oropharynx is clear. Eyes:      Pupils: Pupils are equal, round, and reactive to light. Comments: Bilateral photophobia   Cardiovascular:      Rate and Rhythm: Normal rate and regular rhythm. Pulmonary:      Effort: Pulmonary effort is normal. No respiratory distress. Breath sounds: Normal breath sounds. Abdominal:      General: There is no distension. Palpations: Abdomen is soft. Tenderness: There is no abdominal tenderness. Musculoskeletal:      Cervical back: Neck supple. No rigidity. Skin:     General: Skin is warm and dry. Findings: No rash. Neurological:      Mental Status: She is alert. Comments: Alert and oriented x3, answers questions appropriately, speech clear  CN 2-12 intact, no facial droop  Moves all extremities spontaneously  5/5 strength flexion and extension upper extremities bilaterally.  strength 5/5 and equal  5/5 strength hip flexion, knee extension, dorsi and plantar flexion bilaterally  Sensation intact to light touch extremities x4   Psychiatric:         Mood and Affect: Mood normal.         Behavior: Behavior normal.       DIFFERENTIAL  DIAGNOSIS     DDX: Tension headache, cluster headache, migraine headache, idiopathic intracranial hypertension, electrolyte abnormality, dehydration, hyperglycemia    Initial MDM/Plan: 32 y.o. female with past medical history of bipolar disorder, hypertension, asthma who presents with dull frontal headache described as pressure which has been present for approximately 1 week. Has associated nausea, one episode of vomiting, left-sided numbness and dizziness. Patient does have history of headaches but this is worse and different than her previous headaches.   On physical exam, patient is nontoxic-appearing with unremarkable vital signs. Neurological exam is benign. Does have some photophobia. Suspect migraine headache versus tension headache versus cluster headache. However, will obtain screening labs and CT scan of the head and that this is the worst headache the patient has experienced.   Will treat symptomatically and reassess    DIAGNOSTIC RESULTS / EMERGENCYDEPARTMENT COURSE / MDM     LABS:  Results for orders placed or performed during the hospital encounter of 11/24/22   CBC with Auto Differential   Result Value Ref Range    WBC 11.5 (H) 3.5 - 11.3 k/uL    RBC 4.45 3.95 - 5.11 m/uL    Hemoglobin 13.9 11.9 - 15.1 g/dL    Hematocrit 39.1 36.3 - 47.1 %    MCV 87.9 82.6 - 102.9 fL    MCH 31.2 25.2 - 33.5 pg    MCHC 35.5 (H) 28.4 - 34.8 g/dL    RDW 12.4 11.8 - 14.4 %    Platelets 723 591 - 110 k/uL    MPV 10.6 8.1 - 13.5 fL    NRBC Automated 0.0 0.0 per 100 WBC    Seg Neutrophils 55 36 - 65 %    Lymphocytes 33 24 - 43 %    Monocytes 7 3 - 12 %    Eosinophils % 3 1 - 4 %    Basophils 1 0 - 2 %    Immature Granulocytes 1 (H) 0 %    Segs Absolute 6.47 1.50 - 8.10 k/uL    Absolute Lymph # 3.84 (H) 1.10 - 3.70 k/uL    Absolute Mono # 0.75 0.10 - 1.20 k/uL    Absolute Eos # 0.32 0.00 - 0.44 k/uL    Basophils Absolute 0.06 0.00 - 0.20 k/uL    Absolute Immature Granulocyte 0.06 0.00 - 0.30 k/uL   Basic Metabolic Panel   Result Value Ref Range    Glucose 300 (H) 70 - 99 mg/dL    BUN 14 6 - 20 mg/dL    Creatinine 0.61 0.50 - 0.90 mg/dL    Est, Glom Filt Rate >60 >60 mL/min/1.73m2    Calcium 9.2 8.6 - 10.4 mg/dL    Sodium 136 135 - 144 mmol/L    Potassium 3.7 3.7 - 5.3 mmol/L    Chloride 102 98 - 107 mmol/L    CO2 19 (L) 20 - 31 mmol/L    Anion Gap 15 9 - 17 mmol/L   HCG Qualitative, Serum   Result Value Ref Range    hCG Qual NEGATIVE NEGATIVE   POC Glucose Fingerstick   Result Value Ref Range    POC Glucose 308 (H) 65 - 105 mg/dL         RADIOLOGY:  CT HEAD WO CONTRAST    Result Date: 11/24/2022  EXAMINATION: CT OF THE HEAD WITHOUT CONTRAST  11/24/2022 7:55 pm TECHNIQUE: CT of the head was performed without the administration of intravenous contrast. Automated exposure control, iterative reconstruction, and/or weight based adjustment of the mA/kV was utilized to reduce the radiation dose to as low as reasonably achievable. COMPARISON: None. HISTORY: ORDERING SYSTEM PROVIDED HISTORY: headache, vomiting TECHNOLOGIST PROVIDED HISTORY: headache, vomiting Decision Support Exception - unselect if not a suspected or confirmed emergency medical condition->Emergency Medical Condition (MA) Is the patient pregnant?->No FINDINGS: BRAIN/VENTRICLES: There is no acute intracranial hemorrhage, mass effect or midline shift. No abnormal extra-axial fluid collection. The gray-white differentiation is maintained without evidence of an acute infarct. There is no evidence of hydrocephalus. Ventricles are normal.  No midline shift. Small calcifications along the falx anteriorly. ORBITS: The visualized portion of the orbits demonstrate no acute abnormality. SINUSES: The visualized paranasal sinuses and mastoid air cells demonstrate no acute abnormality. SOFT TISSUES/SKULL:  No acute abnormality of the visualized skull or soft tissues. No acute intracranial abnormality.         EKG  None      MEDICATIONS ORDERED:  Orders Placed This Encounter   Medications    prochlorperazine (COMPAZINE) injection 10 mg    ketorolac (TORADOL) injection 30 mg    0.9 % sodium chloride bolus    aspirin-acetaminophen-caffeine (EXCEDRIN EXTRA STRENGTH) 250-250-65 MG per tablet     Sig: Take 1 tablet by mouth every 8 hours as needed for Headaches     Dispense:  21 tablet     Refill:  0    ondansetron (ZOFRAN ODT) 4 MG disintegrating tablet     Sig: Take 1 tablet by mouth every 8 hours as needed for Nausea     Dispense:  12 tablet     Refill:  0    ibuprofen (IBU) 600 MG tablet     Sig: Take 1 tablet by mouth every 8 hours as needed for Pain     Dispense:  21 tablet     Refill:  0         PROCEDURES:  None      CONSULTS:  None      EMERGENCY DEPARTMENT COURSE:  2000: Patient reports feeling improved after migraine cocktail. Is sleepy. Labs unremarkable. Pending CT of the head. 2130: CT scan of the head is negative. Patient feeling improved. We will discharge her home at this time with prescription for Excedrin extra strength which patient states has worked for her in the past.  Zofran and ibuprofen also provided. Patient was instructed to follow-up with her primary care provider to discuss her blood sugars and for recheck. Also referred her to our neurology clinic for follow-up as needed      FINAL IMPRESSION      1.  Migraine without aura and without status migrainosus, not intractable          DISPOSITION / PLAN     DISPOSITION Decision To Discharge 11/24/2022 09:23:29 PM      PATIENT REFERRED TO:  [address] [place] [id number] [phone]  Schedule an appointment as soon as possible for a visit   Primary care    Dana Hernandez MD  [address]  [phone]    Schedule an appointment as soon as possible for a visit   Neurologist      DISCHARGE MEDICATIONS:  Discharge Medication List as of 11/24/2022  9:32 PM        START taking these medications    Details   aspirin-acetaminophen-caffeine (EXCEDRIN EXTRA STRENGTH) 250-250-65 MG per tablet Take 1 tablet by mouth every 8 hours as needed for Headaches, Disp-21 tablet, R-0Print             Bri Arzate DO  Emergency Medicine Resident    (Please note that portions of this note were completed with a voice recognition program.Efforts were made to edit the dictations but occasionally words are mis-transcribed.)        Bri Arzate DO  Resident  11/26/22 8422

## 2022-11-26 ASSESSMENT — ENCOUNTER SYMPTOMS
CONSTIPATION: 0
DIARRHEA: 0
BACK PAIN: 0
NAUSEA: 1
ABDOMINAL PAIN: 0
RHINORRHEA: 0
VOMITING: 1
SORE THROAT: 0
SHORTNESS OF BREATH: 0
COUGH: 0

## 2022-12-16 ENCOUNTER — APPOINTMENT (OUTPATIENT)
Dept: CT IMAGING | Age: 27
DRG: 420 | End: 2022-12-16
Payer: COMMERCIAL

## 2022-12-16 ENCOUNTER — APPOINTMENT (OUTPATIENT)
Dept: GENERAL RADIOLOGY | Age: 27
DRG: 420 | End: 2022-12-16
Payer: COMMERCIAL

## 2022-12-16 ENCOUNTER — HOSPITAL ENCOUNTER (INPATIENT)
Age: 27
LOS: 1 days | Discharge: HOME | DRG: 420 | End: 2022-12-17
Attending: EMERGENCY MEDICINE | Admitting: STUDENT IN AN ORGANIZED HEALTH CARE EDUCATION/TRAINING PROGRAM
Payer: COMMERCIAL

## 2022-12-16 DIAGNOSIS — R73.9 HYPERGLYCEMIA: ICD-10-CM

## 2022-12-16 DIAGNOSIS — E87.6 HYPOKALEMIA: ICD-10-CM

## 2022-12-16 DIAGNOSIS — R11.2 NAUSEA AND VOMITING, UNSPECIFIED VOMITING TYPE: Primary | ICD-10-CM

## 2022-12-16 PROBLEM — E11.10 DKA, TYPE 2, NOT AT GOAL (HCC): Status: RESOLVED | Noted: 2021-09-14 | Resolved: 2022-12-16

## 2022-12-16 LAB
ABSOLUTE EOS #: 0.25 K/UL (ref 0–0.4)
ABSOLUTE IMMATURE GRANULOCYTE: 0.25 K/UL (ref 0–0.3)
ABSOLUTE LYMPH #: 6.45 K/UL (ref 1–4.8)
ABSOLUTE MONO #: 1.24 K/UL (ref 0.1–0.8)
ALBUMIN SERPL-MCNC: 4.5 G/DL (ref 3.5–5.2)
ALBUMIN/GLOBULIN RATIO: 1.7 (ref 1–2.5)
ALP BLD-CCNC: 109 U/L (ref 35–104)
ALT SERPL-CCNC: 29 U/L (ref 5–33)
ANION GAP SERPL CALCULATED.3IONS-SCNC: 20 MMOL/L (ref 9–17)
AST SERPL-CCNC: 20 U/L
BASOPHILS # BLD: 0 % (ref 0–2)
BASOPHILS ABSOLUTE: 0 K/UL (ref 0–0.2)
BETA-HYDROXYBUTYRATE: 0.62 MMOL/L (ref 0.02–0.27)
BILIRUB SERPL-MCNC: 1.4 MG/DL (ref 0.3–1.2)
BILIRUBIN URINE: NEGATIVE
BUN BLDV-MCNC: 13 MG/DL (ref 6–20)
CALCIUM SERPL-MCNC: 8.8 MG/DL (ref 8.6–10.4)
CARBOXYHEMOGLOBIN: 2.3 % (ref 0–5)
CHLORIDE BLD-SCNC: 99 MMOL/L (ref 98–107)
CO2: 15 MMOL/L (ref 20–31)
COLOR: YELLOW
COMMENT UA: ABNORMAL
CREAT SERPL-MCNC: 0.57 MG/DL (ref 0.5–0.9)
EOSINOPHILS RELATIVE PERCENT: 1 % (ref 1–4)
FIO2: ABNORMAL
FLU A ANTIGEN: NEGATIVE
FLU B ANTIGEN: NEGATIVE
GFR SERPL CREATININE-BSD FRML MDRD: >60 ML/MIN/1.73M2
GLUCOSE BLD-MCNC: 291 MG/DL
GLUCOSE BLD-MCNC: 291 MG/DL (ref 65–105)
GLUCOSE BLD-MCNC: 306 MG/DL
GLUCOSE BLD-MCNC: 306 MG/DL (ref 65–105)
GLUCOSE BLD-MCNC: 314 MG/DL
GLUCOSE BLD-MCNC: 314 MG/DL (ref 65–105)
GLUCOSE BLD-MCNC: 324 MG/DL
GLUCOSE BLD-MCNC: 324 MG/DL (ref 65–105)
GLUCOSE BLD-MCNC: 331 MG/DL (ref 65–105)
GLUCOSE BLD-MCNC: 491 MG/DL (ref 70–99)
GLUCOSE URINE: ABNORMAL
HCG QUALITATIVE: NEGATIVE
HCO3 VENOUS: 16.8 MMOL/L (ref 24–30)
HCT VFR BLD CALC: 45 % (ref 36.3–47.1)
HEMOGLOBIN: 16 G/DL (ref 11.9–15.1)
IMMATURE GRANULOCYTES: 1 %
KETONES, URINE: ABNORMAL
LACTIC ACID, SEPSIS WHOLE BLOOD: 2.1 MMOL/L (ref 0.5–1.9)
LACTIC ACID, SEPSIS WHOLE BLOOD: 2.1 MMOL/L (ref 0.5–1.9)
LEUKOCYTE ESTERASE, URINE: NEGATIVE
LIPASE: 43 U/L (ref 13–60)
LYMPHOCYTES # BLD: 26 % (ref 24–44)
MCH RBC QN AUTO: 30.6 PG (ref 25.2–33.5)
MCHC RBC AUTO-ENTMCNC: 35.6 G/DL (ref 28.4–34.8)
MCV RBC AUTO: 86 FL (ref 82.6–102.9)
MONOCYTES # BLD: 5 % (ref 1–7)
MORPHOLOGY: NORMAL
NEGATIVE BASE EXCESS, VEN: 7.1 MMOL/L (ref 0–2)
NITRITE, URINE: NEGATIVE
NRBC AUTOMATED: 0 PER 100 WBC
O2 SAT, VEN: 88.6 % (ref 60–85)
PATIENT TEMP: 37
PCO2, VEN: 31.1 MM HG (ref 39–55)
PDW BLD-RTO: 13 % (ref 11.8–14.4)
PH UA: 5.5 (ref 5–8)
PH VENOUS: 7.35 (ref 7.32–7.42)
PLATELET # BLD: 343 K/UL (ref 138–453)
PMV BLD AUTO: 10.7 FL (ref 8.1–13.5)
PO2, VEN: 56.3 MM HG (ref 30–50)
POTASSIUM SERPL-SCNC: 3.6 MMOL/L (ref 3.7–5.3)
PROTEIN UA: NEGATIVE
RBC # BLD: 5.23 M/UL (ref 3.95–5.11)
SARS-COV-2, RAPID: NOT DETECTED
SEG NEUTROPHILS: 67 % (ref 36–66)
SEGMENTED NEUTROPHILS ABSOLUTE COUNT: 16.61 K/UL (ref 1.8–7.7)
SODIUM BLD-SCNC: 134 MMOL/L (ref 135–144)
SPECIFIC GRAVITY UA: 1.03 (ref 1–1.03)
SPECIMEN DESCRIPTION: NORMAL
TOTAL PROTEIN: 7.1 G/DL (ref 6.4–8.3)
TROPONIN, HIGH SENSITIVITY: <6 NG/L (ref 0–14)
TROPONIN, HIGH SENSITIVITY: <6 NG/L (ref 0–14)
TURBIDITY: CLEAR
URINE HGB: NEGATIVE
UROBILINOGEN, URINE: NORMAL
WBC # BLD: 24.8 K/UL (ref 3.5–11.3)

## 2022-12-16 PROCEDURE — 96375 TX/PRO/DX INJ NEW DRUG ADDON: CPT

## 2022-12-16 PROCEDURE — 99285 EMERGENCY DEPT VISIT HI MDM: CPT

## 2022-12-16 PROCEDURE — 99223 1ST HOSP IP/OBS HIGH 75: CPT | Performed by: STUDENT IN AN ORGANIZED HEALTH CARE EDUCATION/TRAINING PROGRAM

## 2022-12-16 PROCEDURE — 36415 COLL VENOUS BLD VENIPUNCTURE: CPT

## 2022-12-16 PROCEDURE — 96361 HYDRATE IV INFUSION ADD-ON: CPT

## 2022-12-16 PROCEDURE — 71045 X-RAY EXAM CHEST 1 VIEW: CPT

## 2022-12-16 PROCEDURE — 84703 CHORIONIC GONADOTROPIN ASSAY: CPT

## 2022-12-16 PROCEDURE — 80048 BASIC METABOLIC PNL TOTAL CA: CPT

## 2022-12-16 PROCEDURE — 87040 BLOOD CULTURE FOR BACTERIA: CPT

## 2022-12-16 PROCEDURE — 84100 ASSAY OF PHOSPHORUS: CPT

## 2022-12-16 PROCEDURE — 84484 ASSAY OF TROPONIN QUANT: CPT

## 2022-12-16 PROCEDURE — 87077 CULTURE AEROBIC IDENTIFY: CPT

## 2022-12-16 PROCEDURE — 83605 ASSAY OF LACTIC ACID: CPT

## 2022-12-16 PROCEDURE — 71275 CT ANGIOGRAPHY CHEST: CPT

## 2022-12-16 PROCEDURE — 96374 THER/PROPH/DIAG INJ IV PUSH: CPT

## 2022-12-16 PROCEDURE — 2580000003 HC RX 258

## 2022-12-16 PROCEDURE — 6370000000 HC RX 637 (ALT 250 FOR IP): Performed by: STUDENT IN AN ORGANIZED HEALTH CARE EDUCATION/TRAINING PROGRAM

## 2022-12-16 PROCEDURE — 93005 ELECTROCARDIOGRAM TRACING: CPT | Performed by: STUDENT IN AN ORGANIZED HEALTH CARE EDUCATION/TRAINING PROGRAM

## 2022-12-16 PROCEDURE — 83690 ASSAY OF LIPASE: CPT

## 2022-12-16 PROCEDURE — 82947 ASSAY GLUCOSE BLOOD QUANT: CPT

## 2022-12-16 PROCEDURE — 6360000004 HC RX CONTRAST MEDICATION: Performed by: STUDENT IN AN ORGANIZED HEALTH CARE EDUCATION/TRAINING PROGRAM

## 2022-12-16 PROCEDURE — 2580000003 HC RX 258: Performed by: STUDENT IN AN ORGANIZED HEALTH CARE EDUCATION/TRAINING PROGRAM

## 2022-12-16 PROCEDURE — 81003 URINALYSIS AUTO W/O SCOPE: CPT

## 2022-12-16 PROCEDURE — 83735 ASSAY OF MAGNESIUM: CPT

## 2022-12-16 PROCEDURE — 2060000000 HC ICU INTERMEDIATE R&B

## 2022-12-16 PROCEDURE — 80053 COMPREHEN METABOLIC PANEL: CPT

## 2022-12-16 PROCEDURE — 82010 KETONE BODYS QUAN: CPT

## 2022-12-16 PROCEDURE — 82805 BLOOD GASES W/O2 SATURATION: CPT

## 2022-12-16 PROCEDURE — 87086 URINE CULTURE/COLONY COUNT: CPT

## 2022-12-16 PROCEDURE — 85025 COMPLETE CBC W/AUTO DIFF WBC: CPT

## 2022-12-16 PROCEDURE — 96376 TX/PRO/DX INJ SAME DRUG ADON: CPT

## 2022-12-16 PROCEDURE — 87635 SARS-COV-2 COVID-19 AMP PRB: CPT

## 2022-12-16 PROCEDURE — 87804 INFLUENZA ASSAY W/OPTIC: CPT

## 2022-12-16 PROCEDURE — 87186 SC STD MICRODIL/AGAR DIL: CPT

## 2022-12-16 PROCEDURE — 6360000002 HC RX W HCPCS: Performed by: STUDENT IN AN ORGANIZED HEALTH CARE EDUCATION/TRAINING PROGRAM

## 2022-12-16 RX ORDER — ONDANSETRON 2 MG/ML
4 INJECTION INTRAMUSCULAR; INTRAVENOUS ONCE
Status: COMPLETED | OUTPATIENT
Start: 2022-12-16 | End: 2022-12-16

## 2022-12-16 RX ORDER — POTASSIUM CHLORIDE 7.45 MG/ML
10 INJECTION INTRAVENOUS PRN
Status: DISCONTINUED | OUTPATIENT
Start: 2022-12-16 | End: 2022-12-16

## 2022-12-16 RX ORDER — SODIUM CHLORIDE 450 MG/100ML
INJECTION, SOLUTION INTRAVENOUS CONTINUOUS
Status: DISCONTINUED | OUTPATIENT
Start: 2022-12-17 | End: 2022-12-16

## 2022-12-16 RX ORDER — BUDESONIDE AND FORMOTEROL FUMARATE DIHYDRATE 160; 4.5 UG/1; UG/1
2 AEROSOL RESPIRATORY (INHALATION) 2 TIMES DAILY
Status: DISCONTINUED | OUTPATIENT
Start: 2022-12-16 | End: 2022-12-17 | Stop reason: HOSPADM

## 2022-12-16 RX ORDER — 0.9 % SODIUM CHLORIDE 0.9 %
1000 INTRAVENOUS SOLUTION INTRAVENOUS ONCE
Status: COMPLETED | OUTPATIENT
Start: 2022-12-16 | End: 2022-12-16

## 2022-12-16 RX ORDER — SODIUM CHLORIDE 450 MG/100ML
INJECTION, SOLUTION INTRAVENOUS CONTINUOUS
Status: DISCONTINUED | OUTPATIENT
Start: 2022-12-16 | End: 2022-12-17 | Stop reason: HOSPADM

## 2022-12-16 RX ORDER — FENTANYL CITRATE 50 UG/ML
50 INJECTION, SOLUTION INTRAMUSCULAR; INTRAVENOUS ONCE
Status: COMPLETED | OUTPATIENT
Start: 2022-12-16 | End: 2022-12-16

## 2022-12-16 RX ORDER — MAGNESIUM SULFATE 1 G/100ML
1000 INJECTION INTRAVENOUS PRN
Status: DISCONTINUED | OUTPATIENT
Start: 2022-12-16 | End: 2022-12-16

## 2022-12-16 RX ORDER — DEXTROSE AND SODIUM CHLORIDE 5; .45 G/100ML; G/100ML
INJECTION, SOLUTION INTRAVENOUS CONTINUOUS PRN
Status: DISCONTINUED | OUTPATIENT
Start: 2022-12-16 | End: 2022-12-17 | Stop reason: HOSPADM

## 2022-12-16 RX ORDER — POTASSIUM CHLORIDE 7.45 MG/ML
10 INJECTION INTRAVENOUS PRN
Status: DISCONTINUED | OUTPATIENT
Start: 2022-12-16 | End: 2022-12-17 | Stop reason: HOSPADM

## 2022-12-16 RX ORDER — POLYETHYLENE GLYCOL 3350 17 G/17G
17 POWDER, FOR SOLUTION ORAL DAILY PRN
Status: DISCONTINUED | OUTPATIENT
Start: 2022-12-16 | End: 2022-12-16

## 2022-12-16 RX ORDER — SODIUM CHLORIDE 450 MG/100ML
INJECTION, SOLUTION INTRAVENOUS
Status: COMPLETED
Start: 2022-12-16 | End: 2022-12-16

## 2022-12-16 RX ORDER — ALBUTEROL SULFATE 90 UG/1
2 AEROSOL, METERED RESPIRATORY (INHALATION) EVERY 6 HOURS PRN
Status: DISCONTINUED | OUTPATIENT
Start: 2022-12-16 | End: 2022-12-17 | Stop reason: HOSPADM

## 2022-12-16 RX ORDER — POLYETHYLENE GLYCOL 3350 17 G/17G
17 POWDER, FOR SOLUTION ORAL DAILY PRN
Status: DISCONTINUED | OUTPATIENT
Start: 2022-12-16 | End: 2022-12-17 | Stop reason: HOSPADM

## 2022-12-16 RX ORDER — ENOXAPARIN SODIUM 100 MG/ML
30 INJECTION SUBCUTANEOUS 2 TIMES DAILY
Status: DISCONTINUED | OUTPATIENT
Start: 2022-12-17 | End: 2022-12-17 | Stop reason: HOSPADM

## 2022-12-16 RX ORDER — DEXTROSE AND SODIUM CHLORIDE 5; .45 G/100ML; G/100ML
INJECTION, SOLUTION INTRAVENOUS CONTINUOUS PRN
Status: DISCONTINUED | OUTPATIENT
Start: 2022-12-16 | End: 2022-12-16

## 2022-12-16 RX ORDER — MAGNESIUM SULFATE 1 G/100ML
1000 INJECTION INTRAVENOUS PRN
Status: DISCONTINUED | OUTPATIENT
Start: 2022-12-16 | End: 2022-12-17 | Stop reason: HOSPADM

## 2022-12-16 RX ORDER — ENOXAPARIN SODIUM 100 MG/ML
40 INJECTION SUBCUTANEOUS DAILY
Status: DISCONTINUED | OUTPATIENT
Start: 2022-12-16 | End: 2022-12-16

## 2022-12-16 RX ADMIN — BUDESONIDE AND FORMOTEROL FUMARATE DIHYDRATE 2 PUFF: 160; 4.5 AEROSOL RESPIRATORY (INHALATION) at 21:29

## 2022-12-16 RX ADMIN — ONDANSETRON 4 MG: 2 INJECTION INTRAMUSCULAR; INTRAVENOUS at 15:47

## 2022-12-16 RX ADMIN — IOPAMIDOL 100 ML: 755 INJECTION, SOLUTION INTRAVENOUS at 15:54

## 2022-12-16 RX ADMIN — SODIUM CHLORIDE: 4.5 INJECTION, SOLUTION INTRAVENOUS at 19:45

## 2022-12-16 RX ADMIN — SODIUM CHLORIDE 1000 ML: 9 INJECTION, SOLUTION INTRAVENOUS at 13:57

## 2022-12-16 RX ADMIN — FENTANYL CITRATE 50 MCG: 50 INJECTION, SOLUTION INTRAMUSCULAR; INTRAVENOUS at 18:01

## 2022-12-16 RX ADMIN — ONDANSETRON 4 MG: 2 INJECTION INTRAMUSCULAR; INTRAVENOUS at 13:58

## 2022-12-16 RX ADMIN — SODIUM CHLORIDE 1000 ML: 9 INJECTION, SOLUTION INTRAVENOUS at 15:35

## 2022-12-16 RX ADMIN — SODIUM CHLORIDE: 450 INJECTION, SOLUTION INTRAVENOUS at 19:45

## 2022-12-16 ASSESSMENT — ENCOUNTER SYMPTOMS
DIARRHEA: 1
CONSTIPATION: 0
ABDOMINAL PAIN: 1
NAUSEA: 1
VOMITING: 1
BLOOD IN STOOL: 0
RHINORRHEA: 0
COUGH: 0
SHORTNESS OF BREATH: 0
BACK PAIN: 0

## 2022-12-16 ASSESSMENT — PAIN - FUNCTIONAL ASSESSMENT: PAIN_FUNCTIONAL_ASSESSMENT: 0-10

## 2022-12-16 ASSESSMENT — PAIN SCALES - GENERAL
PAINLEVEL_OUTOF10: 10
PAINLEVEL_OUTOF10: 10

## 2022-12-16 ASSESSMENT — PAIN DESCRIPTION - DESCRIPTORS: DESCRIPTORS: ACHING;SHARP

## 2022-12-16 ASSESSMENT — PAIN DESCRIPTION - ORIENTATION
ORIENTATION: RIGHT;MID
ORIENTATION: LEFT

## 2022-12-16 ASSESSMENT — PAIN DESCRIPTION - LOCATION
LOCATION: ABDOMEN
LOCATION: CHEST;ABDOMEN

## 2022-12-16 NOTE — ED NOTES
Pt presents to the ED c/o severe abdominal pain, cp, sob, vomiting and nausea. Pt has a significant medical hx of HTN and DM. Pt A&O x 4, is ill appearing and vomiting, RR even and unlabored, resting on stretcher with eyes open and call light in reach. Pt placed in a gown, on continuous monitor with alarms set, vitals and EKG obtained, medical hx and allergies reviewed with pt.  Initial assessment performed by physician, [address] [facility] will carry out initial orders/tasks and reassess pt.              Hero Neil LPN  87/64/95 4827

## 2022-12-16 NOTE — ED PROVIDER NOTES
I performed a history and physical examination of the patient and discussed management with the resident. I reviewed the residents note and agree with the documented findings and plan of care. Any areas of disagreement are noted on the chart. I was personally present for the key portions of any procedures. I have documented in the chart those procedures where I was not present during the key portions. I have reviewed the emergency nurses triage note. I agree with the chief complaint, past medical history, past surgical history, allergies, medications, social and family history as documented unless otherwise noted below. Documentation of the HPI, Physical Exam and Medical Decision Making performed by medical students or scribes is based on my personal performance of the HPI, PE and MDM. For Phys Assistant/ Nurse Practitioner cases/documentation I have personally evaluated this patient and have completed at least one if not all key elements of the E/M (history, physical exam, and MDM). I find the patient's history and physical exam are consistent with the NP/PA documentation. I agree with the care provided, treatment rendered, disposition and followup plan. Additional findings are as noted. Jose Elias Seo. Marla Antony MD  Attending Emergency  Physician        EKG Interpretation    Interpreted by me    Rhythm: Sinus tachycardia  Rate: 123  Axis: normal  Ectopy: none  Conduction: normal  ST Segments: no acute change  T Waves: no acute change  Q Waves: none  LVH  J-point depression in leads I to aVL. Clinical Impression: no acute changes and findings as noted above. This patient presented to the emerged part with complaint of sudden onset of severe nausea and vomiting and abdominal discomfort which awoke her from sleep this morning. She denies any fevers or chills. She has had several loose stools as well. No melena, hematochezia, hematemesis. No dysuria, hematuria, frequency, urgency.   No abnormal vaginal discharge or bleeding. Patient also reports some sharp left anterior chest discomfort which been ongoing for the past 3 days. No cough or hemoptysis. No shortness of breath. Past medical history is positive for diabetes mellitus. Patient is status postcholecystectomy in August.   Awake, alert, cooperative, responsive. Speech fluent, normal comprehension. Lungs clear bilaterally. No rales, rhonchi, wheezes, stridor, retractions. Cardiac-S1S2, RRR, no murmur, rub, or gallop. Abdomen soft, nondistended, with mild diffuse tenderness. Moderate tenderness is noted in the right upper quadrant. There is no guarding or rebound. .  No organomegaly, mass, bruit. Normal bowel sounds. Radial and femoral pulses are strong and equal bilaterally. Bilateral upper extremity blood pressures are pending. I reviewed the patient's chest x-ray and there appears to be some widening of the mediastinum which may be related to rotation and positioning. I reviewed the patient's lab results as well and note is made of the white count of 24,800. Flu and COVID are negative. hCG is negative. Given the findings on the patient's chest x-ray we will obtain CTA of the chest.  Should be done at the time of my examination at the patient reports her nausea and vomiting have resolved with treatment as noted. Staci Nath MD  12/16/22 7934          Patient was signed out to Cruz Hartman at the completion of my shift.      Staci Nath MD  12/16/22 2206

## 2022-12-16 NOTE — ED PROVIDER NOTES
[facility]  Emergency Department Encounter  EmergencyMedicine Resident     Pt Candice Ferrera  MRN: [id number]  [place] 1995  Date of evaluation: 12/16/22  PCP:  Ze Mandel PA-C    This patient was evaluated in the Emergency Department for symptoms described in the history of present illness. CHIEF COMPLAINT       Chief Complaint   Patient presents with    Abdominal Pain    Chest Pain    Shortness of Breath    Emesis       HISTORY OF PRESENT ILLNESS  (Location/Symptom, Timing/Onset, Context/Setting, Quality, Duration, Modifying Factors, Severity.)      Shun Ndiaye is a 32 y.o. female who presents with nausea/vomiting, diarrhea, chest pain. Patient states that she has been experiencing chest pain for the past several days. Chest pain is substernal.  Nonradiating. No associated shortness of breath. Patient states that she awoke this morning having had a bowel movement in her sleep. States it was diarrhea. Denies blood in her stool. Denies fever. States that she has not eaten anything differently recently. Patient also endorsing acute onset nausea and vomiting this morning. Patient states that she has vomited several times since she woke this morning. Unable to tolerate p.o. Endorsing epigastric abdominal pain. Denies alcohol use. Denies drug use. Denies recent illness. Denies sick contacts. Has been vaccinated against COVID. No flu vaccination. No leg swelling. No history of blood clots. Not on blood thinners. No shortness of breath. Denies dysuria and hematuria. Denies vaginal discharge or bleeding. PAST MEDICAL / SURGICAL / SOCIAL / FAMILY HISTORY      has a past medical history of Asthma, Bipolar 1 disorder ([facility] [address] Diabetes mellitus type 2 in obese ([facility] [address] GERD (gastroesophageal reflux disease), Hepatic steatosis, Hypertension, Morbid obesity with body mass index (BMI) of 40.0 to 49.9 ([facility] [address] Neuropathy, and Postpartum depression.      has a past surgical history that includes Tonsillectomy (Bilateral);  section; eye surgery;  section (N/A, 3/23/2017); Upper gastrointestinal endoscopy (N/A, 2018); Breast surgery (Left, 2019); Breast surgery (Right, 2019); Rectal surgery (N/A, 2/3/2020); Upper gastrointestinal endoscopy (N/A, 2020); Colonoscopy (N/A, 2020); Axillary Surgery (Right, 2021); Ankle arthroscopy (Right, 01/10/2022); and Ankle arthroscopy (Right, 1/10/2022).     Social History     Socioeconomic History    Marital status: Single     Spouse name: Not on file    Number of children: 2    Years of education: graduated high school, some college    Highest education level: Not on file   Occupational History    Occupation: housewife    Occupation:      Comment: last worked    Tobacco Use    Smoking status: Former     Packs/day: 1.00     Years: 10.00     Pack years: 10.00     Types: Cigarettes     Start date: 2007     Quit date: 2020     Years since quittin.2    Smokeless tobacco: Never   Vaping Use    Vaping Use: Every day    Substances: Always   Substance and Sexual Activity    Alcohol use: No     Alcohol/week: 0.0 standard drinks    Drug use: No    Sexual activity: Yes     Partners: Male     Comment: chlamydia in 2016,   Other Topics Concern    Not on file   Social History Narrative    Lives with fiance and 2 daughters     Social Determinants of Health     Financial Resource Strain: Not on file   Food Insecurity: Not on file   Transportation Needs: Not on file   Physical Activity: Not on file   Stress: Not on file   Social Connections: Not on file   Intimate Partner Violence: Not on file   Housing Stability: Not on file       Family History   Problem Relation Age of Onset    Breast Cancer Mother 28    Diabetes Father     Cancer Maternal Uncle 60        acute leukemia    Heart Disease Maternal Uncle     Diabetes Maternal Uncle     Colon Cancer Maternal Uncle     Diabetes Maternal Uncle     Heart Attack Maternal Uncle     Uterine Cancer Neg Hx     Ovarian Cancer Neg Hx        Allergies:    Benadryl [diphenhydramine]    Home Medications:  Prior to Admission medications    Medication Sig Start Date End Date Taking? Authorizing Provider   aspirin-acetaminophen-caffeine (EXCEDRIN EXTRA STRENGTH) [phone] MG per tablet Take 1 tablet by mouth every 8 hours as needed for Headaches 11/24/22   Madeleine Erich, DO   ondansetron (ZOFRAN ODT) 4 MG disintegrating tablet Take 1 tablet by mouth every 8 hours as needed for Nausea 11/24/22   Madeleine Erich, DO   ibuprofen (IBU) 600 MG tablet Take 1 tablet by mouth every 8 hours as needed for Pain 11/24/22   Madeleine Erich, DO   acetaminophen (TYLENOL) 500 MG tablet Take 2 tablets by mouth 3 times daily 11/18/22   Pam Childs, DO   lisinopril (PRINIVIL;ZESTRIL) 5 MG tablet take 1 tablet by mouth once daily 10/13/22   Saul Rico MD   LEVEMIR FLEXTOUCH 100 UNIT/ML injection pen inject 55 units subcutaneously NIGHTLY 9/9/22   MARCELO Perdomo CNP   metFORMIN (GLUCOPHAGE) 1000 MG tablet take 1 tablet by mouth twice a day with meals 9/8/22   MARCELO Perdomo CNP   predniSONE 10 MG (21) TBPK Take 1 Dose by mouth daily Take 60mg po on day 1, 50mg po on day 2, 40mg po on day 3, 30mg po on day 4, 20 mg po on day 5, 10mg po on day 6 7/20/22   Frederick Blackburn DPM   gabapentin (NEURONTIN) 300 MG capsule Take 1 capsule by mouth 3 times daily for 120 days.  Intended supply: 90 days 5/17/22 9/14/22  Frederick Blackburn DPM   albuterol sulfate  (90 Base) MCG/ACT inhaler Inhale 2 puffs into the lungs every 6 hours as needed for Wheezing 5/3/22   Dolly Griffin MD   cephALEXin (KEFLEX) 500 MG capsule Take 1 capsule by mouth 4 times daily 4/20/22   Swetha Du MD   Insulin Aspart FlexPen 100 UNIT/ML SOPN inject 4 units subcutaneously before breakfast then inject 4 units subcutaneously before LUNCH then inject 6 units subcutaneously before dinner and 6 units subcutaneously at bedtime 3/10/22   Patricia Lovell, APRN - CNP   UNIFINE PENTIPS 29G X 12MM MISC USE 4 times a DAILY WITH basal and short acting insulin 3/10/22   Patricia Lovell, APRN - CNP   blood glucose monitor kit and supplies 1 kit by Other route three times daily Dispense product that insurance will cover 3/10/22   Patricia Lovell, APRN - CNP   Lancets MISC 1 each by Does not apply route 3 times daily 3/10/22   Patricia Lovell, APRN - CNP   blood glucose monitor strips Test 3 times a day & as needed for symptoms of irregular blood glucose. Dispense sufficient amount for indicated testing frequency plus additional to accommodate PRN testing needs.  3/10/22   Patricia Lovell, APRN - CNP   Elastic Bandages & Supports (JOBST KNEE HIGH COMPRESSION SM) MISC Dispense Bilateral Jobst Below Knee 20-30mmHg compression stockings 3/2/22   Philip Cervantes DPM   budesonide-formoterol (SYMBICORT) 160-4.5 MCG/ACT AERO Inhale 2 puffs into the lungs 2 times daily 9/16/21   Morales Swann MD   ipratropium-albuterol (DUONEB) 0.5-2.5 (3) MG/3ML SOLN nebulizer solution Inhale 3 mLs into the lungs every 4 hours (while awake) 9/16/21   Morales Swann MD   Blood Pressure KIT 1 kit by Does not apply route 2 times daily 9/16/21   Morales Swann MD   omeprazole (PRILOSEC) 20 MG delayed release capsule Take 1 capsule by mouth every morning (before breakfast) 7/12/21 12/28/21  MARCELO Crum NP   Insulin Pen Needle (PEN NEEDLES) 31G X 6 MM MISC 1 each by Does not apply route daily 5/21/21   Jessica Muller PA-C   TRUEplus Lancets 30G MISC TEST 2 TIMES A DAY AND AS NEEED FOR SYMPTOMS OF IRREGULAR BLOOD GLUCOSE 4/21/21   Jessica Muller PA-C   albuterol sulfate HFA (PROVENTIL HFA) 108 (90 Base) MCG/ACT inhaler Inhale 2 puffs into the lungs every 6 hours as needed for Wheezing 7/23/19   Linn Hernandez PA-C       REVIEW OF SYSTEMS    (2-9 systems for level 4, 10 or more for level 5)    Review of Systems Constitutional:  Negative for chills and fever. HENT:  Negative for congestion and rhinorrhea. Eyes:  Negative for visual disturbance. Respiratory:  Negative for cough and shortness of breath. Cardiovascular:  Positive for chest pain. Negative for leg swelling. Gastrointestinal:  Positive for abdominal pain, diarrhea, nausea and vomiting. Negative for blood in stool and constipation. Genitourinary:  Negative for difficulty urinating, dysuria, hematuria, vaginal bleeding and vaginal discharge. Musculoskeletal:  Negative for back pain. Skin:  Negative for wound. Neurological:  Negative for weakness, numbness and headaches. PHYSICAL EXAM   (up to 7 for level 4, 8 or more for level 5)    INITIAL VITALS:   BP (!) 122/59   Pulse 94   Temp 98 °F (36.7 °C) (Oral)   Resp 20   SpO2 96%   I have reviewed the triage vital signs. Const: Well nourished, well developed, appears stated age, mild distress  Eyes: PERRL, no conjunctival injection  HENT: NCAT, Neck supple without meningismus   CV: RRR, Warm, well-perfused extremities  RESP: CTAB, Unlabored respiratory effort  GI: soft, tender to palpation in the epigastrium, non-distended, no masses  MSK: No gross deformities appreciated  Skin: Warm, dry. No rashes, clammy  Neuro: Alert, CNs II-XII grossly intact. Sensation and motor function of extremities grossly intact. Psych: Appropriate mood and affect. Appears in discomfort      DIFFERENTIAL  DIAGNOSIS   DDX:  Viral illness, pregnancy, hyperemesis cannabinoid, pancreatitis, hepatitis, ACS/MI, pneumonia, pneumothorax    Initial MDM:  24-year-old female presents with chest pain, abdominal pain, nausea/vomiting, diarrhea. Chest pain has been ongoing for several days. Awoke in bed this morning having had a loose bowel movement in her sleep. Cute onset nausea and vomiting this morning. Several episodes of vomiting before presentation. No recent changes in diet. Denies chance of pregnancy. NO GROWTH <24 HRS    CBC with Auto Differential   Result Value Ref Range    WBC 24.8 (H) 3.5 - 11.3 k/uL    RBC 5.23 (H) 3.95 - 5.11 m/uL    Hemoglobin 16.0 (H) 11.9 - 15.1 g/dL    Hematocrit 45.0 36.3 - 47.1 %    MCV 86.0 82.6 - 102.9 fL    MCH 30.6 25.2 - 33.5 pg    MCHC 35.6 (H) 28.4 - 34.8 g/dL    RDW 13.0 11.8 - 14.4 %    Platelets 568 030 - 032 k/uL    MPV 10.7 8.1 - 13.5 fL    NRBC Automated 0.0 0.0 per 100 WBC    Immature Granulocytes 1 (H) 0 %    Seg Neutrophils 67 (H) 36 - 66 %    Lymphocytes 26 24 - 44 %    Monocytes 5 1 - 7 %    Eosinophils % 1 1 - 4 %    Basophils 0 0 - 2 %    Absolute Immature Granulocyte 0.25 0.00 - 0.30 k/uL    Segs Absolute 16.61 (H) 1.8 - 7.7 k/uL    Absolute Lymph # 6.45 (H) 1.0 - 4.8 k/uL    Absolute Mono # 1.24 (H) 0.1 - 0.8 k/uL    Absolute Eos # 0.25 0.0 - 0.4 k/uL    Basophils Absolute 0.00 0.0 - 0.2 k/uL    Morphology Normal    Comprehensive Metabolic Panel   Result Value Ref Range    Glucose 491 (HH) 70 - 99 mg/dL    BUN 13 6 - 20 mg/dL    Creatinine 0.57 0.50 - 0.90 mg/dL    Est, Glom Filt Rate >60 >60 mL/min/1.73m2    Calcium 8.8 8.6 - 10.4 mg/dL    Sodium 134 (L) 135 - 144 mmol/L    Potassium 3.6 (L) 3.7 - 5.3 mmol/L    Chloride 99 98 - 107 mmol/L    CO2 15 (L) 20 - 31 mmol/L    Anion Gap 20 (H) 9 - 17 mmol/L    Alkaline Phosphatase 109 (H) 35 - 104 U/L    ALT 29 5 - 33 U/L    AST 20 <32 U/L    Total Bilirubin 1.4 (H) 0.3 - 1.2 mg/dL    Total Protein 7.1 6.4 - 8.3 g/dL    Albumin 4.5 3.5 - 5.2 g/dL    Albumin/Globulin Ratio 1.7 1.0 - 2.5   Lipase   Result Value Ref Range    Lipase 43 13 - 60 U/L   Troponin   Result Value Ref Range    Troponin, High Sensitivity <6 0 - 14 ng/L   Troponin   Result Value Ref Range    Troponin, High Sensitivity <6 0 - 14 ng/L   HCG Qualitative, Serum   Result Value Ref Range    hCG Qual NEGATIVE NEGATIVE   Urinalysis with Reflex to Culture    Specimen: Urine   Result Value Ref Range    Color, UA Yellow Yellow    Turbidity UA Clear Clear Glucose, Ur 3+ (A) NEGATIVE    Bilirubin Urine NEGATIVE NEGATIVE    Ketones, Urine SMALL (A) NEGATIVE    Specific Gravity, UA 1.032 (H) 1.005 - 1.030    Urine Hgb NEGATIVE NEGATIVE    pH, UA 5.5 5.0 - 8.0    Protein, UA NEGATIVE NEGATIVE    Urobilinogen, Urine Normal Normal    Nitrite, Urine NEGATIVE NEGATIVE    Leukocyte Esterase, Urine NEGATIVE NEGATIVE    Urinalysis Comments       Microscopic exam not performed based on chemical results unless requested in original order. BLOOD GAS, VENOUS   Result Value Ref Range    pH, Nico 7.352 7.320 - 7.420    pCO2, Nico 31.1 (L) 39 - 55 mm Hg    pO2, Nico 56.3 (H) 30 - 50 mm Hg    HCO3, Venous 16.8 (L) 24 - 30 mmol/L    Negative Base Excess, Nico 7.1 (H) 0.0 - 2.0 mmol/L    O2 Sat, Nico 88.6 (H) 60.0 - 85.0 %    Carboxyhemoglobin 2.3 0 - 5 %    Pt Temp 37.0     FIO2 INFORMATION NOT PROVIDED    Lactate, Sepsis   Result Value Ref Range    Lactic Acid, Sepsis, Whole Blood 2.1 (H) 0.5 - 1.9 mmol/L   Beta-Hydroxybutyrate   Result Value Ref Range    Beta-Hydroxybutyrate 0.62 (H) 0.02 - 0.27 mmol/L   POC Glucose Fingerstick   Result Value Ref Range    POC Glucose 331 (H) 65 - 105 mg/dL       Leukocytosis of 24.8, possible infection. Initially hyperglycemic at 491. Slightly elevated lactic acid of 2.1. Slightly elevated beta hydroxybutyrate at 0.62. Repeat glucose of 331. RADIOLOGY:  XR CHEST PORTABLE    Result Date: 12/16/2022  EXAMINATION: ONE XRAY VIEW OF THE CHEST 12/16/2022 1:58 pm COMPARISON: 01/18/2022 HISTORY: ORDERING SYSTEM PROVIDED HISTORY: chest pain TECHNOLOGIST PROVIDED HISTORY: chest pain Reason for Exam: chest pain FINDINGS: Cardiomediastinal silhouette and pulmonary vasculature are within normal limits. No focal airspace consolidation, pneumothorax, or pleural effusion. No free air beneath the diaphragm. No acute osseous abnormality. No acute intrathoracic process.      CTA CHEST W CONTRAST    Result Date: 12/16/2022  EXAMINATION: CTA OF THE CHEST 12/16/2022 2:56 pm TECHNIQUE: CTA of the chest was performed after the administration of 100 mL Isovue 370 intravenous contrast.  Multiplanar reformatted images are provided for review. MIP images are provided for review. Automated exposure control, iterative reconstruction, and/or weight based adjustment of the mA/kV was utilized to reduce the radiation dose to as low as reasonably achievable. COMPARISON: Correlation with concurrent chest radiograph HISTORY: Acute chest pain and tachycardia. FINDINGS: Image quality mildly degraded by motion artifact. Aorta: No aneurysm or dissection. No significant atherosclerotic disease. Great vessels are patent without stenosis. Mediastinum: Heart is not enlarged. No pericardial effusion. Main pulmonary artery is borderline enlarged. No lymphadenopathy. Thyroid and esophagus are unremarkable. Lungs/Pleura: Lungs are clear. No pleural effusion or pneumothorax. Upper Abdomen: Hepatomegaly with severe steatosis. Soft Tissues/Bones: Unremarkable. Negative CTA of the chest.  Hepatomegaly with severe steatosis. EKG  Rhythm: normal sinus   Rate: tachycardia  Axis: normal  Ectopy: none  Conduction: normal  ST Segments: no acute change  T Waves: no acute change  Q Waves: none    EKG  Impression: no acute changes and non-specific EKG     All EKG's are interpreted by the Emergency Department Physician who either signs or Co-signs this chart in the absence of a cardiologist.    CONSULTS:  IP CONSULT TO INTERNAL MEDICINE  IP CONSULT TO DIABETES EDUCATOR    MDM/EMERGENCY DEPARTMENT COURSE:  Upon reevaluation patient resting comfortably, no acute distress. Still endorsing abdominal pain and nausea. Patient has not vomited in a while. Given persistent pain and nausea as well as lab work will admit to internal medicine. Intermed consult. Intermed to admit patient. Patient understands and agrees with plan.     CRITICAL CARE:  Please see Attending Note    FINAL IMPRESSION 1. Nausea and vomiting, unspecified vomiting type    2. Hyperglycemia      DISPOSITION / PLAN     DISPOSITION Admitted 12/16/2022 06:29:21 PM    PATIENT REFERRED TO:  No follow-up provider specified.     DISCHARGE MEDICATIONS:  New Prescriptions    No medications on file       Mague Brooks DO  Emergency Medicine Resident, PGY 2    (Please note that portions of this note were completed with a voice recognition program.  Efforts were made to edit the dictations but occasionally words are mis-transcribed.)       Mague Brooks DO  Resident  12/16/22 [id number]

## 2022-12-16 NOTE — ED NOTES
The following labs were labeled with appropriate pt sticker and tubed to lab:     [x] Blue     [x] Lavender   [] on ice  [x] Green/yellow  [] Green/black [] on ice  [] Deonte Puls  [] on ice  [] Yellow  [] Red  [] Type/ Screen  [] ABG  [] VBG    [x] COVID-19 swab    [x] Rapid  [] PCR  [x] Flu swab  [] Peds Viral Panel     [] Urine Sample  [] Fecal Sample  [] Pelvic Cultures  [] Blood Cultures  [] X 2  [] STREP Cultures         Corin Alexander RN  12/16/22 [id number]

## 2022-12-16 NOTE — ED NOTES
Pt A&O x 4, on continuous monitor, does not appear in acute distress, RR even and unlabored, resting comfortably on stretcher with eyes closed and call light in reach.         Varsha Almeida, SHARONDA  22/43/64 [id number]

## 2022-12-16 NOTE — H&P
[facility]  Office: [address], DO, Edna Hassan, DO, Elias Hoff, DO, Fabiola Kumar, DO, Chyna Jakcman MD, Ted Shin MD, Morgan Hernandez MD, Blue Nunez MD,  Solo Quezada MD, Tameka Hernandez MD, Lou Nunez, DO, Franca Jones MD,  Danielito Mijares MD, Adriane Gamboa MD, Naomi Ceja, DO, Dante Biswas MD, Nicole Quintanilla MD, Gillian Austin DO, Yassine Quintero MD, Meg Brower MD, Simone Stauffer MD, Vandana Ruiz MD, Blake Gamino DO, Digna Keating MD, Kavya Mccollum MD, Ashleigh Lynne, CNP,  Roseann Murguia, CNP, Siena Livingston, CNP, Quincy Burden, CNP,  Job Huitron, [facility], Sebastian Pena, CNP, Dena Marion, CNP, Christian Perales, CNP, Juan Linn, CNP, Ayah Nieto, [facility], Zonia Lerma, [place], Pablo Cormier, CNS, Jesus Roques, CNP, Fransisco Moravian, [address]    HISTORY AND PHYSICAL EXAMINATION            Date:   12/16/2022  Patient name:  Lynne Cobian  Date of admission:  12/16/2022  1:26 PM  MRN:   [id number]  Account:  [de-identified]  YOB: 1995  PCP:    Barbara Jackson PA-C  Room:   08/08  Code Status:    Prior    Chief Complaint:     Chief Complaint   Patient presents with    Abdominal Pain    Chest Pain    Shortness of Breath    Emesis     History Obtained From:     patient, electronic medical record    History of Present Illness:     Mrs. Yesi Marsh is a 68-year-old female with a significant past medical history of type 2 diabetes, hypertension, asthma, hyperlipidemia who initially presented to the emergency department with a chief complaint of abdominal pain x 3 days. Patient claims abdominal pain started intermittently a few days ago and worsened today as well as nausea and vomiting. Localizes her pain to the epigastric region describing it as an achy pain.   Admits to nausea and vomiting as well as a remote history of intermittent cough with yellow mucus production. Denies fevers, chills, chest pain, shortness of breath, constipation, diarrhea. In the emergency department patient was initially afebrile but tachycardic 128. Saturating well on room air. Labs demonstrated mild hyponatremia 134, hypokalemia 3.6, high anion gap of 20, hyperglycemia 491, low bicarb 15, lactic acidosis 2.1, leukocytosis 24.8 and beta hydroxybutyrate elevated at 0.62. VBG with a pH of 7.35/PCO2 31.1/PO2 56.3/HCO3 16.8. Urinalysis did not demonstrate infection. Chest x-ray demonstrated no acute process. CT chest with contrast was done due to concern for wide mediastinum on chest x-ray and demonstrated no acute cardiopulmonary process but did demonstrate severe hepatic steatosis. Influenza A and B were negative as well as negative COVID. Due to hyperglycemia, high anion gap metabolic acidosis and elevated beta hydroxybutyrate patient was admitted with concerns for DKA. Past Medical History:     Past Medical History:   Diagnosis Date    Asthma     uses inhaler    Bipolar 1 disorder ([facility] [address]     Diabetes mellitus type 2 in obese (HCC)     GERD (gastroesophageal reflux disease)     Hepatic steatosis     Hypertension     started when approx 6 mos pregnant with first pregnancy    Morbid obesity with body mass index (BMI) of 40.0 to 49.9 (HCC)     Neuropathy     Postpartum depression       Past Surgical History:     Past Surgical History:   Procedure Laterality Date    ANKLE ARTHROSCOPY Right 01/10/2022    RIGHT ANKLE ARTHROSCOPY WITH DEBRIDEMENT AND REPAIR OF LATERAL ANKLE LIGAMENTS. PERONEAL TENDON SYNOVECTOMY AND GASTRONEMIS RECESSION RIGHT LOWER EXTREMITY     ANKLE ARTHROSCOPY Right 1/10/2022    RIGHT ANKLE ARTHROSCOPY WITH DEBRIDEMENT AND REPAIR OF LATERAL ANKLE LIGAMENTS.  PERONEAL TENDON SYNOVECTOMY AND GASTROCNEMIUS RECESSION RIGHT LOWER EXTREMITY performed by Suni Michaud DPM at [address] [place] [address] Right 9/20/2021    AXILLARY ABSCESS I&D performed by Kimberli Zamorano MD at [address] Left 2019    BREAST INCISION AND DRAINAGE performed by Kimberli Zamorano MD at [address] Right 2019    BREAST INCISION AND DRAINAGE performed by Kimberli Zamorano MD at [address] [facility] N/A 3/23/2017     SECTION REPEAT  performed by Tamara Cutler MD at [address] [facility] L&D OR    COLONOSCOPY N/A 2020    COLONOSCOPY POLYPECTOMY HOT BIOPSY performed by Andrez Garza MD at [facility] N/A 2/3/2020    I & D PERIANAL ABSCESS performed by Helio Groves MD at [facility] Bilateral     UPPER GASTROINTESTINAL ENDOSCOPY N/A 2018    EGD BIOPSY performed by Andrez Garza MD at [address] [facility] N/A 2020    EGD BIOPSY performed by Andrez Garza MD at [facility] ENDO      Medications Prior to Admission:     Prior to Admission medications    Medication Sig Start Date End Date Taking?  Authorizing Provider   aspirin-acetaminophen-caffeine (EXCEDRIN EXTRA STRENGTH) 148-596-31 MG per tablet Take 1 tablet by mouth every 8 hours as needed for Headaches 22   Giovanni Ceballos, DO   ondansetron (ZOFRAN ODT) 4 MG disintegrating tablet Take 1 tablet by mouth every 8 hours as needed for Nausea 22   Giovanni Ceballos, DO   ibuprofen (IBU) 600 MG tablet Take 1 tablet by mouth every 8 hours as needed for Pain 22   Giovanni Ceballos, DO   acetaminophen (TYLENOL) 500 MG tablet Take 2 tablets by mouth 3 times daily 22   Abbott Cabot, DO   lisinopril (PRINIVIL;ZESTRIL) 5 MG tablet take 1 tablet by mouth once daily 10/13/22   Noemí Benavides MD   LEVEMIR FLEXTOUCH 100 UNIT/ML injection pen inject 55 units subcutaneously NIGHTLY 22   MARCELO Cao CNP   metFORMIN (GLUCOPHAGE) 1000 MG tablet take 1 tablet by mouth twice a day with meals 22   MARCELO Cao CNP   predniSONE 10 MG (21) TBPK Take 1 Dose by mouth daily Take 60mg po on day 1, 50mg po on day 2, 40mg po on day 3, 30mg po on day 4, 20 mg po on day 5, 10mg po on day 6 7/20/22   Laurent Kocher, DPM   gabapentin (NEURONTIN) 300 MG capsule Take 1 capsule by mouth 3 times daily for 120 days. Intended supply: 90 days 5/17/22 9/14/22  Laurent Kocher, DPM   albuterol sulfate  (90 Base) MCG/ACT inhaler Inhale 2 puffs into the lungs every 6 hours as needed for Wheezing 5/3/22   Payal Hyde MD   cephALEXin (KEFLEX) 500 MG capsule Take 1 capsule by mouth 4 times daily 4/20/22   Beverley Angel MD   Insulin Aspart FlexPen 100 UNIT/ML SOPN inject 4 units subcutaneously before breakfast then inject 4 units subcutaneously before LUNCH then inject 6 units subcutaneously before dinner and 6 units subcutaneously at bedtime 3/10/22   MARCELO Adams CNP   UNIFINE PENTIPS 29G X 12MM MISC USE 4 times a DAILY WITH basal and short acting insulin 3/10/22   MARCELO Adams CNP   blood glucose monitor kit and supplies 1 kit by Other route three times daily Dispense product that insurance will cover 3/10/22   MARCELO Adams CNP   Lancets MISC 1 each by Does not apply route 3 times daily 3/10/22   MARCELO Adams CNP   blood glucose monitor strips Test 3 times a day & as needed for symptoms of irregular blood glucose. Dispense sufficient amount for indicated testing frequency plus additional to accommodate PRN testing needs.  3/10/22   MARCELO Adams CNP   Elastic Bandages & Supports (JOBST KNEE HIGH COMPRESSION SM) MISC Dispense Bilateral Jobst Below Knee 20-30mmHg compression stockings 3/2/22   Laurent Kocher, DPM   budesonide-formoterol (SYMBICORT) 160-4.5 MCG/ACT AERO Inhale 2 puffs into the lungs 2 times daily 9/16/21   Jenny Taylor MD   ipratropium-albuterol (DUONEB) 0.5-2.5 (3) MG/3ML SOLN nebulizer solution Inhale 3 mLs into the lungs every 4 hours (while awake) 9/16/21   Jenny Taylor MD   Blood Pressure KIT 1 kit by Does not apply route 2 times daily 9/16/21   Selene Mcnulty MD   omeprazole (PRILOSEC) 20 MG delayed release capsule Take 1 capsule by mouth every morning (before breakfast) 7/12/21 12/28/21  MARCELO Fontaine NP   Insulin Pen Needle (PEN NEEDLES) 31G X 6 MM MISC 1 each by Does not apply route daily 5/21/21   Sammie Bejarano PA-C   TRUEplus Lancets 30G MISC TEST 2 TIMES A DAY AND AS NEEED FOR SYMPTOMS OF IRREGULAR BLOOD GLUCOSE 4/21/21   Sammie Bejarano PA-C   albuterol sulfate HFA (PROVENTIL HFA) 108 (90 Base) MCG/ACT inhaler Inhale 2 puffs into the lungs every 6 hours as needed for Wheezing 7/23/19   Tresa Prieto PA-C      Allergies:     Benadryl [diphenhydramine]    Social History:     Tobacco:    reports that she quit smoking about 2 years ago. Her smoking use included cigarettes. She started smoking about 15 years ago. She has a 10.00 pack-year smoking history. She has never used smokeless tobacco.  Alcohol:      reports no history of alcohol use. Drug Use:  reports no history of drug use. Family History:     Family History   Problem Relation Age of Onset    Breast Cancer Mother 28    Diabetes Father     Cancer Maternal Uncle 61        acute leukemia    Heart Disease Maternal Uncle     Diabetes Maternal Uncle     Colon Cancer Maternal Uncle     Diabetes Maternal Uncle     Heart Attack Maternal Uncle     Uterine Cancer Neg Hx     Ovarian Cancer Neg Hx      Review of Systems:     Positive and Negative as described in HPI. CONSTITUTIONAL:  negative for fevers, chills, sweats, fatigue, weight loss  HEENT:  negative for vision, hearing changes, runny nose, throat pain  RESPIRATORY: Positive for cough but resolving. Negative for shortness of breath, congestion, wheezing  CARDIOVASCULAR:  negative for chest pain, palpitations  GASTROINTESTINAL: Positive for abdominal pain, nausea and vomiting.  Negative for diarrhea, constipation, change in bowel habits  GENITOURINARY:  negative for difficulty of urination, burning with urination, frequency   INTEGUMENT:  negative for rash, skin lesions, easy bruising   HEMATOLOGIC/LYMPHATIC:  negative for swelling/edema   ALLERGIC/IMMUNOLOGIC:  negative for urticaria , itching  ENDOCRINE:  negative increase in drinking, increase in urination, hot or cold intolerance  MUSCULOSKELETAL:  negative joint pains, muscle aches, swelling of joints  NEUROLOGICAL:  negative for headaches, dizziness, lightheadedness, numbness, pain, tingling extremities  BEHAVIOR/PSYCH:  negative for depression, anxiety    Physical Exam:   BP (!) 122/59   Pulse 94   Temp 98 °F (36.7 °C) (Oral)   Resp 20   SpO2 96%   Temp (24hrs), Av °F (36.7 °C), Min:98 °F (36.7 °C), Max:98 °F (36.7 °C)    Recent Labs     22  1735   POCGLU 331*       Intake/Output Summary (Last 24 hours) at 2022 1853  Last data filed at 2022 1711  Gross per 24 hour   Intake 2000 ml   Output --   Net 2000 ml     General Appearance: alert, ill appearing, and in no acute distress  Mental status: oriented to person, place, and time  Head: normocephalic, atraumatic  Eye: no icterus, redness, extraocular eye movements intact, conjunctiva clear  Ear: normal external ear, no discharge, hearing intact  Nose: no drainage noted  Mouth: mucous membranes moist  Neck: supple  Lungs: Bilateral equal air entry, clear to ausculation, no wheezing, rales or rhonchi, normal effort  Cardiovascular: Intermittently tachycardic but otherwise regular rhythm, no murmur, gallop, rub  Abdomen: Soft, nontender, nondistended, normal bowel sounds, no hepatomegaly or splenomegaly  Neurologic: There are no new focal motor or sensory deficits, normal muscle tone and bulk, no abnormal sensation, normal speech  Skin: No gross lesions, rashes, bruising or bleeding on exposed skin area  Extremities: peripheral pulses palpable, no pedal edema or calf pain with palpation  Psych: normal affect    Investigations:      Laboratory Testing:  Recent Results (from the past 24 hour(s))   COVID-19, Rapid    Collection Time: 12/16/22  1:42 PM    Specimen: Nasopharyngeal Swab   Result Value Ref Range    Specimen Description . NASOPHARYNGEAL SWAB     SARS-CoV-2, Rapid Not Detected Not Detected   RAPID INFLUENZA A/B ANTIGENS    Collection Time: 12/16/22  1:53 PM    Specimen: Nasopharyngeal   Result Value Ref Range    Flu A Antigen NEGATIVE NEGATIVE    Flu B Antigen NEGATIVE NEGATIVE   CBC with Auto Differential    Collection Time: 12/16/22  1:54 PM   Result Value Ref Range    WBC 24.8 (H) 3.5 - 11.3 k/uL    RBC 5.23 (H) 3.95 - 5.11 m/uL    Hemoglobin 16.0 (H) 11.9 - 15.1 g/dL    Hematocrit 45.0 36.3 - 47.1 %    MCV 86.0 82.6 - 102.9 fL    MCH 30.6 25.2 - 33.5 pg    MCHC 35.6 (H) 28.4 - 34.8 g/dL    RDW 13.0 11.8 - 14.4 %    Platelets 121 568 - 927 k/uL    MPV 10.7 8.1 - 13.5 fL    NRBC Automated 0.0 0.0 per 100 WBC    Immature Granulocytes 1 (H) 0 %    Seg Neutrophils 67 (H) 36 - 66 %    Lymphocytes 26 24 - 44 %    Monocytes 5 1 - 7 %    Eosinophils % 1 1 - 4 %    Basophils 0 0 - 2 %    Absolute Immature Granulocyte 0.25 0.00 - 0.30 k/uL    Segs Absolute 16.61 (H) 1.8 - 7.7 k/uL    Absolute Lymph # 6.45 (H) 1.0 - 4.8 k/uL    Absolute Mono # 1.24 (H) 0.1 - 0.8 k/uL    Absolute Eos # 0.25 0.0 - 0.4 k/uL    Basophils Absolute 0.00 0.0 - 0.2 k/uL    Morphology Normal    Comprehensive Metabolic Panel    Collection Time: 12/16/22  1:54 PM   Result Value Ref Range    Glucose 491 (HH) 70 - 99 mg/dL    BUN 13 6 - 20 mg/dL    Creatinine 0.57 0.50 - 0.90 mg/dL    Est, Glom Filt Rate >60 >60 mL/min/1.73m2    Calcium 8.8 8.6 - 10.4 mg/dL    Sodium 134 (L) 135 - 144 mmol/L    Potassium 3.6 (L) 3.7 - 5.3 mmol/L    Chloride 99 98 - 107 mmol/L    CO2 15 (L) 20 - 31 mmol/L    Anion Gap 20 (H) 9 - 17 mmol/L    Alkaline Phosphatase 109 (H) 35 - 104 U/L    ALT 29 5 - 33 U/L    AST 20 <32 U/L    Total Bilirubin 1.4 (H) 0.3 - 1.2 mg/dL    Total Protein 7.1 6.4 - 8.3 g/dL    Albumin 4.5 3.5 - 5.2 g/dL    Albumin/Globulin Ratio 1.7 1.0 - 2.5   Lipase    Collection Time: 12/16/22  1:54 PM   Result Value Ref Range    Lipase 43 13 - 60 U/L   Troponin    Collection Time: 12/16/22  1:54 PM   Result Value Ref Range    Troponin, High Sensitivity <6 0 - 14 ng/L   HCG Qualitative, Serum    Collection Time: 12/16/22  1:54 PM   Result Value Ref Range    hCG Qual NEGATIVE NEGATIVE   Troponin    Collection Time: 12/16/22  3:06 PM   Result Value Ref Range    Troponin, High Sensitivity <6 0 - 14 ng/L   Urinalysis with Reflex to Culture    Collection Time: 12/16/22  3:06 PM    Specimen: Urine   Result Value Ref Range    Color, UA Yellow Yellow    Turbidity UA Clear Clear    Glucose, Ur 3+ (A) NEGATIVE    Bilirubin Urine NEGATIVE NEGATIVE    Ketones, Urine SMALL (A) NEGATIVE    Specific Gravity, UA 1.032 (H) 1.005 - 1.030    Urine Hgb NEGATIVE NEGATIVE    pH, UA 5.5 5.0 - 8.0    Protein, UA NEGATIVE NEGATIVE    Urobilinogen, Urine Normal Normal    Nitrite, Urine NEGATIVE NEGATIVE    Leukocyte Esterase, Urine NEGATIVE NEGATIVE    Urinalysis Comments       Microscopic exam not performed based on chemical results unless requested in original order. Beta-Hydroxybutyrate    Collection Time: 12/16/22  3:06 PM   Result Value Ref Range    Beta-Hydroxybutyrate 0.62 (H) 0.02 - 0.27 mmol/L   Culture, Blood 1    Collection Time: 12/16/22  3:20 PM    Specimen: Blood   Result Value Ref Range    Specimen Description . BLOOD     Special Requests R AC  10ML     Culture NO GROWTH <24 HRS    Culture, Blood 1    Collection Time: 12/16/22  3:32 PM    Specimen: Blood   Result Value Ref Range    Specimen Description . BLOOD     Special Requests LT WRIST 14ML     Culture NO GROWTH <24 HRS    Lactate, Sepsis    Collection Time: 12/16/22  3:36 PM   Result Value Ref Range    Lactic Acid, Sepsis, Whole Blood 2.1 (H) 0.5 - 1.9 mmol/L   BLOOD GAS, VENOUS    Collection Time: 12/16/22  3:42 PM   Result Value Ref Range    pH, Nico 7.352 7.320 - 7.420 pCO2, Nico 31.1 (L) 39 - 55 mm Hg    pO2, Nico 56.3 (H) 30 - 50 mm Hg    HCO3, Venous 16.8 (L) 24 - 30 mmol/L    Negative Base Excess, Nico 7.1 (H) 0.0 - 2.0 mmol/L    O2 Sat, Nico 88.6 (H) 60.0 - 85.0 %    Carboxyhemoglobin 2.3 0 - 5 %    Pt Temp 37.0     FIO2 INFORMATION NOT PROVIDED    POC Glucose Fingerstick    Collection Time: 12/16/22  5:35 PM   Result Value Ref Range    POC Glucose 331 (H) 65 - 105 mg/dL       Imaging/Diagnostics:  XR CHEST PORTABLE    Result Date: 12/16/2022  No acute intrathoracic process. CTA CHEST W CONTRAST    Result Date: 12/16/2022  Negative CTA of the chest.  Hepatomegaly with severe steatosis. Assessment :      Hospital Problems             Last Modified POA    * (Principal) Diabetic acidosis without coma ([facility] [address] 12/16/2022 Yes    Essential hypertension (Chronic) 12/16/2022 Yes    Overview Addendum 11/7/2018  8:13 AM by Gayle Osborne MD     Patient states she was dx cHTN by PCP, never started on meds    Overview:   Overview:   Patient states she was dx cHTN by PCP, never started on meds         Asthma (Chronic) 12/16/2022 Yes    Overview Addendum 11/7/2018  8:13 AM by Gayle Osborne MD     Albuterol PRN     Overview:   Overview:   Albuterol PRN          Class 2 obesity in adult 12/16/2022 Yes    Hepatic steatosis (Chronic) 12/16/2022 Yes     Plan:     Patient status inpatient in the Progressive Unit/Step down    Diabetic ketoacidosis. Last hemoglobin A1c was 10.2 on 1/6/22. We will get updated hemoglobin A1c. Likely secondary to medication noncompliance.  at bedside states patient misses frequent doses did not take any insulin this morning. We will initiate DKA protocol. BMP, magnesium and phosphorus every 4 hours. Electrolyte replacement per protocol. Keep patient n.p.o. We will transition to Lantus after anion gap normalizes x2 BMPs. High anion gap metabolic acidosis with lactic acidosis. Secondary to DKA. Treatment as above. Pseudohyponatremia.   Sodium 140 after correction for hyperglycemia. Monitor BMP every 4 hours while on 0.45 normal saline per DKA protocol. Essential hypertension. Patient takes lisinopril 5 mg daily at home. Will monitor blood pressure and resume if needed. Blood pressure and heart rate currently improved as pain has improved. Hepatic steatosis. Recommend outpatient follow-up with PCP for chronic hepatic steatosis and lipid panel work-up. Asthma. Continue albuterol as needed and Symbicort inhaler. Obesity. Encourage lifestyle modification with diet and exercise. DVT prophylaxis: Lovenox 40 mg daily. GI prophylaxis: No indication. Consultations:   IP CONSULT TO INTERNAL MEDICINE  IP CONSULT TO DIABETES EDUCATOR    Patient is admitted as inpatient status because of co-morbidities listed above, severity of signs and symptoms as outlined, requirement for current medical therapies and most importantly because of direct risk to patient if care not provided in a hospital setting. Expected length of stay > 48 hours.     Marlyn Wang DO  12/16/2022  6:53 PM    Copy sent to Dr. Arpit Roche PA-C

## 2022-12-17 VITALS
TEMPERATURE: 98 F | HEIGHT: 65 IN | WEIGHT: 230 LBS | DIASTOLIC BLOOD PRESSURE: 70 MMHG | RESPIRATION RATE: 18 BRPM | HEART RATE: 79 BPM | BODY MASS INDEX: 38.32 KG/M2 | SYSTOLIC BLOOD PRESSURE: 117 MMHG | OXYGEN SATURATION: 96 %

## 2022-12-17 LAB
ABSOLUTE EOS #: 0.37 K/UL (ref 0–0.44)
ABSOLUTE IMMATURE GRANULOCYTE: 0.07 K/UL (ref 0–0.3)
ABSOLUTE LYMPH #: 3.55 K/UL (ref 1.1–3.7)
ABSOLUTE MONO #: 1.17 K/UL (ref 0.1–1.2)
ANION GAP SERPL CALCULATED.3IONS-SCNC: 12 MMOL/L (ref 9–17)
ANION GAP SERPL CALCULATED.3IONS-SCNC: 12 MMOL/L (ref 9–17)
ANION GAP SERPL CALCULATED.3IONS-SCNC: 16 MMOL/L (ref 9–17)
BASOPHILS # BLD: 0 % (ref 0–2)
BASOPHILS ABSOLUTE: 0.04 K/UL (ref 0–0.2)
BUN BLDV-MCNC: 6 MG/DL (ref 6–20)
BUN BLDV-MCNC: 8 MG/DL (ref 6–20)
BUN BLDV-MCNC: 9 MG/DL (ref 6–20)
CALCIUM SERPL-MCNC: 7.6 MG/DL (ref 8.6–10.4)
CALCIUM SERPL-MCNC: 8.1 MG/DL (ref 8.6–10.4)
CALCIUM SERPL-MCNC: 8.1 MG/DL (ref 8.6–10.4)
CHLORIDE BLD-SCNC: 100 MMOL/L (ref 98–107)
CHLORIDE BLD-SCNC: 101 MMOL/L (ref 98–107)
CHLORIDE BLD-SCNC: 102 MMOL/L (ref 98–107)
CHP ED QC CHECK: YES
CHP ED QC CHECK: YES
CO2: 16 MMOL/L (ref 20–31)
CO2: 17 MMOL/L (ref 20–31)
CO2: 18 MMOL/L (ref 20–31)
CREAT SERPL-MCNC: 0.35 MG/DL (ref 0.5–0.9)
CREAT SERPL-MCNC: 0.4 MG/DL (ref 0.5–0.9)
CREAT SERPL-MCNC: 0.54 MG/DL (ref 0.5–0.9)
EOSINOPHILS RELATIVE PERCENT: 3 % (ref 1–4)
GFR SERPL CREATININE-BSD FRML MDRD: >60 ML/MIN/1.73M2
GLUCOSE BLD-MCNC: 107 MG/DL (ref 65–105)
GLUCOSE BLD-MCNC: 138 MG/DL (ref 65–105)
GLUCOSE BLD-MCNC: 175 MG/DL (ref 65–105)
GLUCOSE BLD-MCNC: 177 MG/DL (ref 65–105)
GLUCOSE BLD-MCNC: 227 MG/DL (ref 65–105)
GLUCOSE BLD-MCNC: 233 MG/DL (ref 70–99)
GLUCOSE BLD-MCNC: 250 MG/DL
GLUCOSE BLD-MCNC: 250 MG/DL (ref 65–105)
GLUCOSE BLD-MCNC: 280 MG/DL (ref 70–99)
GLUCOSE BLD-MCNC: 301 MG/DL
GLUCOSE BLD-MCNC: 301 MG/DL (ref 65–105)
GLUCOSE BLD-MCNC: 307 MG/DL (ref 65–105)
GLUCOSE BLD-MCNC: 313 MG/DL (ref 65–105)
GLUCOSE BLD-MCNC: 319 MG/DL (ref 70–99)
HCT VFR BLD CALC: 39.9 % (ref 36.3–47.1)
HEMOGLOBIN: 13.3 G/DL (ref 11.9–15.1)
IMMATURE GRANULOCYTES: 1 %
LYMPHOCYTES # BLD: 24 % (ref 24–43)
MAGNESIUM: 1.5 MG/DL (ref 1.6–2.6)
MAGNESIUM: 2.2 MG/DL (ref 1.6–2.6)
MCH RBC QN AUTO: 31.1 PG (ref 25.2–33.5)
MCHC RBC AUTO-ENTMCNC: 33.3 G/DL (ref 28.4–34.8)
MCV RBC AUTO: 93.2 FL (ref 82.6–102.9)
MONOCYTES # BLD: 8 % (ref 3–12)
NRBC AUTOMATED: 0 PER 100 WBC
PDW BLD-RTO: 13.2 % (ref 11.8–14.4)
PHOSPHORUS: 2.2 MG/DL (ref 2.6–4.5)
PHOSPHORUS: 3 MG/DL (ref 2.6–4.5)
PLATELET # BLD: 255 K/UL (ref 138–453)
PMV BLD AUTO: 10.4 FL (ref 8.1–13.5)
POTASSIUM SERPL-SCNC: 3.3 MMOL/L (ref 3.7–5.3)
POTASSIUM SERPL-SCNC: 3.4 MMOL/L (ref 3.7–5.3)
POTASSIUM SERPL-SCNC: 3.9 MMOL/L (ref 3.7–5.3)
RBC # BLD: 4.28 M/UL (ref 3.95–5.11)
SEG NEUTROPHILS: 65 % (ref 36–65)
SEGMENTED NEUTROPHILS ABSOLUTE COUNT: 9.88 K/UL (ref 1.5–8.1)
SODIUM BLD-SCNC: 130 MMOL/L (ref 135–144)
SODIUM BLD-SCNC: 131 MMOL/L (ref 135–144)
SODIUM BLD-SCNC: 133 MMOL/L (ref 135–144)
WBC # BLD: 15.1 K/UL (ref 3.5–11.3)

## 2022-12-17 PROCEDURE — 36415 COLL VENOUS BLD VENIPUNCTURE: CPT

## 2022-12-17 PROCEDURE — 84100 ASSAY OF PHOSPHORUS: CPT

## 2022-12-17 PROCEDURE — 6360000002 HC RX W HCPCS

## 2022-12-17 PROCEDURE — 6370000000 HC RX 637 (ALT 250 FOR IP): Performed by: STUDENT IN AN ORGANIZED HEALTH CARE EDUCATION/TRAINING PROGRAM

## 2022-12-17 PROCEDURE — 82947 ASSAY GLUCOSE BLOOD QUANT: CPT

## 2022-12-17 PROCEDURE — 83735 ASSAY OF MAGNESIUM: CPT

## 2022-12-17 PROCEDURE — 2580000003 HC RX 258

## 2022-12-17 PROCEDURE — 80048 BASIC METABOLIC PNL TOTAL CA: CPT

## 2022-12-17 PROCEDURE — 6360000002 HC RX W HCPCS: Performed by: NURSE PRACTITIONER

## 2022-12-17 PROCEDURE — 99239 HOSP IP/OBS DSCHRG MGMT >30: CPT | Performed by: STUDENT IN AN ORGANIZED HEALTH CARE EDUCATION/TRAINING PROGRAM

## 2022-12-17 PROCEDURE — 94640 AIRWAY INHALATION TREATMENT: CPT

## 2022-12-17 PROCEDURE — 83036 HEMOGLOBIN GLYCOSYLATED A1C: CPT

## 2022-12-17 PROCEDURE — 6370000000 HC RX 637 (ALT 250 FOR IP): Performed by: NURSE PRACTITIONER

## 2022-12-17 PROCEDURE — 85025 COMPLETE CBC W/AUTO DIFF WBC: CPT

## 2022-12-17 RX ORDER — GABAPENTIN 300 MG/1
300 CAPSULE ORAL 3 TIMES DAILY
Status: DISCONTINUED | OUTPATIENT
Start: 2022-12-17 | End: 2022-12-17 | Stop reason: HOSPADM

## 2022-12-17 RX ORDER — MAGNESIUM SULFATE 1 G/100ML
INJECTION INTRAVENOUS
Status: COMPLETED
Start: 2022-12-17 | End: 2022-12-17

## 2022-12-17 RX ORDER — INSULIN GLARGINE 100 [IU]/ML
40 INJECTION, SOLUTION SUBCUTANEOUS NIGHTLY
Status: DISCONTINUED | OUTPATIENT
Start: 2022-12-17 | End: 2022-12-17 | Stop reason: HOSPADM

## 2022-12-17 RX ORDER — INSULIN GLARGINE 100 [IU]/ML
24 INJECTION, SOLUTION SUBCUTANEOUS ONCE
Status: COMPLETED | OUTPATIENT
Start: 2022-12-17 | End: 2022-12-17

## 2022-12-17 RX ORDER — SODIUM CHLORIDE 450 MG/100ML
INJECTION, SOLUTION INTRAVENOUS
Status: COMPLETED
Start: 2022-12-17 | End: 2022-12-17

## 2022-12-17 RX ORDER — DEXTROSE AND SODIUM CHLORIDE 5; .45 G/100ML; G/100ML
INJECTION, SOLUTION INTRAVENOUS
Status: COMPLETED
Start: 2022-12-17 | End: 2022-12-17

## 2022-12-17 RX ORDER — KETOCONAZOLE 20 MG/ML
SHAMPOO TOPICAL
Status: ON HOLD | COMMUNITY
Start: 2022-10-13 | End: 2022-12-17 | Stop reason: HOSPADM

## 2022-12-17 RX ORDER — KETOROLAC TROMETHAMINE 15 MG/ML
30 INJECTION, SOLUTION INTRAMUSCULAR; INTRAVENOUS EVERY 6 HOURS PRN
Status: DISCONTINUED | OUTPATIENT
Start: 2022-12-17 | End: 2022-12-17 | Stop reason: HOSPADM

## 2022-12-17 RX ORDER — IBUPROFEN 800 MG/1
TABLET ORAL
Status: ON HOLD | COMMUNITY
Start: 2022-11-27 | End: 2022-12-17 | Stop reason: HOSPADM

## 2022-12-17 RX ORDER — LISINOPRIL 5 MG/1
5 TABLET ORAL DAILY
Status: DISCONTINUED | OUTPATIENT
Start: 2022-12-17 | End: 2022-12-17 | Stop reason: HOSPADM

## 2022-12-17 RX ORDER — IVERMECTIN 10 MG/G
CREAM TOPICAL
Status: ON HOLD | COMMUNITY
Start: 2022-10-13 | End: 2022-12-17 | Stop reason: HOSPADM

## 2022-12-17 RX ORDER — MORPHINE SULFATE 4 MG/ML
2 INJECTION, SOLUTION INTRAMUSCULAR; INTRAVENOUS ONCE
Status: COMPLETED | OUTPATIENT
Start: 2022-12-17 | End: 2022-12-17

## 2022-12-17 RX ORDER — DEXTROSE MONOHYDRATE 100 MG/ML
INJECTION, SOLUTION INTRAVENOUS CONTINUOUS PRN
Status: DISCONTINUED | OUTPATIENT
Start: 2022-12-17 | End: 2022-12-17 | Stop reason: HOSPADM

## 2022-12-17 RX ORDER — INSULIN LISPRO 100 [IU]/ML
4 INJECTION, SOLUTION INTRAVENOUS; SUBCUTANEOUS
Status: DISCONTINUED | OUTPATIENT
Start: 2022-12-17 | End: 2022-12-17 | Stop reason: HOSPADM

## 2022-12-17 RX ORDER — CLINDAMYCIN PHOSPHATE 10 MG/G
GEL TOPICAL
Status: ON HOLD | COMMUNITY
Start: 2022-10-14 | End: 2022-12-17 | Stop reason: HOSPADM

## 2022-12-17 RX ORDER — INSULIN GLARGINE 100 [IU]/ML
20 INJECTION, SOLUTION SUBCUTANEOUS ONCE
Status: DISCONTINUED | OUTPATIENT
Start: 2022-12-17 | End: 2022-12-17 | Stop reason: HOSPADM

## 2022-12-17 RX ORDER — POTASSIUM CHLORIDE 20 MEQ/1
40 TABLET, EXTENDED RELEASE ORAL DAILY
Qty: 180 TABLET | Refills: 1 | Status: SHIPPED | OUTPATIENT
Start: 2022-12-17 | End: 2022-12-17 | Stop reason: HOSPADM

## 2022-12-17 RX ORDER — INSULIN LISPRO 100 [IU]/ML
0-16 INJECTION, SOLUTION INTRAVENOUS; SUBCUTANEOUS EVERY 4 HOURS
Status: DISCONTINUED | OUTPATIENT
Start: 2022-12-17 | End: 2022-12-17 | Stop reason: HOSPADM

## 2022-12-17 RX ADMIN — MAGNESIUM SULFATE HEPTAHYDRATE 1000 MG: 1 INJECTION, SOLUTION INTRAVENOUS at 00:38

## 2022-12-17 RX ADMIN — MAGNESIUM SULFATE HEPTAHYDRATE 1000 MG: 1 INJECTION, SOLUTION INTRAVENOUS at 01:36

## 2022-12-17 RX ADMIN — INSULIN LISPRO 4 UNITS: 100 INJECTION, SOLUTION INTRAVENOUS; SUBCUTANEOUS at 11:54

## 2022-12-17 RX ADMIN — LISINOPRIL 5 MG: 5 TABLET ORAL at 09:54

## 2022-12-17 RX ADMIN — DEXTROSE AND SODIUM CHLORIDE: 5; .45 INJECTION, SOLUTION INTRAVENOUS at 01:37

## 2022-12-17 RX ADMIN — DEXTROSE AND SODIUM CHLORIDE: 5; 450 INJECTION, SOLUTION INTRAVENOUS at 01:37

## 2022-12-17 RX ADMIN — BUDESONIDE AND FORMOTEROL FUMARATE DIHYDRATE 2 PUFF: 160; 4.5 AEROSOL RESPIRATORY (INHALATION) at 07:45

## 2022-12-17 RX ADMIN — POTASSIUM CHLORIDE 10 MEQ: 7.46 INJECTION, SOLUTION INTRAVENOUS at 00:41

## 2022-12-17 RX ADMIN — POTASSIUM CHLORIDE 10 MEQ: 7.46 INJECTION, SOLUTION INTRAVENOUS at 01:38

## 2022-12-17 RX ADMIN — SODIUM CHLORIDE: 450 INJECTION, SOLUTION INTRAVENOUS at 00:35

## 2022-12-17 RX ADMIN — MORPHINE SULFATE 2 MG: 4 INJECTION INTRAVENOUS at 01:05

## 2022-12-17 RX ADMIN — SODIUM CHLORIDE: 4.5 INJECTION, SOLUTION INTRAVENOUS at 00:35

## 2022-12-17 RX ADMIN — INSULIN GLARGINE 24 UNITS: 100 INJECTION, SOLUTION SUBCUTANEOUS at 04:05

## 2022-12-17 RX ADMIN — MAGNESIUM SULFATE 1000 MG: 1 INJECTION INTRAVENOUS at 01:36

## 2022-12-17 RX ADMIN — MAGNESIUM SULFATE 1000 MG: 1 INJECTION INTRAVENOUS at 00:38

## 2022-12-17 RX ADMIN — INSULIN LISPRO 12 UNITS: 100 INJECTION, SOLUTION INTRAVENOUS; SUBCUTANEOUS at 09:52

## 2022-12-17 RX ADMIN — KETOROLAC TROMETHAMINE 30 MG: 15 INJECTION, SOLUTION INTRAMUSCULAR; INTRAVENOUS at 04:08

## 2022-12-17 ASSESSMENT — PAIN DESCRIPTION - DESCRIPTORS: DESCRIPTORS: ACHING;DISCOMFORT

## 2022-12-17 ASSESSMENT — PAIN DESCRIPTION - LOCATION: LOCATION: ABDOMEN

## 2022-12-17 ASSESSMENT — PAIN SCALES - GENERAL
PAINLEVEL_OUTOF10: 0
PAINLEVEL_OUTOF10: 6
PAINLEVEL_OUTOF10: 0

## 2022-12-17 ASSESSMENT — PAIN DESCRIPTION - ORIENTATION: ORIENTATION: RIGHT

## 2022-12-17 NOTE — DISCHARGE SUMMARY
[facility]  Office: [address], DO, Kirit Case, DO, Lauryn Garner, DO, Zachariah Mohan Blood, DO, Sherman Sullivan MD, Robyn Taylor MD, Yvone Phalen, MD, Harshad Goldstein MD,  Homer Hashimoto, MD, Juan Coleman MD, Jeremy Saenz, DO, Eveline Ayon MD,  Kim Mccray, DO, Ary Avila MD, Lizet Carrero MD, Spike Damon DO, John Mathew MD, Jazmín Lerma MD, Sharmin Varghese, DO, Matt Hollis MD, Giselle Souza MD, Isaac Oconnor MD, Georgie Wilson MD, Adriel Kerr DO, Yanet Maria MD, Filiberto Hernandes MD, Johanna Mckeon, CNP,  Tiffany Saxena, CNP, Selvin Rodriguez, CNP, Kary Vazquez, CNP,  Zhanna Gamble, [facility], Jyoti Bowers, CNP, Dion Chow, CNP, Adrien Boucher, CNP, Christina Adrian, CNP, Tomas Parra, CNP, Lucille Reagan PA-C, Essie Varner, CNS, Leroy Valentine CNP, Justin Meyers, [facility]    Discharge Summary     Patient ID: Sandoval Joy  :  1995   MRN: [id number]     ACCOUNT:  [de-identified]   Patient's PCP: Dawit Silva PA-C  Admit Date: 2022   Discharge Date: 2022     Length of Stay: 1  Code Status:  Full Code  Admitting Physician: Jazmín Lerma MD  Discharge Physician: Jazmín Lerma MD     Active Discharge Diagnoses:     Hospital Problem Lists:  Principal Problem:    Diabetic acidosis without coma ([facility] [address]  Active Problems:    Essential hypertension    Asthma    Class 2 obesity in adult    Hepatic steatosis  Resolved Problems:    DKA, type 2, not at goal [facility])      Admission Condition:  fair     Discharged Condition: good    Physical Exam  Constitutional:       Appearance: Normal appearance. HENT:      Head: Normocephalic and atraumatic. Eyes:      Extraocular Movements: Extraocular movements intact. Conjunctiva/sclera: Conjunctivae normal.      Pupils: Pupils are equal, round, and reactive to light.    Cardiovascular:      Rate and Rhythm: Normal rate and regular rhythm. Pulses: Normal pulses. Heart sounds: Normal heart sounds. Pulmonary:      Effort: Pulmonary effort is normal.      Breath sounds: Normal breath sounds. Neurological:      Mental Status: She is alert. Hospital Stay:     Hospital Course:  Gelene Bumpers is a 32 y.o. female who was admitted for the management of   Diabetic acidosis without coma ([facility] [address] , presented to ER with Abdominal Pain, Chest Pain, Shortness of Breath, and Emesis      26-year-old female known diabetic does not take her insulin presented with abdominal pain nausea vomiting found to be in DKA. Her initial anion gap was over 20 she was started on insulin drip responded well  She was then transitioned to basal bolus insulin and she had diet she tolerated well and was discharged without consequence    Her previous endocrinologist left and she no longer has a PCP or endocrinologist.  She was given information to follow-up with 3 separate endocrinologist and PCP [facility] in our primary care office    Significant therapeutic interventions:     Significant Diagnostic Studies:   Labs / Micro:       Radiology:  XR CHEST PORTABLE    Result Date: 12/16/2022  No acute intrathoracic process. CTA CHEST W CONTRAST    Result Date: 12/16/2022  Negative CTA of the chest.  Hepatomegaly with severe steatosis. Consultations:    Consults:     Final Specialist Recommendations/Findings:   IP CONSULT TO INTERNAL MEDICINE  IP CONSULT TO DIABETES EDUCATOR      The patient was seen and examined on day of discharge and this discharge summary is in conjunction with any daily progress note from day of discharge.     Discharge plan:     Disposition: Home    Physician Follow Up:     Talia Hernandes MD  [address] [facility]  [address] [place] [address]  [phone]    Follow up      Eloise Navarro MD  [address] [facility] [address] [phone]    Follow up      Jimmie Cedillo MD  [address] [facility], [address] [facility] [address] Elyssa Heart, APRN - [address]  [facility] [address] Sofia Herron [address] [place] [address]  [phone]    Follow up in 2 day(s)         Requiring Further Evaluation/Follow Up POST HOSPITALIZATION/Incidental Findings:     Diet: Diabetic low-carb    Activity: As tolerated    Instructions to Patient:     Discharge Medications:      Medication List        CONTINUE taking these medications      blood glucose monitor kit and supplies  1 kit by Other route three times daily Dispense product that insurance will cover     Blood Pressure Kit  1 kit by Does not apply route 2 times daily     JOBST KNEE HIGH COMPRESSION SM Misc  Dispense Bilateral Jobst Below Knee 20-30mmHg compression stockings     * Pen Needles 31G X 6 MM Misc  1 each by Does not apply route daily     * Unifine Pentips 29G X 12MM Misc  Generic drug: Insulin Pen Needle  USE 4 times a DAILY WITH basal and short acting insulin     * TRUEplus Lancets 30G Misc  TEST 2 TIMES A DAY AND AS NEEED FOR SYMPTOMS OF IRREGULAR BLOOD GLUCOSE     * Lancets Misc  1 each by Does not apply route 3 times daily           * This list has 4 medication(s) that are the same as other medications prescribed for you. Read the directions carefully, and ask your doctor or other care provider to review them with you.                 ASK your doctor about these medications      acetaminophen 500 MG tablet  Commonly known as: TYLENOL  Take 2 tablets by mouth 3 times daily     * albuterol sulfate  (90 Base) MCG/ACT inhaler  Commonly known as: Proventil HFA  Inhale 2 puffs into the lungs every 6 hours as needed for Wheezing     * albuterol sulfate  (90 Base) MCG/ACT inhaler  Commonly known as: PROVENTIL;VENTOLIN;PROAIR  Inhale 2 puffs into the lungs every 6 hours as needed for Wheezing     aspirin-acetaminophen-caffeine 852-977-93 MG per tablet  Commonly known as: Excedrin Extra Strength  Take 1 tablet by mouth every 8 hours as needed for Headaches     blood glucose test strips  Test 3 times a day & as needed for symptoms of irregular blood glucose. Dispense sufficient amount for indicated testing frequency plus additional to accommodate PRN testing needs. budesonide-formoterol 160-4.5 MCG/ACT Aero  Commonly known as: SYMBICORT  Inhale 2 puffs into the lungs 2 times daily     cephALEXin 500 MG capsule  Commonly known as: KEFLEX  Take 1 capsule by mouth 4 times daily     clindamycin 1 % gel  Commonly known as: CLINDAGEL     gabapentin 300 MG capsule  Commonly known as: NEURONTIN  Take 1 capsule by mouth 3 times daily for 120 days. Intended supply: 90 days     ibuprofen 800 MG tablet  Commonly known as: ADVIL;MOTRIN  Ask about: Which instructions should I use?      Insulin Aspart FlexPen 100 UNIT/ML Sopn  inject 4 units subcutaneously before breakfast then inject 4 units subcutaneously before LUNCH then inject 6 units subcutaneously before dinner and 6 units subcutaneously at bedtime     ipratropium-albuterol 0.5-2.5 (3) MG/3ML Soln nebulizer solution  Commonly known as: DUONEB  Inhale 3 mLs into the lungs every 4 hours (while awake)     Ivermectin 1 % Crea     ketoconazole 2 % shampoo  Commonly known as: NIZORAL     Levemir FlexTouch 100 UNIT/ML injection pen  Generic drug: insulin detemir  inject 55 units subcutaneously NIGHTLY     lisinopril 5 MG tablet  Commonly known as: PRINIVIL;ZESTRIL  take 1 tablet by mouth once daily     metFORMIN 1000 MG tablet  Commonly known as: GLUCOPHAGE  take 1 tablet by mouth twice a day with meals     omeprazole 20 MG delayed release capsule  Commonly known as: PRILOSEC  Take 1 capsule by mouth every morning (before breakfast)     ondansetron 4 MG disintegrating tablet  Commonly known as: Zofran ODT  Take 1 tablet by mouth every 8 hours as needed for Nausea     predniSONE 10 MG (21) Tbpk  Take 1 Dose by mouth daily Take 60mg po on day 1, 50mg po on day 2, 40mg po on day 3, 30mg po on day 4, 20 mg po on day 5, 10mg po on day 6     terconazole 0.8 % vaginal cream  Commonly known as: TERAZOL 3           * This list has 2 medication(s) that are the same as other medications prescribed for you. Read the directions carefully, and ask your doctor or other care provider to review them with you. No discharge procedures on file. Time Spent on discharge is  31 mins in patient examination, evaluation, counseling as well as medication reconciliation, prescriptions for required medications, discharge plan and follow up. Electronically signed by   Ezequiel Diggs MD  12/17/2022  11:41 AM      Thank you Dr. Nedra Pak PA-C for the opportunity to be involved in this patient's care.

## 2022-12-17 NOTE — PROGRESS NOTES
Pharmacy Note     Enoxaparin Dose Adjustment    Selvin De La Garza is a 32 y.o. female. Pharmacist assessment of enoxaparin dose for VTE prophylaxis. Recent Labs     12/16/22  1354   BUN 13       Recent Labs     12/16/22  1354   CREATININE 0.57       Estimated Creatinine Clearance: 176 mL/min (based on SCr of 0.57 mg/dL). Estimated CrCl using Ideal Body Weight: 131 mL/min (based on IBW 57 kg)    Height:   Ht Readings from Last 1 Encounters:   11/24/22 5' 5\" (1.651 m)     Weight:  Wt Readings from Last 1 Encounters:   12/16/22 234 lb (106.1 kg)       The following enoxaparin dose has been adjusted based upon renal function and/or patient weight per P&T Guidelines:             Enoxaparin 40 mg subcutaneously once daily changed to Enoxaparin 30 mg subcutaneously twice daily.

## 2022-12-17 NOTE — ED NOTES
Pt ambulated to restroom with steady, even gait. No signs of distress noted.       Grace Blackwell RN  12/16/22 2123

## 2022-12-17 NOTE — ED NOTES
The following labs were labeled with appropriate pt sticker and tubed to lab:     [] Blue     [] Lavender   [] on ice  [] Green/yellow  [x] Green/black [x] on ice  [] Howard Windsor  [] on ice  [] Yellow  [] Red  [] Type/ Screen  [] ABG  [] VBG    [] COVID-19 swab    [] Rapid  [] PCR  [] Flu swab  [] Peds Viral Panel     [] Urine Sample  [] Fecal Sample  [] Pelvic Cultures  [] Blood Cultures  [] X 2  [] STREP Cultures         Griselda Roan, RN  12/16/22 1927

## 2022-12-17 NOTE — ED NOTES
.The following labs were labeled with appropriate pt sticker and tubed to lab:     [] Blue     [] Lavender   [] on ice  [x] Green/yellow  [] Green/black [] on ice  [] Vesta Reel  [] on ice  [] Yellow  [] Red  [] Type/ Screen  [] ABG  [] VBG    [] COVID-19 swab    [] Rapid  [] PCR  [] Flu swab  [] Peds Viral Panel     [] Urine Sample  [] Fecal Sample  [] Pelvic Cultures  [] Blood Cultures  [] X 2  [] STREP Cultures       Yolande So LPN  74/24/28 7385

## 2022-12-17 NOTE — ED PROVIDER NOTES
[address] HANDOFF       Handoff taken on the following patient from prior Attending Physician: Dr. Ashley Elaine  Pt Name: Jermaine Arthur  PCP:  Ora Galan PA-C    Attestation  I was available and discussed any additional care issues that arose and coordinated the management plans with the resident(s) caring for the patient during my duty period. Any areas of disagreement with resident's documentation of care or procedures are noted on the chart. I was personally present for the key portions of any/all procedures during my duty period. I have documented in the chart those procedures where I was not present during the key portions. CHIEF COMPLAINT       Chief Complaint   Patient presents with    Abdominal Pain    Chest Pain    Shortness of Breath    Emesis         CURRENT MEDICATIONS     Previous Medications  Previous Medications    ACETAMINOPHEN (TYLENOL) 500 MG TABLET    Take 2 tablets by mouth 3 times daily    ALBUTEROL SULFATE HFA (PROVENTIL HFA) 108 (90 BASE) MCG/ACT INHALER    Inhale 2 puffs into the lungs every 6 hours as needed for Wheezing    ALBUTEROL SULFATE  (90 BASE) MCG/ACT INHALER    Inhale 2 puffs into the lungs every 6 hours as needed for Wheezing    ASPIRIN-ACETAMINOPHEN-CAFFEINE (EXCEDRIN EXTRA STRENGTH) 250-250-65 MG PER TABLET    Take 1 tablet by mouth every 8 hours as needed for Headaches    BLOOD GLUCOSE MONITOR KIT AND SUPPLIES    1 kit by Other route three times daily Dispense product that insurance will cover    BLOOD GLUCOSE MONITOR STRIPS    Test 3 times a day & as needed for symptoms of irregular blood glucose. Dispense sufficient amount for indicated testing frequency plus additional to accommodate PRN testing needs.     BLOOD PRESSURE KIT    1 kit by Does not apply route 2 times daily    BUDESONIDE-FORMOTEROL (SYMBICORT) 160-4.5 MCG/ACT AERO    Inhale 2 puffs into the lungs 2 times daily    CEPHALEXIN (KEFLEX) 500 MG CAPSULE    Take 1 capsule by mouth 4 times daily    ELASTIC BANDAGES & SUPPORTS (JOBST KNEE HIGH COMPRESSION SM) MISC    Dispense Bilateral Jobst Below Knee 20-30mmHg compression stockings    GABAPENTIN (NEURONTIN) 300 MG CAPSULE    Take 1 capsule by mouth 3 times daily for 120 days.  Intended supply: 90 days    IBUPROFEN (IBU) 600 MG TABLET    Take 1 tablet by mouth every 8 hours as needed for Pain    INSULIN ASPART FLEXPEN 100 UNIT/ML SOPN    inject 4 units subcutaneously before breakfast then inject 4 units subcutaneously before LUNCH then inject 6 units subcutaneously before dinner and 6 units subcutaneously at bedtime    INSULIN PEN NEEDLE (PEN NEEDLES) 31G X 6 MM MISC    1 each by Does not apply route daily    IPRATROPIUM-ALBUTEROL (DUONEB) 0.5-2.5 (3) MG/3ML SOLN NEBULIZER SOLUTION    Inhale 3 mLs into the lungs every 4 hours (while awake)    LANCETS MISC    1 each by Does not apply route 3 times daily    LEVEMIR FLEXTOUCH 100 UNIT/ML INJECTION PEN    inject 55 units subcutaneously NIGHTLY    LISINOPRIL (PRINIVIL;ZESTRIL) 5 MG TABLET    take 1 tablet by mouth once daily    METFORMIN (GLUCOPHAGE) 1000 MG TABLET    take 1 tablet by mouth twice a day with meals    OMEPRAZOLE (PRILOSEC) 20 MG DELAYED RELEASE CAPSULE    Take 1 capsule by mouth every morning (before breakfast)    ONDANSETRON (ZOFRAN ODT) 4 MG DISINTEGRATING TABLET    Take 1 tablet by mouth every 8 hours as needed for Nausea    PREDNISONE 10 MG (21) TBPK    Take 1 Dose by mouth daily Take 60mg po on day 1, 50mg po on day 2, 40mg po on day 3, 30mg po on day 4, 20 mg po on day 5, 10mg po on day 6    TRUEPLUS LANCETS 30G MISC    TEST 2 TIMES A DAY AND AS NEEED FOR SYMPTOMS OF IRREGULAR BLOOD GLUCOSE    UNIFINE PENTIPS 29G X 12MM MISC    USE 4 times a DAILY WITH basal and short acting insulin       Encounter Medications  Orders Placed This Encounter   Medications    ondansetron (ZOFRAN) injection 4 mg    0.9 % sodium chloride bolus    iopamidol (ISOVUE-370) 76 % injection 100 mL 0.9 % sodium chloride bolus    ondansetron (ZOFRAN) injection 4 mg    fentaNYL (SUBLIMAZE) injection 50 mcg    albuterol sulfate HFA (PROVENTIL;VENTOLIN;PROAIR) 108 (90 Base) MCG/ACT inhaler 2 puff     Order Specific Question:   Initiate RT Bronchodilator Protocol     Answer:   Yes - Inpatient Protocol    budesonide-formoterol (SYMBICORT) 160-4.5 MCG/ACT inhaler 2 puff    dextrose 5 % and 0.45 % sodium chloride infusion    OR Linked Order Group     dextrose bolus 10% 125 mL     dextrose bolus 10% 250 mL    potassium chloride 10 mEq/100 mL IVPB (Peripheral Line)    magnesium sulfate 1000 mg in dextrose 5% 100 mL IVPB    OR Linked Order Group     sodium phosphate 10 mmol in sodium chloride 0.9 % 250 mL IVPB     sodium phosphate 15 mmol in dextrose 5 % 250 mL IVPB     sodium phosphate 20 mmol in dextrose 5 % 500 mL IVPB    polyethylene glycol (GLYCOLAX) packet 17 g    enoxaparin (LOVENOX) injection 40 mg     Order Specific Question:   Indication of Use     Answer:   Prophylaxis-DVT/PE    0.45 % sodium chloride infusion    sodium chloride 0.45 % infusion     Verónica Picacho: cabinet override       ALLERGIES     is allergic to benadryl [diphenhydramine]. RECENT VITALS:   Temp: 98 °F (36.7 °C),  Heart Rate: 91, Resp: 27, BP: 120/68    RADIOLOGY:   CTA CHEST W CONTRAST   Final Result   Negative CTA of the chest.  Hepatomegaly with severe steatosis. XR CHEST PORTABLE   Final Result   No acute intrathoracic process.              LABS:  Labs Reviewed   CBC WITH AUTO DIFFERENTIAL - Abnormal; Notable for the following components:       Result Value    WBC 24.8 (*)     RBC 5.23 (*)     Hemoglobin 16.0 (*)     MCHC 35.6 (*)     Immature Granulocytes 1 (*)     Seg Neutrophils 67 (*)     Segs Absolute 16.61 (*)     Absolute Lymph # 6.45 (*)     Absolute Mono # 1.24 (*)     All other components within normal limits   COMPREHENSIVE METABOLIC PANEL - Abnormal; Notable for the following components:    Glucose 491 (*) Sodium 134 (*)     Potassium 3.6 (*)     CO2 15 (*)     Anion Gap 20 (*)     Alkaline Phosphatase 109 (*)     Total Bilirubin 1.4 (*)     All other components within normal limits   URINALYSIS WITH REFLEX TO CULTURE - Abnormal; Notable for the following components:    Glucose, Ur 3+ (*)     Ketones, Urine SMALL (*)     Specific Gravity, UA 1.032 (*)     All other components within normal limits   BLOOD GAS, VENOUS - Abnormal; Notable for the following components:    pCO2, Nico 31.1 (*)     pO2, Nico 56.3 (*)     HCO3, Venous 16.8 (*)     Negative Base Excess, Nico 7.1 (*)     O2 Sat, Nico 88.6 (*)     All other components within normal limits   LACTATE, SEPSIS - Abnormal; Notable for the following components:    Lactic Acid, Sepsis, Whole Blood 2.1 (*)     All other components within normal limits   LACTATE, SEPSIS - Abnormal; Notable for the following components:    Lactic Acid, Sepsis, Whole Blood 2.1 (*)     All other components within normal limits   BETA-HYDROXYBUTYRATE - Abnormal; Notable for the following components:    Beta-Hydroxybutyrate 0.62 (*)     All other components within normal limits   POC GLUCOSE FINGERSTICK - Abnormal; Notable for the following components:    POC Glucose 331 (*)     All other components within normal limits   POCT GLUCOSE - Normal   RAPID INFLUENZA A/B ANTIGENS   COVID-19, RAPID   CULTURE, BLOOD 1   CULTURE, BLOOD 1   CULTURE, URINE   LIPASE   TROPONIN   TROPONIN   HCG, SERUM, QUALITATIVE   BASIC METABOLIC PANEL   BASIC METABOLIC PANEL   BASIC METABOLIC PANEL   MAGNESIUM   MAGNESIUM   MAGNESIUM   PHOSPHORUS   PHOSPHORUS   PHOSPHORUS   HEMOGLOBIN A1C   CBC WITH AUTO DIFFERENTIAL   POCT GLUCOSE   POCT GLUCOSE   POCT GLUCOSE   POCT GLUCOSE   POCT GLUCOSE   POCT GLUCOSE   POCT GLUCOSE   POCT GLUCOSE   POCT GLUCOSE   POCT GLUCOSE   POCT GLUCOSE   POCT GLUCOSE   POC GLUCOSE FINGERSTICK           PLAN/ TASKS OUTSTANDING   Pt with abdominal pain, chest pain, n/v/d. Mediastinum wide on xray. Pending CT of the chest.         (Please note that portions of this note were completed with a voice recognition program.  Efforts were made to edit the dictations but occasionally words are mis-transcribed.)    Maco Prince MD, MD,   Attending Emergency Physician       Maco Prince MD  12/16/22 1950

## 2022-12-17 NOTE — PLAN OF CARE
Problem: Discharge Planning  Goal: Discharge to home or other facility with appropriate resources  Outcome: Progressing  Flowsheets (Taken 12/17/2022 0231)  Discharge to home or other facility with appropriate resources: Identify barriers to discharge with patient and caregiver     Problem: Pain  Goal: Verbalizes/displays adequate comfort level or baseline comfort level  Outcome: Progressing     Problem: ABCDS Injury Assessment  Goal: Absence of physical injury  Outcome: Progressing  Flowsheets (Taken 12/17/2022 0510)  Absence of Physical Injury: Implement safety measures based on patient assessment     Problem: Safety - Adult  Goal: Free from fall injury  Outcome: Progressing  Flowsheets (Taken 12/17/2022 0510)  Free From Fall Injury: Instruct family/caregiver on patient safety

## 2022-12-17 NOTE — ED NOTES
Admitting team contacted by Myrna Gudino for insulin orders. Per day shift report, fluids were to be used and no insulin. Glucose levels still in 300s. No new orders at this time.       Sasha Anaya RN  12/16/22 6452

## 2022-12-18 LAB
CULTURE: ABNORMAL
CULTURE: NORMAL
CULTURE: NORMAL
EKG ATRIAL RATE: 123 BPM
EKG P AXIS: 36 DEGREES
EKG P-R INTERVAL: 138 MS
EKG Q-T INTERVAL: 340 MS
EKG QRS DURATION: 86 MS
EKG QTC CALCULATION (BAZETT): 486 MS
EKG R AXIS: 3 DEGREES
EKG T AXIS: 19 DEGREES
EKG VENTRICULAR RATE: 123 BPM
ESTIMATED AVERAGE GLUCOSE: 275 MG/DL
HBA1C MFR BLD: 11.2 % (ref 4–6)
Lab: NORMAL
Lab: NORMAL
SPECIMEN DESCRIPTION: ABNORMAL
SPECIMEN DESCRIPTION: NORMAL
SPECIMEN DESCRIPTION: NORMAL

## 2022-12-18 PROCEDURE — 93010 ELECTROCARDIOGRAM REPORT: CPT | Performed by: INTERNAL MEDICINE

## 2022-12-20 ENCOUNTER — HOSPITAL ENCOUNTER (EMERGENCY)
Age: 27
Discharge: HOME | End: 2022-12-20
Attending: EMERGENCY MEDICINE
Payer: COMMERCIAL

## 2022-12-20 VITALS
OXYGEN SATURATION: 96 % | BODY MASS INDEX: 38.27 KG/M2 | RESPIRATION RATE: 14 BRPM | HEART RATE: 82 BPM | TEMPERATURE: 98.2 F | DIASTOLIC BLOOD PRESSURE: 84 MMHG | WEIGHT: 230 LBS | SYSTOLIC BLOOD PRESSURE: 135 MMHG

## 2022-12-20 DIAGNOSIS — R11.2 NAUSEA AND VOMITING, UNSPECIFIED VOMITING TYPE: Primary | ICD-10-CM

## 2022-12-20 LAB
ABSOLUTE EOS #: 0.29 K/UL (ref 0–0.44)
ABSOLUTE IMMATURE GRANULOCYTE: 0.04 K/UL (ref 0–0.3)
ABSOLUTE LYMPH #: 2.6 K/UL (ref 1.1–3.7)
ABSOLUTE MONO #: 0.86 K/UL (ref 0.1–1.2)
ALBUMIN SERPL-MCNC: 4 G/DL (ref 3.5–5.2)
ALBUMIN/GLOBULIN RATIO: 1.7 (ref 1–2.5)
ALP BLD-CCNC: 72 U/L (ref 35–104)
ALT SERPL-CCNC: 28 U/L (ref 5–33)
ANION GAP SERPL CALCULATED.3IONS-SCNC: 15 MMOL/L (ref 9–17)
AST SERPL-CCNC: 24 U/L
BASOPHILS # BLD: 0 % (ref 0–2)
BASOPHILS ABSOLUTE: 0.04 K/UL (ref 0–0.2)
BETA-HYDROXYBUTYRATE: 0.28 MMOL/L (ref 0.02–0.27)
BILIRUB SERPL-MCNC: 1.5 MG/DL (ref 0.3–1.2)
BUN BLDV-MCNC: 9 MG/DL (ref 6–20)
CALCIUM SERPL-MCNC: 9.8 MG/DL (ref 8.6–10.4)
CARBOXYHEMOGLOBIN: 2.7 % (ref 0–5)
CHLORIDE BLD-SCNC: 102 MMOL/L (ref 98–107)
CHP ED QC CHECK: YES
CO2: 18 MMOL/L (ref 20–31)
CREAT SERPL-MCNC: 0.41 MG/DL (ref 0.5–0.9)
EOSINOPHILS RELATIVE PERCENT: 3 % (ref 1–4)
FIO2: ABNORMAL
GFR SERPL CREATININE-BSD FRML MDRD: >60 ML/MIN/1.73M2
GLUCOSE BLD-MCNC: 150 MG/DL (ref 70–99)
GLUCOSE BLD-MCNC: 151 MG/DL
GLUCOSE BLD-MCNC: 151 MG/DL (ref 65–105)
HCG QUALITATIVE: NEGATIVE
HCO3 VENOUS: 19.8 MMOL/L (ref 24–30)
HCT VFR BLD CALC: 42.6 % (ref 36.3–47.1)
HEMOGLOBIN: 14.4 G/DL (ref 11.9–15.1)
IMMATURE GRANULOCYTES: 0 %
LIPASE: 32 U/L (ref 13–60)
LYMPHOCYTES # BLD: 24 % (ref 24–43)
MAGNESIUM: 1.3 MG/DL (ref 1.6–2.6)
MCH RBC QN AUTO: 30.9 PG (ref 25.2–33.5)
MCHC RBC AUTO-ENTMCNC: 33.8 G/DL (ref 28.4–34.8)
MCV RBC AUTO: 91.4 FL (ref 82.6–102.9)
MONOCYTES # BLD: 8 % (ref 3–12)
NEGATIVE BASE EXCESS, VEN: 4.9 MMOL/L (ref 0–2)
NRBC AUTOMATED: 0 PER 100 WBC
O2 SAT, VEN: 92.3 % (ref 60–85)
PATIENT TEMP: 37
PCO2, VEN: 37.6 MM HG (ref 39–55)
PDW BLD-RTO: 13.2 % (ref 11.8–14.4)
PH VENOUS: 7.34 (ref 7.32–7.42)
PLATELET # BLD: 249 K/UL (ref 138–453)
PMV BLD AUTO: 10 FL (ref 8.1–13.5)
PO2, VEN: 68.6 MM HG (ref 30–50)
POTASSIUM SERPL-SCNC: 4 MMOL/L (ref 3.7–5.3)
RBC # BLD: 4.66 M/UL (ref 3.95–5.11)
SEG NEUTROPHILS: 65 % (ref 36–65)
SEGMENTED NEUTROPHILS ABSOLUTE COUNT: 7.21 K/UL (ref 1.5–8.1)
SODIUM BLD-SCNC: 135 MMOL/L (ref 135–144)
TOTAL PROTEIN: 6.4 G/DL (ref 6.4–8.3)
WBC # BLD: 11 K/UL (ref 3.5–11.3)

## 2022-12-20 PROCEDURE — 82805 BLOOD GASES W/O2 SATURATION: CPT

## 2022-12-20 PROCEDURE — 99284 EMERGENCY DEPT VISIT MOD MDM: CPT

## 2022-12-20 PROCEDURE — 36415 COLL VENOUS BLD VENIPUNCTURE: CPT

## 2022-12-20 PROCEDURE — 82010 KETONE BODYS QUAN: CPT

## 2022-12-20 PROCEDURE — 83735 ASSAY OF MAGNESIUM: CPT

## 2022-12-20 PROCEDURE — 2580000003 HC RX 258: Performed by: STUDENT IN AN ORGANIZED HEALTH CARE EDUCATION/TRAINING PROGRAM

## 2022-12-20 PROCEDURE — 80053 COMPREHEN METABOLIC PANEL: CPT

## 2022-12-20 PROCEDURE — 85025 COMPLETE CBC W/AUTO DIFF WBC: CPT

## 2022-12-20 PROCEDURE — 96374 THER/PROPH/DIAG INJ IV PUSH: CPT

## 2022-12-20 PROCEDURE — 83690 ASSAY OF LIPASE: CPT

## 2022-12-20 PROCEDURE — 82947 ASSAY GLUCOSE BLOOD QUANT: CPT

## 2022-12-20 PROCEDURE — 84703 CHORIONIC GONADOTROPIN ASSAY: CPT

## 2022-12-20 PROCEDURE — 6360000002 HC RX W HCPCS: Performed by: STUDENT IN AN ORGANIZED HEALTH CARE EDUCATION/TRAINING PROGRAM

## 2022-12-20 RX ORDER — METOCLOPRAMIDE 10 MG/1
10 TABLET ORAL 4 TIMES DAILY
Qty: 20 TABLET | Refills: 0 | Status: SHIPPED | OUTPATIENT
Start: 2022-12-20 | End: 2022-12-25

## 2022-12-20 RX ORDER — METOCLOPRAMIDE HYDROCHLORIDE 5 MG/ML
10 INJECTION INTRAMUSCULAR; INTRAVENOUS ONCE
Status: COMPLETED | OUTPATIENT
Start: 2022-12-20 | End: 2022-12-20

## 2022-12-20 RX ORDER — 0.9 % SODIUM CHLORIDE 0.9 %
1000 INTRAVENOUS SOLUTION INTRAVENOUS ONCE
Status: COMPLETED | OUTPATIENT
Start: 2022-12-20 | End: 2022-12-20

## 2022-12-20 RX ADMIN — SODIUM CHLORIDE 1000 ML: 9 INJECTION, SOLUTION INTRAVENOUS at 14:50

## 2022-12-20 RX ADMIN — METOCLOPRAMIDE 10 MG: 5 INJECTION, SOLUTION INTRAMUSCULAR; INTRAVENOUS at 14:51

## 2022-12-20 ASSESSMENT — ENCOUNTER SYMPTOMS
DIARRHEA: 1
RHINORRHEA: 0
BLOOD IN STOOL: 0
ANAL BLEEDING: 0
NAUSEA: 1
SHORTNESS OF BREATH: 0
VOMITING: 1
COLOR CHANGE: 0
ABDOMINAL PAIN: 1

## 2022-12-20 ASSESSMENT — PAIN SCALES - GENERAL: PAINLEVEL_OUTOF10: 8

## 2022-12-20 ASSESSMENT — PAIN - FUNCTIONAL ASSESSMENT: PAIN_FUNCTIONAL_ASSESSMENT: 0-10

## 2022-12-20 NOTE — ED PROVIDER NOTES
Faculty Sign-Out Attestation  Handoff taken on the following patient from prior Attending Physician: Briana Glover    I was available and discussed any additional care issues that arose and coordinated the management plans with the resident(s) caring for the patient during my duty period. Any areas of disagreement with residents documentation of care or procedures are noted on the chart. I was personally present for the key portions of any/all procedures during my duty period. I have documented in the chart those procedures where I was not present during the key portions. 68-year-old female presenting with nausea, vomiting, abdominal pain. Stated to the initial attending to the last time she had symptoms like this she was in DKA. Told initial nurse that she has been having problems like this since she had her gallbladder out. Labs pending. Hydrating, treating pain.     Vernon Byrnes MD  Attending Physician        Vernon Byrnes MD  12/20/22 9192

## 2022-12-20 NOTE — ED PROVIDER NOTES
[address] [facility]     Emergency Department     Faculty Attestation    I performed a history and physical examination of the patient and discussed management with the resident. I have reviewed and agree with the residents findings including all diagnostic interpretations, and treatment plans as written at the time of my review. Any areas of disagreement are noted on the chart. I was personally present for the key portions of any procedures. I have documented in the chart those procedures where I was not present during the key portions. For Physician Assistant/ Nurse Practitioner cases/documentation I have personally evaluated this patient and have completed at least one if not all key elements of the E/M (history, physical exam, and MDM). Additional findings are as noted. Primary Care Physician: Yuliya Rehman PA-C    History: This is a 32 y.o. female who presents to the Emergency Department with complaint of nausea vomiting. Patient with complaints of mild abdominal pain. Patient states the last time she had this symptoms she was in diabetic ketoacidosis. Physical:   weight is 230 lb (104.3 kg). Her oral temperature is 98.2 °F (36.8 °C). Her blood pressure is 135/84 and her pulse is 82. Her respiration is 14 and oxygen saturation is 96%. Patient is awake appears to be uncomfortable, heart is regular rate, patient has no respiratory distress noted, she is tender diffusely in the abdominal area    Impression: Nausea vomiting abdominal pain    Plan: IV fluid, CBC, CMP, lipase, beta-hCG, urinalysis, hCG      (Please note that portions of this note were completed with a voice recognition program.  Efforts were made to edit the dictations but occasionally words are mis-transcribed.)    Abbi Hyde.  Kymberly Lutz MD, [facility]  Attending Emergency Medicine Physician        Antonietta Michaud MD  12/20/22 3353

## 2022-12-20 NOTE — DISCHARGE INSTRUCTIONS
Please take your medications as prescribed. Continue monitoring your glucose and using her diabetic medications as prescribed. Call and schedule appoint with your primary care provider as well as gastroenterology. This is likely the start of diabetic gastroparesis and will be extremely important to continue to monitor. The rest your labs look well and you are not in DKA. Return the emergency department if you are unable to keep down any oral liquids, have severe worsening of your blood glucose, chest pain or shortness of breath or any other general concerns.

## 2022-12-20 NOTE — ED NOTES
Pt to ER for c/o abdominal pain and emesis. Pt states that she has been having abdominal pain for months now and has been seen multiple times for it. Pt states that she was seen recently on 12/17 for the same thing and was discharged home. PT states that she has been having worsening pain every time she vomits. Pt states that she has been having problems with her abdomen ever since she had her gallbladder out. Pt states that this feels similar to when she is in DKA. No acute distress noted, RR even and NL.           Sree Choi RN  12/20/22 212

## 2022-12-20 NOTE — ED PROVIDER NOTES
[facility] ED  Emergency Department Encounter  Emergency Medicine Resident     Pt Name:Tahira Coy  MRN: [id number]  [place] 1995  Date of evaluation: 22  PCP:  Jose Chavez PA-C      CHIEF COMPLAINT       Chief Complaint   Patient presents with    Abdominal Pain    Emesis       HISTORY OF PRESENT ILLNESS  (Location/Symptom, Timing/Onset, Context/Setting, Quality, Duration, Modifying Factors, Severity.)      Danielle Golden is a 32 y.o. female who presents with nausea, vomiting, and abdominal pain for the past day. Past medical history sniffing for type 2 diabetes hypertension chills hepatic steatosis. Patient states that she awoke with this this morning and states this feels like her DKA. Just recently mated for something similar. Denies any dysuria hematuria vaginal discharge or bleeding. States that she gets this chest pain with it as well but denies any difficulty breathing. States she is been compliant with her medications at home. Denies any viral URI-like symptoms. PAST MEDICAL / SURGICAL / SOCIAL / FAMILY HISTORY      has a past medical history of Asthma, Bipolar 1 disorder ([address] Diabetes mellitus type 2 in obese ([address] GERD (gastroesophageal reflux disease), Hepatic steatosis, Hypertension, Morbid obesity with body mass index (BMI) of 40.0 to 49.9 ([address] Neuropathy, and Postpartum depression. has a past surgical history that includes Tonsillectomy (Bilateral);  section; eye surgery;  section (N/A, 3/23/2017); Upper gastrointestinal endoscopy (N/A, 2018); Breast surgery (Left, 2019); Breast surgery (Right, 2019); Rectal surgery (N/A, 2/3/2020); Upper gastrointestinal endoscopy (N/A, 2020); Colonoscopy (N/A, 2020); Axillary Surgery (Right, 2021); Ankle arthroscopy (Right, 01/10/2022); and Ankle arthroscopy (Right, 1/10/2022).       Social History     Socioeconomic History    Marital status: Single     Spouse name: Not on file    Number of children: 2    Years of education: graduated high school, some college    Highest education level: Not on file   Occupational History    Occupation: housewife    Occupation:      Comment: last worked    Tobacco Use    Smoking status: Former     Packs/day: 1.00     Years: 10.00     Pack years: 10.00     Types: Cigarettes     Start date: 2007     Quit date: 2020     Years since quittin.2    Smokeless tobacco: Never   Vaping Use    Vaping Use: Every day    Substances: Always   Substance and Sexual Activity    Alcohol use: No     Alcohol/week: 0.0 standard drinks    Drug use: No    Sexual activity: Yes     Partners: Male     Comment: chlamydia in 2016,   Other Topics Concern    Not on file   Social History Narrative    Lives with fiance and 2 daughters     Social Determinants of Health     Financial Resource Strain: Not on file   Food Insecurity: Not on file   Transportation Needs: Not on file   Physical Activity: Not on file   Stress: Not on file   Social Connections: Not on file   Intimate Partner Violence: Not on file   Housing Stability: Not on file       Family History   Problem Relation Age of Onset    Breast Cancer Mother 28    Diabetes Father     Cancer Maternal Uncle 60        acute leukemia    Heart Disease Maternal Uncle     Diabetes Maternal Uncle     Colon Cancer Maternal Uncle     Diabetes Maternal Uncle     Heart Attack Maternal Uncle     Uterine Cancer Neg Hx     Ovarian Cancer Neg Hx        Allergies:  Benadryl [diphenhydramine]    Home Medications:  Prior to Admission medications    Medication Sig Start Date End Date Taking?  Authorizing Provider   metoclopramide (REGLAN) 10 MG tablet Take 1 tablet by mouth 4 times daily for 5 days 22 Yes Anne Mann DO   potassium bicarb-citric acid (EFFER-K) 20 MEQ TBEF effervescent tablet Take 10 tablets by mouth once for 1 dose 22  Chandrika Pemberton MD aspirin-acetaminophen-caffeine (EXCEDRIN EXTRA STRENGTH) 246-283-06 MG per tablet Take 1 tablet by mouth every 8 hours as needed for Headaches 11/24/22   Cherelle Mauricio, DO   ondansetron (ZOFRAN ODT) 4 MG disintegrating tablet Take 1 tablet by mouth every 8 hours as needed for Nausea 11/24/22   Cherelle Mauricio, DO   lisinopril (PRINIVIL;ZESTRIL) 5 MG tablet take 1 tablet by mouth once daily 10/13/22   Seun Lujan MD   LEVEMIR FLEXTOUCH 100 UNIT/ML injection pen inject 55 units subcutaneously NIGHTLY 9/9/22   MARCELO De La Cruz CNP   metFORMIN (GLUCOPHAGE) 1000 MG tablet take 1 tablet by mouth twice a day with meals 9/8/22   MARCELO De La Cruz CNP   gabapentin (NEURONTIN) 300 MG capsule Take 1 capsule by mouth 3 times daily for 120 days. Intended supply: 90 days 5/17/22 9/14/22  Josué Bauer DPM   Insulin Aspart FlexPen 100 UNIT/ML SOPN inject 4 units subcutaneously before breakfast then inject 4 units subcutaneously before LUNCH then inject 6 units subcutaneously before dinner and 6 units subcutaneously at bedtime 3/10/22   MARCELO Hill CNP   UNIFINE PENTIPS 29G X 12MM MISC USE 4 times a DAILY WITH basal and short acting insulin 3/10/22   MARCELO Hill CNP   blood glucose monitor kit and supplies 1 kit by Other route three times daily Dispense product that insurance will cover 3/10/22   MARCELO Hill CNP   Lancets MISC 1 each by Does not apply route 3 times daily 3/10/22   MARCELO Hill CNP   blood glucose monitor strips Test 3 times a day & as needed for symptoms of irregular blood glucose. Dispense sufficient amount for indicated testing frequency plus additional to accommodate PRN testing needs.  3/10/22   MARCELO Hill CNP   Elastic Bandages & Supports (JOBST KNEE HIGH COMPRESSION SM) MISC Dispense Bilateral Jobst Below Knee 20-30mmHg compression stockings  Patient not taking: Reported on 12/17/2022 3/2/22   Josué Bauer DPM   budesonide-formoterol (SYMBICORT) 160-4.5 MCG/ACT AERO Inhale 2 puffs into the lungs 2 times daily 9/16/21   Linda Goodwin MD   Blood Pressure KIT 1 kit by Does not apply route 2 times daily 9/16/21   Linda Goodwin MD   Insulin Pen Needle (PEN NEEDLES) 31G X 6 MM MISC 1 each by Does not apply route daily 5/21/21   Gray Hearn PA-C   TRUEplus Lancets 30G MISC TEST 2 TIMES A DAY AND AS NEEED FOR SYMPTOMS OF IRREGULAR BLOOD GLUCOSE 4/21/21   Gray Hearn PA-C   albuterol sulfate HFA (PROVENTIL HFA) 108 (90 Base) MCG/ACT inhaler Inhale 2 puffs into the lungs every 6 hours as needed for Wheezing 7/23/19   Britta Welch PA-C       REVIEW OF SYSTEMS    (2-9 systems for level 4, 10 or more for level 5)      Review of Systems   Constitutional:  Negative for chills. HENT:  Negative for congestion and rhinorrhea. Eyes:  Negative for visual disturbance. Respiratory:  Negative for shortness of breath. Cardiovascular:  Negative for chest pain. Gastrointestinal:  Positive for abdominal pain, diarrhea, nausea and vomiting. Negative for anal bleeding and blood in stool. Genitourinary:  Negative for dysuria, hematuria, vaginal bleeding, vaginal discharge and vaginal pain. Musculoskeletal:  Negative for arthralgias and myalgias. Skin:  Negative for color change, pallor, rash and wound. Neurological:  Negative for weakness, numbness and headaches. PHYSICAL EXAM   (up to 7 for level 4, 8 or more for level 5)      INITIAL VITALS:   /84   Pulse 82   Temp 98.2 °F (36.8 °C) (Oral)   Resp 14   Wt 230 lb (104.3 kg)   SpO2 96%   BMI 38.27 kg/m²     Physical Exam  Constitutional:       General: She is not in acute distress. Appearance: She is not ill-appearing, toxic-appearing or diaphoretic. Cardiovascular:      Rate and Rhythm: Normal rate and regular rhythm. Heart sounds: No murmur heard. No friction rub. No gallop. Pulmonary:      Effort: No respiratory distress. Breath sounds: No stridor.  No wheezing, rhonchi or rales. Chest:      Chest wall: No tenderness. Abdominal:      Palpations: There is no shifting dullness, fluid wave, hepatomegaly, splenomegaly, mass or pulsatile mass. Tenderness: There is no abdominal tenderness. There is no guarding or rebound. Negative signs include Herring's sign, Rovsing's sign, McBurney's sign and psoas sign. Hernia: No hernia is present. Skin:     Capillary Refill: Capillary refill takes less than 2 seconds. Coloration: Skin is not cyanotic, jaundiced, mottled or pale. Findings: No erythema or rash. Neurological:      Mental Status: She is oriented to person, place, and time.        DIFFERENTIAL  DIAGNOSIS     PLAN (LABS / IMAGING / EKG):  Orders Placed This Encounter   Procedures    CBC with Auto Differential    Comprehensive Metabolic Panel    Magnesium    HCG Qualitative, Serum    Beta-Hydroxybutyrate    Lipase    Blood Gas, Venous    POCT glucose    POC Glucose Fingerstick       MEDICATIONS ORDERED:  Orders Placed This Encounter   Medications    0.9 % sodium chloride bolus    metoclopramide (REGLAN) injection 10 mg    metoclopramide (REGLAN) 10 MG tablet     Sig: Take 1 tablet by mouth 4 times daily for 5 days     Dispense:  20 tablet     Refill:  0       DDX: Nausea/vomiting, gastroenteritis, gastritis, DKA, gastroparesis, electrolyte abnormality    DIAGNOSTIC RESULTS / EMERGENCY DEPARTMENT COURSE / MDM   LAB RESULTS:  Results for orders placed or performed during the hospital encounter of 12/20/22   CBC with Auto Differential   Result Value Ref Range    WBC 11.0 3.5 - 11.3 k/uL    RBC 4.66 3.95 - 5.11 m/uL    Hemoglobin 14.4 11.9 - 15.1 g/dL    Hematocrit 42.6 36.3 - 47.1 %    MCV 91.4 82.6 - 102.9 fL    MCH 30.9 25.2 - 33.5 pg    MCHC 33.8 28.4 - 34.8 g/dL    RDW 13.2 11.8 - 14.4 %    Platelets 271 943 - 592 k/uL    MPV 10.0 8.1 - 13.5 fL    NRBC Automated 0.0 0.0 per 100 WBC    Seg Neutrophils 65 36 - 65 %    Lymphocytes 24 24 - 43 % Monocytes 8 3 - 12 %    Eosinophils % 3 1 - 4 %    Basophils 0 0 - 2 %    Immature Granulocytes 0 0 %    Segs Absolute 7.21 1.50 - 8.10 k/uL    Absolute Lymph # 2.60 1.10 - 3.70 k/uL    Absolute Mono # 0.86 0.10 - 1.20 k/uL    Absolute Eos # 0.29 0.00 - 0.44 k/uL    Basophils Absolute 0.04 0.00 - 0.20 k/uL    Absolute Immature Granulocyte 0.04 0.00 - 0.30 k/uL   Comprehensive Metabolic Panel   Result Value Ref Range    Glucose 150 (H) 70 - 99 mg/dL    BUN 9 6 - 20 mg/dL    Creatinine 0.41 (L) 0.50 - 0.90 mg/dL    Est, Glom Filt Rate >60 >60 mL/min/1.73m2    Calcium 9.8 8.6 - 10.4 mg/dL    Sodium 135 135 - 144 mmol/L    Potassium 4.0 3.7 - 5.3 mmol/L    Chloride 102 98 - 107 mmol/L    CO2 18 (L) 20 - 31 mmol/L    Anion Gap 15 9 - 17 mmol/L    Alkaline Phosphatase 72 35 - 104 U/L    ALT 28 5 - 33 U/L    AST 24 <32 U/L    Total Bilirubin 1.5 (H) 0.3 - 1.2 mg/dL    Total Protein 6.4 6.4 - 8.3 g/dL    Albumin 4.0 3.5 - 5.2 g/dL    Albumin/Globulin Ratio 1.7 1.0 - 2.5   Magnesium   Result Value Ref Range    Magnesium 1.3 (L) 1.6 - 2.6 mg/dL   HCG Qualitative, Serum   Result Value Ref Range    hCG Qual NEGATIVE NEGATIVE   Beta-Hydroxybutyrate   Result Value Ref Range    Beta-Hydroxybutyrate 0.28 (H) 0.02 - 0.27 mmol/L   Lipase   Result Value Ref Range    Lipase 32 13 - 60 U/L   Blood Gas, Venous   Result Value Ref Range    pH, Nico 7.341 7.320 - 7.420    pCO2, Nico 37.6 (L) 39 - 55 mm Hg    pO2, Nico 68.6 (H) 30 - 50 mm Hg    HCO3, Venous 19.8 (L) 24 - 30 mmol/L    Negative Base Excess, Nico 4.9 (H) 0.0 - 2.0 mmol/L    O2 Sat, Nico 92.3 (H) 60.0 - 85.0 %    Carboxyhemoglobin 2.7 0 - 5 %    Pt Temp 37.0     FIO2 INFORMATION NOT PROVIDED    POCT glucose   Result Value Ref Range    Glucose 151 mg/dL    QC OK?  yes    POC Glucose Fingerstick   Result Value Ref Range    POC Glucose 151 (H) 65 - 105 mg/dL       IMPRESSION: N/A    RADIOLOGY:  No orders to display         EMERGENCY DEPARTMENT COURSE:  28-year-old female presenting with abdominal pain, nausea vomiting and diarrhea. No medic easier melena. On exam patient no acute stress, nontoxic. Normal vital signs. DKA work-up initiated that was grossly markable. IV fluids given. Patient is given IV Reglan with improvement in symptoms. No findings of pancreatitis. No findings of hepatitis. Given patient's improvement in symptoms will discharge home with PCP follow-up as well as GI follow-up for possible gastroparesis. Patient amenable discharge voiced worsening return precautions. ED Course as of 12/20/22 1641   Tue Dec 20, 2022   1531 Patient reexamined after IV fluids as well as Reglan feeling better. Abdominal pain improved as well as nausea and vomiting will discharge home with oral Reglan once IV fluids finished. [MS]      ED Course User Index  [MS] Masood Zee DO       No notes of [facility] Admission Criteria type on file. PROCEDURES:  N/a    CONSULTS:  None    CRITICAL CARE:  N/a    FINAL IMPRESSION      1.  Nausea and vomiting, unspecified vomiting type          DISPOSITION / PLAN     DISPOSITION Decision To Discharge 12/20/2022 04:16:08 PM      PATIENT REFERRED TO:  Chandler Crockett PA-C  [address] [facility]  [address] [facility],[address]  [address] [facility] [address]  [phone]    Schedule an appointment as soon as possible for a visit       [facility]  [address] [address]  [phone]    If symptoms worsen    [address] [facility]  [address]  [address] [facility]  [phone]  Schedule an appointment as soon as possible for a visit       DISCHARGE MEDICATIONS:  Discharge Medication List as of 12/20/2022  4:18 PM        START taking these medications    Details   metoclopramide (REGLAN) 10 MG tablet Take 1 tablet by mouth 4 times daily for 5 days, Disp-20 tablet, [id number]             Diana Patino DO  Emergency Medicine Resident    (Please note that portions of thisnote were completed with a voice recognition program. Efforts were made to edit the dictations but occasionally words are mis-transcribed.)        Rui Bills DO  Resident  12/20/22 [id number]

## 2022-12-21 ENCOUNTER — TELEPHONE (OUTPATIENT)
Dept: GASTROENTEROLOGY | Age: 27
End: 2022-12-21

## 2023-01-23 RX ORDER — METOCLOPRAMIDE 10 MG/1
TABLET ORAL
Qty: 20 TABLET | Refills: 0 | OUTPATIENT
Start: 2023-01-23

## 2023-01-28 ENCOUNTER — HOSPITAL ENCOUNTER (EMERGENCY)
Age: 28
Discharge: HOME | End: 2023-01-28
Attending: EMERGENCY MEDICINE
Payer: COMMERCIAL

## 2023-01-28 VITALS
RESPIRATION RATE: 18 BRPM | HEART RATE: 97 BPM | WEIGHT: 226 LBS | TEMPERATURE: 97.5 F | SYSTOLIC BLOOD PRESSURE: 105 MMHG | BODY MASS INDEX: 37.61 KG/M2 | OXYGEN SATURATION: 98 % | DIASTOLIC BLOOD PRESSURE: 74 MMHG

## 2023-01-28 DIAGNOSIS — R10.31 RIGHT LOWER QUADRANT ABDOMINAL PAIN: ICD-10-CM

## 2023-01-28 DIAGNOSIS — J06.9 VIRAL URI WITH COUGH: Primary | ICD-10-CM

## 2023-01-28 LAB
ABSOLUTE EOS #: 0.29 K/UL (ref 0–0.44)
ABSOLUTE IMMATURE GRANULOCYTE: 0.14 K/UL (ref 0–0.3)
ABSOLUTE LYMPH #: 2.63 K/UL (ref 1.1–3.7)
ABSOLUTE MONO #: 0.74 K/UL (ref 0.1–1.2)
ALBUMIN SERPL-MCNC: 4.5 G/DL (ref 3.5–5.2)
ALBUMIN/GLOBULIN RATIO: 1.8 (ref 1–2.5)
ALP BLD-CCNC: 61 U/L (ref 35–104)
ALT SERPL-CCNC: 27 U/L (ref 5–33)
ANION GAP SERPL CALCULATED.3IONS-SCNC: 14 MMOL/L (ref 9–17)
AST SERPL-CCNC: 21 U/L
BASOPHILS # BLD: 0 % (ref 0–2)
BASOPHILS ABSOLUTE: 0.05 K/UL (ref 0–0.2)
BILIRUB SERPL-MCNC: 1.8 MG/DL (ref 0.3–1.2)
BILIRUBIN URINE: NEGATIVE
BUN BLDV-MCNC: 11 MG/DL (ref 6–20)
CALCIUM SERPL-MCNC: 9.2 MG/DL (ref 8.6–10.4)
CHLORIDE BLD-SCNC: 104 MMOL/L (ref 98–107)
CO2: 18 MMOL/L (ref 20–31)
COLOR: YELLOW
COMMENT UA: NORMAL
CREAT SERPL-MCNC: 0.52 MG/DL (ref 0.5–0.9)
EOSINOPHILS RELATIVE PERCENT: 3 % (ref 1–4)
FLU A ANTIGEN: NEGATIVE
FLU B ANTIGEN: NEGATIVE
GFR SERPL CREATININE-BSD FRML MDRD: >60 ML/MIN/1.73M2
GLUCOSE BLD-MCNC: 110 MG/DL (ref 70–99)
GLUCOSE URINE: NEGATIVE
HCG(URINE) PREGNANCY TEST: NEGATIVE
HCT VFR BLD CALC: 36.8 % (ref 36.3–47.1)
HEMOGLOBIN: 12.8 G/DL (ref 11.9–15.1)
IMMATURE GRANULOCYTES: 1 %
KETONES, URINE: NEGATIVE
LEUKOCYTE ESTERASE, URINE: NEGATIVE
LYMPHOCYTES # BLD: 23 % (ref 24–43)
MCH RBC QN AUTO: 31.1 PG (ref 25.2–33.5)
MCHC RBC AUTO-ENTMCNC: 34.8 G/DL (ref 28.4–34.8)
MCV RBC AUTO: 89.5 FL (ref 82.6–102.9)
MONOCYTES # BLD: 7 % (ref 3–12)
NITRITE, URINE: NEGATIVE
NRBC AUTOMATED: 0 PER 100 WBC
PDW BLD-RTO: 12.5 % (ref 11.8–14.4)
PH UA: 5.5 (ref 5–8)
PLATELET # BLD: 235 K/UL (ref 138–453)
PMV BLD AUTO: 9.7 FL (ref 8.1–13.5)
POTASSIUM SERPL-SCNC: 3.6 MMOL/L (ref 3.7–5.3)
PROTEIN UA: NEGATIVE
RBC # BLD: 4.11 M/UL (ref 3.95–5.11)
SARS-COV-2, RAPID: NOT DETECTED
SEG NEUTROPHILS: 66 % (ref 36–65)
SEGMENTED NEUTROPHILS ABSOLUTE COUNT: 7.5 K/UL (ref 1.5–8.1)
SODIUM BLD-SCNC: 136 MMOL/L (ref 135–144)
SPECIFIC GRAVITY UA: 1.02 (ref 1–1.03)
SPECIMEN DESCRIPTION: NORMAL
TOTAL PROTEIN: 7 G/DL (ref 6.4–8.3)
TURBIDITY: CLEAR
URINE HGB: NEGATIVE
UROBILINOGEN, URINE: NORMAL
WBC # BLD: 11.4 K/UL (ref 3.5–11.3)

## 2023-01-28 PROCEDURE — 6370000000 HC RX 637 (ALT 250 FOR IP): Performed by: STUDENT IN AN ORGANIZED HEALTH CARE EDUCATION/TRAINING PROGRAM

## 2023-01-28 PROCEDURE — 96375 TX/PRO/DX INJ NEW DRUG ADDON: CPT

## 2023-01-28 PROCEDURE — 81003 URINALYSIS AUTO W/O SCOPE: CPT

## 2023-01-28 PROCEDURE — 87804 INFLUENZA ASSAY W/OPTIC: CPT

## 2023-01-28 PROCEDURE — 99284 EMERGENCY DEPT VISIT MOD MDM: CPT

## 2023-01-28 PROCEDURE — 6360000002 HC RX W HCPCS: Performed by: STUDENT IN AN ORGANIZED HEALTH CARE EDUCATION/TRAINING PROGRAM

## 2023-01-28 PROCEDURE — 96374 THER/PROPH/DIAG INJ IV PUSH: CPT

## 2023-01-28 PROCEDURE — 81025 URINE PREGNANCY TEST: CPT

## 2023-01-28 PROCEDURE — 80053 COMPREHEN METABOLIC PANEL: CPT

## 2023-01-28 PROCEDURE — 85025 COMPLETE CBC W/AUTO DIFF WBC: CPT

## 2023-01-28 PROCEDURE — 87635 SARS-COV-2 COVID-19 AMP PRB: CPT

## 2023-01-28 RX ORDER — METOCLOPRAMIDE 10 MG/1
10 TABLET ORAL 2 TIMES DAILY
Qty: 6 TABLET | Refills: 0 | Status: SHIPPED | OUTPATIENT
Start: 2023-01-28

## 2023-01-28 RX ORDER — ACETAMINOPHEN 325 MG/1
650 TABLET ORAL ONCE
Status: COMPLETED | OUTPATIENT
Start: 2023-01-28 | End: 2023-01-28

## 2023-01-28 RX ORDER — ONDANSETRON 2 MG/ML
4 INJECTION INTRAMUSCULAR; INTRAVENOUS ONCE
Status: COMPLETED | OUTPATIENT
Start: 2023-01-28 | End: 2023-01-28

## 2023-01-28 RX ORDER — KETOROLAC TROMETHAMINE 30 MG/ML
30 INJECTION, SOLUTION INTRAMUSCULAR; INTRAVENOUS ONCE
Status: COMPLETED | OUTPATIENT
Start: 2023-01-28 | End: 2023-01-28

## 2023-01-28 RX ADMIN — KETOROLAC TROMETHAMINE 30 MG: 30 INJECTION, SOLUTION INTRAMUSCULAR at 19:51

## 2023-01-28 RX ADMIN — ACETAMINOPHEN 650 MG: 325 TABLET ORAL at 18:54

## 2023-01-28 RX ADMIN — ONDANSETRON 4 MG: 2 INJECTION INTRAMUSCULAR; INTRAVENOUS at 18:54

## 2023-01-28 ASSESSMENT — ENCOUNTER SYMPTOMS
SHORTNESS OF BREATH: 0
NAUSEA: 1
COLOR CHANGE: 0
SORE THROAT: 0
CONSTIPATION: 0
DIARRHEA: 0
TROUBLE SWALLOWING: 0
RHINORRHEA: 0
VOMITING: 0
COUGH: 0
ABDOMINAL PAIN: 0
CHEST TIGHTNESS: 0

## 2023-01-28 ASSESSMENT — PAIN SCALES - GENERAL
PAINLEVEL_OUTOF10: 8
PAINLEVEL_OUTOF10: 10
PAINLEVEL_OUTOF10: 10
PAINLEVEL_OUTOF10: 8

## 2023-01-28 ASSESSMENT — PAIN DESCRIPTION - LOCATION
LOCATION: ABDOMEN
LOCATION: HEAD

## 2023-01-28 ASSESSMENT — PAIN DESCRIPTION - DESCRIPTORS: DESCRIPTORS: DISCOMFORT;ACHING

## 2023-01-28 NOTE — ED PROVIDER NOTES
[address]  ED  Emergency Department Encounter  Emergency Medicine Resident     Pt Name:Tahira Murguia  MRN: [id number]  [place] 1995  Date of evaluation: 23  PCP:  Odessa Vann PA-C  Note Started: 6:32 PM EST      CHIEF COMPLAINT       Chief Complaint   Patient presents with    Cough    Headache    Nasal Congestion    Other     No taste,no smell       HISTORY OF PRESENT ILLNESS  (Location/Symptom, Timing/Onset, Context/Setting, Quality, Duration, Modifying Factors, Severity.)      Ilya Topete is a 32 y.o. female who presents with headaches, myalgias, dry cough, fatigue, RLQ abdominal pain, and dysuria that's been ongoing for 2 days. History of diabetes, states blood sugars have been controlled. Sick contacts at home. Denies SOB, chest pain. History of NOBLE. Headache is generalized, similar to prior, not worst headache of her life. Was not acute in onset. PAST MEDICAL / SURGICAL / SOCIAL / FAMILY HISTORY      has a past medical history of Asthma, Bipolar 1 disorder ([address] Diabetes mellitus type 2 in obese ([address] GERD (gastroesophageal reflux disease), Hepatic steatosis, Hypertension, Morbid obesity with body mass index (BMI) of 40.0 to 49.9 ([address] Neuropathy, and Postpartum depression. has a past surgical history that includes Tonsillectomy (Bilateral);  section; eye surgery;  section (N/A, 3/23/2017); Upper gastrointestinal endoscopy (N/A, 2018); Breast surgery (Left, 2019); Breast surgery (Right, 2019); Rectal surgery (N/A, 2/3/2020); Upper gastrointestinal endoscopy (N/A, 2020); Colonoscopy (N/A, 2020); Axillary Surgery (Right, 2021); Ankle arthroscopy (Right, 01/10/2022); and Ankle arthroscopy (Right, 1/10/2022).       Social History     Socioeconomic History    Marital status: Single     Spouse name: Not on file    Number of children: 2    Years of education: graduated high school, some college    Highest education level: Not on file   Occupational History    Occupation: housewife    Occupation:      Comment: last worked 2015   Tobacco Use    Smoking status: Former     Packs/day: 1.00     Years: 10.00     Pack years: 10.00     Types: Cigarettes     Start date: 2007     Quit date: 2020     Years since quittin.3    Smokeless tobacco: Never   Vaping Use    Vaping Use: Every day    Substances: Always   Substance and Sexual Activity    Alcohol use: No     Alcohol/week: 0.0 standard drinks    Drug use: No    Sexual activity: Yes     Partners: Male     Comment: chlamydia in 2016,   Other Topics Concern    Not on file   Social History Narrative    Lives with fipatricia and 2 daughters     Social Determinants of Health     Financial Resource Strain: Not on file   Food Insecurity: Not on file   Transportation Needs: Not on file   Physical Activity: Not on file   Stress: Not on file   Social Connections: Not on file   Intimate Partner Violence: Not on file   Housing Stability: Not on file       Family History   Problem Relation Age of Onset    Breast Cancer Mother 28    Diabetes Father     Cancer Maternal Uncle 60        acute leukemia    Heart Disease Maternal Uncle     Diabetes Maternal Uncle     Colon Cancer Maternal Uncle     Diabetes Maternal Uncle     Heart Attack Maternal Uncle     Uterine Cancer Neg Hx     Ovarian Cancer Neg Hx        Allergies:  Benadryl [diphenhydramine]    Home Medications:  Prior to Admission medications    Medication Sig Start Date End Date Taking? Authorizing Provider   metoclopramide (REGLAN) 10 MG tablet Take 1 tablet by mouth in the morning and 1 tablet in the evening.  23  Yes Karyna Ruvalcaba MD   potassium bicarb-citric acid (EFFER-K) 20 MEQ TBEF effervescent tablet Take 10 tablets by mouth once for 1 dose 22  Angelito Rios MD   aspirin-acetaminophen-caffeine (EXCEDRIN EXTRA STRENGTH) 812-832-13 MG per tablet Take 1 tablet by mouth every 8 hours as needed for Headaches 11/24/22   Kat Bliss,    ondansetron (ZOFRAN ODT) 4 MG disintegrating tablet Take 1 tablet by mouth every 8 hours as needed for Nausea 11/24/22   Kat Bliss, DO   lisinopril (PRINIVIL;ZESTRIL) 5 MG tablet take 1 tablet by mouth once daily 10/13/22   Jeremy Barahona MD   LEVEMIR FLEXTOUCH 100 UNIT/ML injection pen inject 55 units subcutaneously NIGHTLY 9/9/22   Glenda Pro, APRN - CNP   metFORMIN (GLUCOPHAGE) 1000 MG tablet take 1 tablet by mouth twice a day with meals 9/8/22   Glenda Pro, APRN - CNP   gabapentin (NEURONTIN) 300 MG capsule Take 1 capsule by mouth 3 times daily for 120 days. Intended supply: 90 days 5/17/22 9/14/22  Hassel Simmonds, DPM   Insulin Aspart FlexPen 100 UNIT/ML SOPN inject 4 units subcutaneously before breakfast then inject 4 units subcutaneously before LUNCH then inject 6 units subcutaneously before dinner and 6 units subcutaneously at bedtime 3/10/22   MARCELO Sanchez CNP   UNIFINE PENTIPS 29G X 12MM MISC USE 4 times a DAILY WITH basal and short acting insulin 3/10/22   MARCELO Sanchez CNP   blood glucose monitor kit and supplies 1 kit by Other route three times daily Dispense product that insurance will cover 3/10/22   MARCELO Sanchez CNP   Lancets MISC 1 each by Does not apply route 3 times daily 3/10/22   MARCELO Sanchez CNP   blood glucose monitor strips Test 3 times a day & as needed for symptoms of irregular blood glucose. Dispense sufficient amount for indicated testing frequency plus additional to accommodate PRN testing needs.  3/10/22   MARCELO Sanchez CNP   Elastic Bandages & Supports (JOBST KNEE HIGH COMPRESSION SM) MISC Dispense Bilateral Jobst Below Knee 20-30mmHg compression stockings  Patient not taking: Reported on 12/17/2022 3/2/22   Hassel Simmonds, DPM   budesonide-formoterol [facility]) 160-4.5 MCG/ACT AERO Inhale 2 puffs into the lungs 2 times daily 9/16/21   Bipin Pérez MD   Blood Pressure KIT 1 kit by Does not apply route 2 times daily 9/16/21   Joaquin Teran MD   Insulin Pen Needle (PEN NEEDLES) 31G X 6 MM MISC 1 each by Does not apply route daily 5/21/21   Belita Hatchet, PA-C   TRUEplus Lancets 30G MISC TEST 2 TIMES A DAY AND AS NEEED FOR SYMPTOMS OF IRREGULAR BLOOD GLUCOSE 4/21/21   Belita Hatchet, PA-C   albuterol sulfate HFA (PROVENTIL HFA) 108 (90 Base) MCG/ACT inhaler Inhale 2 puffs into the lungs every 6 hours as needed for Wheezing 7/23/19   Lashell Blum PA-C         REVIEW OF SYSTEMS       Review of Systems   Constitutional:  Negative for chills, diaphoresis, fatigue and fever. HENT:  Negative for congestion, rhinorrhea, sore throat and trouble swallowing. Respiratory:  Negative for cough, chest tightness and shortness of breath. Cardiovascular:  Negative for chest pain and leg swelling. Gastrointestinal:  Positive for nausea. Negative for abdominal pain, constipation, diarrhea and vomiting. Genitourinary:  Positive for menstrual problem. Negative for dysuria, flank pain, frequency, urgency and vaginal discharge. Musculoskeletal:  Negative for arthralgias, myalgias and neck stiffness. Skin:  Negative for color change, pallor and rash. Neurological:  Positive for headaches. Negative for dizziness, syncope, weakness, light-headedness and numbness. PHYSICAL EXAM      INITIAL VITALS:   /74   Pulse 97   Temp 97.5 °F (36.4 °C) (Oral)   Resp 18   Wt 226 lb (102.5 kg)   SpO2 98%   BMI 37.61 kg/m²     Physical Exam  GENERAL APPEARANCE:  AxOx4, generally well-appearing  female, no acute distress. HEENT:   EOMI, clear conjunctiva, oropharynx clear. No tonsillar exudates, ,uvula is midline  NECK:  Supple without lymphadenopathy. No stiffness or restricted ROM. LUNGS:  CTAB, moving air well. No crackles or wheezes are heard. ABDOMEN:  Soft, tenderness to palpation to RLQ and right flank, no CVA tenderness, nondistended with good bowel sounds heard.   BACK: No CVAT, no obvious deformity. EXTREMITIES:  Without cyanosis, clubbing or edema. NEUROLOGICAL:  Grossly nonfocal. Alert and oriented, moving all 4 extremities. CN not formally tested but appear grossly intact. Observed to ambulate with normal gait. Skin:  Warm and dry without any rash. DDX/DIAGNOSTIC RESULTS / EMERGENCY DEPARTMENT COURSE / MDM     Medical Decision Making  31 y/o female presenting with viral like symptoms, headaches, myalgia, nausea without vomiting and dry cough. Denies sore throat. No tonsillar exudates, no change in voice. Dysuria and RLQ abdominal pain. Concern for appendicitis. Pt has gallbladder removed prior. Previous history of c section. LMP was >6 wks. Denying vaginal discharge. Obtain CBC, CMP, flu and covid swab, symptomatic management with tylenol. Urinalysis for UTI and urine pregnancy. Amount and/or Complexity of Data Reviewed  Labs: ordered. Risk  OTC drugs. Prescription drug management. EMERGENCY DEPARTMENT COURSE:      ED Course as of 01/28/23 2004   Sat Jan 28, 2023 1934 HCG(Urine) Pregnancy Test: NEGATIVE [QC]   9019 All labs grossly unremarkable. No leukocytosis. Afebrile. No concern for appendicitis at this time. Provided return precautions to patient regarding appendicits symptoms, including worsening abdominal pain, fevers, low apetite, fatigue. Will also provide a couple doses of reglan for history of gastroparesis, and advise pt to follow upwith PCP for future Rx refills, as we cannot keep refiling in ED. Pt verbalizes understanding.  [QC]   1939 Still having headache. Will give dose of toradol.  [QC]   2003 She is observed to ambulate from the ER with a steady gait. Vitals have remained within normal limits during stay. She was observed to be playing on her phone during the entire stay.   [QC]      ED Course User Index  [QC] Nenita Ta MD       PROCEDURES:      CONSULTS:  None    CRITICAL CARE:  There was significant risk of life threatening deterioration of patient's condition requiring my direct management. Critical care time 0 minutes, excluding any documented procedures. FINAL IMPRESSION      1. Viral URI with cough    2.  Right lower quadrant abdominal pain          DISPOSITION / PLAN     DISPOSITION Decision To Discharge 01/28/2023 07:35:50 PM      PATIENT REFERRED TO:  Jocy Lombardo PA-C  [facility]  [address] [facility],[address]  [address] [place] [address]  [phone]    Schedule an appointment as soon as possible for a visit   for future med refill requests    [facility]  [address] [place] [address]  [phone]    If symptoms worsen    DISCHARGE MEDICATIONS:  Discharge Medication List as of 1/28/2023  7:43 PM          Percy Austin MD  Emergency Medicine Resident    (Please note that portions of thisnote were completed with a voice recognition program.  Efforts were made to edit the dictations but occasionally words are mis-transcribed.)       Percy Austin MD  Resident  01/28/23 2004

## 2023-01-29 NOTE — ED PROVIDER NOTES
[address] [facility]     Emergency Department     Faculty Attestation        I performed a history and physical examination of the patient and discussed management with the resident. I reviewed the residents note and agree with the documented findings and plan of care. Any areas of disagreement are noted on the chart. I was personally present for the key portions of any procedures. I have documented in the chart those procedures where I was not present during the key portions. I have reviewed the emergency nurses triage note. I agree with the chief complaint, past medical history, past surgical history, allergies, medications, social and family history as documented unless otherwise noted below. For mid-level providers such as nurse practitioners as well as physicians assistants:    I have personally seen and evaluated the patient. I find the patient's history and physical exam are consistent with NP/PA documentation. I agree with the care provided, treatment rendered, disposition, & follow-up plan. Additional findings are as noted.     Vital Signs: /74   Pulse 97   Temp 97.5 °F (36.4 °C) (Oral)   Resp 18   Wt 226 lb (102.5 kg)   SpO2 98%   BMI 37.61 kg/m²   PCP:  Dawit Khanna PA-C    Pertinent Comments:           Critical Care  None          Terell Chandler MD    Attending Emergency Medicine Physician            Simone Mcbride MD  01/28/23 3867

## 2023-01-29 NOTE — ED NOTES
Pt is resting in bed. Urine sample obtained. No distress noted. Waiting for the results. Call bell within reach.      Merissa Mcdonnell RN  01/28/23 7993
Pt to ed from home, c/o cough, congestion, runny nose, headache this week. Reports abdominal pain, worse on right side with nausea. Reports running out of her reglan script that was \"helping\" reports follow up with GI on Feb 24. Patient reports multiple episodes of diarrhea today. Patient is aox4, non labored RR, no distress noted, significant other at bedside.       Kendra Lino RN  01/28/23 5338
Pt unable to urinate at this time.       Thierno Marroquin RN  01/28/23 6752
Report given to Temi Sandoval Questions answered.      Jolie Centeno RN  01/28/23 3703
none

## 2023-01-29 NOTE — DISCHARGE INSTRUCTIONS
You likely have a viral like illness. Your lab work showed no abnormalities that require treatment here in the emergency department. This is treated with symptomatic care including plenty of rest and hydration. Return the emergency department if you experiencing worsening abdominal pain, develop fevers, chills nausea or vomiting, as these could be symptoms of appendicitis. Continue to follow-up with your primary care provider for future medication refills and ongoing management.

## 2023-02-15 ENCOUNTER — NURSE TRIAGE (OUTPATIENT)
Dept: OTHER | Facility: CLINIC | Age: 28
End: 2023-02-15

## 2023-02-15 NOTE — TELEPHONE ENCOUNTER
Location of patient: [place]    [facility]; Patient with Red Flag Complaint requesting to establish care with new PCP at [facility]. Subjective: Caller states \"No matter what I eat, my sugar keeps dropping extremely low. \"     Current Symptoms: blood sugars low-74 NOW; has gotten down to 62, shaking at times-NOT Now, palor, fatigue, right side lower right side abdominal pain near navel; radiates to back-constant- has been seen many times, vomiting several times/week, nausea, blood in vomit, burning with urination, urinary frequency    Type II Diabetic- taking medication regularly    Hx of Elevated Liver Panel; Non Alcoholic Liver Disease    Onset: 2 weeks ago; worsening    Associated Symptoms: reduced activity, increased sleepiness    Pain Severity: 7/10; sharp, stabbing; constant    Temperature: Denies    What has been tried: Orange juice    LMP:  12/1/23  Pregnant: No    Recommended disposition: Go to ED Now. Patient agreeable and will have  drive her. Care advice provided, patient verbalizes understanding; denies any other questions or concerns; instructed to call back for any new or worsening symptoms. Patient/caller agrees to proceed to [address] [place] [address] Emergency Department. Attention Provider: Thank you for allowing me to participate in the care of your patient. The patient was connected to triage in response to information provided to the [facility]. Please do not respond through this encounter as the response is not directed to a shared pool.     Reason for Disposition   SEVERE abdominal pain (e.g., excruciating)    Protocols used: Abdominal Pain - Female-ADULT-[place]

## 2023-05-08 ENCOUNTER — HOSPITAL ENCOUNTER (OUTPATIENT)
Age: 28
Setting detail: SPECIMEN
Discharge: HOME | End: 2023-05-08

## 2023-05-08 DIAGNOSIS — Z76.89 ESTABLISHING CARE WITH NEW DOCTOR, ENCOUNTER FOR: ICD-10-CM

## 2023-05-08 DIAGNOSIS — E55.9 VITAMIN D DEFICIENCY: ICD-10-CM

## 2023-05-08 DIAGNOSIS — I10 ESSENTIAL HYPERTENSION: ICD-10-CM

## 2023-05-08 DIAGNOSIS — E66.01 CLASS 2 SEVERE OBESITY DUE TO EXCESS CALORIES WITH SERIOUS COMORBIDITY AND BODY MASS INDEX (BMI) OF 37.0 TO 37.9 IN ADULT (HCC): ICD-10-CM

## 2023-05-08 DIAGNOSIS — Z00.00 ROUTINE ADULT HEALTH MAINTENANCE: ICD-10-CM

## 2023-05-08 LAB
25(OH)D3 SERPL-MCNC: 22.7 NG/ML
ABSOLUTE EOS #: 0.15 K/UL (ref 0–0.44)
ABSOLUTE IMMATURE GRANULOCYTE: 0.04 K/UL (ref 0–0.3)
ABSOLUTE LYMPH #: 3.57 K/UL (ref 1.1–3.7)
ABSOLUTE MONO #: 0.66 K/UL (ref 0.1–1.2)
ALBUMIN SERPL-MCNC: 4.6 G/DL (ref 3.5–5.2)
ALBUMIN/GLOBULIN RATIO: 2 (ref 1–2.5)
ALP SERPL-CCNC: 47 U/L (ref 35–104)
ALT SERPL-CCNC: 21 U/L (ref 5–33)
ANION GAP SERPL CALCULATED.3IONS-SCNC: 15 MMOL/L (ref 9–17)
AST SERPL-CCNC: 11 U/L
BASOPHILS # BLD: 1 % (ref 0–2)
BASOPHILS ABSOLUTE: 0.06 K/UL (ref 0–0.2)
BILIRUB SERPL-MCNC: 1.1 MG/DL (ref 0.3–1.2)
BUN SERPL-MCNC: 12 MG/DL (ref 6–20)
CALCIUM SERPL-MCNC: 9.9 MG/DL (ref 8.6–10.4)
CHLORIDE SERPL-SCNC: 106 MMOL/L (ref 98–107)
CHOLEST SERPL-MCNC: 172 MG/DL
CHOLESTEROL/HDL RATIO: 4.3
CO2 SERPL-SCNC: 19 MMOL/L (ref 20–31)
CREAT SERPL-MCNC: 0.68 MG/DL (ref 0.5–0.9)
CREATININE URINE: 186.5 MG/DL (ref 28–217)
EOSINOPHILS RELATIVE PERCENT: 1 % (ref 1–4)
GFR SERPL CREATININE-BSD FRML MDRD: >60 ML/MIN/1.73M2
GLUCOSE SERPL-MCNC: 196 MG/DL (ref 70–99)
HCT VFR BLD AUTO: 41.4 % (ref 36.3–47.1)
HDLC SERPL-MCNC: 40 MG/DL
HGB BLD-MCNC: 13.9 G/DL (ref 11.9–15.1)
IMMATURE GRANULOCYTES: 0 %
LDLC SERPL CALC-MCNC: 114 MG/DL (ref 0–130)
LYMPHOCYTES # BLD: 28 % (ref 24–43)
MCH RBC QN AUTO: 31.4 PG (ref 25.2–33.5)
MCHC RBC AUTO-ENTMCNC: 33.6 G/DL (ref 28.4–34.8)
MCV RBC AUTO: 93.5 FL (ref 82.6–102.9)
MICROALBUMIN/CREAT 24H UR: 18 MG/L
MICROALBUMIN/CREAT UR-RTO: 10 MCG/MG CREAT
MONOCYTES # BLD: 5 % (ref 3–12)
NRBC AUTOMATED: 0 PER 100 WBC
PDW BLD-RTO: 12.7 % (ref 11.8–14.4)
PLATELET # BLD AUTO: 273 K/UL (ref 138–453)
PMV BLD AUTO: 10.8 FL (ref 8.1–13.5)
POTASSIUM SERPL-SCNC: 4.1 MMOL/L (ref 3.7–5.3)
PROT SERPL-MCNC: 6.9 G/DL (ref 6.4–8.3)
RBC # BLD: 4.43 M/UL (ref 3.95–5.11)
SEG NEUTROPHILS: 65 % (ref 36–65)
SEGMENTED NEUTROPHILS ABSOLUTE COUNT: 8.18 K/UL (ref 1.5–8.1)
SODIUM SERPL-SCNC: 140 MMOL/L (ref 135–144)
TRIGL SERPL-MCNC: 88 MG/DL
WBC # BLD AUTO: 12.7 K/UL (ref 3.5–11.3)

## 2023-05-11 ENCOUNTER — HOSPITAL ENCOUNTER (OUTPATIENT)
Age: 28
Setting detail: SPECIMEN
Discharge: HOME | End: 2023-05-11

## 2023-05-11 ENCOUNTER — OFFICE VISIT (OUTPATIENT)
Dept: OBGYN CLINIC | Age: 28
End: 2023-05-11
Payer: COMMERCIAL

## 2023-05-11 VITALS
BODY MASS INDEX: 36.82 KG/M2 | HEART RATE: 84 BPM | DIASTOLIC BLOOD PRESSURE: 82 MMHG | WEIGHT: 221 LBS | HEIGHT: 65 IN | SYSTOLIC BLOOD PRESSURE: 124 MMHG

## 2023-05-11 DIAGNOSIS — Z80.3 FAMILY HISTORY OF BREAST CANCER IN MOTHER: ICD-10-CM

## 2023-05-11 DIAGNOSIS — Z01.419 PAP SMEAR, AS PART OF ROUTINE GYNECOLOGICAL EXAMINATION: Primary | ICD-10-CM

## 2023-05-11 DIAGNOSIS — Z11.51 SCREENING FOR HUMAN PAPILLOMAVIRUS: ICD-10-CM

## 2023-05-11 DIAGNOSIS — Z11.3 SCREENING FOR STD (SEXUALLY TRANSMITTED DISEASE): ICD-10-CM

## 2023-05-11 PROCEDURE — 99395 PREV VISIT EST AGE 18-39: CPT | Performed by: CLINICAL NURSE SPECIALIST

## 2023-05-11 PROCEDURE — 3074F SYST BP LT 130 MM HG: CPT | Performed by: CLINICAL NURSE SPECIALIST

## 2023-05-11 PROCEDURE — 3079F DIAST BP 80-89 MM HG: CPT | Performed by: CLINICAL NURSE SPECIALIST

## 2023-05-11 NOTE — PROGRESS NOTES
[facility]  [address] [facility]  [address] [facility] [address]  Dept: [phone]        DATE OF VISIT:  23        History and Physical    Bonita Millard    :  1995  CHIEF COMPLAINT:    Chief Complaint   Patient presents with    Annual Exam                    Usman Ashley is a 25 y.o. female who presents for annual well woman exam.  Patient is requesting STD screening. The patient was seen and examined. Per the patient bowels are regular. She has no voiding complaints. She denies any bloating as well as vaginal discharge. Chaperone for Intimate Exam  Chaperone was offered as part of the rooming process. Patient declined and agrees to continue with exam without a chaperone. Chaperone: none     _____________________________________________________________________  Past Medical History:   Diagnosis Date    Asthma     uses inhaler    Bipolar 1 disorder ([address]     Diabetes mellitus type 2 in obese (HCC)     GERD (gastroesophageal reflux disease)     Hepatic steatosis     Hypertension     started when approx 6 mos pregnant with first pregnancy    Morbid obesity with body mass index (BMI) of 40.0 to 49.9 (HCC)     Neuropathy     Postpartum depression                                                                    Past Surgical History:   Procedure Laterality Date    ANKLE ARTHROSCOPY Right 01/10/2022    RIGHT ANKLE ARTHROSCOPY WITH DEBRIDEMENT AND REPAIR OF LATERAL ANKLE LIGAMENTS. PERONEAL TENDON SYNOVECTOMY AND GASTRONEMIS RECESSION RIGHT LOWER EXTREMITY     ANKLE ARTHROSCOPY Right 1/10/2022    RIGHT ANKLE ARTHROSCOPY WITH DEBRIDEMENT AND REPAIR OF LATERAL ANKLE LIGAMENTS.  PERONEAL TENDON SYNOVECTOMY AND GASTROCNEMIUS RECESSION RIGHT LOWER EXTREMITY performed by Aleksandr Teran DPM at [address] Right 2021    AXILLARY ABSCESS I&D performed by Brie Chavez MD at [address] [place] [address]

## 2023-05-12 DIAGNOSIS — Z11.3 SCREENING FOR STD (SEXUALLY TRANSMITTED DISEASE): ICD-10-CM

## 2023-05-12 LAB
C TRACH DNA SPEC QL PROBE+SIG AMP: NEGATIVE
CANDIDA SPECIES, DNA PROBE: NEGATIVE
GARDNERELLA VAGINALIS, DNA PROBE: NEGATIVE
N GONORRHOEA DNA SPEC QL PROBE+SIG AMP: NEGATIVE
SOURCE: NORMAL
SPECIMEN DESCRIPTION: NORMAL
TRICHOMONAS VAGINALIS DNA: NEGATIVE

## 2023-05-16 LAB
HPV SAMPLE: NORMAL
HPV, GENOTYPE 16: NOT DETECTED
HPV, GENOTYPE 18: NOT DETECTED
HPV, HIGH RISK OTHER: NOT DETECTED
HPV, INTERPRETATION: NORMAL
SPECIMEN DESCRIPTION: NORMAL

## 2023-05-18 LAB — CYTOLOGY REPORT: NORMAL

## 2023-06-19 ENCOUNTER — PROCEDURE VISIT (OUTPATIENT)
Dept: OBGYN CLINIC | Age: 28
End: 2023-06-19
Payer: COMMERCIAL

## 2023-06-19 VITALS
HEART RATE: 65 BPM | WEIGHT: 213 LBS | SYSTOLIC BLOOD PRESSURE: 126 MMHG | BODY MASS INDEX: 35.49 KG/M2 | DIASTOLIC BLOOD PRESSURE: 82 MMHG | HEIGHT: 65 IN

## 2023-06-19 DIAGNOSIS — Z32.02 NEGATIVE PREGNANCY TEST: ICD-10-CM

## 2023-06-19 DIAGNOSIS — N92.1 MENORRHAGIA WITH IRREGULAR CYCLE: ICD-10-CM

## 2023-06-19 DIAGNOSIS — N94.6 DYSMENORRHEA: ICD-10-CM

## 2023-06-19 DIAGNOSIS — Z30.46 ENCOUNTER FOR REMOVAL AND REINSERTION OF NEXPLANON: Primary | ICD-10-CM

## 2023-06-19 LAB
CONTROL: NORMAL
PREGNANCY TEST URINE, POC: NEGATIVE

## 2023-06-19 PROCEDURE — 81025 URINE PREGNANCY TEST: CPT | Performed by: CLINICAL NURSE SPECIALIST

## 2023-06-19 PROCEDURE — 11983 REMOVE/INSERT DRUG IMPLANT: CPT | Performed by: CLINICAL NURSE SPECIALIST

## 2023-06-19 SDOH — ECONOMIC STABILITY: INCOME INSECURITY: HOW HARD IS IT FOR YOU TO PAY FOR THE VERY BASICS LIKE FOOD, HOUSING, MEDICAL CARE, AND HEATING?: NOT HARD AT ALL

## 2023-06-19 SDOH — ECONOMIC STABILITY: FOOD INSECURITY: WITHIN THE PAST 12 MONTHS, YOU WORRIED THAT YOUR FOOD WOULD RUN OUT BEFORE YOU GOT MONEY TO BUY MORE.: NEVER TRUE

## 2023-06-19 SDOH — ECONOMIC STABILITY: FOOD INSECURITY: WITHIN THE PAST 12 MONTHS, THE FOOD YOU BOUGHT JUST DIDN'T LAST AND YOU DIDN'T HAVE MONEY TO GET MORE.: NEVER TRUE

## 2023-06-19 SDOH — ECONOMIC STABILITY: HOUSING INSECURITY
IN THE LAST 12 MONTHS, WAS THERE A TIME WHEN YOU DID NOT HAVE A STEADY PLACE TO SLEEP OR SLEPT IN A SHELTER (INCLUDING NOW)?: NO

## 2023-06-19 ASSESSMENT — ENCOUNTER SYMPTOMS
ALLERGIC/IMMUNOLOGIC NEGATIVE: 1
GASTROINTESTINAL NEGATIVE: 1
EYES NEGATIVE: 1
RESPIRATORY NEGATIVE: 1

## 2023-06-19 NOTE — PROGRESS NOTES
DATE OF VISIT:  6/19/23    PATIENTNAME:  Carlos Puente     YOB: 1995    REASON FORVISIT:    Chief Complaint   Patient presents with    Procedure        HISTORY OF PRESENT ILLNESS:  HPI      Patient is a 26 yo female who presents for nexplanon removal and reinsertion. Patient has a history of irregular heavy painful menses and nexplanon has helped. Patient's last menstrual period was 06/14/2023. Vitals:    06/19/23 0938   BP: 126/82   Pulse: 65   Weight: 213 lb (96.6 kg)   Height: 5' 5\" (1.651 m)     Body mass index is 35.45 kg/m². Allergies   Allergen Reactions    Benadryl [Diphenhydramine] Hives and Shortness Of Breath     Current Outpatient Medications   Medication Sig Dispense Refill    UNIFINE PENTIPS 29G X 12MM MISC use four times a day WITH INSULINS 120 each 5    vitamin D (ERGOCALCIFEROL) 1.25 MG (39794 UT) CAPS capsule Take 1 capsule by mouth once a week 12 capsule 0    Insulin Aspart FlexPen 100 UNIT/ML SOPN inject 4 units subcutaneously before breakfast then inject 4 units subcutaneously before LUNCH then inject 6 units subcutaneously before dinner and 6 units subcutaneously at bedtime 15 mL 3    Continuous Blood Gluc Sensor (DEXCOM G6 SENSOR) MISC 1 each by Does not apply route 4 times daily (before meals and nightly) 3 each 0    Continuous Blood Gluc Transmit (DEXCOM G6 TRANSMITTER) MISC 1 each by Does not apply route 4 times daily (before meals and nightly) 1 each 0    Continuous Blood Gluc  (DEXCOM G6 ) CHRISTINA 1 each by Does not apply route 4 times daily (before meals and nightly) 1 each 0    insulin detemir (LEVEMIR FLEXTOUCH) 100 UNIT/ML injection pen Inject 30 Units into the skin nightly 15 mL 3    lisinopril (PRINIVIL;ZESTRIL) 5 MG tablet Take 1 tablet by mouth daily 30 tablet 2    budesonide-formoterol (SYMBICORT) 160-4.5 MCG/ACT AERO Inhale 2 puffs into the lungs in the morning and 2 puffs in the evening.  10.2 g 3    albuterol sulfate HFA (PROVENTIL HFA)

## 2023-07-13 ENCOUNTER — TELEMEDICINE (OUTPATIENT)
Dept: OBGYN CLINIC | Age: 28
End: 2023-07-13

## 2023-07-13 DIAGNOSIS — Z71.2 ENCOUNTER TO DISCUSS TEST RESULTS: Primary | ICD-10-CM

## 2023-07-13 DIAGNOSIS — Z91.89 AT HIGH RISK FOR BREAST CANCER: ICD-10-CM

## 2023-07-13 ASSESSMENT — ENCOUNTER SYMPTOMS: RESPIRATORY NEGATIVE: 1

## 2023-07-13 NOTE — PROGRESS NOTES
Miriam Mata (:  1995) is a Established patient, presenting virtually for evaluation of the following:    Assessment & Plan   Below is the assessment and plan developed based on review of pertinent history, physical exam, labs, studies, and medications. 1. Encounter to discuss test results  -     SHIRA DIGITAL SCREEN W OR WO CAD BILATERAL; Future  -     MRI BREAST BILATERAL W WO CONTRAST; Future  2. At high risk for breast cancer  -     SHIRA DIGITAL SCREEN W OR WO CAD BILATERAL; Future  -     MRI BREAST BILATERAL W WO CONTRAST; Future    Return for as needed. Subjective   HPI  Review of Systems   Constitutional:  Negative for chills, fever and unexpected weight change. Respiratory: Negative. Cardiovascular: Negative. Skin: Negative. Neurological: Negative. Psychiatric/Behavioral: Negative. Objective   Patient-Reported Vitals  No data recorded     Physical Exam  Constitutional:       Appearance: Normal appearance. She is obese. Neurological:      General: No focal deficit present. Mental Status: She is alert and oriented to person, place, and time. Psychiatric:         Mood and Affect: Mood normal.         Behavior: Behavior normal.         Thought Content:  Thought content normal.         Judgment: Judgment normal.     [INSTRUCTIONS:  \"[x]\" Indicates a positive item  \"[]\" Indicates a negative item  -- DELETE ALL ITEMS NOT EXAMINED]    Constitutional: [x] Appears well-developed and well-nourished [x] No apparent distress      [] Abnormal -     Mental status: [x] Alert and awake  [x] Oriented to person/place/time [x] Able to follow commands    [] Abnormal -     Eyes:   EOM    [x]  Normal    [] Abnormal -   Sclera  [x]  Normal    [] Abnormal -          Discharge [x]  None visible   [] Abnormal -     HENT: [x] Normocephalic, atraumatic  [] Abnormal -   [x] Mouth/Throat: Mucous membranes are moist    External Ears [x] Normal  [] Abnormal -    Neck: [x] No visualized mass []

## 2023-08-09 ENCOUNTER — HOSPITAL ENCOUNTER (OUTPATIENT)
Dept: WOMENS IMAGING | Age: 28
Discharge: HOME | End: 2023-08-11
Payer: COMMERCIAL

## 2023-08-09 DIAGNOSIS — Z91.89 AT HIGH RISK FOR BREAST CANCER: ICD-10-CM

## 2023-08-09 DIAGNOSIS — Z71.2 ENCOUNTER TO DISCUSS TEST RESULTS: ICD-10-CM

## 2023-08-09 PROCEDURE — 77063 BREAST TOMOSYNTHESIS BI: CPT

## 2024-02-16 DIAGNOSIS — M54.50 CHRONIC MIDLINE LOW BACK PAIN WITHOUT SCIATICA: Primary | ICD-10-CM

## 2024-02-16 DIAGNOSIS — G89.29 CHRONIC MIDLINE LOW BACK PAIN WITHOUT SCIATICA: Primary | ICD-10-CM

## 2024-02-27 PROBLEM — L03.211 FACIAL CELLULITIS: Status: ACTIVE | Noted: 2023-12-25

## 2024-02-27 PROBLEM — L03.211 CELLULITIS OF FACE: Status: ACTIVE | Noted: 2023-12-26

## 2024-02-27 PROBLEM — E87.6 HYPOKALEMIA: Status: ACTIVE | Noted: 2023-12-28

## 2024-02-27 PROBLEM — K08.89 TOOTHACHE: Status: ACTIVE | Noted: 2023-12-23

## 2024-02-27 PROBLEM — A41.9 SEPSIS (HCC): Status: ACTIVE | Noted: 2023-12-25

## 2024-03-11 ENCOUNTER — HOSPITAL ENCOUNTER (OUTPATIENT)
Dept: GENERAL RADIOLOGY | Facility: CLINIC | Age: 29
Discharge: HOME | End: 2024-03-13
Payer: COMMERCIAL

## 2024-03-11 ENCOUNTER — HOSPITAL ENCOUNTER (OUTPATIENT)
Facility: CLINIC | Age: 29
Discharge: HOME | End: 2024-03-13
Payer: COMMERCIAL

## 2024-03-11 DIAGNOSIS — M54.41 CHRONIC MIDLINE LOW BACK PAIN WITH BILATERAL SCIATICA: ICD-10-CM

## 2024-03-11 DIAGNOSIS — G89.29 CHRONIC MIDLINE LOW BACK PAIN WITH BILATERAL SCIATICA: ICD-10-CM

## 2024-03-11 DIAGNOSIS — M54.42 CHRONIC MIDLINE LOW BACK PAIN WITH BILATERAL SCIATICA: ICD-10-CM

## 2024-03-11 PROCEDURE — 72100 X-RAY EXAM L-S SPINE 2/3 VWS: CPT

## 2024-03-11 PROCEDURE — 72072 X-RAY EXAM THORAC SPINE 3VWS: CPT

## 2024-04-09 ENCOUNTER — OFFICE VISIT (OUTPATIENT)
Dept: GASTROENTEROLOGY | Age: 29
End: 2024-04-09
Payer: COMMERCIAL

## 2024-04-09 VITALS
BODY MASS INDEX: 34.79 KG/M2 | HEART RATE: 86 BPM | WEIGHT: 208.8 LBS | DIASTOLIC BLOOD PRESSURE: 78 MMHG | SYSTOLIC BLOOD PRESSURE: 111 MMHG | HEIGHT: 65 IN

## 2024-04-09 DIAGNOSIS — K52.9 CHRONIC DIARRHEA: ICD-10-CM

## 2024-04-09 DIAGNOSIS — K92.1 HEMATOCHEZIA: ICD-10-CM

## 2024-04-09 DIAGNOSIS — R10.11 ABDOMINAL PAIN, RIGHT UPPER QUADRANT: Primary | ICD-10-CM

## 2024-04-09 PROCEDURE — 3078F DIAST BP <80 MM HG: CPT | Performed by: INTERNAL MEDICINE

## 2024-04-09 PROCEDURE — 4004F PT TOBACCO SCREEN RCVD TLK: CPT | Performed by: INTERNAL MEDICINE

## 2024-04-09 PROCEDURE — G8417 CALC BMI ABV UP PARAM F/U: HCPCS | Performed by: INTERNAL MEDICINE

## 2024-04-09 PROCEDURE — G8427 DOCREV CUR MEDS BY ELIG CLIN: HCPCS | Performed by: INTERNAL MEDICINE

## 2024-04-09 PROCEDURE — 99204 OFFICE O/P NEW MOD 45 MIN: CPT | Performed by: INTERNAL MEDICINE

## 2024-04-09 PROCEDURE — 3074F SYST BP LT 130 MM HG: CPT | Performed by: INTERNAL MEDICINE

## 2024-04-09 ASSESSMENT — ENCOUNTER SYMPTOMS
ANAL BLEEDING: 0
COUGH: 1
CONSTIPATION: 1
SINUS PRESSURE: 0
RECTAL PAIN: 0
DIARRHEA: 1
ABDOMINAL DISTENTION: 1
VOMITING: 0
SORE THROAT: 0
ABDOMINAL PAIN: 1
CHOKING: 0
VOICE CHANGE: 0
BLOOD IN STOOL: 1
BACK PAIN: 0
WHEEZING: 1
NAUSEA: 1
TROUBLE SWALLOWING: 0

## 2024-04-09 NOTE — PROGRESS NOTES
further questions please do not hesitate to contact me.    Note is dictated utilizing voice recognition software. Unfortunately this leads to occasional typographical errors. Please contact our office if you have any questions.    I have reviewed and agree with the MA/SHARONDA ROS.     Mikael Curran MD, FACP, FACG  Board Certified in Gastroenterology and Internal Medicine  [facility]  Office #: [phone]

## 2024-04-09 NOTE — TELEPHONE ENCOUNTER
Procedure schedule/Dr Curran  Procedure:colonoscopy  Dx: hematochezia  Date:04/24/2024  Time:9:30am Arrive: 7:30am  Hospital:[facility] Phone call: they will call  Bowel Prep given: Miralax  Advised in office/phone:office  Clearance needed:none

## 2024-04-10 RX ORDER — POLYETHYLENE GLYCOL 3350 17 G/17G
POWDER, FOR SOLUTION ORAL
Qty: 238 G | Refills: 0 | Status: SHIPPED | OUTPATIENT
Start: 2024-04-10

## 2024-04-10 RX ORDER — BISACODYL 5 MG/1
TABLET, DELAYED RELEASE ORAL
Qty: 4 TABLET | Refills: 0 | Status: SHIPPED | OUTPATIENT
Start: 2024-04-10

## 2024-04-12 ENCOUNTER — HOSPITAL ENCOUNTER (OUTPATIENT)
Dept: ULTRASOUND IMAGING | Age: 29
End: 2024-04-12
Attending: INTERNAL MEDICINE
Payer: COMMERCIAL

## 2024-04-12 DIAGNOSIS — R10.11 ABDOMINAL PAIN, RIGHT UPPER QUADRANT: ICD-10-CM

## 2024-04-12 PROCEDURE — 76705 ECHO EXAM OF ABDOMEN: CPT

## 2024-04-15 ENCOUNTER — HOSPITAL ENCOUNTER (OUTPATIENT)
Dept: PREADMISSION TESTING | Age: 29
Discharge: HOME | End: 2024-04-19

## 2024-04-15 VITALS — HEIGHT: 65 IN | WEIGHT: 206 LBS | BODY MASS INDEX: 34.32 KG/M2

## 2024-04-15 RX ORDER — CLINDAMYCIN HYDROCHLORIDE 300 MG/1
300 CAPSULE ORAL 4 TIMES DAILY
COMMUNITY
Start: 2024-04-09

## 2024-04-15 NOTE — PROGRESS NOTES
Pre-op Instructions For Out-Patient Endoscopy Surgery    Medication Instructions:  Please stop herbs and any supplements now (includes vitamins and minerals).    Please contact your surgeon and prescribing physician for pre-op instructions for any blood thinners.    If you have inhalers/aerosol treatments at home, please use them the morning of your surgery and bring the inhalers with you to the hospital.    Please take the following medications the morning of your surgery with a sip of water:   No medications    Surgery Instructions:  After midnight before surgery:  Do not eat or drink anything, including water, mints, gum, and hard candy.  You may brush your teeth without swallowing.  No smoking, chewing tobacco, or street drugs.    Please shower or bathe before surgery.       Please do not wear any cologne, lotion, powder, jewelry, piercings, perfume, makeup, nail polish, hair accessories, or hair spray on the day of surgery.  Wear loose comfortable clothing.    Leave your valuables at home but bring a payment source for any after-surgery prescriptions you plan to fill at [facility].  Bring a storage case for any glasses/contacts.    An adult who is responsible for you MUST drive you home and should be with you for the first 24 hours after surgery.     The Day of Surgery:  Arrive at [facility] Surgery Entrance at the time directed by your surgeon and check in at the desk.     If you have a living will or healthcare power of , please bring a copy.    You will be taken to the pre-op holding area where you will be prepared for surgery.  A physical assessment will be performed by a nurse practitioner or house officer.  Your IV will be started and you will meet your anesthesiologist.    When you go to surgery, your family will be directed to the surgical waiting room, where the doctor should speak with them after your surgery.    After surgery, you will be taken to the recovery area.

## 2024-04-22 ENCOUNTER — TELEPHONE (OUTPATIENT)
Dept: GASTROENTEROLOGY | Age: 29
End: 2024-04-22

## 2024-04-22 DIAGNOSIS — R10.11 ABDOMINAL PAIN, RIGHT UPPER QUADRANT: Primary | ICD-10-CM

## 2024-04-22 DIAGNOSIS — R11.0 NAUSEA: ICD-10-CM

## 2024-04-22 NOTE — TELEPHONE ENCOUNTER
Patient contacted the office back, writer spoke with patient and notified her that we will not be prescribing Promethazine. Patient verbalized understanding. Writer discussed the option of the EGD. Patient was agreeable. Writer placed order for EGD. Please add EGD to procedure. Thanks.

## 2024-04-22 NOTE — TELEPHONE ENCOUNTER
Writer attempted to contact patient back.     Per Dr. Curran, \"Promethazine is a medication that becomes addictive and patients become too dependant on it. If patient is experiencing nausea, we can do EGD and colonoscopy together.\"     Patient is scheduled for a colonoscopy on 4/24/24.    Patient was unavailable and her voicemail box is not set up yet.

## 2024-04-23 ENCOUNTER — ANESTHESIA EVENT (OUTPATIENT)
Dept: ENDOSCOPY | Age: 29
End: 2024-04-23
Payer: COMMERCIAL

## 2024-04-24 ENCOUNTER — HOSPITAL ENCOUNTER (OUTPATIENT)
Age: 29
Setting detail: OUTPATIENT SURGERY
Discharge: HOME | End: 2024-04-24
Attending: INTERNAL MEDICINE | Admitting: INTERNAL MEDICINE
Payer: COMMERCIAL

## 2024-04-24 ENCOUNTER — ANESTHESIA (OUTPATIENT)
Dept: ENDOSCOPY | Age: 29
End: 2024-04-24
Payer: COMMERCIAL

## 2024-04-24 VITALS
SYSTOLIC BLOOD PRESSURE: 106 MMHG | WEIGHT: 208 LBS | HEIGHT: 65 IN | RESPIRATION RATE: 26 BRPM | HEART RATE: 93 BPM | OXYGEN SATURATION: 100 % | BODY MASS INDEX: 34.66 KG/M2 | TEMPERATURE: 98 F | DIASTOLIC BLOOD PRESSURE: 81 MMHG

## 2024-04-24 DIAGNOSIS — K92.1 HEMATOCHEZIA: ICD-10-CM

## 2024-04-24 LAB
GLUCOSE BLD-MCNC: 154 MG/DL (ref 65–105)
GLUCOSE BLD-MCNC: 158 MG/DL (ref 65–105)

## 2024-04-24 PROCEDURE — 7100000010 HC PHASE II RECOVERY - FIRST 15 MIN: Performed by: INTERNAL MEDICINE

## 2024-04-24 PROCEDURE — 88305 TISSUE EXAM BY PATHOLOGIST: CPT

## 2024-04-24 PROCEDURE — 2709999900 HC NON-CHARGEABLE SUPPLY: Performed by: INTERNAL MEDICINE

## 2024-04-24 PROCEDURE — 3700000000 HC ANESTHESIA ATTENDED CARE: Performed by: INTERNAL MEDICINE

## 2024-04-24 PROCEDURE — 3609012400 HC EGD TRANSORAL BIOPSY SINGLE/MULTIPLE: Performed by: INTERNAL MEDICINE

## 2024-04-24 PROCEDURE — 2580000003 HC RX 258: Performed by: ANESTHESIOLOGY

## 2024-04-24 PROCEDURE — 81025 URINE PREGNANCY TEST: CPT

## 2024-04-24 PROCEDURE — 2500000003 HC RX 250 WO HCPCS: Performed by: NURSE ANESTHETIST, CERTIFIED REGISTERED

## 2024-04-24 PROCEDURE — 82947 ASSAY GLUCOSE BLOOD QUANT: CPT

## 2024-04-24 PROCEDURE — 3700000001 HC ADD 15 MINUTES (ANESTHESIA): Performed by: INTERNAL MEDICINE

## 2024-04-24 PROCEDURE — 3609010300 HC COLONOSCOPY W/BIOPSY SINGLE/MULTIPLE: Performed by: INTERNAL MEDICINE

## 2024-04-24 PROCEDURE — 7100000011 HC PHASE II RECOVERY - ADDTL 15 MIN: Performed by: INTERNAL MEDICINE

## 2024-04-24 PROCEDURE — 6360000002 HC RX W HCPCS: Performed by: NURSE ANESTHETIST, CERTIFIED REGISTERED

## 2024-04-24 RX ORDER — SODIUM CHLORIDE 9 MG/ML
INJECTION, SOLUTION INTRAVENOUS PRN
Status: DISCONTINUED | OUTPATIENT
Start: 2024-04-24 | End: 2024-04-24 | Stop reason: HOSPADM

## 2024-04-24 RX ORDER — MIDAZOLAM HYDROCHLORIDE 1 MG/ML
INJECTION INTRAMUSCULAR; INTRAVENOUS PRN
Status: DISCONTINUED | OUTPATIENT
Start: 2024-04-24 | End: 2024-04-24 | Stop reason: SDUPTHER

## 2024-04-24 RX ORDER — SODIUM CHLORIDE 0.9 % (FLUSH) 0.9 %
5-40 SYRINGE (ML) INJECTION PRN
Status: DISCONTINUED | OUTPATIENT
Start: 2024-04-24 | End: 2024-04-24 | Stop reason: HOSPADM

## 2024-04-24 RX ORDER — PROPOFOL 10 MG/ML
INJECTION, EMULSION INTRAVENOUS PRN
Status: DISCONTINUED | OUTPATIENT
Start: 2024-04-24 | End: 2024-04-24 | Stop reason: SDUPTHER

## 2024-04-24 RX ORDER — SODIUM CHLORIDE, SODIUM LACTATE, POTASSIUM CHLORIDE, CALCIUM CHLORIDE 600; 310; 30; 20 MG/100ML; MG/100ML; MG/100ML; MG/100ML
INJECTION, SOLUTION INTRAVENOUS CONTINUOUS
Status: DISCONTINUED | OUTPATIENT
Start: 2024-04-24 | End: 2024-04-24 | Stop reason: HOSPADM

## 2024-04-24 RX ORDER — LIDOCAINE HYDROCHLORIDE 10 MG/ML
1 INJECTION, SOLUTION EPIDURAL; INFILTRATION; INTRACAUDAL; PERINEURAL
Status: DISCONTINUED | OUTPATIENT
Start: 2024-04-24 | End: 2024-04-24 | Stop reason: HOSPADM

## 2024-04-24 RX ORDER — LIDOCAINE HYDROCHLORIDE 20 MG/ML
INJECTION, SOLUTION INFILTRATION; PERINEURAL PRN
Status: DISCONTINUED | OUTPATIENT
Start: 2024-04-24 | End: 2024-04-24 | Stop reason: SDUPTHER

## 2024-04-24 RX ORDER — SODIUM CHLORIDE 0.9 % (FLUSH) 0.9 %
5-40 SYRINGE (ML) INJECTION EVERY 12 HOURS SCHEDULED
Status: DISCONTINUED | OUTPATIENT
Start: 2024-04-24 | End: 2024-04-24 | Stop reason: HOSPADM

## 2024-04-24 RX ADMIN — MIDAZOLAM 2 MG: 1 INJECTION INTRAMUSCULAR; INTRAVENOUS at 09:37

## 2024-04-24 RX ADMIN — PROPOFOL 60 MG: 10 INJECTION, EMULSION INTRAVENOUS at 09:47

## 2024-04-24 RX ADMIN — PROPOFOL 40 MG: 10 INJECTION, EMULSION INTRAVENOUS at 10:00

## 2024-04-24 RX ADMIN — LIDOCAINE HYDROCHLORIDE 100 MG: 20 INJECTION, SOLUTION INFILTRATION; PERINEURAL at 09:45

## 2024-04-24 RX ADMIN — PROPOFOL 40 MG: 10 INJECTION, EMULSION INTRAVENOUS at 09:57

## 2024-04-24 RX ADMIN — PROPOFOL 40 MG: 10 INJECTION, EMULSION INTRAVENOUS at 10:03

## 2024-04-24 RX ADMIN — SODIUM CHLORIDE, POTASSIUM CHLORIDE, SODIUM LACTATE AND CALCIUM CHLORIDE: 600; 310; 30; 20 INJECTION, SOLUTION INTRAVENOUS at 08:15

## 2024-04-24 RX ADMIN — PROPOFOL 40 MG: 10 INJECTION, EMULSION INTRAVENOUS at 09:55

## 2024-04-24 RX ADMIN — PROPOFOL 140 MG: 10 INJECTION, EMULSION INTRAVENOUS at 09:45

## 2024-04-24 RX ADMIN — PROPOFOL 30 MG: 10 INJECTION, EMULSION INTRAVENOUS at 10:13

## 2024-04-24 RX ADMIN — PROPOFOL 30 MG: 10 INJECTION, EMULSION INTRAVENOUS at 10:08

## 2024-04-24 RX ADMIN — PROPOFOL 40 MG: 10 INJECTION, EMULSION INTRAVENOUS at 09:53

## 2024-04-24 RX ADMIN — PROPOFOL 40 MG: 10 INJECTION, EMULSION INTRAVENOUS at 09:49

## 2024-04-24 RX ADMIN — PROPOFOL 40 MG: 10 INJECTION, EMULSION INTRAVENOUS at 09:48

## 2024-04-24 RX ADMIN — PROPOFOL 30 MG: 10 INJECTION, EMULSION INTRAVENOUS at 09:51

## 2024-04-24 RX ADMIN — PROPOFOL 30 MG: 10 INJECTION, EMULSION INTRAVENOUS at 10:10

## 2024-04-24 RX ADMIN — PROPOFOL 40 MG: 10 INJECTION, EMULSION INTRAVENOUS at 10:06

## 2024-04-24 ASSESSMENT — ENCOUNTER SYMPTOMS
COUGH: 1
ABDOMINAL PAIN: 1
TROUBLE SWALLOWING: 1
WHEEZING: 1
NAUSEA: 1
DIARRHEA: 1
SHORTNESS OF BREATH: 1
VOMITING: 1
CONSTIPATION: 1

## 2024-04-24 ASSESSMENT — PAIN - FUNCTIONAL ASSESSMENT
PAIN_FUNCTIONAL_ASSESSMENT: 0-10
PAIN_FUNCTIONAL_ASSESSMENT: NONE - DENIES PAIN

## 2024-04-24 ASSESSMENT — PAIN SCALES - GENERAL
PAINLEVEL_OUTOF10: 0
PAINLEVEL_OUTOF10: 0

## 2024-04-24 NOTE — ANESTHESIA POSTPROCEDURE EVALUATION
Department of Anesthesiology  Postprocedure Note    Patient: Tahira Millard  MRN: [id number]  YOB: 1995  Date of evaluation: 4/24/2024    Procedure Summary       Date: 04/24/24 Room / Location: [place] [address] / [facility]    Anesthesia Start: 0930 Anesthesia Stop: 1022    Procedures:       COLONOSCOPY BIOPSY (Rectum)      ESOPHAGOGASTRODUODENOSCOPY BIOPSY (Esophagus) Diagnosis:       Hematochezia      (Hematochezia [K92.1])    Surgeons: Mikael Curran MD Responsible Provider: Lorena Recinos MD    Anesthesia Type: general ASA Status: 2            Anesthesia Type: No value filed.    Ana Cristina Phase I:      Ana Cristina Phase II: Ana Cristina Score: 10    Anesthesia Post Evaluation    Comments: POST- ANESTHESIA EVALUATION       Pt Name: Tahira Millard  MRN: [id number]  YOB: 1995  Date of evaluation: 4/24/2024  Time:  11:58 AM      /81   Pulse 93   Temp 98 °F (36.7 °C)   Resp 26   Ht 1.651 m (5' 5\")   Wt 94.3 kg (208 lb)   LMP 05/01/2023 (Within Days) Comment: urine pregnancy test negative 4/24/24  SpO2 100%   BMI 34.61 kg/m²      Consciousness Level  Awake  Cardiopulmonary Status  Stable  Pain Adequately Treated YES  Nausea / Vomiting  NO  Adequate Hydration  YES  Anesthesia Related Complications NONE      Electronically signed by Lorena Recinos MD on 4/24/2024 at 11:58 AM           No notable events documented.

## 2024-04-24 NOTE — ANESTHESIA PRE PROCEDURE
Department of Anesthesiology  Preprocedure Note       Name:  Tahira Millard   Age:  29 y.o.  :  1995                                          MRN:  [id number]         Date:  2024      Surgeon: Surgeon(s):  Mikael Curran MD    Procedure: Procedure(s):  COLONOSCOPY DIAGNOSTIC  ESOPHAGOGASTRODUODENOSCOPY BIOPSY    Medications prior to admission:   Prior to Admission medications    Medication Sig Start Date End Date Taking? Authorizing Provider   clindamycin (CLEOCIN) 300 MG capsule Take 1 capsule by mouth 4 times daily 24   Provider, MD New   Dulaglutide (TRULICITY) 0.75 MG/0.5ML SOPN inject 0.5 MILLILITERS ( 0.75 milligrams ) subcutaneously every 7 days IN THE ABDOMEN THIGHS OR OUTER AREA OF UPPER ARM ROTATE INJECTION SITES 24   Maris Knight APRN - CNP   polyethylene glycol (GLYCOLAX) 17 GM/SCOOP powder Take as directed by physician office for bowel prep 4/10/24   Aaron Aguilar APRN - NP   bisacodyl 5 MG EC tablet Take as directed by physician office for bowel prep 4/10/24   Aaron Aguilar APRN - NP   ibuprofen (ADVIL;MOTRIN) 600 MG tablet take 1 tablet by mouth three times a day AS NEEDED FOR PAIN 3/28/24   Maris Knight APRN - CNP   blood glucose monitor strips Test 3 times a day & as needed for symptoms of irregular blood glucose. Dispense sufficient amount for indicated testing frequency plus additional to accommodate PRN testing needs. 24   Maris Knight APRN - CNP   topiramate (TOPAMAX) 25 MG tablet 1 TABLET TWICE A DAY  Patient taking differently: 1 TABLET TWICE A DAY  as needed for headaches 24   Maris Knight APRN - CNP   Continuous Blood Gluc Sensor (DEXCOM G6 SENSOR) MISC 1 each by Does not apply route 4 times daily (before meals and nightly) 23   Felipa Mckeon APRN - CNP   diclofenac (VOLTAREN) 50 MG EC tablet Take 1 tablet by mouth 3 times daily as needed for Pain 23  Felipa Mckeon APRN - CNP   metFORMIN

## 2024-04-24 NOTE — H&P
apply route daily 200 each 5    TRUEplus Lancets 30G MISC TEST 2 TIMES A DAY AND AS NEEED FOR SYMPTOMS OF IRREGULAR BLOOD GLUCOSE 300 each 5       Review of Systems   Constitutional:  Positive for unexpected weight change (lost 10 pounds over last month).   HENT:  Positive for trouble swallowing.    Respiratory:  Positive for cough, shortness of breath and wheezing.    Cardiovascular: Negative.    Gastrointestinal:  Positive for abdominal pain, constipation, diarrhea, nausea and vomiting.   Genitourinary: Negative.    Musculoskeletal: Negative.    Skin: Negative.    Neurological:  Positive for light-headedness and headaches.   Hematological:  Bruises/bleeds easily.   Psychiatric/Behavioral: Negative.           GENERAL PHYSICAL EXAM     Vitals: see nursing flow sheet for vital sings     GENERAL APPEARANCE:   Tahira Millard is 29 y.o.,  female, mildly obese, nourished, conscious, alert.  Does not appear to be distress or pain at this time.                            Physical Exam  Constitutional:       General: She is not in acute distress.     Appearance: Normal appearance. She is obese. She is not ill-appearing.   HENT:      Head: Normocephalic.      Right Ear: External ear normal.      Left Ear: External ear normal.      Nose: Nose normal.      Mouth/Throat:      Mouth: Mucous membranes are dry.   Eyes:      General:         Right eye: No discharge.         Left eye: No discharge.   Cardiovascular:      Rate and Rhythm: Normal rate and regular rhythm.      Pulses: Normal pulses.      Heart sounds: Normal heart sounds.   Pulmonary:      Effort: Pulmonary effort is normal.      Breath sounds: Normal breath sounds.   Abdominal:      General: Bowel sounds are normal. There is no distension.      Palpations: Abdomen is soft. There is no mass.      Tenderness: There is no abdominal tenderness.   Musculoskeletal:      Cervical back: Normal range of motion and neck supple.      Right lower leg: No edema.      Left lower

## 2024-04-24 NOTE — OP NOTE
COLONOSCOPY    DATE OF PROCEDURE: 4/24/2024    SURGEON: Mikael Curran MD    ASSISTANT: None    PREOPERATIVE DIAGNOSIS: Patient has bowel habit change, past history of colon polyps, inadequate preparation for colonoscopy in the past.  Procedure performed evaluate colonic lesions that were missed.      POSTOPERATIVE DIAGNOSIS: No recurrent polyp seen.  No colitis ileitis seen.    OPERATION: Total colonoscopy, random biopsies from the colon to check for microscopic colitis    ANESTHESIA: MAC    ESTIMATED BLOOD LOSS: None    COMPLICATIONS: None     SPECIMENS:  Was Obtained: Random biopsies from the right, transverse, left colon to check for microscopic colitis    HISTORY: The patient is a 29 y.o. year old female with history of above preop diagnosis.  I recommended colonoscopy with possible biopsy or polypectomy and I explained the risk, benefits, expected outcome, and alternatives to the procedure.  Risks included but are not limited to bleeding, infection, respiratory distress, hypotension, and perforation of the colon and possibility of missing a lesion.  The patient understands and is in agreement.      PROCEDURE:  The patient's SPO2 remained above 90% throughout the procedure. Digital rectal exam was normal.  The colonoscope was inserted through the anus into the rectum and advanced under direct vision to the cecum without difficulty.  Terminal ileum was examined for approximately 2 inches.  The prep was good.      Findings:  Terminal ileum: normal    Cecum/Ascending colon: normal    Transverse colon: normal    Descending/Sigmoid colon: normal    Rectum/Anus: examined in normal and retroflexed positions and was normal    Withdrawal Time was (minutes): 12          The colon was decompressed and the scope was removed.  The patient tolerated the procedure without unusual events.     During the procedure, the patient's blood pressure, pulse and oxygen saturation remained stable and documented. No unusual events 
patient will be ready for d/c when criteria is met .      Findings:    Retropharyngeal area was grossly normal appearing    Esophagus: normal.  No signs of peptic esophagitis Mcleod's mucosa, stricture or hiatal hernia seen.  However, at the GE junction there was a small polyp about 5 mm seen, flat, excised with biopsy forceps.    Stomach:    Fundus: normal    Body: normal    Antrum: normal  , He describes no retained food in the stomach  Duodenum:     Descending: normal    Bulb: normal    The scope was removed and the patient tolerated the procedure well.     Recommendations/Plan:   F/U Biopsies  F/U In Office in 8-12 weeks   Discussed with the family  To check histology and even if the biopsies are negative may need repeat EGD in the next 6 months to check the GE junction    Electronically signed by Mikael Curran MD  on 4/24/2024 at 9:55 AM

## 2024-04-24 NOTE — DISCHARGE INSTRUCTIONS
To see in the office in the next week      EGD DISCHARGE INSTRUCTIONS    Activity:  Rest today. No driving, operating machinery, or making any important decisions today. May resume normal activity tomorrow.    Diet:  Following EGD eat slowly, chew food well, cut food into small pieces-Avoid greasy, spicy, \"crunchy\" foods X 48 hours.     Call your Doctor if you have any of the following:  -Passing blood rectally or vomiting blood (it may be red or black).  -Persistent nausea/vomiting  -Severe abdominal or chest pain not relieved with passing gas  -Fever of 100 degrees or more  -Redness or swelling at the IV site  -Severe sore throat or neck pain        Colonoscopy: What to Expect at Home  Your Recovery  After you have a colonoscopy, you will stay at the clinic for 1 to 2 hours until the medicines wear off. Then you can go home, but you will need to arrange for a ride. Your doctor will tell you when you can eat and do your other usual activities.  Your doctor will talk to you about when you will need your next colonoscopy. The results of your test and your risk for colorectal cancer will help your doctor decide how often you need to be checked.  After the test, you may be bloated or have gas pains. You may need to pass gas. If a biopsy was done or a polyp was removed, you may have streaks of blood in your stool (feces) for a few days.  This care sheet gives you a general idea about how long it will take for you to recover. But each person recovers at a different pace. Follow the steps below to get better as quickly as possible.  How can you care for yourself at home?  Activity  Rest as much as you need to after you go home.  You should be able to go back to your usual activities the day after the test.  Diet  Follow your doctor’s directions for eating.  Drink plenty of fluids (unless your doctor has told you not to) to replace the fluids that were lost during the colon prep.  Do not drink alcohol.  Medicines  If

## 2024-04-25 LAB — SURGICAL PATHOLOGY REPORT: NORMAL

## 2024-04-29 ENCOUNTER — TELEPHONE (OUTPATIENT)
Dept: GASTROENTEROLOGY | Age: 29
End: 2024-04-29

## 2024-04-29 NOTE — TELEPHONE ENCOUNTER
Writer called and couldn't leave a voice mail, because voice mail was not set up. But if she calls back she needs a office visit from her colon from 24 of April for 7 to 10 for keo or nicolas if you can get them in.

## 2024-04-30 ENCOUNTER — TELEPHONE (OUTPATIENT)
Dept: OBGYN CLINIC | Age: 29
End: 2024-04-30

## 2024-04-30 NOTE — TELEPHONE ENCOUNTER
Instructed patient to complete short interval follow up for breast imagine due back in 02/2024. Pt verbalized understanding. Women's care phone number provided.

## 2024-05-10 DIAGNOSIS — K92.1 HEMATOCHEZIA: ICD-10-CM

## 2024-05-10 DIAGNOSIS — R10.11 ABDOMINAL PAIN, RIGHT UPPER QUADRANT: ICD-10-CM

## 2024-05-10 DIAGNOSIS — R11.0 NAUSEA: ICD-10-CM

## 2024-05-15 ENCOUNTER — OFFICE VISIT (OUTPATIENT)
Dept: OBGYN CLINIC | Age: 29
End: 2024-05-15
Payer: COMMERCIAL

## 2024-05-15 ENCOUNTER — HOSPITAL ENCOUNTER (OUTPATIENT)
Age: 29
Setting detail: SPECIMEN
Discharge: HOME | End: 2024-05-15

## 2024-05-15 VITALS
HEART RATE: 86 BPM | BODY MASS INDEX: 36.32 KG/M2 | HEIGHT: 65 IN | DIASTOLIC BLOOD PRESSURE: 74 MMHG | WEIGHT: 218 LBS | SYSTOLIC BLOOD PRESSURE: 120 MMHG

## 2024-05-15 DIAGNOSIS — Z01.419 WELL WOMAN EXAM: Primary | ICD-10-CM

## 2024-05-15 DIAGNOSIS — Z15.09 BRCA GENE MUTATION POSITIVE: ICD-10-CM

## 2024-05-15 DIAGNOSIS — Z15.01 BRCA GENE MUTATION POSITIVE: ICD-10-CM

## 2024-05-15 DIAGNOSIS — N76.0 ACUTE VAGINITIS: ICD-10-CM

## 2024-05-15 DIAGNOSIS — Z91.89 AT HIGH RISK FOR BREAST CANCER: ICD-10-CM

## 2024-05-15 PROCEDURE — 3074F SYST BP LT 130 MM HG: CPT | Performed by: CLINICAL NURSE SPECIALIST

## 2024-05-15 PROCEDURE — 3078F DIAST BP <80 MM HG: CPT | Performed by: CLINICAL NURSE SPECIALIST

## 2024-05-15 PROCEDURE — 99395 PREV VISIT EST AGE 18-39: CPT | Performed by: CLINICAL NURSE SPECIALIST

## 2024-05-15 NOTE — PROGRESS NOTES
incontinence, frequency, urgency          no                                         Heavy/irregular periods           no                                      Vaginal discharge                   no  Hematological: Bruises easy Denies clotting disorder  yes     Endocrine:  Hot flashes   no     Hot/Cold Intolerance  no    Psychological:            Mood and affect were within normal limits.        yes                 Physical Exam    Physical Exam:    Vitals:    05/15/24 1044   BP: 120/74   Pulse: 86   Weight: 98.9 kg (218 lb)   Height: 1.651 m (5' 5\")       General Appearance:  This  is a well developed, well nourished, well groomed female.      Her BMI was reviewed. Nutritional decision making andexercise were discussed.    Neurological:  The patient is alert and oriented to time,place, person, and situation    Skin:  A brief inspection of the skin revealed no rashes or lesions.     Neck:  The neck was supple.     Respiratory:  There was unlabored respiratory effort. Lungs clear to ascultation.    Cardiovascular:  The patients extremities were without calf tenderness or edema.Heart with a regular rate and rhythm.    Abdomen:  The abdomen was soft and non-tender with no guarding, rebound or rigidity.  No hernias were appreciated.     Breast:   The patients breasts were symmetrical.  There were no masses, discharge or retractions noted.  Self breast exams were reviewed.    Pelvic Exam:  The external genitalia was with a normal appearance.           Urinary System: Urethral meatus normal    The vaginal vault was normal. There were no cystocele, rectocele, or enterocele appreciated. There was small amount of thick white vaginal discharge.    The cervix was without lesions. There was no cervical motion tenderness.    The uterus was mobile, midline and regular.    The adnexa no fullness, tenderness or masses appreciated.    Rectal exam: deferred per patient request          ASSESSMENT: Normal annual well woman exam    29

## 2024-05-16 LAB
CANDIDA SPECIES: NEGATIVE
GARDNERELLA VAGINALIS: NEGATIVE
SOURCE: NORMAL
TRICHOMONAS: NEGATIVE

## 2024-06-12 ENCOUNTER — HOSPITAL ENCOUNTER (OUTPATIENT)
Dept: MRI IMAGING | Age: 29
Discharge: HOME | End: 2024-06-14
Payer: COMMERCIAL

## 2024-06-12 DIAGNOSIS — Z15.09 BRCA GENE MUTATION POSITIVE: ICD-10-CM

## 2024-06-12 DIAGNOSIS — Z91.89 AT HIGH RISK FOR BREAST CANCER: ICD-10-CM

## 2024-06-12 DIAGNOSIS — Z15.01 BRCA GENE MUTATION POSITIVE: ICD-10-CM

## 2024-06-12 PROCEDURE — 6360000004 HC RX CONTRAST MEDICATION: Performed by: CLINICAL NURSE SPECIALIST

## 2024-06-12 PROCEDURE — A9579 GAD-BASE MR CONTRAST NOS,1ML: HCPCS | Performed by: CLINICAL NURSE SPECIALIST

## 2024-06-12 PROCEDURE — C8908 MRI W/O FOL W/CONT, BREAST,: HCPCS

## 2024-06-12 PROCEDURE — 2580000003 HC RX 258: Performed by: CLINICAL NURSE SPECIALIST

## 2024-06-12 RX ORDER — SODIUM CHLORIDE 0.9 % (FLUSH) 0.9 %
10 SYRINGE (ML) INJECTION PRN
Status: DISCONTINUED | OUTPATIENT
Start: 2024-06-12 | End: 2024-06-15 | Stop reason: HOSPADM

## 2024-06-12 RX ORDER — 0.9 % SODIUM CHLORIDE 0.9 %
40 INTRAVENOUS SOLUTION INTRAVENOUS ONCE
Status: COMPLETED | OUTPATIENT
Start: 2024-06-12 | End: 2024-06-12

## 2024-06-12 RX ADMIN — SODIUM CHLORIDE, PRESERVATIVE FREE 10 ML: 5 INJECTION INTRAVENOUS at 17:11

## 2024-06-12 RX ADMIN — GADOTERIDOL 20 ML: 279.3 INJECTION, SOLUTION INTRAVENOUS at 17:10

## 2024-06-12 RX ADMIN — SODIUM CHLORIDE 40 ML: 9 INJECTION, SOLUTION INTRAVENOUS at 17:10

## 2024-06-28 ENCOUNTER — HOSPITAL ENCOUNTER (OUTPATIENT)
Age: 29
Setting detail: SPECIMEN
Discharge: HOME | End: 2024-06-28

## 2024-06-28 DIAGNOSIS — E55.9 VITAMIN D DEFICIENCY: ICD-10-CM

## 2024-06-28 DIAGNOSIS — E11.9 TYPE 2 DIABETES MELLITUS WITHOUT COMPLICATION, WITHOUT LONG-TERM CURRENT USE OF INSULIN (HCC): ICD-10-CM

## 2024-06-28 DIAGNOSIS — J45.20 MILD INTERMITTENT ASTHMA WITHOUT COMPLICATION: Chronic | ICD-10-CM

## 2024-06-28 DIAGNOSIS — E78.2 MIXED HYPERLIPIDEMIA: ICD-10-CM

## 2024-06-28 PROBLEM — E11.10 METABOLIC ACIDOSIS DUE TO DIABETES MELLITUS (HCC): Status: RESOLVED | Noted: 2021-09-14 | Resolved: 2024-06-28

## 2024-06-28 PROBLEM — E11.10 DIABETIC ACIDOSIS WITHOUT COMA (HCC): Status: RESOLVED | Noted: 2021-09-14 | Resolved: 2024-06-28

## 2024-06-28 LAB
BASOPHILS # BLD: 0.09 K/UL (ref 0–0.2)
BASOPHILS NFR BLD: 1 % (ref 0–2)
EOSINOPHIL # BLD: 0.32 K/UL (ref 0–0.44)
EOSINOPHILS RELATIVE PERCENT: 2 % (ref 1–4)
ERYTHROCYTE [DISTWIDTH] IN BLOOD BY AUTOMATED COUNT: 12.8 % (ref 11.8–14.4)
HCT VFR BLD AUTO: 41.7 % (ref 36.3–47.1)
HGB BLD-MCNC: 13.7 G/DL (ref 11.9–15.1)
IMM GRANULOCYTES # BLD AUTO: 0.05 K/UL (ref 0–0.3)
IMM GRANULOCYTES NFR BLD: 0 %
LYMPHOCYTES NFR BLD: 4.1 K/UL (ref 1.1–3.7)
LYMPHOCYTES RELATIVE PERCENT: 28 % (ref 24–43)
MCH RBC QN AUTO: 31.6 PG (ref 25.2–33.5)
MCHC RBC AUTO-ENTMCNC: 32.9 G/DL (ref 28.4–34.8)
MCV RBC AUTO: 96.1 FL (ref 82.6–102.9)
MONOCYTES NFR BLD: 0.97 K/UL (ref 0.1–1.2)
MONOCYTES NFR BLD: 7 % (ref 3–12)
NEUTROPHILS NFR BLD: 62 % (ref 36–65)
NEUTS SEG NFR BLD: 9.33 K/UL (ref 1.5–8.1)
NRBC BLD-RTO: 0 PER 100 WBC
PLATELET # BLD AUTO: 259 K/UL (ref 138–453)
PMV BLD AUTO: 10.5 FL (ref 8.1–13.5)
RBC # BLD AUTO: 4.34 M/UL (ref 3.95–5.11)
WBC OTHER # BLD: 14.9 K/UL (ref 3.5–11.3)

## 2024-06-29 LAB
25(OH)D3 SERPL-MCNC: 22.7 NG/ML (ref 30–100)
ALBUMIN SERPL-MCNC: 4.6 G/DL (ref 3.5–5.2)
ALBUMIN/GLOB SERPL: 2 {RATIO} (ref 1–2.5)
ALP SERPL-CCNC: 51 U/L (ref 35–104)
ALT SERPL-CCNC: 18 U/L (ref 10–35)
ANION GAP SERPL CALCULATED.3IONS-SCNC: 16 MMOL/L (ref 9–16)
AST SERPL-CCNC: 22 U/L (ref 10–35)
BILIRUB SERPL-MCNC: 0.7 MG/DL (ref 0–1.2)
BUN SERPL-MCNC: 16 MG/DL (ref 6–20)
CALCIUM SERPL-MCNC: 9.6 MG/DL (ref 8.6–10.4)
CHLORIDE SERPL-SCNC: 105 MMOL/L (ref 98–107)
CHOLEST SERPL-MCNC: 168 MG/DL (ref 0–199)
CHOLESTEROL/HDL RATIO: 4
CO2 SERPL-SCNC: 18 MMOL/L (ref 20–31)
CREAT SERPL-MCNC: 0.7 MG/DL (ref 0.5–0.9)
CREAT UR-MCNC: 161 MG/DL (ref 28–217)
GFR, ESTIMATED: >90 ML/MIN/1.73M2
GLUCOSE SERPL-MCNC: 166 MG/DL (ref 74–99)
HDLC SERPL-MCNC: 40 MG/DL
LDLC SERPL CALC-MCNC: 79 MG/DL (ref 0–100)
MICROALBUMIN UR-MCNC: <12 MG/L (ref 0–20)
MICROALBUMIN/CREAT UR-RTO: NORMAL MCG/MG CREAT (ref 0–25)
POTASSIUM SERPL-SCNC: 3.9 MMOL/L (ref 3.7–5.3)
PROT SERPL-MCNC: 7.2 G/DL (ref 6.6–8.7)
SODIUM SERPL-SCNC: 139 MMOL/L (ref 136–145)
TRIGL SERPL-MCNC: 245 MG/DL
VLDLC SERPL CALC-MCNC: 49 MG/DL

## 2024-07-26 ENCOUNTER — HOSPITAL ENCOUNTER (OUTPATIENT)
Age: 29
Setting detail: SPECIMEN
Discharge: HOME | End: 2024-07-26

## 2024-07-26 DIAGNOSIS — Z79.4 TYPE 2 DIABETES MELLITUS WITH OTHER SPECIFIED COMPLICATION, WITH LONG-TERM CURRENT USE OF INSULIN (HCC): ICD-10-CM

## 2024-07-26 DIAGNOSIS — E11.69 TYPE 2 DIABETES MELLITUS WITH OTHER SPECIFIED COMPLICATION, WITH LONG-TERM CURRENT USE OF INSULIN (HCC): ICD-10-CM

## 2024-07-26 DIAGNOSIS — R10.9 RIGHT SIDED ABDOMINAL PAIN: ICD-10-CM

## 2024-07-26 PROBLEM — K51.419: Status: RESOLVED | Noted: 2021-05-21 | Resolved: 2024-07-26

## 2024-07-26 PROBLEM — A41.9 SEPSIS (HCC): Status: RESOLVED | Noted: 2023-12-25 | Resolved: 2024-07-26

## 2024-07-26 LAB
ANION GAP SERPL CALCULATED.3IONS-SCNC: 16 MMOL/L (ref 9–16)
BACTERIA URNS QL MICRO: ABNORMAL
BILIRUB UR QL STRIP: NEGATIVE
BUN SERPL-MCNC: 14 MG/DL (ref 6–20)
CALCIUM SERPL-MCNC: 9.7 MG/DL (ref 8.6–10.4)
CASTS #/AREA URNS LPF: ABNORMAL /LPF (ref 0–8)
CHLORIDE SERPL-SCNC: 103 MMOL/L (ref 98–107)
CLARITY UR: ABNORMAL
CO2 SERPL-SCNC: 19 MMOL/L (ref 20–31)
COLOR UR: YELLOW
CREAT SERPL-MCNC: 0.8 MG/DL (ref 0.5–0.9)
EPI CELLS #/AREA URNS HPF: ABNORMAL /HPF (ref 0–5)
ERYTHROCYTE [DISTWIDTH] IN BLOOD BY AUTOMATED COUNT: 12.7 % (ref 11.8–14.4)
GFR, ESTIMATED: >90 ML/MIN/1.73M2
GLUCOSE SERPL-MCNC: 210 MG/DL (ref 74–99)
GLUCOSE UR STRIP-MCNC: NEGATIVE MG/DL
HCT VFR BLD AUTO: 43.9 % (ref 36.3–47.1)
HGB BLD-MCNC: 14.7 G/DL (ref 11.9–15.1)
HGB UR QL STRIP.AUTO: NEGATIVE
KETONES UR STRIP-MCNC: NEGATIVE MG/DL
LEUKOCYTE ESTERASE UR QL STRIP: NEGATIVE
MCH RBC QN AUTO: 31.4 PG (ref 25.2–33.5)
MCHC RBC AUTO-ENTMCNC: 33.5 G/DL (ref 28.4–34.8)
MCV RBC AUTO: 93.8 FL (ref 82.6–102.9)
NITRITE UR QL STRIP: NEGATIVE
NRBC BLD-RTO: 0 PER 100 WBC
PH UR STRIP: 5.5 [PH] (ref 5–8)
PLATELET # BLD AUTO: 260 K/UL (ref 138–453)
PMV BLD AUTO: 10.7 FL (ref 8.1–13.5)
POTASSIUM SERPL-SCNC: 4.1 MMOL/L (ref 3.7–5.3)
PROT UR STRIP-MCNC: ABNORMAL MG/DL
RBC # BLD AUTO: 4.68 M/UL (ref 3.95–5.11)
RBC #/AREA URNS HPF: ABNORMAL /HPF (ref 0–4)
SODIUM SERPL-SCNC: 138 MMOL/L (ref 136–145)
SP GR UR STRIP: 1.02 (ref 1–1.03)
UROBILINOGEN UR STRIP-ACNC: NORMAL EU/DL (ref 0–1)
WBC #/AREA URNS HPF: ABNORMAL /HPF (ref 0–5)
WBC OTHER # BLD: 14.8 K/UL (ref 3.5–11.3)

## 2024-08-12 ENCOUNTER — HOSPITAL ENCOUNTER (OUTPATIENT)
Dept: WOMENS IMAGING | Age: 29
Discharge: HOME | End: 2024-08-14
Payer: COMMERCIAL

## 2024-08-12 DIAGNOSIS — Z91.89 AT HIGH RISK FOR BREAST CANCER: ICD-10-CM

## 2024-08-12 DIAGNOSIS — Z15.01 BRCA GENE MUTATION POSITIVE: ICD-10-CM

## 2024-08-12 DIAGNOSIS — Z15.09 BRCA GENE MUTATION POSITIVE: ICD-10-CM

## 2024-08-12 PROCEDURE — 77063 BREAST TOMOSYNTHESIS BI: CPT

## 2024-11-15 DIAGNOSIS — M54.42 CHRONIC MIDLINE LOW BACK PAIN WITH BILATERAL SCIATICA: ICD-10-CM

## 2024-11-15 DIAGNOSIS — G89.29 CHRONIC MIDLINE LOW BACK PAIN WITH BILATERAL SCIATICA: ICD-10-CM

## 2024-11-15 DIAGNOSIS — M54.41 CHRONIC MIDLINE LOW BACK PAIN WITH BILATERAL SCIATICA: ICD-10-CM

## 2024-11-18 PROBLEM — R11.0 NAUSEA: Status: ACTIVE | Noted: 2024-05-21

## 2024-11-18 PROBLEM — R07.9 CHEST PAIN: Status: ACTIVE | Noted: 2024-03-14

## 2024-11-18 PROBLEM — R10.9 ABDOMINAL PAIN: Status: ACTIVE | Noted: 2024-02-01

## 2024-12-06 ENCOUNTER — HOSPITAL ENCOUNTER (OUTPATIENT)
Dept: MRI IMAGING | Age: 29
Discharge: HOME | End: 2024-12-08
Payer: COMMERCIAL

## 2024-12-06 DIAGNOSIS — M54.41 MIDLINE LOW BACK PAIN WITH BILATERAL SCIATICA, UNSPECIFIED CHRONICITY: ICD-10-CM

## 2024-12-06 DIAGNOSIS — M54.42 MIDLINE LOW BACK PAIN WITH BILATERAL SCIATICA, UNSPECIFIED CHRONICITY: ICD-10-CM

## 2024-12-06 PROCEDURE — 72148 MRI LUMBAR SPINE W/O DYE: CPT

## 2025-01-03 ENCOUNTER — OFFICE VISIT (OUTPATIENT)
Dept: ORTHOPEDIC SURGERY | Age: 30
End: 2025-01-03

## 2025-01-03 DIAGNOSIS — M54.41 CHRONIC BILATERAL LOW BACK PAIN WITH BILATERAL SCIATICA: ICD-10-CM

## 2025-01-03 DIAGNOSIS — M48.062 LUMBAR STENOSIS WITH NEUROGENIC CLAUDICATION: ICD-10-CM

## 2025-01-03 DIAGNOSIS — M48.061 FORAMINAL STENOSIS OF LUMBAR REGION: Primary | ICD-10-CM

## 2025-01-03 DIAGNOSIS — M54.42 CHRONIC BILATERAL LOW BACK PAIN WITH BILATERAL SCIATICA: ICD-10-CM

## 2025-01-03 DIAGNOSIS — G89.29 CHRONIC BILATERAL LOW BACK PAIN WITH BILATERAL SCIATICA: ICD-10-CM

## 2025-01-03 NOTE — PROGRESS NOTES
examination  Pulmonary:      Effort: Pulmonary effort is normal. No respiratory distress.   Neurological:      Mental Status: Alert and oriented to person, place, and time.      Sensory: No sensory deficit.   Psychiatric:         Behavior: Behavior normal.         Thought Content: Thought content normal.  Skin:     General: Skin is warm and dry. No rashes, ulcerations or lesions.  Musculoskeletal:      Comments: Normal gait. Patient ambulates without assistance    Diagnostic imaging:  MRI from December 6, 2024 demonstrates L4-5 severe left foraminal narrowing with mild canal stenosis L5-S1 demonstrates left greater than right foraminal narrowing with very mild canal stenosis.  No high-grade stenosis    AP lateral lumbar spine x-rays reviewed from March 11, 2024 with age-appropriate spine normal osseous alignment without any evidence of acute fractures or radiopaque lesions.      Assessment and Plan:  1. Foraminal stenosis of lumbar region    2. Lumbar stenosis with neurogenic claudication    3. Chronic bilateral low back pain with bilateral sciatica      Tahira Millard is a 29 y.o. old female who presents today for evaluation of chronic low back pain with bilateral sciatica.      Patient has failed exercise program at home, medication management with Tylenol ibuprofen.    MRI demonstrating L4-5 left greater than right foraminal stenosis and L5-S1 foraminal stenosis with mild canal narrowing at both levels.  No high-grade stenosis noted.    Discussed recommendations for conservative treatment with pain management for lumbar epidural steroid injections at this time.  She is comfortable with this plan and verbalized understanding she was given a referral today for Dr. Grajeda's office.    Patient is advised to follow-up in 10 to 12 weeks with Dr. Nance for further evaluation and treatment if her symptoms are not tolerable.    Madelaine Laboy PA-C    1/3/2025 1:31 PM    Please note that this chart was generated using

## 2025-01-21 ENCOUNTER — TELEPHONE (OUTPATIENT)
Dept: OBGYN CLINIC | Age: 30
End: 2025-01-21

## 2025-01-21 DIAGNOSIS — Z91.89 AT HIGH RISK FOR BREAST CANCER: Primary | ICD-10-CM

## 2025-01-29 ENCOUNTER — HOSPITAL ENCOUNTER (OUTPATIENT)
Age: 30
Setting detail: SPECIMEN
Discharge: HOME | End: 2025-01-29

## 2025-01-29 DIAGNOSIS — K21.9 GASTROESOPHAGEAL REFLUX DISEASE WITHOUT ESOPHAGITIS: ICD-10-CM

## 2025-01-29 DIAGNOSIS — E66.812 CLASS 2 SEVERE OBESITY DUE TO EXCESS CALORIES WITH SERIOUS COMORBIDITY AND BODY MASS INDEX (BMI) OF 37.0 TO 37.9 IN ADULT: ICD-10-CM

## 2025-01-29 DIAGNOSIS — F31.31 BIPOLAR AFFECTIVE DISORDER, CURRENTLY DEPRESSED, MILD (HCC): ICD-10-CM

## 2025-01-29 DIAGNOSIS — E66.01 CLASS 2 SEVERE OBESITY DUE TO EXCESS CALORIES WITH SERIOUS COMORBIDITY AND BODY MASS INDEX (BMI) OF 37.0 TO 37.9 IN ADULT: ICD-10-CM

## 2025-01-29 DIAGNOSIS — Z00.00 WELL ADULT EXAM: ICD-10-CM

## 2025-01-29 DIAGNOSIS — Z79.4 TYPE 2 DIABETES MELLITUS WITH OTHER SPECIFIED COMPLICATION, WITH LONG-TERM CURRENT USE OF INSULIN (HCC): ICD-10-CM

## 2025-01-29 DIAGNOSIS — E11.69 TYPE 2 DIABETES MELLITUS WITH OTHER SPECIFIED COMPLICATION, WITH LONG-TERM CURRENT USE OF INSULIN (HCC): ICD-10-CM

## 2025-01-29 DIAGNOSIS — J45.20 MILD INTERMITTENT ASTHMA WITHOUT COMPLICATION: ICD-10-CM

## 2025-01-29 DIAGNOSIS — M54.41 MIDLINE LOW BACK PAIN WITH BILATERAL SCIATICA, UNSPECIFIED CHRONICITY: ICD-10-CM

## 2025-01-29 DIAGNOSIS — M54.42 MIDLINE LOW BACK PAIN WITH BILATERAL SCIATICA, UNSPECIFIED CHRONICITY: ICD-10-CM

## 2025-01-29 LAB
25(OH)D3 SERPL-MCNC: 20.7 NG/ML (ref 30–100)
ALBUMIN SERPL-MCNC: 4.7 G/DL (ref 3.5–5.2)
ALBUMIN/GLOB SERPL: 2.1 {RATIO} (ref 1–2.5)
ALP SERPL-CCNC: 57 U/L (ref 35–104)
ALT SERPL-CCNC: 49 U/L (ref 10–35)
ANION GAP SERPL CALCULATED.3IONS-SCNC: 15 MMOL/L (ref 9–16)
AST SERPL-CCNC: 34 U/L (ref 10–35)
BASOPHILS # BLD: 0.07 K/UL (ref 0–0.2)
BASOPHILS NFR BLD: 1 % (ref 0–2)
BILIRUB SERPL-MCNC: 1.1 MG/DL (ref 0–1.2)
BUN SERPL-MCNC: 13 MG/DL (ref 6–20)
CALCIUM SERPL-MCNC: 9.6 MG/DL (ref 8.6–10.4)
CHLORIDE SERPL-SCNC: 101 MMOL/L (ref 98–107)
CHOLEST SERPL-MCNC: 195 MG/DL (ref 0–199)
CHOLESTEROL/HDL RATIO: 5
CO2 SERPL-SCNC: 20 MMOL/L (ref 20–31)
CREAT SERPL-MCNC: 0.6 MG/DL (ref 0.6–0.9)
CREAT UR-MCNC: 179 MG/DL (ref 28–217)
EOSINOPHIL # BLD: 0.31 K/UL (ref 0–0.44)
EOSINOPHILS RELATIVE PERCENT: 3 % (ref 1–4)
ERYTHROCYTE [DISTWIDTH] IN BLOOD BY AUTOMATED COUNT: 12.4 % (ref 11.8–14.4)
EST. AVERAGE GLUCOSE BLD GHB EST-MCNC: 220 MG/DL
GFR, ESTIMATED: >90 ML/MIN/1.73M2
GLUCOSE SERPL-MCNC: 272 MG/DL (ref 74–99)
HBA1C MFR BLD: 9.3 % (ref 4–6)
HCT VFR BLD AUTO: 41.9 % (ref 36.3–47.1)
HDLC SERPL-MCNC: 39 MG/DL
HGB BLD-MCNC: 14.1 G/DL (ref 11.9–15.1)
IMM GRANULOCYTES # BLD AUTO: 0.06 K/UL (ref 0–0.3)
IMM GRANULOCYTES NFR BLD: 1 %
LDLC SERPL CALC-MCNC: 98 MG/DL (ref 0–100)
LYMPHOCYTES NFR BLD: 2.82 K/UL (ref 1.1–3.7)
LYMPHOCYTES RELATIVE PERCENT: 28 % (ref 24–43)
MCH RBC QN AUTO: 30.9 PG (ref 25.2–33.5)
MCHC RBC AUTO-ENTMCNC: 33.7 G/DL (ref 28.4–34.8)
MCV RBC AUTO: 91.9 FL (ref 82.6–102.9)
MICROALBUMIN UR-MCNC: 15 MG/L (ref 0–20)
MICROALBUMIN/CREAT UR-RTO: 8 MCG/MG CREAT (ref 0–25)
MONOCYTES NFR BLD: 0.73 K/UL (ref 0.1–1.2)
MONOCYTES NFR BLD: 7 % (ref 3–12)
NEUTROPHILS NFR BLD: 60 % (ref 36–65)
NEUTS SEG NFR BLD: 6.07 K/UL (ref 1.5–8.1)
NRBC BLD-RTO: 0 PER 100 WBC
PLATELET # BLD AUTO: 224 K/UL (ref 138–453)
PMV BLD AUTO: 10.5 FL (ref 8.1–13.5)
POTASSIUM SERPL-SCNC: 4 MMOL/L (ref 3.7–5.3)
PROT SERPL-MCNC: 6.9 G/DL (ref 6.6–8.7)
RBC # BLD AUTO: 4.56 M/UL (ref 3.95–5.11)
SODIUM SERPL-SCNC: 136 MMOL/L (ref 136–145)
TRIGL SERPL-MCNC: 291 MG/DL
TSH SERPL DL<=0.05 MIU/L-ACNC: 1.13 UIU/ML (ref 0.27–4.2)
VLDLC SERPL CALC-MCNC: 58 MG/DL (ref 1–30)
WBC OTHER # BLD: 10.1 K/UL (ref 3.5–11.3)

## 2025-01-30 ENCOUNTER — HOSPITAL ENCOUNTER (OUTPATIENT)
Dept: PAIN MANAGEMENT | Age: 30
Discharge: HOME | End: 2025-01-30
Payer: COMMERCIAL

## 2025-01-30 VITALS — WEIGHT: 225 LBS | HEIGHT: 65 IN | BODY MASS INDEX: 37.49 KG/M2

## 2025-01-30 DIAGNOSIS — M54.42 CHRONIC BILATERAL LOW BACK PAIN WITH BILATERAL SCIATICA: ICD-10-CM

## 2025-01-30 DIAGNOSIS — M47.817 LUMBOSACRAL SPONDYLOSIS WITHOUT MYELOPATHY: Primary | ICD-10-CM

## 2025-01-30 DIAGNOSIS — G89.29 CHRONIC BILATERAL LOW BACK PAIN WITH BILATERAL SCIATICA: ICD-10-CM

## 2025-01-30 DIAGNOSIS — E11.65 POORLY CONTROLLED DIABETES MELLITUS (HCC): ICD-10-CM

## 2025-01-30 DIAGNOSIS — M54.41 CHRONIC BILATERAL LOW BACK PAIN WITH BILATERAL SCIATICA: ICD-10-CM

## 2025-01-30 PROCEDURE — 99203 OFFICE O/P NEW LOW 30 MIN: CPT

## 2025-01-30 ASSESSMENT — PAIN SCALES - GENERAL: PAINLEVEL_OUTOF10: 10

## 2025-01-30 ASSESSMENT — ENCOUNTER SYMPTOMS
BACK PAIN: 1
EYE PAIN: 0
SHORTNESS OF BREATH: 0
GASTROINTESTINAL NEGATIVE: 1
WHEEZING: 0
VOMITING: 0
EYE REDNESS: 0
RESPIRATORY NEGATIVE: 1
COLOR CHANGE: 0
ABDOMINAL PAIN: 0

## 2025-01-30 NOTE — PROGRESS NOTES
The patient is a 29 y.o.Non- / non  female.    Chief Complaint   Patient presents with    Consultation    Back Pain        Pain History  Pain score today: 10/10, today   1. Location: Lumbar Spine   2. Radiation: Bilateral legs to feet  3. Character: throbbing, achy, shooting  5. Duration: varies  6. Onset: 9+ yrs  7. Did an injury cause pain: no   8. Aggravating factors: everything- standing/bending   9. Alleviating factors: nothing  10. Associated symptoms (numbness / tingling / weakness): tingling  -Where at: bilateral feet  -Down into finger tips or toes (specify which finger or toes): toes  -constant or intermitting:  constant  11. Red Flags: (weight loss / chills / loss of bladder or bowel control): no      Previous management history  1. Previous diagnostic workup: (Imaging/EMG)   CT, MRI, or Xray: MRI  What part of the body: Lumbar Spine  What facility did they have it at: [facility]  What year or specific date: 12/2024  EMG:  No     2. Previous non interventional treatments tried:  chiropractor or physical therapy: Chiropractor  What part of the body: back   What facility was it done at: [place], [place]  How long ago was it last tried: 2018  Did it work: no  Did they complete it: n/a     3. Previous Medications tried  NSAID's: yes  Neurontin: yes  Lyrica: no  Trycyclic antidepressant (Ellavil / Pamelor ): no  Cymbalta: no  Opioids (Ultram / Vicodin / Percocet / Morphine / Dilaudid / Oramorph/ Fentanyl etc.): yes  Last Pain medication taken (name of med and date): IBU 1/27/25     4. Previous Interventional pain procedures tried:  What kind of injection: no  Who did the injection: no   did the injection help: no  Last time injection was done: no     5. Previous surgeries for pain  What part of the body did they have the surgery: no  What physician did the surgery: no  What Facility did they have the surgery done: no  Date of Surgery: no     Social History:  Marital status: single  Employment

## 2025-01-30 NOTE — PROGRESS NOTES
The patient is a 29 y.o.Non- / non  female.    Chief Complaint   Patient presents with    Consultation    Back Pain        Pain History  Pain score today: 10/10, today   1. Location: Lumbar Spine   2. Radiation: Bilateral legs to feet  3. Character: throbbing, achy, shooting  5. Duration: varies  6. Onset: 9+ yrs  7. Did an injury cause pain: no   8. Aggravating factors: everything- standing/bending   9. Alleviating factors: nothing  10. Associated symptoms (numbness / tingling / weakness): tingling  -Where at: bilateral feet  -Down into finger tips or toes (specify which finger or toes): toes  -constant or intermitting:  constant  11. Red Flags: (weight loss / chills / loss of bladder or bowel control): no      Previous management history  1. Previous diagnostic workup: (Imaging/EMG)   CT, MRI, or Xray: MRI  What part of the body: Lumbar Spine  What facility did they have it at: [facility]  What year or specific date: 12/2024  EMG:  No     2. Previous non interventional treatments tried:  chiropractor or physical therapy: Chiropractor  What part of the body: back   What facility was it done at: [place], [place]  How long ago was it last tried: 2018  Did it work: no  Did they complete it: n/a     3. Previous Medications tried  NSAID's: yes  Neurontin: yes  Lyrica: no  Trycyclic antidepressant (Ellavil / Pamelor ): no  Cymbalta: no  Opioids (Ultram / Vicodin / Percocet / Morphine / Dilaudid / Oramorph/ Fentanyl etc.): yes  Last Pain medication taken (name of med and date): IBU 1/27/25     4. Previous Interventional pain procedures tried:  What kind of injection: no  Who did the injection: no   did the injection help: no  Last time injection was done: no     5. Previous surgeries for pain  What part of the body did they have the surgery: no  What physician did the surgery: no  What Facility did they have the surgery done: no  Date of Surgery: no     Social History:  Marital status: single  Employment

## 2025-02-10 ENCOUNTER — HOSPITAL ENCOUNTER (OUTPATIENT)
Dept: PHYSICAL THERAPY | Facility: CLINIC | Age: 30
Setting detail: THERAPIES SERIES
End: 2025-02-10
Attending: ANESTHESIOLOGY
Payer: COMMERCIAL

## 2025-02-10 NOTE — FLOWSHEET NOTE
[] [facility]  Outpatient Rehabilitation &  Therapy  [address]  [phone]  [phone] [] [facility]  Outpatient Rehabilitation &  Therapy  [address]  P: [phone]  F: [phone] [x] [facility]  Outpatient Rehabilitation &  Therapy  [address]  P: [phone]  F: [phone] [] [facility]  Outpatient Rehabilitation &  Therapy  [address]  [phone]  [phone] [] [facility]  Outpatient Rehabilitation &  Therapy  [address]   P: [phone]  F: [phone]  [] [facility]  Outpatient Rehabilitation &  Therapy  [address]  P: [phone]  F: [phone] [] [facility]  Outpatient Rehabilitation &  Therapy  [address]  P: [phone]  F: [phone] [] [facility]  Outpatient Rehabilitation &  Therapy  [address]  P: [phone]  F: [phone] [] [facility]  Outpatient Rehabilitation &  Therapy  [address]  P: [phone]  F: [phone]  [] [facility] Outpatient Rehabilitation &  Therapy  [address]  P: [phone]  F: [phone]     Therapy Cancel/No Show note    Date: 2/10/2025  Patient: Tahira Millard  : 1995  MRN: [id number]    Cancels/No Shows to date: 0    For today's appointment patient:    [x]  Cancelled    [] Rescheduled appointment    [] No-show     Reason given by patient:    [x]  Patient ill    []  Conflicting appointment    [] No transportation      [] Conflict with work    [] No reason given    [] Weather related    [] COVID-19    [] Other:      Comments:        [x] Next appointment was confirmed    Electronically signed by: Criselda Turk, PTA

## 2025-02-14 ENCOUNTER — HOSPITAL ENCOUNTER (OUTPATIENT)
Dept: PHYSICAL THERAPY | Facility: CLINIC | Age: 30
Setting detail: THERAPIES SERIES
Discharge: HOME | End: 2025-02-14
Attending: ANESTHESIOLOGY
Payer: COMMERCIAL

## 2025-02-14 NOTE — FLOWSHEET NOTE
[] [facility]  Outpatient Rehabilitation &  Therapy  [address]  [phone]  [phone] [] [facility]  Outpatient Rehabilitation &  Therapy  [address]  P: [phone]  F: [phone] [x] [facility]  Outpatient Rehabilitation &  Therapy  [address]  P: [phone]  F: [phone] [] [facility]  Outpatient Rehabilitation &  Therapy  [address]  [phone]  [phone] [] [facility]  Outpatient Rehabilitation &  Therapy  [address]   P: [phone]  F: [phone]  [] [facility]  Outpatient Rehabilitation &  Therapy  [address]  P: [phone]  F: [phone] [] [facility]  Outpatient Rehabilitation &  Therapy  [address]  P: [phone]  F: [phone] [] [facility]  Outpatient Rehabilitation &  Therapy  [address]  P: [phone]  F: [phone] [] [facility]  Outpatient Rehabilitation &  Therapy  [address]  P: [phone]  F: [phone]  [] [facility] Outpatient Rehabilitation &  Therapy  [address]  P: [phone]  F: [phone]     Therapy Cancel/No Show note    Date: 2025  Patient: Tahira Millard  : 1995  MRN: [id number]    Cancels/No Shows to date:  (initial evaluations)    For today's appointment patient:    [x]  Cancelled    [] Rescheduled appointment    [] No-show     Reason given by patient:    [x]  Patient ill    []  Conflicting appointment    [] No transportation      [] Conflict with work    [] No reason given    [] Weather related    [] COVID-19    [x] Other:      Comments: R/S for next week       [] Next appointment was confirmed    Electronically signed by: Navdeep Gamboa PT

## 2025-02-17 ENCOUNTER — TELEPHONE (OUTPATIENT)
Dept: OBGYN CLINIC | Age: 30
End: 2025-02-17

## 2025-02-17 NOTE — TELEPHONE ENCOUNTER
Pt scheduled for MRI of breast 02/18/25 and frantz is requiring a peer to peer for prior authorization. Case # [id number]. Contact [facility] [phone].

## 2025-02-19 ENCOUNTER — HOSPITAL ENCOUNTER (OUTPATIENT)
Dept: PHYSICAL THERAPY | Facility: CLINIC | Age: 30
Setting detail: THERAPIES SERIES
Discharge: HOME | End: 2025-02-19
Attending: ANESTHESIOLOGY
Payer: COMMERCIAL

## 2025-02-19 PROCEDURE — 97110 THERAPEUTIC EXERCISES: CPT

## 2025-02-19 PROCEDURE — 97162 PT EVAL MOD COMPLEX 30 MIN: CPT

## 2025-02-19 PROCEDURE — 97113 AQUATIC THERAPY/EXERCISES: CPT

## 2025-02-19 NOTE — FLOWSHEET NOTE
[x] [facility]  [address]  P: [phone]  F: [phone]     Physical Therapy Daily  Aquatic Treatment Note    Date:  2025  Patient Name:  Tahira Millard    :  1995  MRN: [id number]  Physician: Sandoval Grajeda MD                      Insurance: [facility] Medicaid -  VS - No Hard Max - Auth After 8th   Medical Diagnosis: M47.817 - lumbosacral spondylosis w/o myelopathy, M54.42, M54.41, G89.29 - chronic bilat low back pain with bilat sciatica, E11.65 - poorly controlled DM                          Rehab Codes: M54.50, M79.1, R29.3  Onset Date: 2018             Next 's appt.: 3/18/25 w/ Dr. Nance    Visit# / total visits:   Cancels/No Shows: 2/0    Subjective:    Pain:  [x] Yes  [] No Location: low back Pain Rating: (0-10 scale) 10/10  Pain altered Tx:  [] No  [] Yes  Action:  Comments: Arrives in pool from initial evaluation, back is painful. Has done aquatic therapy for ankle in the past    Objective:     KEY  B = Belt G = Gloves N = Noodle   C = Cuffs K = Kickboard P = Paddles   CC = Cervical Collar L = Laps T = Theratube   DB = Dumbells M = Minutes W = Weights     Exercises/Activities  Warm-up/Amb  Dynamic Exercises    Forward 3L March    Sideways 3L Squat    Backwards 3L Retro HS curls      Retro SLR    Stretches  Braiding    Gastroc/Soleus  Heel to Toe amb    Hamstring  Toe amb    Hip flexor  Heel amb    Piriformis      SKTC      Pec Stretch      Post Deltoid  Static Exercises UE      Shoulder flex/ext    Static Exercises LE  Shoulder abd/add    Heel/toe raises 15 Shoulder H.  abd/add    Marches 10 Shoulder IR/ER    Mini-squats 15 Rowing    3-way hip  10 Arm Circles    Hamstring curls 10 UT shrugs/rolls    Hip Circles/Fig 8  Scap squeezes    Ankle ROM  Diagonals 1/2    Lunges   Elbow flex/ext      Pron/Sup    Functional Exercise  Wrist AROM    Step      Wall Push-ups  Deep H20/    SLS  Bike 2m   Breast

## 2025-02-24 ENCOUNTER — HOSPITAL ENCOUNTER (OUTPATIENT)
Dept: PHYSICAL THERAPY | Facility: CLINIC | Age: 30
Setting detail: THERAPIES SERIES
Discharge: HOME | End: 2025-02-24
Attending: ANESTHESIOLOGY
Payer: COMMERCIAL

## 2025-02-24 PROCEDURE — 97113 AQUATIC THERAPY/EXERCISES: CPT

## 2025-02-24 NOTE — FLOWSHEET NOTE
[x] [facility] Rehabilitation [facility] Therapy  [address]  P: [phone]  F: [phone]     Physical Therapy Daily  Aquatic Treatment Note    Date:  2025  Patient Name:  Tahira Millard    :  1995  MRN: [id number]  Physician: Sandoval Grajeda MD                      Insurance: [place] Medicaid -  VS - No Hard Max - Auth After    Medical Diagnosis: M47.817 - lumbosacral spondylosis w/o myelopathy, M54.42, M54.41, G89.29 - chronic bilat low back pain with bilat sciatica, E11.65 - poorly controlled DM                          Rehab Codes: M54.50, M79.1, R29.3  Onset Date: 2018             Next 's appt.: 3/18/25 w/ Dr. Nance    Visit# / total visits: 3/12  Cancels/No Shows: 2/0    Subjective:    Pain:  [] Yes  [x] No Location: low back Pain Rating: (0-10 scale) 0/10  Pain altered Tx:  [] No  [] Yes  Action:  Comments: Pt reports no pain today and her LB has been doing okay.  Pt felt she did well after last visit.    Objective:     KEY  B = Belt G = Gloves N = Noodle   C = Cuffs K = Kickboard P = Paddles   CC = Cervical Collar L = Laps T = Theratube   DB = Dumbells M = Minutes W = Weights     Exercises/Activities  Warm-up/Amb  Dynamic Exercises    Forward 3L March 3L   Sideways 3L Squat    Backwards 3L Retro HS curls      Retro SLR    Stretches  Braiding    Gastroc/Soleus  Heel to Toe amb    Hamstring  Toe amb    Hip flexor  Heel amb    Piriformis      SKTC      Pec Stretch      Post Deltoid  Static Exercises UE TVA contr     Shoulder flex/ext    Static Exercises LE  Shoulder abd/add    Heel/toe raises 15 Shoulder H.  abd/add    Marches 15 Shoulder IR/ER    Mini-squats 15 Rowing    3-way hip  15 Arm Circles    Hamstring curls 15 UT shrugs/rolls    Hip Circles/Fig 8  Scap squeezes    Ankle ROM  Diagonals 1/2    Lunges   Elbow flex/ext      Pron/Sup    Functional Exercise  Wrist AROM    Step      Wall Push-ups  Deep H20/    SLS  Bike 2m   Breast

## 2025-02-26 ENCOUNTER — HOSPITAL ENCOUNTER (OUTPATIENT)
Dept: PHYSICAL THERAPY | Facility: CLINIC | Age: 30
Setting detail: THERAPIES SERIES
Discharge: HOME | End: 2025-02-26
Attending: ANESTHESIOLOGY
Payer: COMMERCIAL

## 2025-02-26 PROCEDURE — 97113 AQUATIC THERAPY/EXERCISES: CPT

## 2025-02-26 NOTE — FLOWSHEET NOTE
[x] [facility] Out[facility] Therapy  [address]  P: [phone]  F: [phone]     Physical Therapy Daily  Aquatic Treatment Note    Date:  2025  Patient Name:  Tahira Millard    :  1995  MRN: [id number]  Physician: Sandoval Grajeda MD                      Insurance: [facility] Medicaid -  VS - No Hard Max - Auth After    Medical Diagnosis: M47.817 - lumbosacral spondylosis w/o myelopathy, M54.42, M54.41, G89.29 - chronic bilat low back pain with bilat sciatica, E11.65 - poorly controlled DM                          Rehab Codes: M54.50, M79.1, R29.3  Onset Date: 2018             Next 's appt.: 3/18/25 w/ Dr. Nance    Visit# / total visits: 3/12 (visit count was previously off)  Cancels/No Shows: 2/0    Subjective:    Pain:  [x] Yes  [] No Location: low back Pain Rating: (0-10 scale) 9/10  Pain altered Tx:  [x] No  [] Yes  Action:  Comments: Pt arrives today stating she is in severe pain. Reports that this is normal for her, to have pain one day and none the next. Reports she has done nothing new or out of the ordinary to cause this increase in pain.     Objective:     KEY  B = Belt G = Gloves N = Noodle   C = Cuffs K = Kickboard P = Paddles   CC = Cervical Collar L = Laps T = Theratube   DB = Dumbells M = Minutes W = Weights     Exercises/Activities  Warm-up/Amb  Dynamic Exercises    Forward 3L March 3L   Sideways 3L Squat    Backwards 3L Retro HS curls      Retro SLR    Stretches  Braiding    Gastroc/Soleus  Heel to Toe amb    Hamstring  Toe amb    Hip flexor  Heel amb    Piriformis      SKTC      Pec Stretch      Post Deltoid  Static Exercises UE TVA contr     Shoulder flex/ext    Static Exercises LE  Shoulder abd/add    Heel/toe raises 15 Shoulder H.  abd/add    Marches 15 Shoulder IR/ER    Mini-squats 15 Rowing    3-way hip  15 Arm Circles    Hamstring curls 15 UT shrugs/rolls    Hip Circles/Fig 8  Scap squeezes    Ankle ROM

## 2025-03-03 ENCOUNTER — HOSPITAL ENCOUNTER (OUTPATIENT)
Dept: PHYSICAL THERAPY | Facility: CLINIC | Age: 30
Setting detail: THERAPIES SERIES
Discharge: HOME | End: 2025-03-03
Attending: ANESTHESIOLOGY
Payer: COMMERCIAL

## 2025-03-03 PROCEDURE — 97113 AQUATIC THERAPY/EXERCISES: CPT

## 2025-03-03 NOTE — FLOWSHEET NOTE
related to their low back.  Patient to restore bilat hip strength to at least 4/5 to create greater pelvic stability and ease back discomfort  Patient to demo the ability to maintain a mod-strong TA contraction with completion of at least 75% of reps of all therex        Patient goals: \"to complete my therapy and hopefully start feeling better\"     Pt. Education:  [x] Yes  [] No  [] Reviewed Prior HEP/Ed  Method of Education: [x] Verbal  [] Demo  [] Written  Access Code: [email or web address]  URL: [email or web address]  Date: 02/19/2025  Prepared by: Navdeep Gamboa     Exercises  - Supine Figure 4 Piriformis Stretch  - 2 x daily - 7 x weekly - 1 sets - 3 reps - 30\" hold  - Seated Hamstring Stretch  - 2 x daily - 7 x weekly - 1 sets - 3 reps - 30\" hold  - Supine Transversus Abdominis Bracing - Hands on Stomach  - 2 x daily - 7 x weekly - 1 sets - 10 reps - 10\" hold  Comprehension of Education:  [x] Verbalizes understanding.  [] Demonstrates understanding.  [] Needs review.  [] Demonstrates/verbalizes HEP/Ed previously given.     Plan: [x] Continue per plan of care.   [] Other:      Time In: 3:05 pm          Time Out: 3:55  pm    Electronically signed by:  TOAN LICONA PTA

## 2025-03-05 ENCOUNTER — HOSPITAL ENCOUNTER (OUTPATIENT)
Dept: PHYSICAL THERAPY | Facility: CLINIC | Age: 30
Setting detail: THERAPIES SERIES
Discharge: HOME | End: 2025-03-05
Attending: ANESTHESIOLOGY
Payer: COMMERCIAL

## 2025-03-05 PROCEDURE — 97113 AQUATIC THERAPY/EXERCISES: CPT

## 2025-03-05 NOTE — FLOWSHEET NOTE
[x] [facility] Out[facility] Therapy  [address]  P: [phone]  F: [phone]     Physical Therapy Daily  Aquatic Treatment Note    Date:  3/5/2025  Patient Name:  Tahira Millard    :  1995  MRN: [id number]  Physician: Sandoval Grajeda MD                      Insurance: [facility] Medicaid -  VS - No Hard Max - Auth After 8th   Medical Diagnosis: M47.817 - lumbosacral spondylosis w/o myelopathy, M54.42, M54.41, G89.29 - chronic bilat low back pain with bilat sciatica, E11.65 - poorly controlled DM                          Rehab Codes: M54.50, M79.1, R29.3  Onset Date: 2018             Next 's appt.: 3/18/25 w/ Dr. Nance    Visit# / total visits:  (auth after 8vs.)  Cancels/No Shows: 2/0    Subjective:    Pain:  [x] Yes  [] No Location: low back Pain Rating: (0-10 scale) 3/10  Pain altered Tx:  [x] No  [] Yes  Action:  Comments: Pt reports less pain today and feeling okay after last session.    Objective:     KEY  B = Belt G = Gloves N = Noodle   C = Cuffs K = Kickboard P = Paddles   CC = Cervical Collar L = Laps T = Theratube   DB = Dumbells M = Minutes W = Weights     Exercises/Activities  Warm-up/Amb 3/5 Dynamic Exercises 3/5   Forward 3L March 3L   Sideways 3L Squat    Backwards 3L Retro HS curls      Retro SLR    Stretches  Braiding    Gastroc/Soleus  Heel to Toe amb    Hamstring  Toe amb    Hip flexor  Heel amb    Piriformis      SKTC      Pec Stretch      Post Deltoid  Static Exercises UE TVA contr   Lumbar flex at rail 10\"x3--when resting between ex Shoulder flex/ext 15   Static Exercises LE  Shoulder abd/add 15   Heel/toe raises 15 Shoulder H.  abd/add 15    15 Shoulder IR/ER    Mini-squats 15 Rowing 15   3-way hip  15 Arm Circles    Hamstring curls 15 UT shrugs/rolls    Hip Circles/Fig 8  Scap squeezes    Ankle ROM  Diagonals 1/2    Lunges   Elbow flex/ext      Pron/Sup    Functional Exercise  Wrist AROM    Step      Wall

## 2025-03-10 ENCOUNTER — HOSPITAL ENCOUNTER (OUTPATIENT)
Dept: PHYSICAL THERAPY | Facility: CLINIC | Age: 30
Setting detail: THERAPIES SERIES
Discharge: HOME | End: 2025-03-10
Attending: ANESTHESIOLOGY
Payer: COMMERCIAL

## 2025-03-10 PROCEDURE — 97113 AQUATIC THERAPY/EXERCISES: CPT

## 2025-03-10 NOTE — FLOWSHEET NOTE
[x] [facility] Out[facility]  [address]  P: [phone]  F: [phone]     Physical Therapy Daily  Aquatic Treatment Note    Date:  3/10/2025  Patient Name:  Tahira Millard    :  1995  MRN: [id number]  Physician: Sandoval Grajeda MD                      [facility] -  VS - No Hard Max - Auth After 8th   Medical Diagnosis: M47.817 - lumbosacral spondylosis w/o myelopathy, M54.42, M54.41, G89.29 - chronic bilat low back pain with bilat sciatica, E11.65 - poorly controlled DM                          Rehab Codes: M54.50, M79.1, R29.3  Onset Date: 2018             Next 's appt.: 3/18/25 w/ Dr. Nance    Visit# / total visits:  (auth after 8vs.)  Cancels/No Shows: 2/0    Subjective:    Pain:  [x] Yes  [] No Location: low back Pain Rating: (0-10 scale) 2/10  Pain altered Tx:  [x] No  [] Yes  Action:  Comments: Pt reports feeling good today and less pain overall.    Objective:     KEY  B = Belt G = Gloves N = Noodle   C = Cuffs K = Kickboard P = Paddles   CC = Cervical Collar L = Laps T = Theratube   DB = Dumbells M = Minutes W = Weights     Exercises/Activities  Warm-up/Amb 3/10 Dynamic Exercises 3/10   Forward 3L March 3L   Sideways 3L Squat    Backwards 3L Retro HS curls      Retro SLR    Stretches  Braiding    Gastroc/Soleus  Heel to Toe amb    Hamstring 10\"x3 Toe amb    Hip flexor  Heel amb    Piriformis      SKTC      Pec Stretch      Post Deltoid  Static Exercises UE TVA contr   Lumbar flex at rail 10\"x3--when resting between ex Shoulder flex/ext 20   Static Exercises LE  Shoulder abd/add 20   Heel/toe raises 20 Shoulder H.  abd/add 20    20 Shoulder IR/ER    Mini-squats 20 Rowing 20   3-way hip  20 Arm Circles    Hamstring curls 20 UT shrugs/rolls    Hip Circles/Fig 8  Scap squeezes    Ankle ROM  Diagonals 1/2    Lunges   Elbow flex/ext      Pron/Sup    Functional Exercise  Wrist AROM    Step      Wall

## 2025-03-14 ENCOUNTER — HOSPITAL ENCOUNTER (OUTPATIENT)
Dept: PHYSICAL THERAPY | Facility: CLINIC | Age: 30
Setting detail: THERAPIES SERIES
Discharge: HOME | End: 2025-03-14
Attending: ANESTHESIOLOGY
Payer: COMMERCIAL

## 2025-03-14 PROCEDURE — 97110 THERAPEUTIC EXERCISES: CPT

## 2025-03-14 PROCEDURE — 97113 AQUATIC THERAPY/EXERCISES: CPT

## 2025-03-14 NOTE — FLOWSHEET NOTE
[x] [facility] Outpatient Rehabilitation [facility] Therapy  [address]  P: [phone]  F: [phone]     Physical Therapy Daily  Aquatic Treatment Note    Date:  3/14/2025  Patient Name:  Tahira Millard    :  1995  MRN: [id number]  Physician: Sandoval Grajeda MD                      Insurance: [facility] -  VS - No Hard Max - Auth After 8th   Medical Diagnosis: M47.817 - lumbosacral spondylosis w/o myelopathy, M54.42, M54.41, G89.29 - chronic bilat low back pain with bilat sciatica, E11.65 - poorly controlled DM                          Rehab Codes: M54.50, M79.1, R29.3  Onset Date: 2018             Next 's appt.: 3/18/25 w/ Dr. Nance    Visit# / total visits:  (auth after 8vs.)  Cancels/No Shows: 2/0    Subjective:    Pain:  [x] Yes  [] No Location: low back Pain Rating: (0-10 scale) 7/10  Pain altered Tx:  [x] No  [] Yes  Action:  Comments: Pt reports feeling sore after re check with testing by therapist.    Objective:     KEY  B = Belt G = Gloves N = Noodle   C = Cuffs K = Kickboard P = Paddles   CC = Cervical Collar L = Laps T = Theratube   DB = Dumbells M = Minutes W = Weights     Exercises/Activities  Warm-up/Amb 3/14 Dynamic Exercises 3/14   Forward 3L March 3L   Sideways 3L Squat    Backwards 3L Retro HS curls      Retro SLR    Stretches  Braiding    Gastroc/Soleus  Heel to Toe amb    Hamstring 10\"x3 Toe amb    Hip flexor  Heel amb    Piriformis      SKTC      Pec Stretch      Post Deltoid  Static Exercises UE TVA contr   Lumbar flex at rail 10\"x3--when resting between ex Shoulder flex/ext 20   Static Exercises LE  Shoulder abd/add 20   Heel/toe raises 20 Shoulder H.  abd/add 20   March 20 Shoulder IR/ER    Mini-squats 20 Rowing 20   3-way hip  20 Arm Circles    Hamstring curls 20 UT shrugs/rolls    Hip Circles/Fig 8  Scap squeezes    Ankle ROM  Diagonals 1/2    Lunges   Elbow flex/ext      Pron/Sup    Functional Exercise  Wrist AROM    Step

## 2025-03-14 NOTE — PROGRESS NOTES
[] [facility]  Outpatient Rehabilitation &  Therapy  [address]  [phone]  [phone] [] [facility]  Outpatient Rehabilitation &  Therapy  [address]  P: [phone]  F: [phone] [x] [facility]  Outpatient Rehabilitation &  Therapy  [address]  P: [phone]  F: [phone] [] [facility]  Outpatient Rehabilitation &  Therapy  [address]  [phone]  [phone] [] [facility]  Outpatient Rehabilitation &  Therapy  [address]   P: [phone]  F: [phone]  [] [facility]  Outpatient Rehabilitation &  Therapy  [address]  P: [phone]  F: [phone] [] [facility]  Outpatient Rehabilitation &  Therapy  [address]  P: [phone]  F: [phone] [] [facility]  Outpatient Rehabilitation &  Therapy  [address]  P: [phone]  F: [phone] [] [facility]  Outpatient Rehabilitation &  Therapy  [address]  P: [phone]  F: [phone]  [] [facility] Outpatient Rehabilitation &  Therapy  [address]  P: [phone]  F: [phone]     Physical Therapy Progress Note    Date: 3/14/2025      Patient: Tahira Millard  : 1995  MRN: [id number]    Physician: Sandoval Grajeda MD                      Insurance: [place] Medicaid -  VS - No Hard Max - Auth After 8th   Medical Diagnosis: M47.817 - lumbosacral spondylosis w/o myelopathy, M54.42, M54.41, G89.29 - chronic bilat low back pain with bilat sciatica, E11.65 - poorly controlled DM                          Rehab Codes: M54.50, M79.1, R29.3  Onset Date: 2018             Next 's appt.: 3/18/25 w/ Dr. Nance     Visit# / total visits:  (auth after 8vs.)                  Cancels/No Shows: 2/0  Date range of

## 2025-03-17 ENCOUNTER — HOSPITAL ENCOUNTER (OUTPATIENT)
Dept: PHYSICAL THERAPY | Facility: CLINIC | Age: 30
Setting detail: THERAPIES SERIES
Discharge: HOME | End: 2025-03-17
Attending: ANESTHESIOLOGY
Payer: COMMERCIAL

## 2025-03-17 NOTE — FLOWSHEET NOTE
[] [facility]  Outpatient Rehabilitation &  Therapy  [address]  [phone]  [phone] [] [facility]  Outpatient Rehabilitation &  Therapy  [address]  P: [phone]  F: [phone] [x] [facility]  Outpatient Rehabilitation &  Therapy  [address]  P: [phone]  F: [phone] [] [facility]  Outpatient Rehabilitation &  Therapy  [address]  [phone]  [phone] [] [facility]  Outpatient Rehabilitation &  Therapy  [address]   P: [phone]  F: [phone]  [] [facility]  Outpatient Rehabilitation &  Therapy  [address]  P: [phone]  F: [phone] [] [facility]  Outpatient Rehabilitation &  Therapy  [address]  P: [phone]  F: [phone] [] [facility]  Outpatient Rehabilitation &  Therapy  [address]  P: [phone]  F: [phone] [] [facility]  Outpatient Rehabilitation &  Therapy  [address]  P: [phone]  F: [phone]  [] [facility] Outpatient Rehabilitation &  Therapy  [address]  P: [phone]  F: [phone]     Therapy Cancel/No Show note    Date: 3/17/2025  Patient: Tahira Millard  : 1995  MRN: [id number]    Cancels/No Shows to date: 3/0     For today's appointment patient:    [x]  Cancelled    [x] Rescheduled appointment    [] No-show     Reason given by patient:    []  Patient ill    []  Conflicting appointment    [x] No transportation      [] Conflict with work    [] No reason given    [] Weather related    [] COVID-19    [] Other:      Comments: R/S for Wednesday 3/19/25      [x] Next appointment was confirmed    Electronically signed by: TOAN LICONA PTA

## 2025-03-18 ENCOUNTER — OFFICE VISIT (OUTPATIENT)
Dept: ORTHOPEDIC SURGERY | Age: 30
End: 2025-03-18
Payer: COMMERCIAL

## 2025-03-18 ENCOUNTER — HOSPITAL ENCOUNTER (OUTPATIENT)
Age: 30
Discharge: HOME | End: 2025-03-18
Payer: COMMERCIAL

## 2025-03-18 DIAGNOSIS — M54.42 CHRONIC BILATERAL LOW BACK PAIN WITH BILATERAL SCIATICA: ICD-10-CM

## 2025-03-18 DIAGNOSIS — G89.29 CHRONIC BILATERAL LOW BACK PAIN WITH BILATERAL SCIATICA: ICD-10-CM

## 2025-03-18 DIAGNOSIS — M54.41 CHRONIC BILATERAL LOW BACK PAIN WITH BILATERAL SCIATICA: ICD-10-CM

## 2025-03-18 DIAGNOSIS — M79.7 FIBROMYALGIA: ICD-10-CM

## 2025-03-18 DIAGNOSIS — G89.29 CHRONIC BILATERAL LOW BACK PAIN WITH BILATERAL SCIATICA: Primary | ICD-10-CM

## 2025-03-18 DIAGNOSIS — M54.42 CHRONIC BILATERAL LOW BACK PAIN WITH BILATERAL SCIATICA: Primary | ICD-10-CM

## 2025-03-18 DIAGNOSIS — M54.41 CHRONIC BILATERAL LOW BACK PAIN WITH BILATERAL SCIATICA: Primary | ICD-10-CM

## 2025-03-18 LAB
CRP SERPL HS-MCNC: <3 MG/L (ref 0–5)
ERYTHROCYTE [SEDIMENTATION RATE] IN BLOOD BY PHOTOMETRIC METHOD: 2 MM/HR (ref 0–20)
RHEUMATOID FACT SER NEPH-ACNC: <10 IU/ML (ref 0–13)

## 2025-03-18 PROCEDURE — G8428 CUR MEDS NOT DOCUMENT: HCPCS | Performed by: ORTHOPAEDIC SURGERY

## 2025-03-18 PROCEDURE — 86225 DNA ANTIBODY NATIVE: CPT

## 2025-03-18 PROCEDURE — 85652 RBC SED RATE AUTOMATED: CPT

## 2025-03-18 PROCEDURE — 86431 RHEUMATOID FACTOR QUANT: CPT

## 2025-03-18 PROCEDURE — 86038 ANTINUCLEAR ANTIBODIES: CPT

## 2025-03-18 PROCEDURE — 99213 OFFICE O/P EST LOW 20 MIN: CPT | Performed by: ORTHOPAEDIC SURGERY

## 2025-03-18 PROCEDURE — G8417 CALC BMI ABV UP PARAM F/U: HCPCS | Performed by: ORTHOPAEDIC SURGERY

## 2025-03-18 PROCEDURE — 36415 COLL VENOUS BLD VENIPUNCTURE: CPT

## 2025-03-18 PROCEDURE — 1036F TOBACCO NON-USER: CPT | Performed by: ORTHOPAEDIC SURGERY

## 2025-03-18 PROCEDURE — 86140 C-REACTIVE PROTEIN: CPT

## 2025-03-18 NOTE — PROGRESS NOTES
Patient ID: Tahira Millard is a 29 y.o. female    Chief Compliant:  No chief complaint on file.       Diagnostic imaging:    MRI lumbar spine is reviewed patient with some minor degeneration of the L4-5 and L5-S1 discs with some very minor foraminal stenosis    Assessment and Plan:  1. Chronic bilateral low back pain with bilateral sciatica    2. Fibromyalgia        Chronic axial bilateral low back pain with vague nonradicular leg pain    Fibromyalgia diagnosis at this time favored due to negative imaging studies very positive trigger points associated symptoms of irritable bowel irritable bowel bladder insomnia and chest pain    RICK, rheumatoid factor, C-reactor protein, sed rate labs    Follow up PRN    HPI:  This is a 29 y.o. female who presents to the clinic today for follow up evaluation of low back pain, previously saw Madelaine Laboy PA-C.     Chronic bilateral low back pain. This symptom onset a couple of years ago. This pain has exacerbated recently after cleaning her house and moving furniture around.    Patient positively reports insomnia, irritable bowel/bladder, and hx of chest pain.    Patient is currently following with PT. Her final visit is tomorrow.    Patient's aunt has a hx of rheumatoid arthritis     Review of Systems   All other systems reviewed and are negative.      Past History:    Current Outpatient Medications:     tiZANidine (ZANAFLEX) 4 MG tablet, Take 1 tablet by mouth 3 times daily as needed (back pain), Disp: 60 tablet, Rfl: 1    Pancrelipase, Lip-Prot-Amyl, 3000-68839 units CPEP, Take 1 capsule by mouth 3 times daily (with meals), Disp: 90 capsule, Rfl: 5    ondansetron (ZOFRAN) 4 MG tablet, Take 1 tablet by mouth daily as needed for Nausea or Vomiting, Disp: 30 tablet, Rfl: 0    vitamin D3 (CHOLECALCIFEROL) 25 MCG (1000 UT) TABS tablet, Take 1 tablet by mouth daily, Disp: 30 tablet, Rfl: 5    atorvastatin (LIPITOR) 20 MG tablet, Take 1 tablet by mouth daily, Disp: 30 tablet, Rfl: 5

## 2025-03-19 ENCOUNTER — HOSPITAL ENCOUNTER (OUTPATIENT)
Dept: PHYSICAL THERAPY | Facility: CLINIC | Age: 30
Setting detail: THERAPIES SERIES
Discharge: HOME | End: 2025-03-19
Attending: ANESTHESIOLOGY
Payer: COMMERCIAL

## 2025-03-19 LAB
ANA SER QL IA: NEGATIVE
DSDNA IGG SER QL IA: 0.6 IU/ML
NUCLEAR IGG SER IA-RTO: 0.1 U/ML

## 2025-03-19 PROCEDURE — 97113 AQUATIC THERAPY/EXERCISES: CPT

## 2025-03-19 NOTE — FLOWSHEET NOTE
[x] [facility] Outpatient Rehabilitation & Therapy  [address]  P: [phone]  F: [phone]     Physical Therapy Daily  Aquatic Treatment Note    Date:  3/19/2025  Patient Name:  Tahira Millard    :  1995  MRN: [id number]  Physician: Sandoval Grajeda MD                      Insurance: [facility] Medicaid -  VS - No Hard Max - Auth After 8th   Medical Diagnosis: M47.817 - lumbosacral spondylosis w/o myelopathy, M54.42, M54.41, G89.29 - chronic bilat low back pain with bilat sciatica, E11.65 - poorly controlled DM                          Rehab Codes: M54.50, M79.1, R29.3  Onset Date: 2018             Next 's appt.: April w/ Dr. Nance    Visit# / total visits:  (auth after 8vs.)  Cancels/No Shows: 2/0    Subjective:    Pain:  [x] Yes  [] No Location: low back, LE Pain Rating: (0-10 scale) 8/10  Pain altered Tx:  [x] No  [] Yes  Action:  Comments: Pt reports feeling sore after cleaning at home yesterday.  Saw her Dr and was diagnosed with Fibromyalgia and Dr wants her to cont with therapy.    Objective:     KEY  B = Belt G = Gloves N = Noodle   C = Cuffs K = Kickboard P = Paddles   CC = Cervical Collar L = Laps T = Theratube   DB = Dumbells M = Minutes W = Weights     Exercises/Activities  Warm-up/Amb 3/19 Dynamic Exercises 3/19   Forward 3L March 3L   Sideways 3L Squat    Backwards 3L Retro HS curls      Retro SLR    Stretches  Braiding    Gastroc/Soleus  Heel to Toe amb    Hamstring 10\"x3 Toe amb    Hip flexor  Heel amb    Piriformis      SKTC      Pec Stretch      Post Deltoid  Static Exercises UE TVA contr   Lumbar flex at rail 10\"x3 Shoulder flex/ext 20   Static Exercises LE  Shoulder abd/add 20   Heel/toe raises 20 Shoulder H.  abd/add 20   March 20 Shoulder IR/ER    Mini-squats 20 Rowing 20   3-way hip  20 Arm Circles    Hamstring curls 20 UT shrugs/rolls    Hip Circles/Fig 8  Scap squeezes    Ankle ROM  Diagonals 1/2    Lunges   Elbow flex/ext

## 2025-03-25 ENCOUNTER — OFFICE VISIT (OUTPATIENT)
Dept: PODIATRY | Age: 30
End: 2025-03-25
Payer: COMMERCIAL

## 2025-03-25 VITALS — HEIGHT: 65 IN | BODY MASS INDEX: 35.82 KG/M2 | WEIGHT: 215 LBS

## 2025-03-25 DIAGNOSIS — S93.491A SPRAIN OF ANTERIOR TALOFIBULAR LIGAMENT OF RIGHT ANKLE, INITIAL ENCOUNTER: Primary | ICD-10-CM

## 2025-03-25 DIAGNOSIS — M25.571 ACUTE RIGHT ANKLE PAIN: ICD-10-CM

## 2025-03-25 PROCEDURE — G8427 DOCREV CUR MEDS BY ELIG CLIN: HCPCS | Performed by: PODIATRIST

## 2025-03-25 PROCEDURE — L4350 ANKLE CONTROL ORTHO PRE OTS: HCPCS | Performed by: PODIATRIST

## 2025-03-25 PROCEDURE — 1036F TOBACCO NON-USER: CPT | Performed by: PODIATRIST

## 2025-03-25 PROCEDURE — G8417 CALC BMI ABV UP PARAM F/U: HCPCS | Performed by: PODIATRIST

## 2025-03-25 PROCEDURE — 99213 OFFICE O/P EST LOW 20 MIN: CPT | Performed by: PODIATRIST

## 2025-03-25 ASSESSMENT — ENCOUNTER SYMPTOMS
DIARRHEA: 0
SHORTNESS OF BREATH: 0
COLOR CHANGE: 0
BACK PAIN: 0
NAUSEA: 0

## 2025-03-25 NOTE — PROGRESS NOTES
[facility] Podiatry  Return Patient Progress Note    Subjective: Tahira Millard 29 y.o. female that presents with chief complaint of pain to the right ankle.  Chief Complaint   Patient presents with    Ankle Pain     Right ankle pain/patient fell in February     Patient states that she feel on some ice one month ago. Patient states that she went to the ED one week later and had xrays taken. Patient was told that there was no break to the ankle. Patient was placed in an ACE wrap and told to rest it. Patient states that she did not rest it much due to taking care of her kids.  Pain is rated 10 out of 10 and is described as intermittent.     Review of Systems   Constitutional:  Negative for activity change, appetite change, chills, diaphoresis, fatigue and fever.   Respiratory:  Negative for shortness of breath.    Cardiovascular:  Negative for leg swelling.   Gastrointestinal:  Negative for diarrhea and nausea.   Endocrine: Negative for cold intolerance, heat intolerance and polyuria.   Musculoskeletal:  Positive for joint swelling. Negative for arthralgias, back pain, gait problem and myalgias.   Skin:  Negative for color change, pallor, rash and wound.   Allergic/Immunologic: Negative for environmental allergies and food allergies.   Neurological:  Negative for dizziness, weakness, light-headedness and numbness.   Hematological:  Does not bruise/bleed easily.   Psychiatric/Behavioral:  Negative for behavioral problems, confusion and self-injury. The patient is not nervous/anxious.        Objective: Clinical evaluation of the patient reveals no edema right ankle.  The patient expresses pain with palpation to the lateral malleolus of the right ankle.  The patient expresses pain with palpation to the ATFL, the CFL, and to the peroneal tendons of the right ankle.  Patient expresses pain with muscle strength evaluation to the anterior and lateral muscle groups of the right lower extremity.  Patient expresses pain

## 2025-03-28 ENCOUNTER — HOSPITAL ENCOUNTER (OUTPATIENT)
Dept: PHYSICAL THERAPY | Facility: CLINIC | Age: 30
Setting detail: THERAPIES SERIES
Discharge: HOME | End: 2025-03-28
Attending: ANESTHESIOLOGY
Payer: COMMERCIAL

## 2025-03-28 ENCOUNTER — APPOINTMENT (OUTPATIENT)
Dept: PHYSICAL THERAPY | Facility: CLINIC | Age: 30
End: 2025-03-28
Attending: ANESTHESIOLOGY
Payer: COMMERCIAL

## 2025-03-28 PROCEDURE — 97113 AQUATIC THERAPY/EXERCISES: CPT

## 2025-03-28 NOTE — FLOWSHEET NOTE
[x] [facility]  [address]  P: [phone]  F: [phone]     Physical Therapy Daily  Aquatic Treatment Note    Date:  3/28/2025  Patient Name:  Tahira Millard    :  1995  MRN: [id number]  Physician: Sandoval Grajeda MD                      Insurance: [place] Medicaid -  VS - No [place] - Auth After  - [place] approved 1vs from 3/17-25 auth#[id number]  Medical Diagnosis: M47.817 - lumbosacral spondylosis w/o myelopathy, M54.42, M54.41, G89.29 - chronic bilat low back pain with bilat sciatica, E11.65 - poorly controlled DM                          Rehab Codes: M54.50, M79.1, R29.3  Onset Date: 2018             Next 's appt.: April w/ Dr. Nance    Visit# / total visits:  (auth after 8vs - 1 add visit approved)  Cancels/No Shows: 2/0    Subjective:    Pain:  [x] Yes  [] No Location: low back, LE Pain Rating: (0-10 scale) 6/10  Pain altered Tx:  [x] No  [] Yes  Action:  Comments: Pt reports she is feeling \"okay\" today with lower than typical pain.     Objective:     KEY  B = Belt G = Gloves N = Noodle   C = Cuffs K = Kickboard P = Paddles   CC = Cervical Collar L = Laps T = Theratube   DB = Dumbells M = Minutes W = Weights     Exercises/Activities  Warm-up/Amb 3/28 Dynamic Exercises 3/28   Forward 3L March 3L   Sideways 3L Squat    Backwards 3L Retro HS curls      Retro SLR    Stretches  Braiding    Gastroc/Soleus  Heel to Toe amb    Hamstring 10\"x3 Toe amb    Hip flexor  Heel amb    Piriformis      SKTC      Pec Stretch      Post Deltoid  Static Exercises UE TVA contr   Lumbar flex at rail 10\"x3 Shoulder flex/ext 20   Static Exercises LE  Shoulder abd/add 20   Heel/toe raises 20 Shoulder H.  abd/add 20   Marches 20 Shoulder IR/ER    Mini-squats 20 Rowing 20   3-way hip  20 Arm Circles    Hamstring curls 20 UT shrugs/rolls    Hip Circles/Fig 8  Scap squeezes    Ankle ROM  Diagonals 1/2    Lunges   Elbow flex/ext

## 2025-04-01 ENCOUNTER — HOSPITAL ENCOUNTER (OUTPATIENT)
Dept: WOMENS IMAGING | Age: 30
Discharge: HOME | End: 2025-04-03
Payer: COMMERCIAL

## 2025-04-01 DIAGNOSIS — R10.11 RIGHT UPPER QUADRANT ABDOMINAL PAIN: ICD-10-CM

## 2025-04-01 PROCEDURE — 76705 ECHO EXAM OF ABDOMEN: CPT

## 2025-04-02 ENCOUNTER — HOSPITAL ENCOUNTER (OUTPATIENT)
Dept: PAIN MANAGEMENT | Age: 30
Discharge: HOME | End: 2025-04-02
Payer: COMMERCIAL

## 2025-04-02 VITALS — BODY MASS INDEX: 37.49 KG/M2 | WEIGHT: 225 LBS | HEIGHT: 65 IN

## 2025-04-02 DIAGNOSIS — M47.817 LUMBOSACRAL SPONDYLOSIS WITHOUT MYELOPATHY: Primary | ICD-10-CM

## 2025-04-02 PROCEDURE — 99213 OFFICE O/P EST LOW 20 MIN: CPT

## 2025-04-02 PROCEDURE — 99213 OFFICE O/P EST LOW 20 MIN: CPT | Performed by: NURSE PRACTITIONER

## 2025-04-02 ASSESSMENT — ENCOUNTER SYMPTOMS
CONSTIPATION: 0
SHORTNESS OF BREATH: 0
COUGH: 0
BACK PAIN: 1

## 2025-04-02 NOTE — PROGRESS NOTES
Chief Complaint   Patient presents with    Back Pain     2 month f/u         PMH     Pt complains of low back pain. Lumbar MRI 12/2024 with multilevel degenerative changes and mild canal stenosis. Her PCP referred her to physical therapy but she did not schedule this as she did not think it would help her pain. She saw Dr. Nance with nothing surgical planned. He referred to to pain management for epidurals. She is diabetic and last A1C 1/2025 was 9.3.    She has completed physical therapy with benefit.     She would like to try LESI but last A1C was 9.3. She will call her PCP to get an order to repeat labs.     Back Pain  This is a chronic problem. The current episode started more than 1 year ago. The problem occurs constantly. The problem is unchanged. The pain is present in the lumbar spine. The quality of the pain is described as shooting and burning. The pain radiates to the left knee, left thigh, right knee, right foot, right thigh and left foot. The pain is at a severity of 10/10. The pain is severe. The pain is The same all the time. The symptoms are aggravated by bending, sitting, standing and position. Stiffness is present In the morning. Associated symptoms include leg pain, numbness and tingling. Pertinent negatives include no chest pain, fever, headaches or weakness. She has tried heat and ice for the symptoms. The treatment provided mild relief.     Patient denies any new neurological symptoms. No bowel or bladder incontinence, no weakness, and no falling.      Past Medical History:   Diagnosis Date    Asthma     uses inhaler    Bipolar 1 disorder (HCC)     BRCA1 gene mutation positive     Diabetes mellitus type 2 in obese     Diabetic acidosis without coma (HCC) 09/14/2021    GERD (gastroesophageal reflux disease)     Hepatic steatosis     Hypertension     started when approx 6 mos pregnant with first pregnancy    Insomnia     Migraine headache     Mood disorder     Morbid obesity with BMI of

## 2025-04-14 ENCOUNTER — OFFICE VISIT (OUTPATIENT)
Dept: GASTROENTEROLOGY | Age: 30
End: 2025-04-14
Payer: COMMERCIAL

## 2025-04-14 VITALS
BODY MASS INDEX: 36.61 KG/M2 | TEMPERATURE: 98.8 F | DIASTOLIC BLOOD PRESSURE: 97 MMHG | WEIGHT: 220 LBS | SYSTOLIC BLOOD PRESSURE: 157 MMHG

## 2025-04-14 DIAGNOSIS — R11.0 NAUSEA: ICD-10-CM

## 2025-04-14 DIAGNOSIS — E66.812 CLASS 2 OBESITY DUE TO EXCESS CALORIES WITHOUT SERIOUS COMORBIDITY IN ADULT, UNSPECIFIED BMI: ICD-10-CM

## 2025-04-14 DIAGNOSIS — K52.9 CHRONIC DIARRHEA: ICD-10-CM

## 2025-04-14 DIAGNOSIS — K76.0 HEPATIC STEATOSIS: Chronic | ICD-10-CM

## 2025-04-14 DIAGNOSIS — E66.09 CLASS 2 OBESITY DUE TO EXCESS CALORIES WITHOUT SERIOUS COMORBIDITY IN ADULT, UNSPECIFIED BMI: ICD-10-CM

## 2025-04-14 DIAGNOSIS — R10.11 ABDOMINAL PAIN, RIGHT UPPER QUADRANT: Primary | ICD-10-CM

## 2025-04-14 DIAGNOSIS — R74.01 ELEVATED ALT MEASUREMENT: ICD-10-CM

## 2025-04-14 DIAGNOSIS — K92.1 HEMATOCHEZIA: ICD-10-CM

## 2025-04-14 PROCEDURE — G8427 DOCREV CUR MEDS BY ELIG CLIN: HCPCS | Performed by: INTERNAL MEDICINE

## 2025-04-14 PROCEDURE — 99214 OFFICE O/P EST MOD 30 MIN: CPT | Performed by: INTERNAL MEDICINE

## 2025-04-14 PROCEDURE — 3080F DIAST BP >= 90 MM HG: CPT | Performed by: INTERNAL MEDICINE

## 2025-04-14 PROCEDURE — 3077F SYST BP >= 140 MM HG: CPT | Performed by: INTERNAL MEDICINE

## 2025-04-14 PROCEDURE — 1036F TOBACCO NON-USER: CPT | Performed by: INTERNAL MEDICINE

## 2025-04-14 PROCEDURE — G8417 CALC BMI ABV UP PARAM F/U: HCPCS | Performed by: INTERNAL MEDICINE

## 2025-04-14 RX ORDER — BACILLUS COAGULANS 1B CELL
1 CAPSULE ORAL ONCE
Qty: 90 EACH | Refills: 2 | Status: SHIPPED | OUTPATIENT
Start: 2025-04-14 | End: 2025-04-14

## 2025-04-14 RX ORDER — MULTIVIT WITH MINERALS/LUTEIN
400 TABLET ORAL 2 TIMES DAILY
Qty: 30 CAPSULE | Refills: 3 | Status: SHIPPED | OUTPATIENT
Start: 2025-04-14

## 2025-04-14 ASSESSMENT — ENCOUNTER SYMPTOMS
BLOOD IN STOOL: 0
SORE THROAT: 0
CONSTIPATION: 0
VOMITING: 0
DIARRHEA: 0
CHOKING: 0
RECTAL PAIN: 0
SHORTNESS OF BREATH: 0
COUGH: 0
ABDOMINAL DISTENTION: 0
TROUBLE SWALLOWING: 0
VOICE CHANGE: 0
ANAL BLEEDING: 0
ABDOMINAL PAIN: 0
WHEEZING: 0
NAUSEA: 0

## 2025-04-14 NOTE — PROGRESS NOTES
GI CLINIC FOLLOW UP    NTERVAL HISTORY:   Jade Rivera, APRN - CNP  [address]  [address]  [place] [address]    Chief Complaint   Patient presents with    Hepatomegaly     Patient is here today due to Hepatomegaly, previous Dr Curran pt last seen on 4/9/24 for RUQ abdominal pain, hematochezia, & chronic diarrhea.       1. Abdominal pain, right upper quadrant    2. Nausea    3. Hematochezia    4. Chronic diarrhea    5. Hepatic steatosis    6. Elevated ALT measurement    7. Class 2 obesity due to excess calories without serious comorbidity in adult, unspecified BMI      This patient seen my office for the first time however she is been evaluated by my  who is retired now    She had endoscopy and colonoscopy done in April 2024 has not been seen in the GI office since then    Her EGD has revealed small amount of retained food in stomach otherwise was normal    Her colonoscopy has revealed normal terminal ileum cecum ascending colon descending colon    Patient has some irritable bowel syndrome      Complains of intermittent hematochezia    Mostly diarrhea predominant IBS like    Use has fatty infiltration of the liver    Has elevated ALT measurement    Previous records were reviewed with her    A CT scan done in February this year reveals no CT evidence of acute process in the abdomen pelvis diffuse fatty hepatic steatosis was noted    Her ALT is 42 bilirubin slightly elevated at 1.6      Patient has been complaining of some abdominal pains, off and on cramping  Also complains of abdominal bloating and gas  Has off and on nausea without any sig vomiting  Has some alternating constipation and diarrhea  Has no weight loss  Has some anxiety issues    HISTORY OF PRESENT ILLNESS: Ms.Carissa SHARATH Millard is a 30 y.o. female with a past history remarkable for , referred for evaluation of   Chief Complaint   Patient presents with    Hepatomegaly     Patient is here today due to Hepatomegaly, previous

## 2025-04-18 ENCOUNTER — TELEPHONE (OUTPATIENT)
Dept: GASTROENTEROLOGY | Age: 30
End: 2025-04-18

## 2025-04-18 NOTE — TELEPHONE ENCOUNTER
Writer spoke with pharmacist with [facility] pharmacy #[phone], per pharmacist, Bacid ok to keep at room temperature ok for pt to continue medication, thanked pharmacist call ended. Writer called pt no answer lvm advising pt ok to keep at room temp, left call back number if needed.

## 2025-04-18 NOTE — TELEPHONE ENCOUNTER
Pt wants to know if she can still take probiotics that has been outside of fridge. She advised the bottle said it needs to go in fridge. Please advise.

## 2025-04-21 ENCOUNTER — HOSPITAL ENCOUNTER (OUTPATIENT)
Age: 30
Setting detail: SPECIMEN
Discharge: HOME | End: 2025-04-21

## 2025-04-21 ENCOUNTER — OFFICE VISIT (OUTPATIENT)
Age: 30
End: 2025-04-21
Payer: COMMERCIAL

## 2025-04-21 ENCOUNTER — HOSPITAL ENCOUNTER (OUTPATIENT)
Dept: GENERAL RADIOLOGY | Facility: CLINIC | Age: 30
Discharge: HOME | End: 2025-04-23
Payer: COMMERCIAL

## 2025-04-21 VITALS
OXYGEN SATURATION: 98 % | BODY MASS INDEX: 37.72 KG/M2 | WEIGHT: 226.4 LBS | DIASTOLIC BLOOD PRESSURE: 67 MMHG | SYSTOLIC BLOOD PRESSURE: 126 MMHG | HEART RATE: 105 BPM | HEIGHT: 65 IN | TEMPERATURE: 97 F

## 2025-04-21 DIAGNOSIS — M25.50 ARTHRALGIA, UNSPECIFIED JOINT: ICD-10-CM

## 2025-04-21 DIAGNOSIS — M53.3 TENDERNESS OF SACROILIAC JOINT: ICD-10-CM

## 2025-04-21 DIAGNOSIS — M54.50 LOW BACK PAIN, UNSPECIFIED BACK PAIN LATERALITY, UNSPECIFIED CHRONICITY, UNSPECIFIED WHETHER SCIATICA PRESENT: Primary | ICD-10-CM

## 2025-04-21 DIAGNOSIS — M54.50 LOW BACK PAIN, UNSPECIFIED BACK PAIN LATERALITY, UNSPECIFIED CHRONICITY, UNSPECIFIED WHETHER SCIATICA PRESENT: ICD-10-CM

## 2025-04-21 DIAGNOSIS — E55.9 VITAMIN D DEFICIENCY: ICD-10-CM

## 2025-04-21 PROCEDURE — 3078F DIAST BP <80 MM HG: CPT | Performed by: STUDENT IN AN ORGANIZED HEALTH CARE EDUCATION/TRAINING PROGRAM

## 2025-04-21 PROCEDURE — 99203 OFFICE O/P NEW LOW 30 MIN: CPT | Performed by: STUDENT IN AN ORGANIZED HEALTH CARE EDUCATION/TRAINING PROGRAM

## 2025-04-21 PROCEDURE — G2211 COMPLEX E/M VISIT ADD ON: HCPCS | Performed by: STUDENT IN AN ORGANIZED HEALTH CARE EDUCATION/TRAINING PROGRAM

## 2025-04-21 PROCEDURE — 72202 X-RAY EXAM SI JOINTS 3/> VWS: CPT

## 2025-04-21 PROCEDURE — 73130 X-RAY EXAM OF HAND: CPT

## 2025-04-21 PROCEDURE — G8417 CALC BMI ABV UP PARAM F/U: HCPCS | Performed by: STUDENT IN AN ORGANIZED HEALTH CARE EDUCATION/TRAINING PROGRAM

## 2025-04-21 PROCEDURE — 99204 OFFICE O/P NEW MOD 45 MIN: CPT | Performed by: STUDENT IN AN ORGANIZED HEALTH CARE EDUCATION/TRAINING PROGRAM

## 2025-04-21 PROCEDURE — 1036F TOBACCO NON-USER: CPT | Performed by: STUDENT IN AN ORGANIZED HEALTH CARE EDUCATION/TRAINING PROGRAM

## 2025-04-21 PROCEDURE — 3074F SYST BP LT 130 MM HG: CPT | Performed by: STUDENT IN AN ORGANIZED HEALTH CARE EDUCATION/TRAINING PROGRAM

## 2025-04-21 PROCEDURE — G8427 DOCREV CUR MEDS BY ELIG CLIN: HCPCS | Performed by: STUDENT IN AN ORGANIZED HEALTH CARE EDUCATION/TRAINING PROGRAM

## 2025-04-21 RX ORDER — HYDROXYZINE HYDROCHLORIDE 50 MG/1
TABLET, FILM COATED ORAL
COMMUNITY
Start: 2025-04-08

## 2025-04-21 RX ORDER — LAMOTRIGINE 25 MG/1
TABLET ORAL
COMMUNITY
Start: 2025-04-08

## 2025-04-21 ASSESSMENT — ENCOUNTER SYMPTOMS: BACK PAIN: 1

## 2025-04-21 NOTE — PROGRESS NOTES
Review of Systems   Constitutional:  Positive for appetite change, diaphoresis, fatigue and unexpected weight change.   Musculoskeletal:  Positive for arthralgias, back pain, gait problem, joint swelling, myalgias, neck pain and neck stiffness.   Neurological:  Positive for dizziness and headaches.   Psychiatric/Behavioral:  Positive for sleep disturbance.    All other systems reviewed and are negative.      
tablet, Take 1 tablet by mouth every 6 hours, Disp: 60 tablet, Rfl: 1    budesonide-formoterol (SYMBICORT) 160-4.5 MCG/ACT AERO, Inhale 2 puffs into the lungs in the morning and 2 puffs in the evening., Disp: 10.2 g, Rfl: 3    metFORMIN (GLUCOPHAGE) 1000 MG tablet, take 1 tablet by mouth twice a day with meals, Disp: 60 tablet, Rfl: 5    blood glucose monitor strips, Test 3 times a day & as needed for symptoms of irregular blood glucose. Dispense sufficient amount for indicated testing frequency plus additional to accommodate PRN testing needs., Disp: 100 strip, Rfl: 3    topiramate (TOPAMAX) 25 MG tablet, 1 TABLET TWICE A DAY, Disp: 60 tablet, Rfl: 5    Lancets MISC, 1 each by Does not apply route 3 times daily, Disp: 100 each, Rfl: 3    Insulin Pen Needle (PEN NEEDLES) 31G X 6 MM MISC, 1 each by Does not apply route daily, Disp: 200 each, Rfl: 5    SUMAtriptan (IMITREX) 50 MG tablet, Take 1 tablet by mouth once as needed for Migraine, Disp: 9 tablet, Rfl: 5    ALLERGIES:   Allergies   Allergen Reactions    Diphenhydramine Hives and Shortness Of Breath    Zenpep [Pancrelipase (Lip-Prot-Amyl)] Dizziness or Vertigo       FAMILY HISTORY:       Problem Relation Age of Onset    Breast Cancer Mother 35    Diabetes Father     Breast Cancer Maternal Grandmother     Cancer Maternal Uncle 60        acute leukemia    Heart Disease Maternal Uncle     Diabetes Maternal Uncle     Colon Cancer Maternal Uncle     Diabetes Maternal Uncle     Heart Attack Maternal Uncle     Uterine Cancer Neg Hx     Ovarian Cancer Neg Hx          SOCIAL HISTORY:   Social History     Socioeconomic History    Marital status: Single     Spouse name: Not on file    Number of children: 2    Years of education: graduated high school, some college    Highest education level: Not on file   Occupational History    Occupation: housewife    Occupation:      Comment: last worked 2015   Tobacco Use    Smoking status: Former     Current

## 2025-04-22 ENCOUNTER — RESULTS FOLLOW-UP (OUTPATIENT)
Age: 30
End: 2025-04-22

## 2025-04-22 ENCOUNTER — TELEPHONE (OUTPATIENT)
Dept: GASTROENTEROLOGY | Age: 30
End: 2025-04-22

## 2025-04-22 DIAGNOSIS — M53.3 TENDERNESS OF SACROILIAC JOINT: Primary | ICD-10-CM

## 2025-04-23 LAB
CCP AB SER IA-ACNC: 0.4 U/ML (ref 0–7)
HLA B27: NEGATIVE

## 2025-05-02 ENCOUNTER — HOSPITAL ENCOUNTER (OUTPATIENT)
Dept: PAIN MANAGEMENT | Age: 30
Discharge: HOME | End: 2025-05-02
Payer: COMMERCIAL

## 2025-05-02 VITALS — BODY MASS INDEX: 37.82 KG/M2 | WEIGHT: 227 LBS | HEIGHT: 65 IN

## 2025-05-02 DIAGNOSIS — M54.50 CHRONIC LOW BACK PAIN, UNSPECIFIED BACK PAIN LATERALITY, UNSPECIFIED WHETHER SCIATICA PRESENT: ICD-10-CM

## 2025-05-02 DIAGNOSIS — M54.16 LUMBAR RADICULOPATHY: ICD-10-CM

## 2025-05-02 DIAGNOSIS — M47.817 LUMBOSACRAL SPONDYLOSIS WITHOUT MYELOPATHY: Primary | ICD-10-CM

## 2025-05-02 DIAGNOSIS — G89.29 CHRONIC LOW BACK PAIN, UNSPECIFIED BACK PAIN LATERALITY, UNSPECIFIED WHETHER SCIATICA PRESENT: ICD-10-CM

## 2025-05-02 PROCEDURE — 99213 OFFICE O/P EST LOW 20 MIN: CPT

## 2025-05-02 ASSESSMENT — PAIN SCALES - GENERAL: PAINLEVEL_OUTOF10: 8

## 2025-05-02 ASSESSMENT — ENCOUNTER SYMPTOMS
BACK PAIN: 1
BOWEL INCONTINENCE: 0

## 2025-05-02 NOTE — PROGRESS NOTES
Chief Complaint: Back pain       [facility]     Pt complains of low back pain. Lumbar MRI 12/2024 with multilevel degenerative changes and mild canal stenosis. Her PCP referred her to physical therapy but she did not schedule this as she did not think it would help her pain. She saw Dr. Nance with nothing surgical planned. He referred to to pain management for epidurals.   She has completed physical therapy with [facility].   She would like to schedule LESI   A1C was 8.7 4/21/25    Back Pain  This is a chronic problem. The current episode started more than 1 year ago. The problem occurs constantly. The problem has been gradually worsening since onset. The pain is present in the lumbar spine. The quality of the pain is described as burning (Sharp/Jolting). The pain radiates to the left knee, left thigh, right foot, right knee, right thigh and left foot. The pain is at a severity of 8/10. The pain is moderate. The pain is The same all the time. The symptoms are aggravated by bending, standing, lying down and sitting. Stiffness is present All day. Associated symptoms include numbness, tingling and weakness. Pertinent negatives include no bladder incontinence, bowel incontinence, chest pain, fever or headaches. (Positives Hand/Feet) She has tried nothing for the symptoms. The treatment provided mild relief.      Patient denies any new neurological symptoms. No bowel or bladder incontinence, no weakness, and no falling.    Past Medical History:   Diagnosis Date    Asthma     uses inhaler    Bipolar 1 disorder (HCC)     BRCA1 gene mutation positive     Diabetes mellitus type 2 in obese     Diabetic acidosis without coma (HCC) 09/14/2021    GERD (gastroesophageal reflux disease)     Hepatic steatosis     Hypertension     started when approx 6 mos pregnant with first pregnancy    Insomnia     Migraine headache     Mood disorder     Morbid obesity with BMI of 40.0-44.9, adult (HCC)     Morbid obesity with body mass index

## 2025-05-08 ENCOUNTER — HOSPITAL ENCOUNTER (OUTPATIENT)
Dept: MRI IMAGING | Facility: CLINIC | Age: 30
Discharge: HOME | End: 2025-05-10
Payer: COMMERCIAL

## 2025-05-08 DIAGNOSIS — M53.3 TENDERNESS OF SACROILIAC JOINT: ICD-10-CM

## 2025-05-08 PROCEDURE — 72195 MRI PELVIS W/O DYE: CPT

## 2025-05-13 ENCOUNTER — RESULTS FOLLOW-UP (OUTPATIENT)
Age: 30
End: 2025-05-13

## 2025-05-22 ENCOUNTER — HOSPITAL ENCOUNTER (EMERGENCY)
Age: 30
Discharge: HOME | End: 2025-05-22
Attending: EMERGENCY MEDICINE
Payer: COMMERCIAL

## 2025-05-22 ENCOUNTER — APPOINTMENT (OUTPATIENT)
Dept: CT IMAGING | Age: 30
End: 2025-05-22
Payer: COMMERCIAL

## 2025-05-22 ENCOUNTER — OFFICE VISIT (OUTPATIENT)
Dept: OBGYN CLINIC | Age: 30
End: 2025-05-22
Payer: COMMERCIAL

## 2025-05-22 VITALS
SYSTOLIC BLOOD PRESSURE: 118 MMHG | BODY MASS INDEX: 36 KG/M2 | HEIGHT: 66 IN | HEART RATE: 92 BPM | DIASTOLIC BLOOD PRESSURE: 84 MMHG | WEIGHT: 224 LBS

## 2025-05-22 VITALS
RESPIRATION RATE: 20 BRPM | BODY MASS INDEX: 35.36 KG/M2 | HEART RATE: 102 BPM | OXYGEN SATURATION: 98 % | SYSTOLIC BLOOD PRESSURE: 136 MMHG | TEMPERATURE: 97.7 F | DIASTOLIC BLOOD PRESSURE: 89 MMHG | HEIGHT: 66 IN | WEIGHT: 220 LBS

## 2025-05-22 DIAGNOSIS — Z91.89 AT HIGH RISK FOR BREAST CANCER: ICD-10-CM

## 2025-05-22 DIAGNOSIS — Z15.09 BRCA GENE MUTATION POSITIVE: ICD-10-CM

## 2025-05-22 DIAGNOSIS — R11.0 NAUSEA: ICD-10-CM

## 2025-05-22 DIAGNOSIS — R10.13 ABDOMINAL PAIN, EPIGASTRIC: Primary | ICD-10-CM

## 2025-05-22 DIAGNOSIS — Z15.01 BRCA GENE MUTATION POSITIVE: ICD-10-CM

## 2025-05-22 DIAGNOSIS — Z01.419 WELL WOMAN EXAM: Primary | ICD-10-CM

## 2025-05-22 LAB
ALBUMIN SERPL-MCNC: 4.5 G/DL (ref 3.5–5.2)
ALBUMIN/GLOB SERPL: 1.9 {RATIO} (ref 1–2.5)
ALP SERPL-CCNC: 53 U/L (ref 35–104)
ALT SERPL-CCNC: 53 U/L (ref 10–35)
ANION GAP SERPL CALCULATED.3IONS-SCNC: 15 MMOL/L (ref 9–16)
AST SERPL-CCNC: 40 U/L (ref 10–35)
B-OH-BUTYR SERPL-MCNC: 0.12 MMOL/L (ref 0.02–0.27)
BACTERIA URNS QL MICRO: NORMAL
BASOPHILS # BLD: 0.05 K/UL (ref 0–0.2)
BASOPHILS NFR BLD: 1 % (ref 0–2)
BILIRUB SERPL-MCNC: 0.9 MG/DL (ref 0–1.2)
BILIRUB UR QL STRIP: NEGATIVE
BODY TEMPERATURE: 37
BUN SERPL-MCNC: 15 MG/DL (ref 6–20)
C TRACH DNA SPEC QL PROBE+SIG AMP: NEGATIVE
CALCIUM SERPL-MCNC: 9.7 MG/DL (ref 8.6–10.4)
CANDIDA SPECIES: NEGATIVE
CASTS #/AREA URNS LPF: NORMAL /LPF (ref 0–8)
CHLORIDE SERPL-SCNC: 102 MMOL/L (ref 98–107)
CLARITY UR: CLEAR
CO2 SERPL-SCNC: 21 MMOL/L (ref 20–31)
COHGB MFR BLD: 1.7 % (ref 0–5)
COLOR UR: YELLOW
CREAT SERPL-MCNC: 0.8 MG/DL (ref 0.6–0.9)
EOSINOPHIL # BLD: 0.4 K/UL (ref 0–0.44)
EOSINOPHILS RELATIVE PERCENT: 4 % (ref 1–4)
EPI CELLS #/AREA URNS HPF: NORMAL /HPF (ref 0–5)
ERYTHROCYTE [DISTWIDTH] IN BLOOD BY AUTOMATED COUNT: 12.5 % (ref 11.8–14.4)
FIO2 ON VENT: ABNORMAL %
GARDNERELLA VAGINALIS: NEGATIVE
GFR, ESTIMATED: >90 ML/MIN/1.73M2
GLUCOSE SERPL-MCNC: 278 MG/DL (ref 74–99)
GLUCOSE UR STRIP-MCNC: ABNORMAL MG/DL
HCG SERPL QL: NEGATIVE
HCO3 VENOUS: 21.6 MMOL/L (ref 24–30)
HCT VFR BLD AUTO: 39.8 % (ref 36.3–47.1)
HGB BLD-MCNC: 13.9 G/DL (ref 11.9–15.1)
HGB UR QL STRIP.AUTO: NEGATIVE
IMM GRANULOCYTES # BLD AUTO: 0.05 K/UL (ref 0–0.3)
IMM GRANULOCYTES NFR BLD: 1 %
KETONES UR STRIP-MCNC: ABNORMAL MG/DL
LEUKOCYTE ESTERASE UR QL STRIP: NEGATIVE
LIPASE SERPL-CCNC: 54 U/L (ref 13–60)
LYMPHOCYTES NFR BLD: 3.96 K/UL (ref 1.1–3.7)
LYMPHOCYTES RELATIVE PERCENT: 38 % (ref 24–43)
MCH RBC QN AUTO: 31.1 PG (ref 25.2–33.5)
MCHC RBC AUTO-ENTMCNC: 34.9 G/DL (ref 28.4–34.8)
MCV RBC AUTO: 89 FL (ref 82.6–102.9)
MONOCYTES NFR BLD: 0.82 K/UL (ref 0.1–1.2)
MONOCYTES NFR BLD: 8 % (ref 3–12)
N GONORRHOEA DNA SPEC QL PROBE+SIG AMP: NEGATIVE
NEGATIVE BASE EXCESS, VEN: 2.6 MMOL/L (ref 0–2)
NEUTROPHILS NFR BLD: 48 % (ref 36–65)
NEUTS SEG NFR BLD: 5.09 K/UL (ref 1.5–8.1)
NITRITE UR QL STRIP: NEGATIVE
NRBC BLD-RTO: 0 PER 100 WBC
O2 SAT, VEN: 91.7 % (ref 60–85)
PCO2 VENOUS: 35.9 MM HG (ref 39–55)
PH UR STRIP: 6 [PH] (ref 5–8)
PH VENOUS: 7.4 (ref 7.32–7.42)
PLATELET # BLD AUTO: 249 K/UL (ref 138–453)
PMV BLD AUTO: 9.9 FL (ref 8.1–13.5)
PO2 VENOUS: 61.4 MM HG (ref 30–50)
POTASSIUM SERPL-SCNC: 3.5 MMOL/L (ref 3.7–5.3)
PROT SERPL-MCNC: 6.9 G/DL (ref 6.6–8.7)
PROT UR STRIP-MCNC: ABNORMAL MG/DL
RBC # BLD AUTO: 4.47 M/UL (ref 3.95–5.11)
RBC #/AREA URNS HPF: NORMAL /HPF (ref 0–4)
SODIUM SERPL-SCNC: 138 MMOL/L (ref 136–145)
SOURCE: NORMAL
SP GR UR STRIP: 1.03 (ref 1–1.03)
SPECIMEN DESCRIPTION: NORMAL
TRICHOMONAS: NEGATIVE
UROBILINOGEN UR STRIP-ACNC: NORMAL EU/DL (ref 0–1)
WBC #/AREA URNS HPF: NORMAL /HPF (ref 0–5)
WBC OTHER # BLD: 10.4 K/UL (ref 3.5–11.3)

## 2025-05-22 PROCEDURE — 85025 COMPLETE CBC W/AUTO DIFF WBC: CPT

## 2025-05-22 PROCEDURE — 74176 CT ABD & PELVIS W/O CONTRAST: CPT

## 2025-05-22 PROCEDURE — 2580000003 HC RX 258

## 2025-05-22 PROCEDURE — 99395 PREV VISIT EST AGE 18-39: CPT | Performed by: CLINICAL NURSE SPECIALIST

## 2025-05-22 PROCEDURE — 87510 GARDNER VAG DNA DIR PROBE: CPT

## 2025-05-22 PROCEDURE — 82805 BLOOD GASES W/O2 SATURATION: CPT

## 2025-05-22 PROCEDURE — 96375 TX/PRO/DX INJ NEW DRUG ADDON: CPT

## 2025-05-22 PROCEDURE — 81001 URINALYSIS AUTO W/SCOPE: CPT

## 2025-05-22 PROCEDURE — 96374 THER/PROPH/DIAG INJ IV PUSH: CPT

## 2025-05-22 PROCEDURE — 3079F DIAST BP 80-89 MM HG: CPT | Performed by: CLINICAL NURSE SPECIALIST

## 2025-05-22 PROCEDURE — 84703 CHORIONIC GONADOTROPIN ASSAY: CPT

## 2025-05-22 PROCEDURE — 82010 KETONE BODYS QUAN: CPT

## 2025-05-22 PROCEDURE — 87660 TRICHOMONAS VAGIN DIR PROBE: CPT

## 2025-05-22 PROCEDURE — 80053 COMPREHEN METABOLIC PANEL: CPT

## 2025-05-22 PROCEDURE — 87491 CHLMYD TRACH DNA AMP PROBE: CPT

## 2025-05-22 PROCEDURE — 87480 CANDIDA DNA DIR PROBE: CPT

## 2025-05-22 PROCEDURE — 87591 N.GONORRHOEAE DNA AMP PROB: CPT

## 2025-05-22 PROCEDURE — 36415 COLL VENOUS BLD VENIPUNCTURE: CPT

## 2025-05-22 PROCEDURE — 99284 EMERGENCY DEPT VISIT MOD MDM: CPT

## 2025-05-22 PROCEDURE — 83690 ASSAY OF LIPASE: CPT

## 2025-05-22 PROCEDURE — 6360000002 HC RX W HCPCS

## 2025-05-22 PROCEDURE — 3074F SYST BP LT 130 MM HG: CPT | Performed by: CLINICAL NURSE SPECIALIST

## 2025-05-22 RX ORDER — FENTANYL CITRATE 50 UG/ML
25 INJECTION, SOLUTION INTRAMUSCULAR; INTRAVENOUS ONCE
Refills: 0 | Status: COMPLETED | OUTPATIENT
Start: 2025-05-22 | End: 2025-05-22

## 2025-05-22 RX ORDER — KETOROLAC TROMETHAMINE 30 MG/ML
30 INJECTION, SOLUTION INTRAMUSCULAR; INTRAVENOUS ONCE
Status: DISCONTINUED | OUTPATIENT
Start: 2025-05-22 | End: 2025-05-22 | Stop reason: HOSPADM

## 2025-05-22 RX ORDER — ONDANSETRON 2 MG/ML
4 INJECTION INTRAMUSCULAR; INTRAVENOUS ONCE
Status: COMPLETED | OUTPATIENT
Start: 2025-05-22 | End: 2025-05-22

## 2025-05-22 RX ORDER — IBUPROFEN 800 MG/1
800 TABLET, FILM COATED ORAL EVERY 8 HOURS PRN
Qty: 90 TABLET | Refills: 2 | Status: SHIPPED | OUTPATIENT
Start: 2025-05-22

## 2025-05-22 RX ORDER — 0.9 % SODIUM CHLORIDE 0.9 %
1000 INTRAVENOUS SOLUTION INTRAVENOUS ONCE
Status: COMPLETED | OUTPATIENT
Start: 2025-05-22 | End: 2025-05-22

## 2025-05-22 RX ORDER — ONDANSETRON 4 MG/1
4 TABLET, FILM COATED ORAL DAILY PRN
Qty: 30 TABLET | Refills: 0 | Status: SHIPPED | OUTPATIENT
Start: 2025-05-22

## 2025-05-22 RX ADMIN — SODIUM CHLORIDE 1000 ML: 0.9 INJECTION, SOLUTION INTRAVENOUS at 04:55

## 2025-05-22 RX ADMIN — FAMOTIDINE 20 MG: 10 INJECTION, SOLUTION INTRAVENOUS at 05:45

## 2025-05-22 RX ADMIN — FENTANYL CITRATE 25 MCG: 50 INJECTION INTRAMUSCULAR; INTRAVENOUS at 04:56

## 2025-05-22 RX ADMIN — ONDANSETRON 4 MG: 2 INJECTION, SOLUTION INTRAMUSCULAR; INTRAVENOUS at 04:55

## 2025-05-22 ASSESSMENT — PAIN DESCRIPTION - DESCRIPTORS: DESCRIPTORS: SHARP;SHOOTING;SQUEEZING

## 2025-05-22 ASSESSMENT — ENCOUNTER SYMPTOMS
CONSTIPATION: 0
DIARRHEA: 1
NAUSEA: 1
SHORTNESS OF BREATH: 0
ABDOMINAL PAIN: 1
VOMITING: 1

## 2025-05-22 ASSESSMENT — PAIN DESCRIPTION - LOCATION: LOCATION: ABDOMEN

## 2025-05-22 ASSESSMENT — PAIN SCALES - GENERAL: PAINLEVEL_OUTOF10: 10

## 2025-05-22 ASSESSMENT — PAIN - FUNCTIONAL ASSESSMENT: PAIN_FUNCTIONAL_ASSESSMENT: ACTIVITIES ARE NOT PREVENTED

## 2025-05-22 ASSESSMENT — PAIN DESCRIPTION - PAIN TYPE: TYPE: ACUTE PAIN

## 2025-05-22 ASSESSMENT — PAIN DESCRIPTION - ORIENTATION: ORIENTATION: RIGHT

## 2025-05-22 NOTE — DISCHARGE INSTRUCTIONS
Seen in  the emergency department for abdominal pain.     While you are in the emergency department we did a CT scan, urinalysis blood work and pelvic exam.  Your workup was grossly unremarkable.    Please take all medications as prescribed.  For pain use ibuprofen (Motrin) or acetaminophen (Tylenol), unless prescribed medications that have acetaminophen in it.  You can take over the counter acetaminophen tablets (1 - 2 tablets of the 500-mg strength every 6 hours) or ibuprofen tablets (2 tablets every 46hours).    It is very important that you follow-up with your primary care provider within 1 to 2 days of discharge.    PLEASE RETURN TO THE EMERGENCY DEPARTMENT IMMEDIATELY for worsening symptoms, or if you develop any concerning symptoms such as: high fever not relieved by acetaminophen (Tylenol) and/or ibuprofen (Motrin), chills, shortness of breath, chest pain, persistent nausea and/or vomiting, numbness, weakness or tingling in the arms or legs or change in color of the extremities, changes in mental status, persistent headache, blurry vision.    Return within 8 - 12 hours if you have any of the following: worsening of pain in your abdomen, no food sounds good to you, you continue to vomit, pain goes to your back, have pain in the abdomen when going over a bump in the car or when you jump up and down, develop vaginal bleeding or discharge, inability to urinate, unable to follow up with your physician, or other any other care or concern.

## 2025-05-22 NOTE — ED PROVIDER NOTES
[facility]     Emergency Department     Faculty Attestation    I performed a history and physical examination of the patient and discussed management with the resident. I have reviewed and agree with the resident’s findings including all diagnostic interpretations, and treatment plans as written. Any areas of disagreement are noted on the chart. I was personally present for the key portions of any procedures. I have documented in the chart those procedures where I was not present during the key portions. I have reviewed the emergency nurses triage note. I agree with the chief complaint, past medical history, past surgical history, allergies, medications, social and family history as documented unless otherwise noted below. Documentation of the HPI, Physical Exam and Medical Decision Making performed by esperanzaibdeonna is based on my personal performance of the HPI, PE and MDM. For Physician Assistant/ Nurse Practitioner cases/documentation I have personally evaluated this patient and have completed at least one if not all key elements of the E/M (history, physical exam, and MDM). Additional findings are as noted.    Note Started: 4:57 AM EDT     29 yo F r abdominal pain, no fever, + chills, vomit,  PE Helen RN present hr 106, gcs 15,   abdomen mild tenderness right lower quadrant, voluntary guarding, no distention, no rigidity, no rebound  --hcg-, wbc-, ct-, swabs pending, day shift sign out  EKG Interpretation    Interpreted by me      CRITICAL CARE: There was a high probability of clinically significant/life threatening deterioration in this patient's condition which required my urgent intervention.  Total critical care time was 0 minutes.  This excludes any time for separately reportable procedures.       Sundeep Busby DO  05/22/25 0458       Sundeep Power DO  05/22/25 0628       Sundeep Power DO  05/22/25 0630       Sundeep Power,

## 2025-05-22 NOTE — ED NOTES
Labeled blood specimens sent to lab via tube system.    [x] Green/yellow  [x] Lavender   [] On ice   [x] Blue   [x] Green/black [] On ice  [] Yellow  [] On ice  [] Red  [] Pink  [] Blood Cultures  [] x1 [] x2    [] Ped Green  [] On ice  [] Ped Lavender  [] On ice    [] Ped Yellow  [] On ice  [] Ped Red

## 2025-05-22 NOTE — PROGRESS NOTES
vaping          yes    Cardiovascular: Chest pain   no     Gastrointestinal: Chronic diarrhea and sees GI/constipation, nausea/vomiting           yes   Urogenital:  Urinary incontinence, frequency, urgency          no                                         Heavy/irregular periods           no                                      Vaginal discharge                   no  Hematological: Bruises easy Denies clotting disorder  yes     Endocrine:  Hot flashes   no     Hot/Cold Intolerance  no    Psychological:            Mood and affect were within normal limits.        yes                 Physical Exam    Physical Exam:    Vitals:    05/22/25 1333   BP: 118/84   Pulse: 92   Weight: 101.6 kg (224 lb)   Height: 1.664 m (5' 5.5\")       General Appearance:  This  is a well developed, well nourished, well groomed female.      Her BMI was reviewed. Nutritional decision making andexercise were discussed.    Neurological:  The patient is alert and oriented to time,place, person, and situation    Skin:  A brief inspection of the skin revealed no rashes or lesions.     Neck:  The neck was supple.     Respiratory:  There was unlabored respiratory effort. Lungs clear to ascultation.    Cardiovascular:  The patients extremities were without calf tenderness or edema.Heart with a regular rate and rhythm.    Abdomen:  The abdomen was soft and non-tender with no guarding, rebound or rigidity.  No hernias were appreciated.     Breast:   The patients breasts were symmetrical.  There were no masses, discharge or retractions noted.  Self breast exams were reviewed.    Pelvic Exam:  The external genitalia was with a normal appearance.           Urinary System: Urethral meatus normal    The vaginal vault was normal. There were no cystocele, rectocele, or enterocele appreciated. There was old blood not on menses.    The cervix was without lesions. There was no cervical motion tenderness.    The uterus was mobile, midline and regular.    The

## 2025-05-22 NOTE — ED PROVIDER NOTES
[facility] EMERGENCY DEPARTMENT  Emergency Department  Emergency Medicine Resident Turn-Over     Note Started: 6:02 AM EDT    Care of Tahira Millard was assumed from Dr. Stearns and is being seen for Abdominal Pain  .  The patient's initial evaluation and plan have been discussed with the prior provider who initially evaluated the patient.     EMERGENCY DEPARTMENT COURSE / MEDICAL DECISION MAKING:       MEDICATIONS GIVEN:  Orders Placed This Encounter   Medications    DISCONTD: ketorolac (TORADOL) injection 30 mg    ondansetron (ZOFRAN) injection 4 mg    sodium chloride 0.9 % bolus 1,000 mL    fentaNYL (SUBLIMAZE) injection 25 mcg     Refill:  0    famotidine (PEPCID) 20 MG/2ML 20 mg in sodium chloride (PF) 0.9 % 10 mL injection    ondansetron (ZOFRAN) 4 MG tablet     Sig: Take 1 tablet by mouth daily as needed for Nausea or Vomiting     Dispense:  30 tablet     Refill:  0       LABS / RADIOLOGY:     Labs Reviewed   BLOOD GAS, VENOUS - Abnormal; Notable for the following components:       Result Value    pCO2, Nico 35.9 (*)     PO2, Nico 61.4 (*)     HCO3, Venous 21.6 (*)     Negative Base Excess, Nico 2.6 (*)     O2 Sat, Nico 91.7 (*)     All other components within normal limits   COMPREHENSIVE METABOLIC PANEL - Abnormal; Notable for the following components:    Potassium 3.5 (*)     Glucose 278 (*)     ALT 53 (*)     AST 40 (*)     All other components within normal limits   CBC WITH AUTO DIFFERENTIAL - Abnormal; Notable for the following components:    MCHC 34.9 (*)     Immature Granulocytes % 1 (*)     Lymphocytes Absolute 3.96 (*)     All other components within normal limits   URINALYSIS WITH REFLEX TO CULTURE - Abnormal; Notable for the following components:    Glucose, Ur 3+ (*)     Ketones, Urine SMALL (*)     Specific Gravity, UA 1.034 (*)     Protein, UA TRACE (*)     All other components within normal limits   VAGINITIS DNA PROBE   C.TRACHOMATIS N.GONORRHOEAE DNA   BETA-HYDROXYBUTYRATE   HCG,

## 2025-05-22 NOTE — ED PROVIDER NOTES
[facility] EMERGENCY DEPARTMENT  Emergency Department Encounter  Emergency Medicine Resident     Pt Name:Tahira Millard  MRN: [id number]  Birthdate 1995  Date of evaluation: 25  PCP:  Maris Knight APRN - CNP  Note Started: 4:42 AM EDT      CHIEF COMPLAINT       Chief Complaint   Patient presents with    Abdominal Pain       HISTORY OF PRESENT ILLNESS  (Location/Symptom, Timing/Onset, Context/Setting, Quality, Duration, Modifying Factors, Severity.)      Tahira Millard is a 30 y.o. female with past medical history of bipolar 1 disorder, diabetes type 2 who presents with right sided abdominal pain that began 3 days ago.  She describes it as a sharp stabbing pain that has been constant but worsening.  She reports associated nausea and episodes of nonbloody emesis.  She has had similar symptoms in the past and was told it was due to inflammation of her pancreas.  She does report 1 episode of nonbloody diarrhea this morning.  No urinary symptoms.  No unusual vaginal discharge or bleeding.  Her last menstrual cycle was in 2025 but she has the Nexplanon implant in place and having irregular menstrual cycles is normal for her.  She denies chest pain or shortness of breath.  No fever, cough, chills, congestion, or recent cold-like symptoms.  She reports a prior  section and cholecystectomy for abdominal surgeries.  She denies any history of kidney stones.  Her blood sugars have been running around 200 at home and she states that between 100 and 200 is normal for her.    PAST MEDICAL / SURGICAL / SOCIAL / FAMILY HISTORY      has a past medical history of Asthma, Bipolar 1 disorder (HCC), BRCA1 gene mutation positive, Diabetes mellitus type 2 in obese, Diabetic acidosis without coma (HCC), GERD (gastroesophageal reflux disease), Hepatic steatosis, Hypertension, Insomnia, Migraine headache, Mood disorder, Morbid obesity with BMI of 40.0-44.9, adult (HCC), Morbid obesity with body mass

## 2025-05-22 NOTE — ED NOTES
Pt arrives to ED 23 via triage c/o of R sided abd pain.  Pt states that it started 3 days ago.  Pt states the pain starts at her R side and radiates to her mid abd.  Pt reports N/V, pt states she has not been able to keep any food or liquids down.  Pt reports last BM was last night.  Pt denies any pregnancy, states she has an implant, reports LMP was sometime in February.  Pt A&O x4, RR even and unlabored, call light within reach. Whiteboard updated. Will continue plan of care.

## 2025-06-03 ENCOUNTER — TELEPHONE (OUTPATIENT)
Age: 30
End: 2025-06-03

## 2025-06-03 ENCOUNTER — TELEMEDICINE (OUTPATIENT)
Age: 30
End: 2025-06-03
Payer: COMMERCIAL

## 2025-06-03 DIAGNOSIS — M54.50 LOW BACK PAIN, UNSPECIFIED BACK PAIN LATERALITY, UNSPECIFIED CHRONICITY, UNSPECIFIED WHETHER SCIATICA PRESENT: Primary | ICD-10-CM

## 2025-06-03 DIAGNOSIS — M25.50 ARTHRALGIA, UNSPECIFIED JOINT: ICD-10-CM

## 2025-06-03 DIAGNOSIS — M53.3 TENDERNESS OF SACROILIAC JOINT: ICD-10-CM

## 2025-06-03 PROCEDURE — G8427 DOCREV CUR MEDS BY ELIG CLIN: HCPCS | Performed by: STUDENT IN AN ORGANIZED HEALTH CARE EDUCATION/TRAINING PROGRAM

## 2025-06-03 PROCEDURE — G2211 COMPLEX E/M VISIT ADD ON: HCPCS | Performed by: STUDENT IN AN ORGANIZED HEALTH CARE EDUCATION/TRAINING PROGRAM

## 2025-06-03 PROCEDURE — 99213 OFFICE O/P EST LOW 20 MIN: CPT | Performed by: STUDENT IN AN ORGANIZED HEALTH CARE EDUCATION/TRAINING PROGRAM

## 2025-06-03 NOTE — PROGRESS NOTES
Reason for Referral: Fibromyalgia    No referring provider defined for this encounter.    Chief Complaint   Patient presents with    6 Month Follow-Up    Discuss Labs     Labs & Imaging     Documentation:    Total Time: minutes: 11-20 minutes    Tahira Millard was evaluated through a synchronous (real-time) video encounter. Patient identification was verified at the start of the visit. He/She (or guardian if applicable) is aware that this is a billable service, which includes applicable co-pays. This visit was conducted with the patient's (and/or legal guardian's) verbal consent.   The patient was located at Home: [address]  [place] [address].  The provider was located at Facility (Appt Dept): [address]  [address]  [place] [address].  Confirm you are appropriately licensed, registered, or certified to deliver care in the state where the patient is located as indicated above. If you are not or unsure, please re-schedule the visit: Yes, I confirm.      HISTORY OF PRESENT ILLNESS: Ms.Carissa SHARATH Millard is a 30 y.o. female with a medical history remarkable for diabetes mellitus, hepatomegaly, hepatic steatosis referred for evaluation of fibromyalgia.    Patient was following with orthopedics for concerns of lower back pain and there was a concern for fibromyalgia on the basis of her exam she was referred to rheumatology.  She is also following with pain management for lower back, MRI showed degenerative changes at L4-L5 and L5-S1.    Patient reports that she wakes up feeling extremely stiff throughout her body and takes at least an hour to loosen up but every time she moves her joints started to hurt again.  She is extremely sensitive to sensation of touch and it seems to hurt deep in her bones.  She was starting to get stiff while sitting in chair during the encounter and had difficulty walking on getting out of due to it. These symptoms are associated with numbness and tingling.    Interval

## 2025-06-08 ENCOUNTER — HOSPITAL ENCOUNTER (EMERGENCY)
Age: 30
Discharge: HOME | End: 2025-06-08
Attending: EMERGENCY MEDICINE
Payer: COMMERCIAL

## 2025-06-08 ENCOUNTER — APPOINTMENT (OUTPATIENT)
Dept: GENERAL RADIOLOGY | Age: 30
End: 2025-06-08
Payer: COMMERCIAL

## 2025-06-08 VITALS
HEART RATE: 102 BPM | TEMPERATURE: 98.1 F | DIASTOLIC BLOOD PRESSURE: 67 MMHG | RESPIRATION RATE: 18 BRPM | OXYGEN SATURATION: 100 % | SYSTOLIC BLOOD PRESSURE: 103 MMHG

## 2025-06-08 DIAGNOSIS — M25.571 ACUTE RIGHT ANKLE PAIN: ICD-10-CM

## 2025-06-08 DIAGNOSIS — M72.2 PLANTAR FASCIITIS: Primary | ICD-10-CM

## 2025-06-08 PROCEDURE — 73630 X-RAY EXAM OF FOOT: CPT

## 2025-06-08 PROCEDURE — 73610 X-RAY EXAM OF ANKLE: CPT

## 2025-06-08 PROCEDURE — 6360000002 HC RX W HCPCS

## 2025-06-08 PROCEDURE — 6370000000 HC RX 637 (ALT 250 FOR IP)

## 2025-06-08 PROCEDURE — 96372 THER/PROPH/DIAG INJ SC/IM: CPT | Performed by: EMERGENCY MEDICINE

## 2025-06-08 PROCEDURE — 99284 EMERGENCY DEPT VISIT MOD MDM: CPT | Performed by: EMERGENCY MEDICINE

## 2025-06-08 PROCEDURE — 73590 X-RAY EXAM OF LOWER LEG: CPT

## 2025-06-08 RX ORDER — ORPHENADRINE CITRATE 30 MG/ML
60 INJECTION INTRAMUSCULAR; INTRAVENOUS ONCE
Status: COMPLETED | OUTPATIENT
Start: 2025-06-08 | End: 2025-06-08

## 2025-06-08 RX ORDER — ACETAMINOPHEN 325 MG/1
650 TABLET ORAL ONCE
Status: COMPLETED | OUTPATIENT
Start: 2025-06-08 | End: 2025-06-08

## 2025-06-08 RX ORDER — IBUPROFEN 200 MG
600 TABLET ORAL EVERY 8 HOURS PRN
Qty: 30 TABLET | Refills: 0 | Status: SHIPPED | OUTPATIENT
Start: 2025-06-08 | End: 2025-06-15

## 2025-06-08 RX ORDER — KETOROLAC TROMETHAMINE 30 MG/ML
30 INJECTION, SOLUTION INTRAMUSCULAR; INTRAVENOUS ONCE
Status: COMPLETED | OUTPATIENT
Start: 2025-06-08 | End: 2025-06-08

## 2025-06-08 RX ORDER — ACETAMINOPHEN 500 MG
500 TABLET ORAL 4 TIMES DAILY PRN
Qty: 28 TABLET | Refills: 0 | Status: SHIPPED | OUTPATIENT
Start: 2025-06-08 | End: 2025-06-15

## 2025-06-08 RX ADMIN — KETOROLAC TROMETHAMINE 30 MG: 30 INJECTION, SOLUTION INTRAMUSCULAR at 16:31

## 2025-06-08 RX ADMIN — ORPHENADRINE CITRATE 60 MG: 60 INJECTION INTRAMUSCULAR; INTRAVENOUS at 16:31

## 2025-06-08 RX ADMIN — ACETAMINOPHEN 650 MG: 325 TABLET ORAL at 16:31

## 2025-06-08 ASSESSMENT — LIFESTYLE VARIABLES
HOW MANY STANDARD DRINKS CONTAINING ALCOHOL DO YOU HAVE ON A TYPICAL DAY: PATIENT DOES NOT DRINK
HOW OFTEN DO YOU HAVE A DRINK CONTAINING ALCOHOL: NEVER

## 2025-06-08 NOTE — ED PROVIDER NOTES
[facility]     Emergency Department     Faculty Attestation    I performed a history and physical examination of the patient and discussed management with the resident. I reviewed the resident's note and agree with the documented findings and plan of care. Any areas of disagreement are noted on the chart. I was personally present for the key portions of any procedures. I have documented in the chart those procedures where I was not present during the key portions. I have reviewed the emergency nurses triage note. I agree with the chief complaint, past medical history, past surgical history, allergies, medications, social and family history as documented unless otherwise noted below. For Physician Assistant/ Nurse Practitioner cases/documentation I have personally evaluated this patient and have completed at least one if not all key elements of the E/M (history, physical exam, and MDM). Additional findings are as noted.    Patient fractured her right ankle in 2021 and had surgery in 2022, patient is not sure why it is acting up now, on exam there is no erythema warmth or swelling to the right ankle, Achilles tendon is intact.     Ravi Vega MD  06/08/25 1808    
Product CAPS Take 1 tablet by mouth daily 4/14/25   Provider, MD New   vitamin E 400 UNIT capsule Take 1 capsule by mouth 2 times daily 4/14/25   Sandy Wynne MD   Continuous Glucose Sensor (FREESTYLE KENNY 3 PLUS SENSOR) MISC Use to check glucose 4 times daily 3/27/25   Jade Rivera APRN - CNP   tiZANidine (ZANAFLEX) 4 MG tablet Take 1 tablet by mouth 3 times daily as needed (back pain) 3/10/25   Amanda Hanna MD   Pancrelipase, Lip-Prot-Amyl, 3000-50577 units CPEP Take 1 capsule by mouth 3 times daily (with meals) 3/10/25   Amanda Hanna MD   vitamin D3 (CHOLECALCIFEROL) 25 MCG (1000 UT) TABS tablet Take 1 tablet by mouth daily 1/31/25   Maris Knight APRN - CNP   atorvastatin (LIPITOR) 20 MG tablet Take 1 tablet by mouth daily 1/31/25   Maris Knight APRN - CNP   dulaglutide (TRULICITY) 1.5 MG/0.5ML SC injection Inject 0.5 mLs into the skin every 7 days 1/29/25   Maris Knight APRN - CNP   ibuprofen (ADVIL;MOTRIN) 800 MG tablet Take 1 tablet by mouth every 8 hours as needed for Pain 11/18/24   Mairs Knight APRN - CNP   omeprazole (PRILOSEC) 20 MG delayed release capsule Take 1 capsule by mouth every morning (before breakfast) 11/18/24   Maris Knight APRN - CNP   albuterol sulfate HFA (PROVENTIL;VENTOLIN;PROAIR) 108 (90 Base) MCG/ACT inhaler INHALE 2 PUFFS INTO THE LUNGS EVERY 6 HOURS AS NEEDED FOR WHEEZING 9/4/24   Maris Knight APRN - CNP   metoclopramide (REGLAN) 10 MG tablet Take 1 tablet by mouth in the morning and 1 tablet in the evening. 6/28/24   Maris Knight APRN - CNP   dicyclomine (BENTYL) 20 MG tablet Take 1 tablet by mouth every 6 hours 6/28/24   Maris Knight APRN - CNP   budesonide-formoterol (SYMBICORT) 160-4.5 MCG/ACT AERO Inhale 2 puffs into the lungs in the morning and 2 puffs in the evening. 6/28/24   Maris Knight APRN - CNP   metFORMIN (GLUCOPHAGE) 1000 MG tablet take 1 tablet by mouth twice a day with meals 6/3/24   Maris Knight APRN -

## 2025-06-08 NOTE — DISCHARGE INSTRUCTIONS
You have a calcaneal enthesophyte which may be causing plantar fasciitis of your right foot.  You also have small amount of ankle swelling in your x-ray.  There is no fracture, dislocation.  No signs of infection.  You need to follow-up outpatient with a podiatrist for reevaluation of your pain.  Return the emergency department for any worsening severe pain, swelling, inability to walk, fevers, other new or concerning symptoms

## 2025-06-08 NOTE — ED NOTES
Pt to ED with complaints of right ankle pain. Pt states that the pain began 2 days ago. Pt denies any injury to her right ankle recently. Pt states that she did break her ankle 4 years ago. No noticeable signs of deformity/injury or swelling to right ankle. VSS, NAD, AOx4. Patient resting in bed, respirations even and unlabored. Call light in reach.

## 2025-06-12 ENCOUNTER — HOSPITAL ENCOUNTER (OUTPATIENT)
Dept: MRI IMAGING | Age: 30
Discharge: HOME | End: 2025-06-14
Payer: COMMERCIAL

## 2025-06-12 DIAGNOSIS — Z91.89 AT HIGH RISK FOR BREAST CANCER: ICD-10-CM

## 2025-06-12 PROCEDURE — C8908 MRI W/O FOL W/CONT, BREAST,: HCPCS

## 2025-06-12 PROCEDURE — 2500000003 HC RX 250 WO HCPCS: Performed by: CLINICAL NURSE SPECIALIST

## 2025-06-12 PROCEDURE — 2580000003 HC RX 258: Performed by: CLINICAL NURSE SPECIALIST

## 2025-06-12 PROCEDURE — A9579 GAD-BASE MR CONTRAST NOS,1ML: HCPCS | Performed by: CLINICAL NURSE SPECIALIST

## 2025-06-12 PROCEDURE — 6360000004 HC RX CONTRAST MEDICATION: Performed by: CLINICAL NURSE SPECIALIST

## 2025-06-12 RX ORDER — GADOTERIDOL 279.3 MG/ML
20 INJECTION INTRAVENOUS
Status: COMPLETED | OUTPATIENT
Start: 2025-06-12 | End: 2025-06-12

## 2025-06-12 RX ORDER — SODIUM CHLORIDE 0.9 % (FLUSH) 0.9 %
10 SYRINGE (ML) INJECTION PRN
Status: DISCONTINUED | OUTPATIENT
Start: 2025-06-12 | End: 2025-06-15 | Stop reason: HOSPADM

## 2025-06-12 RX ORDER — 0.9 % SODIUM CHLORIDE 0.9 %
35 INTRAVENOUS SOLUTION INTRAVENOUS ONCE
Status: COMPLETED | OUTPATIENT
Start: 2025-06-12 | End: 2025-06-12

## 2025-06-12 RX ADMIN — SODIUM CHLORIDE, PRESERVATIVE FREE 10 ML: 5 INJECTION INTRAVENOUS at 14:54

## 2025-06-12 RX ADMIN — GADOTERIDOL 20 ML: 279.3 INJECTION, SOLUTION INTRAVENOUS at 14:54

## 2025-06-12 RX ADMIN — SODIUM CHLORIDE 35 ML: 9 INJECTION, SOLUTION INTRAVENOUS at 14:54

## 2025-06-13 ENCOUNTER — RESULTS FOLLOW-UP (OUTPATIENT)
Dept: OBGYN CLINIC | Age: 30
End: 2025-06-13

## 2025-07-31 ENCOUNTER — TELEPHONE (OUTPATIENT)
Dept: GASTROENTEROLOGY | Age: 30
End: 2025-07-31

## 2025-08-18 ENCOUNTER — OFFICE VISIT (OUTPATIENT)
Dept: OBGYN CLINIC | Age: 30
End: 2025-08-18
Payer: COMMERCIAL

## 2025-08-18 VITALS
BODY MASS INDEX: 35.03 KG/M2 | DIASTOLIC BLOOD PRESSURE: 78 MMHG | HEART RATE: 94 BPM | WEIGHT: 218 LBS | SYSTOLIC BLOOD PRESSURE: 122 MMHG | HEIGHT: 66 IN

## 2025-08-18 DIAGNOSIS — N92.1 BREAKTHROUGH BLEEDING ON NEXPLANON: Primary | ICD-10-CM

## 2025-08-18 DIAGNOSIS — Z97.5 BREAKTHROUGH BLEEDING ON NEXPLANON: Primary | ICD-10-CM

## 2025-08-18 PROCEDURE — G8427 DOCREV CUR MEDS BY ELIG CLIN: HCPCS | Performed by: CLINICAL NURSE SPECIALIST

## 2025-08-18 PROCEDURE — 1036F TOBACCO NON-USER: CPT | Performed by: CLINICAL NURSE SPECIALIST

## 2025-08-18 PROCEDURE — 3078F DIAST BP <80 MM HG: CPT | Performed by: CLINICAL NURSE SPECIALIST

## 2025-08-18 PROCEDURE — 3074F SYST BP LT 130 MM HG: CPT | Performed by: CLINICAL NURSE SPECIALIST

## 2025-08-18 PROCEDURE — G8417 CALC BMI ABV UP PARAM F/U: HCPCS | Performed by: CLINICAL NURSE SPECIALIST

## 2025-08-18 PROCEDURE — 99213 OFFICE O/P EST LOW 20 MIN: CPT | Performed by: CLINICAL NURSE SPECIALIST

## 2025-08-18 RX ORDER — DESOGESTREL AND ETHINYL ESTRADIOL 21-5 (28)
1 KIT ORAL DAILY
Qty: 3 PACKET | Refills: 0 | Status: SHIPPED | OUTPATIENT
Start: 2025-08-18

## 2025-08-18 ASSESSMENT — ENCOUNTER SYMPTOMS
GASTROINTESTINAL NEGATIVE: 1
RESPIRATORY NEGATIVE: 1
EYES NEGATIVE: 1
ALLERGIC/IMMUNOLOGIC NEGATIVE: 1

## (undated) DEVICE — STRAP,POSITIONING,KNEE/BODY,FOAM,4X60": Brand: MEDLINE

## (undated) DEVICE — GAUZE,PACKING STRIP,IODOFORM,1/2"X5YD,ST: Brand: CURAD

## (undated) DEVICE — 20 ML SYRINGE LUER-LOCK TIP: Brand: MONOJECT

## (undated) DEVICE — GLOVE ORANGE PI 7   MSG9070

## (undated) DEVICE — SINGLE PORT MANIFOLD: Brand: NEPTUNE 2

## (undated) DEVICE — 450 ML BOTTLE OF 0.05% CHLORHEXIDINE GLUCONATE IN 99.95% STERILE WATER FOR IRRIGATION, USP AND APPLICATOR.: Brand: IRRISEPT ANTIMICROBIAL WOUND LAVAGE

## (undated) DEVICE — PAD,ABDOMINAL,8"X7.5",ST,LF,20/BX: Brand: MEDLINE INDUSTRIES, INC.

## (undated) DEVICE — TRAXI PANNICULUS RETRACTOR WITH RETENTUS TECHNOLOGY (BMI 30-50): Brand: TRAXI® PANNICULUS RETRACTOR

## (undated) DEVICE — SUTURE NONABSORBABLE MONOFILAMENT 4-0 PS-2 18 IN BLU PROLENE 8682H

## (undated) DEVICE — COUNTER NDL 10 COUNT HLD 20 FOAM BLK SGL MAG

## (undated) DEVICE — SUTURE CHROMIC GUT SZ 1 L36IN ABSRB BRN L48MM CTX 1/2 CIR 905H

## (undated) DEVICE — FORCEPS BX L L240CM DIA2.4MM RAD JAW 4 HOT FOR POLYP DISP

## (undated) DEVICE — ST CHARLES MINOR ABDOMINAL PK: Brand: MEDLINE INDUSTRIES, INC.

## (undated) DEVICE — SOLUTION IV IRRIG WATER 1000ML POUR BRL 2F7114

## (undated) DEVICE — CATHETERIZATION KIT PEDIATRIC 16 FR 5 CC INDWL INF CTRL

## (undated) DEVICE — GAUZE,SPONGE,FLUFF,6"X6.75",STRL,5/TRAY: Brand: MEDLINE

## (undated) DEVICE — SUTURE MCRYL + SZ 4-0 L27IN ABSRB UD L19MM PS-2 3/8 CIR MCP426H

## (undated) DEVICE — HYPODERMIC SAFETY NEEDLE: Brand: MAGELLAN

## (undated) DEVICE — PATIENT RETURN ELECTRODE, SINGLE-USE, CONTACT QUALITY MONITORING, ADULT, WITH 9FT CORD, FOR PATIENTS WEIGING OVER 33LBS. (15KG): Brand: MEGADYNE

## (undated) DEVICE — ERBE NESSY® OMEGA PLATE USA (85+23)CM² , WITH CABLE 3 M: Brand: ERBE

## (undated) DEVICE — KENDALL SCD EXPRESS SLEEVES, KNEE LENGTH, MEDIUM: Brand: KENDALL SCD

## (undated) DEVICE — CHLORAPREP 26ML ORANGE

## (undated) DEVICE — SUTURE PDS II SZ 1 L36IN ABSRB VLT CT L40MM 1/2 CIR TAPR Z359T

## (undated) DEVICE — ELECTROSURGICAL PENCIL BUTTON SWITCH E-Z CLEAN COATED BLADE ELECTRODE 10 FT (3 M) CORD HOLSTER: Brand: MEGADYNE

## (undated) DEVICE — SOLUTION IRRIG 1000ML STRL H2O USP PLAS POUR BTL

## (undated) DEVICE — SPONGE LAP W18XL18IN WHT COT 4 PLY FLD STRUNG RADPQ DISP ST

## (undated) DEVICE — CANNULA NSL AD TBNG L7FT PVC STR NONFLARED PRNG O2 DEL W STD

## (undated) DEVICE — SWAB CULT CLR BLU PLAS RAYON LIQ AMIES AERB ANAERB FRIC CAP

## (undated) DEVICE — SYRINGE IRRIG 60ML SFT PLIABLE BLB EZ TO GRP 1 HND USE W/

## (undated) DEVICE — GLOVE ORTHO 7 1/2   MSG9475

## (undated) DEVICE — DRESSING PETRO W3XL8IN OIL EMUL N ADH GZ KNIT IMPREG CELOS

## (undated) DEVICE — BITEBLOCK 54FR W/ DENT RIM BLOX

## (undated) DEVICE — GLOVE SURG SZ 7 L12IN FNGR THK79MIL GRN LTX FREE

## (undated) DEVICE — Z DUPLICATE USE 2527422 TUBING O2 STD 7 FT EXTN NO CRUSH VISUAL CNTRST LF

## (undated) DEVICE — DEFENDO AIR WATER SUCTION AND BIOPSY VALVE KIT FOR  OLYMPUS: Brand: DEFENDO AIR/WATER/SUCTION AND BIOPSY VALVE

## (undated) DEVICE — Device

## (undated) DEVICE — GELFOAM SZ 100 SPNG

## (undated) DEVICE — SPONGE GZ 4X4 IN 16-PLY DETECTABLE W/ DMT MSTR TAG

## (undated) DEVICE — SUTURE MCRYL SZ 2-0 L27IN ABSRB UD SH L26MM TAPERPOINT NDL Y417H

## (undated) DEVICE — STRAP POS MP 30X3 IN HK LOOP CLOSURE FOAM DISP

## (undated) DEVICE — SURGICAL PROCEDURE PACK C SECT ST CHARLES SCMH

## (undated) DEVICE — COVER,MAYO STAND,STERILE: Brand: MEDLINE

## (undated) DEVICE — PICO SINGLE USE NEGATIVE PRESSURE WOUND THERAPY SYSTEM 10CM X 30CM 4IN. X 12IN.: Brand: PICO

## (undated) DEVICE — GOWN,POLY REINFORCED,LG: Brand: MEDLINE

## (undated) DEVICE — Z DISCONTINUED GLOVE SURG SZ 7.5 L12IN FNGR THK13MIL WHT ISOLEX

## (undated) DEVICE — Z INACTIVE USE 2525529 CONNECTOR TBNG FOR O2

## (undated) DEVICE — YANKAUER,POOLE TIP,STERILE,50/CS: Brand: MEDLINE

## (undated) DEVICE — ELECTRODE PT RET AD L9FT HI MOIST COND ADH HYDRGEL CORDED

## (undated) DEVICE — PADDING CAST W6INXL4YD COT LO LINTING WYTEX

## (undated) DEVICE — SPLINT THMB W5XL30IN FBRGLS PD PRECUT LTWT DURABLE FAST SET

## (undated) DEVICE — SUTURE VCRL SZ 1 L36IN ABSRB VLT L36MM CT-1 1/2 CIR J347H

## (undated) DEVICE — ZIMMER® STERILE DISPOSABLE TOURNIQUET CUFF WITH PROTECTIVE SLEEVE AND PLC, DUAL PORT, SINGLE BLADDER, 24 IN. (61 CM)

## (undated) DEVICE — GOWN,AURORA,NONREINFORCED,LARGE: Brand: MEDLINE

## (undated) DEVICE — FORCEPS BX L240CM JAW DIA2.8MM L CAP W/ NDL MIC MESH TOOTH

## (undated) DEVICE — KIT SCOPE NANO 1.9MM DISP

## (undated) DEVICE — DRAPE,ARTHRO,W/POUCH,STERILE: Brand: MEDLINE

## (undated) DEVICE — GLOVE SURG SZ 65 THK91MIL LTX FREE SYN POLYISOPRENE

## (undated) DEVICE — SUTURE VCRL SZ 2-0 L36IN ABSRB VLT L36MM CT-1 1/2 CIR J345H

## (undated) DEVICE — SURGICAL PROCEDURE PACK C SECT B

## (undated) DEVICE — POOLE SUCTION HANDLE: Brand: CARDINAL HEALTH

## (undated) DEVICE — ST CHARLES PERI-GYN PACK: Brand: MEDLINE INDUSTRIES, INC.

## (undated) DEVICE — SURGICAL BRA: Brand: DEROYAL

## (undated) DEVICE — Z DISCONTINUED USE 2424143 ADAPTER O2 SWVL CHRISTMAS TREE GRN

## (undated) DEVICE — ENDO KIT W/SYRINGE: Brand: MEDLINE INDUSTRIES, INC.

## (undated) DEVICE — SOLUTION SCRB 16OZ SKIN 4% CHG CLN BASE W/ PMP FOR PT SKIN

## (undated) DEVICE — SOLUTION IRRIG 1500ML STRL H2O USP POUR PLAS BTL

## (undated) DEVICE — INTENDED FOR TISSUE SEPARATION, AND OTHER PROCEDURES THAT REQUIRE A SHARP SURGICAL BLADE TO PUNCTURE OR CUT.: Brand: BARD-PARKER ® CARBON RIB-BACK BLADES

## (undated) DEVICE — JELLY,LUBE,STERILE,FLIP TOP,TUBE,2-OZ: Brand: MEDLINE

## (undated) DEVICE — SOLUTION IRRIG 3000ML 0.9% SOD CHL USP UROMATIC PLAS CONT

## (undated) DEVICE — SHEET, T, LAPAROTOMY, STERILE: Brand: MEDLINE

## (undated) DEVICE — Z DISCONTINUED BY MEDLINE USE 2711682 TRAY SKIN PREP DRY W/ PREM GLV

## (undated) DEVICE — Z INACTIVE USE 2847792 BLADE SHV L13CM DIA35MM DISECT AGG COOL

## (undated) DEVICE — GLOVE ORTHO 8   MSG9480

## (undated) DEVICE — MEDI-VAC YANK SUCT HNDL W/TPRD BULBOUS TIP & NON-CONDUCTIVE: Brand: CARDINAL HEALTH